# Patient Record
Sex: MALE | Race: BLACK OR AFRICAN AMERICAN | Employment: OTHER | URBAN - METROPOLITAN AREA
[De-identification: names, ages, dates, MRNs, and addresses within clinical notes are randomized per-mention and may not be internally consistent; named-entity substitution may affect disease eponyms.]

---

## 2017-01-17 ENCOUNTER — OFFICE VISIT (OUTPATIENT)
Dept: ENDOCRINOLOGY | Age: 72
End: 2017-01-17

## 2017-01-17 VITALS
SYSTOLIC BLOOD PRESSURE: 124 MMHG | DIASTOLIC BLOOD PRESSURE: 80 MMHG | HEIGHT: 72 IN | WEIGHT: 245.2 LBS | HEART RATE: 80 BPM | BODY MASS INDEX: 33.21 KG/M2

## 2017-01-17 DIAGNOSIS — I10 ESSENTIAL HYPERTENSION WITH GOAL BLOOD PRESSURE LESS THAN 130/80: ICD-10-CM

## 2017-01-17 DIAGNOSIS — E11.22 TYPE 2 DIABETES MELLITUS WITH STAGE 4 CHRONIC KIDNEY DISEASE, WITH LONG-TERM CURRENT USE OF INSULIN (HCC): Primary | ICD-10-CM

## 2017-01-17 DIAGNOSIS — E78.5 HYPERLIPIDEMIA, UNSPECIFIED HYPERLIPIDEMIA TYPE: ICD-10-CM

## 2017-01-17 DIAGNOSIS — N18.4 TYPE 2 DIABETES MELLITUS WITH STAGE 4 CHRONIC KIDNEY DISEASE, WITH LONG-TERM CURRENT USE OF INSULIN (HCC): Primary | ICD-10-CM

## 2017-01-17 DIAGNOSIS — Z79.4 TYPE 2 DIABETES MELLITUS WITH STAGE 4 CHRONIC KIDNEY DISEASE, WITH LONG-TERM CURRENT USE OF INSULIN (HCC): Primary | ICD-10-CM

## 2017-01-17 LAB — HBA1C MFR BLD HPLC: 8.8 %

## 2017-01-17 RX ORDER — CARVEDILOL 3.12 MG/1
6.25 TABLET ORAL 2 TIMES DAILY
COMMUNITY
Start: 2016-12-06 | End: 2018-02-27 | Stop reason: ALTCHOICE

## 2017-01-17 NOTE — PATIENT INSTRUCTIONS
Switch to Reli-On 70-30 insulin (mixed)  Take 40 units with breakfast and 40 units with dinner ONLY  STOP levemir and novolog    ----------------------------------------------------------------------------------------------------------------------    Below you will find a glucose log sheet which you can use to record your blood sugars. Without checking and recording what your home glucose levels are, it will be difficult to make any changes to your medication dose, even when significant changes may be needed. Please feel free to use the log below to record your home glucose levels. At the very least, I would like for you to login the entire 2-3 weeks just before your visit so we can make your visit much more productive and beneficial to you. GLUCOSE LOG SHEET:    Date Breakfast Lunch Dinner Bedtime Comments ? GLUCOSE LOG SHEET:    Date Breakfast Lunch Dinner Bedtime Comments ? GLUCOSE LOG SHEET:    Date Breakfast Lunch Dinner Bedtime Comments ? Learning About Diabetes Food Guidelines  Your Care Instructions  Meal planning is important to manage diabetes. It helps keep your blood sugar at a target level (which you set with your doctor). You don't have to eat special foods.  You can eat what your family eats, including sweets once in a while. But you do have to pay attention to how often you eat and how much you eat of certain foods. You may want to work with a dietitian or a certified diabetes educator (CDE) to help you plan meals and snacks. A dietitian or CDE can also help you lose weight if that is one of your goals. What should you know about eating carbs? Managing the amount of carbohydrate (carbs) you eat is an important part of healthy meals when you have diabetes. Carbohydrate is found in many foods. · Learn which foods have carbs. And learn the amounts of carbs in different foods. ¨ Bread, cereal, pasta, and rice have about 15 grams of carbs in a serving. A serving is 1 slice of bread (1 ounce), ½ cup of cooked cereal, or 1/3 cup of cooked pasta or rice. ¨ Fruits have 15 grams of carbs in a serving. A serving is 1 small fresh fruit, such as an apple or orange; ½ of a banana; ½ cup of cooked or canned fruit; ½ cup of fruit juice; 1 cup of melon or raspberries; or 2 tablespoons of dried fruit. ¨ Milk and no-sugar-added yogurt have 15 grams of carbs in a serving. A serving is 1 cup of milk or 2/3 cup of no-sugar-added yogurt. ¨ Starchy vegetables have 15 grams of carbs in a serving. A serving is ½ cup of mashed potatoes or sweet potato; 1 cup winter squash; ½ of a small baked potato; ½ cup of cooked beans; or ½ cup cooked corn or green peas. · Learn how much carbs to eat each day and at each meal. A dietitian or CDE can teach you how to keep track of the amount of carbs you eat. This is called carbohydrate counting. · If you are not sure how to count carbohydrate grams, use the Plate Method to plan meals. It is a good, quick way to make sure that you have a balanced meal. It also helps you spread carbs throughout the day. ¨ Divide your plate by types of foods.  Put non-starchy vegetables on half the plate, meat or other protein food on one-quarter of the plate, and a grain or starchy vegetable in the final quarter of the plate. To this you can add a small piece of fruit and 1 cup of milk or yogurt, depending on how many carbs you are supposed to eat at a meal.  · Try to eat about the same amount of carbs at each meal. Do not \"save up\" your daily allowance of carbs to eat at one meal.  · Proteins have very little or no carbs per serving. Examples of proteins are beef, chicken, turkey, fish, eggs, tofu, cheese, cottage cheese, and peanut butter. A serving size of meat is 3 ounces, which is about the size of a deck of cards. Examples of meat substitute serving sizes (equal to 1 ounce of meat) are 1/4 cup of cottage cheese, 1 egg, 1 tablespoon of peanut butter, and ½ cup of tofu. How can you eat out and still eat healthy? · Learn to estimate the serving sizes of foods that have carbohydrate. If you measure food at home, it will be easier to estimate the amount in a serving of restaurant food. · If the meal you order has too much carbohydrate (such as potatoes, corn, or baked beans), ask to have a low-carbohydrate food instead. Ask for a salad or green vegetables. · If you use insulin, check your blood sugar before and after eating out to help you plan how much to eat in the future. · If you eat more carbohydrate at a meal than you had planned, take a walk or do other exercise. This will help lower your blood sugar. What else should you know? · Limit saturated fat, such as the fat from meat and dairy products. This is a healthy choice because people who have diabetes are at higher risk of heart disease. So choose lean cuts of meat and nonfat or low-fat dairy products. Use olive or canola oil instead of butter or shortening when cooking. · Don't skip meals. Your blood sugar may drop too low if you skip meals and take insulin or certain medicines for diabetes. · Check with your doctor before you drink alcohol. Alcohol can cause your blood sugar to drop too low.  Alcohol can also cause a bad reaction if you take certain diabetes medicines. Follow-up care is a key part of your treatment and safety. Be sure to make and go to all appointments, and call your doctor if you are having problems. It's also a good idea to know your test results and keep a list of the medicines you take. Where can you learn more? Go to http://maria r-fran.info/. Enter B157 in the search box to learn more about \"Learning About Diabetes Food Guidelines. \"  Current as of: May 23, 2016  Content Version: 11.1  © 1797-9536 [employer]. Care instructions adapted under license by [employer] (which disclaims liability or warranty for this information). If you have questions about a medical condition or this instruction, always ask your healthcare professional. [address] any warranty or liability for your use of this information.

## 2017-01-17 NOTE — PROGRESS NOTES
CHIEF COMPLAINT: f/u evaluation for uncontrolled type 2 diabetes    HISTORY OF PRESENT ILLNESS:   Mandie Stovall is a 73 y.o. male with a PMHx as noted below who presents to the endocrinology clinic for f/u evaluation of uncontrolled type 2 diabetes. Patient presents for f/u. Noted for membranous nephropathy requiring steroids, and thus using NPH as an adjunct treatment for steroid induced hyperglycemia. He is currently off prednisone as of the 31st of December. Off the NPH as of 6-7th of January. Currently taking the following meds:  Levemir 30 units daily  Novolog 10/15/10  NPH: OFF    Review of home blood glucose:  -220  Lunch 180-300  Dinner 200-300's  Bedtime 200-300    Blood pressure today is stable, need to check a baseline urine microalbumin level, which may be a marker for us in the future of worsening nephropathy. PAST MEDICAL/SURGICAL HISTORY:   Past Medical History   Diagnosis Date    Arthritis     CAD (coronary artery disease)      high cholesterol    Chronic kidney disease     Diabetes ([facility] [address]     GERD (gastroesophageal reflux disease)      HX    Hypertension     Ill-defined condition     Unspecified sleep apnea      NO CPAP     Past Surgical History   Procedure Laterality Date    Hx orthopaedic       CERVICAL FUSION    Hx urological       TURP    Colonoscopy N/A 11/9/2016     COLONOSCOPY performed by Es Del Toro MD at [facility] ENDOSCOPY       ALLERGIES:   Allergies   Allergen Reactions    Albumin, Human 25 % Anaphylaxis and Hives       MEDICATIONS ON ADMISSION:     Current Outpatient Prescriptions:     carvedilol (COREG) 3.125 mg tablet, , Disp: , Rfl:     raNITIdine (ZANTAC) 150 mg tablet, , Disp: , Rfl:     insulin aspart (NOVOLOG) 100 unit/mL injection, 0.1 mL by SubCUTAneous route Before breakfast, lunch, and dinner., Disp: 10 mL, Rfl: 3    lisinopril (PRINIVIL, ZESTRIL) 20 mg tablet, Take 40 mg by mouth daily. , Disp: , Rfl:     atorvastatin (LIPITOR) 20 mg tablet, Take 20 mg by mouth daily. , Disp: , Rfl:     glimepiride (AMARYL) 4 mg tablet, Take 4 mg by mouth every morning., Disp: , Rfl:     insulin detemir (LEVEMIR) 100 unit/mL injection, 50 Units by SubCUTAneous route every evening., Disp: , Rfl:     Calcium-Cholecalciferol, D3, (CALCIUM 600 WITH VITAMIN D3) 600 mg(1,500mg) -400 unit chew, Take 1 Tab by mouth daily. , Disp: , Rfl:     fluticasone (FLONASE) 50 mcg/actuation nasal spray, 2 Sprays by Both Nostrils route daily. , Disp: , Rfl:     tamsulosin (FLOMAX) 0.4 mg capsule, Take 0.4 mg by mouth daily. , Disp: , Rfl:     insulin NPH (HUMULIN N) 100 unit/mL injection, 45 Units by SubCUTAneous route daily (with breakfast). To be taken with prednisone, Disp: , Rfl:     predniSONE (DELTASONE) 10 mg tablet, Take 40 mg by mouth daily. , Disp: , Rfl:     SOCIAL HISTORY:   Social History     Social History    Marital status: UNKNOWN     Spouse name: N/A    Number of children: N/A    Years of education: N/A     Occupational History    Not on file. Social History Main Topics    Smoking status: Former Smoker    Smokeless tobacco: Not on file      Comment: cigar    Alcohol use No    Drug use: No    Sexual activity: Not Currently     Other Topics Concern    Not on file     Social History Narrative       FAMILY HISTORY:  Family History   Problem Relation Age of Onset    Cancer Father     Diabetes Brother        REVIEW OF SYSTEMS: Complete ROS assessed and noted for that which is described above, all else are negative.   Eyes: normal  ENT: normal  CVS: normal  Resp: normal  GI: normal  : normal  GYN: normal  Endocrine: normal  Integument: normal  Musculoskeletal: normal  Neuro: normal  Psych: normal      PHYSICAL EXAMINATION:    VITAL SIGNS:  Visit Vitals    /80 (BP 1 Location: Right arm, BP Patient Position: Sitting)    Pulse 80    Ht 6' (1.829 m)    Wt 245 lb 3.2 oz (111.2 kg)    BMI 33.26 kg/m2       GENERAL: NCAT, Sitting comfortably, NAD  EYES: EOMI, non-icteric, no proptosis  Ear/Nose/Throat: NCAT, no inflammation, no masses  LYMPH NODES: No LAD  CARDIOVASCULAR: S1 S2, RRR, No murmur, 2+ radial pulses  RESPIRATORY: CTA b/l, no wheeze/rales  GASTROINTESTINAL: obese, NT, ND,  MUSCULOSKELETAL: Normal ROM, no atrophy  SKIN: warm, no edema/rash/ or other skin changes  NEUROLOGIC: 5/5 power all extremities, no tremors, AAOx3  PSYCHIATRIC: Normal affect, Normal insight and judgement         REVIEW OF LABORATORY AND RADIOLOGY DATA:   Labs and documentation have been reviewed as described above. ASSESSMENT AND PLAN:   Gricelda Nguyen is a 73 y.o. male with a PMHx as noted above who presents to the endocrinology clinic for evaluation of uncontrolled type 2 diabetes. 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 We discussed the plan moving forward and the importance of checking glucose and updating me with logs when glucose is not controlled. DM2:  STOP Levemir and Novolog  Start Novolin 70/30 at 40 units breakfast and dinner, will need to adjust from there, start this tomorrow  Continue to check glucose 4x/day  Provided with new log sheets      HTN: BP stable continue current meds  HLD: stable, will f/u on lipids when approp    Labs: urine microalbumin today    1 month f/u visit. Carolina Castellon.  [address] [facility]

## 2017-01-17 NOTE — MR AVS SNAPSHOT
Visit Information Date & Time Provider Department Dept. Phone Encounter #  
 1/17/2017  2:30 PM Luz Love, [address] [place] [address] Diabetes and Endocrinology [phone] [id number] Follow-up Instructions Return in about 1 month (around 2/17/2017). Upcoming Health Maintenance Date Due MICROALBUMIN Q1 8/29/1955 EYE EXAM RETINAL OR DILATED Q1 8/29/1957 FOBT Q 1 YEAR AGE 50-75 8/29/1997 ZOSTER VACCINE AGE 60> 8/29/2007 GLAUCOMA SCREENING Q2Y 8/29/2012 Pneumococcal 65+ High/Highest Risk (1 of 2 - PCV13) 8/29/2012 MEDICARE YEARLY EXAM 8/29/2012 HEMOGLOBIN A1C Q6M 5/16/2017 FOOT EXAM Q1 11/16/2017 LIPID PANEL Q1 11/16/2017 DTaP/Tdap/Td series (2 - Td) 6/24/2023 Allergies as of 1/17/2017  Review Complete On: 1/17/2017 By: Luz Love MD  
  
 Severity Noted Reaction Type Reactions Albumin, Human 25 % High 08/21/2016    Anaphylaxis, Hives Current Immunizations  Never Reviewed Name Date Influenza Vaccine (Quad) PF 11/1/2016  9:36 AM  
 Tdap 6/24/2013  7:24 PM  
  
 Not reviewed this visit You Were Diagnosed With   
  
 Codes Comments Type 2 diabetes mellitus with stage 4 chronic kidney disease, with long-term current use of insulin (HCC)    -  Primary ICD-10-CM: E11.22, N18.4, Z79.4 ICD-9-CM: 250.40, 585.4, V58.67 Hyperlipidemia, unspecified hyperlipidemia type     ICD-10-CM: E78.5 ICD-9-CM: 272.4 Essential hypertension with goal blood pressure less than 130/80     ICD-10-CM: I10 
ICD-9-CM: 401.9 Vitals BP Pulse Height(growth percentile) Weight(growth percentile) BMI Smoking Status 124/80 (BP 1 Location: Right arm, BP Patient Position: Sitting) 80 6' (1.829 m) 245 lb 3.2 oz (111.2 kg) 33.26 kg/m2 Former Smoker BMI and BSA Data Body Mass Index Body Surface Area  
 33.26 kg/m 2 2.38 m 2 Preferred Pharmacy Pharmacy Name Phone  [facility], [address] [facility] [phone] Your Updated Medication List  
  
   
This list is accurate as of: 17  3:00 PM.  Always use your most recent med list.  
  
  
  
  
 AMARYL 4 mg tablet Generic drug:  glimepiride Take 4 mg by mouth every morning. CALCIUM 600 WITH VITAMIN D3 600 mg(1,500mg) -400 unit Chew Generic drug:  Calcium-Cholecalciferol (D3) Take 1 Tab by mouth daily. carvedilol 3.125 mg tablet Commonly known as:  COREG  
  
 FLOMAX 0.4 mg capsule Generic drug:  tamsulosin Take 0.4 mg by mouth daily. FLONASE 50 mcg/actuation nasal spray Generic drug:  fluticasone 2 Sprays by Both Nostrils route daily. insulin NPH/insulin regular 100 unit/mL (70-30) injection Commonly known as:  NOVOLIN 70/30, HUMULIN 70/30  
40 Units by SubCUTAneous route Before breakfast and dinner. Reli-On brand  
  
 insulin syringe-needle U-100  
by SubCUTAneous route two (2) times a day. LIPITOR 20 mg tablet Generic drug:  atorvastatin Take 20 mg by mouth daily. lisinopril 20 mg tablet Commonly known as:  Supriya Kewanna Take 40 mg by mouth daily. predniSONE 10 mg tablet Commonly known as:  Christina Gordon Take 40 mg by mouth daily. raNITIdine 150 mg tablet Commonly known as:  ZANTAC Prescriptions Sent to Pharmacy Refills  
 insulin NPH/insulin regular (NOVOLIN 70/30, HUMULIN 70/30) 100 unit/mL (70-30) injection 11 Si Units by SubCUTAneous route Before breakfast and dinner. Reli-On brand Class: Normal  
 Pharmacy: [address], [facility] Ph #: [phone] Route: SubCUTAneous  
 insulin syringe-needle U-100 11 Sig: by SubCUTAneous route two (2) times a day. Class: Normal  
 Pharmacy: [address], [facility] Ph #: [phone] Route: SubCUTAneous We Performed the Following AMB POC HEMOGLOBIN A1C [62619 CPT(R)] MICROALBUMIN, UR, RAND W/ MICROALBUMIN/CREA RATIO [id number] [facility](R)] Follow-up Instructions Return in about 1 month (around 2/17/2017). Patient Instructions Switch to Reli-On 70-30 insulin (mixed) Take 40 units with breakfast and 40 units with dinner ONLY 
STOP levemir and novolog 
 
---------------------------------------------------------------------------------------------------------------------- Below you will find a glucose log sheet which you can use to record your blood sugars. Without checking and recording what your home glucose levels are, it will be difficult to make any changes to your medication dose, even when significant changes may be needed. Please feel free to use the log below to record your home glucose levels. At the very least, I would like for you to login the entire 2-3 weeks just before your visit so we can make your visit much more productive and beneficial to you. GLUCOSE LOG SHEET: 
 
Date Breakfast Lunch Dinner Bedtime Comments ? GLUCOSE LOG SHEET: 
 
Date Breakfast Lunch Dinner Bedtime Comments ? GLUCOSE LOG SHEET: 
 
Date Breakfast Lunch Dinner Bedtime Comments ? Learning About Diabetes Food Guidelines Your Care Instructions Meal planning is important to manage diabetes. It helps keep your blood sugar at a target level (which you set with your doctor). You don't have to eat special foods. You can eat what your family eats, including sweets once in a while. But you do have to pay attention to how often you eat and how much you eat of certain foods. You may want to work with a dietitian or a certified diabetes educator (CDE) to help you plan meals and snacks. A dietitian or CDE can also help you lose weight if that is one of your goals. What should you know about eating carbs? Managing the amount of carbohydrate (carbs) you eat is an important part of healthy meals when you have diabetes. Carbohydrate is found in many foods. · Learn which foods have carbs. And learn the amounts of carbs in different foods. ¨ Bread, cereal, pasta, and rice have about 15 grams of carbs in a serving. A serving is 1 slice of bread (1 ounce), ½ cup of cooked cereal, or 1/3 cup of cooked pasta or rice. ¨ Fruits have 15 grams of carbs in a serving. A serving is 1 small fresh fruit, such as an apple or orange; ½ of a banana; ½ cup of cooked or canned fruit; ½ cup of fruit juice; 1 cup of melon or raspberries; or 2 tablespoons of dried fruit. ¨ Milk and no-sugar-added yogurt have 15 grams of carbs in a serving. A serving is 1 cup of milk or 2/3 cup of no-sugar-added yogurt. ¨ Starchy vegetables have 15 grams of carbs in a serving. A serving is ½ cup of mashed potatoes or sweet potato; 1 cup winter squash; ½ of a small baked potato; ½ cup of cooked beans; or ½ cup cooked corn or green peas. · Learn how much carbs to eat each day and at each meal. A dietitian or CDE can teach you how to keep track of the amount of carbs you eat. This is called carbohydrate counting. · If you are not sure how to count carbohydrate grams, use the Plate Method to plan meals.  It is a good, quick way to make sure that you have a balanced meal. It also helps you spread carbs throughout the day. ¨ Divide your plate by types of foods. Put non-starchy vegetables on half the plate, meat or other protein food on one-quarter of the plate, and a grain or starchy vegetable in the final quarter of the plate. To this you can add a small piece of fruit and 1 cup of milk or yogurt, depending on how many carbs you are supposed to eat at a meal. 
· Try to eat about the same amount of carbs at each meal. Do not \"save up\" your daily allowance of carbs to eat at one meal. 
· Proteins have very little or no carbs per serving. Examples of proteins are beef, chicken, turkey, fish, eggs, tofu, cheese, cottage cheese, and peanut butter. A serving size of meat is 3 ounces, which is about the size of a deck of cards. Examples of meat substitute serving sizes (equal to 1 ounce of meat) are 1/4 cup of cottage cheese, 1 egg, 1 tablespoon of peanut butter, and ½ cup of tofu. How can you eat out and still eat healthy? · Learn to estimate the serving sizes of foods that have carbohydrate. If you measure food at home, it will be easier to estimate the amount in a serving of restaurant food. · If the meal you order has too much carbohydrate (such as potatoes, corn, or baked beans), ask to have a low-carbohydrate food instead. Ask for a salad or green vegetables. · If you use insulin, check your blood sugar before and after eating out to help you plan how much to eat in the future. · If you eat more carbohydrate at a meal than you had planned, take a walk or do other exercise. This will help lower your blood sugar. What else should you know? · Limit saturated fat, such as the fat from meat and dairy products. This is a healthy choice because people who have diabetes are at higher risk of heart disease. So choose lean cuts of meat and nonfat or low-fat dairy products. Use olive or canola oil instead of butter or shortening when cooking. · Don't skip meals. Your blood sugar may drop too low if you skip meals and take insulin or certain medicines for diabetes. · Check with your doctor before you drink alcohol. Alcohol can cause your blood sugar to drop too low. Alcohol can also cause a bad reaction if you take certain diabetes medicines. Follow-up care is a key part of your treatment and safety. Be sure to make and go to all appointments, and call your doctor if you are having problems. It's also a good idea to know your test results and keep a list of the medicines you take. Where can you learn more? Go to http://maria r-fran.info/. Enter W331 in the search box to learn more about \"Learning About Diabetes Food Guidelines. \" Current as of: May 23, 2016 Content Version: 11.1 © 2605-3749 [employer]. Care instructions adapted under license by [employer] (which disclaims liability or warranty for this information). If you have questions about a medical condition or this instruction, always ask your healthcare professional. [place] [address] any warranty or liability for your use of this information. Introducing [facility] & HEALTH SERVICES! [facility] introduces [employer] patient portal. Now you can access parts of your medical record, email your doctor's office, and request medication refills online. 1. In your internet browser, go to [email or web address] 2. Click on the First Time User? Click Here link in the Sign In box. You will see the New Member Sign Up page. 3. Enter your [employer] Access Code exactly as it appears below. You will not need to use this code after youve completed the sign-up process. If you do not sign up before the expiration date, you must request a new code. · [employer] Access Code: [email or web address] Expires: 1/25/2017  9:21 AM 
 
4.  Enter the last four digits of your Social Security Number (xxxx) and Date of Birth (mm/dd/yyyy) as indicated and click Submit. You will be taken to the next sign-up page. 5. Create a [employer] ID. This will be your [employer] login ID and cannot be changed, so think of one that is secure and easy to remember. 6. Create a [employer] password. You can change your password at any time. 7. Enter your Password Reset Question and Answer. This can be used at a later time if you forget your password. 8. Enter your e-mail address. You will receive e-mail notification when new information is available in [address] 9. Click Sign Up. You can now view and download portions of your medical record. 10. Click the Download Summary menu link to download a portable copy of your medical information. If you have questions, please visit the Frequently Asked Questions section of the [employer] website. Remember, [employer] is NOT to be used for urgent needs. For medical emergencies, dial 911. Now available from your iPhone and Android! Please provide this summary of care documentation to your next provider. Your primary care clinician is listed as Jennifer Mims. If you have any questions after today's visit, please call [phone].

## 2017-01-18 LAB
ALBUMIN/CREAT UR: 3857.9 MG/G CREAT (ref 0–30)
CREAT UR-MCNC: 122.8 MG/DL
INTERPRETATION: NORMAL
MICROALBUMIN UR-MCNC: 4737.5 UG/ML

## 2017-01-18 NOTE — PROGRESS NOTES
Proteinuria, treated for underlying nephropathy with steroids, completed course. May improve with better glycemic control. Letter sent to patient. No prior level with which to compare. Vania Schwartz.  [address] [facility]  [address] [place] [address]

## 2017-02-06 RX ORDER — DIPHENHYDRAMINE HYDROCHLORIDE 50 MG/ML
50 INJECTION, SOLUTION INTRAMUSCULAR; INTRAVENOUS ONCE
Status: COMPLETED | OUTPATIENT
Start: 2017-02-10 | End: 2017-02-10

## 2017-02-06 RX ORDER — ACETAMINOPHEN 325 MG/1
650 TABLET ORAL ONCE
Status: COMPLETED | OUTPATIENT
Start: 2017-02-10 | End: 2017-02-10

## 2017-02-10 ENCOUNTER — HOSPITAL ENCOUNTER (OUTPATIENT)
Dept: INFUSION THERAPY | Age: 72
Discharge: HOME | End: 2017-02-10
Payer: MEDICARE

## 2017-02-10 VITALS
OXYGEN SATURATION: 98 % | RESPIRATION RATE: 18 BRPM | SYSTOLIC BLOOD PRESSURE: 123 MMHG | DIASTOLIC BLOOD PRESSURE: 81 MMHG | TEMPERATURE: 99 F | WEIGHT: 250.3 LBS | HEART RATE: 93 BPM | BODY MASS INDEX: 33.95 KG/M2

## 2017-02-10 PROCEDURE — 74011000258 HC RX REV CODE- 258: Performed by: INTERNAL MEDICINE

## 2017-02-10 PROCEDURE — 96415 CHEMO IV INFUSION ADDL HR: CPT

## 2017-02-10 PROCEDURE — 99211 OFF/OP EST MAY X REQ PHY/QHP: CPT

## 2017-02-10 PROCEDURE — 96375 TX/PRO/DX INJ NEW DRUG ADDON: CPT

## 2017-02-10 PROCEDURE — 74011250636 HC RX REV CODE- 250/636

## 2017-02-10 PROCEDURE — 96413 CHEMO IV INFUSION 1 HR: CPT

## 2017-02-10 PROCEDURE — 74011250636 HC RX REV CODE- 250/636: Performed by: INTERNAL MEDICINE

## 2017-02-10 PROCEDURE — 74011250637 HC RX REV CODE- 250/637: Performed by: INTERNAL MEDICINE

## 2017-02-10 RX ORDER — SODIUM CHLORIDE 9 MG/ML
50 INJECTION, SOLUTION INTRAVENOUS ONCE
Status: COMPLETED | OUTPATIENT
Start: 2017-02-10 | End: 2017-02-10

## 2017-02-10 RX ORDER — ONDANSETRON 2 MG/ML
INJECTION INTRAMUSCULAR; INTRAVENOUS
Status: COMPLETED
Start: 2017-02-10 | End: 2017-02-10

## 2017-02-10 RX ORDER — ONDANSETRON 2 MG/ML
4 INJECTION INTRAMUSCULAR; INTRAVENOUS ONCE
Status: COMPLETED | OUTPATIENT
Start: 2017-02-10 | End: 2017-02-10

## 2017-02-10 RX ORDER — SODIUM CHLORIDE 0.9 % (FLUSH) 0.9 %
10-40 SYRINGE (ML) INJECTION AS NEEDED
Status: DISCONTINUED | OUTPATIENT
Start: 2017-02-10 | End: 2017-02-14 | Stop reason: HOSPADM

## 2017-02-10 RX ADMIN — ACETAMINOPHEN 650 MG: 325 TABLET ORAL at 10:59

## 2017-02-10 RX ADMIN — SODIUM CHLORIDE 50 ML/HR: 900 INJECTION, SOLUTION INTRAVENOUS at 10:59

## 2017-02-10 RX ADMIN — RITUXIMAB 880 MG: 10 INJECTION, SOLUTION INTRAVENOUS at 11:29

## 2017-02-10 RX ADMIN — ONDANSETRON 4 MG: 2 INJECTION INTRAMUSCULAR; INTRAVENOUS at 13:42

## 2017-02-10 RX ADMIN — METHYLPREDNISOLONE SODIUM SUCCINATE 125 MG: 125 INJECTION, POWDER, FOR SOLUTION INTRAMUSCULAR; INTRAVENOUS at 13:41

## 2017-02-10 RX ADMIN — Medication 4 MG: at 13:42

## 2017-02-10 RX ADMIN — DIPHENHYDRAMINE HYDROCHLORIDE 50 MG: 50 INJECTION INTRAMUSCULAR; INTRAVENOUS at 11:00

## 2017-02-10 NOTE — PROGRESS NOTES
Problem: Knowledge Deficit  Goal: *Verbalizes understanding of procedures and medications  Outcome: Progressing Towards Goal  Handout given on rituxan and reviewed with pt

## 2017-02-10 NOTE — PROGRESS NOTES
1000 Pt arrived at [facility] ambulatory and in no distress for Rituxan dose 1 of 2. Assessment completed, pt noted to have baseline SOB. States his blood sugars have improved greatly since switching to 70/30 insulin per Dr. Gilberto Looney. IV started left forearm with 22 gauge. Handout given on rituxan and reviewed with pt and wife. Visit Vitals    /90 (BP 1 Location: Left arm, BP Patient Position: Sitting)    Pulse 94    Temp 96.9 °F (36.1 °C)    Resp 18    Wt 113.5 kg (250 lb 4.8 oz)    SpO2 97%    BMI 33.95 kg/m2     Medications received:  NS @ KVO  Tylenol 650 mg PO  Benadryl 50 mg IV  rituxan 880 mg IV, titrated, started at 14 ml/ hr (50 mg) and titrated    1322 Rate @ 56 ml/ hr, pt began with rigors, visibly shaking. Rituxan stopped. NS started. VS taken and stable, pt has baseline SOB but no wheezing noted. PO2 97%. /90 manually. 1330 pt vomited large amount. 1335 Call placed to Dr. Marya Keane to advise of above. Orders to give solumedrol per protocol, zofran 4 mg IV x1 may repeat in 1 hour. If no wheezing, try to restart rituxan once symptoms resolve. Solumedrol 125 mg IV push, zofran 4 mg IV push    1425 Rigors resolved, pt states \" i feel much better\" . Rituxan restarted at 42 ml hr    1455  VSS, rituxan increased to 56 ml/ hr     1525 Rituxan increased to 70 ml/ hr    1550 Call placed to Dr. Esteban Vargas office at his request to update on condition, spoke to nurse Mary Adam. Pt tolerating rituxan since restarted, other than running low grade temps in the 99s.      1600  Mary Adam called to advise per Dr. Marya Keane, stop rituxan for today and do not give 2nd dose. Rituxan has approx 50-75 ml remaining in bag. Pt advised, and encouraged to follow up with Dr. Marya Keane for further instructions. Instructed to use tylenol/ benadryl if mild chills but if severe, or if he develops wheezing, chest pain he is to report to ER. Left IV flushed and removed.      Patient Vitals for the past 12 hrs:   Temp Pulse Resp BP SpO2   02/10/17 1550 99 °F (37.2 °C) 93 18 123/81 -   02/10/17 1520 99.3 °F (37.4 °C) 88 18 119/67 -   02/10/17 1454 99.5 °F (37.5 °C) 94 20 122/74 -   02/10/17 1344 - 88 18 146/88 98 %   02/10/17 1325 97.9 °F (36.6 °C) 76 18 128/90 97 %   02/10/17 1322 98.1 °F (36.7 °C) - 22 128/90 -   02/10/17 1235 97.8 °F (36.6 °C) 67 18 133/79 -   02/10/17 1205 97.5 °F (36.4 °C) 72 18 127/79 -   02/10/17 1009 96.9 °F (36.1 °C) 94 18 165/90 97 %       1610 Tolerated treatment well, no adverse reaction noted. D/Cd from [facility] ambulatory and in no distress accompanied by wife. No more appts at [facility].

## 2017-02-17 ENCOUNTER — OFFICE VISIT (OUTPATIENT)
Dept: ENDOCRINOLOGY | Age: 72
End: 2017-02-17

## 2017-02-17 VITALS
HEART RATE: 87 BPM | DIASTOLIC BLOOD PRESSURE: 83 MMHG | SYSTOLIC BLOOD PRESSURE: 159 MMHG | BODY MASS INDEX: 33.62 KG/M2 | HEIGHT: 72 IN | WEIGHT: 248.2 LBS

## 2017-02-17 DIAGNOSIS — N18.4 TYPE 2 DIABETES MELLITUS WITH STAGE 4 CHRONIC KIDNEY DISEASE, WITH LONG-TERM CURRENT USE OF INSULIN (HCC): Primary | ICD-10-CM

## 2017-02-17 DIAGNOSIS — E78.5 HYPERLIPIDEMIA, UNSPECIFIED HYPERLIPIDEMIA TYPE: ICD-10-CM

## 2017-02-17 DIAGNOSIS — I10 ESSENTIAL HYPERTENSION WITH GOAL BLOOD PRESSURE LESS THAN 130/80: ICD-10-CM

## 2017-02-17 DIAGNOSIS — Z79.4 TYPE 2 DIABETES MELLITUS WITH STAGE 4 CHRONIC KIDNEY DISEASE, WITH LONG-TERM CURRENT USE OF INSULIN (HCC): Primary | ICD-10-CM

## 2017-02-17 DIAGNOSIS — E11.22 TYPE 2 DIABETES MELLITUS WITH STAGE 4 CHRONIC KIDNEY DISEASE, WITH LONG-TERM CURRENT USE OF INSULIN (HCC): Primary | ICD-10-CM

## 2017-02-17 RX ORDER — SYRINGE-NEEDLE,INSULIN,0.5 ML 30 GX5/16"
SYRINGE, EMPTY DISPOSABLE MISCELLANEOUS
COMMUNITY
Start: 2017-01-17 | End: 2018-02-16 | Stop reason: SDUPTHER

## 2017-02-17 NOTE — MR AVS SNAPSHOT
Visit Information Date & Time Provider Department Dept. Phone Encounter #  
 2/17/2017  2:30 PM Evens Kern, [address] [facility] [phone] [id number] Follow-up Instructions Return in about 3 months (around 5/17/2017). Upcoming Health Maintenance Date Due FOBT Q 1 YEAR AGE 50-75 8/29/1997 ZOSTER VACCINE AGE 60> 8/29/2007 Pneumococcal 65+ High/Highest Risk (1 of 2 - PCV13) 8/29/2012 MEDICARE YEARLY EXAM 8/29/2012 HEMOGLOBIN A1C Q6M 7/17/2017 EYE EXAM RETINAL OR DILATED Q1 9/9/2017 FOOT EXAM Q1 11/16/2017 LIPID PANEL Q1 11/16/2017 MICROALBUMIN Q1 1/17/2018 GLAUCOMA SCREENING Q2Y 9/9/2018 DTaP/Tdap/Td series (2 - Td) 6/24/2023 Allergies as of 2/17/2017  Review Complete On: 2/17/2017 By: Evens Kern MD  
  
 Severity Noted Reaction Type Reactions Albumin, Human 25 % High 08/21/2016    Anaphylaxis, Hives Current Immunizations  Reviewed on 2/10/2017 Name Date Influenza Vaccine (Quad) PF 11/1/2016  9:36 AM  
 Tdap 6/24/2013  7:24 PM  
  
 Not reviewed this visit You Were Diagnosed With   
  
 Codes Comments Type 2 diabetes mellitus with stage 4 chronic kidney disease, with long-term current use of insulin (HCC)    -  Primary ICD-10-CM: E11.22, N18.4, Z79.4 ICD-9-CM: 250.40, 585.4, V58.67 Hyperlipidemia, unspecified hyperlipidemia type     ICD-10-CM: E78.5 ICD-9-CM: 272.4 Essential hypertension with goal blood pressure less than 130/80     ICD-10-CM: I10 
ICD-9-CM: 401.9 Vitals BP Pulse Height(growth percentile) Weight(growth percentile) BMI Smoking Status 159/83 (BP 1 Location: Right arm, BP Patient Position: Sitting) 87 6' (1.829 m) 248 lb 3.2 oz (112.6 kg) 33.66 kg/m2 Former Smoker Vitals History BMI and BSA Data Body Mass Index Body Surface Area  
 33.66 kg/m 2 2.39 m 2 Preferred Pharmacy Pharmacy Name Phone [facility] [address] [facility], [address] [phone] Your Updated Medication List  
  
   
This list is accurate as of: 2/17/17  3:00 PM.  Always use your most recent med list.  
  
  
  
  
 AMARYL 4 mg tablet Generic drug:  glimepiride Take 4 mg by mouth every morning. CALCIUM 600 WITH VITAMIN D3 600 mg(1,500mg) -400 unit Chew Generic drug:  Calcium-Cholecalciferol (D3) Take 1 Tab by mouth daily. carvedilol 3.125 mg tablet Commonly known as:  COREG  
  
 FLOMAX 0.4 mg capsule Generic drug:  tamsulosin Take 0.4 mg by mouth daily. FLONASE 50 mcg/actuation nasal spray Generic drug:  fluticasone 2 Sprays by Both Nostrils route daily. insulin NPH/insulin regular 100 unit/mL (70-30) injection Commonly known as:  NOVOLIN 70/30, HUMULIN 70/30  
40 Units by SubCUTAneous route Before breakfast and dinner. Reli-On brand  
  
 insulin syringe-needle U-100  
by SubCUTAneous route two (2) times a day. Insulin Syringe-Needle U-100 1 mL 30 gauge x 5/16 Syrg LIPITOR 20 mg tablet Generic drug:  atorvastatin Take 20 mg by mouth daily. lisinopril 20 mg tablet Commonly known as:  Arnold Files Take 40 mg by mouth daily. predniSONE 10 mg tablet Commonly known as:  Freya Gelineau Take 40 mg by mouth daily. raNITIdine 150 mg tablet Commonly known as:  ZANTAC Follow-up Instructions Return in about 3 months (around 5/17/2017). Patient Instructions Blood sugars are getting better Increase your mixed insulin as follows: 
 
40 units with breakfast,   45 units with dinner, If your morning blood sugars are still persistently above 160, increase dinner dose to 50 units.   
 
 
Plan for a 3 month f/u visit 
 
---------------------------------------------------------------------------------------------------------------------- 
 
 Below you will find a glucose log sheet which you can use to record your blood sugars. Without checking and recording what your home glucose levels are, it will be difficult to make any changes to your medication dose, even when significant changes may be needed. Please feel free to use the log below to record your home glucose levels. At the very least, I would like for you to login the entire 2-3 weeks just before your visit so we can make your visit much more productive and beneficial to you. GLUCOSE LOG SHEET: 
 
Date Breakfast Lunch Dinner Bedtime Comments ? GLUCOSE LOG SHEET: 
 
Date Breakfast Lunch Dinner Bedtime Comments ? GLUCOSE LOG SHEET: 
 
Date Breakfast Lunch Dinner Bedtime Comments ? Introducing [facility]! Lisa Arias introduces [employer] patient portal. Now you can access parts of your medical record, email your doctor's office, and request medication refills online. 1. In your internet browser, go to [email or web address] 2. Click on the First Time User? Click Here link in the Sign In box. You will see the New Member Sign Up page. 3. Enter your [employer] Access Code exactly as it appears below.  You will not need to use this code after youve completed the sign-up process. If you do not sign up before the expiration date, you must request a new code. · [employer] Access Code: [email or web address] Expires: 5/3/2017 10:12 AM 
 
4. Enter the last four digits of your Social Security Number (xxxx) and Date of Birth (mm/dd/yyyy) as indicated and click Submit. You will be taken to the next sign-up page. 5. Create a [employer] ID. This will be your [employer] login ID and cannot be changed, so think of one that is secure and easy to remember. 6. Create a [employer] password. You can change your password at any time. 7. Enter your Password Reset Question and Answer. This can be used at a later time if you forget your password. 8. Enter your e-mail address. You will receive e-mail notification when new information is available in [address] 9. Click Sign Up. You can now view and download portions of your medical record. 10. Click the Download Summary menu link to download a portable copy of your medical information. If you have questions, please visit the Frequently Asked Questions section of the [employer] website. Remember, [employer] is NOT to be used for urgent needs. For medical emergencies, dial 911. Now available from your iPhone and Android! Please provide this summary of care documentation to your next provider. Your primary care clinician is listed as Jovana Esposito. If you have any questions after today's visit, please call [phone].

## 2017-02-17 NOTE — PROGRESS NOTES
CHIEF COMPLAINT: f/u evaluation for uncontrolled type 2 diabetes    HISTORY OF PRESENT ILLNESS:   Tex Merlin is a 73 y.o. male with a PMHx as noted below who presents to the endocrinology clinic for f/u evaluation of uncontrolled type 2 diabetes. Patient presents for f/u. 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 He continues off prednisone as of the 31st of December. Off the NPH as of 6-7th of January. Currently taking the following meds:  Levemir and novolog were prev discontinued due to cost and he was started on reli-on novolin 70/30 insulin since he tends to get on NPH during steroids already. Previously advised 40 units BID AC    Review of home blood glucose:  AM       130-200  Lunch   Dinner  114-155  Bedtime     Nephropathy: Checked a urine microalbumin level for baseline following treatment with predisone: 3857.9mg / gm creatinine. Today he notes that he has had some joint symptoms, ,and feels dyspnea on exertion on occasion, along with fatigue.       PAST MEDICAL/SURGICAL HISTORY:   Past Medical History   Diagnosis Date    Arthritis     CAD (coronary artery disease)      high cholesterol    Chronic kidney disease     Diabetes ([facility] [address]     GERD (gastroesophageal reflux disease)      HX    Hypertension     Ill-defined condition     Unspecified sleep apnea      NO CPAP     Past Surgical History   Procedure Laterality Date    Hx orthopaedic       CERVICAL FUSION    Hx urological       TURP    Colonoscopy N/A 11/9/2016     COLONOSCOPY performed by Darnell King MD at [facility] ENDOSCOPY       ALLERGIES:   Allergies   Allergen Reactions    Albumin, Human 25 % Anaphylaxis and Hives       MEDICATIONS ON ADMISSION:     Current Outpatient Prescriptions:     Insulin Syringe-Needle U-100 1 mL 30 gauge x 5/16 syrg, , Disp: , Rfl:     carvedilol (COREG) 3.125 mg tablet, , Disp: , Rfl:    insulin NPH/insulin regular (NOVOLIN 70/30, HUMULIN 70/30) 100 unit/mL (70-30) injection, 40 Units by SubCUTAneous route Before breakfast and dinner. Reli-On brand, Disp: 30 mL, Rfl: 11    insulin syringe-needle U-100, by SubCUTAneous route two (2) times a day., Disp: 100 Syringe, Rfl: 11    raNITIdine (ZANTAC) 150 mg tablet, , Disp: , Rfl:     lisinopril (PRINIVIL, ZESTRIL) 20 mg tablet, Take 40 mg by mouth daily. , Disp: , Rfl:     atorvastatin (LIPITOR) 20 mg tablet, Take 20 mg by mouth daily. , Disp: , Rfl:     glimepiride (AMARYL) 4 mg tablet, Take 4 mg by mouth every morning., Disp: , Rfl:     Calcium-Cholecalciferol, D3, (CALCIUM 600 WITH VITAMIN D3) 600 mg(1,500mg) -400 unit chew, Take 1 Tab by mouth daily. , Disp: , Rfl:     fluticasone (FLONASE) 50 mcg/actuation nasal spray, 2 Sprays by Both Nostrils route daily. , Disp: , Rfl:     tamsulosin (FLOMAX) 0.4 mg capsule, Take 0.4 mg by mouth daily. , Disp: , Rfl:     predniSONE (DELTASONE) 10 mg tablet, Take 40 mg by mouth daily. , Disp: , Rfl:     SOCIAL HISTORY:   Social History     Social History    Marital status: UNKNOWN     Spouse name: N/A    Number of children: N/A    Years of education: N/A     Occupational History    Not on file. Social History Main Topics    Smoking status: Former Smoker    Smokeless tobacco: Not on file      Comment: cigar    Alcohol use No    Drug use: No    Sexual activity: Not Currently     Other Topics Concern    Not on file     Social History Narrative       FAMILY HISTORY:  Family History   Problem Relation Age of Onset    Cancer Father     Diabetes Brother        REVIEW OF SYSTEMS: Complete ROS assessed and noted for that which is described above, all else are negative.   Eyes: normal  ENT: normal  CVS: normal  Resp: normal  GI: normal  : normal  GYN: normal  Endocrine: normal  Integument: normal  Musculoskeletal: knee discomfort  Neuro: normal  Psych: normal      PHYSICAL EXAMINATION:    VITAL SIGNS:  Visit Vitals    /83 (BP 1 Location: Right arm, BP Patient Position: Sitting)    Pulse 87    Ht 6' (1.829 m)    Wt 248 lb 3.2 oz (112.6 kg)    BMI 33.66 kg/m2       GENERAL: NCAT, Sitting comfortably, NAD  EYES: EOMI, non-icteric, no proptosis  Ear/Nose/Throat: NCAT, no inflammation, no masses  LYMPH NODES: No LAD  CARDIOVASCULAR: S1 S2, RRR, No murmur  RESPIRATORY: CTA b/l, no wheeze/rales  GASTROINTESTINAL: obese, NT, ND,  MUSCULOSKELETAL: Normal ROM, no atrophy  SKIN: warm, no edema/rash/ or other skin changes  NEUROLOGIC: 5/5 power all extremities, no tremors, AAOx3  PSYCHIATRIC: Normal affect, Normal insight and judgement         REVIEW OF LABORATORY AND RADIOLOGY DATA:   Labs and documentation have been reviewed as described above. ASSESSMENT AND PLAN:   Leyla Vega is a 73 y.o. male with a PMHx as noted above who presents to the endocrinology clinic for evaluation of uncontrolled type 2 diabetes. 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 Need to increase evening dose of mixed insulin. Ultimately I find that he does well on this insulin and he has not minded the use of syringes, noting it is going well. I advised him that when he has symptoms of progressive dyspnea and feels ill, he should get evaluated sooner rather than later. Advised that even visiting the ED is appropriate with consideration to his prior acute CHF, and history of CAD. DM2:  Increase Novolin 70/30 to 40 units breakfast and 45 units with dinner  Continue to check glucose 4x/day  Provided with new log sheets    HTN: BP stable on coreg and lisinopril  HLD: lipitor 20mg    Labs: up to date, will review on future visit. 3 month f/u visit. Advised to call with any concerns,      Drew Colon.  [address] [facility]

## 2017-02-17 NOTE — PATIENT INSTRUCTIONS
Blood sugars are getting better  Increase your mixed insulin as follows:    40 units with breakfast,   45 units with dinner,         If your morning blood sugars are still persistently above 160, increase dinner dose to 50 units. Plan for a 3 month f/u visit    ----------------------------------------------------------------------------------------------------------------------    Below you will find a glucose log sheet which you can use to record your blood sugars. Without checking and recording what your home glucose levels are, it will be difficult to make any changes to your medication dose, even when significant changes may be needed. Please feel free to use the log below to record your home glucose levels. At the very least, I would like for you to login the entire 2-3 weeks just before your visit so we can make your visit much more productive and beneficial to you. GLUCOSE LOG SHEET:    Date Breakfast Lunch Dinner Bedtime Comments ? GLUCOSE LOG SHEET:    Date Breakfast Lunch Dinner Bedtime Comments ? GLUCOSE LOG SHEET:    Date Breakfast Lunch Dinner Bedtime Comments ?

## 2017-03-03 ENCOUNTER — APPOINTMENT (OUTPATIENT)
Dept: INFUSION THERAPY | Age: 72
End: 2017-03-03
Payer: MEDICARE

## 2017-03-21 RX ORDER — DEXAMETHASONE SODIUM PHOSPHATE 4 MG/ML
4 INJECTION, SOLUTION INTRA-ARTICULAR; INTRALESIONAL; INTRAMUSCULAR; INTRAVENOUS; SOFT TISSUE ONCE
Status: COMPLETED | OUTPATIENT
Start: 2017-03-24 | End: 2017-03-24

## 2017-03-21 RX ORDER — ACETAMINOPHEN 325 MG/1
650 TABLET ORAL ONCE
Status: COMPLETED | OUTPATIENT
Start: 2017-03-24 | End: 2017-03-24

## 2017-03-21 RX ORDER — DIPHENHYDRAMINE HYDROCHLORIDE 50 MG/ML
50 INJECTION, SOLUTION INTRAMUSCULAR; INTRAVENOUS ONCE
Status: COMPLETED | OUTPATIENT
Start: 2017-03-24 | End: 2017-03-24

## 2017-03-21 RX ORDER — ONDANSETRON 2 MG/ML
4 INJECTION INTRAMUSCULAR; INTRAVENOUS ONCE
Status: COMPLETED | OUTPATIENT
Start: 2017-03-24 | End: 2017-03-24

## 2017-03-24 ENCOUNTER — HOSPITAL ENCOUNTER (OUTPATIENT)
Dept: INFUSION THERAPY | Age: 72
Discharge: HOME | End: 2017-03-24
Payer: MEDICARE

## 2017-03-24 VITALS
BODY MASS INDEX: 34.58 KG/M2 | OXYGEN SATURATION: 96 % | TEMPERATURE: 97 F | DIASTOLIC BLOOD PRESSURE: 87 MMHG | RESPIRATION RATE: 18 BRPM | HEART RATE: 67 BPM | SYSTOLIC BLOOD PRESSURE: 142 MMHG | WEIGHT: 255 LBS

## 2017-03-24 PROCEDURE — 74011250636 HC RX REV CODE- 250/636: Performed by: INTERNAL MEDICINE

## 2017-03-24 PROCEDURE — 96415 CHEMO IV INFUSION ADDL HR: CPT

## 2017-03-24 PROCEDURE — 96375 TX/PRO/DX INJ NEW DRUG ADDON: CPT

## 2017-03-24 PROCEDURE — 96413 CHEMO IV INFUSION 1 HR: CPT

## 2017-03-24 PROCEDURE — 74011000258 HC RX REV CODE- 258: Performed by: INTERNAL MEDICINE

## 2017-03-24 PROCEDURE — 74011250637 HC RX REV CODE- 250/637: Performed by: INTERNAL MEDICINE

## 2017-03-24 RX ORDER — SODIUM CHLORIDE 0.9 % (FLUSH) 0.9 %
10-40 SYRINGE (ML) INJECTION AS NEEDED
Status: DISCONTINUED | OUTPATIENT
Start: 2017-03-24 | End: 2017-03-28 | Stop reason: HOSPADM

## 2017-03-24 RX ORDER — BUMETANIDE 2 MG/1
2 TABLET ORAL EVERY EVENING
Status: ON HOLD | COMMUNITY
End: 2020-07-21

## 2017-03-24 RX ORDER — SODIUM CHLORIDE 9 MG/ML
50 INJECTION, SOLUTION INTRAVENOUS ONCE
Status: COMPLETED | OUTPATIENT
Start: 2017-03-24 | End: 2017-03-24

## 2017-03-24 RX ADMIN — DIPHENHYDRAMINE HYDROCHLORIDE 50 MG: 50 INJECTION, SOLUTION INTRAMUSCULAR; INTRAVENOUS at 08:36

## 2017-03-24 RX ADMIN — ACETAMINOPHEN 650 MG: 325 TABLET ORAL at 08:35

## 2017-03-24 RX ADMIN — RITUXIMAB 880 MG: 10 INJECTION, SOLUTION INTRAVENOUS at 09:51

## 2017-03-24 RX ADMIN — SODIUM CHLORIDE 50 ML/HR: 900 INJECTION, SOLUTION INTRAVENOUS at 08:32

## 2017-03-24 RX ADMIN — DEXAMETHASONE SODIUM PHOSPHATE 4 MG: 4 INJECTION, SOLUTION INTRA-ARTICULAR; INTRALESIONAL; INTRAMUSCULAR; INTRAVENOUS; SOFT TISSUE at 08:51

## 2017-03-24 RX ADMIN — Medication 4 MG: at 08:47

## 2017-03-24 RX ADMIN — Medication 10 ML: at 08:32

## 2017-05-19 ENCOUNTER — OFFICE VISIT (OUTPATIENT)
Dept: ENDOCRINOLOGY | Age: 72
End: 2017-05-19

## 2017-05-19 VITALS
WEIGHT: 245.6 LBS | DIASTOLIC BLOOD PRESSURE: 97 MMHG | HEIGHT: 72 IN | HEART RATE: 72 BPM | SYSTOLIC BLOOD PRESSURE: 165 MMHG | BODY MASS INDEX: 33.26 KG/M2

## 2017-05-19 DIAGNOSIS — E78.5 HYPERLIPIDEMIA, UNSPECIFIED HYPERLIPIDEMIA TYPE: ICD-10-CM

## 2017-05-19 DIAGNOSIS — Z79.4 TYPE 2 DIABETES MELLITUS WITH STAGE 4 CHRONIC KIDNEY DISEASE, WITH LONG-TERM CURRENT USE OF INSULIN (HCC): Primary | ICD-10-CM

## 2017-05-19 DIAGNOSIS — N18.4 TYPE 2 DIABETES MELLITUS WITH STAGE 4 CHRONIC KIDNEY DISEASE, WITH LONG-TERM CURRENT USE OF INSULIN (HCC): Primary | ICD-10-CM

## 2017-05-19 DIAGNOSIS — I10 ESSENTIAL HYPERTENSION WITH GOAL BLOOD PRESSURE LESS THAN 130/80: ICD-10-CM

## 2017-05-19 DIAGNOSIS — E11.22 TYPE 2 DIABETES MELLITUS WITH STAGE 4 CHRONIC KIDNEY DISEASE, WITH LONG-TERM CURRENT USE OF INSULIN (HCC): Primary | ICD-10-CM

## 2017-05-19 LAB — HBA1C MFR BLD HPLC: 6.8 %

## 2017-05-19 RX ORDER — HYDRALAZINE HYDROCHLORIDE 25 MG/1
TABLET, FILM COATED ORAL
COMMUNITY
Start: 2017-04-18 | End: 2018-02-27 | Stop reason: ALTCHOICE

## 2017-05-19 RX ORDER — CLONIDINE HYDROCHLORIDE 0.1 MG/1
0.1 TABLET ORAL 2 TIMES DAILY
Qty: 180 TAB | Refills: 3 | Status: SHIPPED | OUTPATIENT
Start: 2017-05-19 | End: 2018-02-27 | Stop reason: ALTCHOICE

## 2017-05-19 NOTE — PROGRESS NOTES
CHIEF COMPLAINT: f/u evaluation for uncontrolled type 2 diabetes    HISTORY OF PRESENT ILLNESS:   Juana Lawson is a 73 y.o. male with a PMHx as noted below who presents to the endocrinology clinic for f/u evaluation of uncontrolled type 2 diabetes. Patient presents for f/u. 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 He continues off prednisone as of the 31st of December. Currently taking the following meds:  Levemir and novolog were prev discontinued due to cost and he was started on reli-on novolin 70/30 insulin since he tends to get on NPH during steroids already. Novolin 70/30 to 20 units breakfast and ZERO units with dinner, he cut back from the 40/45. Review of home blood glucose:  AM       105-160  Lunch   Dinner  97 - 120  Bedtime     Nephropathy: baseline proteinuria, seeing nephrology, last level off prednisone: 3857.9mg / gm creatinine. Feeling well, was concerned about some lows in th 50-70's frequently at bedtime. He has been cutting back on his dose as noted above.        PAST MEDICAL/SURGICAL HISTORY:   Past Medical History:   Diagnosis Date    Arthritis     CAD (coronary artery disease)     high cholesterol    Chronic kidney disease     Diabetes ([facility] [address]     GERD (gastroesophageal reflux disease)     HX    Hypertension     Ill-defined condition     Unspecified sleep apnea     NO CPAP     Past Surgical History:   Procedure Laterality Date    COLONOSCOPY N/A 11/9/2016    COLONOSCOPY performed by Lizbeth Avila MD at [facility] ENDOSCOPY    HX ORTHOPAEDIC      CERVICAL FUSION    HX UROLOGICAL      TURP       ALLERGIES:   Allergies   Allergen Reactions    Albumin, Human 25 % Anaphylaxis and Hives       MEDICATIONS ON ADMISSION:     Current Outpatient Prescriptions:     bumetanide (BUMEX) 2 mg tablet, Take 2 mg by mouth two (2) times a day., Disp: , Rfl:     Insulin Syringe-Needle U-100 1 mL 30 gauge x 5/16 syrg, , Disp: , Rfl:     carvedilol (COREG) 3.125 mg tablet, , Disp: , Rfl:     insulin syringe-needle U-100, by SubCUTAneous route two (2) times a day., Disp: 100 Syringe, Rfl: 11    raNITIdine (ZANTAC) 150 mg tablet, , Disp: , Rfl:     lisinopril (PRINIVIL, ZESTRIL) 20 mg tablet, Take 40 mg by mouth daily. , Disp: , Rfl:     atorvastatin (LIPITOR) 20 mg tablet, Take 20 mg by mouth daily. , Disp: , Rfl:     glimepiride (AMARYL) 4 mg tablet, Take 4 mg by mouth every morning., Disp: , Rfl:     Calcium-Cholecalciferol, D3, (CALCIUM 600 WITH VITAMIN D3) 600 mg(1,500mg) -400 unit chew, Take 1 Tab by mouth daily. , Disp: , Rfl:     fluticasone (FLONASE) 50 mcg/actuation nasal spray, 2 Sprays by Both Nostrils route daily. , Disp: , Rfl:     tamsulosin (FLOMAX) 0.4 mg capsule, Take 0.4 mg by mouth daily. , Disp: , Rfl:     hydrALAZINE (APRESOLINE) 25 mg tablet, , Disp: , Rfl:     insulin NPH/insulin regular (NOVOLIN 70/30, HUMULIN 70/30) 100 unit/mL (70-30) injection, 40 Units by SubCUTAneous route Before breakfast and dinner. Reli-On brand (Patient taking differently: 20 Units by SubCUTAneous route Before breakfast and dinner. Reli-On brand), Disp: 30 mL, Rfl: 11    predniSONE (DELTASONE) 10 mg tablet, Take 40 mg by mouth daily. , Disp: , Rfl:     SOCIAL HISTORY:   Social History     Social History    Marital status: UNKNOWN     Spouse name: N/A    Number of children: N/A    Years of education: N/A     Occupational History    Not on file. Social History Main Topics    Smoking status: Former Smoker    Smokeless tobacco: Not on file      Comment: cigar    Alcohol use No    Drug use: No    Sexual activity: Not Currently     Other Topics Concern    Not on file     Social History Narrative       FAMILY HISTORY:  Family History   Problem Relation Age of Onset    Cancer Father     Diabetes Brother        REVIEW OF SYSTEMS: Complete ROS assessed and noted for that which is described above, all else are negative.   Eyes: normal  ENT: normal  CVS: normal  Resp: normal  GI: normal  : normal  GYN: normal  Endocrine: normal  Integument: normal  Musculoskeletal: knee discomfort  Neuro: normal  Psych: normal      PHYSICAL EXAMINATION:    VITAL SIGNS:  Visit Vitals    BP (!) 165/97 (BP 1 Location: Right arm, BP Patient Position: Sitting)    Pulse 72    Ht 6' (1.829 m)    Wt 245 lb 9.6 oz (111.4 kg)    BMI 33.31 kg/m2       GENERAL: NCAT, Sitting comfortably, NAD  EYES: EOMI, non-icteric, no proptosis  Ear/Nose/Throat: NCAT, no inflammation, no masses  LYMPH NODES: No LAD  CARDIOVASCULAR: S1 S2, RRR, No murmur  RESPIRATORY: CTA b/l, no wheeze/rales  GASTROINTESTINAL: obese, NT, ND,  MUSCULOSKELETAL: Normal ROM, no atrophy  SKIN: warm, no edema/rash/ or other skin changes  NEUROLOGIC: 5/5 power all extremities, no tremors, AAOx3  PSYCHIATRIC: Normal affect, Normal insight and judgement         REVIEW OF LABORATORY AND RADIOLOGY DATA:   Labs and documentation have been reviewed as described above. ASSESSMENT AND PLAN:   Niya Bella is a 73 y.o. male with a PMHx as noted above who presents to the endocrinology clinic for evaluation of uncontrolled type 2 diabetes. 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 This is promising. Blood pressure however uncontrolled and needs to be addressed. DM2:  Novolin 70/30 to 7 units BID  Continue to check glucose 4x/day  Provided with new log sheets    HTN: BP remains elevated per his home records: coreg 6.25 BID and lisinopril 40mg daily, and hydralazine 25mg BID. Start clonidine 0.1mg BID  HLD: lipitor 20mg    Labs: up to date, will review on future visit. 3 month f/u visit. Advised to call with any concerns,      Arlet Yen.  [address] [facility] End[facility]

## 2017-05-19 NOTE — MR AVS SNAPSHOT
Visit Information Date & Time Provider Department Dept. Phone Encounter #  
 5/19/2017  2:30 PM Merle Tracy, [address] [place] [address] Diabetes and Endocrinology [phone] [id number] Follow-up Instructions Return in about 3 months (around 8/19/2017). Upcoming Health Maintenance Date Due FOBT Q 1 YEAR AGE 50-75 8/29/1997 ZOSTER VACCINE AGE 60> 8/29/2007 Pneumococcal 65+ High/Highest Risk (1 of 2 - PCV13) 8/29/2012 MEDICARE YEARLY EXAM 8/29/2012 HEMOGLOBIN A1C Q6M 7/17/2017 INFLUENZA AGE 9 TO ADULT 8/1/2017 EYE EXAM RETINAL OR DILATED Q1 9/9/2017 FOOT EXAM Q1 11/16/2017 LIPID PANEL Q1 11/16/2017 MICROALBUMIN Q1 1/17/2018 GLAUCOMA SCREENING Q2Y 9/9/2018 DTaP/Tdap/Td series (2 - Td) 6/24/2023 Allergies as of 5/19/2017  Review Complete On: 5/19/2017 By: Merle Tracy MD  
  
 Severity Noted Reaction Type Reactions Albumin, Human 25 % High 08/21/2016    Anaphylaxis, Hives Current Immunizations  Reviewed on 3/24/2017 Name Date Influenza Vaccine (Quad) PF 11/1/2016  9:36 AM  
 Tdap 6/24/2013  7:24 PM  
  
 Not reviewed this visit You Were Diagnosed With   
  
 Codes Comments Type 2 diabetes mellitus with stage 4 chronic kidney disease, with long-term current use of insulin (HCC)    -  Primary ICD-10-CM: E11.22, N18.4, Z79.4 ICD-9-CM: 250.40, 585.4, V58.67 Hyperlipidemia, unspecified hyperlipidemia type     ICD-10-CM: E78.5 ICD-9-CM: 272.4 Essential hypertension with goal blood pressure less than 130/80     ICD-10-CM: I10 
ICD-9-CM: 401.9 Vitals BP Pulse Height(growth percentile) Weight(growth percentile) BMI Smoking Status (!) 165/97 (BP 1 Location: Right arm, BP Patient Position: Sitting) 72 6' (1.829 m) 245 lb 9.6 oz (111.4 kg) 33.31 kg/m2 Former Smoker Vitals History BMI and BSA Data Body Mass Index Body Surface Area  
 33.31 kg/m 2 2.38 m 2 Preferred Pharmacy Pharmacy Name Phone [facility] [address] [facility], [address] [phone] Your Updated Medication List  
  
   
This list is accurate as of: 5/19/17  3:01 PM.  Always use your most recent med list.  
  
  
  
  
 AMARYL 4 mg tablet Generic drug:  glimepiride Take 4 mg by mouth every morning. bumetanide 2 mg tablet Commonly known as:  Christina Bolds Take 2 mg by mouth two (2) times a day. CALCIUM 600 WITH VITAMIN D3 600 mg(1,500mg) -400 unit Chew Generic drug:  Calcium-Cholecalciferol (D3) Take 1 Tab by mouth daily. carvedilol 3.125 mg tablet Commonly known as:  COREG  
  
 cloNIDine HCl 0.1 mg tablet Commonly known as:  CATAPRES Take 1 Tab by mouth two (2) times a day. FLOMAX 0.4 mg capsule Generic drug:  tamsulosin Take 0.4 mg by mouth daily. FLONASE 50 mcg/actuation nasal spray Generic drug:  fluticasone 2 Sprays by Both Nostrils route daily. hydrALAZINE 25 mg tablet Commonly known as:  APRESOLINE  
  
 insulin NPH/insulin regular 100 unit/mL (70-30) injection Commonly known as:  NOVOLIN 70/30, HUMULIN 70/30  
40 Units by SubCUTAneous route Before breakfast and dinner. Reli-On brand  
  
 insulin syringe-needle U-100  
by SubCUTAneous route two (2) times a day. Insulin Syringe-Needle U-100 1 mL 30 gauge x 5/16 Syrg LIPITOR 20 mg tablet Generic drug:  atorvastatin Take 20 mg by mouth daily. lisinopril 20 mg tablet Commonly known as:  Elizabeth Joon Take 40 mg by mouth daily. predniSONE 10 mg tablet Commonly known as:  [place] Take 40 mg by mouth daily. raNITIdine 150 mg tablet Commonly known as:  ZANTAC Prescriptions Sent to Pharmacy Refills  
 cloNIDine HCl (CATAPRES) 0.1 mg tablet 3 Sig: Take 1 Tab by mouth two (2) times a day. Class: Normal  
 Pharmacy: [facility] [address] [facility], [address] [facility] Ph #: [phone]  Route: Oral  
  
 We Performed the Following AMB POC HEMOGLOBIN A1C [05842 CPT(R)] Follow-up Instructions Return in about 3 months (around 8/19/2017). Patient Instructions Cut your insulin dose back to 7 units with breakfast and dinner,  
Continue checking your blood sugars regularly, We started clonidine 0.1mg twice daily in addition to your other blood pressure medications since your pressures have remained high at home for the past few weeks, 
 
 
---------------------------------------------------------------------------------------------------------------------- Below you will find a glucose log sheet which you can use to record your blood sugars. Without checking and recording what your home glucose levels are, it will be difficult to make any changes to your medication dose, even when significant changes may be needed. Please feel free to use the log below to record your home glucose levels. At the very least, I would like for you to login the entire 2-3 weeks just before your visit so we can make your visit much more productive and beneficial to you. GLUCOSE LOG SHEET: 
 
Date Breakfast Lunch Dinner Bedtime Comments ? GLUCOSE LOG SHEET: 
 
Date Breakfast Lunch Dinner Bedtime Comments ? GLUCOSE LOG SHEET: 
 
Date Breakfast Lunch Dinner Bedtime Comments ? Introducing [facility]! [facility] introduces [employer] patient portal. Now you can access parts of your medical record, email your doctor's office, and request medication refills online. 1. In your internet browser, go to [email or web address] 2. Click on the First Time User? Click Here link in the Sign In box. You will see the New Member Sign Up page. 3. Enter your [employer] Access Code exactly as it appears below. You will not need to use this code after youve completed the sign-up process. If you do not sign up before the expiration date, you must request a new code. · [employer] Access Code: [email or web address] Expires: 8/17/2017  3:00 PM 
 
4. Enter the last four digits of your Social Security Number (xxxx) and Date of Birth (mm/dd/yyyy) as indicated and click Submit. You will be taken to the next sign-up page. 5. Create a [employer] ID. This will be your [employer] login ID and cannot be changed, so think of one that is secure and easy to remember. 6. Create a [employer] password. You can change your password at any time. 7. Enter your Password Reset Question and Answer. This can be used at a later time if you forget your password. 8. Enter your e-mail address. You will receive e-mail notification when new information is available in [address] 9. Click Sign Up. You can now view and download portions of your medical record. 10. Click the Download Summary menu link to download a portable copy of your medical information. If you have questions, please visit the Frequently Asked Questions section of the [employer] website. Remember, [employer] is NOT to be used for urgent needs. For medical emergencies, dial 911. Now available from your iPhone and Android! Please provide this summary of care documentation to your next provider. Your primary care clinician is listed as Glenis Kemp  If you have any questions after today's visit, please call [phone].

## 2017-05-19 NOTE — PATIENT INSTRUCTIONS
Cut your insulin dose back to 7 units with breakfast and dinner,   Continue checking your blood sugars regularly,    We started clonidine 0.1mg twice daily in addition to your other blood pressure medications since your pressures have remained high at home for the past few weeks,      ----------------------------------------------------------------------------------------------------------------------    Below you will find a glucose log sheet which you can use to record your blood sugars. Without checking and recording what your home glucose levels are, it will be difficult to make any changes to your medication dose, even when significant changes may be needed. Please feel free to use the log below to record your home glucose levels. At the very least, I would like for you to login the entire 2-3 weeks just before your visit so we can make your visit much more productive and beneficial to you. GLUCOSE LOG SHEET:    Date Breakfast Lunch Dinner Bedtime Comments ? GLUCOSE LOG SHEET:    Date Breakfast Lunch Dinner Bedtime Comments ? GLUCOSE LOG SHEET:    Date Breakfast Lunch Dinner Bedtime Comments ?

## 2017-08-25 ENCOUNTER — OFFICE VISIT (OUTPATIENT)
Dept: ENDOCRINOLOGY | Age: 72
End: 2017-08-25

## 2017-08-25 VITALS
SYSTOLIC BLOOD PRESSURE: 167 MMHG | WEIGHT: 245 LBS | BODY MASS INDEX: 33.18 KG/M2 | DIASTOLIC BLOOD PRESSURE: 83 MMHG | HEART RATE: 64 BPM | HEIGHT: 72 IN

## 2017-08-25 DIAGNOSIS — I10 ESSENTIAL HYPERTENSION WITH GOAL BLOOD PRESSURE LESS THAN 130/80: ICD-10-CM

## 2017-08-25 DIAGNOSIS — Z79.4 TYPE 2 DIABETES MELLITUS WITH STAGE 4 CHRONIC KIDNEY DISEASE, WITH LONG-TERM CURRENT USE OF INSULIN (HCC): Primary | ICD-10-CM

## 2017-08-25 DIAGNOSIS — E11.22 TYPE 2 DIABETES MELLITUS WITH STAGE 4 CHRONIC KIDNEY DISEASE, WITH LONG-TERM CURRENT USE OF INSULIN (HCC): Primary | ICD-10-CM

## 2017-08-25 DIAGNOSIS — N18.4 TYPE 2 DIABETES MELLITUS WITH STAGE 4 CHRONIC KIDNEY DISEASE, WITH LONG-TERM CURRENT USE OF INSULIN (HCC): Primary | ICD-10-CM

## 2017-08-25 DIAGNOSIS — E78.5 HYPERLIPIDEMIA, UNSPECIFIED HYPERLIPIDEMIA TYPE: ICD-10-CM

## 2017-08-25 LAB — HBA1C MFR BLD HPLC: 7.1 %

## 2017-08-25 RX ORDER — FENOFIBRATE 145 MG/1
TABLET, COATED ORAL EVERY EVENING
Status: ON HOLD | COMMUNITY
End: 2020-07-21

## 2017-08-25 RX ORDER — CARVEDILOL 6.25 MG/1
12.5 TABLET ORAL 2 TIMES DAILY
COMMUNITY
Start: 2017-07-19 | End: 2018-10-09 | Stop reason: ALTCHOICE

## 2017-08-25 RX ORDER — HYDRALAZINE HYDROCHLORIDE 100 MG/1
100 TABLET, FILM COATED ORAL 3 TIMES DAILY
COMMUNITY
Start: 2017-08-14 | End: 2021-05-06

## 2017-08-25 NOTE — PATIENT INSTRUCTIONS
Continue 7 units of insulin twice daily  OK to increase to 20 units twice daily for one day when taking athar that day    Will see you back in 6 months,    ----------------------------------------------------------------------------------------------------------------------    Below you will find a glucose log sheet which you can use to record your blood sugars. Without checking and recording what your home glucose levels are, it will be difficult to make any changes to your medication dose, even when significant changes may be needed. Please feel free to use the log below to record your home glucose levels. At the very least, I would like for you to login the entire 2-3 weeks just before your visit so we can make your visit much more productive and beneficial to you. GLUCOSE LOG SHEET:    Date Breakfast Lunch Dinner Bedtime Comments ? GLUCOSE LOG SHEET:    Date Breakfast Lunch Dinner Bedtime Comments ? GLUCOSE LOG SHEET:    Date Breakfast Lunch Dinner Bedtime Comments ?                                                                                                                                                                                                                                             ----------------------------------------------------------------------------------------------------------------------    Below you will find a glucose log sheet which you can use to record your blood sugars.  Without checking and recording what your home glucose levels are, it will be difficult to make any changes to your medication dose, even when significant changes may be needed. Please feel free to use the log below to record your home glucose levels. At the very least, I would like for you to login the entire 2-3 weeks just before your visit so we can make your visit much more productive and beneficial to you. GLUCOSE LOG SHEET:    Date Breakfast Lunch Dinner Bedtime Comments ? GLUCOSE LOG SHEET:    Date Breakfast Lunch Dinner Bedtime Comments ? GLUCOSE LOG SHEET:    Date Breakfast Lunch Dinner Bedtime Comments ?

## 2017-08-25 NOTE — PROGRESS NOTES
CHIEF COMPLAINT: f/u evaluation for uncontrolled type 2 diabetes    HISTORY OF PRESENT ILLNESS:   Paras Clifton is a 73 y.o. male with a PMHx as noted below who presents to the endocrinology clinic for f/u evaluation of uncontrolled type 2 diabetes. Patient presents for f/u. 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 He continues off prednisone as of the 31st of December. Started Acthar (corticotropin) twice weekly for his renal disease, by nephrology, raises his blood sugars.       Currently taking the following meds:  Novolin 70/30:   7 units twice daily    Review of home blood glucose:  120-150 mostly  On days receiving acthar rises to 200's and takes 20 units of insulin BID for 1 day only, recovers    Nephropathy: baseline proteinuria, seeing nephrology    Review of most recent diabetes-related labs:  Lab Results   Component Value Date    HBA1C 10.5 (H) 11/16/2016    HBA1C 5.7 01/19/2011    URA2SSEP 6.8 05/19/2017    BEE9TBEW 8.8 01/17/2017    CHOL 199 11/16/2016    LDLC Comment 11/16/2016    GFRAA 21 (L) 11/01/2016    GFRNA 18 (L) 11/01/2016    MCACR 3857.9 (H) 01/17/2017    TSH 1.39 10/29/2016         PAST MEDICAL/SURGICAL HISTORY:   Past Medical History:   Diagnosis Date    Arthritis     CAD (coronary artery disease)     high cholesterol    Chronic kidney disease     Diabetes ([facility] [address]     GERD (gastroesophageal reflux disease)     HX    Hypertension     Ill-defined condition     Unspecified sleep apnea     NO CPAP     Past Surgical History:   Procedure Laterality Date    COLONOSCOPY N/A 11/9/2016    COLONOSCOPY performed by Megan Waggoner MD [facility] ENDOSCOPY    HX ORTHOPAEDIC      CERVICAL FUSION    HX UROLOGICAL      TURP       ALLERGIES:   Allergies   Allergen Reactions    Albumin, Human 25 % Anaphylaxis and Hives       MEDICATIONS ON ADMISSION:     Current Outpatient Prescriptions:     hydrALAZINE (APRESOLINE) 100 mg tablet, , Disp: , Rfl:     carvedilol (COREG) 6.25 mg tablet, , Disp: , Rfl:     fenofibrate nanocrystallized (TRICOR) 145 mg tablet, Take  by mouth daily. , Disp: , Rfl:     ACTHAR H.P. 80 unit/mL injectable gel, , Disp: , Rfl:     cloNIDine HCl (CATAPRES) 0.1 mg tablet, Take 1 Tab by mouth two (2) times a day., Disp: 180 Tab, Rfl: 3    bumetanide (BUMEX) 2 mg tablet, Take 2 mg by mouth two (2) times a day., Disp: , Rfl:     Insulin Syringe-Needle U-100 1 mL 30 gauge x 5/16 syrg, , Disp: , Rfl:     insulin NPH/insulin regular (NOVOLIN 70/30, HUMULIN 70/30) 100 unit/mL (70-30) injection, 40 Units by SubCUTAneous route Before breakfast and dinner. Reli-On brand (Patient taking differently: 20 Units by SubCUTAneous route Before breakfast and dinner. Reli-On brand), Disp: 30 mL, Rfl: 11    insulin syringe-needle U-100, by SubCUTAneous route two (2) times a day., Disp: 100 Syringe, Rfl: 11    lisinopril (PRINIVIL, ZESTRIL) 20 mg tablet, Take 40 mg by mouth daily. , Disp: , Rfl:     atorvastatin (LIPITOR) 20 mg tablet, Take 20 mg by mouth daily. , Disp: , Rfl:     glimepiride (AMARYL) 4 mg tablet, Take 4 mg by mouth every morning., Disp: , Rfl:     Calcium-Cholecalciferol, D3, (CALCIUM 600 WITH VITAMIN D3) 600 mg(1,500mg) -400 unit chew, Take 1 Tab by mouth daily. , Disp: , Rfl:     fluticasone (FLONASE) 50 mcg/actuation nasal spray, 2 Sprays by Both Nostrils route daily. , Disp: , Rfl:     tamsulosin (FLOMAX) 0.4 mg capsule, Take 0.4 mg by mouth daily. , Disp: , Rfl:     hydrALAZINE (APRESOLINE) 25 mg tablet, , Disp: , Rfl:     carvedilol (COREG) 3.125 mg tablet, , Disp: , Rfl:     raNITIdine (ZANTAC) 150 mg tablet, , Disp: , Rfl:     predniSONE (DELTASONE) 10 mg tablet, Take 40 mg by mouth daily. , Disp: , Rfl:     SOCIAL HISTORY:   Social History     Social History    Marital status: UNKNOWN     Spouse name: N/A    Number of children: N/A    Years of education: N/A     Occupational History    Not on file.      Social History Main Topics    Smoking status: Former Smoker    Smokeless tobacco: Not on file      Comment: cigar    Alcohol use No    Drug use: No    Sexual activity: Not Currently     Other Topics Concern    Not on file     Social History Narrative       FAMILY HISTORY:  Family History   Problem Relation Age of Onset    Cancer Father     Diabetes Brother        REVIEW OF SYSTEMS: Complete ROS assessed and noted for that which is described above, all else are negative. Eyes: normal  ENT: normal  CVS: normal  Resp: normal  GI: normal  : normal  GYN: normal  Endocrine: normal  Integument: normal  Musculoskeletal: knee discomfort  Neuro: normal  Psych: normal      PHYSICAL EXAMINATION:    VITAL SIGNS:  Visit Vitals    /83 (BP 1 Location: Right arm, BP Patient Position: Sitting)    Pulse 64    Ht 6' (1.829 m)    Wt 245 lb (111.1 kg)    BMI 33.23 kg/m2       GENERAL: NCAT, Sitting comfortably, NAD  EYES: EOMI, non-icteric, no proptosis  Ear/Nose/Throat: NCAT, no inflammation, no masses  LYMPH NODES: No LAD  CARDIOVASCULAR: S1 S2, RRR, No murmur  RESPIRATORY: CTA b/l, no wheeze/rales  GASTROINTESTINAL: obese, NT, ND,  MUSCULOSKELETAL: Normal ROM, no atrophy  SKIN: warm, no edema/rash/ or other skin changes  NEUROLOGIC: 5/5 power all extremities, no tremors, AAOx3  PSYCHIATRIC: Normal affect, Normal insight and judgement         REVIEW OF LABORATORY AND RADIOLOGY DATA:   Labs and documentation have been reviewed as described above. ASSESSMENT AND PLAN:   Gianluca Lorenz is a 73 y.o. male with a PMHx as noted above who presents to the endocrinology clinic for evaluation of uncontrolled type 2 diabetes. 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 This is promising. Blood pressure however uncontrolled and needs to be addressed.      DM2:  Novolin 70/30 to 7 units BID  Continue to check glucose 4x/day  Provided with new log sheets    HTN: BP fluctuates: coreg 6.25 BID and lisinopril 40mg daily, and hydralazine 100 mg TID. Started clonidine 0.1mg BID last visit. HLD: lipitor 20mg    Labs: up to date, will review on future visit. 6 month f/u visit. Advised to call with any concerns,    >25 minutes spent together with patient today of which >50% of this time was spent in counseling and coordination of care. Tran Arevalo.  [address] [facility]

## 2017-08-25 NOTE — MR AVS SNAPSHOT
Visit Information Date & Time Provider Department Dept. Phone Encounter #  
 8/25/2017 12:10 PM Reshma Thapa, [address] [place] [address] Diabetes and Endocrinology [phone] [id number] Follow-up Instructions Return in about 6 months (around 2/25/2018). Upcoming Health Maintenance Date Due FOBT Q 1 YEAR AGE 50-75 8/29/1997 ZOSTER VACCINE AGE 60> 6/29/2007 Pneumococcal 65+ High/Highest Risk (1 of 2 - PCV13) 8/29/2012 MEDICARE YEARLY EXAM 8/29/2012 INFLUENZA AGE 9 TO ADULT 8/1/2017 EYE EXAM RETINAL OR DILATED Q1 9/9/2017 FOOT EXAM Q1 11/16/2017 LIPID PANEL Q1 11/16/2017 HEMOGLOBIN A1C Q6M 11/19/2017 MICROALBUMIN Q1 1/17/2018 GLAUCOMA SCREENING Q2Y 9/9/2018 DTaP/Tdap/Td series (2 - Td) 6/24/2023 Allergies as of 8/25/2017  Review Complete On: 8/25/2017 By: Reshma Thapa MD  
  
 Severity Noted Reaction Type Reactions Albumin, Human 25 % High 08/21/2016    Anaphylaxis, Hives Current Immunizations  Reviewed on 3/24/2017 Name Date Influenza Vaccine (Quad) PF 11/1/2016  9:36 AM  
 Tdap 6/24/2013  7:24 PM  
  
 Not reviewed this visit You Were Diagnosed With   
  
 Codes Comments Type 2 diabetes mellitus with stage 4 chronic kidney disease, with long-term current use of insulin (HCC)    -  Primary ICD-10-CM: E11.22, N18.4, Z79.4 ICD-9-CM: 250.40, 585.4, V58.67 Hyperlipidemia, unspecified hyperlipidemia type     ICD-10-CM: E78.5 ICD-9-CM: 272.4 Essential hypertension with goal blood pressure less than 130/80     ICD-10-CM: I10 
ICD-9-CM: 401.9 Vitals BP Pulse Height(growth percentile) Weight(growth percentile) BMI Smoking Status 167/83 (BP 1 Location: Right arm, BP Patient Position: Sitting) 64 6' (1.829 m) 245 lb (111.1 kg) 33.23 kg/m2 Former Smoker Vitals History BMI and BSA Data Body Mass Index Body Surface Area  
 33.23 kg/m 2 2.38 m 2 Preferred Pharmacy Pharmacy Name Phone [facility] 2002 [facility], [address] [phone] Your Updated Medication List  
  
   
This list is accurate as of: 8/25/17 12:54 PM.  Always use your most recent med list.  
  
  
  
  
 Margrette Dry H.P. 80 unit/mL injectable gel Generic drug:  corticotropin AMARYL 4 mg tablet Generic drug:  glimepiride Take 4 mg by mouth every morning. bumetanide 2 mg tablet Commonly known as:  González Pack Take 2 mg by mouth two (2) times a day. CALCIUM 600 WITH VITAMIN D3 600 mg(1,500mg) -400 unit Chew Generic drug:  Calcium-Cholecalciferol (D3) Take 1 Tab by mouth daily. * carvedilol 3.125 mg tablet Commonly known as:  COREG  
6.25 mg two (2) times a day. * carvedilol 6.25 mg tablet Commonly known as:  COREG  
  
 cloNIDine HCl 0.1 mg tablet Commonly known as:  CATAPRES Take 1 Tab by mouth two (2) times a day. fenofibrate nanocrystallized 145 mg tablet Commonly known as:  [employer] Take  by mouth daily. FLOMAX 0.4 mg capsule Generic drug:  tamsulosin Take 0.4 mg by mouth daily. FLONASE 50 mcg/actuation nasal spray Generic drug:  fluticasone 2 Sprays by Both Nostrils route daily. * hydrALAZINE 25 mg tablet Commonly known as:  APRESOLINE * hydrALAZINE 100 mg tablet Commonly known as:  APRESOLINE  
100 mg three (3) times daily. insulin NPH/insulin regular 100 unit/mL (70-30) injection Commonly known as:  NOVOLIN 70/30, HUMULIN 70/30  
40 Units by SubCUTAneous route Before breakfast and dinner. Reli-On brand  
  
 insulin syringe-needle U-100  
by SubCUTAneous route two (2) times a day. Insulin Syringe-Needle U-100 1 mL 30 gauge x 5/16 Syrg LIPITOR 20 mg tablet Generic drug:  atorvastatin Take 20 mg by mouth daily. lisinopril 20 mg tablet Commonly known as:  Mechele Livings Take 40 mg by mouth daily. predniSONE 10 mg tablet Commonly known as:  Edrie Power  
 Take 40 mg by mouth daily. raNITIdine 150 mg tablet Commonly known as:  ZANTAC * Notice: This list has 4 medication(s) that are the same as other medications prescribed for you. Read the directions carefully, and ask your doctor or other care provider to review them with you. We Performed the Following AMB POC HEMOGLOBIN A1C [51937 CPT(R)] Follow-up Instructions Return in about 6 months (around 2/25/2018). Patient Instructions Continue 7 units of insulin twice daily OK to increase to 20 units twice daily for one day when taking athar that day Will see you back in 6 months, 
 
---------------------------------------------------------------------------------------------------------------------- Below you will find a glucose log sheet which you can use to record your blood sugars. Without checking and recording what your home glucose levels are, it will be difficult to make any changes to your medication dose, even when significant changes may be needed. Please feel free to use the log below to record your home glucose levels. At the very least, I would like for you to login the entire 2-3 weeks just before your visit so we can make your visit much more productive and beneficial to you. GLUCOSE LOG SHEET: 
 
Date Breakfast Lunch Dinner Bedtime Comments ? GLUCOSE LOG SHEET: 
 
Date Breakfast Lunch Dinner Bedtime Comments ? GLUCOSE LOG SHEET: 
 
 Date Breakfast Lunch Dinner Bedtime Comments ?  
       
       
       
       
       
       
       
       
       
       
       
       
       
       
       
       
       
       
       
       
       
       
       
       
       
       
       
       
       
 
---------------------------------------------------------------------------------------------------------------------- Below you will find a glucose log sheet which you can use to record your blood sugars. Without checking and recording what your home glucose levels are, it will be difficult to make any changes to your medication dose, even when significant changes may be needed. Please feel free to use the log below to record your home glucose levels. At the very least, I would like for you to login the entire 2-3 weeks just before your visit so we can make your visit much more productive and beneficial to you. GLUCOSE LOG SHEET: 
 
Date Breakfast Lunch Dinner Bedtime Comments ? GLUCOSE LOG SHEET: 
 
Date Breakfast Lunch Dinner Bedtime Comments ? GLUCOSE LOG SHEET: 
 
Date Breakfast Lunch Dinner Bedtime Comments ? Introducing [facility] & HEALTH SERVICES!    
 [employer] introduces [employer] patient portal. Now you can access parts of your medical record, email your doctor's office, and request medication refills online. 1. In your internet browser, go to [email or web address] 2. Click on the First Time User? Click Here link in the Sign In box. You will see the New Member Sign Up page. 3. Enter your its learning Access Code exactly as it appears below. You will not need to use this code after youve completed the sign-up process. If you do not sign up before the expiration date, you must request a new code. · its learning Access Code: [email or web address] Expires: 11/23/2017 12:43 PM 
 
4. Enter the last four digits of your Social Security Number (xxxx) and Date of Birth (mm/dd/yyyy) as indicated and click Submit. You will be taken to the next sign-up page. 5. Create a its learning ID. This will be your its learning login ID and cannot be changed, so think of one that is secure and easy to remember. 6. Create a its learning password. You can change your password at any time. 7. Enter your Password Reset Question and Answer. This can be used at a later time if you forget your password. 8. Enter your e-mail address. You will receive e-mail notification when new information is available in [address] 9. Click Sign Up. You can now view and download portions of your medical record. 10. Click the Download Summary menu link to download a portable copy of your medical information. If you have questions, please visit the Frequently Asked Questions section of the its learning website. Remember, its learning is NOT to be used for urgent needs. For medical emergencies, dial 911. Now available from your iPhone and Android! Please provide this summary of care documentation to your next provider. Your primary care clinician is listed as Master James. If you have any questions after today's visit, please call [phone].

## 2017-10-02 LAB
CREATININE, EXTERNAL: NORMAL
MICROALBUMIN UR TEST STR-MCNC: NORMAL MG/DL

## 2017-10-16 ENCOUNTER — DOCUMENTATION ONLY (OUTPATIENT)
Dept: ENDOCRINOLOGY | Age: 72
End: 2017-10-16

## 2018-01-29 ENCOUNTER — TELEPHONE (OUTPATIENT)
Dept: ENDOCRINOLOGY | Age: 73
End: 2018-01-29

## 2018-02-14 ENCOUNTER — TELEPHONE (OUTPATIENT)
Dept: ENDOCRINOLOGY | Age: 73
End: 2018-02-14

## 2018-02-14 NOTE — TELEPHONE ENCOUNTER
2/14/2018, 4:11 PM    Attempted to call Nahum Ortega, reached voice mail. I left a message to return my call.       Titi Rangel

## 2018-02-15 NOTE — TELEPHONE ENCOUNTER
Ordered,    Patient should discuss this also with the doctor prescribing the acthar as his dose may need to be modified. 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 Thanks,  Christopher Bennett.  [facility]  [address] [place] [address]

## 2018-02-15 NOTE — TELEPHONE ENCOUNTER
I called  Nina Quick and relayed the message from Dr. Paddy Lora. He said he has a appointment with that doctor next week and will talk about it then.   Vero Hernández

## 2018-02-15 NOTE — TELEPHONE ENCOUNTER
I returned Mr. Taiwo Lemus call. He said that now that he is taking his Novolin 70/30 60 units BID because of the ACTHAR, he needs a new script with the new amount. His old script said 7 units and the insurance won't give him any more insulin unless there is a new script with the new amounts. I told him that I would pass this on to Dr. Chester English and give him a call back .   Bibi Goode

## 2018-02-16 RX ORDER — SYRINGE-NEEDLE,INSULIN,0.5 ML 30 GX5/16"
SYRINGE, EMPTY DISPOSABLE MISCELLANEOUS
Qty: 100 SYRINGE | Refills: 7 | Status: SHIPPED | OUTPATIENT
Start: 2018-02-16 | End: 2021-05-05

## 2018-02-21 ENCOUNTER — TELEPHONE (OUTPATIENT)
Dept: ENDOCRINOLOGY | Age: 73
End: 2018-02-21

## 2018-02-21 NOTE — TELEPHONE ENCOUNTER
2/21/2018, 4:20 PM    Attempted to call Hloly Patel, reached voice mail. I left a message to return my call.       Melissa Gracia

## 2018-02-21 NOTE — TELEPHONE ENCOUNTER
Mr. Broderick Jackson called me back. He asked if we could order a 1ml syringe as he needs a larger one for the amount he has to take. I told him that we had done a script and sent it to his pharmacy on 2/16. I called the pharmacy and they said they would have that ready for him to  later today. I let Mr. Broderick Jackson know this.   Summer Cifuentes

## 2018-02-21 NOTE — TELEPHONE ENCOUNTER
Patient called to ask for a larger syring. He says the one he is using now is not big enough. He can be reached at:  [phone].

## 2018-02-27 ENCOUNTER — OFFICE VISIT (OUTPATIENT)
Dept: ENDOCRINOLOGY | Age: 73
End: 2018-02-27

## 2018-02-27 VITALS
BODY MASS INDEX: 34.02 KG/M2 | HEART RATE: 75 BPM | DIASTOLIC BLOOD PRESSURE: 74 MMHG | WEIGHT: 251.2 LBS | SYSTOLIC BLOOD PRESSURE: 115 MMHG | HEIGHT: 72 IN

## 2018-02-27 DIAGNOSIS — E78.5 HYPERLIPIDEMIA, UNSPECIFIED HYPERLIPIDEMIA TYPE: ICD-10-CM

## 2018-02-27 DIAGNOSIS — I10 ESSENTIAL HYPERTENSION WITH GOAL BLOOD PRESSURE LESS THAN 130/80: ICD-10-CM

## 2018-02-27 DIAGNOSIS — Z79.4 TYPE 2 DIABETES MELLITUS WITH STAGE 4 CHRONIC KIDNEY DISEASE, WITH LONG-TERM CURRENT USE OF INSULIN (HCC): Primary | ICD-10-CM

## 2018-02-27 DIAGNOSIS — E11.22 TYPE 2 DIABETES MELLITUS WITH STAGE 4 CHRONIC KIDNEY DISEASE, WITH LONG-TERM CURRENT USE OF INSULIN (HCC): Primary | ICD-10-CM

## 2018-02-27 DIAGNOSIS — N18.4 TYPE 2 DIABETES MELLITUS WITH STAGE 4 CHRONIC KIDNEY DISEASE, WITH LONG-TERM CURRENT USE OF INSULIN (HCC): Primary | ICD-10-CM

## 2018-02-27 LAB — HBA1C MFR BLD HPLC: 10.7 %

## 2018-02-27 RX ORDER — PEN NEEDLE, DIABETIC 31 GX3/16"
NEEDLE, DISPOSABLE MISCELLANEOUS
Qty: 100 PEN NEEDLE | Refills: 3 | Status: SHIPPED | OUTPATIENT
Start: 2018-02-27 | End: 2019-07-30 | Stop reason: SDUPTHER

## 2018-02-27 RX ORDER — CLONIDINE HYDROCHLORIDE 0.2 MG/1
TABLET ORAL EVERY EVENING
COMMUNITY
Start: 2018-02-03 | End: 2018-10-09 | Stop reason: ALTCHOICE

## 2018-02-27 NOTE — MR AVS SNAPSHOT
[facility] [address] [id number] [phone] Patient: Luis Akers MRN: [id number] ZJB:[id number] Visit Information Date & Time Provider Department Dept. Phone Encounter #  
 2/27/2018 10:50 AM Kimmy Jain, [address] [place] [address] Diabetes and Endocrinology [phone] [id number] Follow-up Instructions Return in about 3 months (around 5/27/2018). Upcoming Health Maintenance Date Due FOBT Q 1 YEAR AGE 50-75 8/29/1997 ZOSTER VACCINE AGE 60> 6/29/2007 Pneumococcal 65+ High/Highest Risk (1 of 2 - PCV13) 8/29/2012 MEDICARE YEARLY EXAM 8/29/2012 Influenza Age 5 to Adult 8/1/2017 FOOT EXAM Q1 11/16/2017 LIPID PANEL Q1 11/16/2017 HEMOGLOBIN A1C Q6M 2/25/2018 MICROALBUMIN Q1 10/2/2018 EYE EXAM RETINAL OR DILATED Q1 10/3/2018 GLAUCOMA SCREENING Q2Y 10/3/2019 DTaP/Tdap/Td series (2 - Td) 6/24/2023 Allergies as of 2/27/2018  Review Complete On: 2/27/2018 By: Pelon Mcmanus Severity Noted Reaction Type Reactions Albumin, Human 25 % High 08/21/2016    Anaphylaxis, Hives Niacin  02/27/2018    Hives Current Immunizations  Reviewed on 3/24/2017 Name Date Influenza Vaccine (Quad) PF 11/1/2016  9:36 AM  
 Tdap 6/24/2013  7:24 PM  
  
 Not reviewed this visit You Were Diagnosed With   
  
 Codes Comments Type 2 diabetes mellitus with stage 4 chronic kidney disease, with long-term current use of insulin (HCC)    -  Primary ICD-10-CM: E11.22, N18.4, Z79.4 ICD-9-CM: 250.40, 585.4, V58.67 Hyperlipidemia, unspecified hyperlipidemia type     ICD-10-CM: E78.5 ICD-9-CM: 272.4 Essential hypertension with goal blood pressure less than 130/80     ICD-10-CM: I10 
ICD-9-CM: 401.9 Vitals BP Pulse Height(growth percentile) Weight(growth percentile) BMI Smoking Status  115/74 (BP 1 Location: Right arm, BP Patient Position: Sitting) 75 6' (1.829 m) 251 lb 3.2 oz (113.9 kg) 34.07 kg/m2 Former Smoker Vitals History BMI and BSA Data Body Mass Index Body Surface Area 34.07 kg/m 2 2.41 m 2 Preferred Pharmacy Pharmacy Name Phone [facility] [place] 2002 Sunil Patel [address] [facility] [phone] Your Updated Medication List  
  
   
This list is accurate as of 2/27/18 11:52 AM.  Always use your most recent med list.  
  
  
  
  
 ACTHAR H.P. 80 unit/mL injectable gel Generic drug:  corticotropin AMARYL 4 mg tablet Generic drug:  glimepiride Take 4 mg by mouth every morning. bumetanide 2 mg tablet Commonly known as:  Olin Hammers Take 2 mg by mouth daily. CALCIUM 600 WITH VITAMIN D3 600 mg(1,500mg) -400 unit Chew Generic drug:  Calcium-Cholecalciferol (D3) Take 1 Tab by mouth daily. carvedilol 6.25 mg tablet Commonly known as:  COREG  
12.5 mg two (2) times daily (with meals). cloNIDine HCl 0.2 mg tablet Commonly known as:  CATAPRES  
  
 fenofibrate nanocrystallized 145 mg tablet Commonly known as:  [employer] Take  by mouth daily. FLOMAX 0.4 mg capsule Generic drug:  tamsulosin Take 0.4 mg by mouth daily. FLONASE 50 mcg/actuation nasal spray Generic drug:  fluticasone 2 Sprays by Both Nostrils route daily. hydrALAZINE 100 mg tablet Commonly known as:  APRESOLINE  
100 mg three (3) times daily. Insulin Needles (Disposable) 32 gauge x 5/32\" Ndle Commonly known as:  Haily Pen Needle For use each AM with Victoza Pen  
  
 insulin NPH/insulin regular 100 unit/mL (70-30) injection Commonly known as:  NovoLIN 70/30 U-100 Insulin 60 Units by SubCUTAneous route Before breakfast and dinner. insulin syringe-needle U-100  
by SubCUTAneous route two (2) times a day. Insulin Syringe-Needle U-100 1 mL 30 gauge x 5/16 Syrg Use twice daily for insulin injection LIPITOR 20 mg tablet Generic drug:  atorvastatin Take 20 mg by mouth daily. Liraglutide 0.6 mg/0.1 mL (18 mg/3 mL) Pnij Commonly known as:  VICTOZA 3-NELLI  
1.8 mg by SubCUTAneous route daily. Titrate up to 1.8mg daily as direcected  
  
 lisinopril 20 mg tablet Commonly known as:  Elvia Best Take 40 mg by mouth daily. predniSONE 10 mg tablet Commonly known as:  Valma Belts Take 40 mg by mouth daily. raNITIdine 150 mg tablet Commonly known as:  ZANTAC Prescriptions Sent to Pharmacy Refills Liraglutide (VICTOZA 3-NELLI) 0.6 mg/0.1 mL (18 mg/3 mL) pnij 7 Si.8 mg by SubCUTAneous route daily. Titrate up to 1.8mg daily as direcected Class: Normal  
 Pharmacy: [facility] DR ROYAL MALDONADO [address] [place] [address], [address] [facility] Ph #: [phone] Route: SubCUTAneous Insulin Needles, Disposable, (REYNALDO PEN NEEDLE) 32 gauge x 5/32\" ndle 3 Sig: For use each AM with Victoza Pen Class: Normal  
 Pharmacy: [facility] DR ROYAL MALDONADO [address] [place] [address], [address] [facility] Ph #: [phone] We Performed the Following AMB POC HEMOGLOBIN A1C [68610 CPT(R)]  DIABETES FOOT EXAM [HM7 Custom] LIPID PANEL [10140 CPT(R)] METABOLIC PANEL, COMPREHENSIVE [19453 CPT(R)] MICROALBUMIN, UR, RAND W/ MICROALB/CREAT RATIO [id number] CPT(R)] Follow-up Instructions Return in about 3 months (around 2018). Patient Instructions Starting Victoza:  
Start by taking 0.6mg once daily for 1 week, Then 1.2mg daily for 1 week, Then 1.8mg daily thereafter. Novolin 70/30: Will need to reduce dose gradually as Victoza will start to bring your sugars down. Consider 40 units twice daily initially and adjust from there IF GLUCOSE IS:                 THEN TAKE: 
    0   Extra Unit 151-165   1   Extra Unit 166-180   2   Extra Units 181-195   3   Extra Units 196-210   4   Extra Units 211-225   5   Extra Units 226-240   6   Extra Units 241-255   7   Extra Units 256-270   8   Extra Units 271-285   9   Extra Units 286-300   10 Extra Units 
 
---------------------------------------------------------------------------------------------------------------------- Below you will find a glucose log sheet which you can use to record your blood sugars. Without checking and recording what your home glucose levels are, it will be difficult to make any changes to your medication dose, even when significant changes may be needed. Please feel free to use the log below to record your home glucose levels. At the very least, I would like for you to login the entire 2-3 weeks just before your visit so we can make your visit much more productive and beneficial to you. GLUCOSE LOG SHEET: 
 
Date Breakfast Lunch Dinner Bedtime Comments ? GLUCOSE LOG SHEET: 
 
Date Breakfast Lunch Dinner Bedtime Comments ? GLUCOSE LOG SHEET: 
 
Date Breakfast Lunch Dinner Bedtime Comments ? Introducing [facility] & HEALTH SERVICES! [employer] introduces [employer] patient portal. Now you can access parts of your medical record, email your doctor's office, and request medication refills online. 1. In your internet browser, go to [email or web address] 2. Click on the First Time User? Click Here link in the Sign In box. You will see the New Member Sign Up page. 3. Enter your Omnisio Access Code exactly as it appears below. You will not need to use this code after youve completed the sign-up process. If you do not sign up before the expiration date, you must request a new code. · Omnisio Access Code: [email or web address] Expires: 4/29/2018  2:07 PM 
 
4. Enter the last four digits of your Social Security Number (xxxx) and Date of Birth (mm/dd/yyyy) as indicated and click Submit. You will be taken to the next sign-up page. 5. Create a Omnisio ID. This will be your Omnisio login ID and cannot be changed, so think of one that is secure and easy to remember. 6. Create a O[employer]isio password. You can change your password at any time. 7. Enter your Password Reset Question and Answer. This can be used at a later time if you forget your password. 8. Enter your e-mail address. You will receive e-mail notification when new information is available in [address] 9. Click Sign Up. You can now view and download portions of your medical record. 10. Click the Download Summary menu link to download a portable copy of your medical information. If you have questions, please visit the Frequently Asked Questions section of the O[employer]isio website. Remember, Omnisio is NOT to be used for urgent needs. For medical emergencies, dial 911. Now available from your iPhone and Android! Please provide this summary of care documentation to your next provider. Your primary care clinician is listed as Kasie Reynolds. If you have any questions after today's visit, please call [phone].

## 2018-02-27 NOTE — PATIENT INSTRUCTIONS
Starting Victoza:   Start by taking 0.6mg once daily for 1 week,   Then 1.2mg daily for 1 week,   Then 1.8mg daily thereafter. Novolin 70/30: Will need to reduce dose gradually as Victoza will start to bring your sugars down. Consider 40 units twice daily initially and adjust from there      IF GLUCOSE IS:                 THEN TAKE:      0   Extra Unit  151-165   1   Extra Unit  166-180   2   Extra Units  181-195   3   Extra Units  196-210   4   Extra Units  211-225   5   Extra Units  226-240   6   Extra Units  241-255   7   Extra Units  256-270   8   Extra Units  271-285   9   Extra Units  286-300   10 Extra Units    ----------------------------------------------------------------------------------------------------------------------    Below you will find a glucose log sheet which you can use to record your blood sugars. Without checking and recording what your home glucose levels are, it will be difficult to make any changes to your medication dose, even when significant changes may be needed. Please feel free to use the log below to record your home glucose levels. At the very least, I would like for you to login the entire 2-3 weeks just before your visit so we can make your visit much more productive and beneficial to you. GLUCOSE LOG SHEET:    Date Breakfast Lunch Dinner Bedtime Comments ? GLUCOSE LOG SHEET:    Date Breakfast Lunch Dinner Bedtime Comments ?                                                                                                                                                                                                                                                  GLUCOSE LOG SHEET:    Date Breakfast Lunch Dinner Bedtime Comments ?

## 2018-02-27 NOTE — PROGRESS NOTES
CHIEF COMPLAINT: f/u evaluation for uncontrolled type 2 diabetes    HISTORY OF PRESENT ILLNESS:   Liliam Gannon is a 81 y.o. male with a PMHx as noted below who presents to the endocrinology clinic for f/u evaluation of uncontrolled type 2 diabetes. Patient presents for f/u. 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 Continues on Acthar (corticotropin) twice weekly for his renal disease, by nephrology, raises his blood sugars. I adjusted his insulin doses between visits by phone.     Currently taking the following meds:  Novolin 70/30:   60 units BID  Correction 1:15>150    Review of home blood glucose:  -330, 135 after 3 days of [employer] 165-290, 141, few days after [employer] typical pattern following q4 day ACTHAR    Nephropathy: baseline proteinuria, seeing nephrology    Review of most recent diabetes-related labs:  Lab Results   Component Value Date    HBA1C 10.5 (H) 11/16/2016    HBA1C 5.7 01/19/2011    LUV4GKGX 7.1 08/25/2017    AUZ5IRRZ 6.8 05/19/2017    ULP4JOKR 8.8 01/17/2017    CHOL 199 11/16/2016    LDLC Comment 11/16/2016    GFRAA 21 (L) 11/01/2016    GFRNA 18 (L) 11/01/2016    CREATEXT Cr 3.47,  GFR 20 10/02/2017    MCACR 3857.9 (H) 01/17/2017    MALBEXT HIGH 4737.5 10/02/2017    TSH 1.39 10/29/2016         PAST MEDICAL/SURGICAL HISTORY:   Past Medical History:   Diagnosis Date    Arthritis     CAD (coronary artery disease)     high cholesterol    Chronic kidney disease     Diabetes ([facility] [address]     GERD (gastroesophageal reflux disease)     HX    Hypertension     Ill-defined condition     Unspecified sleep apnea     NO CPAP     Past Surgical History:   Procedure Laterality Date    COLONOSCOPY N/A 11/9/2016    COLONOSCOPY performed by Kristina Hale [facility]    HX ORTHOPAEDIC      CERVICAL FUSION    HX UROLOGICAL      TURP       ALLERGIES:   Allergies   Allergen Reactions    Albumin, Human 25 % Anaphylaxis and Hives    Niacin Hives MEDICATIONS ON ADMISSION:     Current Outpatient Prescriptions:     cloNIDine HCl (CATAPRES) 0.2 mg tablet, , Disp: , Rfl:     Insulin Syringe-Needle U-100 1 mL 30 gauge x 5/16 syrg, Use twice daily for insulin injection, Disp: 100 Syringe, Rfl: 7    insulin NPH/insulin regular (NOVOLIN 70/30 U-100 INSULIN) 100 unit/mL (70-30) injection, 60 Units by SubCUTAneous route Before breakfast and dinner., Disp: 10 Vial, Rfl: 3    hydrALAZINE (APRESOLINE) 100 mg tablet, 100 mg three (3) times daily. , Disp: , Rfl:     carvedilol (COREG) 6.25 mg tablet, 12.5 mg two (2) times daily (with meals). , Disp: , Rfl:     fenofibrate nanocrystallized (TRICOR) 145 mg tablet, Take  by mouth daily. , Disp: , Rfl:     ACTHAR H.P. 80 unit/mL injectable gel, , Disp: , Rfl:     bumetanide (BUMEX) 2 mg tablet, Take 2 mg by mouth daily. , Disp: , Rfl:     insulin syringe-needle U-100, by SubCUTAneous route two (2) times a day., Disp: 100 Syringe, Rfl: 11    lisinopril (PRINIVIL, ZESTRIL) 20 mg tablet, Take 40 mg by mouth daily. , Disp: , Rfl:     atorvastatin (LIPITOR) 20 mg tablet, Take 20 mg by mouth daily. , Disp: , Rfl:     glimepiride (AMARYL) 4 mg tablet, Take 4 mg by mouth every morning., Disp: , Rfl:     Calcium-Cholecalciferol, D3, (CALCIUM 600 WITH VITAMIN D3) 600 mg(1,500mg) -400 unit chew, Take 1 Tab by mouth daily. , Disp: , Rfl:     fluticasone (FLONASE) 50 mcg/actuation nasal spray, 2 Sprays by Both Nostrils route daily. , Disp: , Rfl:     tamsulosin (FLOMAX) 0.4 mg capsule, Take 0.4 mg by mouth daily. , Disp: , Rfl:     raNITIdine (ZANTAC) 150 mg tablet, , Disp: , Rfl:     predniSONE (DELTASONE) 10 mg tablet, Take 40 mg by mouth daily. , Disp: , Rfl:     SOCIAL HISTORY:   Social History     Social History    Marital status: UNKNOWN     Spouse name: N/A    Number of children: N/A    Years of education: N/A     Occupational History    Not on file.      Social History Main Topics    Smoking status: Former Smoker  Smokeless tobacco: Former User      Comment: cigar    Alcohol use No    Drug use: No    Sexual activity: Not Currently     Other Topics Concern    Not on file     Social History Narrative       FAMILY HISTORY:  Family History   Problem Relation Age of Onset    Cancer Father     Diabetes Brother        REVIEW OF SYSTEMS: Complete ROS assessed and noted for that which is described above, all else are negative. Eyes: normal  ENT: normal  CVS: normal  Resp: normal  GI: normal  : normal  GYN: normal  Endocrine: normal  Integument: normal  Musculoskeletal: knee discomfort  Neuro: normal  Psych: normal      PHYSICAL EXAMINATION:    VITAL SIGNS:  Visit Vitals    /74 (BP 1 Location: Right arm, BP Patient Position: Sitting)    Pulse 75    Ht 6' (1.829 m)    Wt 251 lb 3.2 oz (113.9 kg)    BMI 34.07 kg/m2       GENERAL: NCAT, Sitting comfortably, NAD  EYES: EOMI, non-icteric, no proptosis  Ear/Nose/Throat: NCAT, no inflammation, no masses  LYMPH NODES: No LAD  CARDIOVASCULAR: S1 S2, RRR, No murmur  RESPIRATORY: CTA b/l, no wheeze/rales  GASTROINTESTINAL: obese, NT, ND,  MUSCULOSKELETAL: Normal ROM, no atrophy  SKIN: warm, no edema/rash/ or other skin changes  NEUROLOGIC: 5/5 power all extremities, no tremors, AAOx3  PSYCHIATRIC: Normal affect, Normal insight and judgement           Diabetic foot exam performed by Roxann Gallegos MD     Measurement  Response Nurse Comment Physician Comment   Monofilament  R - normal sensation with micro filament  L - normal sensation with micro filament     Pulse DP R - 2+ (normal)  L - 2+ (normal)     Vibration R - Normal    L - Normal     Structural deformity R - None  L - None     Skin Integrity / Deformity R - None  L - None        Reviewed by:          REVIEW OF LABORATORY AND RADIOLOGY DATA:   Labs and documentation have been reviewed as described above.      ASSESSMENT AND PLAN:   Chantel Osorio is a 81 y.o. male with a PMHx as noted above who presents to the endocrinology clinic for evaluation of uncontrolled type 2 diabetes. 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 I am concerned about the cushing like syndrome that is taking effect from the [facility]. I am concerned about the uncontrolled DM and its effect on his entire body, not clear if the degree of any benefit from the [facility] outweighs the risk of uncontrolled DM to this degree. Advised that he discuss this together with his nephrologist. Plan to start Victoza as I don't think he will respond well to increasing doses of insulin due to the insulin resistance, and the likely significant weight gain that would occur. DM2:  Start victoza, titrate to 1.8 daily  Novolin 70/30:  40 units twice daily, to adjust  Correction 1:15>150  Continue to check glucose 4x/day  Provided with new log sheets    HTN: coreg, lisinopril, hydralazine, clonidine  HLD: lipitor 20mg    Labs: up to date, will review on future visit. 2 month f/u visit. Advised to call with any concerns,    >25 minutes spent together with patient today of which >50% of this time was spent in counseling and coordination of care. Poly Phelps.  [address] [facility]

## 2018-02-28 LAB
ALBUMIN SERPL-MCNC: 4.2 G/DL (ref 3.5–4.8)
ALBUMIN/CREAT UR: 1160.8 MG/G CREAT (ref 0–30)
ALBUMIN/GLOB SERPL: 1.7 {RATIO} (ref 1.2–2.2)
ALP SERPL-CCNC: 40 IU/L (ref 39–117)
ALT SERPL-CCNC: 26 IU/L (ref 0–44)
AST SERPL-CCNC: 16 IU/L (ref 0–40)
BILIRUB SERPL-MCNC: 0.3 MG/DL (ref 0–1.2)
BUN SERPL-MCNC: 72 MG/DL (ref 8–27)
BUN/CREAT SERPL: 15 (ref 10–24)
CALCIUM SERPL-MCNC: 9.8 MG/DL (ref 8.6–10.2)
CHLORIDE SERPL-SCNC: 99 MMOL/L (ref 96–106)
CHOLEST SERPL-MCNC: 157 MG/DL (ref 100–199)
CO2 SERPL-SCNC: 22 MMOL/L (ref 18–29)
CREAT SERPL-MCNC: 4.88 MG/DL (ref 0.76–1.27)
CREAT UR-MCNC: 124.9 MG/DL
GFR SERPLBLD CREATININE-BSD FMLA CKD-EPI: 11 ML/MIN/1.73
GFR SERPLBLD CREATININE-BSD FMLA CKD-EPI: 13 ML/MIN/1.73
GLOBULIN SER CALC-MCNC: 2.5 G/DL (ref 1.5–4.5)
GLUCOSE SERPL-MCNC: 142 MG/DL (ref 65–99)
HDLC SERPL-MCNC: 27 MG/DL
INTERPRETATION, 910389: NORMAL
INTERPRETATION: NORMAL
LDLC SERPL CALC-MCNC: 61 MG/DL (ref 0–99)
Lab: NORMAL
MICROALBUMIN UR-MCNC: 1449.8 UG/ML
PDF IMAGE, 910387: NORMAL
POTASSIUM SERPL-SCNC: 4.4 MMOL/L (ref 3.5–5.2)
PROT SERPL-MCNC: 6.7 G/DL (ref 6–8.5)
SODIUM SERPL-SCNC: 139 MMOL/L (ref 134–144)
TRIGL SERPL-MCNC: 346 MG/DL (ref 0–149)
VLDLC SERPL CALC-MCNC: 69 MG/DL (ref 5–40)

## 2018-02-28 NOTE — PROGRESS NOTES
Called patient to let him know that all though proteinuria appears to be improving,   Overall GFR/Cr not improving, but worse. To discussed risks/benefits of ACTHAR at current dose in setting of uncontrolled DM,  He had not picked up [address] yet but is planning to,    Corey Hood.  [address] [facility]  [address] [place] [address]

## 2018-04-20 ENCOUNTER — OFFICE VISIT (OUTPATIENT)
Dept: ENDOCRINOLOGY | Age: 73
End: 2018-04-20

## 2018-04-20 VITALS
SYSTOLIC BLOOD PRESSURE: 131 MMHG | WEIGHT: 254.9 LBS | DIASTOLIC BLOOD PRESSURE: 73 MMHG | BODY MASS INDEX: 34.52 KG/M2 | HEART RATE: 70 BPM | HEIGHT: 72 IN

## 2018-04-20 DIAGNOSIS — N18.4 TYPE 2 DIABETES MELLITUS WITH STAGE 4 CHRONIC KIDNEY DISEASE, WITH LONG-TERM CURRENT USE OF INSULIN (HCC): Primary | ICD-10-CM

## 2018-04-20 DIAGNOSIS — E11.22 TYPE 2 DIABETES MELLITUS WITH STAGE 4 CHRONIC KIDNEY DISEASE, WITH LONG-TERM CURRENT USE OF INSULIN (HCC): Primary | ICD-10-CM

## 2018-04-20 DIAGNOSIS — Z79.4 TYPE 2 DIABETES MELLITUS WITH STAGE 4 CHRONIC KIDNEY DISEASE, WITH LONG-TERM CURRENT USE OF INSULIN (HCC): Primary | ICD-10-CM

## 2018-04-20 DIAGNOSIS — I10 ESSENTIAL HYPERTENSION WITH GOAL BLOOD PRESSURE LESS THAN 130/80: ICD-10-CM

## 2018-04-20 DIAGNOSIS — E78.5 HYPERLIPIDEMIA, UNSPECIFIED HYPERLIPIDEMIA TYPE: ICD-10-CM

## 2018-04-20 NOTE — MR AVS SNAPSHOT
[address] [facility] [address] [address] [phone] Patient: Shiva Prado MRN: [id number] WCT:[id number] Visit Information Date & Time Provider Department Dept. Phone Encounter #  
 4/20/2018 10:50 AM Caryn Dorsey, [address] [place] [address] Diabetes and Endocrinology [phone] Upcoming Health Maintenance Date Due FOBT Q 1 YEAR AGE 50-75 8/29/1997 ZOSTER VACCINE AGE 60> 6/29/2007 Pneumococcal 65+ High/Highest Risk (1 of 2 - PCV13) 8/29/2012 Influenza Age 5 to Adult 8/1/2017 MEDICARE YEARLY EXAM 3/24/2018 HEMOGLOBIN A1C Q6M 8/27/2018 EYE EXAM RETINAL OR DILATED Q1 10/3/2018 FOOT EXAM Q1 2/27/2019 MICROALBUMIN Q1 2/27/2019 LIPID PANEL Q1 2/27/2019 GLAUCOMA SCREENING Q2Y 10/3/2019 DTaP/Tdap/Td series (2 - Td) 6/24/2023 Allergies as of 4/20/2018  Review Complete On: 4/20/2018 By: Caryn Dorsey MD  
  
 Severity Noted Reaction Type Reactions Albumin, Human 25 % High 08/21/2016    Anaphylaxis, Hives Niacin  02/27/2018    Hives Current Immunizations  Reviewed on 3/24/2017 Name Date Influenza Vaccine (Quad) PF 11/1/2016  9:36 AM  
 Tdap 6/24/2013  7:24 PM  
  
 Not reviewed this visit You Were Diagnosed With   
  
 Codes Comments Type 2 diabetes mellitus with stage 4 chronic kidney disease, with long-term current use of insulin (HCC)    -  Primary ICD-10-CM: E11.22, N18.4, Z79.4 ICD-9-CM: 250.40, 585.4, V58.67 Hyperlipidemia, unspecified hyperlipidemia type     ICD-10-CM: E78.5 ICD-9-CM: 272.4 Essential hypertension with goal blood pressure less than 130/80     ICD-10-CM: I10 
ICD-9-CM: 401.9 Vitals BP Pulse Height(growth percentile) Weight(growth percentile) BMI Smoking Status 131/73 (BP 1 Location: Left arm, BP Patient Position: Sitting) 70 6' (1.829 m) 254 lb 14.4 oz (115.6 kg) 34.57 kg/m2 Former Smoker Vitals History BMI and BSA Data Body Mass Index Body Surface Area 34.57 kg/m 2 2.42 m 2 Preferred Pharmacy Pharmacy Name Phone [facility] [address] [facility] [phone] Your Updated Medication List  
  
   
This list is accurate as of 4/20/18 11:18 AM.  Always use your most recent med list.  
  
  
  
  
 [employer] H.P. 80 unit/mL injectable gel Generic drug:  corticotropin AMARYL 4 mg tablet Generic drug:  glimepiride Take 4 mg by mouth every morning. bumetanide 2 mg tablet Commonly known as:  Bernell Rumble Take 2 mg by mouth daily. CALCIUM 600 WITH VITAMIN D3 600 mg(1,500mg) -400 unit Chew Generic drug:  Calcium-Cholecalciferol (D3) Take 1 Tab by mouth daily. carvedilol 6.25 mg tablet Commonly known as:  COREG  
12.5 mg two (2) times daily (with meals). cloNIDine HCl 0.2 mg tablet Commonly known as:  CATAPRES  
  
 fenofibrate nanocrystallized 145 mg tablet Commonly known as:  [employer] Take  by mouth daily. FLOMAX 0.4 mg capsule Generic drug:  tamsulosin Take 0.4 mg by mouth daily. FLONASE 50 mcg/actuation nasal spray Generic drug:  fluticasone 2 Sprays by Both Nostrils route daily. hydrALAZINE 100 mg tablet Commonly known as:  APRESOLINE  
100 mg three (3) times daily. Insulin Needles (Disposable) 32 gauge x 5/32\" Ndle Commonly known as:  Haily Pen Needle For use each AM with Victoza Pen  
  
 insulin NPH/insulin regular 100 unit/mL (70-30) injection Commonly known as:  NovoLIN 70/30 U-100 Insulin 60 Units by SubCUTAneous route Before breakfast and dinner. * Insulin Syringe-Needle U-100 1 mL 30 gauge x 5/16 Syrg Use twice daily for insulin injection * Insulin Syringe-Needle U-100 1 mL 30 gauge x 5/16 Syrg Subcutaneously twice daily LIPITOR 20 mg tablet Generic drug:  atorvastatin Take 20 mg by mouth daily. Liraglutide 0.6 mg/0.1 mL (18 mg/3 mL) Pnij Commonly known as:  VICTOZA 3-NELLI  
1.8 mg by SubCUTAneous route daily. Titrate up to 1.8mg daily as direcected  
  
 lisinopril 20 mg tablet Commonly known as:  Pecola Cypher Take 40 mg by mouth daily. * Notice: This list has 2 medication(s) that are the same as other medications prescribed for you. Read the directions carefully, and ask your doctor or other care provider to review them with you. Patient Instructions Continue victoza, titrate to 1.8 daily Novolin 70/30:  40 units breakfast, 46 units dinner * Increase the dinner dose of insulin by 2-3 units no frequent than every 3 days, and this is based on the AM fasting sugar. The goal is to keep it closer to the 150's. IF GLUCOSE IS:                 THEN TAKE: 
    0   Extra Unit 151-165   1   Extra Unit 166-180   2   Extra Units 181-195   3   Extra Units 196-210   4   Extra Units 211-225   5   Extra Units 226-240   6   Extra Units 241-255   7   Extra Units 256-270   8   Extra Units 271-285   9   Extra Units 286-300   10 Extra Units Hold atorvastatin for 1 week, if leg cramps improve, restart atorvastatin at one tab every other day (skipping days). If symptoms do not improve, then not likely from the atorvastatin, can restart it daily. Keith Cheema. [address] [facility] [address] [place] [address] Please note our new policy, you must arrive to the clinic 15 minutes before your appointment time to allow enough time for proper check-in, adequate time to spend with your doctor, and also to respect the appointment time of the next patient.  Not arriving 15 minutes in advance may result in having your appointment rescheduled for the next available day/time. 
---------------------------------------------------------------------------------------------------------------------- 
 
 Below you will find a glucose log sheet which you can use to record your blood sugars. Without checking and recording what your home glucose levels are, it will be difficult to make any changes to your medication dose, even when significant changes may be needed. Please feel free to use the log below to record your home glucose levels. At the very least, I would like for you to login the entire 2-3 weeks just before your visit so we can make your visit much more productive and beneficial to you. GLUCOSE LOG SHEET: 
 
Date Breakfast Lunch Dinner Bedtime Comments ? GLUCOSE LOG SHEET: 
 
Date Breakfast Lunch Dinner Bedtime Comments ? GLUCOSE LOG SHEET: 
 
Date Breakfast Lunch Dinner Bedtime Comments ? Introducing [facility]! [address] introduces [employer] patient portal. Now you can access parts of your medical record, email your doctor's office, and request medication refills online. 1. In your internet browser, go to [email or web address] 2. Click on the First Time User? Click Here link in the Sign In box. You will see the New Member Sign Up page. 3. Enter your [employer] Access Code exactly as it appears below.  You will not need to use this code after youve completed the sign-up process. If you do not sign up before the expiration date, you must request a new code. · [employer] Access Code: [email or web address] Expires: 4/29/2018  3:07 PM 
 
4. Enter the last four digits of your Social Security Number (xxxx) and Date of Birth (mm/dd/yyyy) as indicated and click Submit. You will be taken to the next sign-up page. 5. Create a [employer] ID. This will be your [employer] login ID and cannot be changed, so think of one that is secure and easy to remember. 6. Create a [employer] password. You can change your password at any time. 7. Enter your Password Reset Question and Answer. This can be used at a later time if you forget your password. 8. Enter your e-mail address. You will receive e-mail notification when new information is available in [address] 9. Click Sign Up. You can now view and download portions of your medical record. 10. Click the Download Summary menu link to download a portable copy of your medical information. If you have questions, please visit the Frequently Asked Questions section of the [employer] website. Remember, [employer] is NOT to be used for urgent needs. For medical emergencies, dial 911. Now available from your iPhone and Android! Please provide this summary of care documentation to your next provider. Your primary care clinician is listed as Becky Kelly. If you have any questions after today's visit, please call [phone].

## 2018-04-20 NOTE — PROGRESS NOTES
CHIEF COMPLAINT: f/u evaluation for uncontrolled type 2 diabetes    HISTORY OF PRESENT ILLNESS:   Gianluca Lorenz is a 81 y.o. male with a PMHx as noted below who presents to the endocrinology clinic for f/u evaluation of uncontrolled type 2 diabetes. Patient presents for f/u. 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 Last dose of Acthar (corticotropin) was on the 11th of this month, was no longer helping with his kidney, raised sugars. On last visit we ordered victoza to help with his sugars and reduce need for increased insulin doses.     Currently taking the following meds:  Victoza ordered on prior visit, on 1.8 daily  Novolin 70/30:   40 units BID  Correction 1:15>150    Review of home blood glucose:  AM    200-300 mostly, occasional 150's  Dinner     [address], some 104,108, some 158,183, rare 218    Review of most recent diabetes-related labs:  Lab Results   Component Value Date    HBA1C 10.5 (H) 11/16/2016    HBA1C 5.7 01/19/2011    NLO4XLET 10.7 02/27/2018    OAN9KOPN 7.1 08/25/2017    CKV5DMAA 6.8 05/19/2017    CHOL 157 02/27/2018    LDLC 61 02/27/2018    GFRAA 13 (L) 02/27/2018    GFRNA 11 (L) 02/27/2018    CREATEXT Cr 3.47,  GFR 20 10/02/2017    MCACR 1160.8 (H) 02/27/2018    MALBEXT HIGH 4737.5 10/02/2017    TSH 1.39 10/29/2016         PAST MEDICAL/SURGICAL HISTORY:   Past Medical History:   Diagnosis Date    Arthritis     CAD (coronary artery disease)     high cholesterol    Chronic kidney disease     Diabetes ([address]     GERD (gastroesophageal reflux disease)     HX    Hypertension     Ill-defined condition     Unspecified sleep apnea     NO CPAP     Past Surgical History:   Procedure Laterality Date    COLONOSCOPY N/A 11/9/2016    COLONOSCOPY performed by Edgar Purcell MD at \[facility]"" ENDOSCOPY    HX ORTHOPAEDIC      CERVICAL FUSION    HX UROLOGICAL      TURP       ALLERGIES:   Allergies   Allergen Reactions    Albumin, Human 25 % Anaphylaxis and Hives    Niacin Hives       MEDICATIONS ON ADMISSION:     Current Outpatient Prescriptions:     Insulin Syringe-Needle U-100 1 mL 30 gauge x 5/16 syrg, Subcutaneously twice daily, Disp: 200 Syringe, Rfl: 3    cloNIDine HCl (CATAPRES) 0.2 mg tablet, , Disp: , Rfl:     Liraglutide (VICTOZA 3-NELLI) 0.6 mg/0.1 mL (18 mg/3 mL) pnij, 1.8 mg by SubCUTAneous route daily. Titrate up to 1.8mg daily as direcected, Disp: 3 Pen, Rfl: 7    Insulin Needles, Disposable, (REYNALDO PEN NEEDLE) 32 gauge x 5/32\" ndle, For use each AM with Victoza Pen, Disp: 100 Pen Needle, Rfl: 3    Insulin Syringe-Needle U-100 1 mL 30 gauge x 5/16 syrg, Use twice daily for insulin injection, Disp: 100 Syringe, Rfl: 7    insulin NPH/insulin regular (NOVOLIN 70/30 U-100 INSULIN) 100 unit/mL (70-30) injection, 60 Units by SubCUTAneous route Before breakfast and dinner. (Patient taking differently: 40 Units by SubCUTAneous route Before breakfast and dinner.), Disp: 10 Vial, Rfl: 3    hydrALAZINE (APRESOLINE) 100 mg tablet, 100 mg three (3) times daily. , Disp: , Rfl:     carvedilol (COREG) 6.25 mg tablet, 12.5 mg two (2) times daily (with meals). , Disp: , Rfl:     fenofibrate nanocrystallized (TRICOR) 145 mg tablet, Take  by mouth daily. , Disp: , Rfl:     bumetanide (BUMEX) 2 mg tablet, Take 2 mg by mouth daily. , Disp: , Rfl:     lisinopril (PRINIVIL, ZESTRIL) 20 mg tablet, Take 40 mg by mouth daily. , Disp: , Rfl:     atorvastatin (LIPITOR) 20 mg tablet, Take 20 mg by mouth daily. , Disp: , Rfl:     glimepiride (AMARYL) 4 mg tablet, Take 4 mg by mouth every morning., Disp: , Rfl:     Calcium-Cholecalciferol, D3, (CALCIUM 600 WITH VITAMIN D3) 600 mg(1,500mg) -400 unit chew, Take 1 Tab by mouth daily. , Disp: , Rfl:     fluticasone (FLONASE) 50 mcg/actuation nasal spray, 2 Sprays by Both Nostrils route daily. , Disp: , Rfl:     tamsulosin (FLOMAX) 0.4 mg capsule, Take 0.4 mg by mouth daily. , Disp: , Rfl:     ACTHAR H.P. 80 unit/mL injectable gel, , Disp: , Rfl: SOCIAL HISTORY:   Social History     Social History    Marital status: UNKNOWN     Spouse name: N/A    Number of children: N/A    Years of education: N/A     Occupational History    Not on file. Social History Main Topics    Smoking status: Former Smoker    Smokeless tobacco: Former User      Comment: cigar    Alcohol use No    Drug use: No    Sexual activity: Not Currently     Other Topics Concern    Not on file     Social History Narrative       FAMILY HISTORY:  Family History   Problem Relation Age of Onset    Cancer Father     Diabetes Brother        REVIEW OF SYSTEMS: Complete ROS assessed and noted for that which is described above, all else are negative. Eyes: normal  ENT: normal  CVS: normal  Resp: normal  GI: normal  : normal  GYN: normal  Endocrine: normal  Integument: normal  Musculoskeletal: knee discomfort  Neuro: normal  Psych: normal      PHYSICAL EXAMINATION:    VITAL SIGNS:  Visit Vitals    /73 (BP 1 Location: Left arm, BP Patient Position: Sitting)    Pulse 70    Ht 6' (1.829 m)    Wt 254 lb 14.4 oz (115.6 kg)    BMI 34.57 kg/m2       GENERAL: NCAT, Sitting comfortably, NAD  EYES: EOMI, non-icteric, no proptosis  Ear/Nose/Throat: NCAT, no inflammation, no masses  LYMPH NODES: No LAD  CARDIOVASCULAR: S1 S2, RRR, No murmur  RESPIRATORY: CTA b/l, no wheeze/rales  GASTROINTESTINAL: obese, NT, ND,  MUSCULOSKELETAL: Normal ROM, no atrophy  SKIN: warm, no edema/rash/ or other skin changes  NEUROLOGIC: 5/5 power all extremities, no tremors, AAOx3  PSYCHIATRIC: Normal affect, Normal insight and judgement    REVIEW OF LABORATORY AND RADIOLOGY DATA:   Labs and documentation have been reviewed as described above. ASSESSMENT AND PLAN:   Lisa Nguyen is a 81 y.o. male with a PMHx as noted above who presents to the endocrinology clinic for evaluation of uncontrolled type 2 diabetes.      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 3 month f/u visit. Advised to call with any concerns,      Cheryl Ochoa.  [address] [facility]

## 2018-04-20 NOTE — PATIENT INSTRUCTIONS
Continue victoza, titrate to 1.8 daily  Novolin 70/30:  40 units breakfast, 46 units dinner    * Increase the dinner dose of insulin by 2-3 units no frequent than every 3 days, and this is based on the AM fasting sugar. The goal is to keep it closer to the 150's. IF GLUCOSE IS:                 THEN TAKE:      0   Extra Unit  151-165   1   Extra Unit  166-180   2   Extra Units  181-195   3   Extra Units  196-210   4   Extra Units  211-225   5   Extra Units  226-240   6   Extra Units  241-255   7   Extra Units  256-270   8   Extra Units  271-285   9   Extra Units  286-300   10 Extra Units      Hold atorvastatin for 1 week, if leg cramps improve, restart atorvastatin at one tab every other day (skipping days). If symptoms do not improve, then not likely from the atorvastatin, can restart it daily. Tran Arevalo. [address] [facility]  [address] [place] [address]      Please note our new policy, you must arrive to the clinic 15 minutes before your appointment time to allow enough time for proper check-in, adequate time to spend with your doctor, and also to respect the appointment time of the next patient. Not arriving 15 minutes in advance may result in having your appointment rescheduled for the next available day/time.  ----------------------------------------------------------------------------------------------------------------------    Below you will find a glucose log sheet which you can use to record your blood sugars. Without checking and recording what your home glucose levels are, it will be difficult to make any changes to your medication dose, even when significant changes may be needed. Please feel free to use the log below to record your home glucose levels. At the very least, I would like for you to login the entire 2-3 weeks just before your visit so we can make your visit much more productive and beneficial to you.      GLUCOSE LOG SHEET:    Date Breakfast Lunch Dinner Bedtime Comments ? GLUCOSE LOG SHEET:    Date Breakfast Lunch Dinner Bedtime Comments ? GLUCOSE LOG SHEET:    Date Breakfast Lunch Dinner Bedtime Comments ?

## 2018-05-07 ENCOUNTER — TELEPHONE (OUTPATIENT)
Dept: ENDOCRINOLOGY | Age: 73
End: 2018-05-07

## 2018-05-07 NOTE — TELEPHONE ENCOUNTER
I received a call from Mr. Chan Covington saying that he has a rash that is very itchy on both is arms and neck for the past 4 days. He is wondering if it is related to the Victoza. He started the Victoza a couple of months ago and he is thinking this might be a reaction to that. I asked about any thing else new and he said no new meds, soaps or food. I told him I would pass this on to Dr. Tracy Buchanan and get back to him.  [address]  [employer] [facility]

## 2018-05-07 NOTE — TELEPHONE ENCOUNTER
I called  Nati Pierson back and relayed the message from Dr. Kong Rodrigues. He understood the information and will do as instructed.   Ivie Hodgkins

## 2018-05-07 NOTE — TELEPHONE ENCOUNTER
If the rash is not at the site of injection, its less likely to be the victoza which he had done well with previously. He might consider using a small antihistamine to see if his symptoms improve. There are lots of allergens / pollens currently that are resulting in rashes, itchiness, and other associated symptoms from exposure outdoors. He could try a small dose of zyrtec or claritin or allegra to see if it improves. Thanks,    Cristiano Zarate.  [address]

## 2018-05-11 ENCOUNTER — HOSPITAL ENCOUNTER (OUTPATIENT)
Dept: GENERAL RADIOLOGY | Age: 73
Discharge: HOME | End: 2018-05-11
Attending: SURGERY
Payer: MEDICARE

## 2018-05-11 ENCOUNTER — HOSPITAL ENCOUNTER (OUTPATIENT)
Dept: PREADMISSION TESTING | Age: 73
Discharge: HOME | End: 2018-05-11
Payer: MEDICARE

## 2018-05-11 VITALS
HEIGHT: 72 IN | RESPIRATION RATE: 18 BRPM | TEMPERATURE: 97.8 F | DIASTOLIC BLOOD PRESSURE: 74 MMHG | SYSTOLIC BLOOD PRESSURE: 134 MMHG | OXYGEN SATURATION: 97 % | WEIGHT: 255.73 LBS | HEART RATE: 72 BPM | BODY MASS INDEX: 34.64 KG/M2

## 2018-05-11 LAB
ALBUMIN SERPL-MCNC: 3.8 G/DL (ref 3.5–5)
ALBUMIN/GLOB SERPL: 1.2 {RATIO} (ref 1.1–2.2)
ALP SERPL-CCNC: 41 U/L (ref 45–117)
ALT SERPL-CCNC: 33 U/L (ref 12–78)
ANION GAP SERPL CALC-SCNC: 9 MMOL/L (ref 5–15)
APTT PPP: 26.6 SEC (ref 22.1–32)
AST SERPL-CCNC: 14 U/L (ref 15–37)
ATRIAL RATE: 74 BPM
BILIRUB SERPL-MCNC: 0.4 MG/DL (ref 0.2–1)
BUN SERPL-MCNC: 65 MG/DL (ref 6–20)
BUN/CREAT SERPL: 12 (ref 12–20)
CALCIUM SERPL-MCNC: 9.4 MG/DL (ref 8.5–10.1)
CALCULATED R AXIS, ECG10: -54 DEGREES
CALCULATED T AXIS, ECG11: 55 DEGREES
CHLORIDE SERPL-SCNC: 106 MMOL/L (ref 97–108)
CO2 SERPL-SCNC: 23 MMOL/L (ref 21–32)
CREAT SERPL-MCNC: 5.46 MG/DL (ref 0.7–1.3)
DIAGNOSIS, 93000: NORMAL
ERYTHROCYTE [DISTWIDTH] IN BLOOD BY AUTOMATED COUNT: 13.8 % (ref 11.5–14.5)
GLOBULIN SER CALC-MCNC: 3.3 G/DL (ref 2–4)
GLUCOSE SERPL-MCNC: 206 MG/DL (ref 65–100)
HCT VFR BLD AUTO: 32.3 % (ref 36.6–50.3)
HGB BLD-MCNC: 10.8 G/DL (ref 12.1–17)
INR PPP: 1.1 (ref 0.9–1.1)
MCH RBC QN AUTO: 30.3 PG (ref 26–34)
MCHC RBC AUTO-ENTMCNC: 33.4 G/DL (ref 30–36.5)
MCV RBC AUTO: 90.7 FL (ref 80–99)
NRBC # BLD: 0 K/UL (ref 0–0.01)
NRBC BLD-RTO: 0 PER 100 WBC
P-R INTERVAL, ECG05: 164 MS
PLATELET # BLD AUTO: 232 K/UL (ref 150–400)
PMV BLD AUTO: 11.6 FL (ref 8.9–12.9)
POTASSIUM SERPL-SCNC: 4.3 MMOL/L (ref 3.5–5.1)
PROT SERPL-MCNC: 7.1 G/DL (ref 6.4–8.2)
PROTHROMBIN TIME: 11.4 SEC (ref 9–11.1)
Q-T INTERVAL, ECG07: 426 MS
QRS DURATION, ECG06: 146 MS
QTC CALCULATION (BEZET), ECG08: 472 MS
RBC # BLD AUTO: 3.56 M/UL (ref 4.1–5.7)
SODIUM SERPL-SCNC: 138 MMOL/L (ref 136–145)
THERAPEUTIC RANGE,PTTT: NORMAL SECS (ref 58–77)
VENTRICULAR RATE, ECG03: 74 BPM
WBC # BLD AUTO: 6.4 K/UL (ref 4.1–11.1)

## 2018-05-11 PROCEDURE — 85730 THROMBOPLASTIN TIME PARTIAL: CPT | Performed by: SURGERY

## 2018-05-11 PROCEDURE — 85610 PROTHROMBIN TIME: CPT | Performed by: SURGERY

## 2018-05-11 PROCEDURE — 85027 COMPLETE CBC AUTOMATED: CPT | Performed by: SURGERY

## 2018-05-11 PROCEDURE — 36415 COLL VENOUS BLD VENIPUNCTURE: CPT | Performed by: SURGERY

## 2018-05-11 PROCEDURE — 93005 ELECTROCARDIOGRAM TRACING: CPT

## 2018-05-11 PROCEDURE — 80053 COMPREHEN METABOLIC PANEL: CPT | Performed by: SURGERY

## 2018-05-11 PROCEDURE — 71046 X-RAY EXAM CHEST 2 VIEWS: CPT

## 2018-05-11 RX ORDER — SODIUM CHLORIDE, SODIUM LACTATE, POTASSIUM CHLORIDE, CALCIUM CHLORIDE 600; 310; 30; 20 MG/100ML; MG/100ML; MG/100ML; MG/100ML
25 INJECTION, SOLUTION INTRAVENOUS CONTINUOUS
Status: CANCELLED | OUTPATIENT
Start: 2018-05-21

## 2018-05-11 NOTE — PERIOP NOTES
[facility]  Preoperative Instructions        Surgery Date 5/21/18          Time of Arrival 0930 am    Contact # [phone]    1. On the day of your surgery, please report to the Surgical Services Registration Desk and sign in at your designated time. The Surgery Center is located to the right of the Emergency Room. 2. You must have someone with you to drive you home. You should not drive a car for 24 hours following surgery. Please make arrangements for a friend or family member to stay with you for the first 24 hours after your surgery. 3. Do not have anything to eat or drink (including water, gum, mints, coffee, juice) after midnight ?5/20/18   . ? This may not apply to medications prescribed by your physician. ?(Please note below the special instructions with medications to take the morning of your procedure.)    4. We recommend you do not drink any alcoholic beverages for 24 hours before and after your surgery. 5. Contact your surgeons office for instructions on the following medications: non-steroidal anti-inflammatory drugs (i.e. Advil, Aleve), vitamins, and supplements. (Some surgeons will want you to stop these medications prior to surgery and others may allow you to take them)  **If you are currently taking Plavix, Coumadin, Aspirin and/or other blood-thinning agents, contact your surgeon for instructions. ** Your surgeon will partner with the physician prescribing these medications to determine if it is safe to stop or if you need to continue taking. Please do not stop taking these medications without instructions from your surgeon    6. Wear comfortable clothes. Wear glasses instead of contacts. Do not bring any money or jewelry. Please bring picture ID, insurance card, and any prearranged co-payment or hospital payment. Do not wear make-up, particularly mascara the morning of your surgery.   Do not wear nail polish, particularly if you are having foot /hand surgery. Wear your hair loose or down, no ponytails, buns, ken pins or clips. All body piercings must be removed. Please shower with antibacterial soap for three consecutive days before and on the morning of surgery, but do not apply any lotions, powders or deodorants after the shower on the day of surgery. Please use a fresh towels after each shower. Please sleep in clean clothes and change bed linens the night before surgery. Please do not shave for 48 hours prior to surgery. Shaving of the face is acceptable. 7. You should understand that if you do not follow these instructions your surgery may be cancelled. If your physical condition changes (I.e. fever, cold or flu) please contact your surgeon as soon as possible. 8. It is important that you be on time. If a situation occurs where you may be late, please call [phone] (OR Holding Area). 9. If you have any questions and or problems, please call [phone] (Pre-admission Testing). 10. Your surgery time may be subject to change. You will receive a phone call the evening prior if your time changes. 11.  If having outpatient surgery, you must have someone to drive you here, stay with you during the duration of your stay, and to drive you home at time of discharge. 12.   In an effort to improve the efficiency, privacy, and safety for all of our Pre-op patients visitors are not allowed in the Holding area. Once you arrive and are registered your family/visitors will be asked to remain in the waiting room. The Pre-op staff will get you from the Surgical Waiting Area and will explain to you and your family/visitors that the Pre-op phase is beginning. The staff will answer any questions and provide instructions for tracking of the patient, by use of the existing tracking number and color-coded status board in the waiting room.   At this time the staff will also ask for your designated spokesperson information in the event that the physician or staff need to provide an update or obtain any pertinent information. The designated spokesperson will be notified if the physician needs to speak to family during the pre-operative phase. If at any time your family/visitors has questions or concerns they may approach the volunteer desk in the waiting area for assistance. Special Instructions: Check blood sugar morning of surgery and call [phone] for NPH dosing instructions. MEDICATIONS TO TAKE THE MORNING OF SURGERY WITH A SIP OF WATER: Flonase, Hydralazine, Victoza, Tamsulosin      I understand a pre-operative phone call will be made to verify my surgery time. In the event that I am not available, I give permission for a message to be left on my answering service and/or with another person?  yes       ___________________      __________   _________    (Signature of Patient)             (Witness)                (Date and Time)

## 2018-05-14 RX ORDER — CEFAZOLIN SODIUM/WATER 2 G/20 ML
2 SYRINGE (ML) INTRAVENOUS ONCE
Status: CANCELLED | OUTPATIENT
Start: 2018-05-21 | End: 2018-05-21

## 2018-05-21 ENCOUNTER — HOSPITAL ENCOUNTER (OUTPATIENT)
Age: 73
Setting detail: OUTPATIENT SURGERY
Discharge: HOME | End: 2018-05-21
Attending: SURGERY | Admitting: SURGERY
Payer: MEDICARE

## 2018-05-21 ENCOUNTER — ANESTHESIA (OUTPATIENT)
Dept: SURGERY | Age: 73
End: 2018-05-21
Payer: MEDICARE

## 2018-05-21 ENCOUNTER — ANESTHESIA EVENT (OUTPATIENT)
Dept: SURGERY | Age: 73
End: 2018-05-21
Payer: MEDICARE

## 2018-05-21 VITALS
RESPIRATION RATE: 16 BRPM | BODY MASS INDEX: 34.94 KG/M2 | DIASTOLIC BLOOD PRESSURE: 66 MMHG | HEART RATE: 77 BPM | WEIGHT: 257.94 LBS | HEIGHT: 72 IN | TEMPERATURE: 97.8 F | OXYGEN SATURATION: 96 % | SYSTOLIC BLOOD PRESSURE: 140 MMHG

## 2018-05-21 DIAGNOSIS — N18.30 CKD (CHRONIC KIDNEY DISEASE) STAGE 3, GFR 30-59 ML/MIN (HCC): Primary | ICD-10-CM

## 2018-05-21 LAB
ANION GAP BLD CALC-SCNC: 15 MMOL/L (ref 10–20)
BUN BLD-MCNC: 56 MG/DL (ref 9–20)
CA-I BLD-MCNC: 1.22 MMOL/L (ref 1.12–1.32)
CHLORIDE BLD-SCNC: 108 MMOL/L (ref 98–107)
CO2 BLD-SCNC: 23 MMOL/L (ref 21–32)
CREAT BLD-MCNC: 6.7 MG/DL (ref 0.6–1.3)
GLUCOSE BLD STRIP.AUTO-MCNC: 142 MG/DL (ref 65–100)
GLUCOSE BLD-MCNC: 163 MG/DL (ref 65–100)
HCT VFR BLD CALC: 30 % (ref 36.6–50.3)
POTASSIUM BLD-SCNC: 5.2 MMOL/L (ref 3.5–5.1)
SERVICE CMNT-IMP: ABNORMAL
SERVICE CMNT-IMP: ABNORMAL
SODIUM BLD-SCNC: 140 MMOL/L (ref 136–145)

## 2018-05-21 PROCEDURE — 82962 GLUCOSE BLOOD TEST: CPT

## 2018-05-21 PROCEDURE — 77030018836 HC SOL IRR NACL ICUM -A: Performed by: SURGERY

## 2018-05-21 PROCEDURE — 76060000033 HC ANESTHESIA 1 TO 1.5 HR: Performed by: SURGERY

## 2018-05-21 PROCEDURE — 74011250636 HC RX REV CODE- 250/636: Performed by: SURGERY

## 2018-05-21 PROCEDURE — 77030020153 HC PRB DOPLR DISP MIZU -C: Performed by: SURGERY

## 2018-05-21 PROCEDURE — 76210000020 HC REC RM PH II FIRST 0.5 HR: Performed by: SURGERY

## 2018-05-21 PROCEDURE — 77030002996 HC SUT SLK J&J -A: Performed by: SURGERY

## 2018-05-21 PROCEDURE — 74011250636 HC RX REV CODE- 250/636

## 2018-05-21 PROCEDURE — 77030037878 HC DRSG MEPILEX >48IN BORD MOLN -B

## 2018-05-21 PROCEDURE — 76210000016 HC OR PH I REC 1 TO 1.5 HR: Performed by: SURGERY

## 2018-05-21 PROCEDURE — A4565 SLINGS: HCPCS

## 2018-05-21 PROCEDURE — 77030002987 HC SUT PROL J&J -B: Performed by: SURGERY

## 2018-05-21 PROCEDURE — 74011000272 HC RX REV CODE- 272: Performed by: SURGERY

## 2018-05-21 PROCEDURE — 77030011640 HC PAD GRND REM COVD -A: Performed by: SURGERY

## 2018-05-21 PROCEDURE — 76010000149 HC OR TIME 1 TO 1.5 HR: Performed by: SURGERY

## 2018-05-21 PROCEDURE — 74011000250 HC RX REV CODE- 250: Performed by: SURGERY

## 2018-05-21 PROCEDURE — 77030002916 HC SUT ETHLN J&J -A: Performed by: SURGERY

## 2018-05-21 PROCEDURE — 74011000250 HC RX REV CODE- 250

## 2018-05-21 PROCEDURE — 80047 BASIC METABLC PNL IONIZED CA: CPT

## 2018-05-21 PROCEDURE — 77030020256 HC SOL INJ NACL 0.9%  500ML: Performed by: SURGERY

## 2018-05-21 PROCEDURE — 77030020782 HC GWN BAIR PAWS FLX 3M -B

## 2018-05-21 PROCEDURE — 77030002986 HC SUT PROL J&J -A: Performed by: SURGERY

## 2018-05-21 PROCEDURE — 77030031139 HC SUT VCRL2 J&J -A: Performed by: SURGERY

## 2018-05-21 RX ORDER — ROPIVACAINE HYDROCHLORIDE 5 MG/ML
INJECTION, SOLUTION EPIDURAL; INFILTRATION; PERINEURAL AS NEEDED
Status: DISCONTINUED | OUTPATIENT
Start: 2018-05-21 | End: 2018-05-21 | Stop reason: HOSPADM

## 2018-05-21 RX ORDER — LIDOCAINE HYDROCHLORIDE 10 MG/ML
0.1 INJECTION, SOLUTION EPIDURAL; INFILTRATION; INTRACAUDAL; PERINEURAL AS NEEDED
Status: DISCONTINUED | OUTPATIENT
Start: 2018-05-21 | End: 2018-05-21 | Stop reason: HOSPADM

## 2018-05-21 RX ORDER — LIDOCAINE HYDROCHLORIDE AND EPINEPHRINE 20; 10 MG/ML; UG/ML
INJECTION, SOLUTION INFILTRATION; PERINEURAL AS NEEDED
Status: DISCONTINUED | OUTPATIENT
Start: 2018-05-21 | End: 2018-05-21 | Stop reason: HOSPADM

## 2018-05-21 RX ORDER — SODIUM CHLORIDE 0.9 % (FLUSH) 0.9 %
5-10 SYRINGE (ML) INJECTION AS NEEDED
Status: DISCONTINUED | OUTPATIENT
Start: 2018-05-21 | End: 2018-05-21 | Stop reason: HOSPADM

## 2018-05-21 RX ORDER — SODIUM CHLORIDE, SODIUM LACTATE, POTASSIUM CHLORIDE, CALCIUM CHLORIDE 600; 310; 30; 20 MG/100ML; MG/100ML; MG/100ML; MG/100ML
25 INJECTION, SOLUTION INTRAVENOUS CONTINUOUS
Status: DISCONTINUED | OUTPATIENT
Start: 2018-05-21 | End: 2018-05-21 | Stop reason: HOSPADM

## 2018-05-21 RX ORDER — PROPOFOL 10 MG/ML
INJECTION, EMULSION INTRAVENOUS
Status: DISCONTINUED | OUTPATIENT
Start: 2018-05-21 | End: 2018-05-21 | Stop reason: HOSPADM

## 2018-05-21 RX ORDER — SODIUM CHLORIDE 0.9 % (FLUSH) 0.9 %
5-10 SYRINGE (ML) INJECTION EVERY 8 HOURS
Status: DISCONTINUED | OUTPATIENT
Start: 2018-05-21 | End: 2018-05-21 | Stop reason: HOSPADM

## 2018-05-21 RX ORDER — FENTANYL CITRATE 50 UG/ML
25 INJECTION, SOLUTION INTRAMUSCULAR; INTRAVENOUS
Status: DISCONTINUED | OUTPATIENT
Start: 2018-05-21 | End: 2018-05-21 | Stop reason: HOSPADM

## 2018-05-21 RX ORDER — HYDROCODONE BITARTRATE AND ACETAMINOPHEN 5; 325 MG/1; MG/1
1 TABLET ORAL
Qty: 15 TAB | Refills: 0 | Status: SHIPPED | OUTPATIENT
Start: 2018-05-21 | End: 2018-10-09 | Stop reason: ALTCHOICE

## 2018-05-21 RX ORDER — DIPHENHYDRAMINE HYDROCHLORIDE 50 MG/ML
12.5 INJECTION, SOLUTION INTRAMUSCULAR; INTRAVENOUS AS NEEDED
Status: DISCONTINUED | OUTPATIENT
Start: 2018-05-21 | End: 2018-05-21 | Stop reason: HOSPADM

## 2018-05-21 RX ORDER — MIDAZOLAM HYDROCHLORIDE 1 MG/ML
INJECTION, SOLUTION INTRAMUSCULAR; INTRAVENOUS AS NEEDED
Status: DISCONTINUED | OUTPATIENT
Start: 2018-05-21 | End: 2018-05-21 | Stop reason: HOSPADM

## 2018-05-21 RX ORDER — SODIUM CHLORIDE 9 MG/ML
INJECTION, SOLUTION INTRAVENOUS
Status: DISCONTINUED | OUTPATIENT
Start: 2018-05-21 | End: 2018-05-21 | Stop reason: HOSPADM

## 2018-05-21 RX ORDER — CEFAZOLIN SODIUM/WATER 2 G/20 ML
2 SYRINGE (ML) INTRAVENOUS ONCE
Status: COMPLETED | OUTPATIENT
Start: 2018-05-21 | End: 2018-05-21

## 2018-05-21 RX ORDER — HEPARIN SODIUM 1000 [USP'U]/ML
INJECTION, SOLUTION INTRAVENOUS; SUBCUTANEOUS AS NEEDED
Status: DISCONTINUED | OUTPATIENT
Start: 2018-05-21 | End: 2018-05-21 | Stop reason: HOSPADM

## 2018-05-21 RX ORDER — FENTANYL CITRATE 50 UG/ML
INJECTION, SOLUTION INTRAMUSCULAR; INTRAVENOUS AS NEEDED
Status: DISCONTINUED | OUTPATIENT
Start: 2018-05-21 | End: 2018-05-21 | Stop reason: HOSPADM

## 2018-05-21 RX ORDER — HYDROMORPHONE HYDROCHLORIDE 2 MG/ML
0.2 INJECTION, SOLUTION INTRAMUSCULAR; INTRAVENOUS; SUBCUTANEOUS
Status: DISCONTINUED | OUTPATIENT
Start: 2018-05-21 | End: 2018-05-21 | Stop reason: HOSPADM

## 2018-05-21 RX ADMIN — PROPOFOL 50 MCG/KG/MIN: 10 INJECTION, EMULSION INTRAVENOUS at 11:34

## 2018-05-21 RX ADMIN — LIDOCAINE HYDROCHLORIDE AND EPINEPHRINE 20 ML: 20; 10 INJECTION, SOLUTION INFILTRATION; PERINEURAL at 10:47

## 2018-05-21 RX ADMIN — MIDAZOLAM HYDROCHLORIDE 1 MG: 1 INJECTION, SOLUTION INTRAMUSCULAR; INTRAVENOUS at 10:44

## 2018-05-21 RX ADMIN — ROPIVACAINE HYDROCHLORIDE 20 ML: 5 INJECTION, SOLUTION EPIDURAL; INFILTRATION; PERINEURAL at 10:47

## 2018-05-21 RX ADMIN — SODIUM CHLORIDE: 9 INJECTION, SOLUTION INTRAVENOUS at 11:11

## 2018-05-21 RX ADMIN — FENTANYL CITRATE 50 MCG: 50 INJECTION, SOLUTION INTRAMUSCULAR; INTRAVENOUS at 10:41

## 2018-05-21 RX ADMIN — ROPIVACAINE HYDROCHLORIDE 5 ML: 5 INJECTION, SOLUTION EPIDURAL; INFILTRATION; PERINEURAL at 10:50

## 2018-05-21 RX ADMIN — LIDOCAINE HYDROCHLORIDE AND EPINEPHRINE 5 ML: 20; 10 INJECTION, SOLUTION INFILTRATION; PERINEURAL at 10:50

## 2018-05-21 RX ADMIN — MIDAZOLAM HYDROCHLORIDE 2 MG: 1 INJECTION, SOLUTION INTRAMUSCULAR; INTRAVENOUS at 10:41

## 2018-05-21 RX ADMIN — HEPARIN SODIUM 5000 UNITS: 1000 INJECTION, SOLUTION INTRAVENOUS; SUBCUTANEOUS at 11:56

## 2018-05-21 RX ADMIN — Medication 2 G: at 11:33

## 2018-05-21 NOTE — ANESTHESIA PROCEDURE NOTES
Peripheral Block    Start time: 5/21/2018 10:40 AM  End time: 5/21/2018 10:51 AM  Performed by: Librado Higginbotham  Authorized by: Librado Higginbotham       Pre-procedure: Indications: at surgeon's request and post-op pain management    Preanesthetic Checklist: risks and benefits discussed, site marked and timeout performed    Timeout Time: 10:40          Block Type:   Block Type:  Supraclavicular  Laterality:  Left  Monitoring:  Standard ASA monitoring, frequent vital sign checks, responsive to questions and oxygen  Injection Technique:  Single shot  Procedures: ultrasound guided    Prep: chlorhexidine    Location:  Supraclavicular  Needle Type:  Stimuplex  Needle Gauge:  22 G  Needle Localization:  Ultrasound guidance  Medication Injected:  0.5%  ropivacaine  Volume (mL):  20  Add'l Medication Injected:  2.0%  lidocaine  Adds:  Epi 1:200K  Volume (mL):  20    Assessment:  Number of attempts:  1  Injection Assessment:  Incremental injection every 5 mL, local visualized surrounding nerve on ultrasound, negative aspiration for blood, ultrasound image on chart, no intravascular symptoms and no paresthesia  Patient tolerance:  Patient tolerated the procedure well with no immediate complications  Supraclavicular Nerve Block Note     Left Supraclavicular nerve block:    Risks, benefits, alternatives explained at length and patient agrees to proceed. Time out performed and site for block/surgery identified. Standard monitors applied, 3 L NC O2, and sedation given as recorded by RN so as to achieve patient comfort and anxiolysis, but maintain meaningful verbal contact. Sterile prep followed by 1-2 mL local at insertion site. A 40 mm, 22G insulated stimiplex needle was inserted in the interscalene groove, approximately one centimeter from the mid-clavicle. The nerve bundle was identified under [address] guidance. Local anesthetic injected without resistance and with gentle aspiration in 3-5 ml increments.      An extremity ring block was performed with 5 ml of 0.5% ropivacaine + 5 ml of 2% lidocaine w/epi. Patient tolerated procedure well. No pain, paresthesia, or blood noted. VSS throughout. No complications noted.

## 2018-05-21 NOTE — IP AVS SNAPSHOT
[address] 
[phone] Patient: Lisa Nguyen MRN: [id number] HQL:[id number] About your hospitalization You were admitted on:  May 21, 2018 You last received care in the:  [facility] You were discharged on:  May 21, 2018 Why you were hospitalized Your primary diagnosis was:  Not on File Follow-up Information Follow up With Details Comments Contact Info MD Jennifer Demarco [address] 
[phone] Discharge Orders None A check leonidas indicates which time of day the medication should be taken. My Medications START taking these medications Instructions Each Dose to Equal  
 Morning Noon Evening Bedtime HYDROcodone-acetaminophen 5-325 mg per tablet Commonly known as:  Damaris Garrison Your last dose was: Your next dose is: Take 1 Tab by mouth every four (4) hours as needed for Pain. Max Daily Amount: 6 Tabs. 1 Tab CHANGE how you take these medications Instructions Each Dose to Equal  
 Morning Noon Evening Bedtime  
 insulin NPH/insulin regular 100 unit/mL (70-30) injection Commonly known as:  NovoLIN 70/30 U-100 Insulin What changed:  how much to take Your last dose was: Your next dose is:    
   
   
 60 Units by SubCUTAneous route Before breakfast and dinner. 60 Units CONTINUE taking these medications Instructions Each Dose to Equal  
 Morning Noon Evening Bedtime AMARYL 4 mg tablet Generic drug:  glimepiride Your last dose was: Your next dose is: Take 4 mg by mouth every morning. 4 mg  
    
   
   
   
  
 bumetanide 2 mg tablet Commonly known as:  Chari Cuenca Your last dose was: Your next dose is: Take 2 mg by mouth every evening. 2 mg CALCIUM 600 WITH VITAMIN D3 600 mg(1,500mg) -400 unit Chew Generic drug:  Calcium-Cholecalciferol (D3) Your last dose was: Your next dose is: Take 1 Tab by mouth every evening. 1 Tab  
    
   
   
   
  
 carvedilol 6.25 mg tablet Commonly known as:  Wendall Lundborg Your last dose was: Your next dose is:    
   
   
 12.5 mg two (2) times a day. Patient takes late afternoon and at bedtime. 12.5 mg  
    
   
   
   
  
 cloNIDine HCl 0.2 mg tablet Commonly known as:  CATAPRES Your last dose was: Your next dose is:    
   
   
 every evening. fenofibrate nanocrystallized 145 mg tablet Commonly known as:  [employer] Your last dose was: Your next dose is: Take  by mouth every evening. FLOMAX 0.4 mg capsule Generic drug:  tamsulosin Your last dose was: Your next dose is: Take 0.4 mg by mouth daily. 0.4 mg  
    
   
   
   
  
 FLONASE 50 mcg/actuation nasal spray Generic drug:  fluticasone Your last dose was: Your next dose is: 2 Sprays by Both Nostrils route daily. 2 Spray  
    
   
   
   
  
 hydrALAZINE 100 mg tablet Commonly known as:  APRESOLINE Your last dose was: Your next dose is:    
   
   
 100 mg three (3) times daily. 100 mg Insulin Needles (Disposable) 32 gauge x 5/32\" Ndle Commonly known as:  Haily Pen Needle Your last dose was: Your next dose is: For use each AM with Victoza Pen * Insulin Syringe-Needle U-100 1 mL 30 gauge x 5/16 Syrg Your last dose was: Your next dose is:    
   
   
 Use twice daily for insulin injection * Insulin Syringe-Needle U-100 1 mL 30 gauge x 5/16 Syrg Your last dose was: Your next dose is:    
   
   
 Subcutaneously twice daily LIPITOR 20 mg tablet Generic drug:  atorvastatin Your last dose was: Your next dose is: Take 20 mg by mouth every evening. 20 mg  
    
   
   
   
  
 Liraglutide 0.6 mg/0.1 mL (18 mg/3 mL) Pnij Commonly known as:  Patricio Bejarano 3-NELLI Your last dose was: Your next dose is:    
   
   
 1.8 mg by SubCUTAneous route daily. Titrate up to 1.8mg daily as direcected 1.8 mg  
    
   
   
   
  
 lisinopril 20 mg tablet Commonly known as:  Chetan Padilla Your last dose was: Your next dose is: Take 40 mg by mouth every evening. 40 mg  
    
   
   
   
  
 * Notice: This list has 2 medication(s) that are the same as other medications prescribed for you. Read the directions carefully, and ask your doctor or other care provider to review them with you. Where to Get Your Medications Information on where to get these meds will be given to you by the nurse or doctor. ! Ask your nurse or doctor about these medications HYDROcodone-acetaminophen 5-325 mg per tablet Opioid Education Prescription Opioids: What You Need to Know: 
 
 
FOLLOW UP VISIT Appointment in: Two Weeks Call for appt at [address] DISCHARGE SUMMARY from Nurse PATIENT INSTRUCTIONS: 
 
After general anesthesia or intravenous sedation, for 24 hours or while taking prescription Narcotics: · Limit your activities · Do not drive and operate hazardous machinery · Do not make important personal or business decisions · Do  not drink alcoholic beverages · If you have not urinated within 8 hours after discharge, please contact your surgeon on call. Report the following to your surgeon: 
· Excessive pain, swelling, redness or odor of or around the surgical area · Temperature over 100.5 · Nausea and vomiting lasting longer than 4 hours or if unable to take medications · Any signs of decreased circulation or nerve impairment to extremity: change in color, persistent  numbness, tingling, coldness or increase pain · Any questions What to do at Home: *  Please give a list of your current medications to your Primary Care Provider. *  Please update this list whenever your medications are discontinued, doses are 
    changed, or new medications (including over-the-counter products) are added. *  Please carry medication information at all times in case of emergency situations. These are general instructions for a healthy lifestyle: No smoking/ No tobacco products/ Avoid exposure to second hand smoke Surgeon General's Warning:  Quitting smoking now greatly reduces serious risk to your health. Obesity, smoking, and sedentary lifestyle greatly increases your risk for illness A healthy diet, regular physical exercise & weight monitoring are important for maintaining a healthy lifestyle You may be retaining fluid if you have a history of heart failure or if you experience any of the following symptoms:  Weight gain of 3 pounds or more overnight or 5 pounds in a week, increased swelling in our hands or feet or shortness of breath while lying flat in bed. Please call your doctor as soon as you notice any of these symptoms; do not wait until your next office visit. Recognize signs and symptoms of STROKE: 
 
 
? soup 
? broth 
?  toast  
? crackers ? applesauce 
? bananas  
? mashed potatoes, 
? soft or scrambled eggs 
? oatmeal 
?  jello It is important to eat when taking your pain medication. This will help to prevent nausea. If possible, please try to time your meals with your medications. It is very important to stay hydrated following surgery. Sip fluids frequently while awake. Avoid acidic drinks such as citrus juices and soda for 24 hours. Carbonated beverages may cause bloating and gas. Acceptable fluids include: 
 
? water (flavor packets may add variety) ? coffee or tea (in moderation) ? Gatorade ? Sophia Kaska ? apple juice 
? cranberry juice You are encouraged to cough and deep breathe every hour when awake. This will help to prevent respiratory complications following anesthesia. You may want to hug a pillow when coughing and sneezing to add additional support to the surgical area and to decrease discomfort if you had abdominal or chest surgery. If you are discharged home with support stockings, you may remove them after 24 hours. Support stockings are used to help prevent blood clots in the legs following surgery. Please take time to review all of your Home Care Instructions and Medication Information sheets provided in your discharge packet.  If you have any questions, please contact your surgeons office. Thank you. Narcotic-Analgesic/Acetaminophen (Percocet, Norco, Lorcet HD, Lortab 10/325) - (By mouth) Why this medicine is used:  
Relieves pain. Contact a nurse or doctor right away if you have: 
· Extreme weakness, shallow breathing, slow heartbeat · Severe confusion, lightheadedness, dizziness, fainting · Yellow skin or eyes, dark urine or pale stools · Severe constipation, severe stomach pain, nausea, vomiting, loss of appetite · Sweating or cold, clammy skin Common side effects: · Mild constipation, nausea, vomiting · Sleepiness, tiredness · Itching, rash © 2017 [employer] Information is for End User's use only and may not be sold, redistributed or otherwise used for commercial purposes. Introducing [facility] HE[facility]! [employer] introduces [employer] patient portal. Now you can access parts of your medical record, email your doctor's office, and request medication refills online. 1. In your internet browser, go to [email or web address] 2. Click on the First Time User? Click Here link in the Sign In box. You will see the New Member Sign Up page. 3. Enter your [employer] Access Code exactly as it appears below. You will not need to use this code after youve completed the sign-up process. If you do not sign up before the expiration date, you must request a new code. · [employer] Access Code: [email or web address] Expires: 8/9/2018 11:01 AM 
 
4. Enter the last four digits of your Social Security Number (xxxx) and Date of Birth (mm/dd/yyyy) as indicated and click Submit. You will be taken to the next sign-up page. 5. Create a Edifilmt ID. This will be your [employer] login ID and cannot be changed, so think of one that is secure and easy to remember. 6. Create a [employer] password. You can change your password at any time. 7. Enter your Password Reset Question and Answer.  This can be used at a later time if you forget your password. 8. Enter your e-mail address. You will receive e-mail notification when new information is available in [address] 9. Click Sign Up. You can now view and download portions of your medical record. 10. Click the Download Summary menu link to download a portable copy of your medical information. If you have questions, please visit the Frequently Asked Questions section of the [employer]oubt website. Remember, [employer] is NOT to be used for urgent needs. For medical emergencies, dial 911. Now available from your iPhone and Android! Introducing William Darnell As a [employer] patient, I wanted to make you aware of our electronic visit tool called William Darnell. [employer] [facility] 24/7 allows you to connect within minutes with a medical provider 24 hours a day, seven days a week via a mobile device or tablet or logging into a secure website from your computer. You can access William Darnell from anywhere in the [place]. A virtual visit might be right for you when you have a simple condition and feel like you just dont want to get out of bed, or cant get away from work for an appointment, when your regular [facility] provider is not available (evenings, weekends or holidays), or when youre out of town and need minor care. Electronic visits cost only $49 and if the [employer] 24/7 provider determines a prescription is needed to treat your condition, one can be electronically transmitted to a nearby pharmacy*. Please take a moment to enroll today if you have not already done so. The enrollment process is free and takes just a few minutes. To enroll, please download the [employer] 24/7 rja to your tablet or phone, or visit [email or web address] to enroll on your computer.    
And, as an [address] [place] [address] patient with a [employer] account, the results of your visits will be scanned into your electronic medical record and your primary care provider will be able to view the scanned results. We urge you to continue to see your regular [facility] provider for your ongoing medical care. And while your primary care provider may not be the one available when you seek a [facility] virtual visit, the peace of mind you get from getting a real diagnosis real time can be priceless. For more information on [employer], view our Frequently Asked Questions (FAQs) at [email or web address]. Sincerely, 
 
Blanca Jennings MD 
Chief Medical Officer Leanna8 Ellen Crawford *:  certain medications cannot be prescribed via [facility] Providers Seen During Your Hospitalization Provider Specialty Primary office phone Miracle Musa MD General and Vascular Surgery [phone] Your Primary Care Physician (PCP) Primary Care Physician Office Phone Office Fax Chris Check [phone] You are allergic to the following Allergen Reactions Albumin, Human 25 % Anaphylaxis Hives Niacin Hives Recent Documentation Height Weight BMI Smoking Status 1.829 m 117 kg 34.98 kg/m2 Former Smoker Emergency Contacts Name Discharge Info Relation Home Work Mobile Lizandro Guzman CAREGIVER [3] Spouse [3] [phone]  Other Relative [6] [phone] Patient Belongings The following personal items are in your possession at time of discharge: 
  Dental Appliances: Other (comment), With patient (dental bridge)  Visual Aid: Glasses, With patient   Hearing Aids/Status: Does not own  Home Medications: None   Jewelry: None  Clothing: Undergarments, With patient, Footwear, Socks, Shirt, Pants    Other Valuables: Eyeglasses  Personal Items Sent to Safe: No valuables to send to security Please provide this summary of care documentation to your next provider. Signatures-by signing, you are acknowledging that this After Visit Summary has been reviewed with you and you have received a copy. Patient Signature:  ____________________________________________________________ Date:  ____________________________________________________________  
  
Darlene William Provider Signature:  ____________________________________________________________ Date:  ____________________________________________________________

## 2018-05-21 NOTE — BRIEF OP NOTE
BRIEF OPERATIVE NOTE    Date of Procedure: 5/21/2018   Preoperative Diagnosis: END STAGE RENAL  Postoperative Diagnosis: END STAGE RENAL    Procedure(s): CREATION LEFT ARM AV FISTULA (PRA)  Surgeon: Andreas Bledsoe MD   Anesthesia: block   Estimated Blood Loss: none  Specimens: none   Findings: nice thrill   Complications: none  Implants: * No implants in log *

## 2018-05-21 NOTE — OP NOTES
[address]  OPERATIVE REPORT    [facility]  MR#: [id number]  : 1945  ACCOUNT #: [de-identified]   DATE OF SERVICE: 2018    PREOPERATIVE DIAGNOSIS:  End-stage renal disease. POSTOPERATIVE DIAGNOSIS:  End-stage renal disease. PROCEDURE PERFORMED:  Creation of a left arm AV fistula (proximal radiocephalic). SURGEON:  Noelle Esposito MD    ANESTHESIA:  Regional block. SPECIMENS REMOVED:  none     ESTIMATED BLOOD LOSS:  none     HISTORY OF PRESENT ILLNESS:  The patient is a 17-year-old gentleman in need of permanent hemodialysis access. He has modest cephalic and basilic veins in his left upper arm. He is admitted for creation of proximal radiocephalic AV fistula and a probable secondary procedure later on. [id number]:  Following informed consent, the patient supine on the operating table after adequate induction of regional block anesthesia, site and patient confirmation, and administered prophylactic antibiotics, the left arm was prepped and draped in sterile fashion. A longitudinal or vertical incision was made in the proximal portion of the forearm just below the antecubital crease and the cephalic vein dissected free and controlled. Dissection was deepened until the proximal radial artery, which is a good caliber and quality, was identified, dissected free and controlled. The patient was systemically heparinized. An end-to-side anastomosis was created between the cephalic vein (which was sclerotic further down the forearm) and the proximal radial artery using continuous Prolene suture technique. At the completion of the procedure, there was a nice thrill. The suture line was hemostatic. Wound was closed in two layers of absorbable suture. The patient tolerated the procedure well. COMPLICATIONS:  No complications.       Claudetta Hawthorne, MD Sharilyn Olden / Chelo Arshad  D: 2018 12:48     T: 2018 13:55  JOB #: [id number]

## 2018-05-21 NOTE — DISCHARGE INSTRUCTIONS
Patient Discharge Instructions    Jane Burton / [id number] : 1945    Admitted 2018 Discharged: 2018     What to do at Home  Recommended activity: Elevate arm   No needle sticks or BP checks in affected arm  Leave dressing for 72-96 hours. Information obtained by :  I understand that if any problems occur once I am at home I am to contact my physician. I understand and acknowledge receipt of the instructions indicated above. R.N.'s Signature                                                                  Date/Time                                                                                                                                              Patient or Representative Signature                                                          Date/Time      Alfred Cox MD     FOLLOW UP VISIT Appointment in: Two Weeks Call for appt at [facility] [phone]       [address] from Nurse    PATIENT INSTRUCTIONS:    After general anesthesia or intravenous sedation, for 24 hours or while taking prescription Narcotics:  · Limit your activities  · Do not drive and operate hazardous machinery  · Do not make important personal or business decisions  · Do  not drink alcoholic beverages  · If you have not urinated within 8 hours after discharge, please contact your surgeon on call.     Report the following to your surgeon:  · Excessive pain, swelling, redness or odor of or around the surgical area  · Temperature over 100.5  · Nausea and vomiting lasting longer than 4 hours or if unable to take medications  · Any signs of decreased circulation or nerve impairment to extremity: change in color, persistent  numbness, tingling, coldness or increase pain  · Any questions    What to do at Home:    *  Please give a list of your current medications to your Primary Care Provider. *  Please update this list whenever your medications are discontinued, doses are      changed, or new medications (including over-the-counter products) are added. *  Please carry medication information at all times in case of emergency situations. These are general instructions for a healthy lifestyle:    No smoking/ No tobacco products/ Avoid exposure to second hand smoke  Surgeon General's Warning:  Quitting smoking now greatly reduces serious risk to your health. Obesity, smoking, and sedentary lifestyle greatly increases your risk for illness    A healthy diet, regular physical exercise & weight monitoring are important for maintaining a healthy lifestyle    You may be retaining fluid if you have a history of heart failure or if you experience any of the following symptoms:  Weight gain of 3 pounds or more overnight or 5 pounds in a week, increased swelling in our hands or feet or shortness of breath while lying flat in bed. Please call your doctor as soon as you notice any of these symptoms; do not wait until your next office visit. Recognize signs and symptoms of STROKE:    F-face looks uneven    A-arms unable to move or move unevenly    S-speech slurred or non-existent    T-time-call 911 as soon as signs and symptoms begin-DO NOT go       Back to bed or wait to see if you get better-TIME IS BRAIN. Warning Signs of HEART ATTACK     Call 911 if you have these symptoms:   Chest discomfort. Most heart attacks involve discomfort in the center of the chest that lasts more than a few minutes, or that goes away and comes back. It can feel like uncomfortable pressure, squeezing, fullness, or pain.  Discomfort in other areas of the upper body. Symptoms can include pain or discomfort in one or both arms, the back, neck, jaw, or stomach.  Shortness of breath with or without chest discomfort.    Other signs may include breaking out in a cold sweat, nausea, or lightheadedness. Don't wait more than five minutes to call 911 - MINUTES MATTER! Fast action can save your life. Calling 911 is almost always the fastest way to get lifesaving treatment. Emergency Medical Services staff can begin treatment when they arrive -- up to an hour sooner than if someone gets to the hospital by car. The discharge information has been reviewed with the patient and caregiver. The patient and caregiver verbalized understanding. Discharge medications reviewed with the patient and caregiver and appropriate educational materials and side effects teaching were provided. ___________________________________________________________________________________________________________________________________      A common side effect of anesthesia following surgery is nausea and/or vomiting. In order to decrease symptoms, it is wise to avoid foods that are high in fat, greasy foods, milk products, and spicy foods for the first 24 hours. Acceptable foods for the first 24 hours following surgery include but are not limited to:     soup   broth    toast    crackers    applesauce    bananas    mashed potatoes,   soft or scrambled eggs   oatmeal    jello    It is important to eat when taking your pain medication. This will help to prevent nausea. If possible, please try to time your meals with your medications. It is very important to stay hydrated following surgery. Sip fluids frequently while awake. Avoid acidic drinks such as citrus juices and soda for 24 hours. Carbonated beverages may cause bloating and gas. Acceptable fluids include:    - water (flavor packets may add variety)  - coffee or tea (in moderation)  - Gatorade  - Albin-aid  - apple juice  - cranberry juice    You are encouraged to cough and deep breathe every hour when awake. This will help to prevent respiratory complications following anesthesia.  You may want to hug a pillow when coughing and sneezing to add additional support to the surgical area and to decrease discomfort if you had abdominal or chest surgery. If you are discharged home with support stockings, you may remove them after 24 hours. Support stockings are used to help prevent blood clots in the legs following surgery. Please take time to review all of your Home Care Instructions and Medication Information sheets provided in your discharge packet. If you have any questions, please contact your surgeons office. Thank you. Narcotic-Analgesic/Acetaminophen (Percocet, Norco, Lorcet HD, Lortab 10/325) - (By mouth)   Why this medicine is used:   Relieves pain. Contact a nurse or doctor right away if you have:  · Extreme weakness, shallow breathing, slow heartbeat  · Severe confusion, lightheadedness, dizziness, fainting  · Yellow skin or eyes, dark urine or pale stools  · Severe constipation, severe stomach pain, nausea, vomiting, loss of appetite  · Sweating or cold, clammy skin     Common side effects:  · Mild constipation, nausea, vomiting  · Sleepiness, tiredness  · Itching, rash  © 2017 [employer] Information is for End User's use only and may not be sold, redistributed or otherwise used for commercial purposes.

## 2018-05-21 NOTE — PERIOP NOTES
Handoff Report from Operating Room to PACU    Report received from ENRRIQUE Delatorre RN and Johnathan [place] TATIANA regarding Олег Son. Surgeon(s):  Walker Everett MD  And Procedure(s) (LRB):  CREATION LEFT ARM AV FISTULA  (AXILLARY BLOCK) (Left)  confirmed   with allergies and dressings discussed. Anesthesia type, drugs, patient history, complications, estimated blood loss, vital signs, intake and output, and last pain medication, lines, temperature and regional block were reviewed.

## 2018-05-21 NOTE — ANESTHESIA POSTPROCEDURE EVALUATION
Post-Anesthesia Evaluation and Assessment    Patient: Janet Gudino MRN: [id number]  SSN: [id number]    YOB: 1945  Age: 81 y.o. Sex: male       Cardiovascular Function/Vital Signs  Visit Vitals    /74    Pulse 71    Temp 36.7 °C (98 °F)    Resp 19    Ht 6' (1.829 m)    Wt 117 kg (257 lb 15 oz)    SpO2 95%    BMI 34.98 kg/m2       Patient is status post MAC, regional anesthesia for Procedure(s):  CREATION LEFT ARM AV FISTULA  (AXILLARY BLOCK). Nausea/Vomiting: None    Postoperative hydration reviewed and adequate. Pain:  Pain Scale 1: Numeric (0 - 10) (05/21/18 1315)  Pain Intensity 1: 0 (05/21/18 1315)   Managed    Neurological Status:   Neuro (WDL): Exceptions to WDL (05/21/18 1315)  Neuro  Neurologic State: Drowsy; Eyes open to voice (05/21/18 1315)  Orientation Level: Oriented to person;Oriented to situation (05/21/18 1245)  Cognition: Follows commands (05/21/18 1245)  Speech: Delayed responses; Appropriate for age (05/21/18 1245)  LUE Motor Response: Pharmacologically paralyzed (05/21/18 1245)  LLE Motor Response: Purposeful (05/21/18 1245)  RUE Motor Response: Purposeful (05/21/18 1245)  RLE Motor Response: Purposeful (05/21/18 1245)   At baseline    Mental Status and Level of Consciousness: Arousable    Pulmonary Status:   O2 Device: Room air (05/21/18 1315)   Adequate oxygenation and airway patent    Complications related to anesthesia: None    Post-anesthesia assessment completed.  No concerns    Signed By: Shelli Mercedes MD     May 21, 2018

## 2018-05-21 NOTE — ANESTHESIA PREPROCEDURE EVALUATION
Anesthetic History     PONV          Review of Systems / Medical History  Patient summary reviewed, nursing notes reviewed and pertinent labs reviewed    Pulmonary        Sleep apnea: No treatment  Shortness of breath      Comments: Former smoker - Quit 2009   Neuro/Psych   Within defined limits           Cardiovascular    Hypertension      CHF: orthopnea, dyspnea on exertion    CAD and hyperlipidemia  Pertinent negatives: No angina  Exercise tolerance: >4 METS  Comments: TTE (10/31/16):  Ejection fraction was estimated to be  25 %.    GI/Hepatic/Renal     GERD    Renal disease: ESRD      Comments: H/O Pancreatitis Endo/Other    Diabetes: poorly controlled, type 2, using insulin    Obesity, arthritis and anemia     Other Findings            Physical Exam    Airway  Mallampati: III  TM Distance: 4 - 6 cm  Neck ROM: normal range of motion   Mouth opening: Normal     Cardiovascular  Regular rate and rhythm,  S1 and S2 normal,  no murmur, click, rub, or gallop             Dental         Pulmonary  Breath sounds clear to auscultation               Abdominal  GI exam deferred       Other Findings            Anesthetic Plan    ASA: 4  Anesthesia type: MAC and regional - supraclavicular block          Induction: Intravenous  Anesthetic plan and risks discussed with: Patient

## 2018-05-21 NOTE — PERIOP NOTES
Pt. For d/c today. VSS, pt. Denies pain or nausea. Ambulated to bathroom and voided large amount clear yellow urine. Pt. left arm has a block and supported by sling. Pt.dyspnic, but denies using o2 at home, but has not taken his med's today. Breathing easier after sitting in chair. Sats 96%. Dressing on left arm is D/C/I, +thrill and bruitt. Reviewed d/c instructions, Rx. & F/U care with pt.  IV d/c'd and pt. Discharged home.

## 2018-06-29 ENCOUNTER — HOSPITAL ENCOUNTER (OUTPATIENT)
Dept: MRI IMAGING | Age: 73
Discharge: HOME | End: 2018-06-29
Attending: FAMILY MEDICINE

## 2018-06-29 DIAGNOSIS — M54.42 ACUTE BACK PAIN WITH SCIATICA, LEFT: ICD-10-CM

## 2018-06-29 DIAGNOSIS — M54.41 ACUTE BACK PAIN WITH SCIATICA, RIGHT: ICD-10-CM

## 2018-09-14 ENCOUNTER — HOSPITAL ENCOUNTER (OUTPATIENT)
Dept: GENERAL RADIOLOGY | Age: 73
Discharge: HOME | End: 2018-09-14
Payer: MEDICARE

## 2018-09-14 DIAGNOSIS — N18.6 END STAGE RENAL DISEASE (HCC): ICD-10-CM

## 2018-09-14 PROCEDURE — 71046 X-RAY EXAM CHEST 2 VIEWS: CPT

## 2018-10-09 ENCOUNTER — OFFICE VISIT (OUTPATIENT)
Dept: ENDOCRINOLOGY | Age: 73
End: 2018-10-09

## 2018-10-09 VITALS
WEIGHT: 238.3 LBS | SYSTOLIC BLOOD PRESSURE: 134 MMHG | HEIGHT: 72 IN | BODY MASS INDEX: 32.28 KG/M2 | DIASTOLIC BLOOD PRESSURE: 81 MMHG | HEART RATE: 85 BPM

## 2018-10-09 DIAGNOSIS — N18.4 TYPE 2 DIABETES MELLITUS WITH STAGE 4 CHRONIC KIDNEY DISEASE, WITH LONG-TERM CURRENT USE OF INSULIN (HCC): Primary | ICD-10-CM

## 2018-10-09 DIAGNOSIS — I10 ESSENTIAL HYPERTENSION WITH GOAL BLOOD PRESSURE LESS THAN 130/80: ICD-10-CM

## 2018-10-09 DIAGNOSIS — E78.5 HYPERLIPIDEMIA, UNSPECIFIED HYPERLIPIDEMIA TYPE: ICD-10-CM

## 2018-10-09 DIAGNOSIS — E11.22 TYPE 2 DIABETES MELLITUS WITH STAGE 4 CHRONIC KIDNEY DISEASE, WITH LONG-TERM CURRENT USE OF INSULIN (HCC): Primary | ICD-10-CM

## 2018-10-09 DIAGNOSIS — Z79.4 TYPE 2 DIABETES MELLITUS WITH STAGE 4 CHRONIC KIDNEY DISEASE, WITH LONG-TERM CURRENT USE OF INSULIN (HCC): Primary | ICD-10-CM

## 2018-10-09 LAB — HBA1C MFR BLD HPLC: 6.6 %

## 2018-10-09 RX ORDER — SERTRALINE HYDROCHLORIDE 25 MG/1
TABLET, FILM COATED ORAL
Status: ON HOLD | COMMUNITY
Start: 2018-07-25 | End: 2020-07-21

## 2018-10-09 RX ORDER — CARVEDILOL 12.5 MG/1
12.5 TABLET ORAL 2 TIMES DAILY WITH MEALS
COMMUNITY
Start: 2018-08-13 | End: 2021-05-05

## 2018-10-09 RX ORDER — TRAMADOL HYDROCHLORIDE 50 MG/1
TABLET ORAL
COMMUNITY
Start: 2018-07-27 | End: 2019-02-16

## 2018-10-09 RX ORDER — LORAZEPAM 0.5 MG/1
0.5 TABLET ORAL AS NEEDED
COMMUNITY
Start: 2018-07-25 | End: 2019-02-16

## 2018-10-09 NOTE — MR AVS SNAPSHOT
[address] [place] [address] [address] [id number] [phone] Patient: Christina Maher MRN: [id number] ZFA:[birth year] Visit Information Date & Time Provider Department Dept. Phone Encounter #  
 10/9/2018 11:50 AM Ruchi Ashby, [address] [place] [address] Diabetes and Endocrinology [phone] [id number] Follow-up Instructions Return in about 4 months (around 2/9/2019). Upcoming Health Maintenance Date Due Shingrix Vaccine Age 50> (1 of 2) 8/29/1997 FOBT Q 1 YEAR AGE 50-75 8/29/1997 Pneumococcal 65+ High/Highest Risk (1 of 2 - PCV13) 8/29/2012 Influenza Age 5 to Adult 8/1/2018 HEMOGLOBIN A1C Q6M 8/27/2018 EYE EXAM RETINAL OR DILATED Q1 10/3/2018 MEDICARE YEARLY EXAM 10/9/2018 FOOT EXAM Q1 2/27/2019 MICROALBUMIN Q1 2/27/2019 LIPID PANEL Q1 2/27/2019 GLAUCOMA SCREENING Q2Y 10/3/2019 DTaP/Tdap/Td series (2 - Td) 6/24/2023 Allergies as of 10/9/2018  Review Complete On: 10/9/2018 By: Ruchi Ashby MD  
  
 Severity Noted Reaction Type Reactions Albumin, Human 25 % High 08/21/2016    Anaphylaxis, Hives Niacin  02/27/2018    Hives Current Immunizations  Reviewed on 3/24/2017 Name Date Influenza Vaccine (Quad) PF 11/1/2016  9:36 AM  
 Tdap 6/24/2013  7:24 PM  
  
 Not reviewed this visit You Were Diagnosed With   
  
 Codes Comments Type 2 diabetes mellitus with stage 4 chronic kidney disease, with long-term current use of insulin (HCC)    -  Primary ICD-10-CM: E11.22, N18.4, Z79.4 ICD-9-CM: 250.40, 585.4, V58.67 Hyperlipidemia, unspecified hyperlipidemia type     ICD-10-CM: E78.5 ICD-9-CM: 272.4 Essential hypertension with goal blood pressure less than 130/80     ICD-10-CM: I10 
ICD-9-CM: 401.9 Vitals BP Pulse Height(growth percentile) Weight(growth percentile) BMI Smoking Status  134/81 (BP 1 Location: Left arm, BP Patient Position: Sitting) 85 6' (1.829 m) 238 lb 4.8 oz (108.1 kg) 32.32 kg/m2 Former Smoker BMI and BSA Data Body Mass Index Body Surface Area  
 32.32 kg/m 2 2.34 m 2 Preferred Pharmacy Pharmacy Name Phone [facility] [phone] [phone] Your Updated Medication List  
  
   
This list is accurate as of 10/9/18 12:17 PM.  Always use your most recent med list.  
  
  
  
  
 AMARYL 4 mg tablet Generic drug:  glimepiride Take 4 mg by mouth every morning. bumetanide 2 mg tablet Commonly known as:  Maryln Spoon Take 2 mg by mouth every evening. CALCIUM 600 WITH VITAMIN D3 600 mg(1,500mg) -400 unit Chew Generic drug:  Calcium-Cholecalciferol (D3) Take 1 Tab by mouth every evening. carvedilol 12.5 mg tablet Commonly known as:  COREG  
  
 fenofibrate nanocrystallized 145 mg tablet Commonly known as:  [employer] Take  by mouth every evening. FLOMAX 0.4 mg capsule Generic drug:  tamsulosin Take 0.4 mg by mouth daily. FLONASE 50 mcg/actuation nasal spray Generic drug:  fluticasone 2 Sprays by Both Nostrils route daily. hydrALAZINE 100 mg tablet Commonly known as:  APRESOLINE  
100 mg three (3) times daily. Insulin Needles (Disposable) 32 gauge x 5/32\" Ndle Commonly known as:  Haily Pen Needle For use each AM with Victoza Pen  
  
 insulin NPH/insulin regular 100 unit/mL (70-30) injection Commonly known as:  NovoLIN 70/30 U-100 Insulin 60 Units by SubCUTAneous route Before breakfast and dinner. Insulin Syringe-Needle U-100 1 mL 30 gauge x 5/16 Syrg Use twice daily for insulin injection LIPITOR 20 mg tablet Generic drug:  atorvastatin Take 20 mg by mouth every evening. Liraglutide 0.6 mg/0.1 mL (18 mg/3 mL) Pnij Commonly known as:  VICTOZA 3-NELLI  
1.8 mg by SubCUTAneous route daily. Titrate up to 1.8mg daily as direcected LORazepam 0.5 mg tablet Commonly known as:  ATIVAN  
  
 sertraline 25 mg tablet Commonly known as:  ZOLOFT  
TK 1 T PO QD  
  
 traMADol 50 mg tablet Commonly known as:  ULTRAM  
  
  
  
  
We Performed the Following AMB POC HEMOGLOBIN A1C [58303 CPT(R)] HEMOGLOBIN A1C WITH EAG [75491 CPT(R)] LIPID PANEL [27668 CPT(R)] METABOLIC PANEL, COMPREHENSIVE [34461 CPT(R)] MICROALBUMIN, UR, RAND W/ MICROALB/CREAT RATIO [id number] CPT(R)] Follow-up Instructions Return in about 4 months (around 2/9/2019). Patient Instructions Continue victoza, 1.8 daily Novolin 70/30:  40 units breakfast, as needed with dinner, you seem to be managing that very well Blood work before next visit using lab slip provided, (3 days before visit is ok, fasting) Tammi Mckenzie. [address] [facility] [address] [place] [address] Please note our new policy, you must arrive to the clinic 15 minutes before your appointment time to allow enough time for proper check-in, adequate time to spend with your doctor, and also to respect the appointment time of the next patient. Not arriving 15 minutes in advance may result in having your appointment rescheduled for the next available day/time. 
---------------------------------------------------------------------------------------------------------------------- Below you will find a glucose log sheet which you can use to record your blood sugars. Without checking and recording what your home glucose levels are, it will be difficult to make any changes to your medication dose, even when significant changes may be needed. Please feel free to use the log below to record your home glucose levels. At the very least, I would like for you to login the entire 2-3 weeks just before your visit so we can make your visit much more productive and beneficial to you. GLUCOSE LOG SHEET: 
 
Date Breakfast Lunch Dinner Bedtime Comments ? GLUCOSE LOG SHEET: 
 
Date Breakfast Lunch Dinner Bedtime Comments ? GLUCOSE LOG SHEET: 
 
Date Breakfast Lunch Dinner Bedtime Comments ? Introducing [facility]! Delilah Morton introduces Twyxt patient portal. Now you can access parts of your medical record, email your doctor's office, and request medication refills online. 1. In your internet browser, go to [email or web address] 2. Click on the First Time User? Click Here link in the Sign In box. You will see the New Member Sign Up page. 3. Enter your Twyxt Access Code exactly as it appears below. You will not need to use this code after youve completed the sign-up process. If you do not sign up before the expiration date, you must request a new code. · Twyxt Access Code: [email or web address] Expires: 12/13/2018  1:48 PM 
 
4. Enter the last four digits of your Social Security Number (xxxx) and Date of Birth (mm/dd/yyyy) as indicated and click Submit. You will be taken to the next sign-up page. 5. Create a Twyxt ID. This will be your Twyxt login ID and cannot be changed, so think of one that is secure and easy to remember. 6. Create a Twyxt password. You can change your password at any time. 7. Enter your Password Reset Question and Answer.  This can be used at a later time if you forget your password. 8. Enter your e-mail address. You will receive e-mail notification when new information is available in [address] 9. Click Sign Up. You can now view and download portions of your medical record. 10. Click the Download Summary menu link to download a portable copy of your medical information. If you have questions, please visit the Frequently Asked Questions section of the [employer] website. Remember, Screenie is NOT to be used for urgent needs. For medical emergencies, dial 911. Now available from your iPhone and Android! Please provide this summary of care documentation to your next provider. Your primary care clinician is listed as Caitlin Landaverde. If you have any questions after today's visit, please call [phone].

## 2018-10-09 NOTE — PATIENT INSTRUCTIONS
Continue victoza, 1.8 daily  Novolin 70/30:  40 units breakfast, as needed with dinner, you seem to be managing that very well    Blood work before next visit using lab slip provided, (3 days before visit is ok, fasting)    Amilcar BOLANOS [address] [facility]  [address] [place] [address]      Please note our new policy, you must arrive to the clinic 15 minutes before your appointment time to allow enough time for proper check-in, adequate time to spend with your doctor, and also to respect the appointment time of the next patient. Not arriving 15 minutes in advance may result in having your appointment rescheduled for the next available day/time.  ----------------------------------------------------------------------------------------------------------------------    Below you will find a glucose log sheet which you can use to record your blood sugars. Without checking and recording what your home glucose levels are, it will be difficult to make any changes to your medication dose, even when significant changes may be needed. Please feel free to use the log below to record your home glucose levels. At the very least, I would like for you to login the entire 2-3 weeks just before your visit so we can make your visit much more productive and beneficial to you. GLUCOSE LOG SHEET:    Date Breakfast Lunch Dinner Bedtime Comments ? GLUCOSE LOG SHEET:    Date Breakfast Lunch Dinner Bedtime Comments ?                                                                                                                                                                                                                                                  GLUCOSE LOG SHEET:    Date Breakfast Lunch Dinner Bedtime Comments ?

## 2018-10-09 NOTE — PROGRESS NOTES
CHIEF COMPLAINT: f/u evaluation for uncontrolled type 2 diabetes    HISTORY OF PRESENT ILLNESS:   Severo Kill is a 72 y.o. male with a PMHx as noted below who presents to the endocrinology clinic for f/u evaluation of uncontrolled type 2 diabetes. Patients A1c today is 6.6% and he is doing fairly well with the current regimen. Patient started dialysis about 2.5 weeks ago.  T/R/Sat    Currently taking the following meds:  Victoza 1.8mg daily  Novolin 70/30:   40 units breakfast, 46 units dinner (taking once or twice per day based on his sugars)  Correction 1:15>150    Review of home blood glucose:  AM    Variable: 115 - 180 mostly  Dinner   102 - 160 mostly,   On average appears to be stable  No hypoglycemia    Review of most recent diabetes-related labs:  Lab Results   Component Value Date    HBA1C 10.5 (H) 11/16/2016    HBA1C 5.7 01/19/2011    MCD2DQLU 10.7 02/27/2018    UFZ1NSSS 7.1 08/25/2017    BWX3QDTO 6.8 05/19/2017    CHOL 157 02/27/2018    LDLC 61 02/27/2018    GFRAA 13 (L) 05/11/2018    GFRNA 10 (L) 05/11/2018    CREATEXT 5.20 09/13/2018    MCACR 1160.8 (H) 02/27/2018    MALBEXT HIGH 4737.5 10/02/2017    TSH 1.39 10/29/2016         PAST MEDICAL/SURGICAL HISTORY:   Past Medical History:   Diagnosis Date    Arthritis     spine    CAD (coronary artery disease)     high cholesterol    Chronic kidney disease     Diabetes ([facility] [address]     GERD (gastroesophageal reflux disease)     HX    Hypertension     Ill-defined condition     irritable bowel syndrome    Unspecified sleep apnea     NO CPAP     Past Surgical History:   Procedure Laterality Date    COLONOSCOPY N/A 11/9/2016    COLONOSCOPY performed by Orlin Christie MD at [facility] ENDOSCOPY    HX ORTHOPAEDIC      CERVICAL FUSION    HX UROLOGICAL      TURP       ALLERGIES:   Allergies   Allergen Reactions    Albumin, Human 25 % Anaphylaxis and Hives    Niacin Hives       MEDICATIONS ON ADMISSION:     Current Outpatient Prescriptions:     LORazepam (ATIVAN) 0.5 mg tablet, , Disp: , Rfl:     sertraline (ZOLOFT) 25 mg tablet, TK 1 T PO QD, Disp: , Rfl:     traMADol (ULTRAM) 50 mg tablet, , Disp: , Rfl:     carvedilol (COREG) 12.5 mg tablet, , Disp: , Rfl:     Liraglutide (VICTOZA 3-NELLI) 0.6 mg/0.1 mL (18 mg/3 mL) pnij, 1.8 mg by SubCUTAneous route daily. Titrate up to 1.8mg daily as direcected, Disp: 3 Pen, Rfl: 7    Insulin Needles, Disposable, (REYNALDO PEN NEEDLE) 32 gauge x 5/32\" ndle, For use each AM with Victoza Pen, Disp: 100 Pen Needle, Rfl: 3    Insulin Syringe-Needle U-100 1 mL 30 gauge x 5/16 syrg, Use twice daily for insulin injection, Disp: 100 Syringe, Rfl: 7    insulin NPH/insulin regular (NOVOLIN 70/30 U-100 INSULIN) 100 unit/mL (70-30) injection, 60 Units by SubCUTAneous route Before breakfast and dinner. (Patient taking differently: 40 Units by SubCUTAneous route Before breakfast and dinner.), Disp: 10 Vial, Rfl: 3    hydrALAZINE (APRESOLINE) 100 mg tablet, 100 mg three (3) times daily. , Disp: , Rfl:     fenofibrate nanocrystallized (TRICOR) 145 mg tablet, Take  by mouth every evening., Disp: , Rfl:     bumetanide (BUMEX) 2 mg tablet, Take 2 mg by mouth every evening., Disp: , Rfl:     atorvastatin (LIPITOR) 20 mg tablet, Take 20 mg by mouth every evening., Disp: , Rfl:     glimepiride (AMARYL) 4 mg tablet, Take 4 mg by mouth every morning., Disp: , Rfl:     Calcium-Cholecalciferol, D3, (CALCIUM 600 WITH VITAMIN D3) 600 mg(1,500mg) -400 unit chew, Take 1 Tab by mouth every evening., Disp: , Rfl:     fluticasone (FLONASE) 50 mcg/actuation nasal spray, 2 Sprays by Both Nostrils route daily. , Disp: , Rfl:     tamsulosin (FLOMAX) 0.4 mg capsule, Take 0.4 mg by mouth daily. , Disp: , Rfl:     HYDROcodone-acetaminophen (NORCO) 5-325 mg per tablet, Take 1 Tab by mouth every four (4) hours as needed for Pain.  Max Daily Amount: 6 Tabs., Disp: 15 Tab, Rfl: 0    cloNIDine HCl (CATAPRES) 0.2 mg tablet, every evening., Disp: , Rfl:    carvedilol (COREG) 6.25 mg tablet, 12.5 mg two (2) times a day. Patient takes late afternoon and at bedtime. , Disp: , Rfl:     lisinopril (PRINIVIL, ZESTRIL) 20 mg tablet, Take 40 mg by mouth every evening., Disp: , Rfl:     SOCIAL HISTORY:   Social History     Social History    Marital status: UNKNOWN     Spouse name: N/A    Number of children: N/A    Years of education: N/A     Occupational History    Not on file. Social History Main Topics    Smoking status: Former Smoker     Quit date: 2009    Smokeless tobacco: Former User      Comment: cigars only    Alcohol use No    Drug use: No    Sexual activity: Not Currently     Other Topics Concern    Not on file     Social History Narrative       FAMILY HISTORY:  Family History   Problem Relation Age of Onset    Cancer Father      \"bone\"    Diabetes Brother        REVIEW OF SYSTEMS: Complete ROS assessed and noted for that which is described above, all else are negative. Eyes: normal  ENT: normal  CVS: normal  Resp: normal  GI: normal  : normal  GYN: normal  Endocrine: normal  Integument: normal  Musculoskeletal: knee discomfort  Neuro: normal  Psych: normal      PHYSICAL EXAMINATION:    VITAL SIGNS:  Visit Vitals    /81 (BP 1 Location: Left arm, BP Patient Position: Sitting)    Pulse 85    Ht 6' (1.829 m)    Wt 238 lb 4.8 oz (108.1 kg)    BMI 32.32 kg/m2       GENERAL: NCAT, Sitting comfortably, NAD  EYES: EOMI, non-icteric, no proptosis  Ear/Nose/Throat: NCAT, no inflammation, no masses  LYMPH NODES: No LAD  CARDIOVASCULAR: S1 S2, RRR, No murmur  RESPIRATORY: CTA b/l, no wheeze/rales  GASTROINTESTINAL: obese, NT, ND,  MUSCULOSKELETAL: Normal ROM, no atrophy  SKIN: warm, no edema/rash/ or other skin changes  NEUROLOGIC: 5/5 power all extremities, no tremors, AAOx3  PSYCHIATRIC: Normal affect, Normal insight and judgement    REVIEW OF LABORATORY AND RADIOLOGY DATA:   Labs and documentation have been reviewed as described above. ASSESSMENT AND PLAN:   Balaji Blair is a 72 y.o. male with a PMHx as noted above who presents to the endocrinology clinic for evaluation of uncontrolled type 2 diabetes. DM2  HTN  HLD    Patient now on HD for his kidney disease, and his current regimen is appropriate in this setting. It is reassuring that his A1c and sugars are improved and at goal, and his blood pressure is stable on less medications. We spent time reviewing the best way to approach his sugars moving forward and he notably is doing a good job in terms of judging how much insulin to take with dinner and if any is needed at all, as review of his blood sugars look quite stable and his decisions have generally resulted in good diabetes control. He is advised to let me know of any issues before next visit. He will have DM lab panel collected before next visit, ordered. DM2:  Continue victoza, 1.8 daily  Novolin 70/30:  40 units breakfast, he does a good job adjusting on his own for dinner based on his sugars,   Continue to check glucose 2-3 times daily,  Provided with new log sheets    HTN: Stable on coreg, hydralazine  HLD: lipitor 20mg, hx of myalgias, to take 3 days per week    4 month f/u visit. Advised to call with any concerns,    Alexx Quintana.  [address] [facility]

## 2018-10-19 NOTE — PERIOP NOTES
[facility] Preoperative Instructions Surgery Date 10/22/18          Time of Arrival 10:30AM 
 
1. On the day of your surgery, please report to the Surgical Services Registration Desk and sign in at your designated time. The Surgery Center is located to the right of the Emergency Room. 2. You must have someone with you to drive you home. You should not drive a car for 24 hours following surgery. Please make arrangements for a friend or family member to stay with you for the first 24 hours after your surgery. 3. Do not have anything to eat or drink (including water, gum, mints, coffee, juice) after midnight 10/21/18      . ? This may not apply to medications prescribed by your physician. ?(Please note below the special instructions with medications to take the morning of your procedure.) 4. We recommend you do not drink any alcoholic beverages for 24 hours before and after your surgery. 5. Contact your surgeons office for instructions on the following medications: non-steroidal anti-inflammatory drugs (i.e. Advil, Aleve), vitamins, and supplements. (Some surgeons will want you to stop these medications prior to surgery and others may allow you to take them) **If you are currently taking Plavix, Coumadin, Aspirin and/or other blood-thinning agents, contact your surgeon for instructions. ** Your surgeon will partner with the physician prescribing these medications to determine if it is safe to stop or if you need to continue taking. Please do not stop taking these medications without instructions from your surgeon 6. Wear comfortable clothes. Wear glasses instead of contacts. Do not bring any money or jewelry. Please bring picture ID, insurance card, and any prearranged co-payment or hospital payment. Do not wear make-up, particularly mascara the morning of your surgery. Do not wear nail polish, particularly if you are having foot /hand surgery.   Wear your hair loose or down, no ponytails, buns, ken pins or clips. All body piercings must be removed. Please shower with antibacterial soap for three consecutive days before and on the morning of surgery, but do not apply any lotions, powders or deodorants after the shower on the day of surgery. Please use a fresh towels after each shower. Please sleep in clean clothes and change bed linens the night before surgery. Please do not shave for 48 hours prior to surgery. Shaving of the face is acceptable. 7. You should understand that if you do not follow these instructions your surgery may be cancelled. If your physical condition changes (I.e. fever, cold or flu) please contact your surgeon as soon as possible. 8. It is important that you be on time. If a situation occurs where you may be late, please call [phone] (OR Holding Area). 9. If you have any questions and or problems, please call [phone] (Pre-admission Testing). 10. Your surgery time may be subject to change. You will receive a phone call the evening prior if your time changes. 11.  If having outpatient surgery, you must have someone to drive you here, stay with you during the duration of your stay, and to drive you home at time of discharge. 12.   In an effort to improve the efficiency, privacy, and safety for all of our Pre-op patients visitors are not allowed in the Holding area. Once you arrive and are registered your family/visitors will be asked to remain in the waiting room. The Pre-op staff will get you from the Surgical Waiting Area and will explain to you and your family/visitors that the Pre-op phase is beginning. The staff will answer any questions and provide instructions for tracking of the patient, by use of the existing tracking number and color-coded status board in the waiting room.   At this time the staff will also ask for your designated spokesperson information in the event that the physician or staff need to provide an update or obtain any pertinent information. The designated spokesperson will be notified if the physician needs to speak to family during the pre-operative phase. If at any time your family/visitors has questions or concerns they may approach the volunteer desk in the waiting area for assistance. Special Instructions: MEDICATIONS TO TAKE THE MORNING OF SURGERY WITH A SIP OF WATER: Coreg, Hydralazine, Zoloft, Flomax. Also Ultram and Ativan I understand a pre-operative phone call will be made to verify my surgery time. In the event that I am not available, I give permission for a message to be left on my answering service and/or with another person? Yes  
 
 
 
 ___________________      __________   _________ 
  (Signature of Patient)             (Witness)                (Date and Time)

## 2018-10-22 ENCOUNTER — HOSPITAL ENCOUNTER (OUTPATIENT)
Age: 73
Setting detail: OUTPATIENT SURGERY
Discharge: HOME | End: 2018-10-22
Attending: SURGERY | Admitting: SURGERY
Payer: MEDICARE

## 2018-10-22 ENCOUNTER — ANESTHESIA EVENT (OUTPATIENT)
Dept: SURGERY | Age: 73
End: 2018-10-22
Payer: MEDICARE

## 2018-10-22 ENCOUNTER — ANESTHESIA (OUTPATIENT)
Dept: SURGERY | Age: 73
End: 2018-10-22
Payer: MEDICARE

## 2018-10-22 VITALS
TEMPERATURE: 97.9 F | SYSTOLIC BLOOD PRESSURE: 164 MMHG | WEIGHT: 237.22 LBS | OXYGEN SATURATION: 99 % | HEIGHT: 72 IN | BODY MASS INDEX: 32.13 KG/M2 | HEART RATE: 65 BPM | DIASTOLIC BLOOD PRESSURE: 83 MMHG | RESPIRATION RATE: 21 BRPM

## 2018-10-22 DIAGNOSIS — N18.30 CKD (CHRONIC KIDNEY DISEASE) STAGE 3, GFR 30-59 ML/MIN (HCC): Primary | ICD-10-CM

## 2018-10-22 LAB
ANION GAP BLD CALC-SCNC: 13 MMOL/L (ref 10–20)
BUN BLD-MCNC: 51 MG/DL (ref 9–20)
CA-I BLD-MCNC: 1.19 MMOL/L (ref 1.12–1.32)
CHLORIDE BLD-SCNC: 106 MMOL/L (ref 98–107)
CO2 BLD-SCNC: 27 MMOL/L (ref 21–32)
CREAT BLD-MCNC: 4.6 MG/DL (ref 0.6–1.3)
GLUCOSE BLD STRIP.AUTO-MCNC: 139 MG/DL (ref 65–100)
GLUCOSE BLD-MCNC: 141 MG/DL (ref 65–100)
HCT VFR BLD CALC: 37 % (ref 36.6–50.3)
POTASSIUM BLD-SCNC: 4.6 MMOL/L (ref 3.5–5.1)
SERVICE CMNT-IMP: ABNORMAL
SERVICE CMNT-IMP: ABNORMAL
SODIUM BLD-SCNC: 141 MMOL/L (ref 136–145)

## 2018-10-22 PROCEDURE — 76010000149 HC OR TIME 1 TO 1.5 HR: Performed by: SURGERY

## 2018-10-22 PROCEDURE — 77030020256 HC SOL INJ NACL 0.9%  500ML: Performed by: SURGERY

## 2018-10-22 PROCEDURE — 76210000020 HC REC RM PH II FIRST 0.5 HR: Performed by: SURGERY

## 2018-10-22 PROCEDURE — 74011250636 HC RX REV CODE- 250/636: Performed by: ANESTHESIOLOGY

## 2018-10-22 PROCEDURE — A4565 SLINGS: HCPCS

## 2018-10-22 PROCEDURE — 74011250636 HC RX REV CODE- 250/636

## 2018-10-22 PROCEDURE — 77030020153 HC PRB DOPLR DISP MIZU -C: Performed by: SURGERY

## 2018-10-22 PROCEDURE — 77030002987 HC SUT PROL J&J -B: Performed by: SURGERY

## 2018-10-22 PROCEDURE — 76060000033 HC ANESTHESIA 1 TO 1.5 HR: Performed by: SURGERY

## 2018-10-22 PROCEDURE — 77030020782 HC GWN BAIR PAWS FLX 3M -B

## 2018-10-22 PROCEDURE — 77030002916 HC SUT ETHLN J&J -A: Performed by: SURGERY

## 2018-10-22 PROCEDURE — 80047 BASIC METABLC PNL IONIZED CA: CPT

## 2018-10-22 PROCEDURE — 74011000272 HC RX REV CODE- 272: Performed by: SURGERY

## 2018-10-22 PROCEDURE — 77030002986 HC SUT PROL J&J -A: Performed by: SURGERY

## 2018-10-22 PROCEDURE — 74011000250 HC RX REV CODE- 250: Performed by: SURGERY

## 2018-10-22 PROCEDURE — 74011250636 HC RX REV CODE- 250/636: Performed by: SURGERY

## 2018-10-22 PROCEDURE — 77030002996 HC SUT SLK J&J -A: Performed by: SURGERY

## 2018-10-22 PROCEDURE — 76210000006 HC OR PH I REC 0.5 TO 1 HR: Performed by: SURGERY

## 2018-10-22 PROCEDURE — 77030008463 HC STPLR SKN PROX J&J -B: Performed by: SURGERY

## 2018-10-22 PROCEDURE — 77030018836 HC SOL IRR NACL ICUM -A: Performed by: SURGERY

## 2018-10-22 PROCEDURE — 77030011640 HC PAD GRND REM COVD -A: Performed by: SURGERY

## 2018-10-22 PROCEDURE — 77030002924 HC SUT GORTX WLGO -B: Performed by: SURGERY

## 2018-10-22 PROCEDURE — C1768 GRAFT, VASCULAR: HCPCS | Performed by: SURGERY

## 2018-10-22 PROCEDURE — 82962 GLUCOSE BLOOD TEST: CPT

## 2018-10-22 PROCEDURE — 74011000250 HC RX REV CODE- 250

## 2018-10-22 DEVICE — PROPATEN VASCULAR GRAFT TW 7MMX40CM HEPARIN
Type: IMPLANTABLE DEVICE | Site: ARM | Status: FUNCTIONAL
Brand: GORE PROPATEN VASCULAR GRAFT

## 2018-10-22 RX ORDER — MIDAZOLAM HYDROCHLORIDE 1 MG/ML
INJECTION, SOLUTION INTRAMUSCULAR; INTRAVENOUS AS NEEDED
Status: DISCONTINUED | OUTPATIENT
Start: 2018-10-22 | End: 2018-10-22 | Stop reason: HOSPADM

## 2018-10-22 RX ORDER — LIDOCAINE HYDROCHLORIDE AND EPINEPHRINE 20; 10 MG/ML; UG/ML
INJECTION, SOLUTION INFILTRATION; PERINEURAL AS NEEDED
Status: DISCONTINUED | OUTPATIENT
Start: 2018-10-22 | End: 2018-10-22 | Stop reason: HOSPADM

## 2018-10-22 RX ORDER — HYDROCODONE BITARTRATE AND ACETAMINOPHEN 5; 325 MG/1; MG/1
1 TABLET ORAL
Qty: 20 TAB | Refills: 0 | Status: SHIPPED | OUTPATIENT
Start: 2018-10-22 | End: 2019-02-16

## 2018-10-22 RX ORDER — PHENYLEPHRINE HCL IN 0.9% NACL 0.4MG/10ML
SYRINGE (ML) INTRAVENOUS AS NEEDED
Status: DISCONTINUED | OUTPATIENT
Start: 2018-10-22 | End: 2018-10-22 | Stop reason: HOSPADM

## 2018-10-22 RX ORDER — HEPARIN SODIUM 1000 [USP'U]/ML
INJECTION, SOLUTION INTRAVENOUS; SUBCUTANEOUS AS NEEDED
Status: DISCONTINUED | OUTPATIENT
Start: 2018-10-22 | End: 2018-10-22 | Stop reason: HOSPADM

## 2018-10-22 RX ORDER — EPHEDRINE SULFATE 50 MG/ML
INJECTION, SOLUTION INTRAVENOUS AS NEEDED
Status: DISCONTINUED | OUTPATIENT
Start: 2018-10-22 | End: 2018-10-22 | Stop reason: HOSPADM

## 2018-10-22 RX ORDER — CEFAZOLIN SODIUM/WATER 2 G/20 ML
2 SYRINGE (ML) INTRAVENOUS ONCE
Status: COMPLETED | OUTPATIENT
Start: 2018-10-22 | End: 2018-10-22

## 2018-10-22 RX ORDER — FENTANYL CITRATE 50 UG/ML
INJECTION, SOLUTION INTRAMUSCULAR; INTRAVENOUS AS NEEDED
Status: DISCONTINUED | OUTPATIENT
Start: 2018-10-22 | End: 2018-10-22 | Stop reason: HOSPADM

## 2018-10-22 RX ORDER — PROPOFOL 10 MG/ML
INJECTION, EMULSION INTRAVENOUS
Status: DISCONTINUED | OUTPATIENT
Start: 2018-10-22 | End: 2018-10-22 | Stop reason: HOSPADM

## 2018-10-22 RX ORDER — SODIUM CHLORIDE 9 MG/ML
25 INJECTION, SOLUTION INTRAVENOUS CONTINUOUS
Status: DISCONTINUED | OUTPATIENT
Start: 2018-10-22 | End: 2018-10-22 | Stop reason: HOSPADM

## 2018-10-22 RX ORDER — ROPIVACAINE HYDROCHLORIDE 5 MG/ML
INJECTION, SOLUTION EPIDURAL; INFILTRATION; PERINEURAL AS NEEDED
Status: DISCONTINUED | OUTPATIENT
Start: 2018-10-22 | End: 2018-10-22 | Stop reason: HOSPADM

## 2018-10-22 RX ADMIN — LIDOCAINE HYDROCHLORIDE AND EPINEPHRINE 5 ML: 20; 10 INJECTION, SOLUTION INFILTRATION; PERINEURAL at 12:15

## 2018-10-22 RX ADMIN — LIDOCAINE HYDROCHLORIDE AND EPINEPHRINE 5 ML: 20; 10 INJECTION, SOLUTION INFILTRATION; PERINEURAL at 12:16

## 2018-10-22 RX ADMIN — EPHEDRINE SULFATE 20 MG: 50 INJECTION, SOLUTION INTRAVENOUS at 13:17

## 2018-10-22 RX ADMIN — ROPIVACAINE HYDROCHLORIDE 5 ML: 5 INJECTION, SOLUTION EPIDURAL; INFILTRATION; PERINEURAL at 12:15

## 2018-10-22 RX ADMIN — PROPOFOL 30 MCG/KG/MIN: 10 INJECTION, EMULSION INTRAVENOUS at 12:45

## 2018-10-22 RX ADMIN — PROPOFOL 50 MCG/KG/MIN: 10 INJECTION, EMULSION INTRAVENOUS at 12:37

## 2018-10-22 RX ADMIN — MIDAZOLAM HYDROCHLORIDE 2 MG: 1 INJECTION, SOLUTION INTRAMUSCULAR; INTRAVENOUS at 12:09

## 2018-10-22 RX ADMIN — MIDAZOLAM HYDROCHLORIDE 1 MG: 1 INJECTION, SOLUTION INTRAMUSCULAR; INTRAVENOUS at 12:38

## 2018-10-22 RX ADMIN — ROPIVACAINE HYDROCHLORIDE 10 ML: 5 INJECTION, SOLUTION EPIDURAL; INFILTRATION; PERINEURAL at 12:14

## 2018-10-22 RX ADMIN — Medication 100 MCG: at 13:13

## 2018-10-22 RX ADMIN — FENTANYL CITRATE 50 MCG: 50 INJECTION, SOLUTION INTRAMUSCULAR; INTRAVENOUS at 12:09

## 2018-10-22 RX ADMIN — Medication 100 MCG: at 13:07

## 2018-10-22 RX ADMIN — SODIUM CHLORIDE 25 ML/HR: 900 INJECTION, SOLUTION INTRAVENOUS at 11:33

## 2018-10-22 RX ADMIN — ROPIVACAINE HYDROCHLORIDE 5 ML: 5 INJECTION, SOLUTION EPIDURAL; INFILTRATION; PERINEURAL at 12:16

## 2018-10-22 RX ADMIN — Medication 2 G: at 12:37

## 2018-10-22 RX ADMIN — LIDOCAINE HYDROCHLORIDE AND EPINEPHRINE 10 ML: 20; 10 INJECTION, SOLUTION INFILTRATION; PERINEURAL at 12:14

## 2018-10-22 RX ADMIN — HEPARIN SODIUM 5000 UNITS: 1000 INJECTION, SOLUTION INTRAVENOUS; SUBCUTANEOUS at 13:04

## 2018-10-22 NOTE — OP NOTES
[address]  OPERATIVE REPORT    Alex Jones  MR#: [id number]  : 1945  ACCOUNT #: [de-identified]   DATE OF SERVICE: 10/22/2018    PREOPERATIVE DIAGNOSIS:  End-stage renal disease with an immature arteriovenous fistula. POSTOPERATIVE DIAGNOSIS:  End-stage renal disease with an immature arteriovenous fistula. PROCEDURE PERFORMED:  Placement of a new PTFE graft in the left upper arm (7 mm Propaten from the proximal fistula to axillary venous outflow). SURGEON:  Noemi Tatum. Margarita Mandel MD       ANESTHESIA:  Regional block. ESTIMATED BLOOD LOSS:  none    SPECIMENS REMOVED:  None     INDICATIONS:  The patient is a 77-year-old gentleman who in May had creation of a proximal radiocephalic AV fistula, which has failed to adequately mature after recent duplex and angiographic evaluation, he presents for revision. PROCEDURE:  After obtaining informed consent, the patient was placed supine on the operating table. After adequate induction of a regional block anesthesia, site and patient confirmation of prophylactic antibiotics, the left arm was prepped and draped in sterile fashion. A longitudinal incision was made in the axilla and the neurovascular bundle dissected free and controlled. Axillary vein was of excellent caliber and quality. A counter incision was made laterally on the arm. A vertical incision was made just below the elbow crease in the proximal portion of the fistula, which was well-developed, was identified and dissected free. A 7 mm heparin bonded graft was chosen and tunneled into position. The patient was systemically heparinized. The outflow anastomosis constructed first in an end-to-side fashion using continuous Kirkland-Gianluca suture. The proximal inflow anastomosis was then constructed and an end-to-end anastomosis with a perfect size fit. This was accomplished using a continuous running Kirkland-Gianluca suture.   At the completion of the second anastomosis, suture line was hemostatic. There was excellent flow in the graft as evidenced by a strong Doppler signal and a good thrill. All 3 wounds were copiously irrigated with antibiotic solution and closed in 2 layers. The patient tolerated the procedure well. There were no complications.       MD EDWIN Langford / TORRIE  D: 10/22/2018 14:07     T: 10/22/2018 19:22  JOB #: [id number]  CC: Lucina Pineda MD  CC: Sariah Benavides MD

## 2018-10-22 NOTE — ANESTHESIA PREPROCEDURE EVALUATION
Anesthetic History PONV Review of Systems / Medical History Patient summary reviewed, nursing notes reviewed and pertinent labs reviewed Pulmonary Sleep apnea: No treatment Shortness of breath Comments: Former smoker - Quit 2009 Neuro/Psych Within defined limits Cardiovascular Hypertension CHF: orthopnea, dyspnea on exertion CAD and hyperlipidemia Pertinent negatives: No angina Exercise tolerance: >4 METS Comments: TTE (10/31/16):  EF=25% GI/Hepatic/Renal 
  
GERD Renal disease: ESRD and dialysis Comments: H/O Pancreatitis Endo/Other Diabetes: poorly controlled, type 2, using insulin Obesity, arthritis and anemia Other Findings Physical Exam 
 
Airway Mallampati: II 
TM Distance: 4 - 6 cm Neck ROM: normal range of motion Mouth opening: Normal 
 
 Cardiovascular Regular rate and rhythm,  S1 and S2 normal,  no murmur, click, rub, or gallop Dental 
 
 
Comments: Multiple missing teeth, lower front crowns Pulmonary Breath sounds clear to auscultation Abdominal 
GI exam deferred Other Findings Anesthetic Plan ASA: 4 Anesthesia type: MAC and regional - supraclavicular block Induction: Intravenous Anesthetic plan and risks discussed with: Patient

## 2018-10-22 NOTE — ROUTINE PROCESS
TRANSFER - IN REPORT: 
 
Verbal report received from Kevin Ni RN(name) on Vicente Fonseca  being received from OR(unit) for routine post - op Report consisted of patients Situation, Background, Assessment and  
Recommendations(SBAR). Information from the following report(s) OR Summary was reviewed with the receiving nurse. Opportunity for questions and clarification was provided. Assessment completed upon patients arrival to unit and care assumed.

## 2018-10-22 NOTE — DISCHARGE INSTRUCTIONS
Patient Discharge Instructions    Ladena Lesches / [id number] : 1945    Admitted 10/22/2018 Discharged: 10/22/2018     What to do at Home  Recommended activity: Elevate arm   No needle sticks or BP checks in affected arm  Leave dressing for 72-96 hours. Follow-Up 2 weeks, Call office to make appointment. Huy Sierra MD      Narcotic-Analgesic/Acetaminophen (Percocet, [address], [facility], Lortab 10/325) - (By mouth)   Why this medicine is used:   Relieves pain. Contact a nurse or doctor right away if you have:  · Extreme weakness, shallow breathing, slow heartbeat  · Severe confusion, lightheadedness, dizziness, fainting  · Yellow skin or eyes, dark urine or pale stools  · Severe constipation, severe stomach pain, nausea, vomiting, loss of appetite  · Sweating or cold, clammy skin     Common side effects:  · Mild constipation, nausea, vomiting  · Sleepiness, tiredness  · Itching, rash  © 2017 [facility] Information is for End User's use only and may not be sold, redistributed or otherwise used for commercial purposes. DISCHARGE SUMMARY from Nurse    PATIENT INSTRUCTIONS:    After general anesthesia or intravenous sedation, for 24 hours or while taking prescription Narcotics:  · Limit your activities  · Do not drive and operate hazardous machinery  · Do not make important personal or business decisions  · Do  not drink alcoholic beverages  · If you have not urinated within 8 hours after discharge, please contact your surgeon on call.     Report the following to your surgeon:  · Excessive pain, swelling, redness or odor of or around the surgical area  · Temperature over 100.5  · Nausea and vomiting lasting longer than 4 hours or if unable to take medications  · Any signs of decreased circulation or nerve impairment to extremity: change in color, persistent  numbness, tingling, coldness or increase pain  · Any questions    *  Please give a list of your current medications to your Primary Care Provider. *  Please update this list whenever your medications are discontinued, doses are      changed, or new medications (including over-the-counter products) are added. *  Please carry medication information at all times in case of emergency situations. These are general instructions for a healthy lifestyle:    No smoking/ No tobacco products/ Avoid exposure to second hand smoke  Surgeon General's Warning:  Quitting smoking now greatly reduces serious risk to your health. Obesity, smoking, and sedentary lifestyle greatly increases your risk for illness    A healthy diet, regular physical exercise & weight monitoring are important for maintaining a healthy lifestyle    You may be retaining fluid if you have a history of heart failure or if you experience any of the following symptoms:  Weight gain of 3 pounds or more overnight or 5 pounds in a week, increased swelling in our hands or feet or shortness of breath while lying flat in bed. Please call your doctor as soon as you notice any of these symptoms; do not wait until your next office visit. Recognize signs and symptoms of STROKE:    F-face looks uneven    A-arms unable to move or move unevenly    S-speech slurred or non-existent    T-time-call 911 as soon as signs and symptoms begin-DO NOT go       Back to bed or wait to see if you get better-TIME IS BRAIN. Warning Signs of HEART ATTACK     Call 911 if you have these symptoms:   Chest discomfort. Most heart attacks involve discomfort in the center of the chest that lasts more than a few minutes, or that goes away and comes back. It can feel like uncomfortable pressure, squeezing, fullness, or pain.  Discomfort in other areas of the upper body. Symptoms can include pain or discomfort in one or both arms, the back, neck, jaw, or stomach.  Shortness of breath with or without chest discomfort.    Other signs may include breaking out in a cold sweat, nausea, or lightheadedness. Don't wait more than five minutes to call 911 - MINUTES MATTER! Fast action can save your life. Calling 911 is almost always the fastest way to get lifesaving treatment. Emergency Medical Services staff can begin treatment when they arrive -- up to an hour sooner than if someone gets to the hospital by car. The discharge information has been reviewed with the patient and Son. The patient and Son verbalized understanding. Discharge medications reviewed with the patient and Son and appropriate educational materials and side effects teaching were provided. For some surgeries, a doctor may do a peripheral nerve block to numb the part of the body involved, often an arm or a leg. Peripheral nerves lead from the spinal cord to other parts of the body, carrying signals for movement and feeling. The nerve block is sometimes used with medicine that makes you sleep during the surgery. Or the nerve block may be all that's needed, and you can stay awake without feeling any pain. The nerve block may also help keep your pain level lower after the surgery. What can you expect after a peripheral nerve block? The nerve block will leave the area that was numbed, like your arm or leg, partly or totally numb for a while. Your doctor will tell you how long. Follow your doctor's instructions carefully. If you'll be going home right after the surgery, you'll need someone to drive you. As the block wears off, you will start to feel some pain from the surgery. Be sure to take your pain medicines before the pain gets bad. Problems from a nerve block are rare. There is a small risk of problems like seizures, heart problems, damage to nerves, infection, or bleeding. The benefits usually outweigh these risks. Follow-up care is a key part of your treatment and safety. Be sure to make and go to all appointments, and call your doctor if you are having problems.  It's also a good idea to know your test results and keep a list of the medicines you take. Where can you learn more? Go to http://maria r-fran.info/. Enter Z959 in the search box to learn more about \"Learning About a Peripheral Nerve Block. \"  Current as of: March 29, 2018  Content Version: 11.8  © 2371-0932 [employer]wise, [employer]. Care instructions adapted under license by [employer] (which disclaims liability or warranty for this information). If you have questions about a medical condition or this instruction, always ask your healthcare professional. [address] any warranty or liability for your use of this information.

## 2018-10-22 NOTE — ANESTHESIA POSTPROCEDURE EVALUATION
Procedure(s): CREATION LEFT ARM ARTERIO VENOUS FISTULA WITH GRAFT  (AXILLARY BLOCK). Anesthesia Post Evaluation Patient location during evaluation: PACU Note status: Adequate. Level of consciousness: responsive to verbal stimuli and sleepy but conscious Pain management: satisfactory to patient Airway patency: patent Anesthetic complications: no 
Cardiovascular status: acceptable Respiratory status: acceptable Hydration status: acceptable Comments: +Post-Anesthesia Evaluation and Assessment Patient: Balaji Blair MRN: [id number]  SSN: [id number] YOB: 1945  Age: 72 y.o. Sex: male Cardiovascular Function/Vital Signs /81   Pulse 71   Temp 36.4 °C (97.6 °F)   Resp 18   Ht 6' (1.829 m)   Wt 107.6 kg (237 lb 3.4 oz)   SpO2 96%   BMI 32.17 kg/m² Patient is status post Procedure(s): CREATION LEFT ARM ARTERIO VENOUS FISTULA WITH GRAFT  (AXILLARY BLOCK). Nausea/Vomiting: Controlled. Postoperative hydration reviewed and adequate. Pain: 
Pain Scale 1: Numeric (0 - 10) (10/22/18 1415) Pain Intensity 1: 0 (10/22/18 1415) Managed. Neurological Status:  
Neuro (WDL): Exceptions to WDL (10/22/18 1357) At baseline. Mental Status and Level of Consciousness: Arousable. Pulmonary Status:  
O2 Device: Room air (10/22/18 1410) Adequate oxygenation and airway patent. Complications related to anesthesia: None Post-anesthesia assessment completed. No concerns. Signed By: Geni Nguyen MD  
 10/22/2018 Visit Vitals /81 Pulse 71 Temp 36.4 °C (97.6 °F) Resp 18 Ht 6' (1.829 m) Wt 107.6 kg (237 lb 3.4 oz) SpO2 96% BMI 32.17 kg/m²

## 2018-10-22 NOTE — BRIEF OP NOTE
BRIEF OPERATIVE NOTE Date of Procedure: 10/22/2018 Preoperative Diagnosis: ESRD w/immature AVF Postoperative Diagnosis: same Procedure(s): Revision with new AV graft MIGDALIAE (7mm Propaten) Surgeon: Davis Marvin MD 
Anesthesia: block Estimated Blood Loss: none Specimens: none Findings: nice thrill Complications: none Implants:  
Implant Name Type Inv. Item Serial No.  Lot No. LRB No. Used Action GRAFT TW STRTCH [id number] -- PROPATEN - [id number]  GRAFT TW STRTCH [id number] -- PROPATEN [id number]  [employer] N/A Left 1 Implanted

## 2018-10-22 NOTE — ANESTHESIA PROCEDURE NOTES
Peripheral Block Start time: 10/22/2018 12:09 PM 
End time: 10/22/2018 12:16 PM 
Performed by: Makenzie Gomez MD 
Authorized by: Makenzie Gomez MD  
 
 
Pre-procedure: Indications: at surgeon's request and post-op pain management Preanesthetic Checklist: risks and benefits discussed, site marked and timeout performed Timeout Time: 12:09 Block Type:  
Block Type:  Supraclavicular Laterality:  Left Monitoring:  Standard ASA monitoring, frequent vital sign checks, responsive to questions and oxygen Injection Technique:  Single shot Procedures: ultrasound guided Prep: chlorhexidine Location:  Supraclavicular Needle Type:  Stimuplex Needle Gauge:  22 G Needle Localization:  Ultrasound guidance Assessment: 
Number of attempts:  1 Injection Assessment:  Incremental injection every 5 mL, local visualized surrounding nerve on ultrasound, negative aspiration for blood, ultrasound image on chart, no intravascular symptoms and no paresthesia Patient tolerance:  Patient tolerated the procedure well with no immediate complications Supraclavicular Nerve Block Note Left Supraclavicular nerve block: 
 
Risks, benefits, alternatives explained at length and patient agrees to proceed. Time out performed and site for block/surgery identified. Standard monitors applied, 3 L NC O2, and sedation given as recorded by RN so as to achieve patient comfort and anxiolysis, but maintain meaningful verbal contact. Sterile prep followed by 1-2 mL local at insertion site. A 40 mm, 22G insulated stimiplex needle was inserted in the interscalene groove, approximately one centimeter from the mid-clavicle. The nerve bundle was identified under [place] guidance. Local anesthetic injected without resistance and with gentle aspiration in 3-5 ml increments. An extremity ring block was performed with 5 ml of 0.5% ropivacaine + 5 ml of 2% lidocaine w/epi. Patient tolerated procedure well. No pain, paresthesia, or blood noted. VSS throughout. No complications noted.

## 2018-10-22 NOTE — PROGRESS NOTES
Supraclavicular block done to left arm by anesthesia. Nursing at bedside to assist.  Patient placed on 2 liter of oxygen nasal cannula and vitals signs were monitored during procedure. Patient tolerated procedure well. No complaints of chest pain or shortness of breath. Frequent vitals initiated and nursing at bedside until patient left unit for the OR.

## 2019-02-16 ENCOUNTER — HOSPITAL ENCOUNTER (EMERGENCY)
Age: 74
Discharge: HOME | End: 2019-02-16
Attending: EMERGENCY MEDICINE
Payer: MEDICARE

## 2019-02-16 ENCOUNTER — APPOINTMENT (OUTPATIENT)
Dept: CT IMAGING | Age: 74
End: 2019-02-16
Attending: EMERGENCY MEDICINE
Payer: MEDICARE

## 2019-02-16 VITALS
BODY MASS INDEX: 32.94 KG/M2 | DIASTOLIC BLOOD PRESSURE: 83 MMHG | OXYGEN SATURATION: 95 % | WEIGHT: 243.17 LBS | RESPIRATION RATE: 16 BRPM | HEART RATE: 86 BPM | TEMPERATURE: 98.2 F | HEIGHT: 72 IN | SYSTOLIC BLOOD PRESSURE: 162 MMHG

## 2019-02-16 DIAGNOSIS — N30.00 ACUTE CYSTITIS WITHOUT HEMATURIA: Primary | ICD-10-CM

## 2019-02-16 DIAGNOSIS — N18.6 ESRD (END STAGE RENAL DISEASE) (HCC): ICD-10-CM

## 2019-02-16 DIAGNOSIS — R10.84 ABDOMINAL PAIN, GENERALIZED: ICD-10-CM

## 2019-02-16 DIAGNOSIS — K80.20 GALLSTONES: ICD-10-CM

## 2019-02-16 LAB
ALBUMIN SERPL-MCNC: 3.3 G/DL (ref 3.5–5)
ALBUMIN/GLOB SERPL: 0.8 {RATIO} (ref 1.1–2.2)
ALP SERPL-CCNC: 48 U/L (ref 45–117)
ALT SERPL-CCNC: 40 U/L (ref 12–78)
ANION GAP SERPL CALC-SCNC: 9 MMOL/L (ref 5–15)
APPEARANCE UR: ABNORMAL
AST SERPL-CCNC: 27 U/L (ref 15–37)
BACTERIA URNS QL MICRO: NEGATIVE /HPF
BASOPHILS # BLD: 0 K/UL (ref 0–0.1)
BASOPHILS NFR BLD: 0 % (ref 0–1)
BILIRUB SERPL-MCNC: 0.4 MG/DL (ref 0.2–1)
BILIRUB UR QL: NEGATIVE
BUN SERPL-MCNC: 51 MG/DL (ref 6–20)
BUN/CREAT SERPL: 12 (ref 12–20)
CALCIUM SERPL-MCNC: 8.9 MG/DL (ref 8.5–10.1)
CHLORIDE SERPL-SCNC: 110 MMOL/L (ref 97–108)
CO2 SERPL-SCNC: 23 MMOL/L (ref 21–32)
COLOR UR: ABNORMAL
CREAT SERPL-MCNC: 4.35 MG/DL (ref 0.7–1.3)
DIFFERENTIAL METHOD BLD: ABNORMAL
EOSINOPHIL # BLD: 0.2 K/UL (ref 0–0.4)
EOSINOPHIL NFR BLD: 2 % (ref 0–7)
EPITH CASTS URNS QL MICRO: ABNORMAL /LPF
ERYTHROCYTE [DISTWIDTH] IN BLOOD BY AUTOMATED COUNT: 15.3 % (ref 11.5–14.5)
GLOBULIN SER CALC-MCNC: 4 G/DL (ref 2–4)
GLUCOSE SERPL-MCNC: 140 MG/DL (ref 65–100)
GLUCOSE UR STRIP.AUTO-MCNC: NEGATIVE MG/DL
HCT VFR BLD AUTO: 32.2 % (ref 36.6–50.3)
HGB BLD-MCNC: 10.3 G/DL (ref 12.1–17)
HGB UR QL STRIP: NEGATIVE
HYALINE CASTS URNS QL MICRO: ABNORMAL /LPF (ref 0–5)
IMM GRANULOCYTES # BLD AUTO: 0.1 K/UL (ref 0–0.04)
IMM GRANULOCYTES NFR BLD AUTO: 1 % (ref 0–0.5)
KETONES UR QL STRIP.AUTO: NEGATIVE MG/DL
LEUKOCYTE ESTERASE UR QL STRIP.AUTO: ABNORMAL
LYMPHOCYTES # BLD: 1 K/UL (ref 0.8–3.5)
LYMPHOCYTES NFR BLD: 8 % (ref 12–49)
MCH RBC QN AUTO: 29.8 PG (ref 26–34)
MCHC RBC AUTO-ENTMCNC: 32 G/DL (ref 30–36.5)
MCV RBC AUTO: 93.1 FL (ref 80–99)
MONOCYTES # BLD: 1 K/UL (ref 0–1)
MONOCYTES NFR BLD: 8 % (ref 5–13)
NEUTS SEG # BLD: 10 K/UL (ref 1.8–8)
NEUTS SEG NFR BLD: 81 % (ref 32–75)
NITRITE UR QL STRIP.AUTO: NEGATIVE
NRBC # BLD: 0 K/UL (ref 0–0.01)
NRBC BLD-RTO: 0 PER 100 WBC
PH UR STRIP: 6 [PH] (ref 5–8)
PLATELET # BLD AUTO: 209 K/UL (ref 150–400)
PMV BLD AUTO: 11.5 FL (ref 8.9–12.9)
POTASSIUM SERPL-SCNC: 4.1 MMOL/L (ref 3.5–5.1)
PROT SERPL-MCNC: 7.3 G/DL (ref 6.4–8.2)
PROT UR STRIP-MCNC: 100 MG/DL
RBC # BLD AUTO: 3.46 M/UL (ref 4.1–5.7)
RBC #/AREA URNS HPF: ABNORMAL /HPF (ref 0–5)
SODIUM SERPL-SCNC: 142 MMOL/L (ref 136–145)
SP GR UR REFRACTOMETRY: 1.02 (ref 1–1.03)
UROBILINOGEN UR QL STRIP.AUTO: 1 EU/DL (ref 0.2–1)
WBC # BLD AUTO: 12.3 K/UL (ref 4.1–11.1)
WBC URNS QL MICRO: ABNORMAL /HPF (ref 0–4)

## 2019-02-16 PROCEDURE — 74176 CT ABD & PELVIS W/O CONTRAST: CPT

## 2019-02-16 PROCEDURE — 85025 COMPLETE CBC W/AUTO DIFF WBC: CPT

## 2019-02-16 PROCEDURE — 36415 COLL VENOUS BLD VENIPUNCTURE: CPT

## 2019-02-16 PROCEDURE — 81001 URINALYSIS AUTO W/SCOPE: CPT

## 2019-02-16 PROCEDURE — 99284 EMERGENCY DEPT VISIT MOD MDM: CPT

## 2019-02-16 PROCEDURE — 80053 COMPREHEN METABOLIC PANEL: CPT

## 2019-02-16 RX ORDER — CIPROFLOXACIN 500 MG/1
500 TABLET ORAL 2 TIMES DAILY
COMMUNITY
End: 2019-05-30

## 2019-02-16 NOTE — DISCHARGE INSTRUCTIONS
Patient Education        Abdominal Pain: Care Instructions  Your Care Instructions    Abdominal pain has many possible causes. Some aren't serious and get better on their own in a few days. Others need more testing and treatment. If your pain continues or gets worse, you need to be rechecked and may need more tests to find out what is wrong. You may need surgery to correct the problem. Don't ignore new symptoms, such as fever, nausea and vomiting, urination problems, pain that gets worse, and dizziness. These may be signs of a more serious problem. Your doctor may have recommended a follow-up visit in the next 8 to 12 hours. If you are not getting better, you may need more tests or treatment. The doctor has checked you carefully, but problems can develop later. If you notice any problems or new symptoms, get medical treatment right away. Follow-up care is a key part of your treatment and safety. Be sure to make and go to all appointments, and call your doctor if you are having problems. It's also a good idea to know your test results and keep a list of the medicines you take. How can you care for yourself at home? · Rest until you feel better. · To prevent dehydration, drink plenty of fluids, enough so that your urine is light yellow or clear like water. Choose water and other caffeine-free clear liquids until you feel better. If you have kidney, heart, or liver disease and have to limit fluids, talk with your doctor before you increase the amount of fluids you drink. · If your stomach is upset, eat mild foods, such as rice, dry toast or crackers, bananas, and applesauce. Try eating several small meals instead of two or three large ones. · Wait until 48 hours after all symptoms have gone away before you have spicy foods, alcohol, and drinks that contain caffeine. · Do not eat foods that are high in fat. · Avoid anti-inflammatory medicines such as aspirin, ibuprofen (Advil, Motrin), and naproxen (Aleve). These can cause stomach upset. Talk to your doctor if you take daily aspirin for another health problem. When should you call for help? Call 911 anytime you think you may need emergency care. For example, call if:    · You passed out (lost consciousness).     · You pass maroon or very bloody stools.     · You vomit blood or what looks like coffee grounds.     · You have new, severe belly pain.    Call your doctor now or seek immediate medical care if:    · Your pain gets worse, especially if it becomes focused in one area of your belly.     · You have a new or higher fever.     · Your stools are black and look like tar, or they have streaks of blood.     · You have unexpected vaginal bleeding.     · You have symptoms of a urinary tract infection. These may include:  ? Pain when you urinate. ? Urinating more often than usual.  ? Blood in your urine.     · You are dizzy or lightheaded, or you feel like you may faint.    Watch closely for changes in your health, and be sure to contact your doctor if:    · You are not getting better after 1 day (24 hours). Where can you learn more? Go to http://maria r-fran.info/. Enter I972 in the search box to learn more about \"Abdominal Pain: Care Instructions. \"  Current as of: September 23, 2018  Content Version: 11.9  © 7194-5819 [employer]. Care instructions adapted under license by [employer] (which disclaims liability or warranty for this information). If you have questions about a medical condition or this instruction, always ask your healthcare professional. [place] [address] any warranty or liability for your use of this information. Patient Education        Biliary Colic: Care Instructions  Your Care Instructions    Biliary (say \"BILL-ee-air-ee\") colic is belly pain caused by gallbladder problems.  It is usually caused by a gallstone moving through or blocking the common bile duct or cystic duct.  Gallstones are stones that form in the gallbladder. They are made of cholesterol and other substances. The gallbladder is a small sac located just under the liver. It stores bile released by the liver. Bile helps you digest fats. Gallstones also can form in the common bile duct or cystic duct. These ducts carry bile from the gallbladder and the liver to the small intestine. Gallstones may be as small as a grain of sand or as large as a golf ball. Gallstones that cause severe symptoms usually are treated with surgery to remove the gallbladder. If the first attack of biliary colic is mild, it is often safe to wait until you have had another attack before you think about having surgery. The doctor has checked you carefully, but problems can develop later. If you notice any problems or new symptoms, get medical treatment right away. Follow-up care is a key part of your treatment and safety. Be sure to make and go to all appointments, and call your doctor if you are having problems. It's also a good idea to know your test results and keep a list of the medicines you take. How can you care for yourself at home? · Take pain medicines exactly as directed. ? If the doctor gave you a prescription medicine for pain, take it as prescribed. ? If you are not taking a prescription pain medicine, ask your doctor if you can take an over-the-counter medicine. Read and follow all instructions on the label. · Avoid foods that cause symptoms, especially fatty foods. These can cause biliary colic. · You may need more tests to look at your gallbladder. When should you call for help? Call your doctor now or seek immediate medical care if:    · You have a fever.     · You have new belly pain, or your pain gets worse.     · There is a new or increasing yellow tint to your skin or the whites of your eyes.     · Your urine is dark yellow-brown, or your stools are light-colored or white.     · You cannot keep down fluids.  Watch closely for changes in your health, and be sure to contact your doctor if:    · You do not get better as expected.     · You are not getting better after 1 day (24 hours). Where can you learn more? Go to http://maria r-fran.info/. Enter T913 in the search box to learn more about \"Biliary Colic: Care Instructions. \"  Current as of: March 27, 2018  Content Version: 11.9  © [id number] [employer]. Care instructions adapted under license by [employer] (which disclaims liability or warranty for this information). If you have questions about a medical condition or this instruction, always ask your healthcare professional. [place] [address] any warranty or liability for your use of this information. Patient Education        Urinary Tract Infections in Men: Care Instructions  Your Care Instructions    A urinary tract infection, or UTI, is a general term for an infection anywhere between the kidneys and the tip of the penis. UTIs can also be a result of a prostate problem. Most cause pain or burning when you urinate. Most UTIs are caused by bacteria and can be cured with antibiotics. It is important to complete your treatment so that the infection does not get worse. Follow-up care is a key part of your treatment and safety. Be sure to make and go to all appointments, and call your doctor if you are having problems. It's also a good idea to know your test results and keep a list of the medicines you take. How can you care for yourself at home? · Take your antibiotics as prescribed. Do not stop taking them just because you feel better. You need to take the full course of antibiotics. · Take your medicines exactly as prescribed. Your doctor may have prescribed a medicine, such as phenazopyridine (Pyridium), to help relieve pain when you urinate. This turns your urine orange. You may stop taking it when your symptoms get better.  But be sure to take all of your antibiotics, which treat the infection. · Drink extra water for the next day or two. This will help make the urine less concentrated and help wash out the bacteria causing the infection. (If you have kidney, heart, or liver disease and have to limit your fluids, talk with your doctor before you increase your fluid intake.)  · Avoid drinks that are carbonated or have caffeine. They can irritate the bladder. · Urinate often. Try to empty your bladder each time. · To relieve pain, take a hot bath or lay a heating pad (set on low) over your lower belly or genital area. Never go to sleep with a heating pad in place. To help prevent UTIs  · Drink plenty of fluids, enough so that your urine is light yellow or clear like water. If you have kidney, heart, or liver disease and have to limit fluids, talk with your doctor before you increase the amount of fluids you drink. · Urinate when you have the urge. Do not hold your urine for a long time. Urinate before you go to sleep. · Keep your penis clean. Catheter care  If you have a drainage tube (catheter) in place, the following steps will help you care for it. · Always wash your hands before and after touching your catheter. · Check the area around the urethra for inflammation or signs of infection. Signs of infection include irritated, swollen, red, or tender skin, or pus around the catheter. · Clean the area around the catheter with soap and water two times a day. Dry with a clean towel afterward. · Do not apply powder or lotion to the skin around the catheter. To empty the urine collection bag  · Wash your hands with soap and water. · Without touching the drain spout, remove the spout from its sleeve at the bottom of the collection bag. Open the valve on the spout. · Let the urine flow out of the bag and into the toilet or a container. Do not let the tubing or drain spout touch anything.   · After you empty the bag, clean the end of the drain spout with tissue and water. Close the valve and put the drain spout back into its sleeve at the bottom of the collection bag. · Wash your hands with soap and water. When should you call for help? Call your doctor now or seek immediate medical care if:    · Symptoms such as a fever, chills, nausea, or vomiting get worse or happen for the first time.     · You have new pain in your back just below your rib cage. This is called flank pain.     · There is new blood or pus in your urine.     · You are not able to take or keep down your antibiotics.    Watch closely for changes in your health, and be sure to contact your doctor if:    · You are not getting better after taking an antibiotic for 2 days.     · Your symptoms go away but then come back. Where can you learn more? Go to http://maria r-fran.info/. Enter T646 in the search box to learn more about \"Urinary Tract Infections in Men: Care Instructions. \"  Current as of: March 20, 2018  Content Version: 11.9  © 9313-4956 [employer]. Care instructions adapted under license by [employer] (which disclaims liability or warranty for this information). If you have questions about a medical condition or this instruction, always ask your healthcare professional. [place] [address] any warranty or liability for your use of this information.

## 2019-02-16 NOTE — ED NOTES
Dr _______Smith_________________ in to talk with patient and explain plan of care with  understanding and  written & verbal instructions.

## 2019-02-16 NOTE — ED NOTES
Bladder scan done after patient voided & found 6 ml in bladder & patient notes having spasms after voiding

## 2019-02-16 NOTE — ED PROVIDER NOTES
EMERGENCY DEPARTMENT HISTORY AND PHYSICAL EXAM 
 
 
Date: 2/16/2019 Patient Name: Melvina Judge History of Presenting Illness Chief Complaint Patient presents with  Urinary Pain Pt presents with difficulty urinating. Also reports pain when voiding. Pt on dialysis, Tues, Thurs, Sat. History Provided By: Patient HPI: Melvina Judge, 72 y.o. male with PMHx significant for CKD, CAD, DM, GERD, presents ambulatory to the ED with cc of new onset dysuria with diarrhea and groin pain x 3 days. He notes 5 episodes of watery diarrhea today. The pt reports he was seen at PCP's office 2 days ago and prescribed Cipro. Pt relays he has not been able to take the medication. He adds that he is on dialysis and receives is Tues/Thurs/Sat. He denies any recent travel. He specifically denies any nausea, vomiting and fevers. Social Hx: - Tobacco, - EtOH, - Illicit Drugs Family Hx: Cancer There are no other complaints, changes, or physical findings at this time. PCP: Manny Platt MD 
 
No current facility-administered medications on file prior to encounter. Current Outpatient Medications on File Prior to Encounter Medication Sig Dispense Refill  ciprofloxacin HCl (CIPRO) 500 mg tablet Take 500 mg by mouth two (2) times a day.  sertraline (ZOLOFT) 25 mg tablet TK 1 T PO QD    
 carvedilol (COREG) 12.5 mg tablet  Liraglutide (VICTOZA 3-NELLI) 0.6 mg/0.1 mL (18 mg/3 mL) pnij 1.8 mg by SubCUTAneous route daily.  Titrate up to 1.8mg daily as direcected 3 Pen 7  
 Insulin Needles, Disposable, (REYNALDO PEN NEEDLE) 32 gauge x 5/32\" ndle For use each AM with Victoza Pen 100 Pen Needle 3  
 Insulin Syringe-Needle U-100 1 mL 30 gauge x 5/16 syrg Use twice daily for insulin injection 100 Syringe 7  
 insulin NPH/insulin regular (NOVOLIN 70/30 U-100 INSULIN) 100 unit/mL (70-30) injection 60 Units by SubCUTAneous route Before breakfast and dinner. (Patient taking differently: 40 Units by SubCUTAneous route Before breakfast and dinner.) 10 Vial 3  
 hydrALAZINE (APRESOLINE) 100 mg tablet 100 mg three (3) times daily.  fenofibrate nanocrystallized (TRICOR) 145 mg tablet Take  by mouth every evening.  bumetanide (BUMEX) 2 mg tablet Take 2 mg by mouth every evening.  atorvastatin (LIPITOR) 20 mg tablet Take 20 mg by mouth every evening.  glimepiride (AMARYL) 4 mg tablet Take 4 mg by mouth every morning.  Calcium-Cholecalciferol, D3, (CALCIUM 600 WITH VITAMIN D3) 600 mg(1,500mg) -400 unit chew Take 1 Tab by mouth every evening.  fluticasone (FLONASE) 50 mcg/actuation nasal spray 2 Sprays by Both Nostrils route daily.  tamsulosin (FLOMAX) 0.4 mg capsule Take 0.4 mg by mouth daily. Past History Past Medical History: 
Past Medical History:  
Diagnosis Date  Arthritis   
 spine  CAD (coronary artery disease)   
 high cholesterol  Chronic kidney disease  Diabetes ([facility] [address]  GERD (gastroesophageal reflux disease) HX  
 Hypertension  Ill-defined condition   
 irritable bowel syndrome  Unspecified sleep apnea NO CPAP Past Surgical History: 
Past Surgical History:  
Procedure Laterality Date  COLONOSCOPY N/A 11/9/2016 COLONOSCOPY performed by Clarke Hannah MD at [facility]  
 HX ORTHOPAEDIC    
 CERVICAL FUSION  
 HX UROLOGICAL    
 TURP  
 HX VASCULAR ACCESS    
 L arm dialysis access Family History: 
Family History Problem Relation Age of Onset  Cancer Father \"bone\"  Diabetes Brother Social History: 
Social History Tobacco Use  Smoking status: Former Smoker Last attempt to quit: 2009 Years since quitting: 10.1  Smokeless tobacco: Former User  Tobacco comment: cigars only Substance Use Topics  Alcohol use: No  
 Drug use: No  
 
 
Allergies: Allergies Allergen Reactions  Albumin, Human 25 % Anaphylaxis and Hives  [facility] Review of Systems Review of Systems Constitutional: Positive for fever. HENT: Negative for congestion. Eyes: Negative. Respiratory: Negative for chest tightness. Cardiovascular: Negative for chest pain. Gastrointestinal: Positive for diarrhea. Negative for nausea and vomiting. Endocrine: Negative for heat intolerance. Genitourinary: Positive for dysuria. Musculoskeletal: Negative for back pain. Skin: Negative for rash. Allergic/Immunologic: Negative for immunocompromised state. Neurological: Negative for dizziness. Hematological: Does not bruise/bleed easily. Psychiatric/Behavioral: Negative. All other systems reviewed and are negative. Physical Exam  
Physical Exam  
Constitutional: He is oriented to person, place, and time. He appears well-developed and well-nourished. He appears distressed (mild). HENT:  
Head: Normocephalic and atraumatic. Eyes: EOM are normal. Pupils are equal, round, and reactive to light. Neck: Normal range of motion. Neck supple. Cardiovascular: Normal rate, regular rhythm and normal heart sounds. Pulmonary/Chest: Effort normal and breath sounds normal. He has no wheezes. Abdominal: Soft. Bowel sounds are normal. He exhibits distension. There is tenderness. There is no rebound and no guarding. Mild, diffused abdominal tenderness. Genitourinary:  
Genitourinary Comments: Exam unremarkable. No erythema. Musculoskeletal: Normal range of motion. He exhibits no edema or tenderness. Neurological: He is alert and oriented to person, place, and time. No cranial nerve deficit. Skin: Skin is warm and dry. Psychiatric: He has a normal mood and affect. His behavior is normal.  
Nursing note and vitals reviewed. Diagnostic Study Results Labs - Recent Results (from the past 12 hour(s)) URINALYSIS W/ RFLX MICROSCOPIC  Collection Time: 02/16/19  3:28 AM  
 Result Value Ref Range Color YELLOW/STRAW Appearance TURBID (A) CLEAR Specific gravity 1.017 1.003 - 1.030    
 pH (UA) 6.0 5.0 - 8.0 Protein 100 (A) NEG mg/dL Glucose NEGATIVE  NEG mg/dL Ketone NEGATIVE  NEG mg/dL Bilirubin NEGATIVE  NEG Blood NEGATIVE  NEG Urobilinogen 1.0 0.2 - 1.0 EU/dL Nitrites NEGATIVE  NEG Leukocyte Esterase SMALL (A) NEG    
 WBC 5-10 0 - 4 /hpf  
 RBC 0-5 0 - 5 /hpf Epithelial cells FEW FEW /lpf Bacteria NEGATIVE  NEG /hpf Hyaline cast 0-2 0 - 5 /lpf  
CBC WITH AUTOMATED DIFF Collection Time: 02/16/19  3:49 AM  
Result Value Ref Range WBC 12.3 (H) 4.1 - 11.1 K/uL  
 RBC 3.46 (L) 4.10 - 5.70 M/uL  
 HGB 10.3 (L) 12.1 - 17.0 g/dL HCT 32.2 (L) 36.6 - 50.3 % MCV 93.1 80.0 - 99.0 FL  
 MCH 29.8 26.0 - 34.0 PG  
 MCHC 32.0 30.0 - 36.5 g/dL  
 RDW 15.3 (H) 11.5 - 14.5 % PLATELET 069 888 - 182 K/uL MPV 11.5 8.9 - 12.9 FL  
 NRBC 0.0 0  WBC ABSOLUTE NRBC 0.00 0.00 - 0.01 K/uL NEUTROPHILS 81 (H) 32 - 75 % LYMPHOCYTES 8 (L) 12 - 49 % MONOCYTES 8 5 - 13 % EOSINOPHILS 2 0 - 7 % BASOPHILS 0 0 - 1 % IMMATURE GRANULOCYTES 1 (H) 0.0 - 0.5 % ABS. NEUTROPHILS 10.0 (H) 1.8 - 8.0 K/UL  
 ABS. LYMPHOCYTES 1.0 0.8 - 3.5 K/UL  
 ABS. MONOCYTES 1.0 0.0 - 1.0 K/UL  
 ABS. EOSINOPHILS 0.2 0.0 - 0.4 K/UL  
 ABS. BASOPHILS 0.0 0.0 - 0.1 K/UL  
 ABS. IMM. GRANS. 0.1 (H) 0.00 - 0.04 K/UL  
 DF AUTOMATED METABOLIC PANEL, COMPREHENSIVE Collection Time: 02/16/19  3:49 AM  
Result Value Ref Range Sodium 142 136 - 145 mmol/L Potassium 4.1 3.5 - 5.1 mmol/L Chloride 110 (H) 97 - 108 mmol/L  
 CO2 23 21 - 32 mmol/L Anion gap 9 5 - 15 mmol/L Glucose 140 (H) 65 - 100 mg/dL BUN 51 (H) 6 - 20 MG/DL Creatinine 4.35 (H) 0.70 - 1.30 MG/DL  
 BUN/Creatinine ratio 12 12 - 20 GFR est AA 16 (L) >60 ml/min/1.73m2 GFR est non-AA 14 (L) >60 ml/min/1.73m2  Calcium 8.9 8.5 - 10.1 MG/DL  
 Bilirubin, total 0.4 0.2 - 1.0 MG/DL  
 ALT (SGPT) 40 12 - 78 U/L  
 AST (SGOT) 27 15 - 37 U/L Alk. phosphatase 48 45 - 117 U/L Protein, total 7.3 6.4 - 8.2 g/dL Albumin 3.3 (L) 3.5 - 5.0 g/dL Globulin 4.0 2.0 - 4.0 g/dL A-G Ratio 0.8 (L) 1.1 - 2.2 Radiologic Studies -  
CT ABD PELV WO CONT Final Result IMPRESSION:  
No acute intraperitoneal process is identified CT Results  (Last 48 hours) 02/16/19 0440  CT ABD PELV WO CONT Final result Impression:  IMPRESSION:  
No acute intraperitoneal process is identified Narrative:  EXAM: CT ABD PELV WO CONT Clinical history: Abdominal pain, hypertension, diabetes INDICATION: Abdominal pain Yolanda Expose y. o.?male?with PMHx significant for CKD, CAD, DM, GERD,  
presents?ambulatory?to the ED with cc of new onset dysuria with diarrhea and  
groin pain x 3 days. He notes 5 episodes of watery diarrhea today. COMPARISON: 11/25/2016 CONTRAST:  None. TECHNIQUE:   
Thin axial images were obtained through the abdomen and pelvis. Coronal and  
sagittal reconstructions were generated. Oral contrast was not administered. CT  
dose reduction was achieved through use of a standardized protocol tailored for  
this examination and automatic exposure control for dose modulation. The absence of intravenous contrast material reduces the sensitivity for  
evaluation of the solid parenchymal organs of the abdomen. FINDINGS:   
LUNG BASES: Clear. INCIDENTALLY IMAGED HEART AND MEDIASTINUM: Anemia. Cardiomegaly LIVER: No mass or biliary dilatation. GALLBLADDER: Cholelithiasis SPLEEN: No mass. PANCREAS: No mass or ductal dilatation. ADRENALS: Unremarkable. KIDNEYS/URETERS: Small renal hypodensities are not fully characterized or are  
simple cysts. STOMACH: Unremarkable. SMALL BOWEL: No dilatation or wall thickening. COLON: Diverticula. No diverticulitis APPENDIX: Unremarkable. PERITONEUM: No ascites or pneumoperitoneum. RETROPERITONEUM: No lymphadenopathy or aortic aneurysm. REPRODUCTIVE ORGANS: Enlarged prostate URINARY BLADDER: No mass or calculus. BONES: Degenerative change L4-5 L5-S1. Moderate canal stenosis at L5-S1. ADDITIONAL COMMENTS: N/A Medical Decision Making I am the first provider for this patient. I reviewed the vital signs, available nursing notes, past medical history, past surgical history, family history and social history. Vital Signs-Reviewed the patient's vital signs. Patient Vitals for the past 12 hrs: 
 Temp Pulse Resp BP SpO2  
02/16/19 0244 98.2 °F (36.8 °C) 86 16 162/82 97 % Pulse Oximetry Analysis - 97% on RA Cardiac Monitor:  
Rate: 86 bpm 
Rhythm: Normal Sinus Rhythm Records Reviewed: Nursing Notes and Old Medical Records Provider Notes (Medical Decision Making): DDx: Urinary retention, UTI, worsening renal failure, gastroenteritis, diverticulitis, dysuria ED Course:  
Initial assessment performed. The patients presenting problems have been discussed, and they are in agreement with the care plan formulated and outlined with them. I have encouraged them to ask questions as they arise throughout their visit. Critical Care Time:  
None Disposition: 
Discharge Note: 
5:17 AM 
The patient has been re-evaluated and is ready for discharge. Reviewed available results with patient. Counseled patient on diagnosis and care plan. Patient has expressed understanding, and all questions have been answered. Patient agrees with plan and agrees to follow up as recommended, or return to the ED if their symptoms worsen. Discharge instructions have been provided and explained to the patient, along with reasons to return to the ED. PLAN: 
1. Discharge Current Discharge Medication List  
  
 
2. Follow-up Information Follow up With Specialties Details Why Contact Info Manny Platt MD Family Practice In 2 days As needed Jennifer Amalia Drake [facility]. 
[phone] [facility] EMERGENCY DEPT Emergency Medicine  If symptoms worsen [address] [facility] 
[phone] Return to ED if worse Diagnosis Clinical Impression: 1. Acute cystitis without hematuria 2. Abdominal pain, generalized 3. Gallstones 4. ESRD (end stage renal disease) ([facility] [address] Attestations: This note is prepared by Marilu Lozano, acting as Scribe for MD Paula Mondragon MD: The scribe's documentation has been prepared under my direction and personally reviewed by me in its entirety. I confirm that the note above accurately reflects all work, treatment, procedures, and medical decision making performed by me.

## 2019-02-18 ENCOUNTER — HOSPITAL ENCOUNTER (EMERGENCY)
Age: 74
Discharge: HOME | End: 2019-02-18
Attending: EMERGENCY MEDICINE
Payer: MEDICARE

## 2019-02-18 VITALS
SYSTOLIC BLOOD PRESSURE: 162 MMHG | HEART RATE: 90 BPM | OXYGEN SATURATION: 97 % | WEIGHT: 242.51 LBS | DIASTOLIC BLOOD PRESSURE: 83 MMHG | TEMPERATURE: 98 F | HEIGHT: 72 IN | RESPIRATION RATE: 18 BRPM | BODY MASS INDEX: 32.85 KG/M2

## 2019-02-18 DIAGNOSIS — N40.0 ENLARGED PROSTATE: ICD-10-CM

## 2019-02-18 DIAGNOSIS — Z99.2 ESRD ON DIALYSIS (HCC): ICD-10-CM

## 2019-02-18 DIAGNOSIS — R30.9 URINARY PAIN: Primary | ICD-10-CM

## 2019-02-18 DIAGNOSIS — N18.6 ESRD ON DIALYSIS (HCC): ICD-10-CM

## 2019-02-18 DIAGNOSIS — R39.198 DIFFICULTY URINATING: ICD-10-CM

## 2019-02-18 LAB
AMORPH CRY URNS QL MICRO: ABNORMAL
APPEARANCE UR: ABNORMAL
BACTERIA URNS QL MICRO: NEGATIVE /HPF
BILIRUB UR QL: NEGATIVE
COLOR UR: ABNORMAL
EPITH CASTS URNS QL MICRO: ABNORMAL /LPF
GLUCOSE UR STRIP.AUTO-MCNC: NEGATIVE MG/DL
HGB UR QL STRIP: ABNORMAL
KETONES UR QL STRIP.AUTO: NEGATIVE MG/DL
LEUKOCYTE ESTERASE UR QL STRIP.AUTO: ABNORMAL
NITRITE UR QL STRIP.AUTO: NEGATIVE
PH UR STRIP: 6 [PH] (ref 5–8)
PROT UR STRIP-MCNC: 300 MG/DL
RBC #/AREA URNS HPF: ABNORMAL /HPF (ref 0–5)
SP GR UR REFRACTOMETRY: 1.02 (ref 1–1.03)
UA: UC IF INDICATED,UAUC: ABNORMAL
UROBILINOGEN UR QL STRIP.AUTO: 0.2 EU/DL (ref 0.2–1)
WBC URNS QL MICRO: ABNORMAL /HPF (ref 0–4)

## 2019-02-18 PROCEDURE — 81001 URINALYSIS AUTO W/SCOPE: CPT

## 2019-02-18 PROCEDURE — 99283 EMERGENCY DEPT VISIT LOW MDM: CPT

## 2019-02-18 PROCEDURE — 87086 URINE CULTURE/COLONY COUNT: CPT

## 2019-02-18 RX ORDER — LIDOCAINE HYDROCHLORIDE 20 MG/ML
JELLY TOPICAL ONCE
Status: DISCONTINUED | OUTPATIENT
Start: 2019-02-18 | End: 2019-02-18 | Stop reason: HOSPADM

## 2019-02-18 NOTE — ED PROVIDER NOTES
EMERGENCY DEPARTMENT HISTORY AND PHYSICAL EXAM 
 
 
Date: 2/18/2019 Patient Name: Chino Bermeo History of Presenting Illness Chief Complaint Patient presents with  Urinary Retention x1 week, seen yesterday in ED for same History Provided By: Patient HPI: Chino Bermeo, 72 y.o. male with PMHx significant for DM, HTN, CAD, sleep apnea, CKD, IBS, presents ambulatory to the ED with cc of ongoing difficulty urinating x 5 days. Pt states he typically urinates several times per day, specifically every 1-1.5 hours, however over the past 5 days he has only been able to urinate a very small amount. Pt c/o associated throbbing groin pain and bladder distension. He states he was seen by his PCP at onset of sx's and was written Cipro for suspected UTI. Pt was advised to take the Cipro after his dialysis. He is a Tuesday, Thursday, Saturday dialysis pt and was last dialyzed Saturday, 2/16. Per chart review, pt was seen at [facility] ED on 2/16 for dysuria and diarrhea. Chart review reveals pt's bladder scan showed only 6mL in his bladder, and thus he did not have a catheter placed. Additionally, CT abdomen pelvis showed enlarged prostate, but was otherwise unremarkable. He was dx with acute cystitis and recommended to continue his cipro. He specifically denies any recent fever, chills, nausea, vomiting, CP, SOB, lightheadedness, dizziness, numbness, weakness, tingling, BLE swelling, HA, heart palpitations, changes in PO intake, melena, hematochezia, cough, or congestion. Allergies: Albumin, Niacin PMHx: Significant for DM, HTN, CAD, CKD, IBS PSHx: Significant for urological TURP procedure, cervical fusion, L arm dialysis access Social Hx: - tobacco (former- quit 3342), - EtOH, - Illicit Drugs There are no other complaints, changes, or physical findings at this time. PCP: Conrado Dee MD 
Nephrology: Willam Yost MD  
 
Current Facility-Administered Medications Medication Dose Route Frequency Provider Last Rate Last Dose  lidocaine (URO-JET) 2 % jelly   Urethral ONCE Elodia Dupree MD      
 
Current Outpatient Medications Medication Sig Dispense Refill  ciprofloxacin HCl (CIPRO) 500 mg tablet Take 500 mg by mouth two (2) times a day.  sertraline (ZOLOFT) 25 mg tablet TK 1 T PO QD    
 carvedilol (COREG) 12.5 mg tablet  Liraglutide (VICTOZA 3-NELLI) 0.6 mg/0.1 mL (18 mg/3 mL) pnij 1.8 mg by SubCUTAneous route daily. Titrate up to 1.8mg daily as direcected 3 Pen 7  
 Insulin Needles, Disposable, (REYNALDO PEN NEEDLE) 32 gauge x 5/32\" ndle For use each AM with Victoza Pen 100 Pen Needle 3  
 Insulin Syringe-Needle U-100 1 mL 30 gauge x 5/16 syrg Use twice daily for insulin injection 100 Syringe 7  
 insulin NPH/insulin regular (NOVOLIN 70/30 U-100 INSULIN) 100 unit/mL (70-30) injection 60 Units by SubCUTAneous route Before breakfast and dinner. (Patient taking differently: 40 Units by SubCUTAneous route Before breakfast and dinner.) 10 Vial 3  
 hydrALAZINE (APRESOLINE) 100 mg tablet 100 mg three (3) times daily.  fenofibrate nanocrystallized (TRICOR) 145 mg tablet Take  by mouth every evening.  bumetanide (BUMEX) 2 mg tablet Take 2 mg by mouth every evening.  atorvastatin (LIPITOR) 20 mg tablet Take 20 mg by mouth every evening.  glimepiride (AMARYL) 4 mg tablet Take 4 mg by mouth every morning.  Calcium-Cholecalciferol, D3, (CALCIUM 600 WITH VITAMIN D3) 600 mg(1,500mg) -400 unit chew Take 1 Tab by mouth every evening.  fluticasone (FLONASE) 50 mcg/actuation nasal spray 2 Sprays by Both Nostrils route daily.  tamsulosin (FLOMAX) 0.4 mg capsule Take 0.4 mg by mouth daily. Past History Past Medical History: 
Past Medical History:  
Diagnosis Date  Arthritis   
 spine  CAD (coronary artery disease)   
 high cholesterol  Chronic kidney disease  Diabetes ([facility] [address]  GERD (gastroesophageal reflux disease) HX  
 Hypertension  Ill-defined condition   
 irritable bowel syndrome  Unspecified sleep apnea NO CPAP Past Surgical History: 
Past Surgical History:  
Procedure Laterality Date  COLONOSCOPY N/A 11/9/2016 COLONOSCOPY performed by Abdi Sidhu MD at [facility] ENDOSCOPY  
 HX ORTHOPAEDIC    
 CERVICAL FUSION  
 HX UROLOGICAL    
 TURP  
 HX VASCULAR ACCESS    
 L arm dialysis access Family History: 
Family History Problem Relation Age of Onset  Cancer Father \"bone\"  Diabetes Brother Social History: 
Social History Tobacco Use  Smoking status: Former Smoker Last attempt to quit: 2009 Years since quitting: 10.1  Smokeless tobacco: Former User  Tobacco comment: cigars only Substance Use Topics  Alcohol use: No  
 Drug use: No  
 
 
Allergies: Allergies Allergen Reactions  Albumin, Human 25 % Anaphylaxis and Hives  Niacin Hives Review of Systems Review of Systems Constitutional: Negative. Negative for appetite change, chills and fever. HENT: Negative. Negative for congestion. Eyes: Negative. Respiratory: Negative. Negative for cough and shortness of breath. Cardiovascular: Negative. Negative for chest pain, palpitations and leg swelling. Gastrointestinal: Positive for abdominal distention and diarrhea. Negative for blood in stool, constipation, nausea and vomiting. Genitourinary: Positive for difficulty urinating. Negative for dysuria and frequency. + groin pain Musculoskeletal: Negative. Skin: Negative. Neurological: Negative. Negative for dizziness, weakness, light-headedness, numbness and headaches. Psychiatric/Behavioral: Negative. All other systems reviewed and are negative. Physical Exam  
General appearance - well nourished, well appearing, and in no distress Eyes - pupils equal and reactive, extraocular eye movements intact ENT - mucous membranes moist, pharynx normal without lesions Neck - supple, no significant adenopathy; non-tender to palpation Chest - clear to auscultation, no wheezes, rales or rhonchi; non-tender to palpation Heart - normal rate and regular rhythm, S1 and S2 normal, no murmurs noted Abdomen - soft, suprapubic tenderness, distended abdomen with ventral hernia, no organomegaly Musculoskeletal - fistula left upper arm with positive thrill, no joint tenderness, deformity or swelling; normal ROM Extremities - peripheral pulses normal, no pedal edema Skin - normal coloration and turgor, no rashes Neurological - alert, oriented x3, normal speech, no focal findings or movement disorder noted Diagnostic Study Results Labs - Recent Results (from the past 12 hour(s)) URINALYSIS W/ REFLEX CULTURE Collection Time: 02/18/19  1:45 AM  
Result Value Ref Range Color YELLOW/STRAW Appearance CLOUDY (A) CLEAR Specific gravity 1.019 1.003 - 1.030    
 pH (UA) 6.0 5.0 - 8.0 Protein 300 (A) NEG mg/dL Glucose NEGATIVE  NEG mg/dL Ketone NEGATIVE  NEG mg/dL Bilirubin NEGATIVE  NEG Blood SMALL (A) NEG Urobilinogen 0.2 0.2 - 1.0 EU/dL Nitrites NEGATIVE  NEG Leukocyte Esterase SMALL (A) NEG    
 WBC 5-10 0 - 4 /hpf  
 RBC 5-10 0 - 5 /hpf Epithelial cells MODERATE (A) FEW /lpf Bacteria NEGATIVE  NEG /hpf  
 UA:UC IF INDICATED URINE CULTURE ORDERED (A) CNI Amorphous Crystals 3+ (A) NEG Medical Decision Making I am the first provider for this patient. I reviewed the vital signs, available nursing notes, past medical history, past surgical history, family history and social history. Vital Signs-Reviewed the patient's vital signs. Patient Vitals for the past 12 hrs: 
 Temp Pulse Resp BP SpO2  
02/18/19 0019 98 °F (36.7 °C) 90 18 162/83 97 % Pulse Oximetry Analysis - 97% on RA Cardiac Monitor:  
Rate: 90 bpm 
Rhythm: Normal Sinus Rhythm Records Reviewed: Nursing Notes, Old Medical Records, Previous Radiology Studies and Previous Laboratory Studies Provider Notes (Medical Decision Making): DDx: urinary retention, UTI, prostatitis, gastroenteritis ED Course:  
Initial assessment performed. The patients presenting problems have been discussed, and they are in agreement with the care plan formulated and outlined with them. I have encouraged them to ask questions as they arise throughout their visit. Medications Given in the ED: 
Medications  
lidocaine (URO-JET) 2 % jelly (not administered) Progress note: 
3:05 AM 
Pt noted to be feeling better, ready for discharge. Updated pt and/or family on all final lab findings. Will follow up as instructed. All questions have been answered, pt voiced understanding and agreement with plan. Specific return precautions provided as well as instructions to return to the ED should sx worsen at any time. Vital signs stable for discharge. Critical Care Time:  
none Disposition: 
DISCHARGE NOTE: 
3:10 AM 
The patient is ready for discharge. The patients signs, symptoms, diagnosis, and instructions for discharge have been discussed and the pt has conveyed their understanding. The patient is to follow up as recommended with PCP or return to the ER should their symptoms worsen. Plan has been discussed and patient has conveyed their agreement. PLAN: 
1. Discharge home 2. Follow-up Information Follow up With Specialties Details Why Contact Info Asher Dallas MD [facility] Call  [facility] [address] [phone] John Polk MD Urology Schedule an appointment as soon as possible for a visit  [address] [phone] [facility] EMERGENCY DEPT Emergency Medicine  If symptoms worsen [address] [facility] 
[phone] Return to ED if worse Diagnosis Clinical Impression: 1. Urinary pain 2. Enlarged prostate 3. ESRD on dialysis [facility]) 4. Difficulty urinating Attestations: This note is prepared by Benji Jorgensen, acting as Scribe for Elodia Contreras MD. Tamela Torres MD: The scribe's documentation has been prepared under my direction and personally reviewed by me in its entirety. I confirm that the note above accurately reflects all work, treatment, procedures, and medical decision making performed by me. This note will not be viewable in [address]

## 2019-02-18 NOTE — ED NOTES
Patient discharged by provider. Patient provided with discharge instructions Rx and instructions on follow up care. Patient out of ED ambulatory accompanied by self.

## 2019-02-18 NOTE — DISCHARGE INSTRUCTIONS
Patient Education        Painful Urination (Dysuria): Care Instructions  Your Care Instructions  Burning pain with urination (dysuria) is a common symptom of a urinary tract infection or other urinary problems. The bladder may become inflamed. This can cause pain when the bladder fills and empties. You may also feel pain if the tube that carries urine from the bladder to the outside of the body (urethra) gets irritated or infected. Sexually transmitted infections (STIs) also may cause pain when you urinate. Sometimes the pain can be caused by things other than an infection. The urethra can be irritated by soaps, perfumes, or foreign objects in the urethra. Kidney stones can cause pain when they pass through the urethra. The cause may be hard to find. You may need tests. Treatment for painful urination depends on the cause. Follow-up care is a key part of your treatment and safety. Be sure to make and go to all appointments, and call your doctor if you are having problems. It's also a good idea to know your test results and keep a list of the medicines you take. How can you care for yourself at home? · Drink extra water for the next day or two. This will help make the urine less concentrated. (If you have kidney, heart, or liver disease and have to limit fluids, talk with your doctor before you increase the amount of fluids you drink.)  · Avoid drinks that are carbonated or have caffeine. They can irritate the bladder. · Urinate often. Try to empty your bladder each time. For women:  · Urinate right after you have sex. · After going to the bathroom, wipe from front to back. · Avoid douches, bubble baths, and feminine hygiene sprays. And avoid other feminine hygiene products that have deodorants. When should you call for help? Call your doctor now or seek immediate medical care if:    · You have new symptoms, such as fever, nausea, or vomiting.     · You have new or worse symptoms of a urinary problem.  For example:  ? You have blood or pus in your urine. ? You have chills or body aches. ? It hurts worse to urinate. ? You have groin or belly pain. ? You have pain in your back just below your rib cage (the flank area).    Watch closely for changes in your health, and be sure to contact your doctor if you have any problems. Where can you learn more? Go to http://maria r-fran.info/. Enter E290 in the search box to learn more about \"Painful Urination (Dysuria): Care Instructions. \"  Current as of: March 20, 2018  Content Version: 11.9  © 8404-3481 [employer]. Care instructions adapted under license by [employer] (which disclaims liability or warranty for this information). If you have questions about a medical condition or this instruction, always ask your healthcare professional. [place] [address] any warranty or liability for your use of this information. Patient Education        Benign Prostatic Hyperplasia: Care Instructions  Your Care Instructions    Benign prostatic hyperplasia, or BPH, is an enlarged prostate gland. The prostate is a small gland that makes some of the fluid in semen. Prostate enlargement happens to almost all men as they age. It is usually not serious. BPH does not cause prostate cancer. As the prostate gets bigger, it may partly block the flow of urine. You may have a hard time getting a urine stream started or completely stopped. BPH can cause dribbling. You may have a weak urine stream, or you may have to urinate more often than you used to, especially at night. Most men find these problems easy to manage. You do not need treatment unless your symptoms bother you a lot or you have other problems, such as bladder infections or stones. In these cases, medicines may help. Surgery is not needed unless the urine flow is blocked or the symptoms do not get better with medicine.   Follow-up care is a key part of your treatment and safety. Be sure to make and go to all appointments, and call your doctor if you are having problems. It's also a good idea to know your test results and keep a list of the medicines you take. How can you care for yourself at home? · Take plenty of time to urinate. Try to relax. · Try \"double voiding. \" Urinate as much you can, relax for a few moments, and then try to urinate again. · Sit on the toilet to urinate. · Read or think of other things while you are waiting. · Turn on a faucet, or try to picture running water. Some men find that this helps get their urine flowing. · If dribbling is a problem, wash your penis daily to avoid skin irritation and infection. · Avoid caffeine and alcohol. These drinks will increase how often you need to urinate. Spread your fluid intake throughout the day. If the urge to urinate often wakes you at night, limit your fluid intake in the evening. Urinate right before you go to bed. · Many over-the-counter cold and allergy medicines can make the symptoms of BPH worse. Avoid antihistamines, decongestants, and allergy pills, if you can. Read the warnings on the package. · If you take any prescription medicines, especially tranquilizers or antidepressants, ask your doctor or pharmacist whether they can cause urination problems. There may be other medicines you can use that do not cause urinary problems. · Be safe with medicines. Take your medicines exactly as prescribed. Call your doctor if you think you are having a problem with your medicine. When should you call for help? Call your doctor now or seek immediate medical care if:    · You cannot urinate at all.     · You have symptoms of a urinary infection. For example:  ? You have blood or pus in your urine. ? You have pain in your back just below your rib cage. This is called flank pain. ? You have a fever, chills, or body aches. ? It hurts to urinate. ?  You have groin or belly pain.    Watch closely for changes in your health, and be sure to contact your doctor if:    · It hurts when you ejaculate.     · Your urinary problems get a lot worse or bother you a lot. Where can you learn more? Go to http://maria r-fran.info/. Enter L896 in the search box to learn more about \"Benign Prostatic Hyperplasia: Care Instructions. \"  Current as of: September 26, 2018  Content Version: 11.9  © 6343-2088 [employer]. Care instructions adapted under license by [employer] (which disclaims liability or warranty for this information). If you have questions about a medical condition or this instruction, always ask your healthcare professional. [address] any warranty or liability for your use of this information. Statement Selected

## 2019-02-18 NOTE — ED TRIAGE NOTES
Pt arrives ambulatory with urinary retention, pt states he has not urinated since Wednesday. Pt is on dialysis T, Th, S. Pt reports he still produces urine. Pt having some pain in his groin. Pt reports burning with urination.

## 2019-02-19 LAB
BACTERIA SPEC CULT: NORMAL
CC UR VC: NORMAL
SERVICE CMNT-IMP: NORMAL

## 2019-03-28 ENCOUNTER — TELEPHONE (OUTPATIENT)
Dept: ENDOCRINOLOGY | Age: 74
End: 2019-03-28

## 2019-03-28 NOTE — TELEPHONE ENCOUNTER
I returned this call and let her know that the form had been received and was put on Dr. Peters Law desk.   Edelmira Barragan

## 2019-03-28 NOTE — TELEPHONE ENCOUNTER
----- Message from Mae Whatley sent at 3/28/2019 12:11 PM EDT -----  Regarding: Dr Aruna Thompson from [facility] [phone] confirming receipt of fax sent on 3/26/19 for pt's MD to evaluate the pt's use of  Glimepiride .

## 2019-04-02 ENCOUNTER — TELEPHONE (OUTPATIENT)
Dept: ENDOCRINOLOGY | Age: 74
End: 2019-04-02

## 2019-04-02 NOTE — TELEPHONE ENCOUNTER
----- Message from Reese Mejia sent at 4/2/2019 10:27 AM EDT -----  Regarding: Dr Cecilia Melvin from [address] [place] [address] Dx [phone] , following up on a fax that was received and confirmed by Lev Chau the nurse on 3/28/19, they did receive it and was on the MD desk waiting to be reviewed, they are checking on the status and wanetd to know if there were any questions.

## 2019-04-02 NOTE — TELEPHONE ENCOUNTER
I called and spoke with their pharmacy. Looking back at all of my progress notes, in addition to all my patient instructions, it has not included glimepiride in the diabetes specific instructions that I provide him with after each visit. Only Victoza and reli-on 70/30 insulin. The noted that they will notify patient and their dialysis nurse. Patient not on sulfonylurea as we had started 70/30 insulin to help with prandial sugars. Ashley Jiménez.  [address] [facility] Endocrinology  [address] [place] [address]

## 2019-04-11 ENCOUNTER — OFFICE VISIT (OUTPATIENT)
Dept: ENDOCRINOLOGY | Age: 74
End: 2019-04-11

## 2019-04-11 VITALS
HEART RATE: 79 BPM | BODY MASS INDEX: 32.78 KG/M2 | WEIGHT: 242 LBS | HEIGHT: 72 IN | SYSTOLIC BLOOD PRESSURE: 146 MMHG | DIASTOLIC BLOOD PRESSURE: 81 MMHG

## 2019-04-11 DIAGNOSIS — Z79.4 TYPE 2 DIABETES MELLITUS WITH STAGE 4 CHRONIC KIDNEY DISEASE, WITH LONG-TERM CURRENT USE OF INSULIN (HCC): Primary | ICD-10-CM

## 2019-04-11 DIAGNOSIS — I10 ESSENTIAL HYPERTENSION WITH GOAL BLOOD PRESSURE LESS THAN 130/80: ICD-10-CM

## 2019-04-11 DIAGNOSIS — N18.4 TYPE 2 DIABETES MELLITUS WITH STAGE 4 CHRONIC KIDNEY DISEASE, WITH LONG-TERM CURRENT USE OF INSULIN (HCC): Primary | ICD-10-CM

## 2019-04-11 DIAGNOSIS — E11.22 TYPE 2 DIABETES MELLITUS WITH STAGE 4 CHRONIC KIDNEY DISEASE, WITH LONG-TERM CURRENT USE OF INSULIN (HCC): Primary | ICD-10-CM

## 2019-04-11 DIAGNOSIS — E78.5 HYPERLIPIDEMIA, UNSPECIFIED HYPERLIPIDEMIA TYPE: ICD-10-CM

## 2019-04-11 LAB — HBA1C MFR BLD HPLC: 7.5 %

## 2019-04-11 RX ORDER — GUAIFENESIN 100 MG/5ML
81 LIQUID (ML) ORAL DAILY
COMMUNITY

## 2019-04-11 RX ORDER — SERTRALINE HYDROCHLORIDE 50 MG/1
50 TABLET, FILM COATED ORAL DAILY
Refills: 0 | COMMUNITY
Start: 2019-02-25

## 2019-04-11 NOTE — PATIENT INSTRUCTIONS
Continue victoza, 1.8 daily    Novolin 70/30:  15 units with breakfast and dinner    Check your cholesterol with your next set of labs at dialysis, can send me a copy of results,       See you back in 4 months,     Delroy Gabriel.  [address] [facility] Endocrinology  [address] [place] [address]

## 2019-04-12 LAB
ALBUMIN/CREAT UR: 897.2 MG/G CREAT (ref 0–30)
CREAT UR-MCNC: 188.6 MG/DL
INTERPRETATION: NORMAL
Lab: NORMAL
MICROALBUMIN UR-MCNC: 1692.2 UG/ML

## 2019-05-26 ENCOUNTER — APPOINTMENT (OUTPATIENT)
Dept: GENERAL RADIOLOGY | Age: 74
DRG: 871 | End: 2019-05-26
Attending: EMERGENCY MEDICINE
Payer: MEDICARE

## 2019-05-26 ENCOUNTER — HOSPITAL ENCOUNTER (INPATIENT)
Age: 74
LOS: 4 days | Discharge: HOME | DRG: 871 | End: 2019-05-30
Attending: EMERGENCY MEDICINE | Admitting: INTERNAL MEDICINE
Payer: MEDICARE

## 2019-05-26 ENCOUNTER — APPOINTMENT (OUTPATIENT)
Dept: CT IMAGING | Age: 74
DRG: 871 | End: 2019-05-26
Attending: EMERGENCY MEDICINE
Payer: MEDICARE

## 2019-05-26 DIAGNOSIS — N18.9 ACUTE RENAL FAILURE SUPERIMPOSED ON CHRONIC KIDNEY DISEASE, UNSPECIFIED CKD STAGE, UNSPECIFIED ACUTE RENAL FAILURE TYPE (HCC): ICD-10-CM

## 2019-05-26 DIAGNOSIS — A41.9 SEVERE SEPSIS (HCC): ICD-10-CM

## 2019-05-26 DIAGNOSIS — R77.8 ELEVATED TROPONIN: ICD-10-CM

## 2019-05-26 DIAGNOSIS — J18.9 PNEUMONIA OF BOTH LUNGS DUE TO INFECTIOUS ORGANISM, UNSPECIFIED PART OF LUNG: ICD-10-CM

## 2019-05-26 DIAGNOSIS — J96.01 ACUTE RESPIRATORY FAILURE WITH HYPOXIA (HCC): Primary | ICD-10-CM

## 2019-05-26 DIAGNOSIS — R65.20 SEVERE SEPSIS (HCC): ICD-10-CM

## 2019-05-26 DIAGNOSIS — N17.9 ACUTE RENAL FAILURE SUPERIMPOSED ON CHRONIC KIDNEY DISEASE, UNSPECIFIED CKD STAGE, UNSPECIFIED ACUTE RENAL FAILURE TYPE (HCC): ICD-10-CM

## 2019-05-26 LAB
ALBUMIN SERPL-MCNC: 3.7 G/DL (ref 3.5–5)
ALBUMIN/GLOB SERPL: 0.9 {RATIO} (ref 1.1–2.2)
ALP SERPL-CCNC: 67 U/L (ref 45–117)
ALT SERPL-CCNC: 25 U/L (ref 12–78)
ANION GAP SERPL CALC-SCNC: 10 MMOL/L (ref 5–15)
APPEARANCE UR: CLEAR
AST SERPL-CCNC: 22 U/L (ref 15–37)
BACTERIA URNS QL MICRO: NEGATIVE /HPF
BASOPHILS # BLD: 0 K/UL (ref 0–0.1)
BASOPHILS NFR BLD: 0 % (ref 0–1)
BILIRUB SERPL-MCNC: 0.7 MG/DL (ref 0.2–1)
BILIRUB UR QL: NEGATIVE
BNP SERPL-MCNC: 4208 PG/ML
BUN SERPL-MCNC: 29 MG/DL (ref 6–20)
BUN/CREAT SERPL: 7 (ref 12–20)
CALCIUM SERPL-MCNC: 8.5 MG/DL (ref 8.5–10.1)
CHLORIDE SERPL-SCNC: 102 MMOL/L (ref 97–108)
CK MB CFR SERPL CALC: 0.2 % (ref 0–2.5)
CK MB SERPL-MCNC: 1.8 NG/ML (ref 5–25)
CK SERPL-CCNC: 975 U/L (ref 39–308)
CO2 SERPL-SCNC: 26 MMOL/L (ref 21–32)
COLOR UR: ABNORMAL
CREAT SERPL-MCNC: 4.03 MG/DL (ref 0.7–1.3)
DIFFERENTIAL METHOD BLD: ABNORMAL
EOSINOPHIL # BLD: 0.2 K/UL (ref 0–0.4)
EOSINOPHIL NFR BLD: 2 % (ref 0–7)
EPITH CASTS URNS QL MICRO: ABNORMAL /LPF
ERYTHROCYTE [DISTWIDTH] IN BLOOD BY AUTOMATED COUNT: 15.4 % (ref 11.5–14.5)
GLOBULIN SER CALC-MCNC: 4 G/DL (ref 2–4)
GLUCOSE BLD STRIP.AUTO-MCNC: 112 MG/DL (ref 65–100)
GLUCOSE SERPL-MCNC: 174 MG/DL (ref 65–100)
GLUCOSE UR STRIP.AUTO-MCNC: NEGATIVE MG/DL
HCT VFR BLD AUTO: 36.1 % (ref 36.6–50.3)
HGB BLD-MCNC: 11.6 G/DL (ref 12.1–17)
HGB UR QL STRIP: ABNORMAL
HYALINE CASTS URNS QL MICRO: ABNORMAL /LPF (ref 0–5)
IMM GRANULOCYTES # BLD AUTO: 0 K/UL (ref 0–0.04)
IMM GRANULOCYTES NFR BLD AUTO: 0 % (ref 0–0.5)
KETONES UR QL STRIP.AUTO: NEGATIVE MG/DL
LACTATE BLD-SCNC: 1.23 MMOL/L (ref 0.4–2)
LEUKOCYTE ESTERASE UR QL STRIP.AUTO: NEGATIVE
LYMPHOCYTES # BLD: 0.6 K/UL (ref 0.8–3.5)
LYMPHOCYTES NFR BLD: 7 % (ref 12–49)
MCH RBC QN AUTO: 30.1 PG (ref 26–34)
MCHC RBC AUTO-ENTMCNC: 32.1 G/DL (ref 30–36.5)
MCV RBC AUTO: 93.5 FL (ref 80–99)
MONOCYTES # BLD: 0.7 K/UL (ref 0–1)
MONOCYTES NFR BLD: 9 % (ref 5–13)
NEUTS SEG # BLD: 6.7 K/UL (ref 1.8–8)
NEUTS SEG NFR BLD: 82 % (ref 32–75)
NITRITE UR QL STRIP.AUTO: NEGATIVE
NRBC # BLD: 0 K/UL (ref 0–0.01)
NRBC BLD-RTO: 0 PER 100 WBC
PH UR STRIP: 7 [PH] (ref 5–8)
PLATELET # BLD AUTO: 222 K/UL (ref 150–400)
PMV BLD AUTO: 11.3 FL (ref 8.9–12.9)
POTASSIUM SERPL-SCNC: 3.6 MMOL/L (ref 3.5–5.1)
PROT SERPL-MCNC: 7.7 G/DL (ref 6.4–8.2)
PROT UR STRIP-MCNC: 300 MG/DL
RBC # BLD AUTO: 3.86 M/UL (ref 4.1–5.7)
RBC #/AREA URNS HPF: ABNORMAL /HPF (ref 0–5)
RBC MORPH BLD: ABNORMAL
SERVICE CMNT-IMP: ABNORMAL
SODIUM SERPL-SCNC: 138 MMOL/L (ref 136–145)
SP GR UR REFRACTOMETRY: 1.02 (ref 1–1.03)
TROPONIN I SERPL-MCNC: 0.43 NG/ML
UA: UC IF INDICATED,UAUC: ABNORMAL
UROBILINOGEN UR QL STRIP.AUTO: 1 EU/DL (ref 0.2–1)
WBC # BLD AUTO: 8.2 K/UL (ref 4.1–11.1)
WBC URNS QL MICRO: ABNORMAL /HPF (ref 0–4)

## 2019-05-26 PROCEDURE — 71046 X-RAY EXAM CHEST 2 VIEWS: CPT

## 2019-05-26 PROCEDURE — 74011250637 HC RX REV CODE- 250/637: Performed by: EMERGENCY MEDICINE

## 2019-05-26 PROCEDURE — 96365 THER/PROPH/DIAG IV INF INIT: CPT

## 2019-05-26 PROCEDURE — 74011000250 HC RX REV CODE- 250: Performed by: EMERGENCY MEDICINE

## 2019-05-26 PROCEDURE — 74011250636 HC RX REV CODE- 250/636: Performed by: EMERGENCY MEDICINE

## 2019-05-26 PROCEDURE — 74011000258 HC RX REV CODE- 258: Performed by: INTERNAL MEDICINE

## 2019-05-26 PROCEDURE — 65660000000 HC RM CCU STEPDOWN

## 2019-05-26 PROCEDURE — 83605 ASSAY OF LACTIC ACID: CPT

## 2019-05-26 PROCEDURE — 71250 CT THORAX DX C-: CPT

## 2019-05-26 PROCEDURE — 93005 ELECTROCARDIOGRAM TRACING: CPT

## 2019-05-26 PROCEDURE — 85025 COMPLETE CBC W/AUTO DIFF WBC: CPT

## 2019-05-26 PROCEDURE — 83880 ASSAY OF NATRIURETIC PEPTIDE: CPT

## 2019-05-26 PROCEDURE — 74011250637 HC RX REV CODE- 250/637: Performed by: INTERNAL MEDICINE

## 2019-05-26 PROCEDURE — 96375 TX/PRO/DX INJ NEW DRUG ADDON: CPT

## 2019-05-26 PROCEDURE — 82550 ASSAY OF CK (CPK): CPT

## 2019-05-26 PROCEDURE — 82553 CREATINE MB FRACTION: CPT

## 2019-05-26 PROCEDURE — 94640 AIRWAY INHALATION TREATMENT: CPT

## 2019-05-26 PROCEDURE — 81001 URINALYSIS AUTO W/SCOPE: CPT

## 2019-05-26 PROCEDURE — 84484 ASSAY OF TROPONIN QUANT: CPT

## 2019-05-26 PROCEDURE — 36415 COLL VENOUS BLD VENIPUNCTURE: CPT

## 2019-05-26 PROCEDURE — 94761 N-INVAS EAR/PLS OXIMETRY MLT: CPT

## 2019-05-26 PROCEDURE — 94760 N-INVAS EAR/PLS OXIMETRY 1: CPT

## 2019-05-26 PROCEDURE — 87040 BLOOD CULTURE FOR BACTERIA: CPT

## 2019-05-26 PROCEDURE — 82962 GLUCOSE BLOOD TEST: CPT

## 2019-05-26 PROCEDURE — 77010033678 HC OXYGEN DAILY

## 2019-05-26 PROCEDURE — 77030029684 HC NEB SM VOL KT MONA -A

## 2019-05-26 PROCEDURE — 74011000250 HC RX REV CODE- 250: Performed by: INTERNAL MEDICINE

## 2019-05-26 PROCEDURE — 74011250636 HC RX REV CODE- 250/636: Performed by: INTERNAL MEDICINE

## 2019-05-26 PROCEDURE — 74011000258 HC RX REV CODE- 258: Performed by: EMERGENCY MEDICINE

## 2019-05-26 PROCEDURE — 80053 COMPREHEN METABOLIC PANEL: CPT

## 2019-05-26 PROCEDURE — 99285 EMERGENCY DEPT VISIT HI MDM: CPT

## 2019-05-26 RX ORDER — SODIUM CHLORIDE 0.9 % (FLUSH) 0.9 %
5-40 SYRINGE (ML) INJECTION AS NEEDED
Status: DISCONTINUED | OUTPATIENT
Start: 2019-05-26 | End: 2019-05-30 | Stop reason: HOSPADM

## 2019-05-26 RX ORDER — INSULIN LISPRO 100 [IU]/ML
INJECTION, SOLUTION INTRAVENOUS; SUBCUTANEOUS
Status: DISCONTINUED | OUTPATIENT
Start: 2019-05-26 | End: 2019-05-30 | Stop reason: HOSPADM

## 2019-05-26 RX ORDER — LEVOFLOXACIN 5 MG/ML
750 INJECTION, SOLUTION INTRAVENOUS ONCE
Status: COMPLETED | OUTPATIENT
Start: 2019-05-26 | End: 2019-05-27

## 2019-05-26 RX ORDER — ALBUTEROL SULFATE 0.83 MG/ML
2.5 SOLUTION RESPIRATORY (INHALATION)
Status: DISCONTINUED | OUTPATIENT
Start: 2019-05-26 | End: 2019-05-30 | Stop reason: HOSPADM

## 2019-05-26 RX ORDER — CODEINE PHOSPHATE AND GUAIFENESIN 10; 100 MG/5ML; MG/5ML
10 SOLUTION ORAL
Status: COMPLETED | OUTPATIENT
Start: 2019-05-26 | End: 2019-05-26

## 2019-05-26 RX ORDER — ONDANSETRON 2 MG/ML
4 INJECTION INTRAMUSCULAR; INTRAVENOUS
Status: COMPLETED | OUTPATIENT
Start: 2019-05-26 | End: 2019-05-26

## 2019-05-26 RX ORDER — MAGNESIUM SULFATE 100 %
4 CRYSTALS MISCELLANEOUS AS NEEDED
Status: DISCONTINUED | OUTPATIENT
Start: 2019-05-26 | End: 2019-05-30 | Stop reason: HOSPADM

## 2019-05-26 RX ORDER — BUMETANIDE 0.25 MG/ML
2 INJECTION INTRAMUSCULAR; INTRAVENOUS
Status: COMPLETED | OUTPATIENT
Start: 2019-05-26 | End: 2019-05-26

## 2019-05-26 RX ORDER — FENOFIBRATE 145 MG/1
145 TABLET, COATED ORAL EVERY EVENING
Status: DISCONTINUED | OUTPATIENT
Start: 2019-05-26 | End: 2019-05-30 | Stop reason: HOSPADM

## 2019-05-26 RX ORDER — IPRATROPIUM BROMIDE AND ALBUTEROL SULFATE 2.5; .5 MG/3ML; MG/3ML
3 SOLUTION RESPIRATORY (INHALATION)
Status: DISCONTINUED | OUTPATIENT
Start: 2019-05-26 | End: 2019-05-28

## 2019-05-26 RX ORDER — GUAIFENESIN 100 MG/5ML
81 LIQUID (ML) ORAL DAILY
Status: DISCONTINUED | OUTPATIENT
Start: 2019-05-27 | End: 2019-05-30 | Stop reason: HOSPADM

## 2019-05-26 RX ORDER — ASPIRIN 325 MG
325 TABLET ORAL ONCE
Status: COMPLETED | OUTPATIENT
Start: 2019-05-26 | End: 2019-05-26

## 2019-05-26 RX ORDER — HEPARIN SODIUM 5000 [USP'U]/ML
5000 INJECTION, SOLUTION INTRAVENOUS; SUBCUTANEOUS EVERY 8 HOURS
Status: DISCONTINUED | OUTPATIENT
Start: 2019-05-26 | End: 2019-05-30 | Stop reason: HOSPADM

## 2019-05-26 RX ORDER — TAMSULOSIN HYDROCHLORIDE 0.4 MG/1
0.4 CAPSULE ORAL 2 TIMES DAILY
Status: DISCONTINUED | OUTPATIENT
Start: 2019-05-26 | End: 2019-05-30 | Stop reason: HOSPADM

## 2019-05-26 RX ORDER — SODIUM CHLORIDE 0.9 % (FLUSH) 0.9 %
5-10 SYRINGE (ML) INJECTION AS NEEDED
Status: DISCONTINUED | OUTPATIENT
Start: 2019-05-26 | End: 2019-05-30 | Stop reason: HOSPADM

## 2019-05-26 RX ORDER — SERTRALINE HYDROCHLORIDE 50 MG/1
25 TABLET, FILM COATED ORAL DAILY
Status: DISCONTINUED | OUTPATIENT
Start: 2019-05-27 | End: 2019-05-30 | Stop reason: HOSPADM

## 2019-05-26 RX ORDER — HYDRALAZINE HYDROCHLORIDE 50 MG/1
100 TABLET, FILM COATED ORAL 3 TIMES DAILY
Status: DISCONTINUED | OUTPATIENT
Start: 2019-05-26 | End: 2019-05-30 | Stop reason: HOSPADM

## 2019-05-26 RX ORDER — ACETAMINOPHEN 325 MG/1
650 TABLET ORAL
Status: DISCONTINUED | OUTPATIENT
Start: 2019-05-26 | End: 2019-05-30 | Stop reason: HOSPADM

## 2019-05-26 RX ORDER — CODEINE PHOSPHATE AND GUAIFENESIN 10; 100 MG/5ML; MG/5ML
10 SOLUTION ORAL
Status: DISCONTINUED | OUTPATIENT
Start: 2019-05-26 | End: 2019-05-30 | Stop reason: HOSPADM

## 2019-05-26 RX ORDER — OXYCODONE AND ACETAMINOPHEN 5; 325 MG/1; MG/1
1 TABLET ORAL
Status: DISCONTINUED | OUTPATIENT
Start: 2019-05-26 | End: 2019-05-30 | Stop reason: HOSPADM

## 2019-05-26 RX ORDER — LEVOFLOXACIN 5 MG/ML
500 INJECTION, SOLUTION INTRAVENOUS
Status: DISCONTINUED | OUTPATIENT
Start: 2019-05-28 | End: 2019-05-30 | Stop reason: HOSPADM

## 2019-05-26 RX ORDER — FLUTICASONE PROPIONATE 50 MCG
2 SPRAY, SUSPENSION (ML) NASAL DAILY
Status: DISCONTINUED | OUTPATIENT
Start: 2019-05-27 | End: 2019-05-30 | Stop reason: HOSPADM

## 2019-05-26 RX ORDER — ATORVASTATIN CALCIUM 20 MG/1
20 TABLET, FILM COATED ORAL EVERY EVENING
Status: DISCONTINUED | OUTPATIENT
Start: 2019-05-26 | End: 2019-05-30 | Stop reason: HOSPADM

## 2019-05-26 RX ORDER — GUAIFENESIN 600 MG/1
600 TABLET, EXTENDED RELEASE ORAL 2 TIMES DAILY
Status: DISCONTINUED | OUTPATIENT
Start: 2019-05-26 | End: 2019-05-30 | Stop reason: HOSPADM

## 2019-05-26 RX ORDER — SODIUM CHLORIDE 0.9 % (FLUSH) 0.9 %
5-40 SYRINGE (ML) INJECTION EVERY 8 HOURS
Status: DISCONTINUED | OUTPATIENT
Start: 2019-05-26 | End: 2019-05-30 | Stop reason: HOSPADM

## 2019-05-26 RX ORDER — MORPHINE SULFATE 2 MG/ML
2 INJECTION, SOLUTION INTRAMUSCULAR; INTRAVENOUS
Status: COMPLETED | OUTPATIENT
Start: 2019-05-26 | End: 2019-05-26

## 2019-05-26 RX ORDER — CARVEDILOL 12.5 MG/1
12.5 TABLET ORAL 2 TIMES DAILY WITH MEALS
Status: DISCONTINUED | OUTPATIENT
Start: 2019-05-26 | End: 2019-05-30 | Stop reason: HOSPADM

## 2019-05-26 RX ORDER — VANCOMYCIN 2 GRAM/500 ML IN 0.9 % SODIUM CHLORIDE INTRAVENOUS
2000 ONCE
Status: COMPLETED | OUTPATIENT
Start: 2019-05-26 | End: 2019-05-27

## 2019-05-26 RX ADMIN — GUAIFENESIN AND CODEINE PHOSPHATE 10 ML: 100; 10 SOLUTION ORAL at 21:09

## 2019-05-26 RX ADMIN — AZITHROMYCIN MONOHYDRATE 500 MG: 500 INJECTION, POWDER, LYOPHILIZED, FOR SOLUTION INTRAVENOUS at 19:25

## 2019-05-26 RX ADMIN — CEFEPIME HYDROCHLORIDE 1 G: 1 INJECTION, POWDER, FOR SOLUTION INTRAMUSCULAR; INTRAVENOUS at 22:26

## 2019-05-26 RX ADMIN — CEFTRIAXONE 1 G: 1 INJECTION, POWDER, FOR SOLUTION INTRAMUSCULAR; INTRAVENOUS at 18:02

## 2019-05-26 RX ADMIN — LEVOFLOXACIN 750 MG: 5 INJECTION, SOLUTION INTRAVENOUS at 21:00

## 2019-05-26 RX ADMIN — HEPARIN SODIUM 5000 UNITS: 5000 INJECTION INTRAVENOUS; SUBCUTANEOUS at 21:09

## 2019-05-26 RX ADMIN — IPRATROPIUM BROMIDE AND ALBUTEROL SULFATE 3 ML: .5; 3 SOLUTION RESPIRATORY (INHALATION) at 20:52

## 2019-05-26 RX ADMIN — VANCOMYCIN HYDROCHLORIDE 2000 MG: 10 INJECTION, POWDER, LYOPHILIZED, FOR SOLUTION INTRAVENOUS at 23:24

## 2019-05-26 RX ADMIN — GUAIFENESIN 600 MG: 600 TABLET, EXTENDED RELEASE ORAL at 21:07

## 2019-05-26 RX ADMIN — CARVEDILOL 12.5 MG: 12.5 TABLET, FILM COATED ORAL at 21:06

## 2019-05-26 RX ADMIN — FENOFIBRATE 145 MG: 145 TABLET ORAL at 21:06

## 2019-05-26 RX ADMIN — NITROGLYCERIN 1 INCH: 20 OINTMENT TOPICAL at 18:04

## 2019-05-26 RX ADMIN — MORPHINE SULFATE 2 MG: 2 INJECTION, SOLUTION INTRAMUSCULAR; INTRAVENOUS at 18:03

## 2019-05-26 RX ADMIN — Medication 5 ML: at 21:05

## 2019-05-26 RX ADMIN — TAMSULOSIN HYDROCHLORIDE 0.4 MG: 0.4 CAPSULE ORAL at 21:06

## 2019-05-26 RX ADMIN — ASPIRIN 325 MG: 325 TABLET ORAL at 18:05

## 2019-05-26 RX ADMIN — HYDRALAZINE HYDROCHLORIDE 100 MG: 50 TABLET, FILM COATED ORAL at 21:07

## 2019-05-26 RX ADMIN — BUMETANIDE 2 MG: 0.25 INJECTION INTRAMUSCULAR; INTRAVENOUS at 18:04

## 2019-05-26 RX ADMIN — ONDANSETRON 4 MG: 2 INJECTION INTRAMUSCULAR; INTRAVENOUS at 18:04

## 2019-05-26 RX ADMIN — ATORVASTATIN CALCIUM 20 MG: 20 TABLET, FILM COATED ORAL at 21:06

## 2019-05-26 NOTE — PROGRESS NOTES
Pharmacy Automatic Renal Dosing Protocol - Antimicrobials    Indication for Antimicrobials: HAP     Current Regimen of Each Antimicrobial:  Vancomycin - pharmacy dosing (Start Date ; Day # 1)  Levofloxacin - pharmacy dosing (, day 1)  Cefepime - pharmacy dosing (, day 1)    Previous Antimicrobial Therapy:    Goal Level: VANCOMYCIN TROUGH GOAL RANGE    Vancomycin Trough: 15 - 20 mcg/mL    Date Dose & Interval Measured (mcg/mL) Extrapolated (mcg/mL)                       Date & time of next level:     Significant Cultures:    blood: pending    Radiology / Imaging results: (X-ray, CT scan or MRI): Multifocal consolidation in the right upper lobe, right middle lobe and right lower lobe most consistent with inflammatory infiltrate or pneumonia    Paralysis, amputations, malnutrition:     Labs:  Recent Labs     19  1551   CREA 4.03*   BUN 29*   WBC 8.2     Temp (24hrs), Av.9 °F (37.7 °C), Min:99.9 °F (37.7 °C), Max:99.9 °F (37.7 °C)    Creatinine Clearance (mL/min) or Dialysis: HD    Impression/Plan:   · Vancomycin 2000 mg x 1, then 750 mg after HD  · Levofloxacin 750 mg x 1, then 500 mg every 48 hours  · Cefepime 1 gm every 24 hours  · Antimicrobial stop date pending     Pharmacy will follow daily and adjust medications as appropriate for renal function and/or serum levels.     Thank you,  Kaci Marti, PHARMD

## 2019-05-26 NOTE — H&P
Hospitalist Admission Note    NAME: Julia Roca   :  1945   MRN:  [id number]     Date/Time:  2019 6:49 PM    Patient PCP: Jaiden Rueda MD  ______________________________________________________________________  Given the patient's current clinical presentation, I have a high level of concern for decompensation if discharged from the emergency department. Complex decision making was performed, which includes reviewing the patient's available past medical records, laboratory results, and x-ray films. My assessment of this patient's clinical condition and my plan of care is as follows. Assessment / Plan:  SIRS due to R-sided multilobar PNA with pleurisy, present on admission:  Treating as HCAP due to ESRD/HD  - CXR with patchy bibasilar infiltrate, greatest in the right lower lobe. Correlate for developing CHF versus acute pneumonia in the right lower lobe. . Follow-up as clinically appropriate.  - CT chest with multifocal consolidation in the right upper lobe, right middle lobe and right lower lobe most consistent with inflammatory infiltrate or pneumonia. - blood cultures sent, will follow. Pt says cough nonproductive so not able to provide sputum sample. - emperic vanco, cefepime and levaquin for now  - start mucinex. Robitussin AC prn for pleuritic chest pain. 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 Last echo 10/16 with EF 25%. There was diffuse hypokinesis. Wall thickness was mildly increased. Hypertrophy was noted. Mild concentric hypertrophy.    - serial trop, current 0.43  - con't home ASA, coreg, hydralazine  - will hold bumex today with poor po intake, restart tomorrow if po improves  - con't lipitor, tricor  - prn nitrobid  Insulin dependent DM2 controlled with nephropathy:  - holding home victoza  - on 70/30 insulin at home, have ordered NPH  - lispro sliding scale  ESRD, on HD TTS: sees Dr. Jean Burnett  - nephrology consulted  BPH: con't flomax    Code Status: Full   Surrogate Decision Maker: wife  DVT Prophylaxis: heparin    Baseline: independent      Subjective:   CHIEF COMPLAINT:  Cough, chest pain, SOB    HISTORY OF PRESENT ILLNESS:     Esthela Muñoz is a 72 y.o.  male who presents with above. Pt in his usual state of health until about 3 days ago. He says that he walks daily and has been doing well with his glucose control. He was started on dialysis in October and has been doing well with this per his report. He has been having a severe nonproductive cough the last few days. He has developed R sided chest pain with coughing and movement. He also feels SOB. He endorses fever at home to 101.8. He denies dizziness. He has not eaten today. He denies stool changes. No focal weakness. No new edema. We were asked to admit for work up and evaluation of the above problems.      Past Medical History:   Diagnosis Date    Arthritis     spine    CAD (coronary artery disease)     high cholesterol    Chronic kidney disease     Diabetes (HCC)     GERD (gastroesophageal reflux disease)     HX    Hypertension     Ill-defined condition     irritable bowel syndrome    Systolic CHF ([facility] [address]     Unspecified sleep apnea     NO CPAP        Past Surgical History:   Procedure Laterality Date    COLONOSCOPY N/A 11/9/2016    COLONOSCOPY performed by Es De lToro MD at \[facility]\"" ENDOSCOPY    HX ORTHOPAEDIC      CERVICAL FUSION    HX UROLOGICAL      TURP    HX VASCULAR ACCESS      L arm dialysis access       Social History     Tobacco Use    Smoking status: Former Smoker     Last attempt to quit: 2009     Years since quitting: 10.4    Smokeless tobacco: Former User    Tobacco comment: cigars only   Substance Use Topics    Alcohol use: No        Family History Problem Relation Age of Onset    Cancer Father         \"bone\"    Diabetes Brother      Allergies   Allergen Reactions    Albumin, Human 25 % Anaphylaxis and Hives    Niacin Hives        Prior to Admission medications    Medication Sig Start Date End Date Taking? Authorizing Provider   VICTOZA 2-NELLI 0.6 mg/0.1 mL (18 mg/3 mL) pnij INJECT 1.8- MG SUBCUTANEOUSLY ONCE DAILY, TITRATE UP TO 1.8 DAILY AS DIRECTED 5/15/19   Mukesh Dubon MD   sertraline (ZOLOFT) 50 mg tablet TAKE 1 T PO QD 2/25/19   Provider, Historical   aspirin 81 mg chewable tablet Take 81 mg by mouth daily. Provider, Historical   ciprofloxacin HCl (CIPRO) 500 mg tablet Take 500 mg by mouth two (2) times a day. Other, MD Mignon   sertraline (ZOLOFT) 25 mg tablet TK 1 T PO QD 7/25/18   Provider, Historical   carvedilol (COREG) 12.5 mg tablet  8/13/18   Provider, Historical   Insulin Needles, Disposable, (REYNALDO PEN NEEDLE) 32 gauge x 5/32\" ndle For use each AM with Victoza Pen 2/27/18   Mukesh Dubon MD   Insulin Syringe-Needle U-100 1 mL 30 gauge x 5/16 syrg Use twice daily for insulin injection 2/16/18   Mukesh Dubon MD   insulin NPH/insulin regular (NOVOLIN 70/30 U-100 INSULIN) 100 unit/mL (70-30) injection 60 Units by SubCUTAneous route Before breakfast and dinner. Patient taking differently: 40 Units by SubCUTAneous route Before breakfast and dinner. Uses if blood sugar is over 200 2/15/18   Mukesh Dubon MD   hydrALAZINE (APRESOLINE) 100 mg tablet 100 mg three (3) times daily. 8/14/17   Provider, Historical   fenofibrate nanocrystallized (TRICOR) 145 mg tablet Take  by mouth every evening. Provider, Historical   bumetanide (BUMEX) 2 mg tablet Take 2 mg by mouth every evening. Provider, Historical   atorvastatin (LIPITOR) 20 mg tablet Take 20 mg by mouth every evening.     Provider, Historical   Calcium-Cholecalciferol, D3, (CALCIUM 600 WITH VITAMIN D3) 600 mg(1,500mg) -400 unit chew Take 1 Tab by mouth every evening. Provider, Historical   fluticasone (FLONASE) 50 mcg/actuation nasal spray 2 Sprays by Both Nostrils route daily. Provider, Historical   tamsulosin (FLOMAX) 0.4 mg capsule Take 0.4 mg by mouth two (2) times a day. Other, MD Mignon       REVIEW OF SYSTEMS:     I am not able to complete the review of systems because:    The patient is intubated and sedated    The patient has altered mental status due to his acute medical problems    The patient has baseline aphasia from prior stroke(s)    The patient has baseline dementia and is not reliable historian    The patient is in acute medical distress and unable to provide information           Total of 12 systems reviewed as follows:       POSITIVE= underlined text  Negative = text not underlined  General:  fever, chills, sweats, generalized weakness, weight loss/gain,      loss of appetite   Eyes:    blurred vision, eye pain, loss of vision, double vision  ENT:    rhinorrhea, pharyngitis   Respiratory:   cough, sputum production, SOB, BRITT, wheezing, pleuritic pain   Cardiology:   chest pain, palpitations, orthopnea, PND, edema, syncope   Gastrointestinal:  abdominal pain , N/V, diarrhea, dysphagia, constipation, bleeding   Genitourinary:  frequency, urgency, dysuria, hematuria, incontinence   Muskuloskeletal :  arthralgia, myalgia, back pain  Hematology:  easy bruising, nose or gum bleeding, lymphadenopathy   Dermatological: rash, ulceration, pruritis, color change / jaundice  Endocrine:   hot flashes or polydipsia   Neurological:  headache, dizziness, confusion, focal weakness, paresthesia,     Speech difficulties, memory loss, gait difficulty  Psychological: Feelings of anxiety, depression, agitation    Objective:   VITALS:    Visit Vitals  /74 (BP 1 Location: Left arm, BP Patient Position: Sitting)   Pulse 98   Temp 99.9 °F (37.7 °C)   Resp 28   Wt 110.6 kg (243 lb 13.3 oz)   SpO2 97%   BMI 33.07 kg/m²       PHYSICAL EXAM:    General:    Obese, alert, cooperative, no distress, appears stated age. HEENT: Atraumatic, anicteric sclerae, pink conjunctivae     No oral ulcers, mucosa moist, throat clear, dentition fair  Neck:  Supple, symmetrical,  thyroid: non tender  Lungs:   Clear to auscultation bilaterally. Poor air movement. No Wheezing or Rhonchi. No rales. Chest wall:  No tenderness  No accessory muscle use. Heart:   Regular  rhythm,  No murmur   No edema  Abdomen:   Soft, non-tender. Moderately distended. Bowel sounds normal  Extremities: No cyanosis. No clubbing,      Skin turgor normal, Capillary refill normal, Radial dial pulse 2+  Skin:     Not pale. Not Jaundiced  No rashes   Psych:  Good insight. Not depressed. Not anxious or agitated. Neurologic: EOMs intact. No facial asymmetry. No aphasia or slurred speech. Symmetrical strength, Sensation grossly intact. Alert and oriented X 4.     _______________________________________________________________________  Care Plan discussed with:    Comments   Patient x    Family      RN x    Care Manager                    Consultant:      _______________________________________________________________________  Expected  Disposition:   Home with Family x   HH/PT/OT/RN x   SNF/LTC    [facility]    ________________________________________________________________________  TOTAL TIME:  48 Minutes    Critical Care Provided     Minutes non procedure based      Comments    x Reviewed previous records   >50% of visit spent in counseling and coordination of care x Discussion with patient and/or family and questions answered       ________________________________________________________________________  Signed: Delmi Neely MD    Procedures: see electronic medical records for all procedures/Xrays and details which were not copied into this note but were reviewed prior to creation of Plan.     LAB DATA REVIEWED:    Recent Results (from the past 24 hour(s))   EKG, 12 LEAD, INITIAL    Collection Time: 05/26/19  3:41 PM Result Value Ref Range    Ventricular Rate 96 BPM    Atrial Rate 96 BPM    P-R Interval 168 ms    QRS Duration 130 ms    Q-T Interval 382 ms    QTC Calculation (Bezet) 482 ms    Calculated P Axis 18 degrees    Calculated R Axis -59 degrees    Calculated T Axis 84 degrees    Diagnosis       Normal sinus rhythm  Left atrial enlargement  Left axis deviation  Right bundle branch block  Left ventricular hypertrophy with repolarization abnormality  Cannot rule out Septal infarct (cited on or before 26-MAY-2019)  When compared with ECG of 11-MAY-2018 11:37,  Right bundle branch block is now present     CBC WITH AUTOMATED DIFF    Collection Time: 05/26/19  3:51 PM   Result Value Ref Range    WBC 8.2 4.1 - 11.1 K/uL    RBC 3.86 (L) 4.10 - 5.70 M/uL    HGB 11.6 (L) 12.1 - 17.0 g/dL    HCT 36.1 (L) 36.6 - 50.3 %    MCV 93.5 80.0 - 99.0 FL    MCH 30.1 26.0 - 34.0 PG    MCHC 32.1 30.0 - 36.5 g/dL    RDW 15.4 (H) 11.5 - 14.5 %    PLATELET 319 171 - 123 K/uL    MPV 11.3 8.9 - 12.9 FL    NRBC 0.0 0  WBC    ABSOLUTE NRBC 0.00 0.00 - 0.01 K/uL    NEUTROPHILS 82 (H) 32 - 75 %    LYMPHOCYTES 7 (L) 12 - 49 %    MONOCYTES 9 5 - 13 %    EOSINOPHILS 2 0 - 7 %    BASOPHILS 0 0 - 1 %    IMMATURE GRANULOCYTES 0 0.0 - 0.5 %    ABS. NEUTROPHILS 6.7 1.8 - 8.0 K/UL    ABS. LYMPHOCYTES 0.6 (L) 0.8 - 3.5 K/UL    ABS. MONOCYTES 0.7 0.0 - 1.0 K/UL    ABS. EOSINOPHILS 0.2 0.0 - 0.4 K/UL    ABS. BASOPHILS 0.0 0.0 - 0.1 K/UL    ABS. IMM.  GRANS. 0.0 0.00 - 0.04 K/UL    DF SMEAR SCANNED      RBC COMMENTS NORMOCYTIC, NORMOCHROMIC     METABOLIC PANEL, COMPREHENSIVE    Collection Time: 05/26/19  3:51 PM   Result Value Ref Range    Sodium 138 136 - 145 mmol/L    Potassium 3.6 3.5 - 5.1 mmol/L    Chloride 102 97 - 108 mmol/L    CO2 26 21 - 32 mmol/L    Anion gap 10 5 - 15 mmol/L    Glucose 174 (H) 65 - 100 mg/dL    BUN 29 (H) 6 - 20 MG/DL    Creatinine 4.03 (H) 0.70 - 1.30 MG/DL    BUN/Creatinine ratio 7 (L) 12 - 20      GFR est AA 18 (L) >60 ml/min/1.73m2    GFR est non-AA 15 (L) >60 ml/min/1.73m2    Calcium 8.5 8.5 - 10.1 MG/DL    Bilirubin, total 0.7 0.2 - 1.0 MG/DL    ALT (SGPT) 25 12 - 78 U/L    AST (SGOT) 22 15 - 37 U/L    Alk. phosphatase 67 45 - 117 U/L    Protein, total 7.7 6.4 - 8.2 g/dL    Albumin 3.7 3.5 - 5.0 g/dL    Globulin 4.0 2.0 - 4.0 g/dL    A-G Ratio 0.9 (L) 1.1 - 2.2     CK W/ REFLX CKMB    Collection Time: 05/26/19  3:51 PM   Result Value Ref Range     (H) 39 - 308 U/L   TROPONIN I    Collection Time: 05/26/19  3:51 PM   Result Value Ref Range    Troponin-I, Qt. 0.43 (H) <0.05 ng/mL   NT-PRO BNP    Collection Time: 05/26/19  3:51 PM   Result Value Ref Range    NT pro-BNP 4,208 (H) <125 PG/ML   CK-MB,QUANT.     Collection Time: 05/26/19  3:51 PM   Result Value Ref Range    CK - MB 1.8 <3.6 NG/ML    CK-MB Index 0.2 0.0 - 2.5     POC LACTIC ACID    Collection Time: 05/26/19  4:30 PM   Result Value Ref Range    Lactic Acid (POC) 1.23 0.40 - 2.00 mmol/L

## 2019-05-26 NOTE — ED NOTES
TRANSFER - OUT REPORT:    Verbal report given to [address] [facility]  being transferred to [facility] for routine progression of care       Report consisted of patients Situation, Background, Assessment and   Recommendations(SBAR). Information from the following report(s) SBAR, ED Summary, [facility] and Recent Results was reviewed with the receiving nurse. Opportunity for questions and clarification was provided.       Patient transported with:   [employer]

## 2019-05-27 LAB
ANION GAP SERPL CALC-SCNC: 5 MMOL/L (ref 5–15)
BUN SERPL-MCNC: 32 MG/DL (ref 6–20)
BUN/CREAT SERPL: 7 (ref 12–20)
CALCIUM SERPL-MCNC: 8.3 MG/DL (ref 8.5–10.1)
CHLORIDE SERPL-SCNC: 105 MMOL/L (ref 97–108)
CO2 SERPL-SCNC: 26 MMOL/L (ref 21–32)
CREAT SERPL-MCNC: 4.32 MG/DL (ref 0.7–1.3)
ERYTHROCYTE [DISTWIDTH] IN BLOOD BY AUTOMATED COUNT: 15.3 % (ref 11.5–14.5)
GLUCOSE BLD STRIP.AUTO-MCNC: 124 MG/DL (ref 65–100)
GLUCOSE BLD STRIP.AUTO-MCNC: 167 MG/DL (ref 65–100)
GLUCOSE BLD STRIP.AUTO-MCNC: 181 MG/DL (ref 65–100)
GLUCOSE BLD STRIP.AUTO-MCNC: 196 MG/DL (ref 65–100)
GLUCOSE SERPL-MCNC: 144 MG/DL (ref 65–100)
HCT VFR BLD AUTO: 34.8 % (ref 36.6–50.3)
HGB BLD-MCNC: 11.1 G/DL (ref 12.1–17)
MCH RBC QN AUTO: 30.6 PG (ref 26–34)
MCHC RBC AUTO-ENTMCNC: 31.9 G/DL (ref 30–36.5)
MCV RBC AUTO: 95.9 FL (ref 80–99)
NRBC # BLD: 0 K/UL (ref 0–0.01)
NRBC BLD-RTO: 0 PER 100 WBC
PLATELET # BLD AUTO: 217 K/UL (ref 150–400)
PMV BLD AUTO: 11.6 FL (ref 8.9–12.9)
POTASSIUM SERPL-SCNC: 3.9 MMOL/L (ref 3.5–5.1)
RBC # BLD AUTO: 3.63 M/UL (ref 4.1–5.7)
SERVICE CMNT-IMP: ABNORMAL
SODIUM SERPL-SCNC: 136 MMOL/L (ref 136–145)
TROPONIN I SERPL-MCNC: 0.51 NG/ML
TROPONIN I SERPL-MCNC: 0.51 NG/ML
WBC # BLD AUTO: 7.9 K/UL (ref 4.1–11.1)

## 2019-05-27 PROCEDURE — 94640 AIRWAY INHALATION TREATMENT: CPT

## 2019-05-27 PROCEDURE — 87070 CULTURE OTHR SPECIMN AEROBIC: CPT

## 2019-05-27 PROCEDURE — 85027 COMPLETE CBC AUTOMATED: CPT

## 2019-05-27 PROCEDURE — 74011250636 HC RX REV CODE- 250/636: Performed by: INTERNAL MEDICINE

## 2019-05-27 PROCEDURE — 74011636637 HC RX REV CODE- 636/637: Performed by: INTERNAL MEDICINE

## 2019-05-27 PROCEDURE — 77010033678 HC OXYGEN DAILY

## 2019-05-27 PROCEDURE — 84484 ASSAY OF TROPONIN QUANT: CPT

## 2019-05-27 PROCEDURE — 36415 COLL VENOUS BLD VENIPUNCTURE: CPT

## 2019-05-27 PROCEDURE — 82962 GLUCOSE BLOOD TEST: CPT

## 2019-05-27 PROCEDURE — 65660000000 HC RM CCU STEPDOWN

## 2019-05-27 PROCEDURE — 74011000250 HC RX REV CODE- 250: Performed by: INTERNAL MEDICINE

## 2019-05-27 PROCEDURE — 80048 BASIC METABOLIC PNL TOTAL CA: CPT

## 2019-05-27 PROCEDURE — 74011250637 HC RX REV CODE- 250/637: Performed by: INTERNAL MEDICINE

## 2019-05-27 PROCEDURE — 74011000258 HC RX REV CODE- 258: Performed by: INTERNAL MEDICINE

## 2019-05-27 PROCEDURE — 94760 N-INVAS EAR/PLS OXIMETRY 1: CPT

## 2019-05-27 RX ORDER — FUROSEMIDE 10 MG/ML
40 INJECTION INTRAMUSCULAR; INTRAVENOUS ONCE
Status: COMPLETED | OUTPATIENT
Start: 2019-05-27 | End: 2019-05-27

## 2019-05-27 RX ADMIN — ATORVASTATIN CALCIUM 20 MG: 20 TABLET, FILM COATED ORAL at 17:23

## 2019-05-27 RX ADMIN — IPRATROPIUM BROMIDE AND ALBUTEROL SULFATE 3 ML: .5; 3 SOLUTION RESPIRATORY (INHALATION) at 13:21

## 2019-05-27 RX ADMIN — OXYCODONE AND ACETAMINOPHEN 1 TABLET: 5; 325 TABLET ORAL at 21:43

## 2019-05-27 RX ADMIN — Medication 10 ML: at 21:35

## 2019-05-27 RX ADMIN — HEPARIN SODIUM 5000 UNITS: 5000 INJECTION INTRAVENOUS; SUBCUTANEOUS at 13:35

## 2019-05-27 RX ADMIN — FUROSEMIDE 40 MG: 10 INJECTION, SOLUTION INTRAMUSCULAR; INTRAVENOUS at 17:18

## 2019-05-27 RX ADMIN — Medication 5 ML: at 05:44

## 2019-05-27 RX ADMIN — CARVEDILOL 12.5 MG: 12.5 TABLET, FILM COATED ORAL at 17:23

## 2019-05-27 RX ADMIN — HEPARIN SODIUM 5000 UNITS: 5000 INJECTION INTRAVENOUS; SUBCUTANEOUS at 05:44

## 2019-05-27 RX ADMIN — FENOFIBRATE 145 MG: 145 TABLET ORAL at 17:23

## 2019-05-27 RX ADMIN — HYDRALAZINE HYDROCHLORIDE 100 MG: 50 TABLET, FILM COATED ORAL at 21:34

## 2019-05-27 RX ADMIN — METHYLPREDNISOLONE SODIUM SUCCINATE 40 MG: 40 INJECTION, POWDER, FOR SOLUTION INTRAMUSCULAR; INTRAVENOUS at 21:34

## 2019-05-27 RX ADMIN — OXYCODONE AND ACETAMINOPHEN 1 TABLET: 5; 325 TABLET ORAL at 07:39

## 2019-05-27 RX ADMIN — GUAIFENESIN 600 MG: 600 TABLET, EXTENDED RELEASE ORAL at 17:24

## 2019-05-27 RX ADMIN — INSULIN LISPRO 2 UNITS: 100 INJECTION, SOLUTION INTRAVENOUS; SUBCUTANEOUS at 11:29

## 2019-05-27 RX ADMIN — FLUTICASONE PROPIONATE 2 SPRAY: 50 SPRAY, METERED NASAL at 10:14

## 2019-05-27 RX ADMIN — ALBUTEROL SULFATE 2.5 MG: 2.5 SOLUTION RESPIRATORY (INHALATION) at 16:47

## 2019-05-27 RX ADMIN — METHYLPREDNISOLONE SODIUM SUCCINATE 40 MG: 40 INJECTION, POWDER, FOR SOLUTION INTRAMUSCULAR; INTRAVENOUS at 17:16

## 2019-05-27 RX ADMIN — ASPIRIN 81 MG 81 MG: 81 TABLET ORAL at 08:04

## 2019-05-27 RX ADMIN — IPRATROPIUM BROMIDE AND ALBUTEROL SULFATE 3 ML: .5; 3 SOLUTION RESPIRATORY (INHALATION) at 07:53

## 2019-05-27 RX ADMIN — HUMAN INSULIN 10 UNITS: 100 INJECTION, SUSPENSION SUBCUTANEOUS at 07:39

## 2019-05-27 RX ADMIN — CEFEPIME HYDROCHLORIDE 1 G: 1 INJECTION, POWDER, FOR SOLUTION INTRAMUSCULAR; INTRAVENOUS at 20:00

## 2019-05-27 RX ADMIN — INSULIN LISPRO 2 UNITS: 100 INJECTION, SOLUTION INTRAVENOUS; SUBCUTANEOUS at 08:02

## 2019-05-27 RX ADMIN — ACETAMINOPHEN 650 MG: 325 TABLET ORAL at 20:00

## 2019-05-27 RX ADMIN — HEPARIN SODIUM 5000 UNITS: 5000 INJECTION INTRAVENOUS; SUBCUTANEOUS at 21:35

## 2019-05-27 RX ADMIN — IPRATROPIUM BROMIDE AND ALBUTEROL SULFATE 3 ML: .5; 3 SOLUTION RESPIRATORY (INHALATION) at 02:29

## 2019-05-27 RX ADMIN — HUMAN INSULIN 10 UNITS: 100 INJECTION, SUSPENSION SUBCUTANEOUS at 17:21

## 2019-05-27 RX ADMIN — HYDRALAZINE HYDROCHLORIDE 100 MG: 50 TABLET, FILM COATED ORAL at 08:03

## 2019-05-27 RX ADMIN — CARVEDILOL 12.5 MG: 12.5 TABLET, FILM COATED ORAL at 07:39

## 2019-05-27 RX ADMIN — GUAIFENESIN 600 MG: 600 TABLET, EXTENDED RELEASE ORAL at 08:04

## 2019-05-27 RX ADMIN — Medication 10 ML: at 13:35

## 2019-05-27 RX ADMIN — TAMSULOSIN HYDROCHLORIDE 0.4 MG: 0.4 CAPSULE ORAL at 08:04

## 2019-05-27 RX ADMIN — SERTRALINE HYDROCHLORIDE 25 MG: 50 TABLET ORAL at 08:03

## 2019-05-27 RX ADMIN — HYDRALAZINE HYDROCHLORIDE 100 MG: 50 TABLET, FILM COATED ORAL at 15:24

## 2019-05-27 RX ADMIN — IPRATROPIUM BROMIDE AND ALBUTEROL SULFATE 3 ML: .5; 3 SOLUTION RESPIRATORY (INHALATION) at 20:39

## 2019-05-27 RX ADMIN — TAMSULOSIN HYDROCHLORIDE 0.4 MG: 0.4 CAPSULE ORAL at 17:23

## 2019-05-27 NOTE — PROGRESS NOTES
ADULT PROTOCOL: JET AEROSOL  REASSESSMENT    Patient  Jey Maldonado     72 y.o.   male     5/27/2019  10:11 AM    Breath Sounds Pre Procedure: Right Breath Sounds: Coarse, Expiratory wheezing                               Left Breath Sounds: Coarse, Expiratory wheezing    Breath Sounds Post Procedure: Right Breath Sounds: Coarse, Expiratory wheezing                                 Left Breath Sounds: Coarse, Expiratory wheezing    Breathing pattern: Pre procedure Breathing Pattern: Regular          Post procedure Breathing Pattern: Regular    Heart Rate: Pre procedure Pulse: 84           Post procedure Pulse: 84    Resp Rate: Pre procedure Respirations: 20           Post procedure Respirations: 20            Cough: Pre procedure Cough: Congested, Non-productive               Post procedure Cough: Congested, Non-productive      Oxygen: O2 Device: Nasal cannula   Flow rate (L/min) 2 lpm     Changed: NO    SpO2: Pre procedure SpO2: 96 %   with oxygen              Post procedure SpO2: 93 %  with oxygen    Nebulizer Therapy: Current medications Aerosolized Medications: DuoNeb      Changed: NO        Problem List:   Patient Active Problem List   Diagnosis Code    Acute pancreatitis K85.90    LFT'S ABNORMAL     Acute renal failure (ARF) ([facility]) N17.9    CKD (chronic kidney disease) stage 3, GFR 30-59 ml/min ([facility]) N18.3    HTN (hypertension) I10    CAD (coronary artery disease) I25.10    Abdominal pain, acute, epigastric R10.13    Nausea & vomiting R11.2    UTI (lower urinary tract infection) N39.0    Renal failure (ARF), acute on chronic ([facility]) N17.9, N18.9    Diarrhea R19.7    SIRS (systemic inflammatory response syndrome) ([facility]) R65.10    Nephrotic syndrome N04.9    Renal anasarca N04.9    Acute on chronic renal failure ([facility]) N17.9, N18.9    Hypotension I95.9    Chest pain R07.9    Type II diabetes mellitus with nephropathy ([facility]) E11.21    Hyperlipidemia E78.5    Pneumonia J18.9       Respiratory Therapist: Helen Meyer, RT

## 2019-05-27 NOTE — PROGRESS NOTES
Primary Nurse Alyson Schirmer, RN and Vero Bains RN performed a dual skin assessment on this patient No impairment noted  Kelby score is 21

## 2019-05-27 NOTE — ROUTINE PROCESS
Bedside and Verbal shift change report given to Colin Gresham RN (oncoming nurse) by David Elias RN (offgoing nurse). Report included the following information SBAR, Kardex, Intake/Output, MAR and Recent Results.

## 2019-05-27 NOTE — ED PROVIDER NOTES
EMERGENCY DEPARTMENT HISTORY AND PHYSICAL EXAM      Date: 5/26/2019  Patient Name: Leyla Vega    History of Presenting Illness     Chief Complaint   Patient presents with    Rib Pain     The patient presents to the ED with complaints of right rib pain, states that he has been coughing and short of breath for the past three days.  Shortness of Breath    Cough       History Provided By: Patient    HPI: Leyla Vega, 72 y.o. male with PMHx significant for diabetes, hypertension, CAD, CKD, GERD, IBS, arthritis, CHF, presents by POV to the ED with cc of Increased shortness of breath with cough and right-sided chest wall pain. Patient states over the last 3 days he has had a significant increase in his work of breathing. Patient states that he has had rib discomfort associated with inspiration in addition to his cough. Patient states pain here in the emergency department rated at a 10 out of 10 described as sharp in nature with no alleviating factors. Patient denies any pain or chest discomfort substernal or on the left-hand portion of his chest wall. Patient denies any fever, patient denies any abdominal pain, nausea vomiting diarrhea, patient denies any recent pain or swelling in. Patient denies any recent long travel or periods of immobility. There are no other complaints, changes, or physical findings at this time. PCP: Cierra Giles MD    No current facility-administered medications on file prior to encounter. Current Outpatient Medications on File Prior to Encounter   Medication Sig Dispense Refill    VICTOZA 2-NELLI 0.6 mg/0.1 mL (18 mg/3 mL) pnij INJECT 1.8- MG SUBCUTANEOUSLY ONCE DAILY, TITRATE UP TO 1.8 DAILY AS DIRECTED 9 Pen 3    sertraline (ZOLOFT) 50 mg tablet TAKE 1 T PO QD  0    aspirin 81 mg chewable tablet Take 81 mg by mouth daily.  ciprofloxacin HCl (CIPRO) 500 mg tablet Take 500 mg by mouth two (2) times a day.       sertraline (ZOLOFT) 25 mg tablet TK 1 T PO QD      carvedilol (COREG) 12.5 mg tablet       Insulin Needles, Disposable, (REYNALDO PEN NEEDLE) 32 gauge x 5/32\" ndle For use each AM with Victoza Pen 100 Pen Needle 3    Insulin Syringe-Needle U-100 1 mL 30 gauge x 5/16 syrg Use twice daily for insulin injection 100 Syringe 7    insulin NPH/insulin regular (NOVOLIN 70/30 U-100 INSULIN) 100 unit/mL (70-30) injection 60 Units by SubCUTAneous route Before breakfast and dinner. (Patient taking differently: 40 Units by SubCUTAneous route Before breakfast and dinner. Uses if blood sugar is over 200) 10 Vial 3    hydrALAZINE (APRESOLINE) 100 mg tablet 100 mg three (3) times daily.  fenofibrate nanocrystallized (TRICOR) 145 mg tablet Take  by mouth every evening.  bumetanide (BUMEX) 2 mg tablet Take 2 mg by mouth every evening.  atorvastatin (LIPITOR) 20 mg tablet Take 20 mg by mouth every evening.  Calcium-Cholecalciferol, D3, (CALCIUM 600 WITH VITAMIN D3) 600 mg(1,500mg) -400 unit chew Take 1 Tab by mouth every evening.  fluticasone (FLONASE) 50 mcg/actuation nasal spray 2 Sprays by Both Nostrils route daily.  tamsulosin (FLOMAX) 0.4 mg capsule Take 0.4 mg by mouth two (2) times a day.          Past History     Past Medical History:  Past Medical History:   Diagnosis Date    Arthritis     spine    CAD (coronary artery disease)     high cholesterol    Chronic kidney disease     Diabetes ([address]     GERD (gastroesophageal reflux disease)     HX    Hypertension     Ill-defined condition     irritable bowel syndrome    Systolic CHF (HCC)     Unspecified sleep apnea     NO CPAP       Past Surgical History:  Past Surgical History:   Procedure Laterality Date    COLONOSCOPY N/A 11/9/2016    COLONOSCOPY performed [facility]    HX ORTHOPAEDIC      CERVICAL FUSION    HX UROLOGICAL      TURP    HX VASCULAR ACCESS      L arm dialysis access       Family History:  Family History   Problem Relation Age of Onset    Cancer Father         \"bone\"    Diabetes Brother        Social History:  Social History     Tobacco Use    Smoking status: Former Smoker     Last attempt to quit: 2009     Years since quitting: 10.4    Smokeless tobacco: Former User    Tobacco comment: cigars only   Substance Use Topics    Alcohol use: No    Drug use: No       Allergies: Allergies   Allergen Reactions    Albumin, Human 25 % Anaphylaxis and Hives    Niacin Hives         Review of Systems   Review of Systems   Constitutional: Positive for fatigue. Negative for appetite change, chills and fever. HENT: Negative. Negative for congestion, rhinorrhea, sinus pressure and sore throat. Eyes: Negative. Respiratory: Positive for cough and shortness of breath. Negative for choking, chest tightness and wheezing. Cardiovascular: Positive for chest pain (Right sided- pleuritic in nature). Negative for palpitations and leg swelling. Gastrointestinal: Negative for abdominal pain, constipation, diarrhea, nausea and vomiting. Endocrine: Negative. Genitourinary: Negative. Negative for difficulty urinating, dysuria, flank pain and urgency. Musculoskeletal: Negative. Skin: Negative. Neurological: Negative. Negative for dizziness, speech difficulty, weakness, light-headedness, numbness and headaches. Psychiatric/Behavioral: Negative. All other systems reviewed and are negative. Physical Exam   Physical Exam   Constitutional: He is oriented to person, place, and time. He appears well-developed and well-nourished. He appears distressed (Appears ill). HENT:   Head: Normocephalic and atraumatic. Mouth/Throat: Oropharynx is clear and moist. No oropharyngeal exudate. Eyes: Pupils are equal, round, and reactive to light. Conjunctivae and EOM are normal.   Neck: Normal range of motion. Neck supple. No JVD present. No tracheal deviation present.    Cardiovascular: Normal rate, regular rhythm, normal heart sounds and intact distal pulses. No murmur heard. Pulmonary/Chest: No stridor. He is in respiratory distress. He has no wheezes. He has rales. He exhibits no tenderness. Diminished throughout with coarse breath sounds, increase work of breathing with tachypnea and accessory muscle use    Abdominal: Soft. He exhibits no distension. There is no tenderness. There is no rebound and no guarding. Musculoskeletal: Normal range of motion. He exhibits no edema or tenderness. Neurological: He is alert and oriented to person, place, and time. No cranial nerve deficit. No gross motor or sensory deficits    Skin: Skin is warm and dry. He is not diaphoretic. Psychiatric: He has a normal mood and affect. His behavior is normal.   Nursing note and vitals reviewed.       Diagnostic Study Results     Labs -     Recent Results (from the past 12 hour(s))   EKG, 12 LEAD, INITIAL    Collection Time: 05/26/19  3:41 PM   Result Value Ref Range    Ventricular Rate 96 BPM    Atrial Rate 96 BPM    P-R Interval 168 ms    QRS Duration 130 ms    Q-T Interval 382 ms    QTC Calculation (Bezet) 482 ms    Calculated P Axis 18 degrees    Calculated R Axis -59 degrees    Calculated T Axis 84 degrees    Diagnosis       Normal sinus rhythm  Left atrial enlargement  Left axis deviation  Right bundle branch block  Left ventricular hypertrophy with repolarization abnormality  Cannot rule out Septal infarct (cited on or before 26-MAY-2019)  When compared with ECG of 11-MAY-2018 11:37,  Right bundle branch block is now present     CBC WITH AUTOMATED DIFF    Collection Time: 05/26/19  3:51 PM   Result Value Ref Range    WBC 8.2 4.1 - 11.1 K/uL    RBC 3.86 (L) 4.10 - 5.70 M/uL    HGB 11.6 (L) 12.1 - 17.0 g/dL    HCT 36.1 (L) 36.6 - 50.3 %    MCV 93.5 80.0 - 99.0 FL    MCH 30.1 26.0 - 34.0 PG    MCHC 32.1 30.0 - 36.5 g/dL    RDW 15.4 (H) 11.5 - 14.5 %    PLATELET 617 862 - 061 K/uL    MPV 11.3 8.9 - 12.9 FL    NRBC 0.0 0  WBC    ABSOLUTE NRBC 0.00 0.00 - 0.01 K/uL NEUTROPHILS 82 (H) 32 - 75 %    LYMPHOCYTES 7 (L) 12 - 49 %    MONOCYTES 9 5 - 13 %    EOSINOPHILS 2 0 - 7 %    BASOPHILS 0 0 - 1 %    IMMATURE GRANULOCYTES 0 0.0 - 0.5 %    ABS. NEUTROPHILS 6.7 1.8 - 8.0 K/UL    ABS. LYMPHOCYTES 0.6 (L) 0.8 - 3.5 K/UL    ABS. MONOCYTES 0.7 0.0 - 1.0 K/UL    ABS. EOSINOPHILS 0.2 0.0 - 0.4 K/UL    ABS. BASOPHILS 0.0 0.0 - 0.1 K/UL    ABS. IMM. GRANS. 0.0 0.00 - 0.04 K/UL    DF SMEAR SCANNED      RBC COMMENTS NORMOCYTIC, NORMOCHROMIC     METABOLIC PANEL, COMPREHENSIVE    Collection Time: 05/26/19  3:51 PM   Result Value Ref Range    Sodium 138 136 - 145 mmol/L    Potassium 3.6 3.5 - 5.1 mmol/L    Chloride 102 97 - 108 mmol/L    CO2 26 21 - 32 mmol/L    Anion gap 10 5 - 15 mmol/L    Glucose 174 (H) 65 - 100 mg/dL    BUN 29 (H) 6 - 20 MG/DL    Creatinine 4.03 (H) 0.70 - 1.30 MG/DL    BUN/Creatinine ratio 7 (L) 12 - 20      GFR est AA 18 (L) >60 ml/min/1.73m2    GFR est non-AA 15 (L) >60 ml/min/1.73m2    Calcium 8.5 8.5 - 10.1 MG/DL    Bilirubin, total 0.7 0.2 - 1.0 MG/DL    ALT (SGPT) 25 12 - 78 U/L    AST (SGOT) 22 15 - 37 U/L    Alk. phosphatase 67 45 - 117 U/L    Protein, total 7.7 6.4 - 8.2 g/dL    Albumin 3.7 3.5 - 5.0 g/dL    Globulin 4.0 2.0 - 4.0 g/dL    A-G Ratio 0.9 (L) 1.1 - 2.2     CK W/ REFLX CKMB    Collection Time: 05/26/19  3:51 PM   Result Value Ref Range     (H) 39 - 308 U/L   TROPONIN I    Collection Time: 05/26/19  3:51 PM   Result Value Ref Range    Troponin-I, Qt. 0.43 (H) <0.05 ng/mL   NT-PRO BNP    Collection Time: 05/26/19  3:51 PM   Result Value Ref Range    NT pro-BNP 4,208 (H) <125 PG/ML   CK-MB,QUANT.     Collection Time: 05/26/19  3:51 PM   Result Value Ref Range    CK - MB 1.8 <3.6 NG/ML    CK-MB Index 0.2 0.0 - 2.5     POC LACTIC ACID    Collection Time: 05/26/19  4:30 PM   Result Value Ref Range    Lactic Acid (POC) 1.23 0.40 - 2.00 mmol/L   URINALYSIS W/ REFLEX CULTURE    Collection Time: 05/26/19  6:51 PM   Result Value Ref Range    Color YELLOW/STRAW      Appearance CLEAR CLEAR      Specific gravity 1.021 1.003 - 1.030      pH (UA) 7.0 5.0 - 8.0      Protein 300 (A) NEG mg/dL    Glucose NEGATIVE  NEG mg/dL    Ketone NEGATIVE  NEG mg/dL    Bilirubin NEGATIVE  NEG      Blood TRACE (A) NEG      Urobilinogen 1.0 0.2 - 1.0 EU/dL    Nitrites NEGATIVE  NEG      Leukocyte Esterase NEGATIVE  NEG      WBC 0-4 0 - 4 /hpf    RBC 0-5 0 - 5 /hpf    Epithelial cells FEW FEW /lpf    Bacteria NEGATIVE  NEG /hpf    UA:UC IF INDICATED CULTURE NOT INDICATED BY UA RESULT CNI      Hyaline cast 0-2 0 - 5 /lpf   GLUCOSE, POC    Collection Time: 05/26/19  9:33 PM   Result Value Ref Range    Glucose (POC) 112 (H) 65 - 100 mg/dL    Performed by Chai Steele (PCT)        Radiologic Studies -   CT CHEST WO CONT   Final Result   Multifocal consolidation in the right upper lobe, right middle lobe and right   lower lobe most consistent with inflammatory infiltrate or pneumonia. Correlate   clinically and follow by chest radiograph as clinically appropriate. XR CHEST PA LAT   Final Result    Patchy bibasilar infiltrate, greatest in the right lower lobe. Correlate for   developing CHF versus acute pneumonia in the right lower lobe. . Follow-up as   clinically appropriate. .           CT Results  (Last 48 hours)               05/26/19 1844  CT CHEST WO CONT Final result    Impression:  Multifocal consolidation in the right upper lobe, right middle lobe and right   lower lobe most consistent with inflammatory infiltrate or pneumonia. Correlate   clinically and follow by chest radiograph as clinically appropriate. Narrative:  INDICATION: Right rib pain with cough and shortness of breath for 3 days. COMPARISON: 11/25/2016       CONTRAST: None. TECHNIQUE:  5 mm axial images were obtained through the . Coronal and sagittal   reconstructions were generated.   CT dose reduction was achieved through use of a   standardized protocol tailored for this examination and automatic exposure   control for dose modulation. The absence of intravenous contrast reduces the sensitivity for evaluation of   the mediastinum and upper abdominal organs. FINDINGS:       THYROID: No nodule. MEDIASTINUM: No mass or lymphadenopathy. VICKIE: No mass or lymphadenopathy. THORACIC AORTA: No aneurysm. MAIN PULMONARY ARTERY: Normal in caliber. TRACHEA/BRONCHI: Patent. ESOPHAGUS: No wall thickening or dilatation. HEART: Upper normal in size. PLEURA: No effusion or pneumothorax. LUNGS: Patchy nodular infiltrate in the right upper lobe and also in the right   middle lobe and right lower lobe and separate areas of consolidation. Minimal   atelectasis in both lung bases and in the lingula. INCIDENTALLY IMAGED UPPER ABDOMEN: No focal abnormality. BONES: No destructive bone lesion. CXR Results  (Last 48 hours)               05/26/19 1620  XR CHEST PA LAT Final result    Impression:   Patchy bibasilar infiltrate, greatest in the right lower lobe. Correlate for   developing CHF versus acute pneumonia in the right lower lobe. . Follow-up as   clinically appropriate. .           Narrative:  INDICATION:  shortness of breath. EXAM: 2 VIEW CHEST RADIOGRAPH. COMPARISON: 9/14/2018. FINDINGS: Frontal and lateral views of the chest show patchy bibasilar   infiltrate, greatest in the right lower lobe, and central vascular fullness. .   The heart, mediastinum and pulmonary vasculature are stable with cardiomegaly. The bony thorax is unremarkable for age. ..                   Medical Decision Making   I am the first provider for this patient. I reviewed the vital signs, available nursing notes, past medical history, past surgical history, family history and social history. Vital Signs-Reviewed the patient's vital signs.   Patient Vitals for the past 12 hrs:   Temp Pulse Resp BP SpO2   05/26/19 2310 (!) 100.6 °F (38.1 °C) 83 18 129/68 97 %   05/26/19 2100 99.4 °F (37.4 °C) 85 19 136/73 97 %   05/26/19 2051     96 %   05/26/19 1900  84 30 (!) 169/97 96 %   05/26/19 1848  87 30  94 %   05/26/19 1846    (!) 176/93    05/26/19 1813  87 28 147/75    05/26/19 1812     96 %   05/26/19 1730  86 27 (!) 150/98 100 %   05/26/19 1700  86 25 155/81 98 %   05/26/19 1636     97 %   05/26/19 1632     91 %   05/26/19 1632  91 27 148/73 94 %   05/26/19 1624     93 %   05/26/19 1623    151/85    05/26/19 1536 99.9 °F (37.7 °C) 98 28 138/74 97 %       Pulse Oximetry Analysis - 86% on RA    Cardiac Monitor:   Rate: 82 bpm  Rhythm: Normal Sinus Rhythm      EKG interpretation: (Preliminary)  NSR, rate 96, RBBB, LVH, left axis, normal pr, prolonged qrs, NSST, Wen Jeffery DO       Records Reviewed: Nursing Notes, Old Medical Records, Previous Radiology Studies and Previous Laboratory Studies    Provider Notes (Medical Decision Making):   DDx- Pneumonia, CHF, ACS, PE, Renal Failure, electrolyte abnormality    ED Course:   Initial assessment performed. The patients presenting problems have been discussed, and they are in agreement with the care plan formulated and outlined with them. I have encouraged them to ask questions as they arise throughout their visit. Pt placed on O2 upon arrival to the ED. CXR- infiltrate v. Edema, start IV Ab, given elevated trop and BNP pt given ASA, also dosed with Bumex wh/ he is currently on. Pt states EF has been low in the past but no recent CAD requiring stenting, Possibility of underlying PE given pleuritic CP with elevated trop with no prior cardiac vascular disease. Pt with acute on chronic CKD, pt may need VQ scan. Pt with O2 and Bumex with NTG with sig improvement in resp status. Discussed results with pt and family.     Consult Note:  7:00 PM- Case discussed with Dr. Elis Galvan will see and evaluate pt for admission, Wen Jeffery DO    Critical Care Time:   CRITICAL CARE NOTE :    IMPENDING DETERIORATION -Respiratory, Cardiovascular and Renal  ASSOCIATED RISK FACTORS - Hypoxia, Pneumonia v. Edema, Renal insufficiency, Elevated troponin  MANAGEMENT- Bedside Assessment and Supervision of Care  INTERPRETATION -  Xrays, CT Scan, ECG, Blood Pressure, Cardiac Output Measures  and Labs  INTERVENTIONS - hemodynamic mngmt and Oxygen, IV Ab, ASA, NTG paste, Bumex  CASE REVIEW - Hospitalist, Nursing and Family  TREATMENT RESPONSE -Improved  PERFORMED BY - Self    NOTES   :  I have spent 40 minutes of critical care time involved in lab review, consultations with specialist, family decision- making, bedside attention and documentation. During this entire length of time I was immediately available to the patient . Gabriel Cote, DO    Disposition:  Admit for further eval and management. Multi-lobar pneumonia- IV Ab ordered. Elevated troponin. Pt given dose of ASA. Discussed with hospitalist Lovenox dosing- given elevated troponin, ? PE due to Acute on chronic CKD unable to obtain CT w/, VQ scan likely to be abnormal due to current lung pathology. CT w/o ordered to help further assess Lungs Infectious process v. Interstitial edema or both. Pt had recent cardiac cath at [facility] wh/ per pt did not demonstrate disease requiring stents. Due to holiday staffing I was able to have a copy of cath report sent from CCU staff at [facility], no one present to obtain medical records. PLAN:  1. Admit      Diagnosis     Clinical Impression:   1. Acute respiratory failure with hypoxia ([address]    2. Pneumonia of both lungs due to infectious organism, unspecified part of lung    3. Acute renal failure superimposed on chronic kidney disease, unspecified CKD stage, unspecified acute renal failure type (HCC)    4. Elevated troponin    5.  Severe sepsis ([address]

## 2019-05-27 NOTE — PROGRESS NOTES
Problem: Falls - Risk of  Goal: *Absence of Falls  Description  Document Florentin Pierre Fall Risk and appropriate interventions in the flowsheet.   Outcome: Progressing Towards Goal

## 2019-05-27 NOTE — PROGRESS NOTES
Reason for Admission:   Pneumonia               RRAT Score:     31             Resources/supports as identified by patient/family:   Wife/friends/family                Top Challenges facing patient (as identified by patient/family and CM): Finances/Medication cost?      Medicare A/B with [employer] of choice is [facility]. Transportation? Pt drives self, however wife will transport at discharge as well as any appointments. Support system or lack thereof? Family and friends                     Living arrangements? Pt lives with wife           Self-care/ADLs/Cognition? Pt sitting up on side of the bed with family members at bedside. Pt alert to person, place, time and situation. Pt is independent of ADL's/IADL's currently. Pt has No DME in the home. Pt denies use of HH services or SNF/IRF in the past.          Current Advanced Directive/Advance Care Plan:  Not on file                          Plan for utilizing home health:    No needs at this time                      Likelihood of readmission:  Moderate,  Pt did state his PCP has retired. Pt stated he is to establish care with one of the practice partners. CM encouraged pt to establish as soon as possible, CM can be of assistance scheduling apt at time of discharge. Pt verbalized understanding. Care Management Interventions  PCP Verified by CM: Yes(Per pt PCP retired will establish with partner in practice)  Mode of Transport at Discharge:  Other (see comment)(wife to transport at DC)  Transition of Care Consult (CM Consult): Discharge Planning  Discharge Durable Medical Equipment: No  Physical Therapy Consult: No  Occupational Therapy Consult: No  Speech Therapy Consult: No  Current Support Network: Lives with Spouse, Own Home, Family Lives Nearby(1 story w/daniella 1)  Confirm Follow Up Transport: Self  Plan discussed with Pt/Family/Caregiver: Yes(spoke with pt and wife)  Discharge Location  Discharge Placement: Home with family assistance                 Transition of Care Plan:          1) Pt goal  Home with family assistance  2) Pt will benefit from PCP apt. At DC  3) Pt will need 2nd IM letter prior to DC  4) Wife to transport home at time of DC    CM to remain available for support and DC planning    Cheryle Rilee.  RN, BSN  [facility]  [phone]

## 2019-05-27 NOTE — CONSULTS
[facility]   [address] [facility], [address] [place] [address], [facility]  Phone: [phone] NOTE     Patient: Jey Maldonado MRN: [id number]  PCP: Lawanda Servin MD   :     1945  Age:   72 y.o. Sex:  male      Referring physician: Victor M Hallman MD  Reason for consultation: 72 y.o. male with Pneumonia [I10.0] complicated by OLIVER   Admission Date: 2019  4:04 PM  LOS: 1 day      ASSESSMENT and PLAN :   Right sided multilobar PNA    ESRD - TTS at [facility]    HTN    DM    Non obstructive CAD by cath 3/2019    Congestive CM, EF~25%      REC:  Plan dialysis tomorrow  Ongoing abx  Serial labs     Thank you for consulting [facility] in the care of your patient. Subjective:   HPI: Jey Maldonado is a 72 y.o.  male who has been admitted to the hospital for multilobar PNA right lung. He has ESRD, dialyzes at [facility] unit TTS. Past Medical Hx:   Past Medical History:   Diagnosis Date    Arthritis     spine    CAD (coronary artery disease)     high cholesterol    Chronic kidney disease     Diabetes ([facility]     GERD (gastroesophageal reflux disease)     HX    Hypertension     Ill-defined condition     irritable bowel syndrome    Systolic CHF ([facility]     Unspecified sleep apnea     NO CPAP        Past Surgical Hx:     Past Surgical History:   Procedure Laterality Date    COLONOSCOPY N/A 2016    COLONOSCOPY performed by Deepika Miller MD at [facility] ENDOSCOPY    HX ORTHOPAEDIC      CERVICAL FUSION    HX UROLOGICAL      TURP    HX VASCULAR ACCESS      L arm dialysis access         Allergies   Allergen Reactions    Albumin, Human 25 % Anaphylaxis and Hives    Niacin Hives       Social Hx:  reports that he quit smoking about 10 years ago. He has quit using smokeless tobacco. He reports that he does not drink alcohol or use drugs.      Family History   Problem Relation Age of Onset    Cancer Father \"bone\"    Diabetes Brother        Review of Systems:  A thorough twelve point review of system was performed today. Pertinent positives and negatives are mentioned in the HPI. The reminder of the ROS is negative and noncontributory. Objective:    Vitals:    Vitals:    05/27/19 0841 05/27/19 1059 05/27/19 1321 05/27/19 1500   BP:  120/60  137/75   Pulse:  70  82   Resp:  20  22   Temp: 99.1 °F (37.3 °C) 97.5 °F (36.4 °C)  99.6 °F (37.6 °C)   SpO2:  95% 97% 92%   Weight:         I&O's:  05/26 0701 - 05/27 0700  In: 50 [I.V.:50]  Out: 450 [Urine:450]  Visit Vitals  /75 (BP 1 Location: Right arm, BP Patient Position: At rest)   Pulse 82   Temp 99.6 °F (37.6 °C)   Resp 22   Wt 110.6 kg (243 lb 13.3 oz)   SpO2 92%   BMI 33.07 kg/m²       Physical Exam:  General: SOB with exertion  HEENT: No Pallor , No Icterus  Neck: Supple, full ROM  Lungs : expir ronchi right side anterior and osterior  CVS: RRR, S1 S2 normal, No rub   Abdomen: Soft, NT, BS +  Extremities: Edema - none  Skin: No rash or lesions.   MS: No joint swelling, erythema, warmth  Neurologic: non focal, AAO x 3  Psych: normal affect  Good thrill LUE AV graft    Laboratory Results:    Recent Labs     05/27/19  0139 05/26/19  1551    138   K 3.9 3.6    102   CO2 26 26   * 174*   BUN 32* 29*   CREA 4.32* 4.03*   CA 8.3* 8.5   ALB  --  3.7   SGOT  --  22   ALT  --  25     Recent Labs     05/27/19  0139 05/26/19  1551   WBC 7.9 8.2   HGB 11.1* 11.6*   HCT 34.8* 36.1*    222     Lab Results   Component Value Date/Time    Specimen Description: PBC 10/25/2013 06:05 AM    Specimen Description: URINE 10/25/2013 06:05 AM    Specimen Description: VOIDED URINE 09/24/2013 10:40 AM     Lab Results   Component Value Date/Time    Culture result: NO GROWTH AFTER 15 HOURS 05/26/2019 03:51 PM    Culture result: NO GROWTH AFTER 15 HOURS 05/26/2019 03:51 PM    Culture result: NO GROWTH 1 DAY 02/18/2019 01:45 AM     Recent Results (from the past 24 hour(s))   EKG, 12 LEAD, INITIAL    Collection Time: 05/26/19  3:41 PM   Result Value Ref Range    Ventricular Rate 96 BPM    Atrial Rate 96 BPM    P-R Interval 168 ms    QRS Duration 130 ms    Q-T Interval 382 ms    QTC Calculation (Bezet) 482 ms    Calculated P Axis 18 degrees    Calculated R Axis -59 degrees    Calculated T Axis 84 degrees    Diagnosis       Normal sinus rhythm  Left atrial enlargement  Left axis deviation  Right bundle branch block  Left ventricular hypertrophy with repolarization abnormality  Cannot rule out Septal infarct (cited on or before 26-MAY-2019)  When compared with ECG of 11-MAY-2018 11:37,  Right bundle branch block is now present     CBC WITH AUTOMATED DIFF    Collection Time: 05/26/19  3:51 PM   Result Value Ref Range    WBC 8.2 4.1 - 11.1 K/uL    RBC 3.86 (L) 4.10 - 5.70 M/uL    HGB 11.6 (L) 12.1 - 17.0 g/dL    HCT 36.1 (L) 36.6 - 50.3 %    MCV 93.5 80.0 - 99.0 FL    MCH 30.1 26.0 - 34.0 PG    MCHC 32.1 30.0 - 36.5 g/dL    RDW 15.4 (H) 11.5 - 14.5 %    PLATELET 118 215 - 429 K/uL    MPV 11.3 8.9 - 12.9 FL    NRBC 0.0 0  WBC    ABSOLUTE NRBC 0.00 0.00 - 0.01 K/uL    NEUTROPHILS 82 (H) 32 - 75 %    LYMPHOCYTES 7 (L) 12 - 49 %    MONOCYTES 9 5 - 13 %    EOSINOPHILS 2 0 - 7 %    BASOPHILS 0 0 - 1 %    IMMATURE GRANULOCYTES 0 0.0 - 0.5 %    ABS. NEUTROPHILS 6.7 1.8 - 8.0 K/UL    ABS. LYMPHOCYTES 0.6 (L) 0.8 - 3.5 K/UL    ABS. MONOCYTES 0.7 0.0 - 1.0 K/UL    ABS. EOSINOPHILS 0.2 0.0 - 0.4 K/UL    ABS. BASOPHILS 0.0 0.0 - 0.1 K/UL    ABS. IMM.  GRANS. 0.0 0.00 - 0.04 K/UL    DF SMEAR SCANNED      RBC COMMENTS NORMOCYTIC, NORMOCHROMIC     METABOLIC PANEL, COMPREHENSIVE    Collection Time: 05/26/19  3:51 PM   Result Value Ref Range    Sodium 138 136 - 145 mmol/L    Potassium 3.6 3.5 - 5.1 mmol/L    Chloride 102 97 - 108 mmol/L    CO2 26 21 - 32 mmol/L    Anion gap 10 5 - 15 mmol/L    Glucose 174 (H) 65 - 100 mg/dL    BUN 29 (H) 6 - 20 MG/DL    Creatinine 4.03 (H) 0.70 - 1.30 MG/DL    BUN/Creatinine ratio 7 (L) 12 - 20      GFR est AA 18 (L) >60 ml/min/1.73m2    GFR est non-AA 15 (L) >60 ml/min/1.73m2    Calcium 8.5 8.5 - 10.1 MG/DL    Bilirubin, total 0.7 0.2 - 1.0 MG/DL    ALT (SGPT) 25 12 - 78 U/L    AST (SGOT) 22 15 - 37 U/L    Alk. phosphatase 67 45 - 117 U/L    Protein, total 7.7 6.4 - 8.2 g/dL    Albumin 3.7 3.5 - 5.0 g/dL    Globulin 4.0 2.0 - 4.0 g/dL    A-G Ratio 0.9 (L) 1.1 - 2.2     CK W/ REFLX CKMB    Collection Time: 05/26/19  3:51 PM   Result Value Ref Range     (H) 39 - 308 U/L   TROPONIN I    Collection Time: 05/26/19  3:51 PM   Result Value Ref Range    Troponin-I, Qt. 0.43 (H) <0.05 ng/mL   CULTURE, BLOOD    Collection Time: 05/26/19  3:51 PM   Result Value Ref Range    Special Requests: NO SPECIAL REQUESTS      Culture result: NO GROWTH AFTER 15 HOURS     CULTURE, BLOOD    Collection Time: 05/26/19  3:51 PM   Result Value Ref Range    Special Requests: NO SPECIAL REQUESTS      Culture result: NO GROWTH AFTER 15 HOURS     NT-PRO BNP    Collection Time: 05/26/19  3:51 PM   Result Value Ref Range    NT pro-BNP 4,208 (H) <125 PG/ML   CK-MB,QUANT.     Collection Time: 05/26/19  3:51 PM   Result Value Ref Range    CK - MB 1.8 <3.6 NG/ML    CK-MB Index 0.2 0.0 - 2.5     POC LACTIC ACID    Collection Time: 05/26/19  4:30 PM   Result Value Ref Range    Lactic Acid (POC) 1.23 0.40 - 2.00 mmol/L   URINALYSIS W/ REFLEX CULTURE    Collection Time: 05/26/19  6:51 PM   Result Value Ref Range    Color YELLOW/STRAW      Appearance CLEAR CLEAR      Specific gravity 1.021 1.003 - 1.030      pH (UA) 7.0 5.0 - 8.0      Protein 300 (A) NEG mg/dL    Glucose NEGATIVE  NEG mg/dL    Ketone NEGATIVE  NEG mg/dL    Bilirubin NEGATIVE  NEG      Blood TRACE (A) NEG      Urobilinogen 1.0 0.2 - 1.0 EU/dL    Nitrites NEGATIVE  NEG      Leukocyte Esterase NEGATIVE  NEG      WBC 0-4 0 - 4 /hpf    RBC 0-5 0 - 5 /hpf    Epithelial cells FEW FEW /lpf    Bacteria NEGATIVE  NEG /hpf    UA:UC IF INDICATED CULTURE NOT INDICATED BY UA RESULT CNI      Hyaline cast 0-2 0 - 5 /lpf   GLUCOSE, POC    Collection Time: 05/26/19  9:33 PM   Result Value Ref Range    Glucose (POC) 112 (H) 65 - 100 mg/dL    Performed by Cristin Betancur (PCT)    TROPONIN I    Collection Time: 05/27/19  1:39 AM   Result Value Ref Range    Troponin-I, Qt. 0.51 (H) <2.96 ng/mL   METABOLIC PANEL, BASIC    Collection Time: 05/27/19  1:39 AM   Result Value Ref Range    Sodium 136 136 - 145 mmol/L    Potassium 3.9 3.5 - 5.1 mmol/L    Chloride 105 97 - 108 mmol/L    CO2 26 21 - 32 mmol/L    Anion gap 5 5 - 15 mmol/L    Glucose 144 (H) 65 - 100 mg/dL    BUN 32 (H) 6 - 20 MG/DL    Creatinine 4.32 (H) 0.70 - 1.30 MG/DL    BUN/Creatinine ratio 7 (L) 12 - 20      GFR est AA 16 (L) >60 ml/min/1.73m2    GFR est non-AA 14 (L) >60 ml/min/1.73m2    Calcium 8.3 (L) 8.5 - 10.1 MG/DL   CBC W/O DIFF    Collection Time: 05/27/19  1:39 AM   Result Value Ref Range    WBC 7.9 4.1 - 11.1 K/uL    RBC 3.63 (L) 4.10 - 5.70 M/uL    HGB 11.1 (L) 12.1 - 17.0 g/dL    HCT 34.8 (L) 36.6 - 50.3 %    MCV 95.9 80.0 - 99.0 FL    MCH 30.6 26.0 - 34.0 PG    MCHC 31.9 30.0 - 36.5 g/dL    RDW 15.3 (H) 11.5 - 14.5 %    PLATELET 829 633 - 166 K/uL    MPV 11.6 8.9 - 12.9 FL    NRBC 0.0 0  WBC    ABSOLUTE NRBC 0.00 0.00 - 0.01 K/uL   TROPONIN I    Collection Time: 05/27/19  1:39 AM   Result Value Ref Range    Troponin-I, Qt. 0.51 (H) <0.05 ng/mL   GLUCOSE, POC    Collection Time: 05/27/19  7:48 AM   Result Value Ref Range    Glucose (POC) 167 (H) 65 - 100 mg/dL    Performed by Frankey Stall (PCT)    GLUCOSE, POC    Collection Time: 05/27/19 11:03 AM   Result Value Ref Range    Glucose (POC) 181 (H) 65 - 100 mg/dL    Performed by Frankey Stall (PCT)          Urine dipstick:   Lab Results   Component Value Date/Time    Color YELLOW/STRAW 05/26/2019 06:51 PM    Appearance CLEAR 05/26/2019 06:51 PM    Specific gravity 1.021 05/26/2019 06:51 PM    Specific gravity 1.010 04/06/2009 10:48 AM    pH (UA) 7.0 05/26/2019 06:51 PM    Protein 300 (A) 05/26/2019 06:51 PM    Glucose NEGATIVE  05/26/2019 06:51 PM    Ketone NEGATIVE  05/26/2019 06:51 PM    Bilirubin NEGATIVE  05/26/2019 06:51 PM    Urobilinogen 1.0 05/26/2019 06:51 PM    Nitrites NEGATIVE  05/26/2019 06:51 PM    Leukocyte Esterase NEGATIVE  05/26/2019 06:51 PM    Epithelial cells FEW 05/26/2019 06:51 PM    Bacteria NEGATIVE  05/26/2019 06:51 PM    WBC 0-4 05/26/2019 06:51 PM    RBC 0-5 05/26/2019 06:51 PM       I have reviewed the following: All pertinent labs, microbiology data, radiology imaging for my assessment     Medications list Personally Reviewed   [x]      Yes     []               No       Medications:  Prior to Admission medications    Medication Sig Start Date End Date Taking? Authorizing Provider   VICTOZA 2-NELLI 0.6 mg/0.1 mL (18 mg/3 mL) pnij INJECT 1.8- MG SUBCUTANEOUSLY ONCE DAILY, TITRATE UP TO 1.8 DAILY AS DIRECTED 5/15/19   Davis Woods MD   sertraline (ZOLOFT) 50 mg tablet TAKE 1 T PO QD 2/25/19   Provider, Historical   aspirin 81 mg chewable tablet Take 81 mg by mouth daily. Provider, Historical   ciprofloxacin HCl (CIPRO) 500 mg tablet Take 500 mg by mouth two (2) times a day. Other, MD Mignon   sertraline (ZOLOFT) 25 mg tablet TK 1 T PO QD 7/25/18   Provider, Historical   carvedilol (COREG) 12.5 mg tablet  8/13/18   Provider, Historical   Insulin Needles, Disposable, (REYNALDO PEN NEEDLE) 32 gauge x 5/32\" ndle For use each AM with Victoza Pen 2/27/18   Davis Woods MD   Insulin Syringe-Needle U-100 1 mL 30 gauge x 5/16 syrg Use twice daily for insulin injection 2/16/18   Davis Woods MD   insulin NPH/insulin regular (NOVOLIN 70/30 U-100 INSULIN) 100 unit/mL (70-30) injection 60 Units by SubCUTAneous route Before breakfast and dinner. Patient taking differently: 40 Units by SubCUTAneous route Before breakfast and dinner.  Uses if blood sugar is over 200 2/15/18   Davis Woods MD hydrALAZINE (APRESOLINE) 100 mg tablet 100 mg three (3) times daily. 8/14/17   Provider, Historical   fenofibrate nanocrystallized (TRICOR) 145 mg tablet Take  by mouth every evening. Provider, Historical   bumetanide (BUMEX) 2 mg tablet Take 2 mg by mouth every evening. Provider, Historical   atorvastatin (LIPITOR) 20 mg tablet Take 20 mg by mouth every evening. Provider, Historical   Calcium-Cholecalciferol, D3, (CALCIUM 600 WITH VITAMIN D3) 600 mg(1,500mg) -400 unit chew Take 1 Tab by mouth every evening. Provider, Historical   fluticasone (FLONASE) 50 mcg/actuation nasal spray 2 Sprays by Both Nostrils route daily. Provider, Historical   tamsulosin (FLOMAX) 0.4 mg capsule Take 0.4 mg by mouth two (2) times a day. Other, MD Mignon        Thank you for allowing us to participate in the care of this patient. We will follow patient. Please dont hesitate to call with any questions    Elissa Dakins, MD  [facility] Nephrology [facility]   [address] [facility] [address]  Phone - [phone]   Fax - [phone]  [email or web address]

## 2019-05-27 NOTE — PROGRESS NOTES
Bedside and Verbal shift change report given to Raquel (oncoming nurse) by Rosemarie Friend RN (offgoing nurse). Report included the following information Kardex, MAR and Recent Results.

## 2019-05-27 NOTE — PROGRESS NOTES
Hospitalist Progress Note    NAME: Heena Poe   :  1945   MRN:  [id number]       Assessment / Plan:  Acute respiratory failure with hypoxia   SIRS due to R-sided multilobar PNA with pleurisy, present on admission:  Treating as HCAP due to ESRD/HD  - CXR with patchy bibasilar infiltrate, greatest in the right lower lobe. Correlate for developing CHF versus acute pneumonia in the right lower lobe. . Follow-up as clinically appropriate.  - CT chest with multifocal consolidation in the right upper lobe, right middle lobe and right lower lobe most consistent with inflammatory infiltrate or pneumonia. - blood cultures sent, - emperic vanco, cefepime and levaquin for now  - start mucinex. Robitussin AC prn for pleuritic chest pain. - supplemental O2   - pt with increased wob /crakles and wheezing >> will start IV steroids , give a dose lasix . 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 Last echo 10/16 with EF 25%. There was diffuse hypokinesis. Wall thickness was mildly increased. Hypertrophy was noted. Mild concentric hypertrophy. - serial trop, current 0.43  - con't home ASA, coreg, hydralazine  - will hold bumex today with poor po intake, restart tomorrow if po improves  - con't lipitor, tricor  - prn nitrobid    Insulin dependent DM2 controlled with nephropathy:  - holding home victoza  - on 70/30 insulin at home, have ordered NPH  - lispro sliding scale    ESRD, on HD TTS: sees Dr. Neil Simmons  - nephrology consulted  BPH: con't flomax     Code Status: Full   Surrogate Decision Maker: wife  DVT Prophylaxis: heparin     Baseline: independent      Body mass index is 33.07 kg/m².  therefore classifying patient as obese, NOS (BMI of >30 kg/m2)  -counseled exercise/diet. Patient receptive. Subjective:     Chief Complaint / Reason for Physician Visit  C/o pleuritic chest pain , reproducible worse with coughing and changing movement. Discussed with RN events overnight. Review of Systems:  Symptom Y/N Comments  Symptom Y/N Comments   Fever/Chills n   Chest Pain n    Poor Appetite    Edema     Cough y   Abdominal Pain n    Sputum n   Joint Pain     SOB/BRITT y   Pruritis/Rash     Nausea/vomit n   Tolerating PT/OT     Diarrhea    Tolerating Diet y    Constipation    Other       Could NOT obtain due to:      Objective:     VITALS:   Last 24hrs VS reviewed since prior progress note. Most recent are:  Patient Vitals for the past 24 hrs:   Temp Pulse Resp BP SpO2   05/27/19 1059 97.5 °F (36.4 °C) 70 20 120/60 95 %   05/27/19 0841 99.1 °F (37.3 °C)       05/27/19 0753     96 %   05/27/19 0750 (!) 100.8 °F (38.2 °C) 84 18 152/83 96 %   05/27/19 0312 100 °F (37.8 °C) 87 18 142/72 100 %   05/27/19 0228     96 %   05/26/19 2310 (!) 100.6 °F (38.1 °C) 83 18 129/68 97 %   05/26/19 2100 99.4 °F (37.4 °C) 85 19 136/73 97 %   05/26/19 2051     96 %   05/26/19 1900  84 30 (!) 169/97 96 %   05/26/19 1848  87 30  94 %   05/26/19 1846    (!) 176/93    05/26/19 1813  87 28 147/75    05/26/19 1812     96 %   05/26/19 1730  86 27 (!) 150/98 100 %   05/26/19 1700  86 25 155/81 98 %   05/26/19 1636     97 %   05/26/19 1632     91 %   05/26/19 1632  91 27 148/73 94 %   05/26/19 1624     93 %   05/26/19 1623    151/85    05/26/19 1536 99.9 °F (37.7 °C) 98 28 138/74 97 %       Intake/Output Summary (Last 24 hours) at 5/27/2019 1316  Last data filed at 5/27/2019 0743  Gross per 24 hour   Intake 50 ml   Output 750 ml   Net -700 ml        PHYSICAL EXAM:  General: WD, WN. Alert, cooperative, in mod  acute distress    EENT:  EOMI. Anicteric sclerae. MMM  Resp:  CTA bilaterally, no wheezing but has rales.   Use accessory muscle use  CV:  Regular  rhythm,  No edema, reproducible R sided chest pain  GI:  Soft, Non distended, Non tender.  +Bowel sounds  Neurologic:  Alert and oriented X 3, normal speech,   Psych:   Good insight. Not anxious nor agitated  Skin:  No rashes. No jaundice    Reviewed most current lab test results and cultures  YES  Reviewed most current radiology test results   YES  Review and summation of old records today    NO  Reviewed patient's current orders and MAR    YES  PMH/SH reviewed - no change compared to H&P  ________________________________________________________________________  Care Plan discussed with:    Comments   Patient x    Family      RN x    Care Manager     Consultant                        Multidiciplinary team rounds were held today with , nursing, pharmacist and clinical coordinator. Patient's plan of care was discussed; medications were reviewed and discharge planning was addressed. ________________________________________________________________________  Total NON critical care TIME:  25 Minutes    Total CRITICAL CARE TIME Spent:   Minutes non procedure based      Comments   >50% of visit spent in counseling and coordination of care     ________________________________________________________________________  Toby Garcia MD     Procedures: see electronic medical records for all procedures/Xrays and details which were not copied into this note but were reviewed prior to creation of Plan. LABS:  I reviewed today's most current labs and imaging studies.   Pertinent labs include:  Recent Labs     05/27/19  0139 05/26/19  1551   WBC 7.9 8.2   HGB 11.1* 11.6*   HCT 34.8* 36.1*    222     Recent Labs     05/27/19  0139 05/26/19  1551    138   K 3.9 3.6    102   CO2 26 26   * 174*   BUN 32* 29*   CREA 4.32* 4.03*   CA 8.3* 8.5   ALB  --  3.7   TBILI  --  0.7   SGOT  --  22   ALT  --  25       Signed: Toby Garcia MD

## 2019-05-27 NOTE — PROGRESS NOTES
ADULT PROTOCOL: JET AEROSOL ASSESSMENT    Patient  Gabo Michelle     72 y.o.   male     5/26/2019  9:08 PM    Breath Sounds Pre Procedure: Right Breath Sounds: Diminished, Coarse                               Left Breath Sounds: Diminished, Coarse    Breath Sounds Post Procedure: Right Breath Sounds: Diminished, Coarse                                 Left Breath Sounds: Diminished, Coarse    Heart Rate: Pre procedure Pulse: 82           Post procedure Pulse: 80    Resp Rate: Pre procedure Respirations: 20           Post procedure Respirations: 20        Oxygen: O2 Device: Nasal cannula   Flow rate (L/min) 2     Changed: NO    SpO2: Pre procedure SpO2: 96 %   with oxygen              Post procedure SpO2: 96 %  with oxygen    Nebulizer Therapy: Current medications Aerosolized Medications: DuoNeb      Changed: NO    Smoking History:  Former Smoker    Problem List:   Patient Active Problem List   Diagnosis Code    Acute pancreatitis K85.90    LFT'S ABNORMAL     Acute renal failure (ARF) ([facility]) N17.9    CKD (chronic kidney disease) stage 3, GFR 30-59 ml/min ([facility]) N18.3    HTN (hypertension) I10    CAD (coronary artery disease) I25.10    Abdominal pain, acute, epigastric R10.13    Nausea & vomiting R11.2    UTI (lower urinary tract infection) N39.0    Renal failure (ARF), acute on chronic (HCC) N17.9, N18.9    Diarrhea R19.7    SIRS (systemic inflammatory response syndrome) ([facility]) R65.10    Nephrotic syndrome N04.9    Renal anasarca N04.9    Acute on chronic renal failure (HCC) N17.9, N18.9    Hypotension I95.9    Chest pain R07.9    Type II diabetes mellitus with nephropathy ([facility] E11.21    Hyperlipidemia E78.5    Pneumonia J18.9       Respiratory Therapist: Wing Zimmerman RT

## 2019-05-28 LAB
ALBUMIN SERPL-MCNC: 3.2 G/DL (ref 3.5–5)
ALBUMIN/GLOB SERPL: 0.8 {RATIO} (ref 1.1–2.2)
ALP SERPL-CCNC: 56 U/L (ref 45–117)
ALT SERPL-CCNC: 27 U/L (ref 12–78)
ANION GAP SERPL CALC-SCNC: 8 MMOL/L (ref 5–15)
AST SERPL-CCNC: 26 U/L (ref 15–37)
ATRIAL RATE: 96 BPM
BASOPHILS # BLD: 0 K/UL (ref 0–0.1)
BASOPHILS NFR BLD: 0 % (ref 0–1)
BILIRUB SERPL-MCNC: 0.5 MG/DL (ref 0.2–1)
BUN SERPL-MCNC: 53 MG/DL (ref 6–20)
BUN/CREAT SERPL: 10 (ref 12–20)
CALCIUM SERPL-MCNC: 8.6 MG/DL (ref 8.5–10.1)
CALCULATED P AXIS, ECG09: 18 DEGREES
CALCULATED R AXIS, ECG10: -59 DEGREES
CALCULATED T AXIS, ECG11: 84 DEGREES
CHLORIDE SERPL-SCNC: 101 MMOL/L (ref 97–108)
CO2 SERPL-SCNC: 24 MMOL/L (ref 21–32)
CREAT SERPL-MCNC: 5.56 MG/DL (ref 0.7–1.3)
DIAGNOSIS, 93000: NORMAL
DIFFERENTIAL METHOD BLD: ABNORMAL
EOSINOPHIL # BLD: 0 K/UL (ref 0–0.4)
EOSINOPHIL NFR BLD: 0 % (ref 0–7)
ERYTHROCYTE [DISTWIDTH] IN BLOOD BY AUTOMATED COUNT: 15.2 % (ref 11.5–14.5)
GLOBULIN SER CALC-MCNC: 4.2 G/DL (ref 2–4)
GLUCOSE BLD STRIP.AUTO-MCNC: 186 MG/DL (ref 65–100)
GLUCOSE BLD STRIP.AUTO-MCNC: 223 MG/DL (ref 65–100)
GLUCOSE BLD STRIP.AUTO-MCNC: 269 MG/DL (ref 65–100)
GLUCOSE BLD STRIP.AUTO-MCNC: 329 MG/DL (ref 65–100)
GLUCOSE SERPL-MCNC: 234 MG/DL (ref 65–100)
HCT VFR BLD AUTO: 35.4 % (ref 36.6–50.3)
HGB BLD-MCNC: 11.2 G/DL (ref 12.1–17)
IMM GRANULOCYTES # BLD AUTO: 0 K/UL (ref 0–0.04)
IMM GRANULOCYTES NFR BLD AUTO: 0 % (ref 0–0.5)
LYMPHOCYTES # BLD: 0.5 K/UL (ref 0.8–3.5)
LYMPHOCYTES NFR BLD: 7 % (ref 12–49)
MCH RBC QN AUTO: 30.2 PG (ref 26–34)
MCHC RBC AUTO-ENTMCNC: 31.6 G/DL (ref 30–36.5)
MCV RBC AUTO: 95.4 FL (ref 80–99)
MONOCYTES # BLD: 0.1 K/UL (ref 0–1)
MONOCYTES NFR BLD: 1 % (ref 5–13)
NEUTS SEG # BLD: 6.4 K/UL (ref 1.8–8)
NEUTS SEG NFR BLD: 91 % (ref 32–75)
NRBC # BLD: 0 K/UL (ref 0–0.01)
NRBC BLD-RTO: 0 PER 100 WBC
P-R INTERVAL, ECG05: 168 MS
PLATELET # BLD AUTO: 193 K/UL (ref 150–400)
PMV BLD AUTO: 11.5 FL (ref 8.9–12.9)
POTASSIUM SERPL-SCNC: 4.5 MMOL/L (ref 3.5–5.1)
PROT SERPL-MCNC: 7.4 G/DL (ref 6.4–8.2)
Q-T INTERVAL, ECG07: 382 MS
QRS DURATION, ECG06: 130 MS
QTC CALCULATION (BEZET), ECG08: 482 MS
RBC # BLD AUTO: 3.71 M/UL (ref 4.1–5.7)
SERVICE CMNT-IMP: ABNORMAL
SODIUM SERPL-SCNC: 133 MMOL/L (ref 136–145)
VENTRICULAR RATE, ECG03: 96 BPM
WBC # BLD AUTO: 7 K/UL (ref 4.1–11.1)

## 2019-05-28 PROCEDURE — 94640 AIRWAY INHALATION TREATMENT: CPT

## 2019-05-28 PROCEDURE — 36415 COLL VENOUS BLD VENIPUNCTURE: CPT

## 2019-05-28 PROCEDURE — 5A1D70Z PERFORMANCE OF URINARY FILTRATION, INTERMITTENT, LESS THAN 6 HOURS PER DAY: ICD-10-PCS | Performed by: INTERNAL MEDICINE

## 2019-05-28 PROCEDURE — 65660000000 HC RM CCU STEPDOWN

## 2019-05-28 PROCEDURE — 74011000250 HC RX REV CODE- 250: Performed by: INTERNAL MEDICINE

## 2019-05-28 PROCEDURE — 74011250637 HC RX REV CODE- 250/637: Performed by: INTERNAL MEDICINE

## 2019-05-28 PROCEDURE — 74011636637 HC RX REV CODE- 636/637: Performed by: INTERNAL MEDICINE

## 2019-05-28 PROCEDURE — 90935 HEMODIALYSIS ONE EVALUATION: CPT

## 2019-05-28 PROCEDURE — 74011250636 HC RX REV CODE- 250/636: Performed by: INTERNAL MEDICINE

## 2019-05-28 PROCEDURE — 74011000258 HC RX REV CODE- 258: Performed by: INTERNAL MEDICINE

## 2019-05-28 PROCEDURE — 77010033678 HC OXYGEN DAILY

## 2019-05-28 PROCEDURE — 82962 GLUCOSE BLOOD TEST: CPT

## 2019-05-28 PROCEDURE — 80053 COMPREHEN METABOLIC PANEL: CPT

## 2019-05-28 PROCEDURE — 94760 N-INVAS EAR/PLS OXIMETRY 1: CPT

## 2019-05-28 PROCEDURE — 85025 COMPLETE CBC W/AUTO DIFF WBC: CPT

## 2019-05-28 RX ORDER — BUMETANIDE 1 MG/1
2 TABLET ORAL EVERY EVENING
Status: DISCONTINUED | OUTPATIENT
Start: 2019-05-28 | End: 2019-05-30 | Stop reason: HOSPADM

## 2019-05-28 RX ORDER — PREDNISONE 20 MG/1
40 TABLET ORAL
Status: DISCONTINUED | OUTPATIENT
Start: 2019-05-29 | End: 2019-05-30 | Stop reason: HOSPADM

## 2019-05-28 RX ORDER — IPRATROPIUM BROMIDE AND ALBUTEROL SULFATE 2.5; .5 MG/3ML; MG/3ML
3 SOLUTION RESPIRATORY (INHALATION)
Status: DISCONTINUED | OUTPATIENT
Start: 2019-05-29 | End: 2019-05-29

## 2019-05-28 RX ADMIN — TAMSULOSIN HYDROCHLORIDE 0.4 MG: 0.4 CAPSULE ORAL at 13:23

## 2019-05-28 RX ADMIN — BUMETANIDE 2 MG: 1 TABLET ORAL at 17:31

## 2019-05-28 RX ADMIN — SERTRALINE HYDROCHLORIDE 25 MG: 50 TABLET ORAL at 13:23

## 2019-05-28 RX ADMIN — HUMAN INSULIN 10 UNITS: 100 INJECTION, SUSPENSION SUBCUTANEOUS at 16:30

## 2019-05-28 RX ADMIN — HYDRALAZINE HYDROCHLORIDE 100 MG: 50 TABLET, FILM COATED ORAL at 21:36

## 2019-05-28 RX ADMIN — INSULIN LISPRO 2 UNITS: 100 INJECTION, SOLUTION INTRAVENOUS; SUBCUTANEOUS at 13:23

## 2019-05-28 RX ADMIN — HEPARIN SODIUM 5000 UNITS: 5000 INJECTION INTRAVENOUS; SUBCUTANEOUS at 05:33

## 2019-05-28 RX ADMIN — GUAIFENESIN 600 MG: 600 TABLET, EXTENDED RELEASE ORAL at 17:31

## 2019-05-28 RX ADMIN — INSULIN LISPRO 7 UNITS: 100 INJECTION, SOLUTION INTRAVENOUS; SUBCUTANEOUS at 16:13

## 2019-05-28 RX ADMIN — GUAIFENESIN 600 MG: 600 TABLET, EXTENDED RELEASE ORAL at 13:23

## 2019-05-28 RX ADMIN — INSULIN LISPRO 3 UNITS: 100 INJECTION, SOLUTION INTRAVENOUS; SUBCUTANEOUS at 09:17

## 2019-05-28 RX ADMIN — INSULIN LISPRO 3 UNITS: 100 INJECTION, SOLUTION INTRAVENOUS; SUBCUTANEOUS at 21:36

## 2019-05-28 RX ADMIN — FENOFIBRATE 145 MG: 145 TABLET ORAL at 17:32

## 2019-05-28 RX ADMIN — OXYCODONE AND ACETAMINOPHEN 1 TABLET: 5; 325 TABLET ORAL at 20:51

## 2019-05-28 RX ADMIN — METHYLPREDNISOLONE SODIUM SUCCINATE 40 MG: 40 INJECTION, POWDER, FOR SOLUTION INTRAMUSCULAR; INTRAVENOUS at 05:33

## 2019-05-28 RX ADMIN — METHYLPREDNISOLONE SODIUM SUCCINATE 40 MG: 40 INJECTION, POWDER, FOR SOLUTION INTRAMUSCULAR; INTRAVENOUS at 13:24

## 2019-05-28 RX ADMIN — VANCOMYCIN HYDROCHLORIDE 750 MG: 750 INJECTION, POWDER, LYOPHILIZED, FOR SOLUTION INTRAVENOUS at 11:04

## 2019-05-28 RX ADMIN — TAMSULOSIN HYDROCHLORIDE 0.4 MG: 0.4 CAPSULE ORAL at 17:32

## 2019-05-28 RX ADMIN — HUMAN INSULIN 10 UNITS: 100 INJECTION, SUSPENSION SUBCUTANEOUS at 07:30

## 2019-05-28 RX ADMIN — ASPIRIN 81 MG 81 MG: 81 TABLET ORAL at 13:23

## 2019-05-28 RX ADMIN — HYDRALAZINE HYDROCHLORIDE 100 MG: 50 TABLET, FILM COATED ORAL at 16:13

## 2019-05-28 RX ADMIN — Medication 10 ML: at 05:33

## 2019-05-28 RX ADMIN — FLUTICASONE PROPIONATE 2 SPRAY: 50 SPRAY, METERED NASAL at 13:24

## 2019-05-28 RX ADMIN — HEPARIN SODIUM 5000 UNITS: 5000 INJECTION INTRAVENOUS; SUBCUTANEOUS at 13:29

## 2019-05-28 RX ADMIN — CEFEPIME HYDROCHLORIDE 1 G: 1 INJECTION, POWDER, FOR SOLUTION INTRAMUSCULAR; INTRAVENOUS at 20:48

## 2019-05-28 RX ADMIN — IPRATROPIUM BROMIDE AND ALBUTEROL SULFATE 3 ML: .5; 3 SOLUTION RESPIRATORY (INHALATION) at 21:01

## 2019-05-28 RX ADMIN — LEVOFLOXACIN 500 MG: 5 INJECTION, SOLUTION INTRAVENOUS at 21:36

## 2019-05-28 RX ADMIN — ATORVASTATIN CALCIUM 20 MG: 20 TABLET, FILM COATED ORAL at 17:32

## 2019-05-28 RX ADMIN — Medication 10 ML: at 21:36

## 2019-05-28 RX ADMIN — IPRATROPIUM BROMIDE AND ALBUTEROL SULFATE 3 ML: .5; 3 SOLUTION RESPIRATORY (INHALATION) at 15:28

## 2019-05-28 RX ADMIN — Medication 10 ML: at 13:24

## 2019-05-28 RX ADMIN — HEPARIN SODIUM 5000 UNITS: 5000 INJECTION INTRAVENOUS; SUBCUTANEOUS at 21:36

## 2019-05-28 RX ADMIN — IPRATROPIUM BROMIDE AND ALBUTEROL SULFATE 3 ML: .5; 3 SOLUTION RESPIRATORY (INHALATION) at 02:57

## 2019-05-28 RX ADMIN — CARVEDILOL 12.5 MG: 12.5 TABLET, FILM COATED ORAL at 16:13

## 2019-05-28 NOTE — PROGRESS NOTES
Hospitalist Progress Note    NAME: Jey Maldonado   :  1945   MRN:  [id number]       Assessment / Plan:  Acute respiratory failure with hypoxia   SIRS due to R-sided multilobar PNA with pleurisy, present on admission:  Treating as HCAP due to ESRD/HD  - CXR with patchy bibasilar infiltrate, greatest in the right lower lobe. Correlate for developing CHF versus acute pneumonia in the right lower lobe. . Follow-up as clinically appropriate.  - CT chest with multifocal consolidation in the right upper lobe, right middle lobe and right lower lobe most consistent with inflammatory infiltrate or pneumonia. - blood cultures sent, - emperic vanco, cefepime and levaquin for now  - start mucinex. Robitussin AC prn for pleuritic chest pain. 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 Last echo 10/16 with EF 25%. There was diffuse hypokinesis. Wall thickness was mildly increased. Hypertrophy was noted. Mild concentric hypertrophy. - serial trop, current 0.43  - con't home ASA, coreg, hydralazine  -  bumex restarted  - con't lipitor, tricor  - prn nitrobid    Steroid induced hyperglycemia in setting of  Insulin dependent DM2 controlled with nephropathy:  - holding home victoza  - on 70/30 insulin at home,c/w NPH  - lispro sliding scale    ESRD, on HD TTS: sees Dr. Adrienne Pleitez  - nephrology consulted  BPH: con't flomax     Code Status: Full   Surrogate Decision Maker: wife  DVT Prophylaxis: heparin     Baseline: independent      Body mass index is 32.96 kg/m². therefore classifying patient as obese, NOS (BMI of >30 kg/m2)  -counseled exercise/diet. Patient receptive. Subjective:     Chief Complaint / Reason for Physician Visit  C/o pleuritic chest pain , feels much better. Discussed with RN events overnight. Review of Systems:  Symptom Y/N Comments  Symptom Y/N Comments   Fever/Chills n   Chest Pain n    Poor Appetite    Edema     Cough y   Abdominal Pain n    Sputum n   Joint Pain     SOB/BRITT y   Pruritis/Rash     Nausea/vomit n   Tolerating PT/OT     Diarrhea    Tolerating Diet y    Constipation    Other       Could NOT obtain due to:      Objective:     VITALS:   Last 24hrs VS reviewed since prior progress note. Most recent are:  Patient Vitals for the past 24 hrs:   Temp Pulse Resp BP SpO2   05/28/19 1130 98.2 °F (36.8 °C) 70 16 112/83    05/28/19 1115  68 16 140/77    05/28/19 1100  65 16 137/73    05/28/19 1045  (!) 58 16 129/71    05/28/19 1030  (!) 57 16 116/64    05/28/19 1015  (!) 56 16 109/63    05/28/19 1000  68 16 108/69    05/28/19 0945  61 16 131/75    05/28/19 0930  65 16 129/76    05/28/19 0915  (!) 58 16 119/68    05/28/19 0900  (!) 57 16 110/73    05/28/19 0845  (!) 56 16 120/72    05/28/19 0830  (!) 57 16 120/73    05/28/19 0815  (!) 56 16 121/71    05/28/19 0800 97.8 °F (36.6 °C) (!) 56 16 132/78    05/28/19 0524 97.6 °F (36.4 °C)       05/28/19 0322 97.2 °F (36.2 °C) 65 18 125/73 98 %   05/28/19 0257     97 %   05/27/19 2359 97.3 °F (36.3 °C) 70 18 144/78 99 %   05/27/19 2039     96 %   05/27/19 1941 100.3 °F (37.9 °C)  18 122/71 96 %   05/27/19 1647     94 %   05/27/19 1600  78 20 136/76 95 %   05/27/19 1500 99.6 °F (37.6 °C) 82 22 137/75 92 %   05/27/19 1321     97 %       Intake/Output Summary (Last 24 hours) at 5/28/2019 1230  Last data filed at 5/28/2019 1130  Gross per 24 hour   Intake 100 ml   Output 3035 ml   Net -2935 ml        PHYSICAL EXAM:  General: WD, WN. Alert, cooperative, no distress   EENT:  EOMI. Anicteric sclerae.  MMM  Resp:  CTA bilaterally, no wheezing but has rales. no accessory muscle use  CV:  Regular  rhythm,  No edema, reproducible R sided chest pain  GI:  Soft, Non distended, Non tender.  +Bowel sounds  Neurologic:  Alert and oriented X 3, normal speech,   Psych:   Good insight. Not anxious nor agitated  Skin:  No rashes. No jaundice    Reviewed most current lab test results and cultures  YES  Reviewed most current radiology test results   YES  Review and summation of old records today    NO  Reviewed patient's current orders and MAR    YES  PMH/SH reviewed - no change compared to H&P  ________________________________________________________________________  Care Plan discussed with:    Comments   Patient x    Family      RN x    Care Manager     Consultant                        Multidiciplinary team rounds were held today with , nursing, pharmacist and clinical coordinator. Patient's plan of care was discussed; medications were reviewed and discharge planning was addressed. ________________________________________________________________________  Total NON critical care TIME:  25 Minutes    Total CRITICAL CARE TIME Spent:   Minutes non procedure based      Comments   >50% of visit spent in counseling and coordination of care     ________________________________________________________________________  Gurmeet Morrison MD     Procedures: see electronic medical records for all procedures/Xrays and details which were not copied into this note but were reviewed prior to creation of Plan. LABS:  I reviewed today's most current labs and imaging studies.   Pertinent labs include:  Recent Labs     05/28/19  0236 05/27/19  0139 05/26/19  1551   WBC 7.0 7.9 8.2   HGB 11.2* 11.1* 11.6*   HCT 35.4* 34.8* 36.1*    217 222     Recent Labs     05/28/19  0236 05/27/19  0139 05/26/19  1551   * 136 138   K 4.5 3.9 3.6    105 102   CO2 24 26 26   * 144* 174*   BUN 53* 32* 29*   CREA 5.56* 4.32* 4.03*   CA 8.6 8.3* 8.5   ALB 3.2* --  3.7   TBILI 0.5  --  0.7   SGOT 26  --  22   ALT 27  --  25       Signed: Tomasa Soler MD

## 2019-05-28 NOTE — PROGRESS NOTES
TRANSFER - OUT REPORT:    Verbal report given to NEHA Duque on Charolotte Knee  being transferred to [facility] for ordered procedure       Report consisted of patients Situation, Background, Assessment and   Recommendations(SBAR). Information from the following report(s) Kardex was reviewed with the receiving nurse. Opportunity for questions and clarification was provided.       Patient transported with:   [employer]

## 2019-05-28 NOTE — PROCEDURES
HD TRANSFER - OUT REPORT:    Verbal report given to Renee Baldwinadilia  on Alena Montgomery being transferred to [facility]  for ordered procedure       Report consisted of patient's Situation, Background, Assessment and   Recommendations(SBAR). Information from the following report(s) Kardex was reviewed with the receiving nurse. Method:  $$ Method: Hemodialysis (05/28/19 1130)    Fluid Removed  NET Fluid Removed (mL): 2700 ml (05/28/19 1130)     Patient response to treatment:  Stable    End Time  Hemodialysis End Time: 1130 (05/28/19 1130)  If not documented, dialysis nurse to update post-dialysis row in HD/Filtration flowsheet     Medications /Volume expansion agents or Fluid boluses administered during treatment? yes Vancomycin 750mg IV given    Post-dialysis medication administration due?  no  Remind nurse to administer post-HD medication upon return to unit. Fistula hemostasis? yes    Line heparinization? no    Lines: secure    Opportunity for questions and clarification was provided.       Patient transported with: [employer]

## 2019-05-28 NOTE — PROGRESS NOTES
0700: Bedside shift change report given to NEHA Madrigal (oncoming nurse) by Mariia Nieves (offgoing nurse). Report included the following information SBAR, Kardex, Procedure Summary, Intake/Output, MAR and Recent Results. *Pt. Scheduled for HD today, no issues overnight. Report given to Olive in HD.  Vancomycin pulled from [facility] to go with transport for PRN vanc in HD

## 2019-05-28 NOTE — PROGRESS NOTES
Bedside shift change report given to Dimitri Gonzales (oncoming nurse) by Kash Ugarte (offgoing nurse). Report included the following information SBAR, Kardex, Intake/Output, MAR, Accordion and Recent Results.

## 2019-05-28 NOTE — PROGRESS NOTES
Nephrology Progress Note     Chavo Euceda     [facility]. [email or web address]                  Phone - [phone]   Patient: Alena Montgomery   Date- 5/28/2019        Admit Date: 5/26/2019      YOB: 1945         CC: Follow up for ESRD          Subjective: Interval History:   -  Seen on hd  Sob improved  bp stable  ROS:- as above   Assessment:   ESRD - TTS at [facility]     HTN    Anemia of ckd    Right sided multilobar PNA     DM    Non obstructive CAD by cath 3/2019     Congestive CM, EF~25%        Plan:   · Seen on hd  · Hold epogen  · Remove 2-3 kg  · abx per primary team  · Continue coreg, hydralazine,    Physical Exam:   GEN: NAD  NECK- Supple, no mass  RESP: Clear b/l, no wheezing,   CVS: RRR,S1,S2    ABDO: soft , non tender, No mass  EXT: No Edema   NEURO: normal speech, non focal  Upper arm avf +    Care Plan discussed with: patient and hd nurse  Objective:   Visit Vitals  /72   Pulse (!) 56   Temp 97.8 °F (36.6 °C) (Oral)   Resp 16   Wt 110.2 kg (243 lb)   SpO2 98%   BMI 32.96 kg/m²     Last 3 Recorded Weights in this Encounter    05/26/19 1536 05/28/19 0524   Weight: 110.6 kg (243 lb 13.3 oz) 110.2 kg (243 lb)     05/26 1901 - 05/28 0700  In: 100 [I.V.:100]  Out: 1085 [Urine:1085]  Chart reviewed. Pertinent Notes reviewed.        Medication list  reviewed   Current Facility-Administered Medications   Medication    methylPREDNISolone (PF) (SOLU-MEDROL) injection 40 mg    sodium chloride (NS) flush 5-10 mL    sodium chloride (NS) flush 5-40 mL    sodium chloride (NS) flush 5-40 mL    acetaminophen (TYLENOL) tablet 650 mg    oxyCODONE-acetaminophen (PERCOCET) 5-325 mg per tablet 1 Tab    heparin (porcine) injection 5,000 Units    guaiFENesin ER (MUCINEX) tablet 600 mg    guaiFENesin-codeine (ROBITUSSIN AC) 100-10 mg/5 mL solution 10 mL    insulin lispro (HUMALOG) injection    glucose chewable tablet 16 g    glucagon (GLUCAGEN) injection 1 mg    aspirin chewable tablet 81 mg    atorvastatin (LIPITOR) tablet 20 mg    carvedilol (COREG) tablet 12.5 mg    fenofibrate nanocrystallized (TRICOR) tablet 145 mg    fluticasone propionate (FLONASE) 50 mcg/actuation nasal spray 2 Spray    hydrALAZINE (APRESOLINE) tablet 100 mg    sertraline (ZOLOFT) tablet 25 mg    tamsulosin (FLOMAX) capsule 0.4 mg    insulin NPH (NOVOLIN N, HUMULIN N) injection 10 Units    nitroglycerin (NITROBID) 2 % ointment 1 Inch    cefepime (MAXIPIME) 1 g in 0.9% sodium chloride (MBP/ADV) 50 mL    levoFLOXacin (LEVAQUIN) 500 mg in D5W IVPB    albuterol-ipratropium (DUO-NEB) 2.5 MG-0.5 MG/3 ML    albuterol (PROVENTIL VENTOLIN) nebulizer solution 2.5 mg    vancomycin (VANCOCIN) 750 mg in 0.9% sodium chloride (MBP/ADV) 250 mL    VANCOMYCIN INFORMATION NOTE           Data Review :  Recent Labs     05/28/19  0236 05/27/19  0139 05/26/19  1551   WBC 7.0 7.9 8.2   HGB 11.2* 11.1* 11.6*    217 222   ANEU 6.4  --  6.7   * 136 138   K 4.5 3.9 3.6   * 144* 174*   BUN 53* 32* 29*   CREA 5.56* 4.32* 4.03*   ALT 27  --  25   SGOT 26  --  22   TBILI 0.5  --  0.7   AP 56  --  67   CA 8.6 8.3* 8.5     Lab Results   Component Value Date/Time    Culture result: PENDING 05/27/2019 05:25 PM    Culture result: NO GROWTH 2 DAYS 05/26/2019 03:51 PM    Culture result: NO GROWTH 2 DAYS 05/26/2019 03:51 PM     Lab Results   Component Value Date/Time    Specimen Description: PBC 10/25/2013 06:05 AM    Specimen Description: URINE 10/25/2013 06:05 AM    Specimen Description: VOIDED URINE 09/24/2013 10:40 AM     No results for input(s): FE, TIBC, PSAT, FERR in the last 72 hours. Lab Results   Component Value Date/Time    Creatinine, urine 188.6 04/11/2019 12:27 PM     Chivo Guerra MD  [address] [facility]   [email or web address]  [facility]  [address], [address]  [place], [address]  Phone - [phone]         Fax - [phone]

## 2019-05-28 NOTE — PROGRESS NOTES
TRANSFER - OUT REPORT:    Verbal report given to NEHA Duque on Julia Mini  being transferred to [facility] for ordered procedure       Report consisted of patients Situation, Background, Assessment and   Recommendations(SBAR). Information from the following report(s) Kardex was reviewed with the receiving nurse. Opportunity for questions and clarification was provided.       Patient transported with:   [employer]

## 2019-05-28 NOTE — CONSULTS
Chavo Caseesa         NAME:Amado Magaña  EKP:[id number]   :1945                     Pt seen by DR. PORTER. Tammy Quarles, [facility]  [facility] [address], [place], [address]  Phone - [phone]         Fax - [phone] [facility] Office  [address] [place] [address]  Phone - [phone]        Fax - [phone]     [email or web address]

## 2019-05-28 NOTE — PROCEDURES
[facility]  [phone]    Vitals   Pre   Post   Assessment   Pre   Post     Temp  Temp: 97.8 °F (36.6 °C) (05/28/19 0800)  98.2 LOC  A & O x 4 A & O x 4   HR   Pulse (Heart Rate): (!) 56 (05/28/19 0800) 70 Lungs   Fine rales & wheezes  R/L lower lobes  rales lower lobes   B/P   BP: 132/78 (05/28/19 0800) 112/83 Cardiac   Normal S1  Normal S1   Resp   Resp Rate: 16 (05/28/19 0800) 18 Skin   Warm & dry  Warm & dry   Pain level  Pain Intensity 1: 0 (05/27/19 2240) 0 Edema  None     none   Orders:    Duration:   Start:    0800 End:    1130 Total:   3.5   Dialyzer:   Dialyzer/Set Up Inspection: Revaclear (05/28/19 0800)   K Bath:   Dialysate K (mEq/L): 2 (05/28/19 0800)   Ca Bath:   Dialysate CA (mEq/L): 2.5 (05/28/19 0800)   Na/Bicarb:   Dialysate NA (mEq/L): 140 (05/28/19 0800)   Target Fluid Removal:   Goal/Amount of Fluid to Remove (mL): 3500 mL (05/28/19 0800)   Access  AVF   Type & Location:   Left upper avf   Labs     Obtained/Reviewed   Critical Results Called   Date when labs were drawn-  Hgb-    HGB   Date Value Ref Range Status   05/28/2019 11.2 (L) 12.1 - 17.0 g/dL Final     K-    Potassium   Date Value Ref Range Status   05/28/2019 4.5 3.5 - 5.1 mmol/L Final     Ca-   Calcium   Date Value Ref Range Status   05/28/2019 8.6 8.5 - 10.1 MG/DL Final     Bun-   BUN   Date Value Ref Range Status   05/28/2019 53 (H) 6 - 20 MG/DL Final     Comment:     INVESTIGATED PER DELTA CHECK PROTOCOL     Creat-   Creatinine   Date Value Ref Range Status   05/28/2019 5.56 (H) 0.70 - 1.30 MG/DL Final        Medications/ Blood Products Given     Name   Dose   Route and Time     Vanc  750mg IV 1030             Blood Volume Processed (BVP):    82.0 Net Fluid   Removed:  2700ml   Comments RN completed patient assessment. RN reviewed technicians vital signs and procedure note. Tx completed.  Reviewed by NEAH Benson RN  Time Out Done: 7655  Primary Nurse Rpt Pre:NEHA Duque Nurse Rpt Post: Damian Mediate  Pt Education:Access care  Care Plan:HD care  Tx Summary:STEPHANIE AVF: skin CDI. No s/s of infection. No issues with cannulation or hemostasis. Running well at . Evita Gakarina Pt arrived to HD [address]. Consent signed & on file. SBAR received from Primary RN.  [id number]: Pt cannulated with 73T needles per policy & without issue. Labs drawn per request/ order. VSS. Dialysis Tx initiated. 0800: Pt. Resting, lines secure and visible  0815: Pt. Stable,lines secure and visible  0830: Pt.resting well, lines secure and visible  0845: Pt. Stable, lines secure and visible  0900: Pt resting quietly. Lines secure and visible  0915: Pt. Stable, lines secure and visible, Dr. Naif Rosario roundmadelyn  0930: Pt. Stable, lines secure and visible  0945: Pt. Stable, lines secure and visible  1000: Goal decreased due to low b/p lines secure and visible. 1030: Pt. Stable, lines secure and visible  1045: Pt. Stable, lines secure and visible  1100: Pt. Stable, lines secure and visib  1115: Pt. stable  1130 Tx ended. VSS. All possible blood returned to patient. Hemostasis achieved without issue. Bed locked and in the lowest position, call bell and belongings in reach. SBAR given to Primary, RN. Patient is stable at time of their/ my departure. All Dialysis related medications have been reviewed. Admiting Diagnosis: Pneumonia   Pt's previous clinic- [facility]  Consent signed - Informed Consent Verified: Yes (05/28/19 0800)  Ebonieita Consent - yes  Hepatitis Status- negative 5/16/19  Machine #- Machine Number: [id number] (05/28/19 0800)  Telemetry status-yes  Pre-dialysis wt. -

## 2019-05-29 LAB
ALBUMIN SERPL-MCNC: 3 G/DL (ref 3.5–5)
ALBUMIN/GLOB SERPL: 0.8 {RATIO} (ref 1.1–2.2)
ALP SERPL-CCNC: 52 U/L (ref 45–117)
ALT SERPL-CCNC: 27 U/L (ref 12–78)
ANION GAP SERPL CALC-SCNC: 8 MMOL/L (ref 5–15)
AST SERPL-CCNC: 24 U/L (ref 15–37)
BACTERIA SPEC CULT: ABNORMAL
BACTERIA SPEC CULT: ABNORMAL
BASOPHILS # BLD: 0 K/UL (ref 0–0.1)
BASOPHILS NFR BLD: 0 % (ref 0–1)
BILIRUB SERPL-MCNC: 0.4 MG/DL (ref 0.2–1)
BUN SERPL-MCNC: 64 MG/DL (ref 6–20)
BUN/CREAT SERPL: 14 (ref 12–20)
CALCIUM SERPL-MCNC: 8.4 MG/DL (ref 8.5–10.1)
CHLORIDE SERPL-SCNC: 100 MMOL/L (ref 97–108)
CO2 SERPL-SCNC: 26 MMOL/L (ref 21–32)
CREAT SERPL-MCNC: 4.69 MG/DL (ref 0.7–1.3)
DIFFERENTIAL METHOD BLD: ABNORMAL
EOSINOPHIL # BLD: 0 K/UL (ref 0–0.4)
EOSINOPHIL NFR BLD: 0 % (ref 0–7)
ERYTHROCYTE [DISTWIDTH] IN BLOOD BY AUTOMATED COUNT: 15.1 % (ref 11.5–14.5)
GLOBULIN SER CALC-MCNC: 3.9 G/DL (ref 2–4)
GLUCOSE BLD STRIP.AUTO-MCNC: 211 MG/DL (ref 65–100)
GLUCOSE BLD STRIP.AUTO-MCNC: 255 MG/DL (ref 65–100)
GLUCOSE BLD STRIP.AUTO-MCNC: 275 MG/DL (ref 65–100)
GLUCOSE BLD STRIP.AUTO-MCNC: 275 MG/DL (ref 65–100)
GLUCOSE SERPL-MCNC: 220 MG/DL (ref 65–100)
GRAM STN SPEC: ABNORMAL
HCT VFR BLD AUTO: 31.8 % (ref 36.6–50.3)
HGB BLD-MCNC: 10.1 G/DL (ref 12.1–17)
IMM GRANULOCYTES # BLD AUTO: 0.1 K/UL (ref 0–0.04)
IMM GRANULOCYTES NFR BLD AUTO: 1 % (ref 0–0.5)
LYMPHOCYTES # BLD: 0.8 K/UL (ref 0.8–3.5)
LYMPHOCYTES NFR BLD: 5 % (ref 12–49)
MCH RBC QN AUTO: 29.7 PG (ref 26–34)
MCHC RBC AUTO-ENTMCNC: 31.8 G/DL (ref 30–36.5)
MCV RBC AUTO: 93.5 FL (ref 80–99)
MONOCYTES # BLD: 0.9 K/UL (ref 0–1)
MONOCYTES NFR BLD: 6 % (ref 5–13)
NEUTS SEG # BLD: 12.9 K/UL (ref 1.8–8)
NEUTS SEG NFR BLD: 88 % (ref 32–75)
NRBC # BLD: 0 K/UL (ref 0–0.01)
NRBC BLD-RTO: 0 PER 100 WBC
PLATELET # BLD AUTO: 198 K/UL (ref 150–400)
PMV BLD AUTO: 11.7 FL (ref 8.9–12.9)
POTASSIUM SERPL-SCNC: 4.3 MMOL/L (ref 3.5–5.1)
PROT SERPL-MCNC: 6.9 G/DL (ref 6.4–8.2)
RBC # BLD AUTO: 3.4 M/UL (ref 4.1–5.7)
SERVICE CMNT-IMP: ABNORMAL
SODIUM SERPL-SCNC: 134 MMOL/L (ref 136–145)
WBC # BLD AUTO: 14.6 K/UL (ref 4.1–11.1)

## 2019-05-29 PROCEDURE — 65660000000 HC RM CCU STEPDOWN

## 2019-05-29 PROCEDURE — 80053 COMPREHEN METABOLIC PANEL: CPT

## 2019-05-29 PROCEDURE — 74011000250 HC RX REV CODE- 250: Performed by: INTERNAL MEDICINE

## 2019-05-29 PROCEDURE — 85025 COMPLETE CBC W/AUTO DIFF WBC: CPT

## 2019-05-29 PROCEDURE — 82962 GLUCOSE BLOOD TEST: CPT

## 2019-05-29 PROCEDURE — 74011636637 HC RX REV CODE- 636/637: Performed by: INTERNAL MEDICINE

## 2019-05-29 PROCEDURE — 36415 COLL VENOUS BLD VENIPUNCTURE: CPT

## 2019-05-29 PROCEDURE — 77010033678 HC OXYGEN DAILY

## 2019-05-29 PROCEDURE — 74011250636 HC RX REV CODE- 250/636: Performed by: INTERNAL MEDICINE

## 2019-05-29 PROCEDURE — 94640 AIRWAY INHALATION TREATMENT: CPT

## 2019-05-29 PROCEDURE — 74011250637 HC RX REV CODE- 250/637: Performed by: INTERNAL MEDICINE

## 2019-05-29 PROCEDURE — 74011000258 HC RX REV CODE- 258: Performed by: INTERNAL MEDICINE

## 2019-05-29 PROCEDURE — 94760 N-INVAS EAR/PLS OXIMETRY 1: CPT

## 2019-05-29 RX ORDER — IPRATROPIUM BROMIDE AND ALBUTEROL SULFATE 2.5; .5 MG/3ML; MG/3ML
3 SOLUTION RESPIRATORY (INHALATION)
Status: DISCONTINUED | OUTPATIENT
Start: 2019-05-29 | End: 2019-05-30 | Stop reason: HOSPADM

## 2019-05-29 RX ADMIN — GUAIFENESIN 600 MG: 600 TABLET, EXTENDED RELEASE ORAL at 08:58

## 2019-05-29 RX ADMIN — INSULIN LISPRO 5 UNITS: 100 INJECTION, SOLUTION INTRAVENOUS; SUBCUTANEOUS at 12:14

## 2019-05-29 RX ADMIN — INSULIN LISPRO 3 UNITS: 100 INJECTION, SOLUTION INTRAVENOUS; SUBCUTANEOUS at 21:33

## 2019-05-29 RX ADMIN — CARVEDILOL 12.5 MG: 12.5 TABLET, FILM COATED ORAL at 08:58

## 2019-05-29 RX ADMIN — CARVEDILOL 12.5 MG: 12.5 TABLET, FILM COATED ORAL at 17:17

## 2019-05-29 RX ADMIN — INSULIN LISPRO 3 UNITS: 100 INJECTION, SOLUTION INTRAVENOUS; SUBCUTANEOUS at 08:59

## 2019-05-29 RX ADMIN — HEPARIN SODIUM 5000 UNITS: 5000 INJECTION INTRAVENOUS; SUBCUTANEOUS at 06:10

## 2019-05-29 RX ADMIN — Medication 10 ML: at 06:10

## 2019-05-29 RX ADMIN — BUMETANIDE 2 MG: 1 TABLET ORAL at 17:17

## 2019-05-29 RX ADMIN — HYDRALAZINE HYDROCHLORIDE 100 MG: 50 TABLET, FILM COATED ORAL at 17:17

## 2019-05-29 RX ADMIN — HUMAN INSULIN 12 UNITS: 100 INJECTION, SUSPENSION SUBCUTANEOUS at 08:59

## 2019-05-29 RX ADMIN — ATORVASTATIN CALCIUM 20 MG: 20 TABLET, FILM COATED ORAL at 17:17

## 2019-05-29 RX ADMIN — FENOFIBRATE 145 MG: 145 TABLET ORAL at 17:17

## 2019-05-29 RX ADMIN — GUAIFENESIN 600 MG: 600 TABLET, EXTENDED RELEASE ORAL at 17:17

## 2019-05-29 RX ADMIN — HUMAN INSULIN 12 UNITS: 100 INJECTION, SUSPENSION SUBCUTANEOUS at 17:17

## 2019-05-29 RX ADMIN — TAMSULOSIN HYDROCHLORIDE 0.4 MG: 0.4 CAPSULE ORAL at 08:58

## 2019-05-29 RX ADMIN — TAMSULOSIN HYDROCHLORIDE 0.4 MG: 0.4 CAPSULE ORAL at 17:17

## 2019-05-29 RX ADMIN — CEFEPIME HYDROCHLORIDE 1 G: 1 INJECTION, POWDER, FOR SOLUTION INTRAMUSCULAR; INTRAVENOUS at 19:40

## 2019-05-29 RX ADMIN — ASPIRIN 81 MG 81 MG: 81 TABLET ORAL at 08:59

## 2019-05-29 RX ADMIN — HYDRALAZINE HYDROCHLORIDE 100 MG: 50 TABLET, FILM COATED ORAL at 21:33

## 2019-05-29 RX ADMIN — Medication 10 ML: at 14:38

## 2019-05-29 RX ADMIN — HEPARIN SODIUM 5000 UNITS: 5000 INJECTION INTRAVENOUS; SUBCUTANEOUS at 21:33

## 2019-05-29 RX ADMIN — INSULIN LISPRO 5 UNITS: 100 INJECTION, SOLUTION INTRAVENOUS; SUBCUTANEOUS at 17:17

## 2019-05-29 RX ADMIN — IPRATROPIUM BROMIDE AND ALBUTEROL SULFATE 3 ML: .5; 3 SOLUTION RESPIRATORY (INHALATION) at 07:45

## 2019-05-29 RX ADMIN — FLUTICASONE PROPIONATE 2 SPRAY: 50 SPRAY, METERED NASAL at 09:02

## 2019-05-29 RX ADMIN — Medication 10 ML: at 21:33

## 2019-05-29 RX ADMIN — HEPARIN SODIUM 5000 UNITS: 5000 INJECTION INTRAVENOUS; SUBCUTANEOUS at 14:38

## 2019-05-29 RX ADMIN — SERTRALINE HYDROCHLORIDE 25 MG: 50 TABLET ORAL at 08:58

## 2019-05-29 RX ADMIN — PREDNISONE 40 MG: 20 TABLET ORAL at 08:58

## 2019-05-29 NOTE — PROGRESS NOTES
Hospitalist Progress Note    NAME: Lara Viramontes   :  1945   MRN:  [id number]       Assessment / Plan:  Acute respiratory failure with hypoxia   Sepsis 2/2 R-sided multilobar PNA with pleurisy, present on admission:  Leucocytosis, most likely steroid induced    Treating as HCAP due to ESRD/HD  - CXR with patchy bibasilar infiltrate, greatest in the right lower lobe. Correlate for developing CHF versus acute pneumonia in the right lower lobe. . Follow-up as clinically appropriate.  - CT chest with multifocal consolidation in the right upper lobe, right middle lobe and right lower lobe most consistent with inflammatory infiltrate or pneumonia. - blood cultures sent, - emperic vanco, cefepime and levaquin for now  - start mucinex. Robitussin AC prn for pleuritic chest pain. 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 Last echo 10/16 with EF 25%. There was diffuse hypokinesis. Wall thickness was mildly increased. Hypertrophy was noted. Mild concentric hypertrophy.    - serial trop, current 0.43  - con't home ASA, coreg, hydralazine  -  bumex restarted  - con't lipitor, tricor  - prn nitrobid    Steroid induced hyperglycemia in setting of  Insulin dependent DM2 controlled with nephropathy:  - holding home victoza  - on 70/30 insulin at home,c/w NPH  - lispro sliding scale    ESRD, on HD TTS: sees Dr. Anshu Chaudhry  - nephrology consulted  BPH: con't flomax     Code Status: Full   Surrogate Decision Maker: wife  DVT Prophylaxis: heparin     Baseline: independent      Body mass index is 32.7 kg/m². therefore classifying patient as obese, NOS (BMI of >30 kg/m2)  -counseled exercise/diet. Patient receptive. Subjective:     Chief Complaint / Reason for Physician Visit  Fu pneumonia  , feels much better but desat upon ambulation . He reports miimal productive cough . Discussed with RN events overnight. Review of Systems:  Symptom Y/N Comments  Symptom Y/N Comments   Fever/Chills n   Chest Pain n    Poor Appetite    Edema     Cough y   Abdominal Pain n    Sputum y Minimal   Joint Pain     SOB/BRITT n   Pruritis/Rash     Nausea/vomit n   Tolerating PT/OT     Diarrhea    Tolerating Diet y    Constipation    Other       Could NOT obtain due to:      Objective:     VITALS:   Last 24hrs VS reviewed since prior progress note. Most recent are:  Patient Vitals for the past 24 hrs:   Temp Pulse Resp BP SpO2   05/29/19 0752 97.7 °F (36.5 °C) 68 18 118/73 98 %   05/29/19 0747     95 %   05/29/19 0335 97.4 °F (36.3 °C) 66 18 140/81 98 %   05/28/19 2312 97.5 °F (36.4 °C) 70 18 131/66 96 %   05/28/19 2101     94 %   05/28/19 1943 98.7 °F (37.1 °C) 73 18 125/67 90 %   05/28/19 1835 97.6 °F (36.4 °C) 68 17 132/72 93 %   05/28/19 1730     95 %   05/28/19 1729    131/66 95 %   05/28/19 1529     93 %   05/28/19 1525 98.2 °F (36.8 °C) 69 16 127/74 95 %   05/28/19 1241 98 °F (36.7 °C) (!) 58 16 140/80 98 %   05/28/19 1130 98.2 °F (36.8 °C) 70 16 112/83    05/28/19 1115  68 16 140/77    05/28/19 1100  65 16 137/73    05/28/19 1045  (!) 58 16 129/71    05/28/19 1030  (!) 57 16 116/64        Intake/Output Summary (Last 24 hours) at 5/29/2019 1027  Last data filed at 5/29/2019 0612  Gross per 24 hour   Intake 150 ml   Output 2875 ml   Net -2725 ml        PHYSICAL EXAM:  General: WD, WN. Alert, cooperative, no distress   EENT:  EOMI. Anicteric sclerae.  MMM  Resp:  CTA bilaterally, no wheezing or crakles . no accessory muscle use  CV:  Regular  rhythm,  No edema, reproducible R sided chest pain  GI:  Soft, Non distended, Non tender.  +Bowel sounds  Neurologic:  Alert and oriented X 3, normal speech,   Psych:   Good insight. Not anxious nor agitated  Skin:  No rashes. No jaundice    Reviewed most current lab test results and cultures  YES  Reviewed most current radiology test results   YES  Review and summation of old records today    NO  Reviewed patient's current orders and MAR    YES  PMH/SH reviewed - no change compared to H&P  ________________________________________________________________________  Care Plan discussed with:    Comments   Patient x    Family      RN x    Care Manager     Consultant                        Multidiciplinary team rounds were held today with , nursing, pharmacist and clinical coordinator. Patient's plan of care was discussed; medications were reviewed and discharge planning was addressed. ________________________________________________________________________  Total NON critical care TIME:  25 Minutes    Total CRITICAL CARE TIME Spent:   Minutes non procedure based      Comments   >50% of visit spent in counseling and coordination of care     ________________________________________________________________________  General MD Suzette     Procedures: see electronic medical records for all procedures/Xrays and details which were not copied into this note but were reviewed prior to creation of Plan. LABS:  I reviewed today's most current labs and imaging studies.   Pertinent labs include:  Recent Labs     05/29/19  0301 05/28/19  0236 05/27/19  0139   WBC 14.6* 7.0 7.9   HGB 10.1* 11.2* 11.1*   HCT 31.8* 35.4* 34.8*    193 217     Recent Labs     05/29/19  0301 05/28/19  0236 05/27/19  0139 05/26/19  1551   * 133* 136 138   K 4.3 4.5 3.9 3.6    101 105 102   CO2 26 24 26 26   * 234* 144* 174*   BUN 64* 53* 32* 29* CREA 4.69* 5.56* 4.32* 4.03*   CA 8.4* 8.6 8.3* 8.5   ALB 3.0* 3.2*  --  3.7   TBILI 0.4 0.5  --  0.7   SGOT 24 26  --  22   ALT 27 27  --  25       Signed: Josiah Domingo MD

## 2019-05-29 NOTE — PROGRESS NOTES
ADULT PROTOCOL: JET AEROSOL  REASSESSMENT    Patient  Lara Viramontes     72 y.o.   male     5/29/2019  12:03 PM    Breath Sounds Pre Procedure: Right Breath Sounds: Diminished                               Left Breath Sounds: Diminished    Breath Sounds Post Procedure: Right Breath Sounds: Diminished                                 Left Breath Sounds: Diminished    Breathing pattern: Pre procedure Breathing Pattern: Regular          Post procedure Breathing Pattern: Regular    Heart Rate: Pre procedure Pulse: 75           Post procedure Pulse: 79    Resp Rate: Pre procedure Respirations: 20           Post procedure Respirations: 20      Oxygen: O2 Device: Nasal cannula   Flow rate (L/min) 2     Changed: NO    SpO2: Pre procedure SpO2: 95 %   with oxygen              Post procedure SpO2: 97 %  with oxygen    Nebulizer Therapy: Current medications Aerosolized Medications: DuoNeb      Changed: YES    Smoking History: cigarettes and tobacco use.      Problem List:   Patient Active Problem List   Diagnosis Code    Acute pancreatitis K85.90    LFT'S ABNORMAL     Acute renal failure (ARF) ([facility]) N17.9    CKD (chronic kidney disease) stage 3, GFR 30-59 ml/min ([facility]) N18.3    HTN (hypertension) I10    CAD (coronary artery disease) I25.10    Abdominal pain, acute, epigastric R10.13    Nausea & vomiting R11.2    UTI (lower urinary tract infection) N39.0    Renal failure (ARF), acute on chronic (HCC) N17.9, N18.9    Diarrhea R19.7    SIRS (systemic inflammatory response syndrome) ([facility]) R65.10    Nephrotic syndrome N04.9    Renal anasarca N04.9    Acute on chronic renal failure (HCC) N17.9, N18.9    Hypotension I95.9    Chest pain R07.9    Type II diabetes mellitus with nephropathy ([facility] [address] E11.21    Hyperlipidemia E78.5    Pneumonia J18.9       Respiratory Therapist: Julius Crawford, RT

## 2019-05-29 NOTE — PROGRESS NOTES
Medicare pt has received, reviewed, and signed 2nd IM letter informing them of their right to appeal the discharge. Signed copy has been placed on pt bedside chart.               Ruba Johnson  [phone]

## 2019-05-29 NOTE — PROGRESS NOTES
0700: Bedside shift change report given to NEHA Madrigal (oncoming nurse) by Lennox Hauser (offgoing nurse). Report included the following information SBAR, Kardex, Procedure Summary, Intake/Output, MAR and Recent Results.

## 2019-05-29 NOTE — PROGRESS NOTES
Nephrology Progress Note     Ch[facility] Euceda     [facility]. [email or web address]                  Phone - [phone]   Patient: Julia Roca   Date- 5/29/2019        Admit Date: 5/26/2019      YOB: 1945         CC: Follow up for  esrd          Subjective: Interval History:   -  S/p hd yesterday  bp stable  No C/O of chest pain,SOB, vomiting, abdominal pain,fever,chills  ROS:- as above   Assessment:   ESRD - TTS at [facility]     HTN    Anemia of ckd    Right sided multilobar PNA     DM    Non obstructive CAD by cath 3/2019     Congestive CM, EF~25%        Plan:   · No dialysis today  · Hold epogen  · abx per primary team  · Continue coreg, hydralazine  · Continue hd TTS    Physical Exam:   GENERAL ASSESSMENT: NAD  CHEST: CTA b/l, no wheezing  HEART: S1,S2,RRR  ABDOMEN: Soft,Non tender  NEURO: Non focal, normal speech  EXTREMITY: no EDEMA       Left arm avf bruit +    Care Plan discussed with: patient , nurse  Objective:   Visit Vitals  /73 (BP 1 Location: Right arm, BP Patient Position: At rest)   Pulse 68   Temp 97.7 °F (36.5 °C)   Resp 18   Wt 109.4 kg (241 lb 1.6 oz)   SpO2 98%   BMI 32.70 kg/m²     Last 3 Recorded Weights in this Encounter    05/26/19 1536 05/28/19 0524 05/29/19 0611   Weight: 110.6 kg (243 lb 13.3 oz) 110.2 kg (243 lb) 109.4 kg (241 lb 1.6 oz)     05/27 1901 - 05/29 0700  In: 200 [I.V.:200]  Out: 3210 [Urine:510]  Chart reviewed. Pertinent Notes reviewed.        Medication list  reviewed   Current Facility-Administered Medications   Medication    bumetanide (BUMEX) tablet 2 mg    predniSONE (DELTASONE) tablet 40 mg    insulin NPH (NOVOLIN N, HUMULIN N) injection 12 Units    albuterol-ipratropium (DUO-NEB) 2.5 MG-0.5 MG/3 ML    sodium chloride (NS) flush 5-10 mL    sodium chloride (NS) flush 5-40 mL    sodium chloride (NS) flush 5-40 mL    acetaminophen (TYLENOL) tablet 650 mg    oxyCODONE-acetaminophen (PERCOCET) 5-325 mg per tablet 1 Tab  heparin (porcine) injection 5,000 Units    guaiFENesin ER (MUCINEX) tablet 600 mg    guaiFENesin-codeine (ROBITUSSIN AC) 100-10 mg/5 mL solution 10 mL    insulin lispro (HUMALOG) injection    glucose chewable tablet 16 g    glucagon (GLUCAGEN) injection 1 mg    aspirin chewable tablet 81 mg    atorvastatin (LIPITOR) tablet 20 mg    carvedilol (COREG) tablet 12.5 mg    fenofibrate nanocrystallized (TRICOR) tablet 145 mg    fluticasone propionate (FLONASE) 50 mcg/actuation nasal spray 2 Spray    hydrALAZINE (APRESOLINE) tablet 100 mg    sertraline (ZOLOFT) tablet 25 mg    tamsulosin (FLOMAX) capsule 0.4 mg    nitroglycerin (NITROBID) 2 % ointment 1 Inch    cefepime (MAXIPIME) 1 g in 0.9% sodium chloride (MBP/ADV) 50 mL    levoFLOXacin (LEVAQUIN) 500 mg in D5W IVPB    albuterol (PROVENTIL VENTOLIN) nebulizer solution 2.5 mg    vancomycin (VANCOCIN) 750 mg in 0.9% sodium chloride (MBP/ADV) 250 mL    VANCOMYCIN INFORMATION NOTE           Data Review :  Recent Labs     05/29/19  0301 05/28/19  0236 05/27/19  0139 05/26/19  1551   WBC 14.6* 7.0 7.9 8.2   HGB 10.1* 11.2* 11.1* 11.6*    193 217 222   ANEU 12.9* 6.4  --  6.7   * 133* 136 138   K 4.3 4.5 3.9 3.6   * 234* 144* 174*   BUN 64* 53* 32* 29*   CREA 4.69* 5.56* 4.32* 4.03*   ALT 27 27  --  25   SGOT 24 26  --  22   TBILI 0.4 0.5  --  0.7   AP 52 56  --  67   CA 8.4* 8.6 8.3* 8.5     Lab Results   Component Value Date/Time    Culture result: FEW YEAST, (APPARENT CANDIDA ALBICANS) (A) 05/27/2019 05:25 PM    Culture result: LIGHT NORMAL RESPIRATORY LOW 05/27/2019 05:25 PM    Culture result: NO GROWTH 3 DAYS 05/26/2019 03:51 PM    Culture result: NO GROWTH 3 DAYS 05/26/2019 03:51 PM     Lab Results   Component Value Date/Time    Specimen Description: [facility] 10/25/2013 06:05 AM    Specimen Description: URINE 10/25/2013 06:05 AM    Specimen Description: VOIDED URINE 09/24/2013 10:40 AM     No results for input(s): FE, TIBC, PSAT, FERR in the last 72 hours. Lab Results   Component Value Date/Time    Creatinine, urine 188.6 04/11/2019 12:27 PM     Jeff Dennis MD  [facility]   [email or web address]  [facility]  [address], [address]  [place], [address]  Phone - [phone]         Fax - [phone]

## 2019-05-29 NOTE — PROGRESS NOTES
ADULT PROTOCOL: JET AEROSOL  REASSESSMENT    Patient  Alina Prim     72 y.o.   male     5/28/2019  10:24 PM    Breath Sounds Pre Procedure: Right Breath Sounds: Diminished                               Left Breath Sounds: Diminished    Breath Sounds Post Procedure: Right Breath Sounds: Diminished                                 Left Breath Sounds: Diminished    Breathing pattern: Pre procedure Breathing Pattern: Regular          Post procedure Breathing Pattern: Regular    Heart Rate: Pre procedure Pulse: 68           Post procedure Pulse: 70    Resp Rate: Pre procedure Respirations: 18           Post procedure Respirations: 18    Peak Flow: Pre bronchodilator   n/a          Post bronchodilator   n/a    Incentive Spirometry:   n/a      n/a    Cough: Pre procedure Cough: Non-productive               Post procedure Cough: Non-productive    Sputum: Pre procedure  None                 Post procedure Sputum amount: Moderate  Sputum color/odor: Yellow, Pink tinged  Sputum consistency:  Thick  Sputum method obtained: Spontaneous    Oxygen: O2 Device: Nasal cannula   Flow rate (L/min) 2     Changed: NO    SpO2: Pre procedure SpO2: 94 %   with oxygen              Post procedure SpO2: 94 %  with oxygen    Nebulizer Therapy: Current medications Aerosolized Medications: DuoNeb      Changed: YES    Problem List:   Patient Active Problem List   Diagnosis Code    Acute pancreatitis K85.90    LFT'S ABNORMAL     Acute renal failure (ARF) ([facility]) N17.9    CKD (chronic kidney disease) stage 3, GFR 30-59 ml/min ([facility]) N18.3    HTN (hypertension) I10    CAD (coronary artery disease) I25.10    Abdominal pain, acute, epigastric R10.13    Nausea & vomiting R11.2    UTI (lower urinary tract infection) N39.0    Renal failure (ARF), acute on chronic ([facility]) N17.9, N18.9    Diarrhea R19.7    SIRS (systemic inflammatory response syndrome) ([facility]) R65.10    Nephrotic syndrome N04.9    Renal anasarca N04.9    Acute on chronic renal failure (HCC) N17.9, N18.9    Hypotension I95.9    Chest pain R07.9    [facility] E11.21    Hyperlipidemia E78.5    Pneumonia J18.9       Respiratory Therapist: Matthew Moreira, RT

## 2019-05-29 NOTE — PROGRESS NOTES
Bedside shift change report given to Jolanta Oseguera (oncoming nurse) by Raymond Wilson (offgoing nurse). Report included the following information SBAR, Kardex, MAR and Recent Results.

## 2019-05-29 NOTE — DIABETES MGMT
[facility]    [facility] Progress Note    Recommendations/ Comments: If appropriate, please consider:  1) titrating pt's NPH toward home dose 20 units bid (pt is currently on 50% of home regimen). 3) changing correctional insulin to high sensitivity once po intakes >50% and start prandial coverage. Current hospital DM medication: NPH 12 units bid ac, Lispro correctional insulin normal sensitivity. Chart reviewed on Jenni Mai due to hyperglycemia - pt is on 21 units basal and 9 units of prandial coverage on his home mixed insulin (NPH/Regular) along w/ his GLP1. Patient is a 72 y.o. male with known DM on NPH/Reg 30 units bid ac, Victoza 1.8 mg daily. A1c:   Lab Results   Component Value Date/Time    Hemoglobin A1c 10.5 (H) 11/16/2016 10:45 AM    Hemoglobin A1c 5.7 01/19/2011 07:00 AM       Recent Glucose Results:   Lab Results   Component Value Date/Time     (H) 05/29/2019 03:01 AM    GLUCPOC 275 (H) 05/29/2019 04:33 PM    GLUCPOC 255 (H) 05/29/2019 11:28 AM    GLUCPOC 211 (H) 05/29/2019 07:50 AM        Lab Results   Component Value Date/Time    Creatinine 4.69 (H) 05/29/2019 03:01 AM     CrCl cannot be calculated (Unknown ideal weight. ). Active Orders   Diet    DIET DIABETIC CONSISTENT CARB Regular        PO intake:   Patient Vitals for the past 72 hrs:   % Diet Eaten   05/27/19 1752 20 %   05/27/19 0841 60 %       Will continue to follow as needed.     Thank you  [address]      Time spent: 10 minutes

## 2019-05-29 NOTE — CDMP QUERY
Dr Zuhair Edwards:    
Patient admitted with SIRS d/t R sided multilobar PNA POA and noted to have 3/4 SIRS criteria (temp, HR, RR, WBC) w/ leukocytosis likely steroid induced. Per ED assessment 'severe sepsis.'   If possible, please document in progress notes and d/c summary if you are evaluating and/or treating any of the following: 
 
 
? SIRS of non-infectious origin w/ acute organ dysfunction ? SIRS of non-infectious origin w/o acute organ dysfunction ? Sepsis POA ? Rule out Sepsis ? Other Explanation of clinical findings ? Clinically Undetermined (no explanation for clinical findings) The medical record reflects the following: 
 
   Risk Factors: PNA, ESRD on HD, steroids Clinical Indicators: RLL PNA w/ low grade temp 99.9, HR 98, RR 28, WBC 14K, LA 1.4 Treatment: IV Rocephin/Zitrhomax, CT chest & HD. Thank you,  
Nemesio Solano RN, MP

## 2019-05-30 VITALS
OXYGEN SATURATION: 95 % | WEIGHT: 240.6 LBS | SYSTOLIC BLOOD PRESSURE: 131 MMHG | TEMPERATURE: 97.6 F | DIASTOLIC BLOOD PRESSURE: 67 MMHG | BODY MASS INDEX: 32.63 KG/M2 | HEART RATE: 60 BPM | RESPIRATION RATE: 17 BRPM

## 2019-05-30 LAB
ALBUMIN SERPL-MCNC: 3.3 G/DL (ref 3.5–5)
ALBUMIN/GLOB SERPL: 0.8 {RATIO} (ref 1.1–2.2)
ALP SERPL-CCNC: 56 U/L (ref 45–117)
ALT SERPL-CCNC: 27 U/L (ref 12–78)
ANION GAP SERPL CALC-SCNC: 8 MMOL/L (ref 5–15)
AST SERPL-CCNC: 19 U/L (ref 15–37)
BASOPHILS # BLD: 0 K/UL (ref 0–0.1)
BASOPHILS NFR BLD: 0 % (ref 0–1)
BILIRUB SERPL-MCNC: 0.5 MG/DL (ref 0.2–1)
BUN SERPL-MCNC: 84 MG/DL (ref 6–20)
BUN/CREAT SERPL: 16 (ref 12–20)
CALCIUM SERPL-MCNC: 8.6 MG/DL (ref 8.5–10.1)
CHLORIDE SERPL-SCNC: 104 MMOL/L (ref 97–108)
CO2 SERPL-SCNC: 27 MMOL/L (ref 21–32)
CREAT SERPL-MCNC: 5.37 MG/DL (ref 0.7–1.3)
DATE LAST DOSE: NORMAL
DIFFERENTIAL METHOD BLD: ABNORMAL
EOSINOPHIL # BLD: 0 K/UL (ref 0–0.4)
EOSINOPHIL NFR BLD: 0 % (ref 0–7)
ERYTHROCYTE [DISTWIDTH] IN BLOOD BY AUTOMATED COUNT: 15.1 % (ref 11.5–14.5)
GLOBULIN SER CALC-MCNC: 4 G/DL (ref 2–4)
GLUCOSE BLD STRIP.AUTO-MCNC: 126 MG/DL (ref 65–100)
GLUCOSE BLD STRIP.AUTO-MCNC: 178 MG/DL (ref 65–100)
GLUCOSE SERPL-MCNC: 195 MG/DL (ref 65–100)
HCT VFR BLD AUTO: 33.6 % (ref 36.6–50.3)
HGB BLD-MCNC: 10.8 G/DL (ref 12.1–17)
IMM GRANULOCYTES # BLD AUTO: 0 K/UL (ref 0–0.04)
IMM GRANULOCYTES NFR BLD AUTO: 0 % (ref 0–0.5)
LYMPHOCYTES # BLD: 1.3 K/UL (ref 0.8–3.5)
LYMPHOCYTES NFR BLD: 9 % (ref 12–49)
MCH RBC QN AUTO: 29.9 PG (ref 26–34)
MCHC RBC AUTO-ENTMCNC: 32.1 G/DL (ref 30–36.5)
MCV RBC AUTO: 93.1 FL (ref 80–99)
MONOCYTES # BLD: 0.6 K/UL (ref 0–1)
MONOCYTES NFR BLD: 4 % (ref 5–13)
NEUTS BAND NFR BLD MANUAL: 1 %
NEUTS SEG # BLD: 12.6 K/UL (ref 1.8–8)
NEUTS SEG NFR BLD: 86 % (ref 32–75)
NRBC # BLD: 0 K/UL (ref 0–0.01)
NRBC BLD-RTO: 0 PER 100 WBC
PLATELET # BLD AUTO: 238 K/UL (ref 150–400)
PMV BLD AUTO: 12.1 FL (ref 8.9–12.9)
POTASSIUM SERPL-SCNC: 4 MMOL/L (ref 3.5–5.1)
PROT SERPL-MCNC: 7.3 G/DL (ref 6.4–8.2)
RBC # BLD AUTO: 3.61 M/UL (ref 4.1–5.7)
RBC MORPH BLD: ABNORMAL
REPORTED DOSE,DOSE: NORMAL UNITS
REPORTED DOSE/TIME,TMG: NORMAL
SERVICE CMNT-IMP: ABNORMAL
SERVICE CMNT-IMP: ABNORMAL
SODIUM SERPL-SCNC: 139 MMOL/L (ref 136–145)
VANCOMYCIN TROUGH SERPL-MCNC: 8.1 UG/ML (ref 5–10)
WBC # BLD AUTO: 14.5 K/UL (ref 4.1–11.1)
WBC MORPH BLD: ABNORMAL

## 2019-05-30 PROCEDURE — 80053 COMPREHEN METABOLIC PANEL: CPT

## 2019-05-30 PROCEDURE — 80202 ASSAY OF VANCOMYCIN: CPT

## 2019-05-30 PROCEDURE — 90935 HEMODIALYSIS ONE EVALUATION: CPT

## 2019-05-30 PROCEDURE — 85025 COMPLETE CBC W/AUTO DIFF WBC: CPT

## 2019-05-30 PROCEDURE — 74011250637 HC RX REV CODE- 250/637: Performed by: INTERNAL MEDICINE

## 2019-05-30 PROCEDURE — 36415 COLL VENOUS BLD VENIPUNCTURE: CPT

## 2019-05-30 PROCEDURE — 74011636637 HC RX REV CODE- 636/637: Performed by: INTERNAL MEDICINE

## 2019-05-30 PROCEDURE — 94760 N-INVAS EAR/PLS OXIMETRY 1: CPT

## 2019-05-30 PROCEDURE — 74011250636 HC RX REV CODE- 250/636: Performed by: INTERNAL MEDICINE

## 2019-05-30 PROCEDURE — 82962 GLUCOSE BLOOD TEST: CPT

## 2019-05-30 RX ORDER — LEVOFLOXACIN 500 MG/1
500 TABLET, FILM COATED ORAL
Qty: 2 TAB | Refills: 0 | Status: SHIPPED | OUTPATIENT
Start: 2019-06-01 | End: 2019-06-05

## 2019-05-30 RX ORDER — PREDNISONE 20 MG/1
40 TABLET ORAL
Qty: 6 TAB | Refills: 0 | Status: SHIPPED | OUTPATIENT
Start: 2019-05-31 | End: 2019-06-03

## 2019-05-30 RX ORDER — AMOXICILLIN AND CLAVULANATE POTASSIUM 875; 125 MG/1; MG/1
1 TABLET, FILM COATED ORAL 2 TIMES DAILY
Qty: 16 TAB | Refills: 0 | Status: SHIPPED | OUTPATIENT
Start: 2019-05-30 | End: 2019-06-07

## 2019-05-30 RX ADMIN — Medication 10 ML: at 06:02

## 2019-05-30 RX ADMIN — VANCOMYCIN HYDROCHLORIDE 750 MG: 750 INJECTION, POWDER, LYOPHILIZED, FOR SOLUTION INTRAVENOUS at 09:59

## 2019-05-30 RX ADMIN — TAMSULOSIN HYDROCHLORIDE 0.4 MG: 0.4 CAPSULE ORAL at 12:17

## 2019-05-30 RX ADMIN — SERTRALINE HYDROCHLORIDE 25 MG: 50 TABLET ORAL at 12:18

## 2019-05-30 RX ADMIN — HEPARIN SODIUM 5000 UNITS: 5000 INJECTION INTRAVENOUS; SUBCUTANEOUS at 06:01

## 2019-05-30 RX ADMIN — GUAIFENESIN 600 MG: 600 TABLET, EXTENDED RELEASE ORAL at 12:17

## 2019-05-30 RX ADMIN — ASPIRIN 81 MG 81 MG: 81 TABLET ORAL at 12:17

## 2019-05-30 RX ADMIN — PREDNISONE 40 MG: 20 TABLET ORAL at 12:17

## 2019-05-30 RX ADMIN — FLUTICASONE PROPIONATE 2 SPRAY: 50 SPRAY, METERED NASAL at 12:19

## 2019-05-30 NOTE — PROGRESS NOTES
HD TRANSFER - OUT REPORT:    Verbal report given to Mikaela Pascal RN on Vik Barlow being transferred to [facility]  (Unit) for ordered procedure       Report consisted of patient's Situation, Background, Assessment and   Recommendations(SBAR). Information from the following report(s) Kardex was reviewed with the receiving nurse. Method:  $$ Method: Hemodialysis (05/30/19 0800)    Fluid Removed  NET Fluid Removed (mL): 2700 ml (05/28/19 1130)     Patient response to treatment:  Stable    End Time  Hemodialysis End Time: 1130 (05/28/19 1130)  If not documented, dialysis nurse to update post-dialysis row in HD/Filtration flowsheet     Medications /Volume expansion agents or Fluid boluses administered during treatment? yes, Vancomycin 750mg IV    Post-dialysis medication administration due?  no  Remind nurse to administer post-HD medication upon return to unit. Fistula hemostasis? yes    Line heparinization? no    Lines: lines secure    Opportunity for questions and clarification was provided.       Patient transported with: Hemarina

## 2019-05-30 NOTE — PROGRESS NOTES
TRANSFER - IN REPORT:    Verbal report received from Halima Rodriguez RN on Kameron Cave  being received from Med/Tele for ordered procedure      Report consisted of patients Situation, Background, Assessment and   Recommendations(SBAR). Information from the following report(s) SBAR and Kardex was reviewed with the receiving nurse. Opportunity for questions and clarification was provided. Assessment completed upon patients arrival to unit and care assumed.

## 2019-05-30 NOTE — PROCEDURES
[place] Dialysis Team [facility]  [phone]    Vitals   Pre   Post   Assessment   Pre   Post     Temp  Temp: 97.7 °F (36.5 °C) (05/30/19 0800)  98.1 LOC  A & O X 4 A & O x 4   HR   Pulse (Heart Rate): (!) 58 (05/30/19 0815) 59 Lungs   Clear  Clear   B/P   BP: 124/65 (05/30/19 0815) 142/74 Cardiac   Normal S1  Normal S1   Resp   Resp Rate: 18 (05/30/19 0815) 16 Skin   Warm & dry  Warm & dry   Pain level  Pain Intensity 1: 2 (05/29/19 1932) 0 Edema  None     none   Orders: HEMODIALYSIS INPATIENT Duration (hr): 3.5; Dialysate Bath:  K: 2; Dialysate Bath:  CA: 2.5; Weight Loss (kg): 3.5; Maximum Blood Flow (mL/minute): 450 ONE TIME Routine   Duration:   Start:    0800 End:    1130 Total:   3.5   Dialyzer:   Dialyzer/Set Up Inspection: Lyn Howard (05/30/19 0800)   K Bath:   Dialysate K (mEq/L): 2 (05/30/19 0800)   Ca Bath:   Dialysate CA (mEq/L): 2.5 (05/30/19 0800)   Na/Bicarb:   Dialysate NA (mEq/L): 140 (05/30/19 0800)   Target Fluid Removal:   Goal/Amount of Fluid to Remove (mL): 3000 mL (05/30/19 0800)   Access  AVF   Type & Location:   Left upper AVF   Labs     Obtained/Reviewed   Critical Results Called   Date when labs were drawn-  Hgb-    HGB   Date Value Ref Range Status   05/29/2019 10.1 (L) 12.1 - 17.0 g/dL Final     K-    Potassium   Date Value Ref Range Status   05/29/2019 4.3 3.5 - 5.1 mmol/L Final     Ca-   Calcium   Date Value Ref Range Status   05/29/2019 8.4 (L) 8.5 - 10.1 MG/DL Final     Bun-   BUN   Date Value Ref Range Status   05/29/2019 64 (H) 6 - 20 MG/DL Final     Creat-   Creatinine   Date Value Ref Range Status   05/29/2019 4.69 (H) 0.70 - 1.30 MG/DL Final        Medications/ Blood Products Given     Name   Dose   Route and Time     Vancomycin 750mg IV             Blood Volume Processed (BVP):    78.1 Net Fluid   Removed:  1650ml   Comments RN completed patient assessment. RN reviewed technicians vital signs and procedure note. Tx completed.  Reviewed by RN Paulding Nightengale  Time Out Done: 0720  Primary Nurse Rpt Pre: Juan Galicia  Primary Nurse Rpt Post: Albina Bauer RN  Pt Education: Access care  Care Plan: HD process  Tx Summary:STEPHANIE AVF: skin CDI. No s/s of infection. No issues with cannulation or hemostasis. Labs drawn pre HD. Running well at . Pt arrived to HD suite A&Ox4. Consent signed & on file. SBAR received from Primary RN. 0800: Pt cannulated with 21N needles per policy & without issue. Labs drawn per request/ order. VSS. Dialysis Tx initiated. 0815: Pt. Stable, lines secure and visible. 0830: Pt. Stable, watching TV. Lines secure and visible  0845: Pt. Stable, lines secure and visible  0900: decreased goal due b/p trending down. Admin 100ml NS to maintain patency. Lines secure and visible. 0915: Pt resting quietly. lines secure and visible  0930: DR. Avitia rounding, lines secure and visible  0945: Pt. Stable, no problems noted. Lines secure and visible  1000: Pt. Stable, watching TV. Lines secure and visible  1015: Pt. Watching Tv. Lines secure and visible  1030: Pt. Stable, lines secure and visible. Vanc.750mg IV Started infusing  1045: Pt. Watching tv, lines secure and visible  1115: Pt. Watching tv, voiced no concerns, lines secure and visible  1130: Tx ended, Vanc 750mg completed infusing. VSS. All possible blood returned to patient. Hemostasis achieved without issue. Bed locked and in the lowest position, call bell and belongings in reach. SBAR given to Primary, RN. Patient is stable at time of their/ my departure. All Dialysis related medications have been reviewed. Gricelda Foster  Pt's previous clinic- [facility]  Consent signed - Informed Consent Verified: Yes (05/30/19 0800)  [place] Consent - Yes  Hepatitis Status- Negative  Machine #- Machine Number: [id number] (05/30/19 0800)  Telemetry status- No  Pre-dialysis wt. -  Stretcher

## 2019-05-30 NOTE — PROGRESS NOTES
0700: Bedside shift change report given to [facility] (oncoming nurse) by Juan Mayen (offgoing nurse). Report included the following information SBAR, Kardex, Procedure Summary, Intake/Output, MAR, Accordion and Recent Results.

## 2019-05-30 NOTE — PROGRESS NOTES
Pt was 97% on room air at rest. The patient walked the length of the keys and back and dropped to 92% on room air. Denied shortness of breath.

## 2019-05-30 NOTE — DISCHARGE SUMMARY
Hospitalist Discharge Summary     Patient ID:  Lara Viramontes  [id number]  20 y.o.  1945 5/26/2019    PCP on record: Comfort Espinosa MD    Admit date: 5/26/2019  Discharge date and time: 5/30/2019    DISCHARGE DIAGNOSIS:  Acute respiratory failure with hypoxia   Sepsis 2/2 R-sided multilobar PNA with pleurisy, present on admission:  Leucocytosis, most likely steroid induced  CAD with minimal troponin elevation, chronic systolic CHF, and essential HTN: recent cardiac cath 3/15/19 at [facility] reviewed with   Steroid induced hyperglycemia in setting of  Insulin dependent DM2 controlled with nephropathy:  -ESRD, on HD TTS:   BPH:      CONSULTATIONS:  IP CONSULT TO NEPHROLOGY    Excerpted HPI from H&P of Mikaela Dutton MD:  Kassi Caldwell is a 72 y.o.  male who presents with above. Pt in his usual state of health until about 3 days ago. He says that he walks daily and has been doing well with his glucose control. He was started on dialysis in October and has been doing well with this per his report. He has been having a severe nonproductive cough the last few days. He has developed R sided chest pain with coughing and movement. He also feels SOB. He endorses fever at home to 101.8. He denies dizziness. He has not eaten today. He denies stool changes. No focal weakness. No new edema.    ______________________________________________________________________  DISCHARGE SUMMARY/HOSPITAL COURSE:  for full details see H&P, daily progress notes, labs, consult notes. Acute respiratory failure with hypoxia   Sepsis 2/2 R-sided multilobar PNA with pleurisy, present on admission:  Leucocytosis, most likely steroid induced    Treating as HCAP due to ESRD/HD   - s/p cefipime/vanco and levaquin   - CXR with patchy bibasilar infiltrate, greatest in the right lower lobe. Correlate for developing CHF versus acute pneumonia in the right lower lobe. . Follow-up as clinically appropriate.  - CT chest with multifocal consolidation in the right upper lobe, right middle lobe and right lower lobe most consistent with inflammatory infiltrate or pneumonia. - blood cultures NTD , - - started on  mucinex.  Robitussin AC prn for pleuritic chest pain. 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 Last echo 10/16 with EF 25%. There was diffuse hypokinesis. Wall thickness was mildly increased. Hypertrophy was noted. Mild concentric hypertrophy.   - serial trop, current 0.43  - con't home ASA, coreg, hydralazine  -  bumex restarted  - con't lipitor, tricor  - prn nitrobid     Steroid induced hyperglycemia in setting of  Insulin dependent DM2 controlled with nephropathy:  - c/w home insulin regimen      ESRD, on HD TTS: sees Dr. Nilsa Hernandez  - nephrology consulted  BPH: con't flomax    ______________________________________________________________________  Patient seen and examined by me on discharge day. Pertinent Findings:  Gen:    Not in distress  Chest: Clear lungs  CVS:   Regular rhythm.   No edema  Abd:  Soft, not distended, not tender  Neuro:  Alert, oriented x4  _______________________________________________________________________  DISCHARGE MEDICATIONS:   Discharge Medication List as of 5/30/2019  1:28 PM      CONTINUE these medications which have NOT CHANGED    Details   VICTOZA 2-NELLI 0.6 mg/0.1 mL (18 mg/3 mL) pnij INJECT 1.8- MG SUBCUTANEOUSLY ONCE DAILY, TITRATE UP TO 1.8 DAILY AS DIRECTED, NormalPlease consider 90 day supplies to promote better adherenceDisp-9 Pen, R-3      !! sertraline (ZOLOFT) 50 mg tablet TAKE 1 T PO QD, Historical Med, R-0      aspirin 81 mg chewable tablet Take 81 mg by mouth daily. , Historical Med      ciprofloxacin HCl (CIPRO) 500 mg tablet Take 500 mg by mouth two (2) times a day., Historical Med      !! sertraline (ZOLOFT) 25 mg tablet TK 1 T PO QD, Historical Med      carvedilol (COREG) 12.5 mg tablet Historical Med      Insulin Needles, Disposable, (REYNALDO PEN NEEDLE) 32 gauge x 5/32\" ndle For use each AM with Victoza Pen, Normal, Disp-100 Pen Needle, R-3      Insulin Syringe-Needle U-100 1 mL 30 gauge x 5/16 syrg Use twice daily for insulin injection, Normal, Disp-100 Syringe, R-7      insulin NPH/insulin regular (NOVOLIN 70/30 U-100 INSULIN) 100 unit/mL (70-30) injection 60 Units by SubCUTAneous route Before breakfast and dinner., Normal, Disp-10 Vial, R-3      hydrALAZINE (APRESOLINE) 100 mg tablet 100 mg three (3) times daily. , Historical Med      fenofibrate nanocrystallized (TRICOR) 145 mg tablet Take  by mouth every evening., Historical Med      bumetanide (BUMEX) 2 mg tablet Take 2 mg by mouth every evening., Historical Med      atorvastatin (LIPITOR) 20 mg tablet Take 20 mg by mouth every evening., Historical Med      Calcium-Cholecalciferol, D3, (CALCIUM 600 WITH VITAMIN D3) 600 mg(1,500mg) -400 unit chew Take 1 Tab by mouth every evening., Historical Med      fluticasone (FLONASE) 50 mcg/actuation nasal spray 2 Sprays by Both Nostrils route daily. , Historical Med      tamsulosin (FLOMAX) 0.4 mg capsule Take 0.4 mg by mouth two (2) times a day., Historical Med       !! - Potential duplicate medications found. Please discuss with provider. Patient Follow Up Instructions: Activity: Activity as tolerated  Diet: Renal Diet  Wound Care: None needed    Follow-up with PCP  in 7 days.   Follow-up tests/labs   Follow-up Information     Follow up With Specialties Details Why Contact Info    Jonathan Munguia, [address]  [id number]  [phone]          ________________________________________________________________    Risk of deterioration: Low    Condition at Discharge:  Stable  __________________________________________________________________    Disposition  Home with family, no needs    ____________________________________________________________________    Code Status: Full Code  ___________________________________________________________________      Total time in minutes spent coordinating this discharge (includes going over instructions, follow-up, prescriptions, and preparing report for sign off to her PCP) :  35 minutes    Signed:  Gurmeet Morrison MD

## 2019-05-30 NOTE — DISCHARGE INSTRUCTIONS
DISCHARGE DIAGNOSIS:  Acute respiratory failure with hypoxia   Sepsis 2/2 R-sided multilobar PNA with pleurisy, present on admission:  Leucocytosis, most likely steroid induced  CAD with minimal troponin elevation, chronic systolic CHF, and essential HTN: recent cardiac cath 3/15/19 at [facility] reviewed with   Steroid induced hyperglycemia in setting of  Insulin dependent DM2 controlled with nephropathy:  -ESRD, on HD TTS:   BPH:    MEDICATIONS:  · It is important that you take the medication exactly as they are prescribed. · Keep your medication in the bottles provided by the pharmacist and keep a list of the medication names, dosages, and times to be taken in your wallet. · Do not take other medications without consulting your doctor. Pain Management: per above medications    What to do at Home    Recommended diet:  Renal Diet    Recommended activity: Activity as tolerated    If you have questions regarding the hospital related prescriptions or hospital related issues please call [address] [place] [address] 190 at . You can always direct your questions to your primary care doctor if you are unable to reach your hospital physician; your PCP works as an extension of your hospital doctor just like your hospital doctor is an extension of your PCP for your time at the hospital [facility]).     If you experience any of the following symptoms then please call your primary care physician or return to the emergency room if you cannot get hold of your doctor:  Fever, chills, nausea, vomiting, diarrhea, change in mentation, falling, bleeding, shortness of breath

## 2019-05-30 NOTE — PROGRESS NOTES
Nephrology Progress Note     Ch[facility] Euceda     [email or web address]                  Phone - [phone]   Patient: Wang Quigley   Date- 5/30/2019        Admit Date: 5/26/2019      YOB: 1945         CC: Follow up for  esrd          Subjective: Interval History:     Pt seen on dialysis. He says he is feeling much better. His SOB is gone and cough almost gone. Right-sided chest pain with coughing is also almost gone. He denies any other complaints and says he hopes to go home today after dialysis. ROS:- as above   Assessment:   ESRD - TTS at [facility].      HTN    Anemia of ckd    Right sided multilobar PNA     DM    Non obstructive CAD by cath 3/2019     Congestive CM, EF~25%        Plan:   ·   · Dialysis now. · EPO on hold  · abx per primary team  · Continue coreg, hydralazine  · Continue hd TTS    Physical Exam:   GENERAL ASSESSMENT: elderly man in no distress. CHEST: lungs clear; normal resp effort  HEART: RRR; no gallop or rub  ABDOMEN: soft and non-tender; no distension  NEURO: alert and OX 3; normal speech  EXTREMITY: no EDEMA       Left arm avf bruit +  Psych: normal affect and mood    Care Plan discussed with: patient and dialysis RN. Objective:   Visit Vitals  /71   Pulse 65   Temp 97.7 °F (36.5 °C) (Oral)   Resp 16   Wt 109.1 kg (240 lb 9.6 oz)   SpO2 95%   BMI 32.63 kg/m²     Last 3 Recorded Weights in this Encounter    05/28/19 0524 05/29/19 0611 05/30/19 0325   Weight: 110.2 kg (243 lb) 109.4 kg (241 lb 1.6 oz) 109.1 kg (240 lb 9.6 oz)     05/28 1901 - 05/30 0700  In: 200 [I.V.:200]  Out: 175 [Urine:175]  Chart reviewed. Pertinent Notes reviewed.        Medication list  reviewed   Current Facility-Administered Medications   Medication    albuterol-ipratropium (DUO-NEB) 2.5 MG-0.5 MG/3 ML    Vancomycin Trough    bumetanide (BUMEX) tablet 2 mg    predniSONE (DELTASONE) tablet 40 mg    insulin NPH (NOVOLIN N, HUMULIN N) injection 12 Units    sodium chloride (NS) flush 5-10 mL    sodium chloride (NS) flush 5-40 mL    sodium chloride (NS) flush 5-40 mL    acetaminophen (TYLENOL) tablet 650 mg    oxyCODONE-acetaminophen (PERCOCET) 5-325 mg per tablet 1 Tab    heparin (porcine) injection 5,000 Units    guaiFENesin ER (MUCINEX) tablet 600 mg    guaiFENesin-codeine (ROBITUSSIN AC) 100-10 mg/5 mL solution 10 mL    insulin lispro (HUMALOG) injection    glucose chewable tablet 16 g    glucagon (GLUCAGEN) injection 1 mg    aspirin chewable tablet 81 mg    atorvastatin (LIPITOR) tablet 20 mg    carvedilol (COREG) tablet 12.5 mg    fenofibrate nanocrystallized (TRICOR) tablet 145 mg    fluticasone propionate (FLONASE) 50 mcg/actuation nasal spray 2 Spray    hydrALAZINE (APRESOLINE) tablet 100 mg    sertraline (ZOLOFT) tablet 25 mg    tamsulosin (FLOMAX) capsule 0.4 mg    nitroglycerin (NITROBID) 2 % ointment 1 Inch    cefepime (MAXIPIME) 1 g in 0.9% sodium chloride (MBP/ADV) 50 mL    levoFLOXacin (LEVAQUIN) 500 mg in D5W IVPB    albuterol (PROVENTIL VENTOLIN) nebulizer solution 2.5 mg    vancomycin (VANCOCIN) 750 mg in 0.9% sodium chloride (MBP/ADV) 250 mL    VANCOMYCIN INFORMATION NOTE           Data Review :  Recent Labs     05/30/19  0814 05/29/19  0301 05/28/19  0236   WBC 14.5* 14.6* 7.0   HGB 10.8* 10.1* 11.2*    198 193   ANEU 12.6* 12.9* 6.4    134* 133*   K 4.0 4.3 4.5   * 220* 234*   BUN 84* 64* 53*   CREA 5.37* 4.69* 5.56*   ALT 27 27 27   SGOT 19 24 26   TBILI 0.5 0.4 0.5   AP 56 52 56   CA 8.6 8.4* 8.6     Lab Results   Component Value Date/Time    Culture result: FEW YEAST, (APPARENT CANDIDA ALBICANS) (A) 05/27/2019 05:25 PM    Culture result: LIGHT NORMAL RESPIRATORY LOW 05/27/2019 05:25 PM    Culture result: NO GROWTH 4 DAYS 05/26/2019 03:51 PM    Culture result: NO GROWTH 4 DAYS 05/26/2019 03:51 PM     Lab Results   Component Value Date/Time    Specimen Description: [facility] 10/25/2013 06:05 AM Specimen Description: URINE 10/25/2013 06:05 AM    Specimen Description: VOIDED URINE 09/24/2013 10:40 AM     No results for input(s): FE, TIBC, PSAT, FERR in the last 72 hours. Lab Results   Component Value Date/Time    Creatinine, urine 188.6 04/11/2019 12:27 PM     Augustine Holstein, MD  [facility]   [email or web address]  [facility]  [address] [facility] [place], [address]  Phone - [phone]         Fax - [phone]

## 2019-06-01 LAB
BACTERIA SPEC CULT: NORMAL
BACTERIA SPEC CULT: NORMAL
SERVICE CMNT-IMP: NORMAL
SERVICE CMNT-IMP: NORMAL

## 2019-07-30 ENCOUNTER — APPOINTMENT (OUTPATIENT)
Dept: CT IMAGING | Age: 74
End: 2019-07-30
Attending: PHYSICIAN ASSISTANT
Payer: MEDICARE

## 2019-07-30 ENCOUNTER — HOSPITAL ENCOUNTER (EMERGENCY)
Age: 74
Discharge: HOME | End: 2019-07-30
Attending: EMERGENCY MEDICINE
Payer: MEDICARE

## 2019-07-30 VITALS
HEIGHT: 72 IN | OXYGEN SATURATION: 98 % | DIASTOLIC BLOOD PRESSURE: 77 MMHG | TEMPERATURE: 98 F | WEIGHT: 236.99 LBS | SYSTOLIC BLOOD PRESSURE: 153 MMHG | HEART RATE: 79 BPM | RESPIRATION RATE: 16 BRPM | BODY MASS INDEX: 32.1 KG/M2

## 2019-07-30 DIAGNOSIS — R31.0 GROSS HEMATURIA: Primary | ICD-10-CM

## 2019-07-30 LAB
ALBUMIN SERPL-MCNC: 4 G/DL (ref 3.5–5)
ALBUMIN/GLOB SERPL: 1 {RATIO} (ref 1.1–2.2)
ALP SERPL-CCNC: 76 U/L (ref 45–117)
ALT SERPL-CCNC: 23 U/L (ref 12–78)
ANION GAP SERPL CALC-SCNC: 6 MMOL/L (ref 5–15)
APPEARANCE UR: ABNORMAL
AST SERPL-CCNC: 23 U/L (ref 15–37)
BACTERIA URNS QL MICRO: NEGATIVE /HPF
BILIRUB SERPL-MCNC: 0.6 MG/DL (ref 0.2–1)
BILIRUB UR QL: NEGATIVE
BUN SERPL-MCNC: 21 MG/DL (ref 6–20)
BUN/CREAT SERPL: 7 (ref 12–20)
CALCIUM SERPL-MCNC: 8.8 MG/DL (ref 8.5–10.1)
CHLORIDE SERPL-SCNC: 104 MMOL/L (ref 97–108)
CO2 SERPL-SCNC: 29 MMOL/L (ref 21–32)
COLOR UR: ABNORMAL
CREAT SERPL-MCNC: 2.98 MG/DL (ref 0.7–1.3)
EPITH CASTS URNS QL MICRO: ABNORMAL /LPF
ERYTHROCYTE [DISTWIDTH] IN BLOOD BY AUTOMATED COUNT: 15.3 % (ref 11.5–14.5)
GLOBULIN SER CALC-MCNC: 4.1 G/DL (ref 2–4)
GLUCOSE SERPL-MCNC: 99 MG/DL (ref 65–100)
GLUCOSE UR STRIP.AUTO-MCNC: NEGATIVE MG/DL
HCT VFR BLD AUTO: 37.6 % (ref 36.6–50.3)
HGB BLD-MCNC: 12.1 G/DL (ref 12.1–17)
HGB UR QL STRIP: ABNORMAL
HYALINE CASTS URNS QL MICRO: ABNORMAL /LPF (ref 0–5)
KETONES UR QL STRIP.AUTO: NEGATIVE MG/DL
LEUKOCYTE ESTERASE UR QL STRIP.AUTO: ABNORMAL
MCH RBC QN AUTO: 30.5 PG (ref 26–34)
MCHC RBC AUTO-ENTMCNC: 32.2 G/DL (ref 30–36.5)
MCV RBC AUTO: 94.7 FL (ref 80–99)
NITRITE UR QL STRIP.AUTO: NEGATIVE
NRBC # BLD: 0 K/UL (ref 0–0.01)
NRBC BLD-RTO: 0 PER 100 WBC
PH UR STRIP: 7.5 [PH] (ref 5–8)
PLATELET # BLD AUTO: 233 K/UL (ref 150–400)
PMV BLD AUTO: 11.3 FL (ref 8.9–12.9)
POTASSIUM SERPL-SCNC: 3.8 MMOL/L (ref 3.5–5.1)
PROT SERPL-MCNC: 8.1 G/DL (ref 6.4–8.2)
PROT UR STRIP-MCNC: >300 MG/DL
RBC # BLD AUTO: 3.97 M/UL (ref 4.1–5.7)
RBC #/AREA URNS HPF: >100 /HPF (ref 0–5)
SODIUM SERPL-SCNC: 139 MMOL/L (ref 136–145)
SP GR UR REFRACTOMETRY: 1.01 (ref 1–1.03)
UA: UC IF INDICATED,UAUC: ABNORMAL
UROBILINOGEN UR QL STRIP.AUTO: 0.2 EU/DL (ref 0.2–1)
WBC # BLD AUTO: 7.1 K/UL (ref 4.1–11.1)
WBC URNS QL MICRO: ABNORMAL /HPF (ref 0–4)

## 2019-07-30 PROCEDURE — 85027 COMPLETE CBC AUTOMATED: CPT

## 2019-07-30 PROCEDURE — 80053 COMPREHEN METABOLIC PANEL: CPT

## 2019-07-30 PROCEDURE — 81001 URINALYSIS AUTO W/SCOPE: CPT

## 2019-07-30 PROCEDURE — 74176 CT ABD & PELVIS W/O CONTRAST: CPT

## 2019-07-30 PROCEDURE — 87086 URINE CULTURE/COLONY COUNT: CPT

## 2019-07-30 PROCEDURE — 99283 EMERGENCY DEPT VISIT LOW MDM: CPT

## 2019-07-30 PROCEDURE — 36415 COLL VENOUS BLD VENIPUNCTURE: CPT

## 2019-07-30 RX ORDER — CEPHALEXIN 500 MG/1
500 CAPSULE ORAL 4 TIMES DAILY
Qty: 28 CAP | Refills: 0 | Status: SHIPPED | OUTPATIENT
Start: 2019-07-30 | End: 2019-07-30

## 2019-07-30 RX ORDER — CEPHALEXIN 500 MG/1
500 CAPSULE ORAL 4 TIMES DAILY
Qty: 28 CAP | Refills: 0 | Status: SHIPPED | OUTPATIENT
Start: 2019-07-30 | End: 2019-08-06

## 2019-07-30 NOTE — ED NOTES
Pt returned from CT at this time    Pt arrives ambulatory to the ED with c/o blood in urine since last night. Pt denies pain or difficulty urinating.  Pt states he noticed \"what looked like a blood clot\" in the toilet last night and today his urine looked like the color of \"wine\"    Pt denies hx kidney stone, however states he does have ESRD and does dialysis [facility], Sat    Pt sitting in chair, placed x2 on monitor, call bell in reach

## 2019-07-30 NOTE — ED NOTES
MD Margy reviewed discharge instructions with the patient. The patient verbalized understanding. All questions and concerns were addressed. The patient declined a wheelchair and is discharged ambulatory with instructions and prescriptions in hand. Pt is alert and oriented x 4. Respirations are clear and unlabored.

## 2019-07-30 NOTE — ED PROVIDER NOTES
EMERGENCY DEPARTMENT HISTORY AND PHYSICAL EXAM      Date: 7/30/2019  Patient Name: Leopold Cha    History of Presenting Illness     Chief Complaint   Patient presents with    Blood in Urine     Patient complain of hematuria one episode onset this AM Denies any other symptoms or pain        History Provided By: Patient    HPI: Leopold Cha, 72 y.o. male with PMHx significant for arthritis, coronary artery disease, chronic kidney disease, diabetes, GERD, hypertension, presents to the ED with cc of hematuria onset last night. His urine has been dark red and he did pass one clot. Symptoms are persistent, there are no modifying factors. He denies associated dysuria, flank pain, abdominal pain, fever, nausea, vomiting. There are no other complaints, changes, or physical findings at this time. PCP: None    No current facility-administered medications on file prior to encounter. Current Outpatient Medications on File Prior to Encounter   Medication Sig Dispense Refill    L.acidoph & parac-S.therm-Bifido (LOW Q2/RISAQUAD-2) 16 billion cell cap cap Take 1 Cap by mouth daily. 30 Cap 0    VICTOZA 2-NELLI 0.6 mg/0.1 mL (18 mg/3 mL) pnij INJECT 1.8- MG SUBCUTANEOUSLY ONCE DAILY, TITRATE UP TO 1.8 DAILY AS DIRECTED 9 Pen 3    sertraline (ZOLOFT) 50 mg tablet TAKE 1 T PO QD  0    aspirin 81 mg chewable tablet Take 81 mg by mouth daily.  carvedilol (COREG) 12.5 mg tablet       Insulin Syringe-Needle U-100 1 mL 30 gauge x 5/16 syrg Use twice daily for insulin injection 100 Syringe 7    hydrALAZINE (APRESOLINE) 100 mg tablet 100 mg three (3) times daily.  fenofibrate nanocrystallized (TRICOR) 145 mg tablet Take  by mouth every evening.  bumetanide (BUMEX) 2 mg tablet Take 2 mg by mouth every evening.  atorvastatin (LIPITOR) 20 mg tablet Take 20 mg by mouth every evening.       Calcium-Cholecalciferol, D3, (CALCIUM 600 WITH VITAMIN D3) 600 mg(1,500mg) -400 unit chew Take 1 Tab by mouth every evening.  fluticasone (FLONASE) 50 mcg/actuation nasal spray 2 Sprays by Both Nostrils route daily.  tamsulosin (FLOMAX) 0.4 mg capsule Take 0.4 mg by mouth two (2) times a day.  Insulin Needles, Disposable, (REYNALDO PEN NEEDLE) 32 gauge x 5/32\" ndle FOR USE EVERY MORNING WITH VICTOZA  Pen Needle 3    insulin NPH/insulin regular (NOVOLIN 70/30 U-100 INSULIN) 100 unit/mL (70-30) injection Up to 40 units before breakfast and dinner, 8 Vial 3    sertraline (ZOLOFT) 25 mg tablet TK 1 T PO QD         Past History     Past Medical History:  Past Medical History:   Diagnosis Date    Arthritis     spine    CAD (coronary artery disease)     high cholesterol    Chronic kidney disease     Diabetes (HCC)     GERD (gastroesophageal reflux disease)     HX    Hypertension     Ill-defined condition     irritable bowel syndrome    Systolic CHF ([facility] [address]     Unspecified sleep apnea     NO CPAP       Past Surgical History:  Past Surgical History:   Procedure Laterality Date    COLONOSCOPY N/A 11/9/2016    COLONOSCOPY performed by Tanmay Puentes MD at [facility] ENDOSCOPY    HX ORTHOPAEDIC      CERVICAL FUSION    HX UROLOGICAL      TURP    HX VASCULAR ACCESS      L arm dialysis access       Family History:  Family History   Problem Relation Age of Onset    Cancer Father         \"bone\"    Diabetes Brother        Social History:  Social History     Tobacco Use    Smoking status: Former Smoker     Last attempt to quit: 2009     Years since quitting: 10.5    Smokeless tobacco: Former User    Tobacco comment: cigars only   Substance Use Topics    Alcohol use: No    Drug use: No       Allergies: Allergies   Allergen Reactions    Albumin, Human 25 % Anaphylaxis and Hives    Niacin Hives         Review of Systems   Review of Systems   Constitutional: Negative for chills and fever. HENT: Negative for ear pain and sore throat. Eyes: Negative for redness and visual disturbance.    Respiratory: Negative for cough and shortness of breath. Cardiovascular: Negative for chest pain and palpitations. Gastrointestinal: Negative for abdominal pain, nausea and vomiting. Genitourinary: Positive for hematuria. Negative for dysuria and flank pain. Musculoskeletal: Negative for back pain and gait problem. Skin: Negative for rash and wound. Neurological: Negative for dizziness and headaches. Psychiatric/Behavioral: Negative for behavioral problems and confusion. All other systems reviewed and are negative. Physical Exam   Physical Exam   Constitutional: He is oriented to person, place, and time. He appears well-developed and well-nourished. No distress. HENT:   Head: Normocephalic and atraumatic. Eyes: Pupils are equal, round, and reactive to light. Conjunctivae and EOM are normal.   Neck: Normal range of motion. Neck supple. Cardiovascular: Normal rate, regular rhythm and normal heart sounds. Pulmonary/Chest: Effort normal and breath sounds normal. No respiratory distress. He has no wheezes. He has no rales. Abdominal: Soft. He exhibits no distension. There is no tenderness. There is no rebound, no guarding and no CVA tenderness. Musculoskeletal: Normal range of motion. Neurological: He is alert and oriented to person, place, and time. He has normal strength. No cranial nerve deficit or sensory deficit. GCS eye subscore is 4. GCS verbal subscore is 5. GCS motor subscore is 6. Skin: Skin is warm and dry. No rash noted. Psychiatric: He has a normal mood and affect. His behavior is normal.   Nursing note and vitals reviewed.         Diagnostic Study Results     Labs -     Recent Results (from the past 12 hour(s))   URINALYSIS W/ REFLEX CULTURE    Collection Time: 07/30/19 12:29 PM   Result Value Ref Range    Color BLOODY      Appearance BLOODY (A) CLEAR      Specific gravity 1.014 1.003 - 1.030      pH (UA) 7.5 5.0 - 8.0      Protein >300 (A) NEG mg/dL    Glucose NEGATIVE  NEG mg/dL    Ketone NEGATIVE  NEG mg/dL    Bilirubin NEGATIVE  NEG      Blood LARGE (A) NEG      Urobilinogen 0.2 0.2 - 1.0 EU/dL    Nitrites NEGATIVE  NEG      Leukocyte Esterase TRACE (A) NEG      WBC 10-20 0 - 4 /hpf    RBC >100 (H) 0 - 5 /hpf    Epithelial cells FEW FEW /lpf    Bacteria NEGATIVE  NEG /hpf    UA:UC IF INDICATED URINE CULTURE ORDERED (A) CNI      Hyaline cast 2-5 0 - 5 /lpf   CBC W/O DIFF    Collection Time: 07/30/19 12:29 PM   Result Value Ref Range    WBC 7.1 4.1 - 11.1 K/uL    RBC 3.97 (L) 4.10 - 5.70 M/uL    HGB 12.1 12.1 - 17.0 g/dL    HCT 37.6 36.6 - 50.3 %    MCV 94.7 80.0 - 99.0 FL    MCH 30.5 26.0 - 34.0 PG    MCHC 32.2 30.0 - 36.5 g/dL    RDW 15.3 (H) 11.5 - 14.5 %    PLATELET 926 574 - 516 K/uL    MPV 11.3 8.9 - 12.9 FL    NRBC 0.0 0  WBC    ABSOLUTE NRBC 0.00 0.00 - 8.52 K/uL   METABOLIC PANEL, COMPREHENSIVE    Collection Time: 07/30/19 12:29 PM   Result Value Ref Range    Sodium 139 136 - 145 mmol/L    Potassium 3.8 3.5 - 5.1 mmol/L    Chloride 104 97 - 108 mmol/L    CO2 29 21 - 32 mmol/L    Anion gap 6 5 - 15 mmol/L    Glucose 99 65 - 100 mg/dL    BUN 21 (H) 6 - 20 MG/DL    Creatinine 2.98 (H) 0.70 - 1.30 MG/DL    BUN/Creatinine ratio 7 (L) 12 - 20      GFR est AA 25 (L) >60 ml/min/1.73m2    GFR est non-AA 21 (L) >60 ml/min/1.73m2    Calcium 8.8 8.5 - 10.1 MG/DL    Bilirubin, total 0.6 0.2 - 1.0 MG/DL    ALT (SGPT) 23 12 - 78 U/L    AST (SGOT) 23 15 - 37 U/L    Alk. phosphatase 76 45 - 117 U/L    Protein, total 8.1 6.4 - 8.2 g/dL    Albumin 4.0 3.5 - 5.0 g/dL    Globulin 4.1 (H) 2.0 - 4.0 g/dL    A-G Ratio 1.0 (L) 1.1 - 2.2         Radiologic Studies -   CT ABD PELV WO CONT   Final Result   IMPRESSION:   No acute findings seen. Small kidneys with right side nonobstructing calculi and   cysts. Enlarged prostate. CT Results  (Last 48 hours)               07/30/19 1359  CT ABD PELV WO CONT Final result    Impression:  IMPRESSION:   No acute findings seen.  Small kidneys with right side nonobstructing calculi and   cysts. Enlarged prostate. Narrative:  EXAM: CT ABD PELV WO CONT       INDICATION: Flank pain, stone disease suspected; Hematuria, rule out stone       COMPARISON: February 16, 2019       CONTRAST:  None. TECHNIQUE:    Thin axial images were obtained through the abdomen and pelvis. Coronal and   sagittal reconstructions were generated. Oral contrast was not administered. CT   dose reduction was achieved through use of a standardized protocol tailored for   this examination and automatic exposure control for dose modulation. The absence of intravenous contrast material reduces the sensitivity for   evaluation of the solid parenchymal organs of the abdomen. FINDINGS:    LUNG BASES: Clear. INCIDENTALLY IMAGED HEART AND MEDIASTINUM: Prominent heart size   LIVER: No mass or biliary dilatation. GALLBLADDER: Cholelithiasis in a otherwise unremarkable gallbladder. SPLEEN: No mass. PANCREAS: No mass or ductal dilatation. ADRENALS: Unremarkable. KIDNEYS/URETERS: Small kidneys bilaterally, known obstructing small calculus   upper pole right kidney. Bilateral small masses likely cysts, no obstruction. STOMACH: Unremarkable. SMALL BOWEL: No dilatation or wall thickening. COLON: No dilatation or wall thickening. APPENDIX: Unremarkable. PERITONEUM: No ascites or pneumoperitoneum. RETROPERITONEUM: No lymphadenopathy or aortic aneurysm. The prostate is enlarged and nodular   URINARY BLADDER: Not filled cannot be evaluated. BONES: No destructive bone lesion. ADDITIONAL COMMENTS: N/A               CXR Results  (Last 48 hours)    None            Medical Decision Making   I am the first provider for this patient. I reviewed the vital signs, available nursing notes, past medical history, past surgical history, family history and social history. Vital Signs-Reviewed the patient's vital signs.   Patient Vitals for the past 12 hrs:   BP SpO2 07/30/19 1526  98 %   07/30/19 1500 153/77 95 %   07/30/19 1409  100 %   07/30/19 1408 146/88          Records Reviewed: Nursing Notes and Old Medical Records    Provider Notes (Medical Decision Making):   Patient presents with hematuria onset last night. Differential diagnosis includes urinary tract infection, kidney stone, bladder cancer. Plan for urinalysis, labs, CT abdomen. ED Course:   Initial assessment performed. The patients presenting problems have been discussed, and they are in agreement with the care plan formulated and outlined with them. I have encouraged them to ask questions as they arise throughout their visit. Disposition:  2:58 PM -    The patient has been re-evaluated and is ready for discharge. Reviewed available results with patient. Counseled patient on diagnosis and care plan. Patient has expressed understanding, and all questions have been answered. Patient agrees with plan and agrees to follow up as recommended, or to return to the ED if their symptoms worsen. Discharge instructions have been provided and explained to the patient, along with reasons to return to the ED. PLAN:  1. Discharge Medication List as of 7/30/2019  2:58 PM      START taking these medications    Details   cephALEXin (KEFLEX) 500 mg capsule Take 1 Cap by mouth four (4) times daily for 7 days. , Normal, Disp-28 Cap, R-0         CONTINUE these medications which have CHANGED    Details   Insulin Needles, Disposable, (REYNALDO PEN NEEDLE) 32 gauge x 5/32\" ndle FOR USE EVERY MORNING WITH VICTOZA PEN, NormalPlease consider 90 day supplies to promote better adherenceDisp-100 Pen Needle, R-3      insulin NPH/insulin regular (NOVOLIN 70/30 U-100 INSULIN) 100 unit/mL (70-30) injection Up to 40 units before breakfast and dinner,, NormalPlease consider 90 day supplies to promote better adherenceDisp-8 Vial, R-3         CONTINUE these medications which have NOT CHANGED    Details   Kay & parac-S.therm-Bifido (LOW Q2/RISAQUAD-2) 16 billion cell cap cap Take 1 Cap by mouth daily. , Normal, Disp-30 Cap, R-0      VICTOZA 2-NELLI 0.6 mg/0.1 mL (18 mg/3 mL) pnij INJECT 1.8- MG SUBCUTANEOUSLY ONCE DAILY, TITRATE UP TO 1.8 DAILY AS DIRECTED, NormalPlease consider 90 day supplies to promote better adherenceDisp-9 Pen, R-3      !! sertraline (ZOLOFT) 50 mg tablet TAKE 1 T PO QD, Historical Med, R-0      aspirin 81 mg chewable tablet Take 81 mg by mouth daily. , Historical Med      carvedilol (COREG) 12.5 mg tablet Historical Med      Insulin Syringe-Needle U-100 1 mL 30 gauge x 5/16 syrg Use twice daily for insulin injection, Normal, Disp-100 Syringe, R-7      hydrALAZINE (APRESOLINE) 100 mg tablet 100 mg three (3) times daily. , Historical Med      fenofibrate nanocrystallized (TRICOR) 145 mg tablet Take  by mouth every evening., Historical Med      bumetanide (BUMEX) 2 mg tablet Take 2 mg by mouth every evening., Historical Med      atorvastatin (LIPITOR) 20 mg tablet Take 20 mg by mouth every evening., Historical Med      Calcium-Cholecalciferol, D3, (CALCIUM 600 WITH VITAMIN D3) 600 mg(1,500mg) -400 unit chew Take 1 Tab by mouth every evening., Historical Med      fluticasone (FLONASE) 50 mcg/actuation nasal spray 2 Sprays by Both Nostrils route daily. , Historical Med      tamsulosin (FLOMAX) 0.4 mg capsule Take 0.4 mg by mouth two (2) times a day., Historical Med      !! sertraline (ZOLOFT) 25 mg tablet TK 1 T PO QD, Historical Med       !! - Potential duplicate medications found. Please discuss with provider.         2.   Follow-up Information     Follow up With Specialties Details Why Maria M Segovia MD Urology Call today to schedule a follow up appointment [address]  [phone]      [facility] EMERGENCY DEPT Emergency Medicine Go to If symptoms worsen [address] [place] [address]  [phone]        Return to ED if worse Diagnosis     Clinical Impression:   1. Gross hematuria            Malinda Luis. SIGIFREDO Davila        This note will not be viewable in MyChart.

## 2019-08-01 LAB
BACTERIA SPEC CULT: NORMAL
CC UR VC: NORMAL
SERVICE CMNT-IMP: NORMAL

## 2019-08-14 ENCOUNTER — OFFICE VISIT (OUTPATIENT)
Dept: ENDOCRINOLOGY | Age: 74
End: 2019-08-14

## 2019-08-14 VITALS
BODY MASS INDEX: 32.43 KG/M2 | RESPIRATION RATE: 20 BRPM | SYSTOLIC BLOOD PRESSURE: 129 MMHG | DIASTOLIC BLOOD PRESSURE: 77 MMHG | WEIGHT: 239.4 LBS | HEIGHT: 72 IN | OXYGEN SATURATION: 96 % | HEART RATE: 66 BPM

## 2019-08-14 DIAGNOSIS — E11.22 TYPE 2 DIABETES MELLITUS WITH STAGE 4 CHRONIC KIDNEY DISEASE, WITH LONG-TERM CURRENT USE OF INSULIN (HCC): Primary | ICD-10-CM

## 2019-08-14 DIAGNOSIS — Z79.4 TYPE 2 DIABETES MELLITUS WITH STAGE 4 CHRONIC KIDNEY DISEASE, WITH LONG-TERM CURRENT USE OF INSULIN (HCC): Primary | ICD-10-CM

## 2019-08-14 DIAGNOSIS — N18.4 TYPE 2 DIABETES MELLITUS WITH STAGE 4 CHRONIC KIDNEY DISEASE, WITH LONG-TERM CURRENT USE OF INSULIN (HCC): Primary | ICD-10-CM

## 2019-08-14 DIAGNOSIS — I10 ESSENTIAL HYPERTENSION WITH GOAL BLOOD PRESSURE LESS THAN 130/80: ICD-10-CM

## 2019-08-14 DIAGNOSIS — E78.5 HYPERLIPIDEMIA, UNSPECIFIED HYPERLIPIDEMIA TYPE: ICD-10-CM

## 2019-08-14 LAB — HBA1C MFR BLD HPLC: 6.4 %

## 2019-08-14 NOTE — PROGRESS NOTES
CHIEF COMPLAINT: f/u evaluation for uncontrolled type 2 diabetes    HISTORY OF PRESENT ILLNESS:   Johnathan Cuevas is a 72 y.o. male with a PMHx as noted below who presents to the endocrinology clinic for f/u evaluation of uncontrolled type 2 diabetes.     A1c today is 6.4%,     Currently taking the following meds:  Victoza 1.8mg daily  Novolin 70/30:   15 units with breakfast and dinner  Correction 1:15>150    Review of home blood glucose:  No log or meter with him today  Reports AM sugar usually 130-140  Not checking at dinner or bedtime  Denies hypoglycemia    Review of most recent diabetes-related labs:  Lab Results   Component Value Date    HBA1C 10.5 (H) 11/16/2016    HBA1C 5.7 01/19/2011    FJD8VLDN 6.4 08/14/2019    KMU9BMII 7.5 04/11/2019    WVB4ECGM 6.6 10/09/2018    CHOL 157 02/27/2018    LDLC 61 02/27/2018    GFRAA 25 (L) 07/30/2019    GFRNA 21 (L) 07/30/2019    CREATEXT 5.20 09/13/2018    MCACR 897.2 (H) 04/11/2019    MALBEXT HIGH 4737.5 10/02/2017    TSH 1.39 10/29/2016         PAST MEDICAL/SURGICAL HISTORY:   Past Medical History:   Diagnosis Date    Arthritis     spine    CAD (coronary artery disease)     high cholesterol    Chronic kidney disease     Diabetes ([address]     GERD (gastroesophageal reflux disease)     HX    Hypertension     Ill-defined condition     irritable bowel syndrome    Systolic CHF ([address]     Unspecified sleep apnea     NO CPAP     Past Surgical History:   Procedure Laterality Date    COLONOSCOPY N/A 11/9/2016    COLONOSCOPY performed by Lauren Turner MD at [facility] ENDOSCOPY    HX ORTHOPAEDIC      CERVICAL FUSION    HX UROLOGICAL      TURP    HX VASCULAR ACCESS      L arm dialysis access       ALLERGIES:   Allergies   Allergen Reactions    Albumin, Human 25 % Anaphylaxis and Hives    Niacin Hives       MEDICATIONS ON ADMISSION:     Current Outpatient Medications:     insulin NPH/insulin regular (NOVOLIN 70/30 U-100 INSULIN) 100 unit/mL (70-30) injection, Up to 40 units before breakfast and dinner, (Patient taking differently: 15 Units two (2) times a day. Up to 40 units before breakfast and dinner,  Indications: type 2 diabetes mellitus), Disp: 8 Vial, Rfl: 3    VICTOZA 2-NELLI 0.6 mg/0.1 mL (18 mg/3 mL) pnij, INJECT 1.8- MG SUBCUTANEOUSLY ONCE DAILY, TITRATE UP TO 1.8 DAILY AS DIRECTED, Disp: 9 Pen, Rfl: 3    sertraline (ZOLOFT) 50 mg tablet, TAKE 1 T PO QD, Disp: , Rfl: 0    aspirin 81 mg chewable tablet, Take 81 mg by mouth daily. , Disp: , Rfl:     sertraline (ZOLOFT) 25 mg tablet, TK 1 T PO QD, Disp: , Rfl:     carvedilol (COREG) 12.5 mg tablet, , Disp: , Rfl:     Insulin Syringe-Needle U-100 1 mL 30 gauge x 5/16 syrg, Use twice daily for insulin injection, Disp: 100 Syringe, Rfl: 7    hydrALAZINE (APRESOLINE) 100 mg tablet, 100 mg three (3) times daily. , Disp: , Rfl:     fenofibrate nanocrystallized (TRICOR) 145 mg tablet, Take  by mouth every evening., Disp: , Rfl:     bumetanide (BUMEX) 2 mg tablet, Take 2 mg by mouth every evening., Disp: , Rfl:     atorvastatin (LIPITOR) 20 mg tablet, Take 20 mg by mouth every evening., Disp: , Rfl:     Calcium-Cholecalciferol, D3, (CALCIUM 600 WITH VITAMIN D3) 600 mg(1,500mg) -400 unit chew, Take 1 Tab by mouth every evening., Disp: , Rfl:     fluticasone (FLONASE) 50 mcg/actuation nasal spray, 2 Sprays by Both Nostrils route daily. , Disp: , Rfl:     tamsulosin (FLOMAX) 0.4 mg capsule, Take 0.4 mg by mouth two (2) times a day., Disp: , Rfl:     Insulin Needles, Disposable, (REYNALDO PEN NEEDLE) 32 gauge x 5/32\" ndle, FOR USE EVERY MORNING WITH VICTOZA PEN, Disp: 100 Pen Needle, Rfl: 3    L.acidoph & parac-S.therm-Bifido (LOW Q2/RISAQUAD-2) 16 billion cell cap cap, Take 1 Cap by mouth daily. , Disp: 30 Cap, Rfl: 0    SOCIAL HISTORY:   Social History     Socioeconomic History    Marital status: UNKNOWN     Spouse name: Not on file    Number of children: Not on file    Years of education: Not on file    Highest education level: Not on file   Occupational History    Not on file   Social Needs    Financial resource strain: Not on file    Food insecurity:     Worry: Not on file     Inability: Not on file    Transportation needs:     Medical: Not on file     Non-medical: Not on file   Tobacco Use    Smoking status: Former Smoker     Last attempt to quit: 2009     Years since quitting: 10.6    Smokeless tobacco: Former User    Tobacco comment: cigars only   Substance and Sexual Activity    Alcohol use: No    Drug use: No    Sexual activity: Not Currently   Lifestyle    Physical activity:     Days per week: Not on file     Minutes per session: Not on file    Stress: Not on file   Relationships    Social connections:     Talks on phone: Not on file     Gets together: Not on file     Attends Taoist service: Not on file     Active member of club or organization: Not on file     Attends meetings of clubs or organizations: Not on file     Relationship status: Not on file    Intimate partner violence:     Fear of current or ex partner: Not on file     Emotionally abused: Not on file     Physically abused: Not on file     Forced sexual activity: Not on file   Other Topics Concern    Not on file   Social History Narrative    Not on file       FAMILY HISTORY:  Family History   Problem Relation Age of Onset    Cancer Father         \"bone\"    Diabetes Brother        REVIEW OF SYSTEMS: Complete ROS assessed and noted for that which is described above, all else are negative.   Eyes: normal  ENT: normal  CVS: normal  Resp: normal  GI: normal  : normal  GYN: normal  Endocrine: normal  Integument: normal  Musculoskeletal: knee discomfort  Neuro: normal  Psych: normal      PHYSICAL EXAMINATION:    VITAL SIGNS:  Visit Vitals  /77   Pulse 66   Resp 20   Ht 6' (1.829 m)   Wt 239 lb 6.4 oz (108.6 kg)   SpO2 96%   BMI 32.47 kg/m²       GENERAL: NCAT, Sitting comfortably, NAD  EYES: EOMI, non-icteric, no proptosis  Ear/Nose/Throat: NCAT, no inflammation, no masses  LYMPH NODES: No LAD  CARDIOVASCULAR: S1 S2, RRR, No murmur  RESPIRATORY: CTA b/l, no wheeze/rales  GASTROINTESTINAL: obese, NT, ND,  MUSCULOSKELETAL: Normal ROM, no atrophy  SKIN: warm, no edema/rash/ or other skin changes  NEUROLOGIC: 5/5 power all extremities, no tremors, AAOx3  PSYCHIATRIC: Normal affect, Normal insight and judgement    Diabetic foot exam:     Left Foot:   Visual Exam: normal , flat   Pulse DP: 2+ (normal)   Filament test: normal sensation    Vibratory sensation: Vibratory sensation: normal       Right Foot:   Visual Exam: callous - mediall along great toe, flat   Pulse DP: 2+ (normal)   Filament test: normal sensation    Vibratory sensation: Vibratory sensation: normal      REVIEW OF LABORATORY AND RADIOLOGY DATA:   Labs and documentation have been reviewed as described above. ASSESSMENT AND PLAN:   Lester Sorensen is a 72 y.o. male with a PMHx as noted above who presents to the endocrinology clinic for evaluation of uncontrolled type 2 diabetes. DM2  HTN  HLD    Patient's A1c is improved without hypoglycemia however we noted that we should be cautions with interpreting his A1c as he has ESRD on dialysis. His reported home morning numbers are reassuring and consistent with his A1c. Discussed that it would be a good idea to check his sugar at dinner time when he injects his second dose. DM2:  Continue victoza, 1.8 daily  Novolin 70/30:  15 units with breakfast and dinner  Continue to check glucose 2-3 times daily,    HTN: Stable on current meds  HLD: on statin, advised he needs level, has fistula and will have checked with dialysis on future visit    4 month f/u visit. Advised to call with any concerns,    Natalie Cho.  [address] [facility]

## 2019-08-14 NOTE — PATIENT INSTRUCTIONS
Continue victoza, 1.8 daily Novolin 70/30:  15 units with breakfast AND dinner See you back in 4 months,  
 
Ap AGUILAR. [address] [facility] [address] [place] [address]

## 2019-10-13 ENCOUNTER — APPOINTMENT (OUTPATIENT)
Dept: GENERAL RADIOLOGY | Age: 74
End: 2019-10-13
Attending: EMERGENCY MEDICINE
Payer: MEDICARE

## 2019-10-13 ENCOUNTER — HOSPITAL ENCOUNTER (EMERGENCY)
Age: 74
Discharge: HOME | End: 2019-10-14
Attending: EMERGENCY MEDICINE
Payer: MEDICARE

## 2019-10-13 DIAGNOSIS — Z99.2 ESRD ON DIALYSIS (HCC): ICD-10-CM

## 2019-10-13 DIAGNOSIS — I50.9 CONGESTIVE HEART FAILURE, UNSPECIFIED HF CHRONICITY, UNSPECIFIED HEART FAILURE TYPE (HCC): ICD-10-CM

## 2019-10-13 DIAGNOSIS — J81.0 ACUTE PULMONARY EDEMA (HCC): Primary | ICD-10-CM

## 2019-10-13 DIAGNOSIS — N18.6 ESRD ON DIALYSIS (HCC): ICD-10-CM

## 2019-10-13 PROCEDURE — 82550 ASSAY OF CK (CPK): CPT

## 2019-10-13 PROCEDURE — 93005 ELECTROCARDIOGRAM TRACING: CPT

## 2019-10-13 PROCEDURE — 96374 THER/PROPH/DIAG INJ IV PUSH: CPT

## 2019-10-13 PROCEDURE — 36415 COLL VENOUS BLD VENIPUNCTURE: CPT

## 2019-10-13 PROCEDURE — 71045 X-RAY EXAM CHEST 1 VIEW: CPT

## 2019-10-13 PROCEDURE — 84484 ASSAY OF TROPONIN QUANT: CPT

## 2019-10-13 PROCEDURE — 99284 EMERGENCY DEPT VISIT MOD MDM: CPT

## 2019-10-13 PROCEDURE — 80053 COMPREHEN METABOLIC PANEL: CPT

## 2019-10-13 PROCEDURE — 85025 COMPLETE CBC W/AUTO DIFF WBC: CPT

## 2019-10-13 PROCEDURE — 83880 ASSAY OF NATRIURETIC PEPTIDE: CPT

## 2019-10-14 VITALS
SYSTOLIC BLOOD PRESSURE: 172 MMHG | DIASTOLIC BLOOD PRESSURE: 89 MMHG | TEMPERATURE: 97.7 F | OXYGEN SATURATION: 94 % | HEIGHT: 72 IN | WEIGHT: 240.74 LBS | BODY MASS INDEX: 32.61 KG/M2 | RESPIRATION RATE: 23 BRPM | HEART RATE: 69 BPM

## 2019-10-14 LAB
ALBUMIN SERPL-MCNC: 3.9 G/DL (ref 3.5–5)
ALBUMIN/GLOB SERPL: 1 {RATIO} (ref 1.1–2.2)
ALP SERPL-CCNC: 61 U/L (ref 45–117)
ALT SERPL-CCNC: 27 U/L (ref 12–78)
ANION GAP SERPL CALC-SCNC: 6 MMOL/L (ref 5–15)
APPEARANCE UR: CLEAR
AST SERPL-CCNC: 17 U/L (ref 15–37)
ATRIAL RATE: 79 BPM
BACTERIA URNS QL MICRO: NEGATIVE /HPF
BASOPHILS # BLD: 0.1 K/UL (ref 0–0.1)
BASOPHILS NFR BLD: 1 % (ref 0–1)
BILIRUB SERPL-MCNC: 0.5 MG/DL (ref 0.2–1)
BILIRUB UR QL: NEGATIVE
BNP SERPL-MCNC: 3785 PG/ML
BUN SERPL-MCNC: 41 MG/DL (ref 6–20)
BUN/CREAT SERPL: 9 (ref 12–20)
CALCIUM SERPL-MCNC: 9.1 MG/DL (ref 8.5–10.1)
CALCULATED P AXIS, ECG09: 8 DEGREES
CALCULATED R AXIS, ECG10: -58 DEGREES
CALCULATED T AXIS, ECG11: 92 DEGREES
CHLORIDE SERPL-SCNC: 110 MMOL/L (ref 97–108)
CK MB CFR SERPL CALC: 1.2 % (ref 0–2.5)
CK MB SERPL-MCNC: 2.7 NG/ML (ref 5–25)
CK SERPL-CCNC: 228 U/L (ref 39–308)
CO2 SERPL-SCNC: 24 MMOL/L (ref 21–32)
COLOR UR: ABNORMAL
CREAT SERPL-MCNC: 4.65 MG/DL (ref 0.7–1.3)
DIAGNOSIS, 93000: NORMAL
DIFFERENTIAL METHOD BLD: ABNORMAL
EOSINOPHIL # BLD: 0.4 K/UL (ref 0–0.4)
EOSINOPHIL NFR BLD: 4 % (ref 0–7)
EPITH CASTS URNS QL MICRO: ABNORMAL /LPF
ERYTHROCYTE [DISTWIDTH] IN BLOOD BY AUTOMATED COUNT: 14.7 % (ref 11.5–14.5)
GLOBULIN SER CALC-MCNC: 4.1 G/DL (ref 2–4)
GLUCOSE SERPL-MCNC: 107 MG/DL (ref 65–100)
GLUCOSE UR STRIP.AUTO-MCNC: NEGATIVE MG/DL
HCT VFR BLD AUTO: 35.4 % (ref 36.6–50.3)
HGB BLD-MCNC: 11.3 G/DL (ref 12.1–17)
HGB UR QL STRIP: NEGATIVE
HYALINE CASTS URNS QL MICRO: ABNORMAL /LPF (ref 0–5)
IMM GRANULOCYTES # BLD AUTO: 0.1 K/UL (ref 0–0.04)
IMM GRANULOCYTES NFR BLD AUTO: 1 % (ref 0–0.5)
KETONES UR QL STRIP.AUTO: NEGATIVE MG/DL
LEUKOCYTE ESTERASE UR QL STRIP.AUTO: NEGATIVE
LYMPHOCYTES # BLD: 1.4 K/UL (ref 0.8–3.5)
LYMPHOCYTES NFR BLD: 13 % (ref 12–49)
MCH RBC QN AUTO: 30 PG (ref 26–34)
MCHC RBC AUTO-ENTMCNC: 31.9 G/DL (ref 30–36.5)
MCV RBC AUTO: 93.9 FL (ref 80–99)
MONOCYTES # BLD: 0.8 K/UL (ref 0–1)
MONOCYTES NFR BLD: 7 % (ref 5–13)
NEUTS SEG # BLD: 7.8 K/UL (ref 1.8–8)
NEUTS SEG NFR BLD: 74 % (ref 32–75)
NITRITE UR QL STRIP.AUTO: NEGATIVE
NRBC # BLD: 0 K/UL (ref 0–0.01)
NRBC BLD-RTO: 0 PER 100 WBC
P-R INTERVAL, ECG05: 164 MS
PH UR STRIP: 7.5 [PH] (ref 5–8)
PLATELET # BLD AUTO: 242 K/UL (ref 150–400)
PMV BLD AUTO: 11.7 FL (ref 8.9–12.9)
POTASSIUM SERPL-SCNC: 4.2 MMOL/L (ref 3.5–5.1)
PROT SERPL-MCNC: 8 G/DL (ref 6.4–8.2)
PROT UR STRIP-MCNC: 300 MG/DL
Q-T INTERVAL, ECG07: 428 MS
QRS DURATION, ECG06: 146 MS
QTC CALCULATION (BEZET), ECG08: 490 MS
RBC # BLD AUTO: 3.77 M/UL (ref 4.1–5.7)
RBC #/AREA URNS HPF: ABNORMAL /HPF (ref 0–5)
SODIUM SERPL-SCNC: 140 MMOL/L (ref 136–145)
SP GR UR REFRACTOMETRY: 1.01 (ref 1–1.03)
TROPONIN I SERPL-MCNC: 0.13 NG/ML
UA: UC IF INDICATED,UAUC: ABNORMAL
UROBILINOGEN UR QL STRIP.AUTO: 0.2 EU/DL (ref 0.2–1)
VENTRICULAR RATE, ECG03: 79 BPM
WBC # BLD AUTO: 10.5 K/UL (ref 4.1–11.1)
WBC URNS QL MICRO: ABNORMAL /HPF (ref 0–4)

## 2019-10-14 PROCEDURE — 81001 URINALYSIS AUTO W/SCOPE: CPT

## 2019-10-14 PROCEDURE — 74011000250 HC RX REV CODE- 250: Performed by: EMERGENCY MEDICINE

## 2019-10-14 RX ORDER — BUMETANIDE 0.25 MG/ML
1 INJECTION INTRAMUSCULAR; INTRAVENOUS
Status: COMPLETED | OUTPATIENT
Start: 2019-10-14 | End: 2019-10-14

## 2019-10-14 RX ORDER — ALBUTEROL SULFATE 90 UG/1
2 AEROSOL, METERED RESPIRATORY (INHALATION)
Qty: 1 INHALER | Refills: 0 | Status: ON HOLD | OUTPATIENT
Start: 2019-10-14 | End: 2020-07-21

## 2019-10-14 RX ADMIN — BUMETANIDE 1 MG: 0.25 INJECTION INTRAMUSCULAR; INTRAVENOUS at 00:32

## 2019-10-14 NOTE — ED NOTES
Dr. Martine Blancas has reviewed discharge instructions with the patient. The patient verbalized understanding. Patient ambulated to waiting room in no distress.

## 2019-10-14 NOTE — ED PROVIDER NOTES
EMERGENCY DEPARTMENT HISTORY AND PHYSICAL EXAM      Date: 10/13/2019  Patient Name: Ti Mehta    History of Presenting Illness     Chief Complaint   Patient presents with    Respiratory Distress     Known CHF, dialysis patient. He has been having trouble breathing for about a week but it got really bad tonight. Dialysis T-TH-Sat       History Provided By: Patient    HPI: Ti Mehta, 69 y.o. male with PMHx significant for congestive heart failure, and end-stage renal disease on hemodialysis Tuesday, Thursday, Saturday presents to the emergency room with chief complaint of shortness of breath that is been going on for the past week, but worse tonight. Patient states that he has been eating out a few times this week and may be had more salt than usual, but otherwise there is been no changes in his dietary intake or activities. The review of the records shows that he had an and that showed an EF of 25%. Patient still urinates multiple times a day and takes Bumex 2 mg at nighttime. He denies any chest pain but does report some palpitations and heart racing. He denies leg swelling, fever, chills, nausea, cough. He has been compliant with his dialysis. He denies any vomiting in the last 24 hours but did have an episode last week. Patient states that his shortness of breath is worse when he lies down and with any activity including just talking and moving his arms. PCP: None    No current facility-administered medications on file prior to encounter. Current Outpatient Medications on File Prior to Encounter   Medication Sig Dispense Refill    Insulin Needles, Disposable, (REYNALDO PEN NEEDLE) 32 gauge x 5/32\" ndle FOR USE EVERY MORNING WITH VICTOZA  Pen Needle 3    insulin NPH/insulin regular (NOVOLIN 70/30 U-100 INSULIN) 100 unit/mL (70-30) injection Up to 40 units before breakfast and dinner, (Patient taking differently: 15 Units two (2) times a day.  Up to 40 units before breakfast and dinner, Indications: type 2 diabetes mellitus) 8 Vial 3    L.acidoph & parac-S.therm-Bifido (LOW Q2/RISAQUAD-2) 16 billion cell cap cap Take 1 Cap by mouth daily. 30 Cap 0    VICTOZA 2-NELLI 0.6 mg/0.1 mL (18 mg/3 mL) pnij INJECT 1.8- MG SUBCUTANEOUSLY ONCE DAILY, TITRATE UP TO 1.8 DAILY AS DIRECTED 9 Pen 3    sertraline (ZOLOFT) 50 mg tablet TAKE 1 T PO QD  0    aspirin 81 mg chewable tablet Take 81 mg by mouth daily.  sertraline (ZOLOFT) 25 mg tablet TK 1 T PO QD      carvedilol (COREG) 12.5 mg tablet       Insulin Syringe-Needle U-100 1 mL 30 gauge x 5/16 syrg Use twice daily for insulin injection 100 Syringe 7    hydrALAZINE (APRESOLINE) 100 mg tablet 100 mg three (3) times daily.  fenofibrate nanocrystallized (TRICOR) 145 mg tablet Take  by mouth every evening.  bumetanide (BUMEX) 2 mg tablet Take 2 mg by mouth every evening.  atorvastatin (LIPITOR) 20 mg tablet Take 20 mg by mouth every evening.  Calcium-Cholecalciferol, D3, (CALCIUM 600 WITH VITAMIN D3) 600 mg(1,500mg) -400 unit chew Take 1 Tab by mouth every evening.  fluticasone (FLONASE) 50 mcg/actuation nasal spray 2 Sprays by Both Nostrils route daily.  tamsulosin (FLOMAX) 0.4 mg capsule Take 0.4 mg by mouth two (2) times a day.          Past History     Past Medical History:  Past Medical History:   Diagnosis Date    Arthritis     spine    CAD (coronary artery disease)     high cholesterol    Chronic kidney disease     [facility]     GERD (gastroesophageal reflux disease)     HX    Hypertension     Ill-defined condition     irritable bowel syndrome    Systolic CHF ([facility]     Unspecified sleep apnea     NO CPAP       Past Surgical History:  Past Surgical History:   Procedure Laterality Date    COLONOSCOPY N/A 11/9/2016    COLONOSCOPY performed by Kimberly Foster MD at [facility] ENDOSCOPY    HX ORTHOPAEDIC      CERVICAL FUSION    HX UROLOGICAL      TURP    HX VASCULAR ACCESS      L arm dialysis access Family History:  Family History   Problem Relation Age of Onset   [de-identified] Cancer Father         \"bone\"    Diabetes Brother        Social History:  Social History     Tobacco Use    Smoking status: Former Smoker     Last attempt to quit: 2009     Years since quitting: 10.7    Smokeless tobacco: Former User    Tobacco comment: cigars only   Substance Use Topics    Alcohol use: No    Drug use: No       Allergies: Allergies   Allergen Reactions    Albumin, Human 25 % Anaphylaxis and Hives    Niacin Hives         Review of Systems   Review of Systems   Constitutional: Negative for chills and fever. HENT: Negative for congestion, ear pain, rhinorrhea and sore throat. Eyes: Negative. Respiratory: Positive for shortness of breath. Negative for cough, chest tightness and wheezing. Cardiovascular: Positive for palpitations. Negative for chest pain and leg swelling. Gastrointestinal: Negative for abdominal pain, constipation, diarrhea, nausea and vomiting. Genitourinary: Negative for dysuria, flank pain and hematuria. Musculoskeletal: Negative for back pain and myalgias. Skin: Negative for rash and wound. Neurological: Negative for dizziness, syncope, numbness and headaches. Psychiatric/Behavioral: Negative for agitation. The patient is nervous/anxious.           Physical Exam   General appearance - well nourished, well appearing, and in no distress  Eyes - pupils equal and reactive, extraocular eye movements intact  ENT - mucous membranes moist, pharynx normal without lesions  Neck - supple, no significant adenopathy; non-tender to palpation  Chest -crackles bilateral bases, no wheezes non-tender to palpation  Heart - normal rate and regular rhythm, S1 and S2 normal, no murmurs noted  Abdomen - soft, nontender, nondistended, no masses or organomegaly  Musculoskeletal - no joint tenderness, deformity or swelling; normal ROM  Extremities - peripheral pulses normal, 1+ bilateral lower extremity edema, dialysis access in left upper arm  Skin - normal coloration and turgor, no rashes  Neurological - alert, oriented x3, normal speech, no focal findings or movement disorder noted    Diagnostic Study Results     Labs -     Recent Results (from the past 12 hour(s))   CBC WITH AUTOMATED DIFF    Collection Time: 10/13/19 11:58 PM   Result Value Ref Range    WBC 10.5 4.1 - 11.1 K/uL    RBC 3.77 (L) 4.10 - 5.70 M/uL    HGB 11.3 (L) 12.1 - 17.0 g/dL    HCT 35.4 (L) 36.6 - 50.3 %    MCV 93.9 80.0 - 99.0 FL    MCH 30.0 26.0 - 34.0 PG    MCHC 31.9 30.0 - 36.5 g/dL    RDW 14.7 (H) 11.5 - 14.5 %    PLATELET 412 745 - 558 K/uL    MPV 11.7 8.9 - 12.9 FL    NRBC 0.0 0  WBC    ABSOLUTE NRBC 0.00 0.00 - 0.01 K/uL    NEUTROPHILS 74 32 - 75 %    LYMPHOCYTES 13 12 - 49 %    MONOCYTES 7 5 - 13 %    EOSINOPHILS 4 0 - 7 %    BASOPHILS 1 0 - 1 %    IMMATURE GRANULOCYTES 1 (H) 0.0 - 0.5 %    ABS. NEUTROPHILS 7.8 1.8 - 8.0 K/UL    ABS. LYMPHOCYTES 1.4 0.8 - 3.5 K/UL    ABS. MONOCYTES 0.8 0.0 - 1.0 K/UL    ABS. EOSINOPHILS 0.4 0.0 - 0.4 K/UL    ABS. BASOPHILS 0.1 0.0 - 0.1 K/UL    ABS. IMM. GRANS. 0.1 (H) 0.00 - 0.04 K/UL    DF AUTOMATED         Radiologic Studies -   XR CHEST PORT   Final Result   IMPRESSION: Mild pulmonary edema        CT Results  (Last 48 hours)    None        CXR Results  (Last 48 hours)               10/14/19 0005  XR CHEST PORT Final result    Impression:  IMPRESSION: Mild pulmonary edema       Narrative:  EXAM: XR CHEST PORT       INDICATION: Shortness of breath       COMPARISON: 5/26/2019       FINDINGS: A portable AP radiograph of the chest was obtained at 2345 hours. The   patient is on a cardiac monitor. There is mild pulmonary edema. No focal   infiltrate is seen. Medical Decision Making   I am the first provider for this patient. I reviewed the vital signs, available nursing notes, past medical history, past surgical history, family history and social history.     Vital Signs-Reviewed the patient's vital signs. Patient Vitals for the past 12 hrs:   Temp Pulse Resp BP SpO2   10/13/19 2355     95 %   10/13/19 2341 97.7 °F (36.5 °C) 79 28 (!) 177/91 96 %       EKG: Normal sinus rhythm, 79 bpm, left axis deviation, normal AL interval, right bundle branch block, nonspecific ST changes  Records Reviewed: Nursing Notes and Old Medical Records    Provider Notes (Medical Decision Making):   Differential diagnosis: Acute coronary syndrome, congestive heart failure, fluid overload, pneumonia    ED Course:   Initial assessment performed. The patients presenting problems have been discussed, and they are in agreement with the care plan formulated and outlined with them. I have encouraged them to ask questions as they arise throughout their visit. Progress Notes:  ED Course as of Oct 22 0336   Mon Oct 14, 2019   9980 Patient ambulated in ED without becoming hypoxic. He is feeling much better and has diuresed after Bumex. Will discharge with instructions to return for worsening shortness of breath. Will encourage patient to follow-up for dialysis tomorrow without fail.    [AO]      ED Course User Index  [AO] Elodia Dupree MD       Disposition:  Discharge home    PLAN:  1. Discharge Medication List as of 10/14/2019  5:55 AM        2. Follow-up Information     Follow up With Specialties Details Why Contact Info    [facility] EMERGENCY DEPT Emergency Medicine Schedule an appointment as soon as possible for a visit  [address] [place] [address] 31    Justo Manuel MD Nephrology Schedule an appointment as soon as possible for a visit  [address]  [address]  [address] [facility]      Audrey Vitale MD Cardiology Schedule an appointment as soon as possible for a visit  [address]  [address]  [address] [phone]          Return to ED if worse     Diagnosis     Clinical Impression:   1.  Acute pulmonary edema ([facility]    2. ESRD on dialysis ([facility]    3.  Congestive heart failure, unspecified HF chronicity, unspecified heart failure type ([facility]

## 2019-10-14 NOTE — DISCHARGE INSTRUCTIONS
Patient Education        Pulmonary Edema: Care Instructions  Your Care Instructions    Pulmonary edema is the buildup of fluid in the lungs. It usually occurs when the heart does not pump blood through the body properly. Pulmonary edema can also be caused by another disease, such as liver or kidney failure. It can also happen at high altitudes, from a poisoning, or as a result of a near-drowning. If you have fluid in your lungs, you may have trouble breathing, be restless, have a fast heart rate, or cough up foamy pink fluid. Breathing problems may be worse when you lie down. Follow-up care is a key part of your treatment and safety. Be sure to make and go to all appointments, and call your doctor if you are having problems. It's also a good idea to know your test results and keep a list of the medicines you take. How can you care for yourself at home? Medicines    · Take your medicines exactly as prescribed. Call your doctor if you think you are having a problem with your medicine.     · Review all of your regular medicines with your doctor. Do not take any vitamins, over-the-counter medicines, or herbal products without talking to your doctor first.   Diet    · Eat a balanced diet. Make an appointment with a dietitian if you have questions about what type of diet might be best for you.     · Do not eat more than 2,000 milligrams (mg) of sodium each day. That is less than 1 teaspoon of salt a day, including all the salt you eat in prepared or packaged foods. ? Do not add salt while you are cooking or at the table. Flavor with garlic, lemon juice, onion, vinegar, herbs, and spices instead of salt. ? Eat fewer processed foods and foods from restaurants, including fast food. ? Use fresh or frozen foods instead of canned. ? Count and record how much sodium you eat each day. Check food labels for sodium. ?  Ask your doctor before using salt substitutes that have potassium, such as Lite Salt.    Lifestyle    · Stay out of air pollution; smog; cold, dry air; hot, humid air; and high altitudes.     · Learn breathing methods that help the airflow in and out of your lungs.     · Take rest breaks often. Schedule short rest breaks when doing housework and other activities. An occupational or physical therapist can help you find ways to do everyday activities with less effort.     · Start light exercise if your doctor says it is okay. Try to stay as active as possible. If you have not exercised in the past, start out slowly. Walking is a good way to start.     · Get enough rest at night. Sleeping with 1 or 2 pillows under your upper body and head may help you breathe easier at night.     · Discuss rehabilitation with your doctor. Find out what programs are available in your area.     · Do not smoke or use other tobacco products. Smoking can make your condition worse. If you need help quitting, talk to your doctor about stop-smoking programs and medicines. These can increase your chances of quitting for good.     · Do not use alcohol or illegal drugs. When should you call for help? Call 911 anytime you think you may need emergency care. For example, call if:    · You have severe trouble breathing.     · You passed out (lost consciousness).     · You have symptoms of a heart attack. These may include:  ? Chest pain or pressure, or a strange feeling in the chest.  ? Sweating. ? Shortness of breath. ? Nausea or vomiting. ? Pain, pressure, or a strange feeling in the back, neck, jaw, or upper belly or in one or both shoulders or arms. ? Lightheadedness or sudden weakness. ? A fast or irregular heartbeat. Pain that spreads from the chest to the neck, jaw, or one or both shoulders or arms.    After you call 911, the  may tell you to chew 1 adult-strength or 2 to 4 low-dose aspirin. Wait for an ambulance.  Do not try to drive yourself.   Call your doctor now or seek immediate medical care if:    · You have trouble breathing or have wheezing that is getting worse.     · You are coughing more deeply or more often.     · You cough up blood.     · You get a fever.     · You have more swelling in your legs or belly.     · Your symptoms are getting worse.    Watch closely for changes in your health, and be sure to contact your doctor if you have any problems. Where can you learn more? Go to http://maria r-fran.info/. Enter J231 in the search box to learn more about \"Pulmonary Edema: Care Instructions. \"  Current as of: June 9, 2019  Content Version: 12.2  © 0848-1448 [employer]. Care instructions adapted under license by [employer] (which disclaims liability or warranty for this information). If you have questions about a medical condition or this instruction, always ask your healthcare professional. [address] any warranty or liability for your use of this information.

## 2019-10-28 ENCOUNTER — HOSPITAL ENCOUNTER (EMERGENCY)
Age: 74
Discharge: HOME | End: 2019-10-28
Attending: EMERGENCY MEDICINE
Payer: MEDICARE

## 2019-10-28 ENCOUNTER — APPOINTMENT (OUTPATIENT)
Dept: GENERAL RADIOLOGY | Age: 74
End: 2019-10-28
Attending: EMERGENCY MEDICINE
Payer: MEDICARE

## 2019-10-28 VITALS
DIASTOLIC BLOOD PRESSURE: 94 MMHG | HEART RATE: 69 BPM | BODY MASS INDEX: 32.94 KG/M2 | HEIGHT: 72 IN | RESPIRATION RATE: 22 BRPM | WEIGHT: 243.17 LBS | TEMPERATURE: 97.8 F | SYSTOLIC BLOOD PRESSURE: 164 MMHG | OXYGEN SATURATION: 94 %

## 2019-10-28 DIAGNOSIS — R77.8 ELEVATED TROPONIN: ICD-10-CM

## 2019-10-28 DIAGNOSIS — Z99.2 CHRONIC KIDNEY DISEASE WITH END STAGE RENAL FAILURE ON DIALYSIS (HCC): ICD-10-CM

## 2019-10-28 DIAGNOSIS — N18.6 CHRONIC KIDNEY DISEASE WITH END STAGE RENAL FAILURE ON DIALYSIS (HCC): ICD-10-CM

## 2019-10-28 DIAGNOSIS — J81.0 ACUTE PULMONARY EDEMA (HCC): Primary | ICD-10-CM

## 2019-10-28 LAB
ALBUMIN SERPL-MCNC: 3.7 G/DL (ref 3.5–5)
ALBUMIN/GLOB SERPL: 0.9 {RATIO} (ref 1.1–2.2)
ALP SERPL-CCNC: 55 U/L (ref 45–117)
ALT SERPL-CCNC: 34 U/L (ref 12–78)
ANION GAP SERPL CALC-SCNC: 8 MMOL/L (ref 5–15)
AST SERPL-CCNC: 20 U/L (ref 15–37)
BASOPHILS # BLD: 0.1 K/UL (ref 0–0.1)
BASOPHILS NFR BLD: 1 % (ref 0–1)
BILIRUB SERPL-MCNC: 0.8 MG/DL (ref 0.2–1)
BUN SERPL-MCNC: 37 MG/DL (ref 6–20)
BUN/CREAT SERPL: 8 (ref 12–20)
CALCIUM SERPL-MCNC: 9 MG/DL (ref 8.5–10.1)
CHLORIDE SERPL-SCNC: 111 MMOL/L (ref 97–108)
CO2 SERPL-SCNC: 24 MMOL/L (ref 21–32)
CREAT SERPL-MCNC: 4.91 MG/DL (ref 0.7–1.3)
DIFFERENTIAL METHOD BLD: ABNORMAL
EOSINOPHIL # BLD: 0.3 K/UL (ref 0–0.4)
EOSINOPHIL NFR BLD: 4 % (ref 0–7)
ERYTHROCYTE [DISTWIDTH] IN BLOOD BY AUTOMATED COUNT: 14.9 % (ref 11.5–14.5)
GLOBULIN SER CALC-MCNC: 3.9 G/DL (ref 2–4)
GLUCOSE SERPL-MCNC: 123 MG/DL (ref 65–100)
HCT VFR BLD AUTO: 34.9 % (ref 36.6–50.3)
HGB BLD-MCNC: 10.9 G/DL (ref 12.1–17)
IMM GRANULOCYTES # BLD AUTO: 0 K/UL (ref 0–0.04)
IMM GRANULOCYTES NFR BLD AUTO: 0 % (ref 0–0.5)
LYMPHOCYTES # BLD: 1.2 K/UL (ref 0.8–3.5)
LYMPHOCYTES NFR BLD: 15 % (ref 12–49)
MCH RBC QN AUTO: 29.2 PG (ref 26–34)
MCHC RBC AUTO-ENTMCNC: 31.2 G/DL (ref 30–36.5)
MCV RBC AUTO: 93.6 FL (ref 80–99)
MONOCYTES # BLD: 0.7 K/UL (ref 0–1)
MONOCYTES NFR BLD: 8 % (ref 5–13)
NEUTS SEG # BLD: 6 K/UL (ref 1.8–8)
NEUTS SEG NFR BLD: 72 % (ref 32–75)
NRBC # BLD: 0 K/UL (ref 0–0.01)
NRBC BLD-RTO: 0 PER 100 WBC
PLATELET # BLD AUTO: 241 K/UL (ref 150–400)
PMV BLD AUTO: 12.1 FL (ref 8.9–12.9)
POTASSIUM SERPL-SCNC: 4.2 MMOL/L (ref 3.5–5.1)
PROT SERPL-MCNC: 7.6 G/DL (ref 6.4–8.2)
RBC # BLD AUTO: 3.73 M/UL (ref 4.1–5.7)
SODIUM SERPL-SCNC: 143 MMOL/L (ref 136–145)
TROPONIN I SERPL-MCNC: 0.15 NG/ML
TROPONIN I SERPL-MCNC: 0.15 NG/ML
WBC # BLD AUTO: 8.3 K/UL (ref 4.1–11.1)

## 2019-10-28 PROCEDURE — 36415 COLL VENOUS BLD VENIPUNCTURE: CPT

## 2019-10-28 PROCEDURE — 80053 COMPREHEN METABOLIC PANEL: CPT

## 2019-10-28 PROCEDURE — 85025 COMPLETE CBC W/AUTO DIFF WBC: CPT

## 2019-10-28 PROCEDURE — 93005 ELECTROCARDIOGRAM TRACING: CPT

## 2019-10-28 PROCEDURE — 96374 THER/PROPH/DIAG INJ IV PUSH: CPT

## 2019-10-28 PROCEDURE — 74011000250 HC RX REV CODE- 250: Performed by: EMERGENCY MEDICINE

## 2019-10-28 PROCEDURE — 99284 EMERGENCY DEPT VISIT MOD MDM: CPT

## 2019-10-28 PROCEDURE — 84484 ASSAY OF TROPONIN QUANT: CPT

## 2019-10-28 PROCEDURE — 71046 X-RAY EXAM CHEST 2 VIEWS: CPT

## 2019-10-28 RX ORDER — BUMETANIDE 0.25 MG/ML
1 INJECTION INTRAMUSCULAR; INTRAVENOUS
Status: DISCONTINUED | OUTPATIENT
Start: 2019-10-28 | End: 2019-10-28

## 2019-10-28 RX ORDER — BUMETANIDE 0.25 MG/ML
2 INJECTION INTRAMUSCULAR; INTRAVENOUS
Status: COMPLETED | OUTPATIENT
Start: 2019-10-28 | End: 2019-10-28

## 2019-10-28 RX ADMIN — BUMETANIDE 2 MG: 0.25 INJECTION INTRAMUSCULAR; INTRAVENOUS at 18:46

## 2019-10-28 NOTE — ED PROVIDER NOTES
EMERGENCY DEPARTMENT HISTORY AND PHYSICAL EXAM      Date: 10/28/2019  Patient Name: Marvetta Rinne    History of Presenting Illness     Chief Complaint   Patient presents with    Shortness of Breath     TTS dialysis pt, last full tx Saturday, started with sob last night.  he saw his pcp today who sent him here because \"I  have fluid\"       History Provided By: Patient    HPI: Marvetta Rinne, 69 y.o. male presents to the ED with cc shortness of breath. Patient has a history of end-stage renal disease, and goes to dialysis Tuesdays, Thursdays and Saturdays. He had a full dialysis on Saturday. He states that he has felt more short of breath in the last day. He also noticed that his abdomen is distended and he feels pressure but no pain. He denies any significant swelling in his legs or leg pain. He also denies chest pain. He was seen here on October 13 for a similar episode, at that time he was treated with Bumex and had a good response. He denies lightheadedness, fever or chills. He also denies any recent immobilization or long distance travel. There are no other complaints, changes, or physical findings at this time. PCP: None    No current facility-administered medications on file prior to encounter. Current Outpatient Medications on File Prior to Encounter   Medication Sig Dispense Refill    albuterol (PROVENTIL HFA, VENTOLIN HFA, PROAIR HFA) 90 mcg/actuation inhaler Take 2 Puffs by inhalation every four (4) hours as needed for Wheezing. 1 Inhaler 0    Insulin Needles, Disposable, (REYNALDO PEN NEEDLE) 32 gauge x 5/32\" ndle FOR USE EVERY MORNING WITH VICTOZA  Pen Needle 3    insulin NPH/insulin regular (NOVOLIN 70/30 U-100 INSULIN) 100 unit/mL (70-30) injection Up to 40 units before breakfast and dinner, (Patient taking differently: 15 Units two (2) times a day.  Up to 40 units before breakfast and dinner,  Indications: type 2 diabetes mellitus) 8 Vial 3    L.acidoph & parac-S.therm-Bifido (LOW Q2/RISAQUAD-2) 16 billion cell cap cap Take 1 Cap by mouth daily. 30 Cap 0    VICTOZA 2-NELLI 0.6 mg/0.1 mL (18 mg/3 mL) pnij INJECT 1.8- MG SUBCUTANEOUSLY ONCE DAILY, TITRATE UP TO 1.8 DAILY AS DIRECTED 9 Pen 3    sertraline (ZOLOFT) 50 mg tablet TAKE 1 T PO QD  0    aspirin 81 mg chewable tablet Take 81 mg by mouth daily.  sertraline (ZOLOFT) 25 mg tablet TK 1 T PO QD      carvedilol (COREG) 12.5 mg tablet       Insulin Syringe-Needle U-100 1 mL 30 gauge x 5/16 syrg Use twice daily for insulin injection 100 Syringe 7    hydrALAZINE (APRESOLINE) 100 mg tablet 100 mg three (3) times daily.  fenofibrate nanocrystallized (TRICOR) 145 mg tablet Take  by mouth every evening.  bumetanide (BUMEX) 2 mg tablet Take 2 mg by mouth every evening.  atorvastatin (LIPITOR) 20 mg tablet Take 20 mg by mouth every evening.  Calcium-Cholecalciferol, D3, (CALCIUM 600 WITH VITAMIN D3) 600 mg(1,500mg) -400 unit chew Take 1 Tab by mouth every evening.  fluticasone (FLONASE) 50 mcg/actuation nasal spray 2 Sprays by Both Nostrils route daily.  tamsulosin (FLOMAX) 0.4 mg capsule Take 0.4 mg by mouth two (2) times a day.          Past History     Past Medical History:  Past Medical History:   Diagnosis Date    Arthritis     spine    CAD (coronary artery disease)     high cholesterol    Chronic kidney disease     Diabetes ([address]     GERD (gastroesophageal reflux disease)     HX    Hypertension     Ill-defined condition     irritable bowel syndrome    Systolic CHF (HCC)     Unspecified sleep apnea     NO CPAP       Past Surgical History:  Past Surgical History:   Procedure Laterality Date    COLONOSCOPY N/A 11/9/2016    COLONOSCOPY performed by [facility]ileen Capellan [facility] at [facility] ENDOSCOPY    HX ORTHOPAEDIC      CERVICAL FUSION    HX UROLOGICAL      TURP    HX VASCULAR ACCESS      L arm dialysis access       Family History:  Family History   Problem Relation Age of Onset    Cancer Father \"bone\"    Diabetes Brother        Social History:  Social History     Tobacco Use    Smoking status: Former Smoker     Last attempt to quit: 2009     Years since quitting: 10.8    Smokeless tobacco: Former User    Tobacco comment: cigars only   Substance Use Topics    Alcohol use: No    Drug use: No       Allergies: Allergies   Allergen Reactions    Albumin, Human 25 % Anaphylaxis and Hives    Niacin Hives         Review of Systems   Review of Systems   Constitutional: Negative for chills and fever. HENT: Negative for congestion. Eyes: Negative. Respiratory: Positive for shortness of breath. Negative for cough. Cardiovascular: Negative for chest pain. Gastrointestinal: Negative for abdominal pain. Endocrine: Negative for heat intolerance. Genitourinary: Negative for difficulty urinating. Musculoskeletal: Negative for back pain. Skin: Negative for rash. Allergic/Immunologic: Negative for immunocompromised state. Neurological: Negative for dizziness. Hematological: Does not bruise/bleed easily. Psychiatric/Behavioral: Negative. All other systems reviewed and are negative. Physical Exam   Physical Exam   Constitutional: He is oriented to person, place, and time. He appears well-developed and well-nourished. No distress. HENT:   Head: Normocephalic and atraumatic. Eyes: Pupils are equal, round, and reactive to light. EOM are normal.   Neck: Normal range of motion. Neck supple. Cardiovascular: Normal rate, regular rhythm, normal heart sounds and intact distal pulses. Pulmonary/Chest: Effort normal and breath sounds normal. He has no wheezes. Abdominal: Soft. Bowel sounds are normal. He exhibits distension. There is no tenderness. Musculoskeletal: Normal range of motion. He exhibits no edema or tenderness. Neurological: He is alert and oriented to person, place, and time. No cranial nerve deficit. Skin: Skin is warm and dry.    Psychiatric: He has a normal mood and affect. His behavior is normal.   Nursing note and vitals reviewed. Diagnostic Study Results     Labs -     Recent Results (from the past 12 hour(s))   EKG, 12 LEAD, INITIAL    Collection Time: 10/28/19  4:36 PM   Result Value Ref Range    Ventricular Rate 64 BPM    Atrial Rate 64 BPM    P-R Interval 172 ms    QRS Duration 128 ms    Q-T Interval 456 ms    QTC Calculation (Bezet) 470 ms    Calculated R Axis -55 degrees    Calculated T Axis -27 degrees    Diagnosis       Normal sinus rhythm  Left axis deviation  Left ventricular hypertrophy with QRS widening  When compared with ECG of 13-OCT-2019 23:49,  (RBBB and left anterior fascicular block) is no longer present  Criteria for Anteroseptal infarct are no longer present     CBC WITH AUTOMATED DIFF    Collection Time: 10/28/19  5:39 PM   Result Value Ref Range    WBC 8.3 4.1 - 11.1 K/uL    RBC 3.73 (L) 4.10 - 5.70 M/uL    HGB 10.9 (L) 12.1 - 17.0 g/dL    HCT 34.9 (L) 36.6 - 50.3 %    MCV 93.6 80.0 - 99.0 FL    MCH 29.2 26.0 - 34.0 PG    MCHC 31.2 30.0 - 36.5 g/dL    RDW 14.9 (H) 11.5 - 14.5 %    PLATELET 772 864 - 893 K/uL    MPV 12.1 8.9 - 12.9 FL    NRBC 0.0 0  WBC    ABSOLUTE NRBC 0.00 0.00 - 0.01 K/uL    NEUTROPHILS 72 32 - 75 %    LYMPHOCYTES 15 12 - 49 %    MONOCYTES 8 5 - 13 %    EOSINOPHILS 4 0 - 7 %    BASOPHILS 1 0 - 1 %    IMMATURE GRANULOCYTES 0 0.0 - 0.5 %    ABS. NEUTROPHILS 6.0 1.8 - 8.0 K/UL    ABS. LYMPHOCYTES 1.2 0.8 - 3.5 K/UL    ABS. MONOCYTES 0.7 0.0 - 1.0 K/UL    ABS. EOSINOPHILS 0.3 0.0 - 0.4 K/UL    ABS. BASOPHILS 0.1 0.0 - 0.1 K/UL    ABS. IMM. GRANS. 0.0 0.00 - 0.04 K/UL    DF AUTOMATED         Radiologic Studies -   XR CHEST PA LAT   Final Result   IMPRESSION:    1. Small right pleural effusion. Additional heterogeneous opacities in the   bilateral lower lobes, which likely represent atelectasis, but may represent   edema and/or infection. 2. Unchanged cardiomegaly.            CT Results  (Last 48 hours)    None        CXR Results  (Last 48 hours)               10/28/19 1716  XR CHEST PA LAT Final result    Impression:  IMPRESSION:    1. Small right pleural effusion. Additional heterogeneous opacities in the   bilateral lower lobes, which likely represent atelectasis, but may represent   edema and/or infection. 2. Unchanged cardiomegaly. Narrative:  EXAM:  XR CHEST PA LAT       INDICATION:   sob       COMPARISON: Chest radiograph 10/13/2019. FINDINGS: PA and lateral radiographs of the chest were obtained. Small right pleural effusion with bilateral lower lobe patchy heterogeneous   opacities. No pneumothorax. Unchanged cardiomegaly. No acute osseous   abnormalities. Flowing ventral osteophytes in the thoracic spine consistent with   DISH. Cervical spine ACDF partially visualized. Medical Decision Making   I am the first provider for this patient. I reviewed the vital signs, available nursing notes, past medical history, past surgical history, family history and social history. Vital Signs-Reviewed the patient's vital signs. Patient Vitals for the past 12 hrs:   Temp Pulse Resp BP SpO2   10/28/19 1800  63 26 152/89 95 %   10/28/19 1642 97.8 °F (36.6 °C) 66 16 137/73 98 %       EKG interpretation: (Preliminary)  Rhythm: normal sinus rhythm; and regular . Rate (approx.): 64; Axis: left axis deviation; VA interval: normal; QRS interval: normal ; ST/T wave: non-specific changes; Other findings: unchanged from previous ekg. Records Reviewed: Nursing Notes, Old Medical Records, Previous electrocardiograms, Previous Radiology Studies and Previous Laboratory Studies    Provider Notes (Medical Decision Making):   Fluid overload, chf, cad, PNA    ED Course:   Initial assessment performed. The patients presenting problems have been discussed, and they are in agreement with the care plan formulated and outlined with them.   I have encouraged them to ask questions as they arise throughout their visit.    Progress Note:    The patient was ambulated with a pulse ox after diuresis. His saturation was in the mid 90's and he felt much better       Critical Care Time:   0    Disposition:  home    PLAN:  1. Discharge Medication List as of 10/28/2019  9:12 PM        2. Follow-up Information     Follow up With Specialties Details Why Contact Info    Georgie Sánchez MD Nephrology Call in 1 day  [address] [facility] [address] [phone]      [facility] EMERGENCY DEPT Emergency Medicine  If symptoms worsen [address] [place] [address]  [address] [facility]  [phone]        Return to ED if worse     Diagnosis     Clinical Impression:   1. Acute pulmonary edema (HCC)    2. Chronic kidney disease with end stage renal failure on dialysis (HCC)    3. Elevated troponin        Attestations:    Fabio Painter MD    Please note that this dictation was completed with [employer], the computer voice recognition software. Quite often unanticipated grammatical, syntax, homophones, and other interpretive errors are inadvertently transcribed by the computer software. Please disregard these errors. Please excuse any errors that have escaped final proofreading. Thank you.

## 2019-10-28 NOTE — ED NOTES
Assumed care from triage. Patient comes to the ED feeling short of breath since yesterday and noticed that the patients abdomen is tight and distended. Patient has a hx of CHF and dialysis. Patient last received dialysis on Saturday and has dialysis again tomorrow. Patient states that even sitting in the chair he feels like he cannot get all of the air through.

## 2019-10-29 LAB
ATRIAL RATE: 64 BPM
CALCULATED R AXIS, ECG10: -55 DEGREES
CALCULATED T AXIS, ECG11: -27 DEGREES
DIAGNOSIS, 93000: NORMAL
P-R INTERVAL, ECG05: 172 MS
Q-T INTERVAL, ECG07: 456 MS
QRS DURATION, ECG06: 128 MS
QTC CALCULATION (BEZET), ECG08: 470 MS
VENTRICULAR RATE, ECG03: 64 BPM

## 2019-10-29 NOTE — DISCHARGE INSTRUCTIONS
Patient Education        Learning About Fluid Overload  What is fluid overload? Fluid overload means that your body has too much water. The extra fluid in your body can raise your blood pressure and force your heart to work harder. It can also make it hard for you to breathe. Most of your body is made up of water. The body uses minerals like sodium and potassium to help organs such as your heart, kidneys, and liver balance how much water you need. For example, the heart pumps blood to move water around the body. And the kidneys work to get rid of the water that the body doesn't need. Health conditions like kidney disease, heart failure, and cirrhosis can cause fluid overload. Other things can cause extra fluid to build up. IV fluids, some medicines, too much salt (sodium) from food, and certain medical treatments can sometimes cause this fluid increase. What are the symptoms? Some of the most common symptoms are:  · Gaining weight over a short period of time. · Swelling in the ankles or legs. · Shortness of breath. How is it treated? The goal of treatment is to remove the extra fluid in your body. Your treatment will depend on the cause. Your doctor may:  · Give you medicines, such as diuretics (also called \"water pills\"). They help your body get rid of the extra fluid. · Restrict your fluid or salt intake. Follow-up care is a key part of your treatment and safety. Be sure to make and go to all appointments, and call your doctor if you are having problems. It's also a good idea to know your test results and keep a list of the medicines you take. Where can you learn more? Go to http://maria r-fran.info/. Enter O110 in the search box to learn more about \"Learning About Fluid Overload. \"  Current as of: April 9, 2019  Content Version: 12.2  © 5589-2747 [employer].  Care instructions adapted under license by [employer] (which disclaims liability or warranty for this information). If you have questions about a medical condition or this instruction, always ask your healthcare professional. [place] [address] any warranty or liability for your use of this information.

## 2019-10-29 NOTE — ED NOTES
Dr. Clair Bailey has reviewed discharge instructions with the patient and family. The patient verbalized understanding. Patient ambulated to waiting room in no distress.

## 2019-12-11 ENCOUNTER — OFFICE VISIT (OUTPATIENT)
Dept: ENDOCRINOLOGY | Age: 74
End: 2019-12-11

## 2019-12-11 VITALS
DIASTOLIC BLOOD PRESSURE: 75 MMHG | WEIGHT: 241 LBS | HEIGHT: 72 IN | BODY MASS INDEX: 32.64 KG/M2 | SYSTOLIC BLOOD PRESSURE: 149 MMHG | HEART RATE: 67 BPM

## 2019-12-11 DIAGNOSIS — E11.22 TYPE 2 DIABETES MELLITUS WITH STAGE 4 CHRONIC KIDNEY DISEASE, WITH LONG-TERM CURRENT USE OF INSULIN (HCC): Primary | ICD-10-CM

## 2019-12-11 DIAGNOSIS — N18.4 TYPE 2 DIABETES MELLITUS WITH STAGE 4 CHRONIC KIDNEY DISEASE, WITH LONG-TERM CURRENT USE OF INSULIN (HCC): Primary | ICD-10-CM

## 2019-12-11 DIAGNOSIS — E78.5 HYPERLIPIDEMIA, UNSPECIFIED HYPERLIPIDEMIA TYPE: ICD-10-CM

## 2019-12-11 DIAGNOSIS — I10 ESSENTIAL HYPERTENSION WITH GOAL BLOOD PRESSURE LESS THAN 130/80: ICD-10-CM

## 2019-12-11 DIAGNOSIS — Z79.4 TYPE 2 DIABETES MELLITUS WITH STAGE 4 CHRONIC KIDNEY DISEASE, WITH LONG-TERM CURRENT USE OF INSULIN (HCC): Primary | ICD-10-CM

## 2019-12-11 LAB — HBA1C MFR BLD HPLC: 7.3 %

## 2019-12-11 RX ORDER — GABAPENTIN 300 MG/1
300 CAPSULE ORAL DAILY
Qty: 90 CAP | Refills: 3 | Status: SHIPPED | OUTPATIENT
Start: 2019-12-11 | End: 2020-09-01

## 2019-12-11 NOTE — PATIENT INSTRUCTIONS
Novolin 70/30:  
 Breakfast: 18 units Dinner:      20 units Gabapentin for neuropathy in feet, 300mg at bedtime, OR take it before dialysis on dialysis days. Please note our new policy, you must arrive to the clinic 15 minutes before your appointment time to allow enough time for proper check-in, adequate time to spend with your doctor, and also to respect the appointment time of the next patient. Not arriving 15 minutes in advance may result in having your appointment rescheduled for the next available day/time. 
---------------------------------------------------------------------------------------------------------------------- Below you will find a glucose log sheet which you can use to record your blood sugars. Without checking and recording what your home glucose levels are, it will be difficult to make any changes to your medication dose, even when significant changes may be needed. Please feel free to use the log below to record your home glucose levels. At the very least, I would like for you to login the entire 2-3 weeks just before your visit so we can make your visit much more productive and beneficial to you. GLUCOSE LOG SHEET: 
 
Date Breakfast Lunch Dinner Bedtime Comments ? GLUCOSE LOG SHEET: 
 
Date Breakfast Lunch Dinner Bedtime Comments ? GLUCOSE LOG SHEET: 
 
Date Breakfast Lunch Dinner Bedtime Comments ?

## 2019-12-11 NOTE — PROGRESS NOTES
CHIEF COMPLAINT: f/u evaluation for uncontrolled type 2 diabetes    HISTORY OF PRESENT ILLNESS:   Fidelina Hernandez is a 69 y.o. male with a PMHx as noted below who presents to the endocrinology clinic for f/u evaluation of uncontrolled type 2 diabetes.     A1c today is 7.3%, prev 6.4%,     Currently taking the following meds:  Victoza 1.8mg daily (OFF x2 months due to cost)  Novolin 70/30:   15 units with breakfast and dinner  Correction 1:15>150    Review of home blood glucose:  No log or meter with him today  AM: [address] in the AM  Not checking at other times  No low sugars    Review of most recent diabetes-related labs:  Lab Results   Component Value Date    HBA1C 10.5 (H) 11/16/2016    HBA1C 5.7 01/19/2011    GUK7JBGH 6.4 08/14/2019    SAS2XAQO 7.5 04/11/2019    SCU2PASX 6.6 10/09/2018    CHOL 157 02/27/2018    LDLC 61 02/27/2018    GFRAA 14 (L) 10/28/2019    GFRNA 12 (L) 10/28/2019    CREATEXT 5.20 09/13/2018    MCACR 897.2 (H) 04/11/2019    MALBEXT HIGH 4737.5 10/02/2017    TSH 1.39 10/29/2016         PAST MEDICAL/SURGICAL HISTORY:   Past Medical History:   Diagnosis Date    Arthritis     spine    CAD (coronary artery disease)     high cholesterol    Chronic kidney disease     Diabetes ([address]     GERD (gastroesophageal reflux disease)     HX    Hypertension     Ill-defined condition     irritable bowel syndrome    Systolic CHF ([address]     Unspecified sleep apnea     NO CPAP     Past Surgical History:   Procedure Laterality Date    COLONOSCOPY N/A 11/9/2016    COLON[facility]fred [facility]ulo [facility] ENDOSCOPY    HX ORTHOPAEDIC      CERVICAL FUSION    HX UROLOGICAL      TURP    HX VASCULAR ACCESS      L arm dialysis access       ALLERGIES:   Allergies   Allergen Reactions    Albumin, Human 25 % Anaphylaxis and Hives    Niacin Hives       MEDICATIONS ON ADMISSION:     Current Outpatient Medications:     insulin NPH/insulin regular (NOVOLIN 70/30 U-100 INSULIN) 100 unit/mL (70-30) injection, Up to 40 units before breakfast and dinner, (Patient taking differently: 15 Units two (2) times a day. Up to 40 units before breakfast and dinner,  Indications: type 2 diabetes mellitus), Disp: 8 Vial, Rfl: 3    sertraline (ZOLOFT) 50 mg tablet, TAKE 1 T PO QD, Disp: , Rfl: 0    aspirin 81 mg chewable tablet, Take 81 mg by mouth daily. , Disp: , Rfl:     carvedilol (COREG) 12.5 mg tablet, , Disp: , Rfl:     Insulin Syringe-Needle U-100 1 mL 30 gauge x 5/16 syrg, Use twice daily for insulin injection, Disp: 100 Syringe, Rfl: 7    hydrALAZINE (APRESOLINE) 100 mg tablet, 100 mg three (3) times daily. , Disp: , Rfl:     fenofibrate nanocrystallized (TRICOR) 145 mg tablet, Take  by mouth every evening., Disp: , Rfl:     bumetanide (BUMEX) 2 mg tablet, Take 2 mg by mouth every evening., Disp: , Rfl:     atorvastatin (LIPITOR) 20 mg tablet, Take 20 mg by mouth every evening., Disp: , Rfl:     Calcium-Cholecalciferol, D3, (CALCIUM 600 WITH VITAMIN D3) 600 mg(1,500mg) -400 unit chew, Take 1 Tab by mouth every evening., Disp: , Rfl:     fluticasone (FLONASE) 50 mcg/actuation nasal spray, 2 Sprays by Both Nostrils route daily. , Disp: , Rfl:     tamsulosin (FLOMAX) 0.4 mg capsule, Take 0.4 mg by mouth two (2) times a day., Disp: , Rfl:     albuterol (PROVENTIL HFA, VENTOLIN HFA, PROAIR HFA) 90 mcg/actuation inhaler, Take 2 Puffs by inhalation every four (4) hours as needed for Wheezing., Disp: 1 Inhaler, Rfl: 0    Insulin Needles, Disposable, (REYNALDO PEN NEEDLE) 32 gauge x 5/32\" ndle, FOR USE EVERY MORNING WITH VICTOZA PEN, Disp: 100 Pen Needle, Rfl: 3    L.acidoph & parac-S.therm-Bifido (LOW Q2/RISAQUAD-2) 16 billion cell cap cap, Take 1 Cap by mouth daily. , Disp: 30 Cap, Rfl: 0    VICTOZA 2-NELLI 0.6 mg/0.1 mL (18 mg/3 mL) pnij, INJECT 1.8- MG SUBCUTANEOUSLY ONCE DAILY, TITRATE UP TO 1.8 DAILY AS DIRECTED, Disp: 9 Pen, Rfl: 3    sertraline (ZOLOFT) 25 mg tablet, TK 1 T PO QD, Disp: , Rfl:     SOCIAL HISTORY:   Social History     Socioeconomic History    Marital status: UNKNOWN     Spouse name: Not on file    Number of children: Not on file    Years of education: Not on file    Highest education level: Not on file   Occupational History    Not on file   Social Needs    Financial resource strain: Not on file    Food insecurity:     Worry: Not on file     Inability: Not on file    Transportation needs:     Medical: Not on file     Non-medical: Not on file   Tobacco Use    Smoking status: Former Smoker     Last attempt to quit: 2009     Years since quitting: 10.9    Smokeless tobacco: Former User    Tobacco comment: cigars only   Substance and Sexual Activity    Alcohol use: No    Drug use: No    Sexual activity: Not Currently   Lifestyle    Physical activity:     Days per week: Not on file     Minutes per session: Not on file    Stress: Not on file   Relationships    Social connections:     Talks on phone: Not on file     Gets together: Not on file     Attends Anabaptist service: Not on file     Active member of club or organization: Not on file     Attends meetings of clubs or organizations: Not on file     Relationship status: Not on file    Intimate partner violence:     Fear of current or ex partner: Not on file     Emotionally abused: Not on file     Physically abused: Not on file     Forced sexual activity: Not on file   Other Topics Concern    Not on file   Social History Narrative    Not on file       FAMILY HISTORY:  Family History   Problem Relation Age of Onset    Cancer Father         \"bone\"    Diabetes Brother        REVIEW OF SYSTEMS: Complete ROS assessed and noted for that which is described above, all else are negative.   Eyes: normal  ENT: normal  CVS: normal  Resp: normal  GI: normal  : normal  GYN: normal  Endocrine: normal  Integument: normal  Musculoskeletal: knee discomfort  Neuro: normal  Psych: normal      PHYSICAL EXAMINATION:    VITAL SIGNS:  Visit Vitals  /75 (BP 1 Location: Right arm, BP Patient Position: Sitting)   Pulse 67   Ht 6' (1.829 m)   Wt 241 lb (109.3 kg)   BMI 32.69 kg/m²       GENERAL: NCAT, Sitting comfortably, NAD  EYES: EOMI, non-icteric, no proptosis  Ear/Nose/Throat: NCAT, no inflammation, no masses  LYMPH NODES: No LAD  CARDIOVASCULAR: S1 S2, RRR, No murmur  RESPIRATORY: CTA b/l, no wheeze/rales  GASTROINTESTINAL: obese, NT, ND,  MUSCULOSKELETAL: Normal ROM, no atrophy  SKIN: warm, no edema/rash/ or other skin changes  NEUROLOGIC: 5/5 power all extremities, no tremors, AAOx3  PSYCHIATRIC: Normal affect, Normal insight and judgement    REVIEW OF LABORATORY AND RADIOLOGY DATA:   Labs and documentation have been reviewed as described above. ASSESSMENT AND PLAN:   Mic Sinclair is a 69 y.o. male with a PMHx as noted above who presents to the endocrinology clinic for evaluation of uncontrolled type 2 diabetes. DM2  HTN  HLD    Patients victoza price went up and so he discontinued it 2 months ago. I am surprised that his A1c did not rise much higher. In light of his A1c remaining around 7.3% and his sugars in the 170's fasting, it is reasonable to just increase his insulin a bit rather than adding an alternate GLP-1 agonist at the present time. He is also noting today some neuropathy in his feet that is worse when they raise his legs for dialysis, and is requesting a treatment for this. DM2:  OFF victoza due to cost  Novolin 70/30:    Breakfast: 18 units   Dinner: 20 units  Continue to check glucose 2-3 times daily,    HTN: Stable on current meds  HLD: on statin, advised he needs level, has fistula and will have checked with dialysis, otherwise will obtain on future visit with entire DM panel (2020 panel)  DM Neuropathy: Requesting help with this. Will trial 300mg before dialysis on dialysis days since his feet hurt most those morning.      LABS: Will try to coordinate his next 2020 DM labs to be obtained in the future with his dialysis    4 month f/u visit    Sylvia Vaughn.  [address] [facility]

## 2020-01-22 ENCOUNTER — HOSPITAL ENCOUNTER (OUTPATIENT)
Dept: GENERAL RADIOLOGY | Age: 75
Discharge: HOME | End: 2020-01-22
Payer: MEDICARE

## 2020-01-22 DIAGNOSIS — N18.6 ESRF (END STAGE RENAL FAILURE) (HCC): ICD-10-CM

## 2020-01-22 PROCEDURE — 71046 X-RAY EXAM CHEST 2 VIEWS: CPT

## 2020-02-20 ENCOUNTER — TELEPHONE (OUTPATIENT)
Dept: ENDOCRINOLOGY | Age: 75
End: 2020-02-20

## 2020-02-20 NOTE — TELEPHONE ENCOUNTER
----- Message from Radha Burton sent at 2/20/2020 11:14 AM EST -----  Regarding: /telephone  Medication Refill    Caller (if not patient):      Relationship of caller (if not patient):      Best contact number(s):[phone]      Name of medication and dosage if known: Novalen 70/30. Is patient out of this medication (yes/no): yes. Pharmacy name: [facility] in [place].     Pharmacy listed in chart? (yes/no): yes  Pharmacy phone number:

## 2020-02-20 NOTE — TELEPHONE ENCOUNTER
Message from Kade Kingsley sent at 2/20/2020 11:14 AM EST -----  Regarding: /telephone  Medication Refill     Caller (if not patient):        Relationship of caller (if not patient):        Best contact number(s):[phone]        Name of medication and dosage if known: Novalen 70/30.        Is patient out of this medication (yes/no): yes.        Ph[facility] name: [facility] [address] listed in chart? (yes/no): yes  Pharmacy phone number:

## 2020-06-19 ENCOUNTER — APPOINTMENT (OUTPATIENT)
Dept: CT IMAGING | Age: 75
End: 2020-06-19
Attending: EMERGENCY MEDICINE
Payer: MEDICARE

## 2020-06-19 ENCOUNTER — HOSPITAL ENCOUNTER (EMERGENCY)
Age: 75
Discharge: HOME | End: 2020-06-19
Attending: EMERGENCY MEDICINE
Payer: MEDICARE

## 2020-06-19 ENCOUNTER — APPOINTMENT (OUTPATIENT)
Dept: GENERAL RADIOLOGY | Age: 75
End: 2020-06-19
Attending: EMERGENCY MEDICINE
Payer: MEDICARE

## 2020-06-19 VITALS
WEIGHT: 250 LBS | OXYGEN SATURATION: 95 % | SYSTOLIC BLOOD PRESSURE: 170 MMHG | TEMPERATURE: 98.4 F | BODY MASS INDEX: 33.86 KG/M2 | DIASTOLIC BLOOD PRESSURE: 84 MMHG | RESPIRATION RATE: 18 BRPM | HEIGHT: 72 IN | HEART RATE: 69 BPM

## 2020-06-19 DIAGNOSIS — I50.9 ACUTE ON CHRONIC CONGESTIVE HEART FAILURE, UNSPECIFIED HEART FAILURE TYPE (HCC): ICD-10-CM

## 2020-06-19 DIAGNOSIS — I10 HYPERTENSION, UNSPECIFIED TYPE: Primary | ICD-10-CM

## 2020-06-19 DIAGNOSIS — N18.9 CHRONIC KIDNEY DISEASE, UNSPECIFIED CKD STAGE: ICD-10-CM

## 2020-06-19 LAB
ALBUMIN SERPL-MCNC: 3.6 G/DL (ref 3.5–5)
ALBUMIN/GLOB SERPL: 0.8 {RATIO} (ref 1.1–2.2)
ALP SERPL-CCNC: 86 U/L (ref 45–117)
ALT SERPL-CCNC: 32 U/L (ref 12–78)
ANION GAP SERPL CALC-SCNC: 5 MMOL/L (ref 5–15)
APPEARANCE UR: CLEAR
AST SERPL-CCNC: 18 U/L (ref 15–37)
ATRIAL RATE: 73 BPM
BACTERIA URNS QL MICRO: NEGATIVE /HPF
BASOPHILS # BLD: 0.1 K/UL (ref 0–0.1)
BASOPHILS NFR BLD: 1 % (ref 0–1)
BILIRUB SERPL-MCNC: 0.5 MG/DL (ref 0.2–1)
BILIRUB UR QL: NEGATIVE
BNP SERPL-MCNC: 6784 PG/ML
BUN SERPL-MCNC: 41 MG/DL (ref 6–20)
BUN/CREAT SERPL: 9 (ref 12–20)
CALCIUM SERPL-MCNC: 8.8 MG/DL (ref 8.5–10.1)
CALCULATED P AXIS, ECG09: 9 DEGREES
CALCULATED R AXIS, ECG10: -53 DEGREES
CALCULATED T AXIS, ECG11: 66 DEGREES
CHLORIDE SERPL-SCNC: 107 MMOL/L (ref 97–108)
CO2 SERPL-SCNC: 28 MMOL/L (ref 21–32)
COLOR UR: ABNORMAL
COMMENT, HOLDF: NORMAL
CREAT SERPL-MCNC: 4.72 MG/DL (ref 0.7–1.3)
DIAGNOSIS, 93000: NORMAL
DIFFERENTIAL METHOD BLD: ABNORMAL
EOSINOPHIL # BLD: 0.7 K/UL (ref 0–0.4)
EOSINOPHIL NFR BLD: 8 % (ref 0–7)
EPITH CASTS URNS QL MICRO: ABNORMAL /LPF
ERYTHROCYTE [DISTWIDTH] IN BLOOD BY AUTOMATED COUNT: 15.1 % (ref 11.5–14.5)
GLOBULIN SER CALC-MCNC: 4.3 G/DL (ref 2–4)
GLUCOSE SERPL-MCNC: 174 MG/DL (ref 65–100)
GLUCOSE UR STRIP.AUTO-MCNC: NEGATIVE MG/DL
HCT VFR BLD AUTO: 32.6 % (ref 36.6–50.3)
HGB BLD-MCNC: 10.4 G/DL (ref 12.1–17)
HGB UR QL STRIP: NEGATIVE
HYALINE CASTS URNS QL MICRO: ABNORMAL /LPF (ref 0–5)
IMM GRANULOCYTES # BLD AUTO: 0 K/UL (ref 0–0.04)
IMM GRANULOCYTES NFR BLD AUTO: 0 % (ref 0–0.5)
KETONES UR QL STRIP.AUTO: NEGATIVE MG/DL
LEUKOCYTE ESTERASE UR QL STRIP.AUTO: NEGATIVE
LYMPHOCYTES # BLD: 1.1 K/UL (ref 0.8–3.5)
LYMPHOCYTES NFR BLD: 13 % (ref 12–49)
MCH RBC QN AUTO: 29.5 PG (ref 26–34)
MCHC RBC AUTO-ENTMCNC: 31.9 G/DL (ref 30–36.5)
MCV RBC AUTO: 92.6 FL (ref 80–99)
MONOCYTES # BLD: 0.8 K/UL (ref 0–1)
MONOCYTES NFR BLD: 10 % (ref 5–13)
NEUTS SEG # BLD: 5.8 K/UL (ref 1.8–8)
NEUTS SEG NFR BLD: 68 % (ref 32–75)
NITRITE UR QL STRIP.AUTO: NEGATIVE
NRBC # BLD: 0 K/UL (ref 0–0.01)
NRBC BLD-RTO: 0 PER 100 WBC
P-R INTERVAL, ECG05: 172 MS
PH UR STRIP: 8 [PH] (ref 5–8)
PLATELET # BLD AUTO: 217 K/UL (ref 150–400)
PMV BLD AUTO: 11.6 FL (ref 8.9–12.9)
POTASSIUM SERPL-SCNC: 4.3 MMOL/L (ref 3.5–5.1)
PROT SERPL-MCNC: 7.9 G/DL (ref 6.4–8.2)
PROT UR STRIP-MCNC: 300 MG/DL
Q-T INTERVAL, ECG07: 418 MS
QRS DURATION, ECG06: 132 MS
QTC CALCULATION (BEZET), ECG08: 460 MS
RBC # BLD AUTO: 3.52 M/UL (ref 4.1–5.7)
RBC #/AREA URNS HPF: ABNORMAL /HPF (ref 0–5)
SAMPLES BEING HELD,HOLD: NORMAL
SODIUM SERPL-SCNC: 140 MMOL/L (ref 136–145)
SP GR UR REFRACTOMETRY: 1.02 (ref 1–1.03)
TROPONIN I SERPL-MCNC: 0.16 NG/ML
UA: UC IF INDICATED,UAUC: ABNORMAL
UROBILINOGEN UR QL STRIP.AUTO: 0.2 EU/DL (ref 0.2–1)
VENTRICULAR RATE, ECG03: 73 BPM
WBC # BLD AUTO: 8.5 K/UL (ref 4.1–11.1)
WBC URNS QL MICRO: ABNORMAL /HPF (ref 0–4)

## 2020-06-19 PROCEDURE — 99285 EMERGENCY DEPT VISIT HI MDM: CPT

## 2020-06-19 PROCEDURE — 74176 CT ABD & PELVIS W/O CONTRAST: CPT

## 2020-06-19 PROCEDURE — 74011000250 HC RX REV CODE- 250: Performed by: EMERGENCY MEDICINE

## 2020-06-19 PROCEDURE — 81001 URINALYSIS AUTO W/SCOPE: CPT

## 2020-06-19 PROCEDURE — 36415 COLL VENOUS BLD VENIPUNCTURE: CPT

## 2020-06-19 PROCEDURE — 80053 COMPREHEN METABOLIC PANEL: CPT

## 2020-06-19 PROCEDURE — 83880 ASSAY OF NATRIURETIC PEPTIDE: CPT

## 2020-06-19 PROCEDURE — 96374 THER/PROPH/DIAG INJ IV PUSH: CPT

## 2020-06-19 PROCEDURE — 74011250637 HC RX REV CODE- 250/637: Performed by: EMERGENCY MEDICINE

## 2020-06-19 PROCEDURE — 93005 ELECTROCARDIOGRAM TRACING: CPT

## 2020-06-19 PROCEDURE — 71046 X-RAY EXAM CHEST 2 VIEWS: CPT

## 2020-06-19 PROCEDURE — 85025 COMPLETE CBC W/AUTO DIFF WBC: CPT

## 2020-06-19 PROCEDURE — 84484 ASSAY OF TROPONIN QUANT: CPT

## 2020-06-19 RX ORDER — BUMETANIDE 0.25 MG/ML
2 INJECTION INTRAMUSCULAR; INTRAVENOUS
Status: COMPLETED | OUTPATIENT
Start: 2020-06-19 | End: 2020-06-19

## 2020-06-19 RX ORDER — CARVEDILOL 12.5 MG/1
12.5 TABLET ORAL
Status: COMPLETED | OUTPATIENT
Start: 2020-06-19 | End: 2020-06-19

## 2020-06-19 RX ORDER — HYDRALAZINE HYDROCHLORIDE 50 MG/1
100 TABLET, FILM COATED ORAL
Status: COMPLETED | OUTPATIENT
Start: 2020-06-19 | End: 2020-06-19

## 2020-06-19 RX ORDER — BUMETANIDE 1 MG/1
1 TABLET ORAL DAILY
Qty: 30 TAB | Refills: 0 | Status: SHIPPED | OUTPATIENT
Start: 2020-06-19 | End: 2020-07-23

## 2020-06-19 RX ADMIN — HYDRALAZINE HYDROCHLORIDE 100 MG: 50 TABLET, FILM COATED ORAL at 18:13

## 2020-06-19 RX ADMIN — CARVEDILOL 12.5 MG: 12.5 TABLET, FILM COATED ORAL at 18:13

## 2020-06-19 RX ADMIN — BUMETANIDE 2 MG: 0.25 INJECTION, SOLUTION INTRAMUSCULAR; INTRAVENOUS at 15:55

## 2020-06-19 NOTE — ED NOTES
Bedside shift change report given to Aminata Cutler  (oncoming nurse) by Pal RN  (offgoing nurse). Report included the following information SBAR, Kardex, ED Summary and MAR.

## 2020-06-19 NOTE — DISCHARGE INSTRUCTIONS
Patient Education        Fluid Restriction: Care Instructions  Your Care Instructions     A buildup of fluid in the body can cause low sodium levels in the blood. It may also cause symptoms such as swelling and pain. Your doctor may suggest that you limit liquids, including foods that contain a lot of liquid. Limiting liquids is called fluid restriction. Keeping track of the amount of fluids you take in may help you feel better. Your doctor will tell you how much fluid you can have in a day. Follow-up care is a key part of your treatment and safety. Be sure to make and go to all appointments, and call your doctor if you are having problems. It's also a good idea to know your test results and keep a list of the medicines you take. How can you care for yourself at home? · Find a way of tracking the fluids you take in that works for you. Here are two methods you can try:  ? Write down how much you drink throughout the day. ? Keep a container filled with the amount of liquid allowed for the day. As you drink liquids during the day, such as a 6-ounce cup of coffee, pour that same amount out of the container. When the container is empty, you've had your liquid for the day. · Count any foods that will melt (such as ice cream, gelatin, or flavored ice treats) or liquid foods (such as soup) as part of your fluids for the day. Also count the liquid in canned fruits and vegetables as part of your daily intake, or drain them well before serving. · Space your liquids throughout the day. Then you won't be tempted to drink more than the amount your doctor recommends. · To relieve thirst without taking in extra water, try chewing gum, sucking on hard candy (sugarless if you have diabetes), or rinsing your mouth with water and spitting it out. Where can you learn more? Go to http://maria r-fran.info/  Enter T083 in the search box to learn more about \"Fluid Restriction: Care Instructions. \"  Current as of: December 16, 2019               Content Version: 12.5  © 2746-4790 [employer]. Care instructions adapted under license by [employer] (which disclaims liability or warranty for this information). If you have questions about a medical condition or this instruction, always ask your healthcare professional. [address] any warranty or liability for your use of this information. Patient Education        Heart Failure: Care Instructions  Your Care Instructions     Heart failure occurs when your heart does not pump as much blood as the body needs. Failure does not mean that the heart has stopped pumping but rather that it is not pumping as well as it should. Over time, this causes fluid buildup in your lungs and other parts of your body. Fluid buildup can cause shortness of breath, fatigue, swollen ankles, and other problems. By taking medicines regularly, reducing sodium (salt) in your diet, checking your weight every day, and making lifestyle changes, you can feel better and live longer. Follow-up care is a key part of your treatment and safety. Be sure to make and go to all appointments, and call your doctor if you are having problems. It's also a good idea to know your test results and keep a list of the medicines you take. How can you care for yourself at home? Medicines  · Be safe with medicines. Take your medicines exactly as prescribed. Call your doctor if you think you are having a problem with your medicine. · Do not take any vitamins, over-the-counter medicine, or herbal products without talking to your doctor first. Mario Herb not take ibuprofen (Advil or Motrin) and naproxen (Aleve) without talking to your doctor first. They could make your heart failure worse. · You may take some of the following medicine. ?  Angiotensin-converting enzyme inhibitors (ACEIs) or angiotensin II receptor blockers (ARBs) reduce the heart's workload, lower blood pressure, and reduce swelling. Taking an ACEI or ARB may lower your chance of needing to be hospitalized. ? Beta-blockers can slow heart rate, decrease blood pressure, and improve your condition. Taking a beta-blocker may lower your chance of needing to be hospitalized. ? Diuretics, also called water pills, reduce swelling. You will get more details on the specific medicines your doctor prescribes. Diet  · Your doctor may suggest that you limit sodium. Your doctor can tell you how much sodium is right for you. An example is less than 3,000 mg a day. This includes all the salt you eat in cooking or in packaged foods. People get most of their sodium from processed foods. Fast food and restaurant meals also tend to be very high in sodium. · Ask your doctor how much liquid you can drink each day. You may have to limit liquids. Weight  · Weigh yourself without clothing at the same time each day. Record your weight. Call your doctor if you have a sudden weight gain, such as more than 2 to 3 pounds in a day or 5 pounds in a week. (Your doctor may suggest a different range of weight gain.) A sudden weight gain may mean that your heart failure is getting worse. Activity level  · Start light exercise (if your doctor says it is okay). Even if you can only do a small amount, exercise will help you get stronger, have more energy, and manage your weight and your stress. Walking is an easy way to get exercise. Start out by walking a little more than you did before. Bit by bit, increase the amount you walk. · When you exercise, watch for signs that your heart is working too hard. You are pushing yourself too hard if you cannot talk while you are exercising. If you become short of breath or dizzy or have chest pain, stop, sit down, and rest.  · If you feel \"wiped out\" the day after you exercise, walk slower or for a shorter distance until you can work up to a better pace. · Get enough rest at night.  Sleeping with 1 or 2 pillows under your upper body and head may help you breathe easier. Lifestyle changes  · Do not smoke. Smoking can make a heart condition worse. If you need help quitting, talk to your doctor about stop-smoking programs and medicines. These can increase your chances of quitting for good. Quitting smoking may be the most important step you can take to protect your heart. · Limit alcohol to 2 drinks a day for men and 1 drink a day for women. Too much alcohol can cause health problems. · Avoid getting sick from colds and the flu. Get a pneumococcal vaccine shot. If you have had one before, ask your doctor whether you need another dose. Get a flu shot each year. If you must be around people with colds or the flu, wash your hands often. When should you call for help? Call 911 if you have symptoms of sudden heart failure such as:  · You have severe trouble breathing. · You cough up pink, foamy mucus. · You have a new irregular or rapid heartbeat. Call your doctor now or seek immediate medical care if:  · You have new or increased shortness of breath. · You are dizzy or lightheaded, or you feel like you may faint. · You have sudden weight gain, such as more than 2 to 3 pounds in a day or 5 pounds in a week. (Your doctor may suggest a different range of weight gain.)  · You have increased swelling in your legs, ankles, or feet. · You are suddenly so tired or weak that you cannot do your usual activities. Watch closely for changes in your health, and be sure to contact your doctor if you develop new symptoms. Where can you learn more? Go to http://maria r-fran.info/  Enter C531 in the search box to learn more about \"Heart Failure: Care Instructions. \"  Current as of: December 16, 2019               Content Version: 12.5  © 0706-1848 [employer]. Care instructions adapted under license by [employer] (which disclaims liability or warranty for this information).  If you have questions about a medical condition or this instruction, always ask your healthcare professional. [address] any warranty or liability for your use of this information. Patient Education        High Blood Pressure: Care Instructions  Overview     It's normal for blood pressure to go up and down throughout the day. But if it stays up, you have high blood pressure. Another name for high blood pressure is hypertension. Despite what a lot of people think, high blood pressure usually doesn't cause headaches or make you feel dizzy or lightheaded. It usually has no symptoms. But it does increase your risk of stroke, heart attack, and other problems. You and your doctor will talk about your risks of these problems based on your blood pressure. Your doctor will give you a goal for your blood pressure. Your goal will be based on your health and your age. Lifestyle changes, such as eating healthy and being active, are always important to help lower blood pressure. You might also take medicine to reach your blood pressure goal.  Follow-up care is a key part of your treatment and safety. Be sure to make and go to all appointments, and call your doctor if you are having problems. It's also a good idea to know your test results and keep a list of the medicines you take. How can you care for yourself at home? Medical treatment  · If you stop taking your medicine, your blood pressure will go back up. You may take one or more types of medicine to lower your blood pressure. Be safe with medicines. Take your medicine exactly as prescribed. Call your doctor if you think you are having a problem with your medicine. · Talk to your doctor before you start taking aspirin every day. Aspirin can help certain people lower their risk of a heart attack or stroke. But taking aspirin isn't right for everyone, because it can cause serious bleeding. · See your doctor regularly.  You may need to see the doctor more often at first or until your blood pressure comes down. · If you are taking blood pressure medicine, talk to your doctor before you take decongestants or anti-inflammatory medicine, such as ibuprofen. Some of these medicines can raise blood pressure. · Learn how to check your blood pressure at home. Lifestyle changes  · Stay at a healthy weight. This is especially important if you put on weight around the waist. Losing even 10 pounds can help you lower your blood pressure. · If your doctor recommends it, get more exercise. Walking is a good choice. Bit by bit, increase the amount you walk every day. Try for at least 30 minutes on most days of the week. You also may want to swim, bike, or do other activities. · Avoid or limit alcohol. Talk to your doctor about whether you can drink any alcohol. · Try to limit how much sodium you eat to less than 2,300 milligrams (mg) a day. Your doctor may ask you to try to eat less than 1,500 mg a day. · Eat plenty of fruits (such as bananas and oranges), vegetables, legumes, whole grains, and low-fat dairy products. · Lower the amount of saturated fat in your diet. Saturated fat is found in animal products such as milk, cheese, and meat. Limiting these foods may help you lose weight and also lower your risk for heart disease. · Do not smoke. Smoking increases your risk for heart attack and stroke. If you need help quitting, talk to your doctor about stop-smoking programs and medicines. These can increase your chances of quitting for good. When should you call for help? Call  911 anytime you think you may need emergency care. This may mean having symptoms that suggest that your blood pressure is causing a serious heart or blood vessel problem. Your blood pressure may be over 180/120. For example, call 911 if:  · You have symptoms of a heart attack. These may include:  ? Chest pain or pressure, or a strange feeling in the chest.  ? Sweating. ?  Shortness of breath. ? Nausea or vomiting. ? Pain, pressure, or a strange feeling in the back, neck, jaw, or upper belly or in one or both shoulders or arms. ? Lightheadedness or sudden weakness. ? A fast or irregular heartbeat. · You have symptoms of a stroke. These may include:  ? Sudden numbness, tingling, weakness, or loss of movement in your face, arm, or leg, especially on only one side of your body. ? Sudden vision changes. ? Sudden trouble speaking. ? Sudden confusion or trouble understanding simple statements. ? Sudden problems with walking or balance. ? A sudden, severe headache that is different from past headaches. · You have severe back or belly pain. Do not wait until your blood pressure comes down on its own. Get help right away. Call your doctor now or seek immediate care if:  · Your blood pressure is much higher than normal (such as 180/120 or higher), but you don't have symptoms. · You think high blood pressure is causing symptoms, such as:  ? Severe headache.  ? Blurry vision. Watch closely for changes in your health, and be sure to contact your doctor if:  · Your blood pressure measures higher than your doctor recommends at least 2 times. That means the top number is higher or the bottom number is higher, or both. · You think you may be having side effects from your blood pressure medicine. Where can you learn more? Go to http://maria r-fran.info/  Enter [id number] in the search box to learn more about \"High Blood Pressure: Care Instructions. \"  Current as of: December 16, 2019               Content Version: 12.5  © 2101-6407 [employer]. Care instructions adapted under license by [employer] (which disclaims liability or warranty for this information).  If you have questions about a medical condition or this instruction, always ask your healthcare professional. [place] [address] any warranty or liability for your use of this information.

## 2020-06-22 ENCOUNTER — PATIENT OUTREACH (OUTPATIENT)
Dept: INTERNAL MEDICINE CLINIC | Age: 75
End: 2020-06-22

## 2020-06-22 DIAGNOSIS — Z71.89 ADVANCE CARE PLANNING: Primary | ICD-10-CM

## 2020-06-22 NOTE — PROGRESS NOTES
Patient contacted regarding recent discharge and COVID-19 risk. [facility] ED 20 dx-HTN, CKD, acute on chronic CHF    Discussed COVID-19 related testing which was not done at this time. Test results were not done. Patient informed of results, if available? N/a  Recent Travel Screening and Travel History documentation     Travel Screening      No screening recorded since 20 0000      Travel History   Travel since 20     No documented travel since 20          Attempted to contact for follow up. Left voice message on all phone numbers listed in pt contacts requesting a call back. Spoke to pt who stated he is not feeling too much better, still bloated and has been sob for about a year. He is taking fluid pill as instructed in ED. Pt has not reached out to PCP yet nor Dr Andrea Kaplan, but stated he will. Pt requested referral for ACP specialist.      Care Transition Nurse/ Ambulatory Care Manager contacted the patient by telephone to perform post discharge assessment. Verified name and  with patient as identifiers. Patient has following risk factors of: diabetes, chronic kidney disease and older adult. CTN/ACM reviewed discharge instructions, medical action plan and red flags related to discharge diagnosis. Reviewed and educated them on any new and changed medications related to discharge diagnosis. Advised obtaining a 90-day supply of all daily and as-needed medications. Education provided regarding infection prevention, and signs and symptoms of COVID-19 and when to seek medical attention with patient who verbalized understanding. Discussed exposure protocols and quarantine from [address] you at higher risk for severe illness  and given an opportunity for questions and concerns. The patient agrees to contact the COVID-19 hotline [phone] or PCP office for questions related to their healthcare. CTN/ACM provided contact information for future reference.     From CDC: Are you at higher risk for severe illness?  Wash your hands often.  Avoid close contact (6 feet, which is about two arm lengths) with people who are sick.  Put distance between yourself and other people if COVID-19 is spreading in your community.  Clean and disinfect frequently touched surfaces.  Avoid all cruise travel and non-essential air travel.  Call your healthcare professional if you have concerns about COVID-19 and your underlying condition or if you are sick. For more information on steps you can take to protect yourself, see CDC's How to Protect Yourself      Patient/family/caregiver given information for [facility] and agrees to enroll no  Patient's preferred e-mail:  n/a  Patient's preferred phone number: n/a  Based on Loop alert triggers, patient will be contacted by nurse care manager for worsening symptoms. Plan for follow-up call in 7-14 days based on severity of symptoms and risk factors.

## 2020-06-23 NOTE — ED PROVIDER NOTES
EMERGENCY DEPARTMENT HISTORY AND PHYSICAL EXAM      Date: 6/19/2020  Patient Name: Alex Bass    History of Presenting Illness     Chief Complaint   Patient presents with   [facility]     Ambulatory into the ED with c/o abdominal bloating and SOB when laying flat x last night.  Shortness of Breath       History Provided By: Patient    HPI: Alex Bass, 69 y.o. male with PMHx as noted below presents the emergency department with chief complaint of loading and dyspnea. Patient reports that he is felt some abdominal bloating and swelling since yesterday. He also reports orthopnea yesterday evening. Patient otherwise notes that his dyspnea is somewhat worse with exertion but notes that he feels fine now here sitting at rest.  Patient is currently on 2 mg of Bumex daily. Otherwise has had no cough, sputum production, fevers, chills, chest pain, significant weight gain that he is aware of. Patient notes that he has not missed any of his dialysis sessions. PCP: Polina Bhakta MD    Current Outpatient Medications   Medication Sig Dispense Refill    bumetanide (BUMEX) 1 mg tablet Take 1 Tab by mouth daily. Take 1 tab in morning and your 2mg tab in the evening 30 Tab 0    insulin NPH/insulin regular (NOVOLIN 70/30 U-100 INSULIN) 100 unit/mL (70-30) injection Inject 18 Units with Breakfast, 20 Units with Dinner 4 Vial 3    gabapentin (NEURONTIN) 300 mg capsule Take 1 Cap by mouth daily. Max Daily Amount: 300 mg. 90 Cap 3    albuterol (PROVENTIL HFA, VENTOLIN HFA, PROAIR HFA) 90 mcg/actuation inhaler Take 2 Puffs by inhalation every four (4) hours as needed for Wheezing. 1 Inhaler 0    Insulin Needles, Disposable, (REYNALDO PEN NEEDLE) 32 gauge x 5/32\" ndle FOR USE EVERY MORNING WITH VICTOZA  Pen Needle 3    L.acidoph & parac-S.therm-Bifido (LOW Q2/RISAQUAD-2) 16 billion cell cap cap Take 1 Cap by mouth daily.  30 Cap 0    VICTOZA 2-NELLI 0.6 mg/0.1 mL (18 mg/3 mL) pnij INJECT 1.8- MG SUBCUTANEOUSLY ONCE DAILY, TITRATE UP TO 1.8 DAILY AS DIRECTED 9 Pen 3    sertraline (ZOLOFT) 50 mg tablet TAKE 1 T PO QD  0    aspirin 81 mg chewable tablet Take 81 mg by mouth daily.  sertraline (ZOLOFT) 25 mg tablet TK 1 T PO QD      carvedilol (COREG) 12.5 mg tablet       Insulin Syringe-Needle U-100 1 mL 30 gauge x 5/16 syrg Use twice daily for insulin injection 100 Syringe 7    hydrALAZINE (APRESOLINE) 100 mg tablet 100 mg three (3) times daily.  fenofibrate nanocrystallized (TRICOR) 145 mg tablet Take  by mouth every evening.  bumetanide (BUMEX) 2 mg tablet Take 2 mg by mouth every evening.  atorvastatin (LIPITOR) 20 mg tablet Take 20 mg by mouth every evening.  Calcium-Cholecalciferol, D3, (CALCIUM 600 WITH VITAMIN D3) 600 mg(1,500mg) -400 unit chew Take 1 Tab by mouth every evening.  fluticasone (FLONASE) 50 mcg/actuation nasal spray 2 Sprays by Both Nostrils route daily.  tamsulosin (FLOMAX) 0.4 mg capsule Take 0.4 mg by mouth two (2) times a day.          Past History     Past Medical History:  Past Medical History:   Diagnosis Date    Arthritis     spine    CAD (coronary artery disease)     high cholesterol    Chronic kidney disease     dialysis    Diabetes ([facility] [address]     GERD (gastroesophageal reflux disease)     HX    Hypertension     Ill-defined condition     irritable bowel syndrome    Systolic CHF ([facility] [address]     Unspecified sleep apnea     NO CPAP       Past Surgical History:  Past Surgical History:   Procedure Laterality Date    COLONOSCOPY N/A 11/9/2016    COLONOSCOPY performed by Grayson Martin MD at [facility] ENDOSCOPY    HX ORTHOPAEDIC      CERVICAL FUSION    HX UROLOGICAL      TURP    HX VASCULAR ACCESS      L arm dialysis access       Family History:  Family History   Problem Relation Age of Onset    Cancer Father         \"bone\"    Diabetes Brother        Social History:  Social History     Tobacco Use    Smoking status: Former Smoker     Last attempt to quit: 2009 Years since quittin.4    Smokeless tobacco: Former User    Tobacco comment: cigars only   Substance Use Topics    Alcohol use: No    Drug use: No       Allergies: Allergies   Allergen Reactions    Albumin, Human 25 % Anaphylaxis and Hives    Niacin Hives         Review of Systems   Review of Systems  Constitutional: Negative for fever, chills, and fatigue. HENT: Negative for congestion, sore throat, rhinorrhea, sneezing and neck stiffness   Eyes: Negative for discharge and redness. Respiratory: Positive for  shortness of breath. negative wheezing   Cardiovascular: Negative for chest pain, palpitations   Gastrointestinal: Negative for nausea, vomiting, abdominal pain, constipation, diarrhea and blood in stool. Genitourinary: Negative for dysuria, hematuria, flank pain, decreased urine volume, discharge,   Musculoskeletal: Negative for myalgias or joint pain . Skin: Negative for rash or lesions . Neurological: Negative weakness, light-headedness, numbness and headaches. Physical Exam   Physical Exam    GENERAL: alert and oriented, no acute distress  EYES: PEERL, No injection, discharge or icterus. ENT: Mucous membranes pink and moist.  NECK: Supple  LUNGS: Airway patent. Non-labored respirations, speaking in full sentences. Crackles in the bases bilaterally  HEART: Regular rate and rhythm. ABDOMEN: Mildly distended, diffusely tender, without guarding or rebound. SKIN:  warm, dry  MSK/EXTREMITIES: Without  tenderness or deformity, symmetric with normal ROM  NEUROLOGICAL: Alert, oriented      Diagnostic Study Results     Labs -  Reviewed    Radiologic Studies -   CT ABD PELV WO CONT   Final Result   IMPRESSION:   1. Small pleural effusions. 2. No acute finding in the abdomen/pelvis. XR CHEST PA LAT   Final Result   IMPRESSION:   1. Findings are consistent with congestive heart failure.         CT Results  (Last 48 hours)    None        CXR Results  (Last 48 hours)    None Medical Decision Making   I, Cory Nayak MD am the first provider for this patient and am the attending of record for this patient encounter. I reviewed the vital signs, available nursing notes, past medical history, past surgical history, family history and social history. Vital Signs-Reviewed the patient's vital signs. Records Reviewed: Nursing Notes and Old Medical Records    Provider Notes (Medical Decision Making): On presentation, the patient is well appearing, in no acute distress with normal vital signs. Based on my history and exam the differential diagnosis for this patient includes heart failure, anemia, metabolic abnormality, pneumonia, COVID-19, ACS. Work-up including basic labs, CT scan of the abdomen as well as x-ray of the chest are consistent with exacerbation of his heart failure. Patient was given dose of Bumex in the emergency department. We will add an additional 1 mg pill in the morning in addition to his 2 mg pill that he takes every evening. Patient does also have dialysis tomorrow which should help. He is remained asymptomatic while sitting here in the emergency department and continues to breathe comfortably maintaining adequate oxygen saturations on room air so felt stable for discharge. ED Course:   Initial assessment performed. The patients presenting problems have been discussed, and they are in agreement with the care plan formulated and outlined with them. I have encouraged them to ask questions as they arise throughout their visit. PROGRESS  Santosh Poon's  results have been reviewed with him. He has been counseled regarding his diagnosis. He verbally conveys understanding and agreement of the signs, symptoms, diagnosis, treatment and prognosis and additionally agrees to follow up as recommended with Dr. Augusta Basilio MD in 24 - 48 hours. He also agrees with the care-plan and conveys that all of his questions have been answered.   I have also put together some discharge instructions for him that include: 1) educational information regarding their diagnosis, 2) how to care for their diagnosis at home, as well a 3) list of reasons why they would want to return to the ED prior to their follow-up appointment, should their condition change. Disposition:  home    PLAN:  1. Discharge Medication List as of 6/19/2020  6:12 PM      START taking these medications    Details   !! bumetanide (BUMEX) 1 mg tablet Take 1 Tab by mouth daily. Take 1 tab in morning and your 2mg tab in the evening, Normal, Disp-30 Tab, R-0       !! - Potential duplicate medications found. Please discuss with provider. CONTINUE these medications which have NOT CHANGED    Details   insulin NPH/insulin regular (NOVOLIN 70/30 U-100 INSULIN) 100 unit/mL (70-30) injection Inject 18 Units with Breakfast, 20 Units with Dinner, Normal, Disp-4 Vial, R-3Please consider 90 day supplies to promote better adherence      gabapentin (NEURONTIN) 300 mg capsule Take 1 Cap by mouth daily. Max Daily Amount: 300 mg., Print, Disp-90 Cap, R-3      albuterol (PROVENTIL HFA, VENTOLIN HFA, PROAIR HFA) 90 mcg/actuation inhaler Take 2 Puffs by inhalation every four (4) hours as needed for Wheezing., Print, Disp-1 Inhaler, R-0      Insulin Needles, Disposable, (REYNALDO PEN NEEDLE) 32 gauge x 5/32\" ndle FOR USE EVERY MORNING WITH VICTOZA PEN, NormalPlease consider 90 day supplies to promote better adherenceDisp-100 Pen Needle, R-3      L.acidoph & parac-S.therm-Bifido (LOW Q2/RISAQUAD-2) 16 billion cell cap cap Take 1 Cap by mouth daily. , Normal, Disp-30 Cap, R-0      VICTOZA 2-NELLI 0.6 mg/0.1 mL (18 mg/3 mL) pnij INJECT 1.8- MG SUBCUTANEOUSLY ONCE DAILY, TITRATE UP TO 1.8 DAILY AS DIRECTED, NormalPlease consider 90 day supplies to promote better adherenceDisp-9 Pen, R-3      !! sertraline (ZOLOFT) 50 mg tablet TAKE 1 T PO QD, Historical Med, R-0      aspirin 81 mg chewable tablet Take 81 mg by mouth daily., Historical Med      !! sertraline (ZOLOFT) 25 mg tablet TK 1 T PO QD, Historical Med      carvedilol (COREG) 12.5 mg tablet Historical Med      Insulin Syringe-Needle U-100 1 mL 30 gauge x 5/16 syrg Use twice daily for insulin injection, Normal, Disp-100 Syringe, R-7      hydrALAZINE (APRESOLINE) 100 mg tablet 100 mg three (3) times daily. , Historical Med      fenofibrate nanocrystallized (TRICOR) 145 mg tablet Take  by mouth every evening., Historical Med      !! bumetanide (BUMEX) 2 mg tablet Take 2 mg by mouth every evening., Historical Med      atorvastatin (LIPITOR) 20 mg tablet Take 20 mg by mouth every evening., Historical Med      Calcium-Cholecalciferol, D3, (CALCIUM 600 WITH VITAMIN D3) 600 mg(1,500mg) -400 unit chew Take 1 Tab by mouth every evening., Historical Med      fluticasone (FLONASE) 50 mcg/actuation nasal spray 2 Sprays by Both Nostrils route daily. , Historical Med      tamsulosin (FLOMAX) 0.4 mg capsule Take 0.4 mg by mouth two (2) times a day., Historical Med       !! - Potential duplicate medications found. Please discuss with provider. 2.   Follow-up Information     Follow up With Specialties Details Why Contact Info    [facility] EMERGENCY DEPT Emergency Medicine  If symptoms worsen [address] [facility] [address]    Alec Duran MD Cardiology Schedule an appointment as soon as possible for a visit in 1 day  [id number]  [address] [phone]      Follow-up with her primary care physician in 24 to 48 hours. Return to ED if worse     Diagnosis     Clinical Impression:   1. Hypertension, unspecified type    2. Chronic kidney disease, unspecified CKD stage    3. Acute on chronic congestive heart failure, unspecified heart failure type [facility])        Please note that this dictation was completed with Dragon, computer voice recognition software.   Quite often unanticipated grammatical, syntax, homophones, and other interpretive errors are inadvertently transcribed by the computer software. Please disregard these errors. Additionally, please excuse any errors that have escaped final proofreading.

## 2020-06-26 ENCOUNTER — TELEPHONE (OUTPATIENT)
Dept: PALLATIVE CARE | Age: 75
End: 2020-06-26

## 2020-07-07 ENCOUNTER — PATIENT OUTREACH (OUTPATIENT)
Dept: INTERNAL MEDICINE CLINIC | Age: 75
End: 2020-07-07

## 2020-07-07 NOTE — PROGRESS NOTES
Patient resolved from Transition of Care episode on 7/7/2020. ACM/CTN was unsuccessful at contacting this patient today. Patient/family was provided the following resources and education related to COVID-19 during the initial call:                         Signs, symptoms and red flags related to COVID-19            CDC exposure and quarantine guidelines            [employer] exposure contact - [phone]            Contact for their local Department of Health                 Patient has not had any additional ED or hospital visits. No further outreach scheduled with this CTN/ACM. Episode of Care resolved. Patient has this CTN/ACM contact information if future needs arise.

## 2020-07-21 ENCOUNTER — HOSPITAL ENCOUNTER (INPATIENT)
Age: 75
LOS: 2 days | Discharge: HOME | DRG: 291 | End: 2020-07-23
Attending: EMERGENCY MEDICINE | Admitting: HOSPITALIST
Payer: MEDICARE

## 2020-07-21 ENCOUNTER — APPOINTMENT (OUTPATIENT)
Dept: GENERAL RADIOLOGY | Age: 75
DRG: 291 | End: 2020-07-21
Attending: EMERGENCY MEDICINE
Payer: MEDICARE

## 2020-07-21 DIAGNOSIS — I50.20 SYSTOLIC CONGESTIVE HEART FAILURE, UNSPECIFIED HF CHRONICITY (HCC): ICD-10-CM

## 2020-07-21 DIAGNOSIS — N18.9 CHRONIC KIDNEY FAILURE, UNSPECIFIED STAGE: ICD-10-CM

## 2020-07-21 DIAGNOSIS — J96.01 ACUTE HYPOXEMIC RESPIRATORY FAILURE (HCC): Primary | ICD-10-CM

## 2020-07-21 PROBLEM — R77.8 ELEVATED TROPONIN: Status: ACTIVE | Noted: 2020-07-21

## 2020-07-21 PROBLEM — Z99.2 ESRD (END STAGE RENAL DISEASE) ON DIALYSIS (HCC): Status: ACTIVE | Noted: 2020-07-21

## 2020-07-21 PROBLEM — I10 ACCELERATED HYPERTENSION: Status: ACTIVE | Noted: 2020-07-21

## 2020-07-21 PROBLEM — N18.6 ESRD (END STAGE RENAL DISEASE) ON DIALYSIS (HCC): Status: ACTIVE | Noted: 2020-07-21

## 2020-07-21 PROBLEM — I50.23 ACUTE ON CHRONIC SYSTOLIC CHF (CONGESTIVE HEART FAILURE) (HCC): Status: ACTIVE | Noted: 2020-07-21

## 2020-07-21 LAB
ALBUMIN SERPL-MCNC: 3.6 G/DL (ref 3.5–5)
ALBUMIN/GLOB SERPL: 0.8 {RATIO} (ref 1.1–2.2)
ALP SERPL-CCNC: 87 U/L (ref 45–117)
ALT SERPL-CCNC: 27 U/L (ref 12–78)
ANION GAP BLD CALC-SCNC: 16 MMOL/L (ref 10–20)
ANION GAP SERPL CALC-SCNC: 8 MMOL/L (ref 5–15)
APPEARANCE UR: CLEAR
AST SERPL-CCNC: 15 U/L (ref 15–37)
ATRIAL RATE: 72 BPM
ATRIAL RATE: 88 BPM
BACTERIA URNS QL MICRO: NEGATIVE /HPF
BASOPHILS # BLD: 0.1 K/UL (ref 0–0.1)
BASOPHILS NFR BLD: 1 % (ref 0–1)
BILIRUB SERPL-MCNC: 0.7 MG/DL (ref 0.2–1)
BILIRUB UR QL: NEGATIVE
BNP SERPL-MCNC: 9341 PG/ML
BUN BLD-MCNC: 38 MG/DL (ref 9–20)
BUN SERPL-MCNC: 40 MG/DL (ref 6–20)
BUN/CREAT SERPL: 8 (ref 12–20)
CA-I BLD-MCNC: 1.25 MMOL/L (ref 1.12–1.32)
CALCIUM SERPL-MCNC: 9.1 MG/DL (ref 8.5–10.1)
CALCULATED P AXIS, ECG09: -18 DEGREES
CALCULATED P AXIS, ECG09: 0 DEGREES
CALCULATED R AXIS, ECG10: -59 DEGREES
CALCULATED R AXIS, ECG10: -62 DEGREES
CALCULATED T AXIS, ECG11: 100 DEGREES
CALCULATED T AXIS, ECG11: 94 DEGREES
CHLORIDE BLD-SCNC: 108 MMOL/L (ref 98–107)
CHLORIDE SERPL-SCNC: 110 MMOL/L (ref 97–108)
CK SERPL-CCNC: 191 U/L (ref 39–308)
CO2 BLD-SCNC: 22 MMOL/L (ref 21–32)
CO2 SERPL-SCNC: 21 MMOL/L (ref 21–32)
COLOR UR: ABNORMAL
COMMENT, HOLDF: NORMAL
CREAT BLD-MCNC: 5.1 MG/DL (ref 0.6–1.3)
CREAT SERPL-MCNC: 4.78 MG/DL (ref 0.7–1.3)
DIAGNOSIS, 93000: NORMAL
DIAGNOSIS, 93000: NORMAL
DIFFERENTIAL METHOD BLD: ABNORMAL
EOSINOPHIL # BLD: 0.2 K/UL (ref 0–0.4)
EOSINOPHIL NFR BLD: 3 % (ref 0–7)
EPITH CASTS URNS QL MICRO: ABNORMAL /LPF
ERYTHROCYTE [DISTWIDTH] IN BLOOD BY AUTOMATED COUNT: 15.7 % (ref 11.5–14.5)
GLOBULIN SER CALC-MCNC: 4.5 G/DL (ref 2–4)
GLUCOSE BLD STRIP.AUTO-MCNC: 103 MG/DL (ref 65–100)
GLUCOSE BLD STRIP.AUTO-MCNC: 137 MG/DL (ref 65–100)
GLUCOSE BLD STRIP.AUTO-MCNC: 164 MG/DL (ref 65–100)
GLUCOSE BLD-MCNC: 185 MG/DL (ref 65–100)
GLUCOSE SERPL-MCNC: 194 MG/DL (ref 65–100)
GLUCOSE UR STRIP.AUTO-MCNC: NEGATIVE MG/DL
HCT VFR BLD AUTO: 35.3 % (ref 36.6–50.3)
HCT VFR BLD CALC: 31 % (ref 36.6–50.3)
HGB BLD-MCNC: 11.1 G/DL (ref 12.1–17)
HGB UR QL STRIP: ABNORMAL
IMM GRANULOCYTES # BLD AUTO: 0.1 K/UL (ref 0–0.04)
IMM GRANULOCYTES NFR BLD AUTO: 1 % (ref 0–0.5)
KETONES UR QL STRIP.AUTO: NEGATIVE MG/DL
LEUKOCYTE ESTERASE UR QL STRIP.AUTO: NEGATIVE
LYMPHOCYTES # BLD: 1 K/UL (ref 0.8–3.5)
LYMPHOCYTES NFR BLD: 11 % (ref 12–49)
MAGNESIUM SERPL-MCNC: 2.5 MG/DL (ref 1.6–2.4)
MCH RBC QN AUTO: 29 PG (ref 26–34)
MCHC RBC AUTO-ENTMCNC: 31.4 G/DL (ref 30–36.5)
MCV RBC AUTO: 92.2 FL (ref 80–99)
MONOCYTES # BLD: 0.6 K/UL (ref 0–1)
MONOCYTES NFR BLD: 6 % (ref 5–13)
NEUTS SEG # BLD: 7.6 K/UL (ref 1.8–8)
NEUTS SEG NFR BLD: 78 % (ref 32–75)
NITRITE UR QL STRIP.AUTO: NEGATIVE
NRBC # BLD: 0 K/UL (ref 0–0.01)
NRBC BLD-RTO: 0 PER 100 WBC
P-R INTERVAL, ECG05: 166 MS
P-R INTERVAL, ECG05: 178 MS
PH UR STRIP: 6.5 [PH] (ref 5–8)
PLATELET # BLD AUTO: 286 K/UL (ref 150–400)
PMV BLD AUTO: 10.8 FL (ref 8.9–12.9)
POTASSIUM BLD-SCNC: 4.6 MMOL/L (ref 3.5–5.1)
POTASSIUM SERPL-SCNC: 4.6 MMOL/L (ref 3.5–5.1)
PROT SERPL-MCNC: 8.1 G/DL (ref 6.4–8.2)
PROT UR STRIP-MCNC: >300 MG/DL
Q-T INTERVAL, ECG07: 392 MS
Q-T INTERVAL, ECG07: 452 MS
QRS DURATION, ECG06: 132 MS
QRS DURATION, ECG06: 150 MS
QTC CALCULATION (BEZET), ECG08: 474 MS
QTC CALCULATION (BEZET), ECG08: 494 MS
RBC # BLD AUTO: 3.83 M/UL (ref 4.1–5.7)
RBC #/AREA URNS HPF: ABNORMAL /HPF (ref 0–5)
SAMPLES BEING HELD,HOLD: NORMAL
SERVICE CMNT-IMP: ABNORMAL
SODIUM BLD-SCNC: 140 MMOL/L (ref 136–145)
SODIUM SERPL-SCNC: 139 MMOL/L (ref 136–145)
SP GR UR REFRACTOMETRY: 1.02 (ref 1–1.03)
TROPONIN I SERPL-MCNC: 0.18 NG/ML
TROPONIN I SERPL-MCNC: 0.23 NG/ML
UA: UC IF INDICATED,UAUC: ABNORMAL
UROBILINOGEN UR QL STRIP.AUTO: 0.2 EU/DL (ref 0.2–1)
VENTRICULAR RATE, ECG03: 72 BPM
VENTRICULAR RATE, ECG03: 88 BPM
WBC # BLD AUTO: 9.6 K/UL (ref 4.1–11.1)
WBC URNS QL MICRO: ABNORMAL /HPF (ref 0–4)

## 2020-07-21 PROCEDURE — 83880 ASSAY OF NATRIURETIC PEPTIDE: CPT

## 2020-07-21 PROCEDURE — 74011636637 HC RX REV CODE- 636/637: Performed by: HOSPITALIST

## 2020-07-21 PROCEDURE — 84484 ASSAY OF TROPONIN QUANT: CPT

## 2020-07-21 PROCEDURE — 74011000250 HC RX REV CODE- 250: Performed by: EMERGENCY MEDICINE

## 2020-07-21 PROCEDURE — 93005 ELECTROCARDIOGRAM TRACING: CPT

## 2020-07-21 PROCEDURE — 36415 COLL VENOUS BLD VENIPUNCTURE: CPT

## 2020-07-21 PROCEDURE — 80053 COMPREHEN METABOLIC PANEL: CPT

## 2020-07-21 PROCEDURE — 74011000250 HC RX REV CODE- 250: Performed by: HOSPITALIST

## 2020-07-21 PROCEDURE — 94761 N-INVAS EAR/PLS OXIMETRY MLT: CPT

## 2020-07-21 PROCEDURE — 99285 EMERGENCY DEPT VISIT HI MDM: CPT

## 2020-07-21 PROCEDURE — 74011250637 HC RX REV CODE- 250/637: Performed by: EMERGENCY MEDICINE

## 2020-07-21 PROCEDURE — 82550 ASSAY OF CK (CPK): CPT

## 2020-07-21 PROCEDURE — 85025 COMPLETE CBC W/AUTO DIFF WBC: CPT

## 2020-07-21 PROCEDURE — 90935 HEMODIALYSIS ONE EVALUATION: CPT

## 2020-07-21 PROCEDURE — 80047 BASIC METABLC PNL IONIZED CA: CPT

## 2020-07-21 PROCEDURE — 81001 URINALYSIS AUTO W/SCOPE: CPT

## 2020-07-21 PROCEDURE — 74011250636 HC RX REV CODE- 250/636: Performed by: HOSPITALIST

## 2020-07-21 PROCEDURE — 71045 X-RAY EXAM CHEST 1 VIEW: CPT

## 2020-07-21 PROCEDURE — 96374 THER/PROPH/DIAG INJ IV PUSH: CPT

## 2020-07-21 PROCEDURE — 65660000000 HC RM CCU STEPDOWN

## 2020-07-21 PROCEDURE — 74011250637 HC RX REV CODE- 250/637: Performed by: HOSPITALIST

## 2020-07-21 PROCEDURE — 5A1D70Z PERFORMANCE OF URINARY FILTRATION, INTERMITTENT, LESS THAN 6 HOURS PER DAY: ICD-10-PCS | Performed by: INTERNAL MEDICINE

## 2020-07-21 PROCEDURE — 82962 GLUCOSE BLOOD TEST: CPT

## 2020-07-21 PROCEDURE — 83735 ASSAY OF MAGNESIUM: CPT

## 2020-07-21 RX ORDER — BUMETANIDE 0.25 MG/ML
4 INJECTION INTRAMUSCULAR; INTRAVENOUS ONCE
Status: COMPLETED | OUTPATIENT
Start: 2020-07-21 | End: 2020-07-21

## 2020-07-21 RX ORDER — CARVEDILOL 12.5 MG/1
25 TABLET ORAL 2 TIMES DAILY WITH MEALS
Status: DISCONTINUED | OUTPATIENT
Start: 2020-07-22 | End: 2020-07-23 | Stop reason: HOSPADM

## 2020-07-21 RX ORDER — ASPIRIN 325 MG
325 TABLET ORAL ONCE
Status: COMPLETED | OUTPATIENT
Start: 2020-07-21 | End: 2020-07-21

## 2020-07-21 RX ORDER — FERROUS SULFATE, DRIED 160(50) MG
1 TABLET, EXTENDED RELEASE ORAL EVERY EVENING
Status: DISCONTINUED | OUTPATIENT
Start: 2020-07-21 | End: 2020-07-23 | Stop reason: HOSPADM

## 2020-07-21 RX ORDER — HYDRALAZINE HYDROCHLORIDE 25 MG/1
100 TABLET, FILM COATED ORAL 3 TIMES DAILY
Status: DISCONTINUED | OUTPATIENT
Start: 2020-07-21 | End: 2020-07-23 | Stop reason: HOSPADM

## 2020-07-21 RX ORDER — NITROGLYCERIN 20 MG/100ML
0-100 INJECTION INTRAVENOUS
Status: DISCONTINUED | OUTPATIENT
Start: 2020-07-21 | End: 2020-07-21

## 2020-07-21 RX ORDER — SODIUM CHLORIDE 0.9 % (FLUSH) 0.9 %
5-40 SYRINGE (ML) INJECTION EVERY 8 HOURS
Status: DISCONTINUED | OUTPATIENT
Start: 2020-07-21 | End: 2020-07-23 | Stop reason: HOSPADM

## 2020-07-21 RX ORDER — TAMSULOSIN HYDROCHLORIDE 0.4 MG/1
0.8 CAPSULE ORAL DAILY
Status: DISCONTINUED | OUTPATIENT
Start: 2020-07-21 | End: 2020-07-23 | Stop reason: HOSPADM

## 2020-07-21 RX ORDER — INSULIN LISPRO 100 [IU]/ML
4 INJECTION, SOLUTION INTRAVENOUS; SUBCUTANEOUS
Status: DISCONTINUED | OUTPATIENT
Start: 2020-07-21 | End: 2020-07-22

## 2020-07-21 RX ORDER — MAGNESIUM SULFATE 100 %
4 CRYSTALS MISCELLANEOUS AS NEEDED
Status: DISCONTINUED | OUTPATIENT
Start: 2020-07-21 | End: 2020-07-23 | Stop reason: HOSPADM

## 2020-07-21 RX ORDER — GABAPENTIN 300 MG/1
300 CAPSULE ORAL DAILY
Status: DISCONTINUED | OUTPATIENT
Start: 2020-07-22 | End: 2020-07-23 | Stop reason: HOSPADM

## 2020-07-21 RX ORDER — HYDRALAZINE HYDROCHLORIDE 20 MG/ML
20 INJECTION INTRAMUSCULAR; INTRAVENOUS ONCE
Status: COMPLETED | OUTPATIENT
Start: 2020-07-21 | End: 2020-07-21

## 2020-07-21 RX ORDER — BUMETANIDE 1 MG/1
2 TABLET ORAL DAILY
Status: DISCONTINUED | OUTPATIENT
Start: 2020-07-22 | End: 2020-07-22

## 2020-07-21 RX ORDER — ACETAMINOPHEN 325 MG/1
650 TABLET ORAL
Status: DISCONTINUED | OUTPATIENT
Start: 2020-07-21 | End: 2020-07-23 | Stop reason: HOSPADM

## 2020-07-21 RX ORDER — BUMETANIDE 0.25 MG/ML
2 INJECTION INTRAMUSCULAR; INTRAVENOUS ONCE
Status: COMPLETED | OUTPATIENT
Start: 2020-07-21 | End: 2020-07-21

## 2020-07-21 RX ORDER — SODIUM CHLORIDE 0.9 % (FLUSH) 0.9 %
5-40 SYRINGE (ML) INJECTION AS NEEDED
Status: DISCONTINUED | OUTPATIENT
Start: 2020-07-21 | End: 2020-07-23 | Stop reason: HOSPADM

## 2020-07-21 RX ORDER — INSULIN LISPRO 100 [IU]/ML
INJECTION, SOLUTION INTRAVENOUS; SUBCUTANEOUS
Status: DISCONTINUED | OUTPATIENT
Start: 2020-07-21 | End: 2020-07-23 | Stop reason: HOSPADM

## 2020-07-21 RX ORDER — NITROGLYCERIN 0.4 MG/1
0.4 TABLET SUBLINGUAL AS NEEDED
Status: DISCONTINUED | OUTPATIENT
Start: 2020-07-21 | End: 2020-07-23 | Stop reason: HOSPADM

## 2020-07-21 RX ORDER — GUAIFENESIN 100 MG/5ML
81 LIQUID (ML) ORAL DAILY
Status: DISCONTINUED | OUTPATIENT
Start: 2020-07-22 | End: 2020-07-23 | Stop reason: HOSPADM

## 2020-07-21 RX ORDER — INSULIN GLARGINE 100 [IU]/ML
22 INJECTION, SOLUTION SUBCUTANEOUS
Status: DISCONTINUED | OUTPATIENT
Start: 2020-07-21 | End: 2020-07-22

## 2020-07-21 RX ORDER — HEPARIN SODIUM 5000 [USP'U]/ML
5000 INJECTION, SOLUTION INTRAVENOUS; SUBCUTANEOUS EVERY 12 HOURS
Status: DISCONTINUED | OUTPATIENT
Start: 2020-07-21 | End: 2020-07-23 | Stop reason: HOSPADM

## 2020-07-21 RX ORDER — SERTRALINE HYDROCHLORIDE 50 MG/1
50 TABLET, FILM COATED ORAL DAILY
Status: DISCONTINUED | OUTPATIENT
Start: 2020-07-21 | End: 2020-07-23 | Stop reason: HOSPADM

## 2020-07-21 RX ORDER — ONDANSETRON 2 MG/ML
4 INJECTION INTRAMUSCULAR; INTRAVENOUS
Status: DISCONTINUED | OUTPATIENT
Start: 2020-07-21 | End: 2020-07-23 | Stop reason: HOSPADM

## 2020-07-21 RX ORDER — CARVEDILOL 12.5 MG/1
12.5 TABLET ORAL 2 TIMES DAILY WITH MEALS
Status: DISCONTINUED | OUTPATIENT
Start: 2020-07-21 | End: 2020-07-21

## 2020-07-21 RX ORDER — DEXTROSE 50 % IN WATER (D50W) INTRAVENOUS SYRINGE
12.5-25 AS NEEDED
Status: DISCONTINUED | OUTPATIENT
Start: 2020-07-21 | End: 2020-07-23 | Stop reason: HOSPADM

## 2020-07-21 RX ADMIN — HYDRALAZINE HYDROCHLORIDE 20 MG: 20 INJECTION INTRAMUSCULAR; INTRAVENOUS at 13:37

## 2020-07-21 RX ADMIN — NITROGLYCERIN 0.4 MG: 0.4 TABLET, ORALLY DISINTEGRATING SUBLINGUAL at 05:30

## 2020-07-21 RX ADMIN — HEPARIN SODIUM 5000 UNITS: 5000 INJECTION INTRAVENOUS; SUBCUTANEOUS at 16:27

## 2020-07-21 RX ADMIN — TAMSULOSIN HYDROCHLORIDE 0.8 MG: 0.4 CAPSULE ORAL at 16:26

## 2020-07-21 RX ADMIN — Medication 10 ML: at 16:28

## 2020-07-21 RX ADMIN — NITROGLYCERIN 1 INCH: 20 OINTMENT TOPICAL at 06:29

## 2020-07-21 RX ADMIN — INSULIN LISPRO 4 UNITS: 100 INJECTION, SOLUTION INTRAVENOUS; SUBCUTANEOUS at 17:01

## 2020-07-21 RX ADMIN — ACETAMINOPHEN 650 MG: 325 TABLET ORAL at 19:20

## 2020-07-21 RX ADMIN — Medication 10 ML: at 21:05

## 2020-07-21 RX ADMIN — NITROGLYCERIN 0.4 MG: 0.4 TABLET, ORALLY DISINTEGRATING SUBLINGUAL at 05:33

## 2020-07-21 RX ADMIN — Medication 1 TABLET: at 17:07

## 2020-07-21 RX ADMIN — BUMETANIDE 2 MG: 0.25 INJECTION INTRAMUSCULAR; INTRAVENOUS at 05:32

## 2020-07-21 RX ADMIN — HYDRALAZINE HYDROCHLORIDE 100 MG: 50 TABLET, FILM COATED ORAL at 16:27

## 2020-07-21 RX ADMIN — BUMETANIDE 4 MG: 0.25 INJECTION INTRAMUSCULAR; INTRAVENOUS at 19:30

## 2020-07-21 RX ADMIN — INSULIN GLARGINE 22 UNITS: 100 INJECTION, SOLUTION SUBCUTANEOUS at 22:26

## 2020-07-21 RX ADMIN — HYDRALAZINE HYDROCHLORIDE 100 MG: 50 TABLET, FILM COATED ORAL at 21:05

## 2020-07-21 RX ADMIN — CARVEDILOL 12.5 MG: 12.5 TABLET, FILM COATED ORAL at 16:26

## 2020-07-21 RX ADMIN — SERTRALINE HYDROCHLORIDE 50 MG: 50 TABLET ORAL at 16:27

## 2020-07-21 RX ADMIN — ASPIRIN 325 MG: 325 TABLET, FILM COATED ORAL at 10:15

## 2020-07-21 NOTE — CONSULTS
Consult    NAME: Marino Foy   :  [id number]   MRN:  [id number]     Date/Time:  2020 6:07 PM    Patient PCP: Rylee Baca MD     Cardiologist: Dr Navjot Tinajero   ________________________________________________________________________     Assessment:     1. Type II MI: Minimal troponin elevation, in the setting of elevated BP and volume overload, suggest Type 2 MI  2. CAD, abn stress test in 2018 with anterolateral ischemia   3. Acute on chronic systolic heart failure   4. ESRD on HD   5. Accelerated hypertension   6. DM  7. HLP  8. FRIDA  9. Obesity           Plan:     1. His symptoms resolved after fluid removal with HD, he is asymptomatic now and BP also improved, elevated trop is type 2 with underlying CAD, do not suspect ACS, continue to trend CE and if stable no need for heparin therapy   2. Cont aspirin, b blocker  3. Unclear why not on statin, with DM and CAD would benefit from statin therapy, start lipitor if no contraindication   4. BP improved after HD, Cont to monitor on home medications       Thank you for allowing me to participate in patient care. Will discuss with his cardiologist (Dr. Navjot Tinajero) to follow up with him tomorrow       [x]        High complexity decision making was performed        Subjective:   CHIEF COMPLAINT:  SOB    HISTORY OF PRESENT ILLNESS:     Mika Bender is a 69 y.o. Male with pmh of CAD, DM, HTN, ESRD on HD TTS presented with shortness of breath and abdominal bloating for past 3 days, worse with exertion,also report orthopnea. Denied any Chest pain or chest pressure at rest or with exertion. No fever or chills. Did have HD on Saturday. Did eat jillian salt diet. Has not checked BP at home. He was going to HD this morning, but SOB got worse significantly and came to ER. He was hypoxic with O2 sat 85%, Pulm edema and BP was 199/105. His troponin was minimally elevated. EKG showed SR, RBBB, LVH and repolarization changes.  He was initially started on nitro gtt and taken urgently to HD. After HD his symptoms resolved and BP also improved. We were asked to consult for work up and evaluation of the above problems. He has stress test in 2018 which showed  Anterolateral reversibility and being managed with medical therapy, his EF was 25%. Past Medical History:   Diagnosis Date    Arthritis     spine    CAD (coronary artery disease)     high cholesterol    Chronic kidney disease     dialysis    Diabetes ([facility] [address]     GERD (gastroesophageal reflux disease)     HX    Hypertension     Ill-defined condition     irritable bowel syndrome    Systolic CHF ([address]     Unspecified sleep apnea     NO CPAP      Past Surgical History:   Procedure Laterality Date    COLONOSCOPY N/A 2016    COLONOSCOPY performed by [facility]ado [facility] at [facility] ENDOSCOPY    HX ORTHOPAEDIC      CERVICAL FUSION    HX UROLOGICAL      TURP    HX VASCULAR ACCESS      L arm dialysis access     Allergies   Allergen Reactions    Albumin, Human 25 % Anaphylaxis and Hives    Niacin Hives      Meds:  See below  Social History     Tobacco Use    Smoking status: Former Smoker     Last attempt to quit:      Years since quittin.5    Smokeless tobacco: Former User    Tobacco comment: cigars only   Substance Use Topics    Alcohol use: No      Family History   Problem Relation Age of Onset    Cancer Father         \"bone\"    Diabetes Brother        REVIEW OF SYSTEMS:         Total of 12 systems reviewed, all systems review was negative except Pertinent Positives included in HPI       Objective:      Physical Exam:    Last 24hrs VS reviewed since prior progress note.  Most recent are:    Visit Vitals  /78 (BP Patient Position: At rest)   Pulse 68   Temp 98.3 °F (36.8 °C)   Resp 24   Ht 6' (1.829 m)   Wt 109.1 kg (240 lb 8.4 oz)   SpO2 99% Comment: 2L/min   BMI 32.62 kg/m²       Intake/Output Summary (Last 24 hours) at 2020 1807  Last data filed at 2020 1230  Gross per 24 hour   Intake  Output 3465 ml   Net -3465 ml        General Appearance: Well developed, well nourished, alert & oriented x 3,    no acute distress. Ears/Nose/Mouth/Throat: Pupils equal and round, Hearing grossly normal.  Neck: Supple. JVP within normal limits. Carotids good upstrokes, with no bruit. Chest: Lungs clear to auscultation bilaterally. Cardiovascular: Regular rate and rhythm, S1S2 normal, no murmur, rubs, gallops. Abdomen: Soft, non-tender, bowel sounds are active. No organomegaly. Extremities: No edema bilaterally. Femoral pulses +2, Distal Pulses +1. Skin: Warm and dry. Neuro: CN II-XII grossly intact, Strength and sensation grossly intact. Data:      Telemetry: NSR    EKG:  NSR, RBBB, LVH, STT changes related to LVH        Prior to Admission medications    Medication Sig Start Date End Date Taking? Authorizing Provider   insulin NPH/insulin regular (NOVOLIN 70/30 U-100 INSULIN) 100 unit/mL (70-30) injection Inject 18 Units with Breakfast, 20 Units with Dinner 2/20/20  Yes Alissa Brown MD   gabapentin (NEURONTIN) 300 mg capsule Take 1 Cap by mouth daily. Max Daily Amount: 300 mg. 12/11/19  Yes Alissa Brown MD   sertraline (ZOLOFT) 50 mg tablet TAKE 1 T PO QD 2/25/19  Yes Provider, Historical   aspirin 81 mg chewable tablet Take 81 mg by mouth daily. Yes Provider, Historical   hydrALAZINE (APRESOLINE) 100 mg tablet 100 mg three (3) times daily. 8/14/17  Yes Provider, Historical   tamsulosin (FLOMAX) 0.4 mg capsule Take 0.8 mg by mouth daily. Yes Other, MD Mignon   bumetanide (BUMEX) 1 mg tablet Take 1 Tab by mouth daily. Take 1 tab in morning and your 2mg tab in the evening  Patient taking differently: Take 2 mg by mouth daily.  6/19/20   Duyen Teixeira MD   Insulin Needles, Disposable, (REYNALDO PEN NEEDLE) 32 gauge x 5/32\" ndle FOR USE EVERY MORNING WITH VICTOZA PEN 7/30/19   Alissa Brown MD   carvedilol (COREG) 12.5 mg tablet Take 12.5 mg by mouth two (2) times daily (with meals). 8/13/18   Provider, Historical   Insulin Syringe-Needle U-100 1 mL 30 gauge x 5/16 syrg Use twice daily for insulin injection 2/16/18   Chante Candelario MD   Calcium-Cholecalciferol, D3, (CALCIUM 600 WITH VITAMIN D3) 600 mg(1,500mg) -400 unit chew Take 1 Tab by mouth every evening. Provider, Historical   fluticasone (FLONASE) 50 mcg/actuation nasal spray 2 Sprays by Both Nostrils route daily. Provider, Historical       Recent Results (from the past 24 hour(s))   EKG, 12 LEAD, INITIAL    Collection Time: 07/21/20  5:24 AM   Result Value Ref Range    Ventricular Rate 88 BPM    Atrial Rate 88 BPM    P-R Interval 178 ms    QRS Duration 132 ms    Q-T Interval 392 ms    QTC Calculation (Bezet) 474 ms    Calculated P Axis 0 degrees    Calculated R Axis -59 degrees    Calculated T Axis 100 degrees    Diagnosis       Normal sinus rhythm  Left axis deviation  Right bundle branch block  Left ventricular hypertrophy with repolarization abnormality  Cannot rule out Anteroseptal infarct , age undetermined  When compared with ECG of 19-JUN-2020 13:04,  Diego-Parkinson-White is no longer present     CBC WITH AUTOMATED DIFF    Collection Time: 07/21/20  5:33 AM   Result Value Ref Range    WBC 9.6 4.1 - 11.1 K/uL    RBC 3.83 (L) 4.10 - 5.70 M/uL    HGB 11.1 (L) 12.1 - 17.0 g/dL    HCT 35.3 (L) 36.6 - 50.3 %    MCV 92.2 80.0 - 99.0 FL    MCH 29.0 26.0 - 34.0 PG    MCHC 31.4 30.0 - 36.5 g/dL    RDW 15.7 (H) 11.5 - 14.5 %    PLATELET 785 266 - 219 K/uL    MPV 10.8 8.9 - 12.9 FL    NRBC 0.0 0  WBC    ABSOLUTE NRBC 0.00 0.00 - 0.01 K/uL    NEUTROPHILS 78 (H) 32 - 75 %    LYMPHOCYTES 11 (L) 12 - 49 %    MONOCYTES 6 5 - 13 %    EOSINOPHILS 3 0 - 7 %    BASOPHILS 1 0 - 1 %    IMMATURE GRANULOCYTES 1 (H) 0.0 - 0.5 %    ABS. NEUTROPHILS 7.6 1.8 - 8.0 K/UL    ABS. LYMPHOCYTES 1.0 0.8 - 3.5 K/UL    ABS. MONOCYTES 0.6 0.0 - 1.0 K/UL    ABS. EOSINOPHILS 0.2 0.0 - 0.4 K/UL    ABS. BASOPHILS 0.1 0.0 - 0.1 K/UL    ABS. IMM. GRANS. 0.1 (H) 0.00 - 0.04 K/UL    DF AUTOMATED     METABOLIC PANEL, COMPREHENSIVE    Collection Time: 07/21/20  5:33 AM   Result Value Ref Range    Sodium 139 136 - 145 mmol/L    Potassium 4.6 3.5 - 5.1 mmol/L    Chloride 110 (H) 97 - 108 mmol/L    CO2 21 21 - 32 mmol/L    Anion gap 8 5 - 15 mmol/L    Glucose 194 (H) 65 - 100 mg/dL    BUN 40 (H) 6 - 20 MG/DL    Creatinine 4.78 (H) 0.70 - 1.30 MG/DL    BUN/Creatinine ratio 8 (L) 12 - 20      GFR est AA 15 (L) >60 ml/min/1.73m2    GFR est non-AA 12 (L) >60 ml/min/1.73m2    Calcium 9.1 8.5 - 10.1 MG/DL    Bilirubin, total 0.7 0.2 - 1.0 MG/DL    ALT (SGPT) 27 12 - 78 U/L    AST (SGOT) 15 15 - 37 U/L    Alk. phosphatase 87 45 - 117 U/L    Protein, total 8.1 6.4 - 8.2 g/dL    Albumin 3.6 3.5 - 5.0 g/dL    Globulin 4.5 (H) 2.0 - 4.0 g/dL    A-G Ratio 0.8 (L) 1.1 - 2.2     CK W/ REFLX CKMB    Collection Time: 07/21/20  5:33 AM   Result Value Ref Range     39 - 308 U/L   SAMPLES BEING HELD    Collection Time: 07/21/20  5:33 AM   Result Value Ref Range    SAMPLES BEING HELD  RD, BLUE     COMMENT        Add-on orders for these samples will be processed based on acceptable specimen integrity and analyte stability, which may vary by analyte.    MAGNESIUM    Collection Time: 07/21/20  5:33 AM   Result Value Ref Range    Magnesium 2.5 (H) 1.6 - 2.4 mg/dL   NT-PRO BNP    Collection Time: 07/21/20  5:33 AM   Result Value Ref Range    NT pro-BNP 9,341 (H) <125 PG/ML   TROPONIN I    Collection Time: 07/21/20  5:33 AM   Result Value Ref Range    Troponin-I, Qt. 0.18 (H) <0.05 ng/mL   POC CHEM8    Collection Time: 07/21/20  5:47 AM   Result Value Ref Range    Calcium, ionized (POC) 1.25 1.12 - 1.32 mmol/L    Sodium (POC) 140 136 - 145 mmol/L    Potassium (POC) 4.6 3.5 - 5.1 mmol/L    Chloride (POC) 108 (H) 98 - 107 mmol/L    CO2 (POC) 22 21 - 32 mmol/L    Anion gap (POC) 16 10 - 20 mmol/L    Glucose (POC) 185 (H) 65 - 100 mg/dL    BUN (POC) 38 (H) 9 - 20 mg/dL    Creatinine (POC) 5.1 (H) 0.6 - 1.3 mg/dL    GFRAA, POC 14 (L) >60 ml/min/1.73m2    GFRNA, POC 11 (L) >60 ml/min/1.73m2    Hematocrit (POC) 31 (L) 36.6 - 50.3 %    Comment Notified RN or MD immediately by      URINALYSIS W/ REFLEX CULTURE    Collection Time: 07/21/20  6:45 AM    Specimen: Urine   Result Value Ref Range    Color YELLOW/STRAW      Appearance CLEAR CLEAR      Specific gravity 1.020 1.003 - 1.030      pH (UA) 6.5 5.0 - 8.0      Protein >300 (A) NEG mg/dL    Glucose Negative NEG mg/dL    Ketone Negative NEG mg/dL    Bilirubin Negative NEG      Blood TRACE (A) NEG      Urobilinogen 0.2 0.2 - 1.0 EU/dL    Nitrites Negative NEG      Leukocyte Esterase Negative NEG      WBC 0-4 0 - 4 /hpf    RBC 5-10 0 - 5 /hpf    Epithelial cells FEW FEW /lpf    Bacteria Negative NEG /hpf    UA:UC IF INDICATED CULTURE NOT INDICATED BY UA RESULT CNI     TROPONIN I    Collection Time: 07/21/20 10:15 AM   Result Value Ref Range    Troponin-I, Qt. 0.23 (H) <0.05 ng/mL   EKG, 12 LEAD, SUBSEQUENT    Collection Time: 07/21/20  1:07 PM   Result Value Ref Range    Ventricular Rate 72 BPM    Atrial Rate 72 BPM    P-R Interval 166 ms    QRS Duration 150 ms    Q-T Interval 452 ms    QTC Calculation (Bezet) 494 ms    Calculated P Axis -18 degrees    Calculated R Axis -62 degrees    Calculated T Axis 94 degrees    Diagnosis       Normal sinus rhythm  Right bundle branch block  Left anterior fascicular block  ** Bifascicular block **  Left ventricular hypertrophy with repolarization abnormality  Cannot rule out Septal infarct , age undetermined  When compared with ECG of 21-JUL-2020 05:24,  MANUAL COMPARISON REQUIRED, DATA IS UNCONFIRMED     GLUCOSE, POC    Collection Time: 07/21/20  1:18 PM   Result Value Ref Range    Glucose (POC) 103 (H) 65 - 100 mg/dL    Performed by Lisa Ayoub    GLUCOSE, POC    Collection Time: 07/21/20  4:22 PM   Result Value Ref Range    Glucose (POC) 137 (H) 65 - 100 mg/dL    Performed by Summer Chan (PCT) Ralph Wilcox MD

## 2020-07-21 NOTE — ED NOTES
TRANSFER - OUT REPORT:    Verbal report given to Tricia (name) on Maya Police  being transferred to PCU (unit) for routine progression of care       Report consisted of patients Situation, Background, Assessment and   Recommendations(SBAR). Information from the following report(s) SBAR, ED Summary, MAR and Med Rec Status was reviewed with the receiving nurse. Lines:   Peripheral IV 07/21/20 Right Forearm (Active)   Site Assessment Clean, dry, & intact 07/21/20 0538   Phlebitis Assessment 0 07/21/20 0538   Infiltration Assessment 0 07/21/20 0538   Dressing Status Clean, dry, & intact 07/21/20 0538   Dressing Type Tape;Transparent 07/21/20 0538   Hub Color/Line Status Pink;Flushed;Patent 07/21/20 0538   Action Taken Blood drawn 07/21/20 0538   Alcohol Cap Used No 07/21/20 0538        Opportunity for questions and clarification was provided.       Patient transported with:   Monitor  O2 @ 2 liters  Registered Nurse

## 2020-07-21 NOTE — ED NOTES
Dialysis is pulling troponin on patient and giving aspirin. Discussed with Dr. Tanmay Lopez and she is aware pt went to dialysis shortly after 0730 and okay with getting repeat EKG when he returns.

## 2020-07-21 NOTE — PROGRESS NOTES
11:28 AM- CM attempted to meet with pt this morning- he was unavailable. CM contact pt's wife regarding initial assessment- no answer, HIPPA compliant left on cell phone, home phone number did not have any space to leave a VM. CM will continue to attempt to contact pt's wife for initial assessment.      Yvonne Traylor, MSW, [address] [facility]   [phone]

## 2020-07-21 NOTE — PROGRESS NOTES
HD TRANSFER - OUT REPORT:    Verbal report given to Fatuma Caruso RN on Dewaynechuckie Redmond being transferred to ED for routine progression of care       Report consisted of patient's Situation, Background, Assessment and   Recommendations(SBAR). Information from the following report(s) SBAR, Kardex, Procedure Summary, Intake/Output, MAR and Recent Results was reviewed with the receiving nurse. Method:  $$ Method: Hemodialysis (07/21/20 9688)    Fluid Removed  NET Fluid Removed (mL): 3350 ml (07/21/20 1230)     Patient response to treatment:  Stable    End Time  Hemodialysis End Time: 8611 (07/21/20 1230)  If not documented, dialysis nurse to update post-dialysis row in HD/Filtration flowsheet     Medications /Volume expansion agents or Fluid boluses administered during treatment? yes    Post-dialysis medication administration due?  yes  Remind nurse to administer post-HD medication upon return to unit. Fistula hemostasis? yes    Line heparinization? no    Lines: STEPHANIE AVG    Opportunity for questions and clarification was provided.       Patient transported with: O2 @ 3 liters  Tech

## 2020-07-21 NOTE — PROGRESS NOTES
1418 Report received from [address].    1500 Pt arrived to floor    1520 Report given to [address].

## 2020-07-21 NOTE — ED PROVIDER NOTES
EMERGENCY DEPARTMENT HISTORY AND PHYSICAL EXAM    Please note that this dictation was completed with [employer], the computer voice recognition software. Quite often unanticipated grammatical, syntax, homophones, and other interpretive errors are inadvertently transcribed by the computer software. Please disregard these errors. Please excuse any errors that have escaped final proofreading. Date: 7/21/2020  Patient Name: Amarilis Ruelas  Patient Age and Sex: 69 y.o. male    History of Presenting Illness     Chief Complaint   Patient presents with    Respiratory Distress     Pt reports SOB and \"bloating\" x 2 days. Denies chest pain. Worse with exertion. Dialysis patient receiving treatments Tu, Thurs, and Sat. Last dialyzed on Sat. History Provided By: Patient    HPI: Amarilis Ruelas, is a 69 y.o. male whose medical history is noted below and includes chronic kidney disease, on dialysis Tuesday/Thursday/Saturday, congestive heart failure with last echo in 2016 showing markedly reduced EF,presents to the ED with exertional dyspnea that has progressed to also dyspnea at rest and orthopnea. He denies having any chest pain at this time. He was last dialyzed on Saturday. Over the weekend, reports some dietary indiscretion which he believes may be the cause of his current symptoms. Namely, he ate food containing a lot of fat as well as salt. His dyspnea began on Saturday and then gradually progressed. He was barely able to sleep despite being completely propped up last night. He finally then decided he had to come in because he was unable to even across the room without getting very winded which is not normal for him. No fevers or chills. No cough. He was seen for congestive heart failure exacerbation in the emergency room about a month ago. At that time, he presented with very similar symptoms but reports him to have been less severe.   He is normally on 2 mg of Bumex daily, after that visit an extra 1 mg of Bumex was added to his regiment in hopes to improve his fluid balance. Patient states that he after that visit initially felt somewhat better until his shortness of breath started on Saturday. Pt denies any other alleviating or exacerbating factors. There are no other complaints, changes or physical findings at this time. PCP: Jenelle Matos MD    Past History   Past Medical History:  Past Medical History:   Diagnosis Date    Arthritis     spine    CAD (coronary artery disease)     high cholesterol    Chronic kidney disease     dialysis    Diabetes ([facility] [address]     GERD (gastroesophageal reflux disease)     HX    Hypertension     Ill-defined condition     irritable bowel syndrome    Systolic CHF ([facility] [address]     Unspecified sleep apnea     NO CPAP       Past Surgical History:  Past Surgical History:   Procedure Laterality Date    COLONOSCOPY N/A 2016    COLONOSCOPY performed by Yong Archibald MD at [facility] ENDOSCOPY    HX ORTHOPAEDIC      CERVICAL FUSION    HX UROLOGICAL      TURP    HX VASCULAR ACCESS      L arm dialysis access       Family History:  Family History   Problem Relation Age of Onset    Cancer Father         \"bone\"    Diabetes Brother        Social History:  Social History     Tobacco Use    Smoking status: Former Smoker     Last attempt to quit: 2009     Years since quittin.5    Smokeless tobacco: Former User    Tobacco comment: cigars only   Substance Use Topics    Alcohol use: No    Drug use: No       Allergies: Allergies   Allergen Reactions    Albumin, Human 25 % Anaphylaxis and Hives    Niacin Hives       Review of Systems     Review of Systems   Constitutional: Negative for appetite change, chills and fever. HENT: Negative. Respiratory: Positive for shortness of breath. Negative for cough, chest tightness and wheezing. Cardiovascular: Positive for leg swelling. Negative for chest pain and palpitations. Gastrointestinal: Positive for abdominal distention. Negative for abdominal pain, blood in stool, constipation, diarrhea, nausea and vomiting. Genitourinary: Negative for dysuria, flank pain and hematuria. Musculoskeletal: Negative for arthralgias, joint swelling, myalgias, neck pain and neck stiffness. Skin: Negative for rash and wound. Neurological: Negative for dizziness, weakness, light-headedness, numbness and headaches. Hematological: Negative for adenopathy. All other systems reviewed and are negative. Physical Exam     Physical Exam  Vitals signs and nursing note reviewed. Constitutional:       General: He is in acute distress. Appearance: He is ill-appearing and diaphoretic. HENT:      Head: Atraumatic. Mouth/Throat:      Mouth: Mucous membranes are moist.   Eyes:      Extraocular Movements: Extraocular movements intact. Conjunctiva/sclera: Conjunctivae normal.      Pupils: Pupils are equal, round, and reactive to light. Neck:      Musculoskeletal: Normal range of motion and neck supple. No neck rigidity. Cardiovascular:      Rate and Rhythm: Normal rate and regular rhythm. Pulses: Normal pulses. Pulmonary:      Breath sounds: Rales present. No wheezing. Comments: Increased effort  Abdominal:      General: There is distension. Tenderness: There is no abdominal tenderness. Musculoskeletal: Normal range of motion. Right lower leg: Edema present. Left lower leg: Edema present. Skin:     General: Skin is warm. Capillary Refill: Capillary refill takes 2 to 3 seconds. Findings: No erythema. Neurological:      General: No focal deficit present. Mental Status: He is alert and oriented to person, place, and time. Diagnostic Study Results     Labs - I have personally reviewed and interpreted all laboratory results.  Reji Drew MD, MSc  Recent Results (from the past 24 hour(s))   EKG, 12 LEAD, INITIAL    Collection Time: 07/21/20  5:24 AM   Result Value Ref Range Ventricular Rate 88 BPM    Atrial Rate 88 BPM    P-R Interval 178 ms    QRS Duration 132 ms    Q-T Interval 392 ms    QTC Calculation (Bezet) 474 ms    Calculated P Axis 0 degrees    Calculated R Axis -59 degrees    Calculated T Axis 100 degrees    Diagnosis       Normal sinus rhythm  Left axis deviation  Right bundle branch block  Left ventricular hypertrophy with repolarization abnormality  Cannot rule out Anteroseptal infarct , age undetermined  When compared with ECG of 19-JUN-2020 13:04,  Diego-Parkinson-White is no longer present     CBC WITH AUTOMATED DIFF    Collection Time: 07/21/20  5:33 AM   Result Value Ref Range    WBC 9.6 4.1 - 11.1 K/uL    RBC 3.83 (L) 4.10 - 5.70 M/uL    HGB 11.1 (L) 12.1 - 17.0 g/dL    HCT 35.3 (L) 36.6 - 50.3 %    MCV 92.2 80.0 - 99.0 FL    MCH 29.0 26.0 - 34.0 PG    MCHC 31.4 30.0 - 36.5 g/dL    RDW 15.7 (H) 11.5 - 14.5 %    PLATELET 431 896 - 280 K/uL    MPV 10.8 8.9 - 12.9 FL    NRBC 0.0 0  WBC    ABSOLUTE NRBC 0.00 0.00 - 0.01 K/uL    NEUTROPHILS 78 (H) 32 - 75 %    LYMPHOCYTES 11 (L) 12 - 49 %    MONOCYTES 6 5 - 13 %    EOSINOPHILS 3 0 - 7 %    BASOPHILS 1 0 - 1 %    IMMATURE GRANULOCYTES 1 (H) 0.0 - 0.5 %    ABS. NEUTROPHILS 7.6 1.8 - 8.0 K/UL    ABS. LYMPHOCYTES 1.0 0.8 - 3.5 K/UL    ABS. MONOCYTES 0.6 0.0 - 1.0 K/UL    ABS. EOSINOPHILS 0.2 0.0 - 0.4 K/UL    ABS. BASOPHILS 0.1 0.0 - 0.1 K/UL    ABS. IMM.  GRANS. 0.1 (H) 0.00 - 0.04 K/UL    DF AUTOMATED     METABOLIC PANEL, COMPREHENSIVE    Collection Time: 07/21/20  5:33 AM   Result Value Ref Range    Sodium 139 136 - 145 mmol/L    Potassium 4.6 3.5 - 5.1 mmol/L    Chloride 110 (H) 97 - 108 mmol/L    CO2 21 21 - 32 mmol/L    Anion gap 8 5 - 15 mmol/L    Glucose 194 (H) 65 - 100 mg/dL    BUN 40 (H) 6 - 20 MG/DL    Creatinine 4.78 (H) 0.70 - 1.30 MG/DL    BUN/Creatinine ratio 8 (L) 12 - 20      GFR est AA 15 (L) >60 ml/min/1.73m2    GFR est non-AA 12 (L) >60 ml/min/1.73m2    Calcium 9.1 8.5 - 10.1 MG/DL    Bilirubin, total 0.7 0.2 - 1.0 MG/DL    ALT (SGPT) 27 12 - 78 U/L    AST (SGOT) 15 15 - 37 U/L    Alk. phosphatase 87 45 - 117 U/L    Protein, total 8.1 6.4 - 8.2 g/dL    Albumin 3.6 3.5 - 5.0 g/dL    Globulin 4.5 (H) 2.0 - 4.0 g/dL    A-G Ratio 0.8 (L) 1.1 - 2.2     CK W/ REFLX CKMB    Collection Time: 07/21/20  5:33 AM   Result Value Ref Range     39 - 308 U/L   SAMPLES BEING HELD    Collection Time: 07/21/20  5:33 AM   Result Value Ref Range    SAMPLES BEING HELD  RD, BLUE     COMMENT        Add-on orders for these samples will be processed based on acceptable specimen integrity and analyte stability, which may vary by analyte. MAGNESIUM    Collection Time: 07/21/20  5:33 AM   Result Value Ref Range    Magnesium 2.5 (H) 1.6 - 2.4 mg/dL   NT-PRO BNP    Collection Time: 07/21/20  5:33 AM   Result Value Ref Range    NT pro-BNP 9,341 (H) <125 PG/ML   TROPONIN I    Collection Time: 07/21/20  5:33 AM   Result Value Ref Range    Troponin-I, Qt. 0.18 (H) <0.05 ng/mL   POC CHEM8    Collection Time: 07/21/20  5:47 AM   Result Value Ref Range    Calcium, ionized (POC) 1.25 1.12 - 1.32 mmol/L    Sodium (POC) 140 136 - 145 mmol/L    Potassium (POC) 4.6 3.5 - 5.1 mmol/L    Chloride (POC) 108 (H) 98 - 107 mmol/L    CO2 (POC) 22 21 - 32 mmol/L    Anion gap (POC) 16 10 - 20 mmol/L    Glucose (POC) 185 (H) 65 - 100 mg/dL    BUN (POC) 38 (H) 9 - 20 mg/dL    Creatinine (POC) 5.1 (H) 0.6 - 1.3 mg/dL    GFRAA, POC 14 (L) >60 ml/min/1.73m2    GFRNA, POC 11 (L) >60 ml/min/1.73m2    Hematocrit (POC) 31 (L) 36.6 - 50.3 %    Comment Notified RN or MD immediately by          Radiologic Studies - I have personally reviewed and interpreted all imaging studies and agree with radiology interpretation and report.  Alma Mcghee MD, MSc  XR CHEST PORT   Final Result   IMPRESSION: Findings most with congestive failure with bilateral pleural   effusions, interstitial edema, and probable airspace edema though superimposed   infectious infiltrate is not excluded. Medical Decision Making   I am the first provider for this patient. Records Reviewed: I reviewed our electronic medical record system for any past medical records that were available that may contribute to the patient's current condition, including their PMH, surgical history, social and family history. Reviewed the nursing notes and vital signs from today's visit. Nursing notes will be reviewed as they become available in realtime while the pt has been in the ED. Stefanie Araujo MD Msc    Vital Signs-Reviewed the patient's vital signs. Patient Vitals for the past 24 hrs:   Temp Pulse Resp BP SpO2   07/21/20 0647 98.9 °F (37.2 °C) 72 26 (!) 199/98 99 %   07/21/20 0630  76 27 (!) 195/110 99 %   07/21/20 0629  74  (!) 199/98    07/21/20 0615  89 (!) 31  97 %   07/21/20 0600  81 29 (!) 193/110 100 %   07/21/20 0550  82 29 (!) 191/108 100 %   07/21/20 0540  82 27 (!) 171/101 97 %   07/21/20 0531  88 28  99 %   07/21/20 0530  88 (!) 33 (!) 175/123 98 %   07/21/20 0525  95 30 (!) 199/105 98 %   07/21/20 0522     (!) 85 %   07/21/20 0511 98.6 °F (37 °C) 88 24 (!) 190/96 90 %       ECG interpretation: Normal sinus rhythm with rate 88, lad, LVH, similar to prior and no changes concerning for acute ischemia. This ECG has been viewed and interpreted by me personally. Stefanie Araujo MD, Msc    Provider Notes (Medical Decision Making): The patient is a 59-year-old gentleman who has advanced heart failure as well as end-stage renal disease presents to the emergency room with shortness of breath and orthopnea. It appears that over the past 36 hours he has gained quite a bit of fluid. This is likely the cause of his symptoms. He is due to be dialyzed today. His history does not suggest an infectious etiology of his shortness of breath and therefore I do not think that he has pneumonia or any viral illness. Will get chest x-ray regardless.    Plan: Full hematologic and metabolic panel will be sent. Cardiac markers. ECG. Chest x-ray. We will start treating him with IV Bumex, sublingual nitro, supplemental oxygen as needed. He was hypoxemic on arrival and his oxygen level was 86% without supplemental oxygen which he does not normally use at home. Needs dialysis today. Can be placed on nitro drip if blood pressure not controlled. This will help with his respiratory symptoms I believe. 5:48 AM  Spoke with nephrology. Will dialyze today. ED Course:   Initial assessment performed. The patients presenting problems have been discussed, and they are in agreement with the care plan formulated and outlined with them. I have encouraged them to ask questions as they arise throughout their visit. Brian Kerr MD, am the attending of record for this patient encounter. ED Orders Placed/Medications Administered During ED Course:   Orders Placed This Encounter    XR CHEST PORT    CBC WITH AUTOMATED DIFF    METABOLIC PANEL, COMPREHENSIVE    CK W/ REFLX CKMB    URINALYSIS W/ REFLEX CULTURE    SAMPLES BEING HELD    MAGNESIUM    NT-PRO BNP    TROPONIN I    SOB PANEL TRACKING (DO NOT DESELECT)    OXYGEN CANNULA (To maintain O2 sat greater than 92%) Consult MD if Hx. of COPD    PULSE OXIMETRY SPOT CHECK    VITAL SIGNS    WEIGH PATIENT    POC CHEM8    POC CHEM8    EKG, 12 LEAD, INITIAL    SALINE LOCK IV ONE TIME STAT    SALINE LOCK IV ONE TIME STAT    HEMODIALYSIS INPATIENT Duration (hr): 3.75; Dialyzer: Revaclear; Dialysate Bath:  K: 2; Dialysate Bath:  CA: 2.5; Dialysate Bath: Bicarb: 35; Sodium Profiling/Modeling: No; Profile (Beginning / mEq/L): 138; Weight Loss (kg): 3; Dry Weight (kg): 11. ..     nitroglycerin (NITROSTAT) tablet 0.4 mg    bumetanide (BUMEX) injection 2 mg    nitroglycerin (NITROBID) 2 % ointment 1 Inch     Medications   nitroglycerin (NITROSTAT) tablet 0.4 mg (0.4 mg SubLINGual Given 7/21/20 0533)   bumetanide (BUMEX) injection 2 mg (2 mg IntraVENous Given 7/21/20 0532)   nitroglycerin (NITROBID) 2 % ointment 1 Inch (1 Inch Topical Given 7/21/20 3559)     ED Course as of Jul 21 0720 Tue Jul 21, 2020   0629 RN notified that troponin elevated 0.18. Patient not having any active chest pain. Received signout at the bedside from Dr. Dickson Reed. Patient had just attempted to use the bathroom and was extremely winded, elevated respiratory rate at 36 but improved with rest over the course of 2 to 3 minutes. Labs now back showing reassuring potassium at 4.6, elevated troponin at 0.18 and proBNP at 9341.  1 inch Nitropaste ordered with plan for hospital admission. [TL]      ED Course User Index  [TL] Deniz Valera MD       Consult Note:  Nandini Son MD spoke with nephrology,   Discussed pt's hx, physical exam and available diagnostic and imaging results. Reviewed care plans. Agree with management and plan thus far. Disposition: Admit. Admit Note:  7:21 AM  Pt is being admitted by Dr.K Jessica Callaway. The results of their tests and reason(s) for their admission have been discussed with pt and/or available family. They convey agreement and understanding for the need to be admitted and for admission diagnosis. Diagnosis     Clinical Impression:   1. Acute hypoxemic respiratory failure (HCC)    2. Systolic congestive heart failure, unspecified HF chronicity ([facility] [address]    3. Chronic kidney failure, unspecified stage        Attestation:  I personally performed the services described in this documentation on this date 7/21/2020 for patient Vadim Santana.   Nandini Son MD

## 2020-07-21 NOTE — H&P
Hospitalist Admission Note    NAME: Bhaskar Kothari   :     MRN:  [id number]     Date/Time:  2020 2:42 PM    Patient PCP: Nii Martinez MD  ______________________________________________________________________   Assessment & Plan:  Acute hypoxic respiratory failure to due  Pulmonary edema, POA with acute on chronic systolic chf EF 41% in 0655  Elevated troponin 0.16-->0.23, rule out nstemi. Subtle ST changes anterior new from prior  --85% RA, required 3L O2. O2 sat after dialysis with removal of 3.5L is 89-90% RA at rest.  Patient remains tachypneic with exertion. Suspect will need additional dialysis tomorrow, still has crackles in left base  --give bumex 4mg IV x 1 this afternoon and resume PO bumex in AM  --continue supplemental O2 and wean off as tolerated  --continue aspirin. --cardiology consult. Discussed with Dr. eKlley Cody ([facility]), if troponin increase significantly, will put on heparin drip  --ordered echo    Accelerated HTN  --BP remains elevated after dialysis 228/28 systolic  --IV hydralazine 20mg x1.   --increase coreg from 12.5mg bid to 25mg bid. Continue hydralazine  --add ACE-I if echo shows reduced EF    ESRD on HD TTS  --nephrology consult and dialysis done today. --suspect will need to repeat dialysis tomorrow to get more volume off    Noncompliance with diet, recent increased intake  --ate miner, sausages and greasy foods at [facility] over weekend. --nutrition consult for diet education    IDDM   DM neuropathy  --continue 70/30 insulin bid  --continue neurontin    FRIDA, not on cpap  --need repeat sleep study if echo shows pulmonary HTN    Obesity  Body mass index is 33.91 kg/m².     Code: full  DVT prophylaxis: heparin  Surrogate decision maker:  wife        Subjective:   CHIEF COMPLAINT:  SOB, orthopnea    HISTORY OF PRESENT ILLNESS:     Bhaskar Kothari is a 69 y.o. male with PMH CAD, DM, HTN, ESRD on HD TTS presents with SOB started 3 days ago, worse with exertion, progressive orthopnea. Denies chest pain, wheezing, cough. No abdominal pain, n/v.  He had dialysis on Saturday and that evening had barbecue and ate miner, sausages and greasy foods. SOB started that night. In ER, he is hypoxic 85% RA, CXR pulmonary edema, /105. He did not take his bumex yesterday. We were asked to admit for work up and evaluation of the above problems. Past Medical History:   Diagnosis Date    Arthritis     spine    CAD (coronary artery disease)     high cholesterol    Chronic kidney disease     dialysis    Diabetes ([facility] [address]     GERD (gastroesophageal reflux disease)     HX    Hypertension     Ill-defined condition     irritable bowel syndrome    Systolic CHF ([facility] [address]     Unspecified sleep apnea     NO CPAP      Past Surgical History:   Procedure Laterality Date    COLONOSCOPY N/A 2016    COLONOSCOPY performed by Kryst[facility] Iniguez [facility] at [facility] ENDOSCOPY    HX ORTHOPAEDIC      CERVICAL FUSION    HX UROLOGICAL      TURP    HX VASCULAR ACCESS      L arm dialysis access     Social History     Tobacco Use    Smoking status: Former Smoker     Last attempt to quit:      Years since quittin.5    Smokeless tobacco: Former User    Tobacco comment: cigars only   Substance Use Topics    Alcohol use: No      Drug use:        Family History   Problem Relation Age of Onset    Cancer Father         \"bone\"    Diabetes Brother      Allergies   Allergen Reactions    Albumin, Human 25 % Anaphylaxis and Hives    Niacin Hives        Prior to Admission medications    Medication Sig Start Date End Date Taking? Authorizing Provider   bumetanide (BUMEX) 1 mg tablet Take 1 Tab by mouth daily.  Take 1 tab in morning and your 2mg tab in the evening 20   Santos Elizabeth MD   insulin NPH/insulin regular (NOVOLIN 70/30 U-100 INSULIN) 100 unit/mL (70-30) injection Inject 18 Units with Breakfast, 20 Units with Dinner 20   Isabelle Anglin MD   gabapentin (NEURONTIN) 300 mg capsule Take 1 Cap by mouth daily. Max Daily Amount: 300 mg. 12/11/19   Vamsi Reyes MD   albuterol (PROVENTIL HFA, VENTOLIN HFA, PROAIR HFA) 90 mcg/actuation inhaler Take 2 Puffs by inhalation every four (4) hours as needed for Wheezing. 10/14/19   Elodia Dupree MD   Insulin Needles, Disposable, (REYNALDO PEN NEEDLE) 32 gauge x 5/32\" ndle FOR USE EVERY MORNING WITH VICTOZA PEN 7/30/19   Vamsi Reyes MD   L.acidoph & parac-S.therm-Bifido (LOW Q2/RISAQUAD-2) 16 billion cell cap cap Take 1 Cap by mouth daily. 5/30/19   Rishabh Lynn MD   VICTOZA 2-NELLI 0.6 mg/0.1 mL (18 mg/3 mL) pnij INJECT 1.8- MG SUBCUTANEOUSLY ONCE DAILY, TITRATE UP TO 1.8 DAILY AS DIRECTED 5/15/19   Vamsi Reyes MD   sertraline (ZOLOFT) 50 mg tablet TAKE 1 T PO QD 2/25/19   Provider, Historical   aspirin 81 mg chewable tablet Take 81 mg by mouth daily. Provider, Historical   sertraline (ZOLOFT) 25 mg tablet TK 1 T PO QD 7/25/18   Provider, Historical   carvedilol (COREG) 12.5 mg tablet  8/13/18   Provider, Historical   Insulin Syringe-Needle U-100 1 mL 30 gauge x 5/16 syrg Use twice daily for insulin injection 2/16/18   Vamsi Reyes MD   hydrALAZINE (APRESOLINE) 100 mg tablet 100 mg three (3) times daily. 8/14/17   Provider, Historical   fenofibrate nanocrystallized (TRICOR) 145 mg tablet Take  by mouth every evening. Provider, Historical   bumetanide (BUMEX) 2 mg tablet Take 2 mg by mouth every evening. Provider, Historical   atorvastatin (LIPITOR) 20 mg tablet Take 20 mg by mouth every evening. Provider, Historical   Calcium-Cholecalciferol, D3, (CALCIUM 600 WITH VITAMIN D3) 600 mg(1,500mg) -400 unit chew Take 1 Tab by mouth every evening. Provider, Historical   fluticasone (FLONASE) 50 mcg/actuation nasal spray 2 Sprays by Both Nostrils route daily. Provider, Historical   tamsulosin (FLOMAX) 0.4 mg capsule Take 0.4 mg by mouth two (2) times a day.     Other, MD Mignon     REVIEW OF SYSTEMS:  POSITIVE= Bold. Negative = normal text  General:  fever, chills, sweats, generalized weakness, weight loss/gain, loss of appetite  Eyes:  blurred vision, eye pain, loss of vision, diplopia  Ear Nose and Throat:  rhinorrhea, pharyngitis  Respiratory:   cough, sputum production, SOB, wheezing, BRITT, pleuritic pain  Cardiology:  chest pain, palpitations, orthopnea, PND, edema, syncope   Gastrointestinal:  abdominal pain, N/V, dysphagia, diarrhea, constipation, bleeding  Genitourinary:  frequency, urgency, dysuria, hematuria, incontinence  Muskuloskeletal :  arthralgia, myalgia  Hematology:  easy bruising, bleeding, lymphadenopathy  Dermatological:  rash, ulceration, pruritis  Endocrine:  hot flashes or polydipsia  Neurological:  headache, dizziness, confusion, focal weakness, paresthesia, memory loss, gait disturbance  Psychological: anxiety, depression, agitation      Objective:   VITALS:    Visit Vitals  /77   Pulse 79   Temp 98.1 °F (36.7 °C)   Resp 25   Ht 6' (1.829 m)   Wt 113.4 kg (250 lb)   SpO2 100%   BMI 33.91 kg/m²     Temp (24hrs), Av.6 °F (37 °C), Min:98.1 °F (36.7 °C), Max:98.9 °F (37.2 °C)    Body mass index is 33.91 kg/m². PHYSICAL EXAM:    General:    Alert, obese male, cooperative, tachypnea, worsen with minimal exertion such as shifting in bed, appears stated age. HEENT: Atraumatic, anicteric sclerae, pink conjunctivae     No oral ulcers, mucosa moist, throat clear. Hearing intact. Neck:  Supple, symmetrical,  thyroid: non tender  Lungs:   Crackles in left base otherwise clear. No Wheezing or Rhonchi. Chest wall:  No tenderness  No Accessory muscle use. Heart:   Regular  rhythm,  No  murmur   No gallop. No edema. Abdomen:   Soft, non-tender. Not distended. Bowel sounds normal. No masses  Extremities: No cyanosis. No clubbing  Skin:     Not pale Not Jaundiced  No rashes   Psych:  Good insight. Not depressed. Not anxious or agitated. Neurologic: EOMs intact.  No facial asymmetry. No aphasia or slurred speech. Symmetrical strength, Alert and oriented X 3. IMAGING RESULTS:   []       I have personally reviewed the actual   []     CXR  []     CT scan  CXR:  CT :  EKG:   ________________________________________________________________________  Care Plan discussed with:    Comments   Patient y    Family      RN y    Care Manager                    Consultant:      ________________________________________________________________________  Prophylaxis:  GI none   DVT heparin   ________________________________________________________________________  Recommended Disposition:   Home with Family y   HH/PT/OT/RN    SNF/LTC    [facility]    ________________________________________________________________________  Code Status:  Full Code y   DNR/DNI    ________________________________________________________________________  TOTAL TIME:  50 minutes      Comments     Reviewed previous records   >50% of visit spent in counseling and coordination of care  Discussion with patient and/or family and questions answered         ______________________________________________________________________  Mason Smyth MD      Procedures: see electronic medical records for all procedures/Xrays and details which were not copied into this note but were reviewed prior to creation of Plan.     LAB DATA REVIEWED:    Recent Results (from the past 24 hour(s))   EKG, 12 LEAD, INITIAL    Collection Time: 07/21/20  5:24 AM   Result Value Ref Range    Ventricular Rate 88 BPM    Atrial Rate 88 BPM    P-R Interval 178 ms    QRS Duration 132 ms    Q-T Interval 392 ms    QTC Calculation (Bezet) 474 ms    Calculated P Axis 0 degrees    Calculated R Axis -59 degrees    Calculated T Axis 100 degrees    Diagnosis       Normal sinus rhythm  Left axis deviation  Right bundle branch block  Left ventricular hypertrophy with repolarization abnormality  Cannot rule out Anteroseptal infarct , age undetermined  When compared with ECG of 19-JUN-2020 13:04,  Diego-Parkinson-White is no longer present     CBC WITH AUTOMATED DIFF    Collection Time: 07/21/20  5:33 AM   Result Value Ref Range    WBC 9.6 4.1 - 11.1 K/uL    RBC 3.83 (L) 4.10 - 5.70 M/uL    HGB 11.1 (L) 12.1 - 17.0 g/dL    HCT 35.3 (L) 36.6 - 50.3 %    MCV 92.2 80.0 - 99.0 FL    MCH 29.0 26.0 - 34.0 PG    MCHC 31.4 30.0 - 36.5 g/dL    RDW 15.7 (H) 11.5 - 14.5 %    PLATELET 943 021 - 875 K/uL    MPV 10.8 8.9 - 12.9 FL    NRBC 0.0 0  WBC    ABSOLUTE NRBC 0.00 0.00 - 0.01 K/uL    NEUTROPHILS 78 (H) 32 - 75 %    LYMPHOCYTES 11 (L) 12 - 49 %    MONOCYTES 6 5 - 13 %    EOSINOPHILS 3 0 - 7 %    BASOPHILS 1 0 - 1 %    IMMATURE GRANULOCYTES 1 (H) 0.0 - 0.5 %    ABS. NEUTROPHILS 7.6 1.8 - 8.0 K/UL    ABS. LYMPHOCYTES 1.0 0.8 - 3.5 K/UL    ABS. MONOCYTES 0.6 0.0 - 1.0 K/UL    ABS. EOSINOPHILS 0.2 0.0 - 0.4 K/UL    ABS. BASOPHILS 0.1 0.0 - 0.1 K/UL    ABS. IMM. GRANS. 0.1 (H) 0.00 - 0.04 K/UL    DF AUTOMATED     METABOLIC PANEL, COMPREHENSIVE    Collection Time: 07/21/20  5:33 AM   Result Value Ref Range    Sodium 139 136 - 145 mmol/L    Potassium 4.6 3.5 - 5.1 mmol/L    Chloride 110 (H) 97 - 108 mmol/L    CO2 21 21 - 32 mmol/L    Anion gap 8 5 - 15 mmol/L    Glucose 194 (H) 65 - 100 mg/dL    BUN 40 (H) 6 - 20 MG/DL    Creatinine 4.78 (H) 0.70 - 1.30 MG/DL    BUN/Creatinine ratio 8 (L) 12 - 20      GFR est AA 15 (L) >60 ml/min/1.73m2    GFR est non-AA 12 (L) >60 ml/min/1.73m2    Calcium 9.1 8.5 - 10.1 MG/DL    Bilirubin, total 0.7 0.2 - 1.0 MG/DL    ALT (SGPT) 27 12 - 78 U/L    AST (SGOT) 15 15 - 37 U/L    Alk.  phosphatase 87 45 - 117 U/L    Protein, total 8.1 6.4 - 8.2 g/dL    Albumin 3.6 3.5 - 5.0 g/dL    Globulin 4.5 (H) 2.0 - 4.0 g/dL    A-G Ratio 0.8 (L) 1.1 - 2.2     CK W/ REFLX CKMB    Collection Time: 07/21/20  5:33 AM   Result Value Ref Range     39 - 308 U/L   SAMPLES BEING HELD    Collection Time: 07/21/20  5:33 AM   Result Value Ref Range    SAMPLES BEING HELD  RD, BLUE COMMENT        Add-on orders for these samples will be processed based on acceptable specimen integrity and analyte stability, which may vary by analyte.    MAGNESIUM    Collection Time: 07/21/20  5:33 AM   Result Value Ref Range    Magnesium 2.5 (H) 1.6 - 2.4 mg/dL   NT-PRO BNP    Collection Time: 07/21/20  5:33 AM   Result Value Ref Range    NT pro-BNP 9,341 (H) <125 PG/ML   TROPONIN I    Collection Time: 07/21/20  5:33 AM   Result Value Ref Range    Troponin-I, Qt. 0.18 (H) <0.05 ng/mL   POC CHEM8    Collection Time: 07/21/20  5:47 AM   Result Value Ref Range    Calcium, ionized (POC) 1.25 1.12 - 1.32 mmol/L    Sodium (POC) 140 136 - 145 mmol/L    Potassium (POC) 4.6 3.5 - 5.1 mmol/L    Chloride (POC) 108 (H) 98 - 107 mmol/L    CO2 (POC) 22 21 - 32 mmol/L    Anion gap (POC) 16 10 - 20 mmol/L    Glucose (POC) 185 (H) 65 - 100 mg/dL    BUN (POC) 38 (H) 9 - 20 mg/dL    Creatinine (POC) 5.1 (H) 0.6 - 1.3 mg/dL    GFRAA, POC 14 (L) >60 ml/min/1.73m2    GFRNA, POC 11 (L) >60 ml/min/1.73m2    Hematocrit (POC) 31 (L) 36.6 - 50.3 %    Comment Notified RN or MD immediately by      URINALYSIS W/ REFLEX CULTURE    Collection Time: 07/21/20  6:45 AM    Specimen: Urine   Result Value Ref Range    Color YELLOW/STRAW      Appearance CLEAR CLEAR      Specific gravity 1.020 1.003 - 1.030      pH (UA) 6.5 5.0 - 8.0      Protein >300 (A) NEG mg/dL    Glucose Negative NEG mg/dL    Ketone Negative NEG mg/dL    Bilirubin Negative NEG      Blood TRACE (A) NEG      Urobilinogen 0.2 0.2 - 1.0 EU/dL    Nitrites Negative NEG      Leukocyte Esterase Negative NEG      WBC 0-4 0 - 4 /hpf    RBC 5-10 0 - 5 /hpf    Epithelial cells FEW FEW /lpf    Bacteria Negative NEG /hpf    UA:UC IF INDICATED CULTURE NOT INDICATED BY UA RESULT CNI     TROPONIN I    Collection Time: 07/21/20 10:15 AM   Result Value Ref Range    Troponin-I, Qt. 0.23 (H) <0.05 ng/mL   EKG, 12 LEAD, SUBSEQUENT    Collection Time: 07/21/20  1:07 PM   Result Value Ref Range    Ventricular Rate 72 BPM    Atrial Rate 72 BPM    P-R Interval 166 ms    QRS Duration 150 ms    Q-T Interval 452 ms    QTC Calculation (Bezet) 494 ms    Calculated P Axis -18 degrees    Calculated R Axis -62 degrees    Calculated T Axis 94 degrees    Diagnosis       Normal sinus rhythm  Right bundle branch block  Left anterior fascicular block  ** Bifascicular block **  Left ventricular hypertrophy with repolarization abnormality  Cannot rule out Septal infarct , age undetermined  When compared with ECG of 21-JUL-2020 05:24,  MANUAL COMPARISON REQUIRED, DATA IS UNCONFIRMED     GLUCOSE, POC    Collection Time: 07/21/20  1:18 PM   Result Value Ref Range    Glucose (POC) 103 (H) 65 - 100 mg/dL    Performed by Bonita Álvarez

## 2020-07-21 NOTE — ED NOTES
Spoke with office regarding consult. Dr. Servin Double in the office today and will not be taking consult. Office to re-page consult and have MD call RN back. Awaiting call back.

## 2020-07-21 NOTE — CONSULTS
Consult Date: 2020    Consults     69yo AAM, h/o ESRD( h/o membranous nephropathy, AIN due to NSAIDs, on HD at [facility] on TTS since 2018), DM, HTN and HF,  presents with SOB and found to have uncontrolled HTN( BP up to 199/105's in the ER early this AM), pulmonary edema and hypoxemia( SO2: 86% on RA)  No h/o missing sessions  Tx Time :3 hrs 45 min   .0 Kg per HD records, so pt is currently 3.4kg above dry weight   Nephrology consulted for ESRD management, fluid overload.     Subjective   Pt seen during HD this AM  Reports some improvement in SOB  Admitted high intake of fast food last few days and forgot to take antihypertensives yesterday    Past Medical History:   Diagnosis Date    Arthritis     spine    CAD (coronary artery disease)     high cholesterol    Chronic kidney disease     dialysis    Diabetes ([address]     GERD (gastroesophageal reflux disease)     HX    Hypertension     Ill-defined condition     irritable bowel syndrome    Systolic CHF ([address]     Unspecified sleep apnea     NO CPAP      Past Surgical History:   Procedure Laterality Date    COLONOSCOPY N/A 2016    COLONOSCOPY performed by Jb Dinh MD at [facility] ENDOSCOPY    HX ORTHOPAEDIC      CERVICAL FUSION    HX UROLOGICAL      TURP    HX VASCULAR ACCESS      L arm dialysis access     Family History   Problem Relation Age of Onset    Cancer Father         \"bone\"    Diabetes Brother       Social History     Tobacco Use    Smoking status: Former Smoker     Last attempt to quit: 2009     Years since quittin.5    Smokeless tobacco: Former User    Tobacco comment: cigars only   Substance Use Topics    Alcohol use: No       Current Facility-Administered Medications   Medication Dose Route Frequency Provider Last Rate Last Dose    nitroglycerin (NITROSTAT) tablet 0.4 mg  0.4 mg SubLINGual PRN Paz Delaney MD   0.4 mg at 20 0533    acetaminophen (TYLENOL) tablet 650 mg  650 mg Oral Q6H PRN Arleen Holloway Byron Jones MD        aspirin tablet 325 mg  325 mg Oral ONCE Aman Ku MD        nitroglycerin (Tridil) 200 mcg/ml infusion  0-100 mcg/min IntraVENous TITRATE Dwain Muniz MD         Current Outpatient Medications   Medication Sig Dispense Refill    bumetanide (BUMEX) 1 mg tablet Take 1 Tab by mouth daily. Take 1 tab in morning and your 2mg tab in the evening 30 Tab 0    insulin NPH/insulin regular (NOVOLIN 70/30 U-100 INSULIN) 100 unit/mL (70-30) injection Inject 18 Units with Breakfast, 20 Units with Dinner 4 Vial 3    gabapentin (NEURONTIN) 300 mg capsule Take 1 Cap by mouth daily. Max Daily Amount: 300 mg. 90 Cap 3    albuterol (PROVENTIL HFA, VENTOLIN HFA, PROAIR HFA) 90 mcg/actuation inhaler Take 2 Puffs by inhalation every four (4) hours as needed for Wheezing. 1 Inhaler 0    Insulin Needles, Disposable, (REYNALDO PEN NEEDLE) 32 gauge x 5/32\" ndle FOR USE EVERY MORNING WITH VICTOZA  Pen Needle 3    L.acidoph & parac-S.therm-Bifido (LOW Q2/RISAQUAD-2) 16 billion cell cap cap Take 1 Cap by mouth daily. 30 Cap 0    VICTOZA 2-NELLI 0.6 mg/0.1 mL (18 mg/3 mL) pnij INJECT 1.8- MG SUBCUTANEOUSLY ONCE DAILY, TITRATE UP TO 1.8 DAILY AS DIRECTED 9 Pen 3    sertraline (ZOLOFT) 50 mg tablet TAKE 1 T PO QD  0    aspirin 81 mg chewable tablet Take 81 mg by mouth daily.  sertraline (ZOLOFT) 25 mg tablet TK 1 T PO QD      carvedilol (COREG) 12.5 mg tablet       Insulin Syringe-Needle U-100 1 mL 30 gauge x 5/16 syrg Use twice daily for insulin injection 100 Syringe 7    hydrALAZINE (APRESOLINE) 100 mg tablet 100 mg three (3) times daily.  fenofibrate nanocrystallized (TRICOR) 145 mg tablet Take  by mouth every evening.  bumetanide (BUMEX) 2 mg tablet Take 2 mg by mouth every evening.  atorvastatin (LIPITOR) 20 mg tablet Take 20 mg by mouth every evening.  Calcium-Cholecalciferol, D3, (CALCIUM 600 WITH VITAMIN D3) 600 mg(1,500mg) -400 unit chew Take 1 Tab by mouth every evening.  fluticasone (FLONASE) 50 mcg/actuation nasal spray 2 Sprays by Both Nostrils route daily.  tamsulosin (FLOMAX) 0.4 mg capsule Take 0.4 mg by mouth two (2) times a day. Review of Systems   Constitutional: Negative for chills, fever and malaise/fatigue. HENT: Negative. Eyes: Negative. Respiratory: Positive for shortness of breath. Negative for cough, hemoptysis, sputum production and wheezing. Cardiovascular: Positive for leg swelling. Negative for chest pain. Gastrointestinal: Negative for abdominal pain, diarrhea, nausea and vomiting. Genitourinary: Negative. Musculoskeletal: Negative for myalgias. Skin: Negative. Neurological: Negative. Psychiatric/Behavioral: Negative. Objective     Vital signs for last 24 hours:  Visit Vitals  BP (!) 188/102   Pulse 71   Temp 98.9 °F (37.2 °C)   Resp 17   Ht 6' (1.829 m)   Wt 113.4 kg (250 lb)   SpO2 98%   BMI 33.91 kg/m²       Intake/Output this shift:  Current Shift: No intake/output data recorded.   Last 3 Shifts: 07/19 1901 - 07/21 0700  In: -   Out: 115 [Urine:115]    Data Review:   Recent Results (from the past 24 hour(s))   EKG, 12 LEAD, INITIAL    Collection Time: 07/21/20  5:24 AM   Result Value Ref Range    Ventricular Rate 88 BPM    Atrial Rate 88 BPM    P-R Interval 178 ms    QRS Duration 132 ms    Q-T Interval 392 ms    QTC Calculation (Bezet) 474 ms    Calculated P Axis 0 degrees    Calculated R Axis -59 degrees    Calculated T Axis 100 degrees    Diagnosis       Normal sinus rhythm  Left axis deviation  Right bundle branch block  Left ventricular hypertrophy with repolarization abnormality  Cannot rule out Anteroseptal infarct , age undetermined  When compared with ECG of 19-JUN-2020 13:04,  Diego-Parkinson-White is no longer present     CBC WITH AUTOMATED DIFF    Collection Time: 07/21/20  5:33 AM   Result Value Ref Range    WBC 9.6 4.1 - 11.1 K/uL    RBC 3.83 (L) 4.10 - 5.70 M/uL    HGB 11.1 (L) 12.1 - 17.0 g/dL HCT 35.3 (L) 36.6 - 50.3 %    MCV 92.2 80.0 - 99.0 FL    MCH 29.0 26.0 - 34.0 PG    MCHC 31.4 30.0 - 36.5 g/dL    RDW 15.7 (H) 11.5 - 14.5 %    PLATELET 756 346 - 233 K/uL    MPV 10.8 8.9 - 12.9 FL    NRBC 0.0 0  WBC    ABSOLUTE NRBC 0.00 0.00 - 0.01 K/uL    NEUTROPHILS 78 (H) 32 - 75 %    LYMPHOCYTES 11 (L) 12 - 49 %    MONOCYTES 6 5 - 13 %    EOSINOPHILS 3 0 - 7 %    BASOPHILS 1 0 - 1 %    IMMATURE GRANULOCYTES 1 (H) 0.0 - 0.5 %    ABS. NEUTROPHILS 7.6 1.8 - 8.0 K/UL    ABS. LYMPHOCYTES 1.0 0.8 - 3.5 K/UL    ABS. MONOCYTES 0.6 0.0 - 1.0 K/UL    ABS. EOSINOPHILS 0.2 0.0 - 0.4 K/UL    ABS. BASOPHILS 0.1 0.0 - 0.1 K/UL    ABS. IMM. GRANS. 0.1 (H) 0.00 - 0.04 K/UL    DF AUTOMATED     METABOLIC PANEL, COMPREHENSIVE    Collection Time: 07/21/20  5:33 AM   Result Value Ref Range    Sodium 139 136 - 145 mmol/L    Potassium 4.6 3.5 - 5.1 mmol/L    Chloride 110 (H) 97 - 108 mmol/L    CO2 21 21 - 32 mmol/L    Anion gap 8 5 - 15 mmol/L    Glucose 194 (H) 65 - 100 mg/dL    BUN 40 (H) 6 - 20 MG/DL    Creatinine 4.78 (H) 0.70 - 1.30 MG/DL    BUN/Creatinine ratio 8 (L) 12 - 20      GFR est AA 15 (L) >60 ml/min/1.73m2    GFR est non-AA 12 (L) >60 ml/min/1.73m2    Calcium 9.1 8.5 - 10.1 MG/DL    Bilirubin, total 0.7 0.2 - 1.0 MG/DL    ALT (SGPT) 27 12 - 78 U/L    AST (SGOT) 15 15 - 37 U/L    Alk. phosphatase 87 45 - 117 U/L    Protein, total 8.1 6.4 - 8.2 g/dL    Albumin 3.6 3.5 - 5.0 g/dL    Globulin 4.5 (H) 2.0 - 4.0 g/dL    A-G Ratio 0.8 (L) 1.1 - 2.2     CK W/ REFLX CKMB    Collection Time: 07/21/20  5:33 AM   Result Value Ref Range     39 - 308 U/L   SAMPLES BEING HELD    Collection Time: 07/21/20  5:33 AM   Result Value Ref Range    SAMPLES BEING HELD  RD, BLUE     COMMENT        Add-on orders for these samples will be processed based on acceptable specimen integrity and analyte stability, which may vary by analyte.    MAGNESIUM    Collection Time: 07/21/20  5:33 AM   Result Value Ref Range    Magnesium 2.5 (H) 1.6 - 2.4 mg/dL   NT-PRO BNP    Collection Time: 07/21/20  5:33 AM   Result Value Ref Range    NT pro-BNP 9,341 (H) <125 PG/ML   TROPONIN I    Collection Time: 07/21/20  5:33 AM   Result Value Ref Range    Troponin-I, Qt. 0.18 (H) <0.05 ng/mL   POC CHEM8    Collection Time: 07/21/20  5:47 AM   Result Value Ref Range    Calcium, ionized (POC) 1.25 1.12 - 1.32 mmol/L    Sodium (POC) 140 136 - 145 mmol/L    Potassium (POC) 4.6 3.5 - 5.1 mmol/L    Chloride (POC) 108 (H) 98 - 107 mmol/L    CO2 (POC) 22 21 - 32 mmol/L    Anion gap (POC) 16 10 - 20 mmol/L    Glucose (POC) 185 (H) 65 - 100 mg/dL    BUN (POC) 38 (H) 9 - 20 mg/dL    Creatinine (POC) 5.1 (H) 0.6 - 1.3 mg/dL    GFRAA, POC 14 (L) >60 ml/min/1.73m2    GFRNA, POC 11 (L) >60 ml/min/1.73m2    Hematocrit (POC) 31 (L) 36.6 - 50.3 %    Comment Notified RN or MD immediately by      URINALYSIS W/ REFLEX CULTURE    Collection Time: 07/21/20  6:45 AM    Specimen: Urine   Result Value Ref Range    Color YELLOW/STRAW      Appearance CLEAR CLEAR      Specific gravity 1.020 1.003 - 1.030      pH (UA) 6.5 5.0 - 8.0      Protein >300 (A) NEG mg/dL    Glucose Negative NEG mg/dL    Ketone Negative NEG mg/dL    Bilirubin Negative NEG      Blood TRACE (A) NEG      Urobilinogen 0.2 0.2 - 1.0 EU/dL    Nitrites Negative NEG      Leukocyte Esterase Negative NEG      WBC 0-4 0 - 4 /hpf    RBC 5-10 0 - 5 /hpf    Epithelial cells FEW FEW /lpf    Bacteria Negative NEG /hpf    UA:UC IF INDICATED CULTURE NOT INDICATED BY UA RESULT CNI         Physical Exam  Constitutional:       Appearance: He is well-developed and well-nourished. HENT:      Head: Normocephalic and atraumatic. Neck:      Musculoskeletal: Normal range of motion and neck supple. Vascular: No JVD. Cardiovascular:      Rate and Rhythm: Normal rate and regular rhythm. Heart sounds: Normal heart sounds. Pulmonary:      Effort: Pulmonary effort is normal. No respiratory distress. Breath sounds:  No wheezing or rales. Comments: Decreased BS in bases  Skin:     General: Skin is warm and dry. Findings: No rash. Neurological:      Mental Status: He is alert and oriented to person, place, and time. Psychiatric:         Mood and Affect: Mood and affect normal.         Behavior: Behavior normal.         Thought Content: Thought content normal.         Judgment: Judgment normal.     left AVF in use  Study Result     EXAM: XR CHEST PORT     INDICATION: Shortness of breath     COMPARISON: Chest x-ray 6/19/2020.     FINDINGS: A portable AP radiograph of the chest was obtained at 05:59 hours. The  patient is on a cardiac monitor. There are bilateral pleural effusions,  pulmonary vascular congestion with diffuse interstitial prominence, and areas of  airspace opacity. The cardiac silhouette appears enlarged and the mediastinal  contours are normal.  The chest wall structures and visualized upper abdomen  show no acute findings with incidental note of degenerative spine and shoulder  changes.      IMPRESSION  IMPRESSION: Findings most with congestive failure with bilateral pleural  effusions, interstitial edema, and probable airspace edema though superimposed  infectious infiltrate is not excluded. XR CHEST PORT (Order: [id number]) - 7/21/2020       Assessment/Plan:  69yo AAM, h/o ESRD( on HD at [facility] on TTS since 2018), DM, HTN and HF,  presents with SOB and found to have uncontrolled HTN( BP up to 199/105's), pulmonary edema and hypoxemia( SO2: 86% on RA). Nephrology consulted for ESRD management, fluid overload.     # ESRD  - Outpt Tx Time :3 hrs 45 min   - .0 Kg per HD records, so pt is currently 3.4kg above dry weight   - fluid overload likely due to poor compliance with diet/fluid intake, didn't miss HD sessions  - HD today: 4hs, UF: 3.5L as tolerated  - increase home dose of Bumex to 2mg BID  - monitor I/O's, daily weight  - fluid restriction 1L/day  - May need additional  UF tomorrow, will reassess in AM  - renal diet    # Anemia of CKD  - Hb: 11.1  - no need to start EPO at this time  - monitor CBC    # MBD   - check PTH, Vit D, phosph  - apparently not on binders, will check outpt records    # HTN  - uncontrolled currently likely due to volume overload and partial compliance with meds  - please resume home meds  - will monitor    Thank you for allowing us to participate in this patient's care.

## 2020-07-21 NOTE — PROGRESS NOTES
TRANSFER - IN REPORT:    Verbal report received from [address] on Vadim [place]  being received from ED for ordered procedure      Report consisted of patients Situation, Background, Assessment and   Recommendations(SBAR). Information from the following report(s) SBAR, ED Summary and Cardiac Rhythm R BBB was reviewed with the receiving nurse. Opportunity for questions and clarification was provided. Assessment completed upon patients arrival to unit and care assumed.

## 2020-07-21 NOTE — ROUTINE PROCESS
1500:Report received from RAH Casanova RN. Admission database in process. 1715:Admission database completed to the best of my ability. 1800:He accepted 100% of his meal well without c/o nausea or regurgitation. His wife Ramona Wells is at the bedside, she left his medication and glasses at the bedside. 1917:He c/o headache, tylenol administered. 1930:Bedside and Verbal shift change report given to NEHA Louise (oncoming nurse) by myself (offgoing nurse). Report included the following information SBAR, Kardex, Procedure Summary, Intake/Output, MAR, Accordion, Recent Results, Med Rec Status, Cardiac Rhythm sinus rhythm, Alarm Parameters  and Quality Measures. Continue the current plan of care.

## 2020-07-21 NOTE — ED NOTES
8838: Pt presents to ed c/o shortness of breath increased upon moving and exertion. Pt reports that he has dialysis on Tues, thurs, and sat and was dialyzed this sat. Hx of HF. Pt still produces urine and is prescribed diuretics. Pt placed on monitor x4 and placed on 4 liters of O2, quick improvement then weaned down to 3 L NC. Remains with appropriate POX    TRANSFER - OUT REPORT:    Verbal report given to  AUREA Grace RN (name) on Reubin Rein  being transferred to Dialysis unit(unit) for routine progression of care       Report consisted of patients Situation, Background, Assessment and   Recommendations(SBAR). Information from the following report(s) SBAR, Kardex, ED Summary, Intake/Output and MAR was reviewed with the receiving nurse. Lines:   Peripheral IV 07/21/20 Right Forearm (Active)   Site Assessment Clean, dry, & intact 07/21/20 0538   Phlebitis Assessment 0 07/21/20 0538   Infiltration Assessment 0 07/21/20 0538   Dressing Status Clean, dry, & intact 07/21/20 0538   Dressing Type Tape;Transparent 07/21/20 0538   Hub Color/Line Status Pink;Flushed;Patent 07/21/20 0538   Action Taken Blood drawn 07/21/20 0538   Alcohol Cap Used No 07/21/20 0538        Opportunity for questions and clarification was provided. Patient transported with:   Monitor      Dialysis RN will call for transport shortly    ------------------------------------------------    0720 - Bedside shift change report given to [address] [place] [address] (oncoming nurse) by Luz Thapa  (offgoing nurse). Report included the following information SBAR, Kardex, ED Summary, Intake/Output and MAR.      Pending for transport to dialysis

## 2020-07-22 ENCOUNTER — APPOINTMENT (OUTPATIENT)
Dept: NON INVASIVE DIAGNOSTICS | Age: 75
DRG: 291 | End: 2020-07-22
Attending: HOSPITALIST
Payer: MEDICARE

## 2020-07-22 LAB
25(OH)D3 SERPL-MCNC: 30.9 NG/ML (ref 30–100)
ALBUMIN SERPL-MCNC: 3.2 G/DL (ref 3.5–5)
ANION GAP SERPL CALC-SCNC: 7 MMOL/L (ref 5–15)
BUN SERPL-MCNC: 30 MG/DL (ref 6–20)
BUN/CREAT SERPL: 7 (ref 12–20)
CALCIUM SERPL-MCNC: 8.5 MG/DL (ref 8.5–10.1)
CALCIUM SERPL-MCNC: 8.7 MG/DL (ref 8.5–10.1)
CHLORIDE SERPL-SCNC: 98 MMOL/L (ref 97–108)
CO2 SERPL-SCNC: 32 MMOL/L (ref 21–32)
CREAT SERPL-MCNC: 4.17 MG/DL (ref 0.7–1.3)
ECHO AO ROOT DIAM: 3.48 CM
ECHO AV AREA PEAK VELOCITY: 2 CM2
ECHO AV AREA PEAK VELOCITY: 2.01 CM2
ECHO AV AREA PEAK VELOCITY: 2.02 CM2
ECHO AV AREA PEAK VELOCITY: 2.04 CM2
ECHO AV AREA VTI: 2.22 CM2
ECHO AV AREA/BSA VTI: 1 CM2/M2
ECHO AV MEAN GRADIENT: 7.6 MMHG
ECHO AV PEAK GRADIENT: 18.18 MMHG
ECHO AV PEAK GRADIENT: 18.57 MMHG
ECHO AV PEAK VELOCITY: 213.2 CM/S
ECHO AV PEAK VELOCITY: 215.49 CM/S
ECHO AV VTI: 34.02 CM
ECHO EST RA PRESSURE: 10 MMHG
ECHO LA AREA 4C: 23.75 CM2
ECHO LA MAJOR AXIS: 4.29 CM
ECHO LA MINOR AXIS: 1.86 CM
ECHO LA VOL 4C: 61.97 ML (ref 18–58)
ECHO LA VOLUME INDEX A4C: 26.92 ML/M2 (ref 16–28)
ECHO LV E' LATERAL VELOCITY: 6.28 CM/S
ECHO LV E' SEPTAL VELOCITY: 3.24 CM/S
ECHO LV EDV A4C: 266.53 ML
ECHO LV EDV INDEX A4C: 115.8 ML/M2
ECHO LV EJECTION FRACTION A4C: 59 PERCENT
ECHO LV ESV A4C: 109.4 ML
ECHO LV ESV INDEX A4C: 47.5 ML/M2
ECHO LV INTERNAL DIMENSION DIASTOLIC: 5.32 CM (ref 4.2–5.9)
ECHO LV INTERNAL DIMENSION SYSTOLIC: 5.18 CM
ECHO LV IVSD: 1.27 CM (ref 0.6–1)
ECHO LV MASS 2D: 293.4 G (ref 88–224)
ECHO LV MASS INDEX 2D: 127.4 G/M2 (ref 49–115)
ECHO LV POSTERIOR WALL DIASTOLIC: 1.36 CM (ref 0.6–1)
ECHO LVOT CARDIAC OUTPUT: 5.52 LITER/MINUTE
ECHO LVOT DIAM: 2.24 CM
ECHO LVOT PEAK GRADIENT: 4.8 MMHG
ECHO LVOT PEAK GRADIENT: 4.86 MMHG
ECHO LVOT PEAK VELOCITY: 109.58 CM/S
ECHO LVOT PEAK VELOCITY: 110.25 CM/S
ECHO LVOT SV: 75.7 ML
ECHO LVOT VTI: 19.21 CM
ECHO MV A VELOCITY: 61.25 CM/S
ECHO MV AREA PHT: 2.77 CM2
ECHO MV AREA VTI: 1.83 CM2
ECHO MV E DECELERATION TIME (DT): 0.17 S
ECHO MV E VELOCITY: 129.99 CM/S
ECHO MV E/A RATIO: 2.12
ECHO MV E/E' LATERAL: 20.7
ECHO MV E/E' RATIO (AVERAGED): 30.41
ECHO MV E/E' SEPTAL: 40.12
ECHO MV MAX VELOCITY: 167.59 CM/S
ECHO MV MEAN GRADIENT: 3.54 MMHG
ECHO MV PEAK GRADIENT: 11.23 MMHG
ECHO MV PRESSURE HALF TIME (PHT): 0.08 S
ECHO MV VTI: 41.43 CM
ECHO PV MAX VELOCITY: 106.9 CM/S
ECHO PV MAX VELOCITY: 110.1 CM/S
ECHO PV MEAN GRADIENT: 2.5 MMHG
ECHO PV PEAK INSTANTANEOUS GRADIENT SYSTOLIC: 4.57 MMHG
ECHO PV PEAK INSTANTANEOUS GRADIENT SYSTOLIC: 4.85 MMHG
ECHO PV VTI: 22.61 CM
ECHO RIGHT VENTRICULAR SYSTOLIC PRESSURE (RVSP): 32.65 MMHG
ECHO TV REGURGITANT MAX VELOCITY: 237.98 CM/S
ECHO TV REGURGITANT PEAK GRADIENT: 22.65 MMHG
ERYTHROCYTE [DISTWIDTH] IN BLOOD BY AUTOMATED COUNT: 15.4 % (ref 11.5–14.5)
GLUCOSE BLD STRIP.AUTO-MCNC: 120 MG/DL (ref 65–100)
GLUCOSE BLD STRIP.AUTO-MCNC: 123 MG/DL (ref 65–100)
GLUCOSE BLD STRIP.AUTO-MCNC: 133 MG/DL (ref 65–100)
GLUCOSE BLD STRIP.AUTO-MCNC: 158 MG/DL (ref 65–100)
GLUCOSE SERPL-MCNC: 117 MG/DL (ref 65–100)
HCT VFR BLD AUTO: 33 % (ref 36.6–50.3)
HGB BLD-MCNC: 10.1 G/DL (ref 12.1–17)
LVOT MG: 1.8 MMHG
MCH RBC QN AUTO: 28.3 PG (ref 26–34)
MCHC RBC AUTO-ENTMCNC: 30.6 G/DL (ref 30–36.5)
MCV RBC AUTO: 92.4 FL (ref 80–99)
NRBC # BLD: 0 K/UL (ref 0–0.01)
NRBC BLD-RTO: 0 PER 100 WBC
PHOSPHATE SERPL-MCNC: 3.6 MG/DL (ref 2.6–4.7)
PLATELET # BLD AUTO: 248 K/UL (ref 150–400)
PMV BLD AUTO: 11 FL (ref 8.9–12.9)
POTASSIUM SERPL-SCNC: 3.5 MMOL/L (ref 3.5–5.1)
PTH-INTACT SERPL-MCNC: 323.6 PG/ML (ref 18.4–88)
RBC # BLD AUTO: 3.57 M/UL (ref 4.1–5.7)
SERVICE CMNT-IMP: ABNORMAL
SODIUM SERPL-SCNC: 137 MMOL/L (ref 136–145)
TROPONIN I SERPL-MCNC: 0.18 NG/ML
WBC # BLD AUTO: 8.3 K/UL (ref 4.1–11.1)

## 2020-07-22 PROCEDURE — 84484 ASSAY OF TROPONIN QUANT: CPT

## 2020-07-22 PROCEDURE — 94760 N-INVAS EAR/PLS OXIMETRY 1: CPT

## 2020-07-22 PROCEDURE — 74011636637 HC RX REV CODE- 636/637: Performed by: HOSPITALIST

## 2020-07-22 PROCEDURE — 36415 COLL VENOUS BLD VENIPUNCTURE: CPT

## 2020-07-22 PROCEDURE — 80069 RENAL FUNCTION PANEL: CPT

## 2020-07-22 PROCEDURE — 74011250637 HC RX REV CODE- 250/637: Performed by: HOSPITALIST

## 2020-07-22 PROCEDURE — 82306 VITAMIN D 25 HYDROXY: CPT

## 2020-07-22 PROCEDURE — 82962 GLUCOSE BLOOD TEST: CPT

## 2020-07-22 PROCEDURE — 85027 COMPLETE CBC AUTOMATED: CPT

## 2020-07-22 PROCEDURE — 93306 TTE W/DOPPLER COMPLETE: CPT

## 2020-07-22 PROCEDURE — 83970 ASSAY OF PARATHORMONE: CPT

## 2020-07-22 PROCEDURE — 65660000000 HC RM CCU STEPDOWN

## 2020-07-22 PROCEDURE — 77010033678 HC OXYGEN DAILY

## 2020-07-22 PROCEDURE — 74011250637 HC RX REV CODE- 250/637: Performed by: INTERNAL MEDICINE

## 2020-07-22 PROCEDURE — 74011636637 HC RX REV CODE- 636/637: Performed by: INTERNAL MEDICINE

## 2020-07-22 PROCEDURE — 74011250636 HC RX REV CODE- 250/636: Performed by: HOSPITALIST

## 2020-07-22 RX ORDER — BUMETANIDE 1 MG/1
2 TABLET ORAL
Status: DISCONTINUED | OUTPATIENT
Start: 2020-07-22 | End: 2020-07-23 | Stop reason: HOSPADM

## 2020-07-22 RX ORDER — AMLODIPINE BESYLATE 5 MG/1
5 TABLET ORAL DAILY
Status: DISCONTINUED | OUTPATIENT
Start: 2020-07-22 | End: 2020-07-23 | Stop reason: HOSPADM

## 2020-07-22 RX ORDER — INSULIN GLARGINE 100 [IU]/ML
15 INJECTION, SOLUTION SUBCUTANEOUS
Status: DISCONTINUED | OUTPATIENT
Start: 2020-07-22 | End: 2020-07-23 | Stop reason: HOSPADM

## 2020-07-22 RX ADMIN — CARVEDILOL 25 MG: 12.5 TABLET, FILM COATED ORAL at 08:50

## 2020-07-22 RX ADMIN — Medication 1 TABLET: at 17:19

## 2020-07-22 RX ADMIN — AMLODIPINE BESYLATE 5 MG: 5 TABLET ORAL at 09:59

## 2020-07-22 RX ADMIN — HYDRALAZINE HYDROCHLORIDE 100 MG: 50 TABLET, FILM COATED ORAL at 16:49

## 2020-07-22 RX ADMIN — INSULIN GLARGINE 15 UNITS: 100 INJECTION, SOLUTION SUBCUTANEOUS at 21:37

## 2020-07-22 RX ADMIN — HYDRALAZINE HYDROCHLORIDE 100 MG: 50 TABLET, FILM COATED ORAL at 21:37

## 2020-07-22 RX ADMIN — HEPARIN SODIUM 5000 UNITS: 5000 INJECTION INTRAVENOUS; SUBCUTANEOUS at 16:49

## 2020-07-22 RX ADMIN — ASPIRIN 81 MG CHEWABLE TABLET 81 MG: 81 TABLET CHEWABLE at 08:50

## 2020-07-22 RX ADMIN — Medication 10 ML: at 13:16

## 2020-07-22 RX ADMIN — GABAPENTIN 300 MG: 300 CAPSULE ORAL at 08:50

## 2020-07-22 RX ADMIN — Medication 10 ML: at 06:00

## 2020-07-22 RX ADMIN — SERTRALINE HYDROCHLORIDE 50 MG: 50 TABLET ORAL at 08:50

## 2020-07-22 RX ADMIN — BUMETANIDE 2 MG: 1 TABLET ORAL at 16:49

## 2020-07-22 RX ADMIN — BUMETANIDE 2 MG: 1 TABLET ORAL at 08:50

## 2020-07-22 RX ADMIN — Medication 10 ML: at 21:37

## 2020-07-22 RX ADMIN — HEPARIN SODIUM 5000 UNITS: 5000 INJECTION INTRAVENOUS; SUBCUTANEOUS at 03:25

## 2020-07-22 RX ADMIN — TAMSULOSIN HYDROCHLORIDE 0.8 MG: 0.4 CAPSULE ORAL at 08:50

## 2020-07-22 RX ADMIN — HYDRALAZINE HYDROCHLORIDE 100 MG: 50 TABLET, FILM COATED ORAL at 08:50

## 2020-07-22 RX ADMIN — CARVEDILOL 25 MG: 12.5 TABLET, FILM COATED ORAL at 16:49

## 2020-07-22 RX ADMIN — INSULIN LISPRO 4 UNITS: 100 INJECTION, SOLUTION INTRAVENOUS; SUBCUTANEOUS at 08:48

## 2020-07-22 NOTE — PROGRESS NOTES
TRANSFER - OUT REPORT:    Verbal report given to Yesika Nunez (name) on Jamaal Hernandez  being transferred to (unit) for routine progression of care       Report consisted of patients Situation, Background, Assessment and   Recommendations(SBAR). Information from the following report(s) SBAR, Kardex, Intake/Output, MAR, Recent Results and Cardiac Rhythm NSR was reviewed with the receiving nurse. Lines:   Peripheral IV 07/21/20 Right Forearm (Active)   Site Assessment Clean, dry, & intact 07/22/20 0846   Phlebitis Assessment 0 07/22/20 0846   Infiltration Assessment 0 07/22/20 0846   Dressing Status Clean, dry, & intact 07/22/20 0846   Dressing Type Tape;Transparent 07/22/20 0846   Hub Color/Line Status Pink 07/22/20 0846   Action Taken Open ports on tubing capped 07/22/20 0324   Alcohol Cap Used No 07/22/20 0846        Opportunity for questions and clarification was provided.

## 2020-07-22 NOTE — PROGRESS NOTES
Progress Note      7/22/2020 2:01 PM  NAME: Crys Callejas   MRN:  [id number]   Admit Diagnosis: Acute respiratory failure with hypoxia ([facility] [J96.01]; Acute on chronic systolic CHF (congestive heart failure) ([facility] [I50.23]; Elevated troponin [R79.89]; Accelerated hypertension [I10];ESRD (end stage renal disease) on dialysis ([facility] [N18.6, Z99.2]     Assessment:       1. Minimal troponin elevation, in the setting of elevated BP and volume overload , ESRD ,  Chronically elevated troponin . This is not an MI.      1. Pt has chronically elevated Troponin dating back to at least 2016 in  . 2. stress test in 2018 with anterolateral ischemia   3.  chronic systolic heart failure   LV EF reported 40 - 45% on Echo today . 3. ESRD on HD   4. Accelerated hypertension   5. DM  6. HLP  7. FRIDA  8. Obesity     Symptoms resolved after urgent dialysis yesterday with removal of > 3 liters fluid volume . The primary issue here seems to be volume overload, dietary indiscretion, etc.   No indication of acute cardiac event. Plan:     Continue medical management . No plans for Cath or other cardiac procedure. [x]        High complexity decision making was performed    Subjective:     Crys Callejas denies chest pain, dyspnea. Discussed with RN events overnight.      Patient Active Problem List   Diagnosis Code    Acute pancreatitis K85.90    LFT'S ABNORMAL     Acute renal failure (ARF) ([facility]) N17.9    CKD (chronic kidney disease) stage 3, GFR 30-59 ml/min ([facility]) N18.3    HTN (hypertension) I10    CAD (coronary artery disease) I25.10    Abdominal pain, acute, epigastric R10.13    Nausea & vomiting R11.2    UTI (lower urinary tract infection) N39.0    Renal failure (ARF), acute on chronic (HCC) N17.9, N18.9    Diarrhea R19.7    SIRS (systemic inflammatory response syndrome) ([facility]) R65.10    Nephrotic syndrome N04.9    Renal anasarca N04.9    Acute on chronic renal failure (HCC) N17.9, N18.9  Hypotension I95.9    Chest pain R07.9    Type II diabetes mellitus with nephropathy ([facility]) E11.21    Hyperlipidemia E78.5    Pneumonia J18.9    Accelerated hypertension I10    Elevated troponin R79.89    ESRD (end stage renal disease) on dialysis ([facility]) N18.6, Z99.2    Acute respiratory failure with hypoxia ([facility]) J96.01    Acute on chronic systolic CHF (congestive heart failure) ([facility]) I50.23       Review of Systems:    Symptom Y/N Comments  Symptom Y/N Comments   Fever/Chills N   Chest Pain N    Poor Appetite N   Edema N    Cough N   Abdominal Pain N    Sputum N   Joint Pain N    SOB/BRITT N   Pruritis/Rash N    Nausea/vomit N   Tolerating PT/OT Y    Diarrhea N   Tolerating Diet Y    Constipation N   Other       Could NOT obtain due to:      Objective:      Physical Exam:    Last 24hrs VS reviewed since prior progress note. Most recent are:    Visit Vitals  /76 (BP 1 Location: Left arm, BP Patient Position: Sitting)   Pulse 75   Temp 98 °F (36.7 °C)   Resp 20   Ht 6' (1.829 m)   Wt 108.9 kg (240 lb)   SpO2 100%   BMI 32.55 kg/m²       Intake/Output Summary (Last 24 hours) at 7/22/2020 1401  Last data filed at 7/22/2020 0846  Gross per 24 hour   Intake 720 ml   Output 676 ml   Net 44 ml        General Appearance: Well developed, well nourished, alert & oriented x 3,    no acute distress. Ears/Nose/Mouth/Throat: Hearing grossly normal.  Neck: Supple. Chest: Lungs clear to auscultation bilaterally. Cardiovascular: Regular rate and rhythm, S1S2 normal, no murmur. Abdomen: Soft, non-tender, bowel sounds are active. Extremities: No edema bilaterally. Skin: Warm and dry.     PMH/SH reviewed - no change compared to H&P    Data Review    Telemetry: normal sinus rhythm     Lab Data Personally Reviewed:    Recent Labs     07/22/20 0337 07/21/20  0533   WBC 8.3 9.6   HGB 10.1* 11.1*   HCT 33.0* 35.3*    286   LABRCNT(INR:3,PTP:3,APTT:3,)  Recent Labs     07/22/20 0337 07/21/20  0533    139   K 3.5 4.6   CL 98 110*   CO2 32 21   BUN 30* 40*   CREA 4.17* 4.78*   * 194*   CA 8.5  8.7 9.1   MG  --  2.5*   LABRCNT(CPK:3,CpKMB:3,ckndx:3,troiq:3)  Lab Results   Component Value Date/Time    Cholesterol, total 157 02/27/2018 11:38 AM    HDL Cholesterol 27 (L) 02/27/2018 11:38 AM    LDL, calculated 61 02/27/2018 11:38 AM    Triglyceride 346 (H) 02/27/2018 11:38 AM    CHOL/HDL Ratio 3.9 01/19/2011 07:00 AM   LABRCNT(sgot:3,gpt:3,ap:3,tbiL:3,TP:3,ALB:3,GLOB:3,ggt:3,aml:3,amyp:3,lpse:3,hlpse:3)No results for input(s): PH, PCO2, PO2 in the last 72 hours. Lab Results   Component Value Date/Time    Cholesterol, total 157 02/27/2018 11:38 AM    HDL Cholesterol 27 (L) 02/27/2018 11:38 AM    LDL, calculated 61 02/27/2018 11:38 AM    Triglyceride 346 (H) 02/27/2018 11:38 AM    CHOL/HDL Ratio 3.9 01/19/2011 07:00 AM   MEDTABLETimmickey Galicia MD  No results for input(s): PH, PCO2, PO2 in the last 72 hours.     Medications Personally Reviewed:    Current Facility-Administered Medications   Medication Dose Route Frequency    bumetanide (BUMEX) tablet 2 mg  2 mg Oral ACB&D    amLODIPine (NORVASC) tablet 5 mg  5 mg Oral DAILY    nitroglycerin (NITROSTAT) tablet 0.4 mg  0.4 mg SubLINGual PRN    acetaminophen (TYLENOL) tablet 650 mg  650 mg Oral Q6H PRN    sodium chloride (NS) flush 5-40 mL  5-40 mL IntraVENous Q8H    sodium chloride (NS) flush 5-40 mL  5-40 mL IntraVENous PRN    ondansetron (ZOFRAN) injection 4 mg  4 mg IntraVENous Q6H PRN    heparin (porcine) injection 5,000 Units  5,000 Units SubCUTAneous Q12H    insulin lispro (HUMALOG) injection   SubCUTAneous AC&HS    glucose chewable tablet 16 g  4 Tab Oral PRN    dextrose (D50W) injection syrg 12.5-25 g  12.5-25 g IntraVENous PRN    glucagon (GLUCAGEN) injection 1 mg  1 mg IntraMUSCular PRN    aspirin chewable tablet 81 mg  81 mg Oral DAILY    calcium-vitamin D (OS-ARELI) 500 mg-200 unit tablet  1 Tab Oral QPM    gabapentin (NEURONTIN) capsule 300 mg 300 mg Oral DAILY    hydrALAZINE (APRESOLINE) tablet 100 mg  100 mg Oral TID    sertraline (ZOLOFT) tablet 50 mg  50 mg Oral DAILY    tamsulosin (FLOMAX) capsule 0.8 mg  0.8 mg Oral DAILY    insulin glargine (LANTUS) injection 22 Units  22 Units SubCUTAneous QHS    And    insulin lispro (HUMALOG) injection 4 Units  4 Units SubCUTAneous TIDAC    carvediloL (COREG) tablet 25 mg  25 mg Oral BID WITH MEALS         Yuni Keller MD

## 2020-07-22 NOTE — PROGRESS NOTES
TRANSFER - IN REPORT:    Verbal report received from [address] (name) on Licha Mireles  being received from PCU (unit) for routine progression of care      Report consisted of patients Situation, Background, Assessment and   Recommendations(SBAR). Information from the following report(s) SBAR was reviewed with the receiving nurse. Opportunity for questions and clarification was provided. Assessment completed upon patients arrival to unit and care assumed.

## 2020-07-22 NOTE — PROGRESS NOTES
Problem: Falls - Risk of  Goal: *Absence of Falls  Description: Document Fawn Salvador Fall Risk and appropriate interventions in the flowsheet.   Outcome: Progressing Towards Goal  Note: Fall Risk Interventions:  Mobility Interventions: Patient to call before getting OOB         Medication Interventions: Patient to call before getting OOB, Teach patient to arise slowly    Elimination Interventions: Bed/chair exit alarm, Call light in reach, Patient to call for help with toileting needs, Toileting schedule/hourly rounds              Problem: Patient Education: Go to Patient Education Activity  Goal: Patient/Family Education  Outcome: Progressing Towards Goal

## 2020-07-22 NOTE — PROGRESS NOTES
Patient walked to the end of the Neuro hallway and back with no oxygen. SpO2 was 94% on 2L NC before walking, after walking SpO2 was 84% on room air. Patient expressed feeling tired and disappointed that he was so fatigued at the end of the walk. Patient placed back on 2L NC. Will continue to monitor the need for O2.

## 2020-07-22 NOTE — PROGRESS NOTES
Nutrition Education    · Verbally reviewed information with Patient and Family member  · Educated on Low Na diet and fluid restriction  · Written educational materials provided. · Contact name and number provided. · Refer to Patient Education activity for more details. Provided written and verbal education to pt and his wife. Pt consumed mostly high Na foods at home. He also uses the salt shaker on all of his foods per wife. Wife states he drinks several 16oz beverages a day. We focused mostly on low Na/low fat cooking methods, strongly encouraging pt to eat foods cooked from scratch using Mrs. Heller. Encouraged him to stop using salt shaker first and foremost (and discouraged salt alternatives as they are KCL). Discussed using a water bottle to contain his entire 1L of fluids for the day to keep track of how much he has consumed and how much he has left for the day. Encouraged pt to discuss changes to his diet with dialysis RD (can focus on 1-2 changes per week). Expected compliance is fair to poor, he will certainly benefit from outpatient counseling when he is ready and motivated to change.      Electronically signed by Yvon Baumann RD on 7/22/2020 at 3:18 PM    Contact Number: [phone]

## 2020-07-22 NOTE — PROGRESS NOTES
Bedside and Verbal shift change report given to Stewart Gomez (oncoming nurse) by Andres Dutton (offgoing nurse). Report included the following information SBAR.

## 2020-07-22 NOTE — PROGRESS NOTES
[address]  [facility]:[id number]   :1945                                                  Nephrology Progress Note  [address] [facility]  [facility] [employer]  [address], [place], [address]  Phone - [phone]               Fax - [phone]  Patient: Vadim Santana    YOB: 1945     Admit Date: 2020   Date- 2020     CC: Follow up for ESRD/fluid overload        Assessment/Plan:   67yo AAM, h/o ESRD( on HD at [facility] on TTS since 2018), DM, HTN and HF,  presents with SOB and found to have uncontrolled HTN( BP up to 199/105's), pulmonary edema and hypoxemia. Nephrology consulted for ESRD management, fluid overload. # ESRD  - Outpt Tx Time :3 hrs 45 min   - .0 Kg per HD records  - fluid overload likely due to poor compliance with diet/fluid intake, didn't miss HD sessions  - HD yesterday: 4hs, UF: 3.2L removed  - increased home dose of Bumex to 2mg BID  - monitor I/O's, daily weight  - fluid restriction 1L/day  - May need additional  UF tomorrow, will reassess in AM  - renal diet    # Anemia of CKD  - Hb: 10  - will resume EPO   - monitor CBC    # MBD   - PTH, Vit D and  phosph at goal    # HTN  - uncontrolled  - added Amlodipine to current regimen  - will monitor    Thank you for allowing us to participate in this patient's care. Will follow                 Subjective: Interval History:   Pt on Echo suite  No new complaints    ROS:  Not done today, pt out of the room    Objective:   Vitals:    20 0717 20 0959 20 1011 20 1047   BP: 150/90 140/73 140/73 144/76   Pulse: 63 74  75   Resp: 20   20   Temp: 98.1 °F (36.7 °C)   98 °F (36.7 °C)   TempSrc:       SpO2: 99%   100%   Weight:   108.9 kg (240 lb)    Height:   6' (1.829 m)       Physical exam:     Pt on Echo suite      Chart reviewed. Pertinent Notes reviewed. Data Review :  Recent Labs     07/22/20  0337 07/21/20  0533    139   K 3.5 4.6   CL 98 110*   CO2 32 21   BUN 30* 40*   CREA 4.17* 4.78*   * 194*   CA 8.5  8.7 9.1   MG  --  2.5*   PHOS 3.6  --      Recent Labs     07/22/20  0337 07/21/20  0533   WBC 8.3 9.6   HGB 10.1* 11.1*   HCT 33.0* 35.3*    286     No results for input(s): FE, TIBC, PSAT, FERR in the last 72 hours.    Medication list  reviewed  Current Facility-Administered Medications   Medication Dose Route Frequency    bumetanide (BUMEX) tablet 2 mg  2 mg Oral ACB&D    amLODIPine (NORVASC) tablet 5 mg  5 mg Oral DAILY    nitroglycerin (NITROSTAT) tablet 0.4 mg  0.4 mg SubLINGual PRN    acetaminophen (TYLENOL) tablet 650 mg  650 mg Oral Q6H PRN    sodium chloride (NS) flush 5-40 mL  5-40 mL IntraVENous Q8H    sodium chloride (NS) flush 5-40 mL  5-40 mL IntraVENous PRN    ondansetron (ZOFRAN) injection 4 mg  4 mg IntraVENous Q6H PRN    heparin (porcine) injection 5,000 Units  5,000 Units SubCUTAneous Q12H    insulin lispro (HUMALOG) injection   SubCUTAneous AC&HS    glucose chewable tablet 16 g  4 Tab Oral PRN    dextrose (D50W) injection syrg 12.5-25 g  12.5-25 g IntraVENous PRN    glucagon (GLUCAGEN) injection 1 mg  1 mg IntraMUSCular PRN    aspirin chewable tablet 81 mg  81 mg Oral DAILY    calcium-vitamin D (OS-ARELI) 500 mg-200 unit tablet  1 Tab Oral QPM    gabapentin (NEURONTIN) capsule 300 mg  300 mg Oral DAILY    hydrALAZINE (APRESOLINE) tablet 100 mg  100 mg Oral TID    sertraline (ZOLOFT) tablet 50 mg  50 mg Oral DAILY    tamsulosin (FLOMAX) capsule 0.8 mg  0.8 mg Oral DAILY    insulin glargine (LANTUS) injection 22 Units  22 Units SubCUTAneous QHS    And    insulin lispro (HUMALOG) injection 4 Units  4 Units SubCUTAneous TIDAC    carvediloL (COREG) tablet 25 mg  25 mg Oral BID WITH [address] MD                   [facility]                   [email or web address]

## 2020-07-22 NOTE — PROGRESS NOTES
Bedside shift change report given to Karina Gannon (oncoming nurse) by [place] (offgoing nurse). Report included the following information SBAR, Kardex, Intake/Output, MAR, Recent Results and Cardiac Rhythm NSR. Bedside shift change report given to Jerzy (oncoming nurse) by Karina Gannon (offgoing nurse). Report included the following information SBAR, Kardex, Intake/Output, MAR, Recent Results and Cardiac Rhythm NSR.

## 2020-07-22 NOTE — PROGRESS NOTES
Hospitalist Progress Note    NAME: Julian Rahman   :  1945   MRN:  [id number]       Assessment / Plan:  Acute hypoxic respiratory failure to due  Acute on chronic systolic chf EF 96% in 1365  Elevated troponin likely demand ischemia   -Significant improvement in symptoms after hemodialysis with removal of 3 L  -Continue Bumex, Coreg  -Appreciate recommendations from cardiology. Recommends medical management  -He is now on 2 L nasal cannula   -Echo shows diastolic dysfunction with ejection fraction of 45%       Accelerated HTN  -Blood pressure improved with hemodialysis  -Continue Norvasc, Coreg and hydralazine       ESRD on HD TTS  --Renal following for hemodialysis needs     Noncompliance with diet, recent increased intake  [facility] --Counseled regarding importance of dietary compliance     IDDM   DM neuropathy  --Blood sugars borderline low around 120  -Decrease Lantus to 15 units and hold lispro for now. Continue insulin sliding scale     FRIDA, not on cpap  --Outpatient follow-up     Obesity  Body mass index is 33.91 kg/m².     Code: full  DVT prophylaxis: heparin  Surrogate decision maker:  wife               Body mass index is 32.55 kg/m². Subjective:     Chief Complaint / Reason for Physician Visit  Reports improvement of sob with HD. Mild cough. Review of Systems:  Symptom Y/N Comments  Symptom Y/N Comments   Fever/Chills    Chest Pain     Poor Appetite    Edema     Cough    Abdominal Pain     Sputum    Joint Pain     SOB/BRITT    Pruritis/Rash     Nausea/vomit    Tolerating PT/OT     Diarrhea    Tolerating Diet     Constipation    Other       Could NOT obtain due to:      Objective:     VITALS:   Last 24hrs VS reviewed since prior progress note.  Most recent are:  Patient Vitals for the past 24 hrs:   Temp Pulse Resp BP SpO2   20 1047 98 °F (36.7 °C) 75 20 144/76 100 %   20 1011    140/73    20 0959  74  140/73    07/22/20 0717 98.1 °F (36.7 °C) 63 20 150/90 99 %   07/22/20 0324 98.3 °F (36.8 °C) 73 21 147/84 100 %   07/21/20 2332 98.4 °F (36.9 °C) 64 20 135/70 96 %   07/21/20 1930  74  154/69    07/21/20 1914 98.4 °F (36.9 °C) 78 28 154/69 99 %   07/21/20 1600 98.3 °F (36.8 °C) 68 24 166/78 99 %   07/21/20 1520 98.5 °F (36.9 °C) 75 17 155/74 90 %       Intake/Output Summary (Last 24 hours) at 7/22/2020 1516  Last data filed at 7/22/2020 0846  Gross per 24 hour   Intake 720 ml   Output 676 ml   Net 44 ml        PHYSICAL EXAM:  General: ooperative, no acute distress    EENT:  EOMI. Anicteric sclerae. MMM  Resp:  Briana +, crackles +, no wheeze   CV:  Regular  rhythm,  No edema  GI:  Soft, Non distended, Non tender.  +Bowel sounds  Neurologic:  Alert and oriented X 3, normal speech,   Psych:   Not anxious nor agitated  Skin:  No rashes.   No jaundice    Reviewed most current lab test results and cultures  YES  Reviewed most current radiology test results   YES  Review and summation of old records today    NO  Reviewed patient's current orders and MAR    YES  PMH/SH reviewed - no change compared to H&P          Current Facility-Administered Medications:     bumetanide (BUMEX) tablet 2 mg, 2 mg, Oral, ACB&D, Luis Antonio Maza MD, Stopped at 07/22/20 1000    amLODIPine (NORVASC) tablet 5 mg, 5 mg, Oral, DAILY, Miroslava Goode MD, 5 mg at 07/22/20 0959    nitroglycerin (NITROSTAT) tablet 0.4 mg, 0.4 mg, SubLINGual, PRN, Eli Washburn MD, 0.4 mg at 07/21/20 0533    acetaminophen (TYLENOL) tablet 650 mg, 650 mg, Oral, Q6H PRN, Moose Fleming MD, 650 mg at 07/21/20 1920    sodium chloride (NS) flush 5-40 mL, 5-40 mL, IntraVENous, Q8H, Neymar Xiong MD, 10 mL at 07/22/20 1316    sodium chloride (NS) flush 5-40 mL, 5-40 mL, IntraVENous, PRN, Neymar Xiong MD    ondansetron [facility]) injection 4 mg, 4 mg, IntraVENous, Q6H PRN, Neymar Xiong MD    heparin (porcine) injection 5,000 Units, 5,000 Units, SubCUTAneous, Q12H, Kieth Gaucher, MD, 5,000 Units at 07/22/20 0325    insulin lispro (HUMALOG) injection, , SubCUTAneous, AC&HS, Kieth Gaucher, MD, Stopped at 07/21/20 1630    glucose chewable tablet 16 g, 4 Tab, Oral, PRN, Kieth Gaucher, MD    dextrose (D50W) injection syrg 12.5-25 g, 12.5-25 g, IntraVENous, PRN, Kieth Gaucher, MD    glucagon [facility]) injection 1 mg, 1 mg, IntraMUSCular, PRN, Kieth Gaucher, MD    aspirin chewable tablet 81 mg, 81 mg, Oral, DAILY, Kieth Gaucher, MD, 81 mg at 07/22/20 0850    calcium-vitamin D (OS-ARELI) 500 mg-200 unit tablet, 1 Tab, Oral, QPM, Kieth Gaucher, MD, 1 Tab at 07/21/20 1707    gabapentin (NEURONTIN) capsule 300 mg, 300 mg, Oral, DAILY, Kieth Gaucher, MD, 300 mg at 07/22/20 0850    hydrALAZINE (APRESOLINE) tablet 100 mg, 100 mg, Oral, TID, Kieth Gaucher, MD, 100 mg at 07/22/20 0850    sertraline (ZOLOFT) tablet 50 mg, 50 mg, Oral, DAILY, Kieth Gaucher, MD, 50 mg at 07/22/20 0850    tamsulosin (FLOMAX) capsule 0.8 mg, 0.8 mg, Oral, DAILY, Kieth Gaucher, MD, 0.8 mg at 07/22/20 0850    insulin glargine (LANTUS) injection 22 Units, 22 Units, SubCUTAneous, QHS, 22 Units at 07/21/20 2226 **AND** insulin lispro (HUMALOG) injection 4 Units, 4 Units, SubCUTAneous, TIDAC, Kieth Gaucher, MD, Stopped at 07/22/20 1130    carvediloL (COREG) tablet 25 mg, 25 mg, Oral, BID WITH MEALS, Kieth Gaucher, MD, 25 mg at 07/22/20 0969  ________________________________________________________________________  Care Plan discussed with:    Comments   Patient y    Family      RN y    Care Manager     Consultant                        Multidiciplinary team rounds were held today with , nursing, pharmacist and clinical coordinator. Patient's plan of care was discussed; medications were reviewed and discharge planning was addressed.      ________________________________________________________________________  Total NON critical care TIME:  35    Minutes    Total CRITICAL CARE TIME Spent:   Minutes non procedure based      Comments   >50% of visit spent in counseling and coordination of care     ________________________________________________________________________  Svetlana Quiñones MD     Procedures: see electronic medical records for all procedures/Xrays and details which were not copied into this note but were reviewed prior to creation of Plan. LABS:  I reviewed today's most current labs and imaging studies.   Pertinent labs include:  Recent Labs     07/22/20 0337 07/21/20  0533   WBC 8.3 9.6   HGB 10.1* 11.1*   HCT 33.0* 35.3*    286     Recent Labs     07/22/20  0337 07/21/20  0533    139   K 3.5 4.6   CL 98 110*   CO2 32 21   * 194*   BUN 30* 40*   CREA 4.17* 4.78*   CA 8.5  8.7 9.1   MG  --  2.5*   PHOS 3.6  --    ALB 3.2* 3.6   TBILI  --  0.7   ALT  --  27       Signed: Svetlana Quiñones MD

## 2020-07-23 VITALS
RESPIRATION RATE: 16 BRPM | HEIGHT: 72 IN | TEMPERATURE: 97.6 F | DIASTOLIC BLOOD PRESSURE: 62 MMHG | WEIGHT: 240.9 LBS | BODY MASS INDEX: 32.63 KG/M2 | OXYGEN SATURATION: 96 % | HEART RATE: 70 BPM | SYSTOLIC BLOOD PRESSURE: 113 MMHG

## 2020-07-23 LAB
ALBUMIN SERPL-MCNC: 3.2 G/DL (ref 3.5–5)
ANION GAP SERPL CALC-SCNC: 6 MMOL/L (ref 5–15)
BUN SERPL-MCNC: 44 MG/DL (ref 6–20)
BUN/CREAT SERPL: 9 (ref 12–20)
CALCIUM SERPL-MCNC: 8.8 MG/DL (ref 8.5–10.1)
CHLORIDE SERPL-SCNC: 102 MMOL/L (ref 97–108)
CO2 SERPL-SCNC: 31 MMOL/L (ref 21–32)
CREAT SERPL-MCNC: 5.13 MG/DL (ref 0.7–1.3)
GLUCOSE BLD STRIP.AUTO-MCNC: 100 MG/DL (ref 65–100)
GLUCOSE BLD STRIP.AUTO-MCNC: 145 MG/DL (ref 65–100)
GLUCOSE BLD STRIP.AUTO-MCNC: 177 MG/DL (ref 65–100)
GLUCOSE SERPL-MCNC: 113 MG/DL (ref 65–100)
PHOSPHATE SERPL-MCNC: 4 MG/DL (ref 2.6–4.7)
POTASSIUM SERPL-SCNC: 3.8 MMOL/L (ref 3.5–5.1)
SERVICE CMNT-IMP: ABNORMAL
SERVICE CMNT-IMP: ABNORMAL
SERVICE CMNT-IMP: NORMAL
SODIUM SERPL-SCNC: 139 MMOL/L (ref 136–145)

## 2020-07-23 PROCEDURE — 82962 GLUCOSE BLOOD TEST: CPT

## 2020-07-23 PROCEDURE — 74011250637 HC RX REV CODE- 250/637: Performed by: HOSPITALIST

## 2020-07-23 PROCEDURE — 74011250636 HC RX REV CODE- 250/636: Performed by: HOSPITALIST

## 2020-07-23 PROCEDURE — 77010033678 HC OXYGEN DAILY

## 2020-07-23 PROCEDURE — 90935 HEMODIALYSIS ONE EVALUATION: CPT

## 2020-07-23 PROCEDURE — 74011250637 HC RX REV CODE- 250/637: Performed by: INTERNAL MEDICINE

## 2020-07-23 PROCEDURE — 94760 N-INVAS EAR/PLS OXIMETRY 1: CPT

## 2020-07-23 PROCEDURE — 74011636637 HC RX REV CODE- 636/637: Performed by: HOSPITALIST

## 2020-07-23 PROCEDURE — 80069 RENAL FUNCTION PANEL: CPT

## 2020-07-23 RX ORDER — BUMETANIDE 2 MG/1
2 TABLET ORAL
Qty: 60 TAB | Refills: 0 | Status: SHIPPED | OUTPATIENT
Start: 2020-07-24 | End: 2020-08-23

## 2020-07-23 RX ADMIN — TAMSULOSIN HYDROCHLORIDE 0.8 MG: 0.4 CAPSULE ORAL at 07:38

## 2020-07-23 RX ADMIN — INSULIN LISPRO 2 UNITS: 100 INJECTION, SOLUTION INTRAVENOUS; SUBCUTANEOUS at 16:30

## 2020-07-23 RX ADMIN — INSULIN LISPRO 2 UNITS: 100 INJECTION, SOLUTION INTRAVENOUS; SUBCUTANEOUS at 07:38

## 2020-07-23 RX ADMIN — Medication 1 TABLET: at 16:25

## 2020-07-23 RX ADMIN — BUMETANIDE 2 MG: 1 TABLET ORAL at 15:54

## 2020-07-23 RX ADMIN — HYDRALAZINE HYDROCHLORIDE 100 MG: 50 TABLET, FILM COATED ORAL at 15:54

## 2020-07-23 RX ADMIN — Medication 10 ML: at 15:59

## 2020-07-23 RX ADMIN — CARVEDILOL 25 MG: 12.5 TABLET, FILM COATED ORAL at 16:06

## 2020-07-23 RX ADMIN — HEPARIN SODIUM 5000 UNITS: 5000 INJECTION INTRAVENOUS; SUBCUTANEOUS at 04:31

## 2020-07-23 RX ADMIN — ASPIRIN 81 MG CHEWABLE TABLET 81 MG: 81 TABLET CHEWABLE at 16:06

## 2020-07-23 RX ADMIN — SERTRALINE HYDROCHLORIDE 50 MG: 50 TABLET ORAL at 07:39

## 2020-07-23 RX ADMIN — GABAPENTIN 300 MG: 300 CAPSULE ORAL at 07:40

## 2020-07-23 RX ADMIN — HEPARIN SODIUM 5000 UNITS: 5000 INJECTION INTRAVENOUS; SUBCUTANEOUS at 15:54

## 2020-07-23 NOTE — PROGRESS NOTES
Comprehensive Nutrition Assessment    Type and Reason for Visit: Initial, Positive nutrition screen    Nutrition Recommendations/Plan:  Continue current diet    Nutrition Assessment:     Patient medically noted for respiratory failure, CHF, ESRD on HD, accelerated HTN, and DM. Currently receiving a renal diet. Patient finishing lunch at time of visit; he had eaten >70% of his tray. He reports a good appetite and food is okay. Followed up on education provided by SAVITA yesterday; patient states he has no questions at this time. He states his wife and him will go over the literature provided. Encouraged patient to ask questions as needed. Estimated Daily Nutrient Needs:  Energy (kcal):  1941 kcal (BMR (1878) x 1. 3AF -500kcal)  Protein (g):  97-105g (1.2-1.3 g/kg IBW)       Fluid (ml/day):  1000 mL    Nutrition Related Findings:       -[phone], K+ 3.8, Phos 4.0  Medications: Norvasc, Bumex, Os-kim, Hydralazine, Lantus, Humalog, Zoloft  BM 7/21    Wounds:    None       Current Nutrition Therapies:   DIET RENAL Regular; FR 1000ML    Anthropometric Measures:  · Height:  6' (182.9 cm)  · Current Body Wt:  109.3 kg (240 lb 15.4 oz)    · Ideal Body Wt:  178:  135.4 %   · BMI Categories:  Obese class 1 (BMI 30.0-34. 9)       Nutrition Diagnosis:   No nutritional diagnosis at this time     Nutrition Interventions:   Food and/or Nutrient Delivery: Continue current diet  Nutrition Education and Counseling: No recommendations at this time  Coordination of Nutrition Care: No recommendation at this time    Goals:  PO intake >75% of meals next 5-7 days       Nutrition Monitoring and Evaluation:   Behavioral-Environmental Outcomes: Knowledge or skill, Readiness for change  Food/Nutrient Intake Outcomes: Food and nutrient intake  Physical Signs/Symptoms Outcomes: Biochemical data, Weight    Discharge Planning:    Continue current diet     Electronically signed by Shelton Russo RD on 7/23/2020 at 2:01 PM    Contact: Ext [phone], Pager [phone]

## 2020-07-23 NOTE — PROGRESS NOTES
COTY:   1) Home with family vs home w/HH  2) FU appts  3) 2nd IM letter  4) Care Card application  5) Wife to transport at d/c    Reason for Admission:   Acute respiratory failure with hypoxia, acute on chronic systolic CHF, elevated troponin, accelerated HTN, ESRD                   RUR Score:    17 LOW                 Plan for utilizing home health:     Awaiting PT recs     PCP: First and Last name:  Junaid Jones   Name of Practice: [facility]   Are you a current patient: Yes/No: Yes   Approximate date of last visit: June   Can you participate in a virtual visit with your PCP: Yes                    Current Advanced Directive/Advance Care Plan: None on file. Pt may want to complete one during stay                         Transition of Care Plan:                      Pt is a 69year old  male admitted with acute respiratory failure with hypoxia, acute on chronic systolic CHF, elevated troponin, accelerated HTN, and ESRD. Pt is off davis for dialysis so CM spoke with pt's wife Lexie Pedersen. Mrs Nikhil Lamb verified demographics, emergency contacts, and PCP. Pt lives with his wife and son in a single story house with 2 steps to get in. Pt is independent with ADLs and sometimes uses a cane. Pt still drives and his wife will transport home at discharge. Pt has not had HH or SNF in the past. Pt uses the [facility] in [place] and sometimes has problems paying for his medications. Pt has good support with his wife and son. Family has no concerns for discharge at this time. Case management consult noted. Pt will likely discharge home with f/u appts [facility]. Will ask floor CM to give pt Care Card application when pt returns from dialysis. CM will continue to follow pt and assist with any needs he may have. Care Management Interventions  PCP Verified by CM: Yes  Mode of Transport at Discharge:  Other (see comment)(wife)  Discharge Durable Medical Equipment: No  Physical Therapy Consult: Yes  Occupational Therapy Consult: No  Speech Therapy Consult: No  Current Support Network: Lives with Spouse, Own Home  Confirm Follow Up Transport: Self  The Patient and/or Patient Representative was Provided with a Choice of Provider and Agrees with the Discharge Plan?: No  Freedom of Choice List was Provided with Basic Dialogue that Supports the Patient's Individualized Plan of Care/Goals, Treatment Preferences and Shares the Quality Data Associated with the Providers?: No   Resource Information Provided?: No    Sohan Dick RN ,[facility]  [phone]

## 2020-07-23 NOTE — PROGRESS NOTES
Progress Note      7/23/2020 2:01 PM  NAME: Gin Glover   MRN:  [id number]   Admit Diagnosis: Acute respiratory failure with hypoxia ([facility] [J96.01]; Acute on chronic systolic CHF (congestive heart failure) ([facility] [I50.23]; Elevated troponin [R79.89]; Accelerated hypertension [I10];ESRD (end stage renal disease) on dialysis ([facility] [N18.6, Z99.2]     Assessment:       1. Minimal troponin elevation, in the setting of elevated BP and volume overload , ESRD ,  Chronically elevated troponin . This is not an MI.      1. Pt has chronically elevated Troponin dating back to at least 2016 in  . 2. stress test in 2018 with anterolateral ischemia   3.  chronic systolic heart failure   LV EF reported 40 - 45% on Echo today . 3. ESRD on HD   4. Accelerated hypertension   5. DM  6. HLP  7. FRIDA  8. Obesity     Symptoms resolved after urgent dialysis yesterday with removal of > 3 liters fluid volume . The primary issue here seems to be volume overload, dietary indiscretion, etc.   No indication of acute cardiac event. 7/23  VSS ,   Weight down to 102 Kg today , from 113 Kg on admission. O2 sat is good , nasal O2.   K+ 5.1 today , after dialysis yesterday . Cardiac status stable . No new c/o or issues. Plan:     Continue medical management . No plans for Cath or other cardiac procedure. [x]        High complexity decision making was performed    Subjective:     Gin Glover denies chest pain, dyspnea. Discussed with RN events overnight.      Patient Active Problem List   Diagnosis Code    Acute pancreatitis K85.90    LFT'S ABNORMAL     Acute renal failure (ARF) (HCC) N17.9    CKD (chronic kidney disease) stage 3, GFR 30-59 ml/min ([facility]) N18.3    HTN (hypertension) I10    CAD (coronary artery disease) I25.10    Abdominal pain, acute, epigastric R10.13    Nausea & vomiting R11.2    UTI (lower urinary tract infection) N39.0    Renal failure (ARF), acute on chronic (HCC) N17.9, N18.9    Diarrhea R19.7    SIRS (systemic inflammatory response syndrome) ([facility]) R65.10    Nephrotic syndrome N04.9    Renal anasarca N04.9    Acute on chronic renal failure ([facility]) N17.9, N18.9    Hypotension I95.9    Chest pain R07.9    Type II diabetes mellitus with nephropathy ([facility]) E11.21    Hyperlipidemia E78.5    Pneumonia J18.9    Accelerated hypertension I10    Elevated troponin R79.89    ESRD (end stage renal disease) on dialysis ([facility]) N18.6, Z99.2    Acute respiratory failure with hypoxia ([facility]) J96.01    Acute on chronic systolic CHF (congestive heart failure) ([facility]) I50.23       Review of Systems:    Symptom Y/N Comments  Symptom Y/N Comments   Fever/Chills N   Chest Pain N    Poor Appetite N   Edema N    Cough N   Abdominal Pain N    Sputum N   Joint Pain N    SOB/BRITT N   Pruritis/Rash N    Nausea/vomit N   Tolerating PT/OT Y    Diarrhea N   Tolerating Diet Y    Constipation N   Other       Could NOT obtain due to:      Objective:      Physical Exam:    Last 24hrs VS reviewed since prior progress note. Most recent are:    Visit Vitals  BP (P) 114/69   Pulse (P) 62   Temp 98 °F (36.7 °C) (Oral)   Resp (P) 17   Ht 6' (1.829 m)   Wt 109.3 kg (240 lb 14.4 oz)   SpO2 100%   BMI 32.67 kg/m²     No intake or output data in the 24 hours ending 07/23/20 0939     General Appearance: Well developed, well nourished, alert & oriented x 3,    no acute distress. Ears/Nose/Mouth/Throat: Hearing grossly normal.  Neck: Supple. Chest: Lungs clear to auscultation bilaterally. Cardiovascular: Regular rate and rhythm, S1S2 normal, no murmur. Abdomen: Soft, non-tender, bowel sounds are active. Extremities: No edema bilaterally. Skin: Warm and dry.     PMH/SH reviewed - no change compared to H&P    Data Review    Telemetry: normal sinus rhythm     Lab Data Personally Reviewed:    Recent Labs     07/22/20  0337 07/21/20  0533   WBC 8.3 9.6   HGB 10.1* 11.1*   HCT 33.0* 35.3*    286 LABRCNT(INR:3,PTP:3,APTT:3,)  Recent Labs     07/23/20  0339 07/22/20  0337 07/21/20  0533    137 139   K 3.8 3.5 4.6    98 110*   CO2 31 32 21   BUN 44* 30* 40*   CREA 5.13* 4.17* 4.78*   * 117* 194*   CA 8.8 8.5  8.7 9.1   MG  --   --  2.5*   LABRCNT(CPK:3,CpKMB:3,ckndx:3,troiq:3)  Lab Results   Component Value Date/Time    Cholesterol, total 157 02/27/2018 11:38 AM    HDL Cholesterol 27 (L) 02/27/2018 11:38 AM    LDL, calculated 61 02/27/2018 11:38 AM    Triglyceride 346 (H) 02/27/2018 11:38 AM    CHOL/HDL Ratio 3.9 01/19/2011 07:00 AM   LABRCNT(sgot:3,gpt:3,ap:3,tbiL:3,TP:3,ALB:3,GLOB:3,ggt:3,aml:3,amyp:3,lpse:3,hlpse:3)No results for input(s): PH, PCO2, PO2 in the last 72 hours. Lab Results   Component Value Date/Time    Cholesterol, total 157 02/27/2018 11:38 AM    HDL Cholesterol 27 (L) 02/27/2018 11:38 AM    LDL, calculated 61 02/27/2018 11:38 AM    Triglyceride 346 (H) 02/27/2018 11:38 AM    CHOL/HDL Ratio 3.9 01/19/2011 07:00 AM   MEDTABLEYury Pantoja MD  No results for input(s): PH, PCO2, PO2 in the last 72 hours.     Medications Personally Reviewed:    Current Facility-Administered Medications   Medication Dose Route Frequency    bumetanide (BUMEX) tablet 2 mg  2 mg Oral ACB&D    amLODIPine (NORVASC) tablet 5 mg  5 mg Oral DAILY    insulin glargine (LANTUS) injection 15 Units  15 Units SubCUTAneous QHS    nitroglycerin (NITROSTAT) tablet 0.4 mg  0.4 mg SubLINGual PRN    acetaminophen (TYLENOL) tablet 650 mg  650 mg Oral Q6H PRN    sodium chloride (NS) flush 5-40 mL  5-40 mL IntraVENous Q8H    sodium chloride (NS) flush 5-40 mL  5-40 mL IntraVENous PRN    ondansetron (ZOFRAN) injection 4 mg  4 mg IntraVENous Q6H PRN    heparin (porcine) injection 5,000 Units  5,000 Units SubCUTAneous Q12H    insulin lispro (HUMALOG) injection   SubCUTAneous AC&HS    glucose chewable tablet 16 g  4 Tab Oral PRN    dextrose (D50W) injection syrg 12.5-25 g  12.5-25 g IntraVENous PRN  glucagon (GLUCAGEN) injection 1 mg  1 mg IntraMUSCular PRN    aspirin chewable tablet 81 mg  81 mg Oral DAILY    calcium-vitamin D (OS-ARELI) 500 mg-200 unit tablet  1 Tab Oral QPM    gabapentin (NEURONTIN) capsule 300 mg  300 mg Oral DAILY    hydrALAZINE (APRESOLINE) tablet 100 mg  100 mg Oral TID    sertraline (ZOLOFT) tablet 50 mg  50 mg Oral DAILY    tamsulosin (FLOMAX) capsule 0.8 mg  0.8 mg Oral DAILY    carvediloL (COREG) tablet 25 mg  25 mg Oral BID WITH MEALS         Nkechi Rogers MD

## 2020-07-23 NOTE — PROGRESS NOTES
Problem: Falls - Risk of  Goal: *Absence of Falls  Description: Document Jun Vinson Fall Risk and appropriate interventions in the flowsheet.   7/23/2020 1706 by Brigitte Ferreira  Outcome: Resolved/Met  7/23/2020 0841 by Brigittekimberley Ferreira  Outcome: Progressing Towards Goal  Note: Fall Risk Interventions:  Mobility Interventions: Patient to call before getting OOB         Medication Interventions: Evaluate medications/consider consulting pharmacy, Teach patient to arise slowly    Elimination Interventions: Bed/chair exit alarm, Call light in reach, Patient to call for help with toileting needs, Toileting schedule/hourly rounds              Problem: Patient Education: Go to Patient Education Activity  Goal: Patient/Family Education  7/23/2020 1706 by Brigitte Hanna  Outcome: Resolved/Met  7/23/2020 0841 by Brigitte Glonicky  Outcome: Progressing Towards Goal

## 2020-07-23 NOTE — PROGRESS NOTES
COTY:   1) Home with family home O2 via [address]  2) FU appts  3) Care Card application  5) Wife to transport at d/c    3:54 PM: Referral faxed to [address] and they are willing to accept. Oxygen will be delivered to the hospital this evening for discharge. Care Card application given to pt. Pt will need to make follow up appointments after discharge. No further needs identified at this time. Pt is clear to discharge from a CM standpoint. RN notified. Care Management Interventions  PCP Verified by CM: Yes  Mode of Transport at Discharge: Other (see comment)(wife)  Transition of Care Consult (CM Consult): Other(home with home O2)  Discharge Durable Medical Equipment: Yes(home O2)  Physical Therapy Consult: Yes  Occupational Therapy Consult: No  Speech Therapy Consult: No  Current Support Network: Lives with Spouse, Own Home  Confirm Follow Up Transport: Self  The Patient and/or Patient Representative was Provided with a Choice of Provider and Agrees with the Discharge Plan?: No  Freedom of Choice List was Provided with Basic Dialogue that Supports the Patient's Individualized Plan of Care/Goals, Treatment Preferences and Shares the Quality Data Associated with the Providers?: No   Resource Information Provided?: No  Discharge Location  Discharge Placement: Home with outpatient services       3:29 PM: Pt will need home oxygen prior to discharge.  Pt will need an order stating he needs home   O2 and needs portable gas and concentrator and the liters per minute that he needs to be on in order to send referrral.     Queenie Kincaid RN ,[facility],[address]   [phone]

## 2020-07-23 NOTE — PROGRESS NOTES
TRANSFER - IN REPORT:    Verbal report received from Mahesh Michaels RN on Mic Sinclair  being received from [facility] for ordered procedure      Report consisted of patients Situation, Background, Assessment and   Recommendations(SBAR). Information from the following report(s) SBAR, Kardex and Cardiac Rhythm NSR, PVCs was reviewed with the receiving nurse. Opportunity for questions and clarification was provided. Assessment completed upon patients arrival to unit and care assumed.

## 2020-07-23 NOTE — DISCHARGE INSTRUCTIONS
Patient Discharge Instructions    Carlyle John / [id number] : 1945    Admitted 2020 Discharged: 2020         DISCHARGE DIAGNOSIS:   Acute hypoxic respiratory failure to due  Acute on chronic systolic chf EF 01% in   Elevated troponin likely demand ischemia   Accelerated HTN improved  ESRD on HD TTS  Noncompliance with diet, recent increased intake  IDDM   FRIDA, not on cpap  Obesity        Take Home Medications     {Medication reconciliation information is now added to the patient's AVS automatically when it is printed. There is no need to use this SmartLink in discharge instructions. Highlight this text and delete it to clear this message}      General drug facts     If you have a very bad allergy, wear an allergy ID at all times. It is important that you take the medication exactly as they are prescribed. Keep your medication in the bottles provided by the pharmacist.  Keep a list of all your drugs (prescription, natural products, vitamins, OTC) with you. Give this list to your doctor. Do not take other medications without consulting your doctor. Do not share your drugs with others and do not take anyone else's drugs. Keep all drugs out of the reach of children and pets. Most drugs may be thrown away in household trash after mixing with coffee grounds or palma litter and sealing in a plastic bag. Keep a list Call your doctor for help with any side effects. If in the [place] you may also call the FDA at 6-493-SOX-1647    Talk with the doctor before starting any new drug, including OTC, natural products, or vitamins. What to do at Home    1. Recommended diet: diabetic     2. Recommended activity: Activity as tolerated    3. If you experience any of the following symptoms then please call your primary care physician or return to the emergency room if you cannot get hold of your doctor:    4. Wound Care: none     5. Lab work: cbc and bmp in 1 week     6.  Continue to wear oxygen at 2l at all times     7. Bring these papers with you to your follow up appointments. The papers will help your doctors be sure to continue the care plan from the hospital.      Follow-up with:   PCP: Gucci Ward MD  Follow-up Information     Follow up With Specialties Details Why Contact Info    Gucci Ward MD Family Medicine       Gucci Ward MD Family Medicine Schedule an appointment as soon as possible for a visit in 1 week      Sole Phillips MD Cardiology Schedule an appointment as soon as possible for a visit in 1 week  [phone] [facility]  [address] [facility]      Natty Zimmerman MD Nephrology Schedule an appointment as soon as possible for a visit in 1 week  [facility]. [address]  [address] [facility] [address]. [phone]             Please call for your own appointment        Information obtained by :  I understand that if any problems occur once I am at home I am to contact my physician. I understand and acknowledge receipt of the instructions indicated above.                                                                                                                                            Physician's or R.N.'s Signature                                                                  Date/Time                                                                                                                                              Patient or Representative Signature                                                          Date/Time

## 2020-07-23 NOTE — PROGRESS NOTES
PT note:     Orders received and acknowledged. Chart reviewed and spoke with nursing. Patient is currently ambulating in hallway with nursing staff for O2 challenge. Noted safe and steady gait and patient with no concerns regarding mobility. Will complete order.      Kary Haley, PT, DPT

## 2020-07-23 NOTE — PROGRESS NOTES
Problem: Falls - Risk of  Goal: *Absence of Falls  Description: Document Vikas Juan Luis Fall Risk and appropriate interventions in the flowsheet.   Outcome: Progressing Towards Goal  Note: Fall Risk Interventions:  Mobility Interventions: Patient to call before getting OOB         Medication Interventions: Patient to call before getting OOB    Elimination Interventions: Bed/chair exit alarm, Call light in reach, Patient to call for help with toileting needs, Toileting schedule/hourly rounds

## 2020-07-23 NOTE — PROGRESS NOTES
Patient taken for 5 minute walk for the O2 Challenge. Patient only able to complete 3 minutes of the 5 minute walk. Patient oxygen saturation 97% on room air while sitting. Patient oxygen saturation 99% on 2 liters nasal cannula while sitting    Patient oxygen saturation 93% on room air while standing  Patient oxygen saturation 96% on 2 liters nasal cannula while standing    Patient oxygen saturation 85% on room air while ambulating  Patient oxygen saturation 95% on 2 liters nasal cannula while ambulating.

## 2020-07-23 NOTE — PROGRESS NOTES
Hospitalist Progress Note    NAME: Lillian Mora   :  1945   MRN:  [id number]       Assessment / Plan:  Acute hypoxic respiratory failure to due  Acute on chronic systolic chf EF 38% in 4677  Elevated troponin likely demand ischemia   -Significant improvement in symptoms after hemodialysis with removal of 3 L  -Continue Bumex, Coreg  -Appreciate recommendations from cardiology. Recommends medical management  -He is now on 2 L nasal cannula   -Echo shows diastolic dysfunction with ejection fraction of 45%  -consult case mgmt for home o2  -Needs homeO2 and portable gas and concentrator at  2 liters per minute at all times           Accelerated HTN improved  -Blood pressure improved with hemodialysis  -Continue Norvasc, Coreg and hydralazine       ESRD on HD TTS  --Renal following for hemodialysis needs     Noncompliance with diet, recent increased intake  --ate miner, sausages and greasy foods at barbecue over weekend. --Counseled regarding importance of dietary compliance     IDDM   DM neuropathy  --Blood sugars borderline low around 120  -Cont Lantus 15 units and SSI. Continue insulin sliding scale     FRIDA, not on cpap  --Outpatient follow-up     Obesity  Body mass index is 33.91 kg/m².     Code: full  DVT prophylaxis: heparin  Surrogate decision maker:  wife               Body mass index is 32.67 kg/m². Subjective:     Chief Complaint / Reason for Physician Visit  Sob is better but desaturated on room air on ambulation      Review of Systems:  Symptom Y/N Comments  Symptom Y/N Comments   Fever/Chills    Chest Pain     Poor Appetite    Edema     Cough    Abdominal Pain     Sputum    Joint Pain     SOB/BRITT    Pruritis/Rash     Nausea/vomit    Tolerating PT/OT     Diarrhea    Tolerating Diet     Constipation    Other       Could NOT obtain due to:      Objective:     VITALS:   Last 24hrs VS reviewed since prior progress note.  Most recent are:  Patient Vitals for the past 24 hrs:   Temp Pulse Resp BP SpO2   07/23/20 1519 97.6 °F (36.4 °C) 70 16 113/62 96 %   07/23/20 1324 97.8 °F (36.6 °C) 62 16 133/80 97 %   07/23/20 1230 98 °F (36.7 °C) 62 17 141/82    07/23/20 1215  64 18 124/78    07/23/20 1200  65 18 128/80    07/23/20 1145  (!) 58 18 130/79    07/23/20 1130  62 18 138/77    07/23/20 1115  61 18 134/80    07/23/20 1100  60 18 127/76    07/23/20 1045  60 17 125/74    07/23/20 1030  64 18 117/74    07/23/20 1000  (!) 59 18 122/67    07/23/20 0945  (!) 55 17 105/63    07/23/20 0930  (!) 59 17 126/72    07/23/20 0915  62 17 114/69    07/23/20 0900  (!) 57 18 125/67    07/23/20 0845  (!) 57 17 133/63    07/23/20 0830  (!) 59 18 143/74    07/23/20 0815  63 18 146/79    07/23/20 0810  66 18 142/76    07/23/20 0732 98 °F (36.7 °C) 69 18 130/73    07/23/20 0631 97.6 °F (36.4 °C) 70 16 147/64 100 %   07/23/20 0413 97.7 °F (36.5 °C) (!) 57 18 114/66 97 %   07/23/20 0400  75      07/23/20 0000  65      07/22/20 2357 97.7 °F (36.5 °C) 62 16 121/50 96 %   07/22/20 2000  66      07/22/20 1827 97.9 °F (36.6 °C) 66 20 142/81 97 %       Intake/Output Summary (Last 24 hours) at 7/23/2020 1536  Last data filed at 7/23/2020 1215  Gross per 24 hour   Intake    Output 3000 ml   Net -3000 ml        PHYSICAL EXAM:  General: ooperative, no acute distress    EENT:  EOMI. Anicteric sclerae. MMM  Resp:  Briana +, crackles +, no wheeze   CV:  Regular  rhythm,  No edema  GI:  Soft, Non distended, Non tender.  +Bowel sounds  Neurologic:  Alert and oriented X 3, normal speech,   Psych:   Not anxious nor agitated  Skin:  No rashes.   No jaundice    Reviewed most current lab test results and cultures  YES  Reviewed most current radiology test results   YES  Review and summation of old records today    NO  Reviewed patient's current orders and MAR    YES  PMH/SH reviewed - no change compared to H&P          Current Facility-Administered Medications:     bumetanide (BUMEX) tablet 2 mg, 2 mg, Oral, ACB&D, Arnie Stephens MD, Stopped at 07/23/20 0730    amLODIPine (NORVASC) tablet 5 mg, 5 mg, Oral, DAILY, Miroslava Goode MD, Stopped at 07/23/20 0900    insulin glargine (LANTUS) injection 15 Units, 15 Units, SubCUTAneous, QHS, Chrissy Baeza MD, 15 Units at 07/22/20 2137    nitroglycerin (NITROSTAT) tablet 0.4 mg, 0.4 mg, SubLINGual, PRN, Lucero Sorto MD, 0.4 mg at 07/21/20 0533    acetaminophen (TYLENOL) tablet 650 mg, 650 mg, Oral, Q6H PRN, Halima Tapia MD, 650 mg at 07/21/20 1920    sodium chloride (NS) flush 5-40 mL, 5-40 mL, IntraVENous, Q8H, Tyesha Yuan MD, 10 mL at 07/22/20 2137    sodium chloride (NS) flush 5-40 mL, 5-40 mL, IntraVENous, PRN, Tyesha Yuan MD    [facility]) injection 4 mg, 4 mg, IntraVENous, Q6H PRN, Tyesha Yuan MD    heparin (porcine) injection 5,000 Units, 5,000 Units, SubCUTAneous, Q12H, Tyesha Yuan MD, 5,000 Units at 07/23/20 0431    insulin lispro (HUMALOG) injection, , SubCUTAneous, AC&HS, Tyesha Yuan MD, Stopped at 07/23/20 1130    glucose chewable tablet 16 g, 4 Tab, Oral, PRN, Tyesha Yuan MD    dextrose (D50W) injection syrg 12.5-25 g, 12.5-25 g, IntraVENous, PRN, Tyesha Yuan MD    glucagon [facility]) injection 1 mg, 1 mg, IntraMUSCular, PRN, Tyesha Yuan MD    aspirin chewable tablet 81 mg, 81 mg, Oral, DAILY, Tyesha Yuan MD, 81 mg at 07/22/20 0850    calcium-vitamin D (OS-ARELI) 500 mg-200 unit tablet, 1 Tab, Oral, QPM, Tyesha Yuan MD, 1 Tab at 07/22/20 1719    gabapentin (NEURONTIN) capsule 300 mg, 300 mg, Oral, DAILY, Tyesha Yuan MD, 300 mg at 07/23/20 0740    hydrALAZINE (APRESOLINE) tablet 100 mg, 100 mg, Oral, TID, Tyesha Yuan MD, Stopped at 07/23/20 0900    sertraline (ZOLOFT) tablet 50 mg, 50 mg, Oral, DAILY, Tyesha Yuan MD, 50 mg at 07/23/20 0739    tamsulosin (FLOMAX) capsule 0.8 mg, 0.8 mg, Oral, DAILY, Tyesha Yuan MD, 0.8 mg at 07/23/20 0738    carvediloL (COREG) tablet 25 mg, 25 mg, Oral, BID WITH MEALS, Danielle Yost MD, Stopped at 07/23/20 0800  ________________________________________________________________________  Care Plan discussed with:    Comments   Patient y    Family      RN y    Care Manager     Consultant                        Multidiciplinary team rounds were held today with , nursing, pharmacist and clinical coordinator. Patient's plan of care was discussed; medications were reviewed and discharge planning was addressed. ________________________________________________________________________  Total NON critical care TIME:  35    Minutes    Total CRITICAL CARE TIME Spent:   Minutes non procedure based      Comments   >50% of visit spent in counseling and coordination of care     ________________________________________________________________________  Es Krishnamurthy MD     Procedures: see electronic medical records for all procedures/Xrays and details which were not copied into this note but were reviewed prior to creation of Plan. LABS:  I reviewed today's most current labs and imaging studies.   Pertinent labs include:  Recent Labs     07/22/20 0337 07/21/20 0533   WBC 8.3 9.6   HGB 10.1* 11.1*   HCT 33.0* 35.3*    286     Recent Labs     07/23/20 0339 07/22/20 0337 07/21/20  0533    137 139   K 3.8 3.5 4.6    98 110*   CO2 31 32 21   * 117* 194*   BUN 44* 30* 40*   CREA 5.13* 4.17* 4.78*   CA 8.8 8.5  8.7 9.1   MG  --   --  2.5*   PHOS 4.0 3.6  --    ALB 3.2* 3.2* 3.6   TBILI  --   --  0.7   ALT  --   --  27       Signed: Es Krishnamurthy MD

## 2020-07-23 NOTE — PROGRESS NOTES
Problem: Falls - Risk of  Goal: *Absence of Falls  Description: Document Wicho Wilkinson Fall Risk and appropriate interventions in the flowsheet.   Outcome: Progressing Towards Goal  Note: Fall Risk Interventions:  Mobility Interventions: Patient to call before getting OOB         Medication Interventions: Evaluate medications/consider consulting pharmacy, Teach patient to arise slowly    Elimination Interventions: Bed/chair exit alarm, Call light in reach, Patient to call for help with toileting needs, Toileting schedule/hourly rounds              Problem: Patient Education: Go to Patient Education Activity  Goal: Patient/Family Education  Outcome: Progressing Towards Goal

## 2020-07-23 NOTE — DISCHARGE SUMMARY
Hospitalist Discharge Summary     Patient ID:  Xi Plunkett  [id number]  89 y.o.  1945 7/21/2020    PCP on record: Freddy Morse MD    Admit date: 7/21/2020  Discharge date and time: 7/23/2020    DISCHARGE DIAGNOSIS:    Acute hypoxic respiratory failure to due  Acute on chronic systolic chf EF 33% in 8452  Elevated troponin likely demand ischemia   Accelerated HTN improved  ESRD on HD TTS  Noncompliance with diet, recent increased intake  IDDM   FRIDA, not on cpap  Obesity    CONSULTATIONS:  IP CONSULT TO CARDIOLOGY  IP CONSULT TO HOSPITALIST    Excerpted HPI from H&P of Rupert Cornejo MD:  Xi Plunkett is a 69 y.o. male with PMH CAD, DM, HTN, ESRD on HD TTS presents with SOB started 3 days ago, worse with exertion, progressive orthopnea. Denies chest pain, wheezing, cough. No abdominal pain, n/v.  He had dialysis on Saturday and that evening had barbecue and ate miner, sausages and greasy foods. SOB started that night.     In ER, he is hypoxic 85% RA, CXR pulmonary edema, /105. He did not take his bumex yesterday.       ______________________________________________________________________  DISCHARGE SUMMARY/HOSPITAL COURSE:  for full details see H&P, daily progress notes, labs, consult notes. Hospital course problem wise      Acute hypoxic respiratory failure to due  Acute on chronic systolic chf EF 20% in 5452  Elevated troponin likely demand ischemia     Patient was admitted to hospital and diagnosed to have acute on chronic systolic congestive heart failure. Cardiology and nephrology consultations were placed secondary to his dialysis needs. Patient underwent dialysis with removal of 3 L of fluid and experienced a significant improvement of his symptoms. His echo showed ejection fraction of 69% with diastolic dysfunction. He desaturated upon ambulation on room air for which home oxygen was arranged.   Cardiology cleared the patient for discharge but advised to increase the dose of Bumex to 2 mg twice daily. Patient is doing much better today his symptoms are significantly better and I personally spoke to his wife and explained plan of care and he is being released today. PT consultation was placed and they recommended that he can go home no needs. Accelerated HTN improved  -Blood pressure improved with hemodialysis  -Continue  Coreg and hydralazine        ESRD on HD TTS  --Renal following for hemodialysis needs     Noncompliance with diet, recent increased intake  --ate miner, sausages and greasy foods at barbecue over weekend. --Counseled regarding importance of dietary compliance     IDDM   DM neuropathy  He was advised to go back on his previous home regimen of insulin as below     FRIDA, not on cpap  --Outpatient follow-up     Obesity  Body mass index is 33.91 kg/m². Patient seen and examined today, vital signs are stable, lab work is at baseline and he was cleared by all consultant parties including cardiology and nephrology to be discharged for outpatient follow-up.    _______________________________________________________________________  Patient seen and examined by me on discharge day. Pertinent Findings:  Gen:    Not in distress  Chest: Clear lungs  CVS:   Regular rhythm. No edema  Abd:  Soft, not distended, not tender  Neuro:  Alert, awake  _______________________________________________________________________  DISCHARGE MEDICATIONS:   Current Discharge Medication List      CONTINUE these medications which have CHANGED    Details   bumetanide (BUMEX) 2 mg tablet Take 1 Tab by mouth Before breakfast and dinner for 30 days.   Qty: 60 Tab, Refills: 0         CONTINUE these medications which have NOT CHANGED    Details   insulin NPH/insulin regular (NOVOLIN 70/30 U-100 INSULIN) 100 unit/mL (70-30) injection Inject 18 Units with Breakfast, 20 Units with Dinner  Qty: 4 Vial, Refills: 3    Comments: Please consider 90 day supplies to promote better adherence gabapentin (NEURONTIN) 300 mg capsule Take 1 Cap by mouth daily. Max Daily Amount: 300 mg. Qty: 90 Cap, Refills: 3    Associated Diagnoses: Type 2 diabetes mellitus with stage 4 chronic kidney disease, with long-term current use of insulin (HCC)      sertraline (ZOLOFT) 50 mg tablet TAKE 1 T PO QD  Refills: 0      aspirin 81 mg chewable tablet Take 81 mg by mouth daily. hydrALAZINE (APRESOLINE) 100 mg tablet 100 mg three (3) times daily. tamsulosin (FLOMAX) 0.4 mg capsule Take 0.8 mg by mouth daily. Insulin Needles, Disposable, (REYNALDO PEN NEEDLE) 32 gauge x 5/32\" ndle FOR USE EVERY MORNING WITH VICTOZA PEN  Qty: 100 Pen Needle, Refills: 3    Comments: Please consider 90 day supplies to promote better adherence      carvedilol (COREG) 12.5 mg tablet Take 12.5 mg by mouth two (2) times daily (with meals). Insulin Syringe-Needle U-100 1 mL 30 gauge x 5/16 syrg Use twice daily for insulin injection  Qty: 100 Syringe, Refills: 7      Calcium-Cholecalciferol, D3, (CALCIUM 600 WITH VITAMIN D3) 600 mg(1,500mg) -400 unit chew Take 1 Tab by mouth every evening. fluticasone (FLONASE) 50 mcg/actuation nasal spray 2 Sprays by Both Nostrils route daily. Patient Follow Up Instructions: Activity: Activity as tolerated  Diet: Diabetic Diet  Wound Care: None needed    Follow-up with as below       Follow-up Information     Follow up With Specialties Details Why Contact Info    Melody Lawrence MD Family Medicine       Melody Lawrence MD Family Medicine Schedule an appointment as soon as possible for a visit in 1 week      Cristin Murray MD Cardiology Schedule an appointment as soon as possible for a visit in 1 week  [address] [facility] 700  [address] [facility]      Pramod Trinidad MD Nephrology Schedule an appointment as soon as possible for a visit in 1 week  [facility].   [address]  [phone] ________________________________________________________________    Risk of deterioration: High    Condition at Discharge:  Stable  __________________________________________________________________    Disposition  Home with family, no needs    ____________________________________________________________________    Code Status: Full Code  ___________________________________________________________________      Total time in minutes spent coordinating this discharge (includes going over instructions, follow-up, prescriptions, and preparing report for sign off to her PCP) :  35  minutes    Signed:  Rankin Runner, MD

## 2020-07-23 NOTE — PROGRESS NOTES
[address]  [id number]   :1945                                                  Nephrology Progress Note  [address] [facility]  [facility] CummingsAt[facility], [place], [address]  Phone - [phone]               Fax - [phone]  Patient: Elvira Morgan    YOB: 1945     Admit Date: 2020   Date- 2020     CC: Follow up for ESRD/fluid overload        Assessment/Plan:   69yo AAM, h/o ESRD( on HD at [facility] AND [facility] on TTS since 2018), DM, HTN and HF,  presents with SOB and found to have uncontrolled HTN( BP up to 199/105's), pulmonary edema and hypoxemia. Nephrology consulted for ESRD management, fluid overload. # ESRD  - Outpt Tx Time :3 hrs 45 min   - .0 Kg per HD records  - fluid overload on admission  likely due to poor compliance with diet/fluid intake, didn't miss HD sessions  - HD yesterday, tolerated well  - increased home dose of Bumex to 2mg BID  - no need for HD today  - monitor I/O's, daily weight  - fluid restriction 1L/day  - renal diet    # Anemia of CKD  - continue EPO   - monitor CBC    # MBD   - PTH, Vit D and  phosph at goal    # HTN  - better controlled  - added Amlodipine to regimen  - will monitor    Thank you for allowing us to participate in this patient's care. Will follow                 Subjective: Interval History:     No new complaints    ROS:  12 ROS neg     Objective:   Vitals:    20 1230 20 1324 20 1358 20 1519   BP: 141/82 133/80  113/62   Pulse: 62 62  70   Resp: 17 16  16   Temp: 98 °F (36.7 °C) 97.8 °F (36.6 °C)  97.6 °F (36.4 °C)   TempSrc: Oral      SpO2:  97%  96%   Weight:       Height:   6' (1.829 m)       Physical exam:     Constitutional:       Appearance: He is well-developed and obese  HENT:      Head: Normocephalic and atraumatic.    Neck: Musculoskeletal: Normal range of motion and neck supple. Vascular: No JVD. Cardiovascular:      Rate and Rhythm: Normal rate and regular rhythm. Heart sounds: Normal heart sounds. Pulmonary:      Effort: Pulmonary effort is normal. No respiratory distress. Breath sounds: No wheezing or rales. Skin:     General: Skin is warm and dry. Findings: No rash. Neurological:      Mental Status: He is alert and oriented to person, place, and time. Psychiatric:         Mood and Affect: Mood and affect normal.         Behavior: Behavior normal.         Thought Content: Thought content normal.         Judgment: Judgment normal.   HD access: left AVF with good thrill    Chart reviewed. Pertinent Notes reviewed. Data Review :  Recent Labs     07/23/20  0339 07/22/20 0337 07/21/20  0533    137 139   K 3.8 3.5 4.6    98 110*   CO2 31 32 21   BUN 44* 30* 40*   CREA 5.13* 4.17* 4.78*   * 117* 194*   CA 8.8 8.5  8.7 9.1   MG  --   --  2.5*   PHOS 4.0 3.6  --      Recent Labs     07/22/20 0337 07/21/20  0533   WBC 8.3 9.6   HGB 10.1* 11.1*   HCT 33.0* 35.3*    286     No results for input(s): FE, TIBC, PSAT, FERR in the last 72 hours.    Medication list  reviewed  Current Facility-Administered Medications   Medication Dose Route Frequency    bumetanide (BUMEX) tablet 2 mg  2 mg Oral ACB&D    amLODIPine (NORVASC) tablet 5 mg  5 mg Oral DAILY    insulin glargine (LANTUS) injection 15 Units  15 Units SubCUTAneous QHS    nitroglycerin (NITROSTAT) tablet 0.4 mg  0.4 mg SubLINGual PRN    acetaminophen (TYLENOL) tablet 650 mg  650 mg Oral Q6H PRN    sodium chloride (NS) flush 5-40 mL  5-40 mL IntraVENous Q8H    sodium chloride (NS) flush 5-40 mL  5-40 mL IntraVENous PRN    ondansetron (ZOFRAN) injection 4 mg  4 mg IntraVENous Q6H PRN    heparin (porcine) injection 5,000 Units  5,000 Units SubCUTAneous Q12H    insulin lispro (HUMALOG) injection   SubCUTAneous AC&HS  glucose chewable tablet 16 g  4 Tab Oral PRN    dextrose (D50W) injection syrg 12.5-25 g  12.5-25 g IntraVENous PRN    glucagon (GLUCAGEN) injection 1 mg  1 mg IntraMUSCular PRN    aspirin chewable tablet 81 mg  81 mg Oral DAILY    calcium-vitamin D (OS-ARELI) 500 mg-200 unit tablet  1 Tab Oral QPM    gabapentin (NEURONTIN) capsule 300 mg  300 mg Oral DAILY    hydrALAZINE (APRESOLINE) tablet 100 mg  100 mg Oral TID    sertraline (ZOLOFT) tablet 50 mg  50 mg Oral DAILY    tamsulosin (FLOMAX) capsule 0.8 mg  0.8 mg Oral DAILY    carvediloL (COREG) tablet 25 mg  25 mg Oral BID WITH MEALS                          Donn Bird MD                   [facility]                   [email or web address]

## 2020-07-23 NOTE — ADT AUTH CERT NOTES
Patient Demographics     Patient Name   Kwame Sullivan   [id number]  Sex   Male     1945  Address   [address] [place], [place] [address]  Phone   [phone] (Home)   [phone] (Mobile) *Preferred*    CSN:    [id number]    Admit Date:  Admit Time  Room  Bed    2020   5:15 AM  3103 [59822]  01 [[id number]]    Attending Providers     Provider  Pager  From  To    Cl Pace MD   20    Masha Escalona MD   20    Eloina Cuevas MD   20    Colton Aparicio MD   20     Emergency Contact(s)     Name  Relation  Home  Work  [facility] [employer]  [phone]    Utilization Reviews         Pulmonary Disease GRG - Care Day 3 (2020) by Mellissa Gutiérrez RN         Review Entered  Review Status    2020 11:48  Completed        Criteria Review       Care Day: 3 Care Date: 2020 Level of Care: Telemetry    Guideline Day 3    Level Of Care    ( ) * Activity level acceptable    ( ) * Isolation not needed, or status acceptable    (X) Floor to discharge    2020 11:47:02 EDT by Daisy Kay      tele bed    Clinical Status    (X) * Respiratory status acceptable    2020 11:47:51 EDT by Daisy Kay      acceptable    ( ) * Right heart function adequate    (X) * Temperature status acceptable    2020 11:47:02 EDT by Daisy Kay      97.6 70 16 100 147/64 2 l nc    ( ) * No infection, or status acceptable    ( ) * Stable chest findings    (X) * Pain and nausea absent or adequately managed    2020 11:47:02 EDT by Daisy Kay      pain mgt    ( ) * General Discharge Criteria met    Interventions    ( ) * No chest tube, or status acceptable    ( ) * Intake acceptable    (X) * No inpatient interventions needed    2020 11:47:02 EDT by Daisy Kay      Neurontin 300 mg po qd, Hep 5000 u sc q12 Humalog ssi qid,  zoloft 50 mg po qd FLomax 0.8 mg po qd,    2020 11:47:27 EDT by Vasu Williamson    Subject: Additional Clinical Information    glu 113 bun 44 crit 5.13 bun ratio 9 gfr 11 alb 3.2       * Milestone    Additional Notes    7/23       Cardio notes:  Assessment:             1.  Minimal troponin elevation, in the setting of elevated BP and volume overload , ESRD ,  Chronically elevated troponin .             This is not an MI.         1. Pt has chronically elevated Troponin dating back to at least 2016 in  . 2. stress test in 2018 with anterolateral ischemia    3.  chronic systolic heart failure   LV EF reported 40 - 45% on Echo today . 3. ESRD on HD    4. Accelerated hypertension    5. DM    6. HLP    7. FRIDA    8. Obesity          Symptoms resolved after urgent dialysis yesterday with removal of > 3 liters fluid volume .  The primary issue here seems to be volume overload, dietary indiscretion, etc.   No indication of acute cardiac event.           7/23  VSS ,   Weight down to 102 Kg today , from 113 Kg on admission.      O2 sat is good , nasal O2.    K+ 5.1 today , after dialysis yesterday .      Cardiac status stable .  No new c/o or issues.                          Plan:           Continue medical management . No plans for Cath or other cardiac procedure. General Appearance: Well developed, well nourished, alert & oriented x 3,                no acute distress. Ears/Nose/Mouth/Throat: Hearing grossly normal.    Neck: Supple. Chest: Lungs clear to auscultation bilaterally. Cardiovascular: Regular rate and rhythm, S1S2 normal, no murmur. Abdomen: Soft, non-tender, bowel sounds are active.     Extremities: No edema bilaterally.        Pulmonary Disease GRG - Care Day 2 (7/22/2020) by Tatiana Watts RN         Review Entered  Review Status    7/22/2020 13:21  Completed        Criteria Review       Care Day: 2 Care Date: 7/22/2020 Level of Care: Telemetry    Guideline Day 2    Level Of Care    (X) Floor    7/22/2020 13:18:35 EDT by Titi Maki tele bed  98 75 20 100 144/76 2 l nc    Clinical Status    (X) * No ICU or intermediate care needs    7/22/2020 13:19:53 EDT by Edwige Sanchez      tele bed    (X) * No mechanical ventilation required [I]    7/22/2020 13:19:53 EDT by Edwige Sanchez      2 l nc    Interventions    (X) Transition to oral routes    7/22/2020 13:21:02 EDT by Edwige Sanchez      Norvacs 5 mg po qd ASA 81 mg po qd, coreg 25 mg po bid, Neurontin 300 mg po qd, Hep gtt, Hydralzine 100 mg po tid, Humalog 4 u sc tid, Humalog ssi qid, Zoloft 50 mg po qd flomax 0.8 mg po qd, bumex 2 mg po qd    7/22/2020 13:19:32 EDT by Edwige Sanchez    Subject: Additional Clinical Information       rbc 3.57 h/h 10.1/33.0 glu 117 bun 30 crit 4.17 bun ratio 7 gfr 14 alb 3.2 trop 0.18    Result status: Final result    * LV: Normal cavity size. Mild concentric hypertrophy. Mild-to-moderate systolic function. Estimated left ventricular ejection fraction is 40 - 45%. Left ventricular diastolic dysfunction. * LA: Mildly dilated left atrium. * MV: Mild mitral annular calcification. Trace mitral valve regurgitation is present.                           * Milestone    Additional Notes    7/22       Nephro: Assessment/Plan:    69yo AAM, h/o ESRD( on HD at [facility] AND [facility] on TTS since 2018), DM, HTN and HF,  presents with SOB and found to have uncontrolled HTN( BP up to 199/105's), pulmonary edema and hypoxemia.  Nephrology consulted for ESRD management, fluid overload.         # ESRD    - Outpt Tx Time :3 hrs 45 min    - .0 Kg per HD records    - fluid overload likely due to poor compliance with diet/fluid intake, didn't miss HD sessions    - HD yesterday: 4hs, UF: 3.2L removed    - increased home dose of Bumex to 2mg BID    - monitor I/O's, daily weight    - fluid restriction 1L/day    - May need additional  UF tomorrow, will reassess in AM    - renal diet         # Anemia of CKD    - Hb: 10    - will resume EPO    - monitor CBC         # MBD    - PTH, Vit D and  phosph at goal         # HTN    - uncontrolled    - added Amlodipine to current regimen    - will monitor         Thank you for allowing us to participate in this patient's care. Will follow                                  Subjective:     Interval History:    Pt on Echo suite    No new complaints         ROS:    Not done today, pt out of the room

## 2020-07-24 ENCOUNTER — PATIENT OUTREACH (OUTPATIENT)
Dept: CASE MANAGEMENT | Age: 75
End: 2020-07-24

## 2020-07-24 NOTE — PROGRESS NOTES
Patient contacted regarding recent discharge and COVID-19 risk. Discussed COVID-19 related testing which was not done at this time. Test results were not done. Patient informed of results, if available? n/a    Care Transition Nurse/ Ambulatory Care Manager contacted the patient by telephone to perform post discharge assessment. Verified name and  with patient as identifiers. Patient has following risk factors of: heart failure, diabetes and chronic kidney disease. CTN/ACM reviewed discharge instructions, medical action plan and red flags related to discharge diagnosis. Reviewed and educated them on any new and changed medications related to discharge diagnosis. Advised obtaining a 90-day supply of all daily and as-needed medications. Patient stated he has obtained all medications and is taking as prescribed. Advance Care Planning:   Does patient have an Advance Directive: not on file     Education provided regarding infection prevention, and signs and symptoms of COVID-19 and when to seek medical attention with patient who verbalized understanding. Discussed exposure protocols and quarantine from [address] you at higher risk for severe illness  and given an opportunity for questions and concerns. The patient agrees to contact the COVID-19 hotline [phone] or PCP office for questions related to their healthcare. CTN/ACM provided contact information for future reference. Patient declined 800 numbers at this time. From CDC: Are you at higher risk for severe illness?  Wash your hands often.  Avoid close contact (6 feet, which is about two arm lengths) with people who are sick.  Put distance between yourself and other people if COVID-19 is spreading in your community.  Clean and disinfect frequently touched surfaces.  Avoid all cruise travel and non-essential air travel.    Call your healthcare professional if you have concerns about COVID-19 and your underlying condition or if you are sick. For more information on steps you can take to protect yourself, see CDC's How to Protect Yourself      Patient/family/caregiver given information for [employer] and agrees to enroll no  Patient's preferred e-mail:  n/a  Patient's preferred phone number: n/a      Plan to follow up in 14 days based on severity of symptoms and risk factors. DMB

## 2020-08-07 ENCOUNTER — TELEPHONE (OUTPATIENT)
Dept: PALLATIVE CARE | Age: 75
End: 2020-08-07

## 2020-08-07 NOTE — TELEPHONE ENCOUNTER
[address]  Note    Carlyle John  1945    Received Advance Care Specialist referral from Rey Oswald RN. This nurse called patient to discuss ACP and to answer any questions he might have. No answer, no option to leave voicemail. Will call back.       JAYE BennettN, RN  [phone]

## 2020-08-11 ENCOUNTER — PATIENT OUTREACH (OUTPATIENT)
Dept: CASE MANAGEMENT | Age: 75
End: 2020-08-11

## 2020-08-11 NOTE — PROGRESS NOTES
Patient resolved from Transition of Care episode on 8/11/2020. ACM/CTN was unsuccessful at contacting this patient today. S/W Jennifer Rodriguez, patient's wife she advises he is currently at dialysis. CTN left contact information for patient to return call. Patient/family was provided the following resources and education related to COVID-19 during the initial call:                         Signs, symptoms and red flags related to COVID-19            CDC exposure and quarantine guidelines            [facility] exposure contact - [phone]            Contact for their local Department of Health                 Patient has not had any additional ED or hospital visits. No further outreach scheduled with this CTN/ACM. Episode of Care resolved. Patient has this CTN/ACM contact information if future needs arise.

## 2020-08-19 ENCOUNTER — TELEPHONE (OUTPATIENT)
Dept: PALLATIVE CARE | Age: 75
End: 2020-08-19

## 2020-08-19 NOTE — TELEPHONE ENCOUNTER
[address]  Note    Luis Miguel Chavez  1945       Patient was referred to Advance Care Specialist by Mady Mcfarland, NEHA. This nurse called patient to discuss ACP and to answer any questions he might have. No answer, left message requesting pt call back.      Gregg Kunz, BSN, RN  [phone]
14-Feb-2018

## 2020-08-31 DIAGNOSIS — Z79.4 TYPE 2 DIABETES MELLITUS WITH STAGE 4 CHRONIC KIDNEY DISEASE, WITH LONG-TERM CURRENT USE OF INSULIN (HCC): ICD-10-CM

## 2020-08-31 DIAGNOSIS — N18.4 TYPE 2 DIABETES MELLITUS WITH STAGE 4 CHRONIC KIDNEY DISEASE, WITH LONG-TERM CURRENT USE OF INSULIN (HCC): ICD-10-CM

## 2020-08-31 DIAGNOSIS — E11.22 TYPE 2 DIABETES MELLITUS WITH STAGE 4 CHRONIC KIDNEY DISEASE, WITH LONG-TERM CURRENT USE OF INSULIN (HCC): ICD-10-CM

## 2020-08-31 NOTE — TELEPHONE ENCOUNTER
[address]  Note    Marta Haque  1945    Marta Haque was referred to Advance Care Specialist by Myrtle Brittle, RN, ANNIE OROZCO nurse. This nurse called patient to discuss ACP, to answer any questions he might have, and to assist with completion of a [place] for Pepe Lomeli if patient is ready to do so. No answer, left HIPPA compliant voice mail requesting call back.       Margery Skiff, BSN, RN  [phone]

## 2020-09-01 RX ORDER — GABAPENTIN 300 MG/1
CAPSULE ORAL
Qty: 90 CAP | Refills: 3 | Status: ON HOLD | OUTPATIENT
Start: 2020-09-01 | End: 2021-05-06 | Stop reason: SDUPTHER

## 2020-09-17 ENCOUNTER — HOSPITAL ENCOUNTER (EMERGENCY)
Age: 75
Discharge: HOME | End: 2020-09-17
Attending: EMERGENCY MEDICINE
Payer: MEDICARE

## 2020-09-17 VITALS
HEIGHT: 72 IN | WEIGHT: 243 LBS | HEART RATE: 62 BPM | TEMPERATURE: 98.7 F | SYSTOLIC BLOOD PRESSURE: 121 MMHG | OXYGEN SATURATION: 94 % | RESPIRATION RATE: 19 BRPM | DIASTOLIC BLOOD PRESSURE: 65 MMHG | BODY MASS INDEX: 32.91 KG/M2

## 2020-09-17 DIAGNOSIS — R42 EPISODIC LIGHTHEADEDNESS: Primary | ICD-10-CM

## 2020-09-17 LAB
ANION GAP SERPL CALC-SCNC: 2 MMOL/L (ref 5–15)
BUN SERPL-MCNC: 25 MG/DL (ref 6–20)
BUN/CREAT SERPL: 7 (ref 12–20)
CALCIUM SERPL-MCNC: 8.7 MG/DL (ref 8.5–10.1)
CHLORIDE SERPL-SCNC: 100 MMOL/L (ref 97–108)
CO2 SERPL-SCNC: 33 MMOL/L (ref 21–32)
CREAT SERPL-MCNC: 3.8 MG/DL (ref 0.7–1.3)
ERYTHROCYTE [DISTWIDTH] IN BLOOD BY AUTOMATED COUNT: 17.1 % (ref 11.5–14.5)
GLUCOSE SERPL-MCNC: 196 MG/DL (ref 65–100)
HCT VFR BLD AUTO: 37.4 % (ref 36.6–50.3)
HGB BLD-MCNC: 11.8 G/DL (ref 12.1–17)
MCH RBC QN AUTO: 29.1 PG (ref 26–34)
MCHC RBC AUTO-ENTMCNC: 31.6 G/DL (ref 30–36.5)
MCV RBC AUTO: 92.1 FL (ref 80–99)
NRBC # BLD: 0 K/UL (ref 0–0.01)
NRBC BLD-RTO: 0 PER 100 WBC
PLATELET # BLD AUTO: 164 K/UL (ref 150–400)
PMV BLD AUTO: 11.4 FL (ref 8.9–12.9)
POTASSIUM SERPL-SCNC: 4.3 MMOL/L (ref 3.5–5.1)
RBC # BLD AUTO: 4.06 M/UL (ref 4.1–5.7)
SODIUM SERPL-SCNC: 135 MMOL/L (ref 136–145)
WBC # BLD AUTO: 8.6 K/UL (ref 4.1–11.1)

## 2020-09-17 PROCEDURE — 99284 EMERGENCY DEPT VISIT MOD MDM: CPT

## 2020-09-17 PROCEDURE — 93005 ELECTROCARDIOGRAM TRACING: CPT

## 2020-09-17 PROCEDURE — 80048 BASIC METABOLIC PNL TOTAL CA: CPT

## 2020-09-17 PROCEDURE — 85027 COMPLETE CBC AUTOMATED: CPT

## 2020-09-17 PROCEDURE — 36415 COLL VENOUS BLD VENIPUNCTURE: CPT

## 2020-09-17 RX ORDER — LOSARTAN POTASSIUM 25 MG/1
25 TABLET ORAL DAILY
COMMUNITY

## 2020-09-17 NOTE — ED PROVIDER NOTES
EMERGENCY DEPARTMENT HISTORY AND PHYSICAL EXAM      Date: 9/17/2020  Patient Name: Amarilis Ruelas  Patient Age and Sex: 70 y.o. male     History of Presenting Illness     Chief Complaint   Patient presents with    Dizziness     Patient stated he was at dialysis and asked the dialysis nurse to take him off of dialysis 5 min early to go to the bathroom. Patient stated when he was standing at the sink he felt lightheaded and dizzy. Per EMS . History Provided By: Patient    HPI: Amarilis Ruelas is a 44-year-old male with a history of end-stage renal disease on dialysis presenting with lightheadedness. Patient states that he was about 5 minutes from completing dialysis when he started to have gas pains and wanted to go to the bathroom. Patient states that he has intermittent gas pains every so often. While he was drying his hands in the bathroom he started to feel lightheaded. Patient states that he sat on a stool and they gave him some water to drink. His blood pressures were within normal limits and his glucose was 200s. EMS reports that his blood pressures have been fine and patient states that he feels perfect right now. Does not feel lightheaded. Denies any chest pain, shortness of breath, nausea, vomiting, diarrhea with this instance. There are no other complaints, changes, or physical findings at this time. PCP: Doreen Stephenson MD    No current facility-administered medications on file prior to encounter. Current Outpatient Medications on File Prior to Encounter   Medication Sig Dispense Refill    losartan (COZAAR) 25 mg tablet Take  by mouth daily.       gabapentin (NEURONTIN) 300 mg capsule TAKE 1 CAPSULE BY MOUTH EVERY DAY 90 Cap 3    insulin NPH/insulin regular (NOVOLIN 70/30 U-100 INSULIN) 100 unit/mL (70-30) injection Inject 18 Units with Breakfast, 20 Units with Dinner 4 Vial 3    sertraline (ZOLOFT) 50 mg tablet TAKE 1 T PO QD  0    aspirin 81 mg chewable tablet Take 81 mg by mouth daily.  carvedilol (COREG) 12.5 mg tablet Take 12.5 mg by mouth two (2) times daily (with meals).  Calcium-Cholecalciferol, D3, (CALCIUM 600 WITH VITAMIN D3) 600 mg(1,500mg) -400 unit chew Take 1 Tab by mouth every evening.  fluticasone (FLONASE) 50 mcg/actuation nasal spray 2 Sprays by Both Nostrils route daily.  tamsulosin (FLOMAX) 0.4 mg capsule Take 0.8 mg by mouth daily.  Insulin Needles, Disposable, (REYNALDO PEN NEEDLE) 32 gauge x \" ndle FOR USE EVERY MORNING WITH VICTOZA  Pen Needle 3    Insulin Syringe-Needle U-100 1 mL 30 gauge x  syrg Use twice daily for insulin injection 100 Syringe 7    hydrALAZINE (APRESOLINE) 100 mg tablet 100 mg three (3) times daily. Past History     Past Medical History:  Past Medical History:   Diagnosis Date    Arthritis     spine    CAD (coronary artery disease)     high cholesterol    Chronic kidney disease     dialysis    Diabetes ([address]     GERD (gastroesophageal reflux disease)     HX    Hypertension     Ill-defined condition     irritable bowel syndrome    Systolic CHF ([address]     Unspecified sleep apnea     NO CPAP       Past Surgical History:  Past Surgical History:   Procedure Laterality Date    COLONOSCOPY N/A 2016    COLONOSCOPY performed [facility] Hilary Calixto [facility] at [facility] ENDOSCOPY    HX ORTHOPAEDIC      CERVICAL FUSION    HX UROLOGICAL      TURP    HX VASCULAR ACCESS      L arm dialysis access       Family History:  Family History   Problem Relation Age of Onset    Cancer Father         \"bone\"    Diabetes Brother        Social History:  Social History     Tobacco Use    Smoking status: Former Smoker     Last attempt to quit: 2009     Years since quittin.7    Smokeless tobacco: Former User    Tobacco comment: cigars only   Substance Use Topics    Alcohol use: No    Drug use: No       Allergies:   Allergies   Allergen Reactions    Albumin, Human 25 % Anaphylaxis and Hives    Niacin Hives Review of Systems   Review of Systems   Constitutional: Negative for chills and fever. Respiratory: Negative for cough and shortness of breath. Cardiovascular: Negative for chest pain. Gastrointestinal: Positive for abdominal pain. Negative for constipation, diarrhea, nausea and vomiting. Genitourinary: Negative for dysuria, frequency and hematuria. Neurological: Positive for light-headedness. Negative for syncope, weakness and numbness. All other systems reviewed and are negative. Physical Exam   Physical Exam  Vitals signs and nursing note reviewed. Constitutional:       Appearance: He is well-developed. HENT:      Head: Normocephalic and atraumatic. Nose: Nose normal.      Mouth/Throat:      Mouth: Mucous membranes are moist.   Eyes:      Extraocular Movements: Extraocular movements intact. Conjunctiva/sclera: Conjunctivae normal.   Neck:      Musculoskeletal: Normal range of motion and neck supple. Cardiovascular:      Rate and Rhythm: Normal rate and regular rhythm. Pulmonary:      Effort: Pulmonary effort is normal. No respiratory distress. Breath sounds: Normal breath sounds. Abdominal:      General: There is no distension. Palpations: Abdomen is soft. Tenderness: There is no abdominal tenderness. Comments: Where patient had his gas pains in the lower quadrants no longer having any abdominal pain or tenderness   Musculoskeletal: Normal range of motion. Skin:     General: Skin is warm and dry. Neurological:      General: No focal deficit present. Mental Status: He is alert and oriented to person, place, and time. Mental status is at baseline.    Psychiatric:         Mood and Affect: Mood normal.          Diagnostic Study Results     Labs -     Recent Results (from the past 12 hour(s))   CBC W/O DIFF    Collection Time: 09/17/20 11:46 AM   Result Value Ref Range    WBC 8.6 4.1 - 11.1 K/uL    RBC 4.06 (L) 4.10 - 5.70 M/uL    HGB 11.8 (L) 12.1 - 17.0 g/dL    HCT 37.4 36.6 - 50.3 %    MCV 92.1 80.0 - 99.0 FL    MCH 29.1 26.0 - 34.0 PG    MCHC 31.6 30.0 - 36.5 g/dL    RDW 17.1 (H) 11.5 - 14.5 %    PLATELET 942 701 - 840 K/uL    MPV 11.4 8.9 - 12.9 FL    NRBC 0.0 0  WBC    ABSOLUTE NRBC 0.00 0.00 - 1.64 K/uL   METABOLIC PANEL, BASIC    Collection Time: 09/17/20 11:46 AM   Result Value Ref Range    Sodium 135 (L) 136 - 145 mmol/L    Potassium 4.3 3.5 - 5.1 mmol/L    Chloride 100 97 - 108 mmol/L    CO2 33 (H) 21 - 32 mmol/L    Anion gap 2 (L) 5 - 15 mmol/L    Glucose 196 (H) 65 - 100 mg/dL    BUN 25 (H) 6 - 20 MG/DL    Creatinine 3.80 (H) 0.70 - 1.30 MG/DL    BUN/Creatinine ratio 7 (L) 12 - 20      GFR est AA 19 (L) >60 ml/min/1.73m2    GFR est non-AA 16 (L) >60 ml/min/1.73m2    Calcium 8.7 8.5 - 10.1 MG/DL       Radiologic Studies -   No orders to display     CT Results  (Last 48 hours)    None        CXR Results  (Last 48 hours)    None            Medical Decision Making   I am the first provider for this patient. I reviewed the vital signs, available nursing notes, past medical history, past surgical history, family history and social history. Vital Signs-Reviewed the patient's vital signs. Patient Vitals for the past 12 hrs:   Temp Pulse Resp BP SpO2   09/17/20 1230 -- 62 19 121/65 94 %   09/17/20 1201 -- 63 -- 112/77 --   09/17/20 1159 -- -- -- 112/77 --   09/17/20 1118 98.7 °F (37.1 °C) 69 16 114/65 97 %       Records Reviewed: Nursing Notes and Old Medical Records    Provider Notes (Medical Decision Making):   Patient presenting with lightheadedness after dialysis. Differential includes orthostatic hypotension, just getting up too quickly to go to the bathroom, vasovagal, overdiuresis. Less likely ACS or electrolyte issue. Will get orthostatics and basic labs in addition to EKG. ED Course:   Initial assessment performed.  The patients presenting problems have been discussed, and they are in agreement with the care plan formulated and outlined with them. I have encouraged them to ask questions as they arise throughout their visit. ED Course as of Sep 17 1422   Thu Sep 17, 2020   1117 EKG interpreted by me. Shows Normal Sinus rhythm with a HR of 69. No ST elevations or depressions concerning for ischemia. Normal intervals. Artifact in V2  Brianda Olsen M.D.        [JS]      ED Course User Index  [JS] Marcello Loza MD     Critical Care Time:   0    Disposition:  Discharge Note:  The patient has been re-evaluated and is ready for discharge. Reviewed available results with patient. Counseled patient on diagnosis and care plan. Patient has expressed understanding, and all questions have been answered. Patient agrees with plan and agrees to follow up as recommended, or to return to the ED if their symptoms worsen. Discharge instructions have been provided and explained to the patient, along with reasons to return to the ED. PLAN:  Discharge Medication List as of 9/17/2020 12:21 PM        2. Follow-up Information     Follow up With Specialties Details Why Contact Info    Sharon Ross MD Family Medicine  As needed         3. Return to ED if worse     Diagnosis     Clinical Impression:   1. Episodic lightheadedness        Attestations:    Brianda Olsen M.D. Please note that this dictation was completed with [employer], the computer voice recognition software. Quite often unanticipated grammatical, syntax, homophones, and other interpretive errors are inadvertently transcribed by the computer software. Please disregard these errors. Please excuse any errors that have escaped final proofreading. Thank you.

## 2020-09-17 NOTE — DISCHARGE INSTRUCTIONS
Patient Education        Lightheadedness or Faintness: Care Instructions  Your Care Instructions  Lightheadedness is a feeling that you are about to faint or \"pass out. \" You do not feel as if you or your surroundings are moving. It is different from vertigo, which is the feeling that you or things around you are spinning or tilting. Lightheadedness usually goes away or gets better when you lie down. If lightheadedness gets worse, it can lead to a fainting spell. It is common to feel lightheaded from time to time. Lightheadedness usually is not caused by a serious problem. It often is caused by a short-lasting drop in blood pressure and blood flow to your head that occurs when you get up too quickly from a seated or lying position. Follow-up care is a key part of your treatment and safety. Be sure to make and go to all appointments, and call your doctor if you are having problems. It's also a good idea to know your test results and keep a list of the medicines you take. How can you care for yourself at home? · Lie down for 1 or 2 minutes when you feel lightheaded. After lying down, sit up slowly and remain sitting for 1 to 2 minutes before slowly standing up. · Avoid movements, positions, or activities that have made you lightheaded in the past.  · Get plenty of rest, especially if you have a cold or flu, which can cause lightheadedness. · Make sure you drink plenty of fluids, especially if you have a fever or have been sweating. · Do not drive or put yourself and others in danger while you feel lightheaded. When should you call for help? Call 911 anytime you think you may need emergency care. For example, call if:    · You have symptoms of a stroke. These may include:  ? Sudden numbness, tingling, weakness, or loss of movement in your face, arm, or leg, especially on only one side of your body. ? Sudden vision changes. ? Sudden trouble speaking.   ? Sudden confusion or trouble understanding simple statements. ? Sudden problems with walking or balance. ? A sudden, severe headache that is different from past headaches.     · You have symptoms of a heart attack. These may include:  ? Chest pain or pressure, or a strange feeling in the chest.  ? Sweating. ? Shortness of breath. ? Nausea or vomiting. ? Pain, pressure, or a strange feeling in the back, neck, jaw, or upper belly or in one or both shoulders or arms. ? Lightheadedness or sudden weakness. ? A fast or irregular heartbeat. After you call 911, the  may tell you to chew 1 adult-strength or 2 to 4 low-dose aspirin. Wait for an ambulance. Do not try to drive yourself. Watch closely for changes in your health, and be sure to contact your doctor if:    · Your lightheadedness gets worse or does not get better with home care. Where can you learn more? Go to http://maria r-fran.info/  Enter [id number] in the search box to learn more about \"Lightheadedness or Faintness: Care Instructions. \"  Current as of: June 26, 2019               Content Version: 12.6  © 6100-9462 [employer]. Care instructions adapted under license by [employer] (which disclaims liability or warranty for this information). If you have questions about a medical condition or this instruction, always ask your healthcare professional. [address] any warranty or liability for your use of this information.

## 2020-09-18 LAB
ATRIAL RATE: 69 BPM
CALCULATED P AXIS, ECG09: -23 DEGREES
CALCULATED R AXIS, ECG10: -60 DEGREES
CALCULATED T AXIS, ECG11: 31 DEGREES
DIAGNOSIS, 93000: NORMAL
P-R INTERVAL, ECG05: 184 MS
Q-T INTERVAL, ECG07: 428 MS
QRS DURATION, ECG06: 140 MS
QTC CALCULATION (BEZET), ECG08: 458 MS
VENTRICULAR RATE, ECG03: 69 BPM

## 2020-11-09 ENCOUNTER — TRANSCRIBE ORDER (OUTPATIENT)
Dept: SCHEDULING | Age: 75
End: 2020-11-09

## 2020-11-09 DIAGNOSIS — R31.9 HEMATURIA: Primary | ICD-10-CM

## 2021-03-16 ENCOUNTER — HOSPITAL ENCOUNTER (EMERGENCY)
Age: 76
Discharge: HOME | End: 2021-03-16
Attending: EMERGENCY MEDICINE
Payer: MEDICARE

## 2021-03-16 ENCOUNTER — APPOINTMENT (OUTPATIENT)
Dept: GENERAL RADIOLOGY | Age: 76
End: 2021-03-16
Attending: EMERGENCY MEDICINE
Payer: MEDICARE

## 2021-03-16 VITALS
HEART RATE: 62 BPM | BODY MASS INDEX: 35.27 KG/M2 | RESPIRATION RATE: 26 BRPM | SYSTOLIC BLOOD PRESSURE: 152 MMHG | WEIGHT: 260.36 LBS | DIASTOLIC BLOOD PRESSURE: 59 MMHG | TEMPERATURE: 97.9 F | HEIGHT: 72 IN | OXYGEN SATURATION: 97 %

## 2021-03-16 DIAGNOSIS — Z20.822 SUSPECTED COVID-19 VIRUS INFECTION: ICD-10-CM

## 2021-03-16 DIAGNOSIS — R50.9 FEVER, UNSPECIFIED FEVER CAUSE: ICD-10-CM

## 2021-03-16 DIAGNOSIS — J18.9 PNEUMONIA DUE TO INFECTIOUS ORGANISM, UNSPECIFIED LATERALITY, UNSPECIFIED PART OF LUNG: Primary | ICD-10-CM

## 2021-03-16 LAB
ALBUMIN SERPL-MCNC: 3.4 G/DL (ref 3.5–5)
ALBUMIN/GLOB SERPL: 0.8 {RATIO} (ref 1.1–2.2)
ALP SERPL-CCNC: 80 U/L (ref 45–117)
ALT SERPL-CCNC: 44 U/L (ref 12–78)
ANION GAP SERPL CALC-SCNC: 7 MMOL/L (ref 5–15)
AST SERPL-CCNC: 28 U/L (ref 15–37)
ATRIAL RATE: 75 BPM
BASOPHILS # BLD: 0 K/UL (ref 0–0.1)
BASOPHILS NFR BLD: 0 % (ref 0–1)
BILIRUB SERPL-MCNC: 0.4 MG/DL (ref 0.2–1)
BUN SERPL-MCNC: 63 MG/DL (ref 6–20)
BUN/CREAT SERPL: 9 (ref 12–20)
CALCIUM SERPL-MCNC: 8.8 MG/DL (ref 8.5–10.1)
CALCULATED R AXIS, ECG10: -54 DEGREES
CALCULATED T AXIS, ECG11: 89 DEGREES
CHLORIDE SERPL-SCNC: 103 MMOL/L (ref 97–108)
CO2 SERPL-SCNC: 22 MMOL/L (ref 21–32)
COVID-19 RAPID TEST, COVR: ABNORMAL
CREAT SERPL-MCNC: 6.66 MG/DL (ref 0.7–1.3)
DIAGNOSIS, 93000: NORMAL
DIFFERENTIAL METHOD BLD: ABNORMAL
EOSINOPHIL # BLD: 0.3 K/UL (ref 0–0.4)
EOSINOPHIL NFR BLD: 3 % (ref 0–7)
ERYTHROCYTE [DISTWIDTH] IN BLOOD BY AUTOMATED COUNT: 14.3 % (ref 11.5–14.5)
FLUAV AG NPH QL IA: NEGATIVE
FLUBV AG NOSE QL IA: NEGATIVE
GLOBULIN SER CALC-MCNC: 4.2 G/DL (ref 2–4)
GLUCOSE SERPL-MCNC: 296 MG/DL (ref 65–100)
HCT VFR BLD AUTO: 26 % (ref 36.6–50.3)
HGB BLD-MCNC: 8.6 G/DL (ref 12.1–17)
IMM GRANULOCYTES # BLD AUTO: 0.1 K/UL (ref 0–0.04)
IMM GRANULOCYTES NFR BLD AUTO: 1 % (ref 0–0.5)
LACTATE BLD-SCNC: 1.02 MMOL/L (ref 0.4–2)
LYMPHOCYTES # BLD: 1.5 K/UL (ref 0.8–3.5)
LYMPHOCYTES NFR BLD: 15 % (ref 12–49)
MCH RBC QN AUTO: 30.3 PG (ref 26–34)
MCHC RBC AUTO-ENTMCNC: 33.1 G/DL (ref 30–36.5)
MCV RBC AUTO: 91.5 FL (ref 80–99)
MONOCYTES # BLD: 0.7 K/UL (ref 0–1)
MONOCYTES NFR BLD: 8 % (ref 5–13)
NEUTS SEG # BLD: 7.1 K/UL (ref 1.8–8)
NEUTS SEG NFR BLD: 73 % (ref 32–75)
NRBC # BLD: 0 K/UL (ref 0–0.01)
NRBC BLD-RTO: 0 PER 100 WBC
P-R INTERVAL, ECG05: 168 MS
PLATELET # BLD AUTO: 177 K/UL (ref 150–400)
PMV BLD AUTO: 11.8 FL (ref 8.9–12.9)
POTASSIUM SERPL-SCNC: 4.6 MMOL/L (ref 3.5–5.1)
PROT SERPL-MCNC: 7.6 G/DL (ref 6.4–8.2)
Q-T INTERVAL, ECG07: 410 MS
QRS DURATION, ECG06: 132 MS
QTC CALCULATION (BEZET), ECG08: 457 MS
RBC # BLD AUTO: 2.84 M/UL (ref 4.1–5.7)
SARS-COV-2, COV2: NORMAL
SODIUM SERPL-SCNC: 132 MMOL/L (ref 136–145)
SOURCE, COVRS: ABNORMAL
TROPONIN I SERPL-MCNC: 0.11 NG/ML
TROPONIN I SERPL-MCNC: 0.11 NG/ML
VENTRICULAR RATE, ECG03: 75 BPM
WBC # BLD AUTO: 9.7 K/UL (ref 4.1–11.1)

## 2021-03-16 PROCEDURE — 74011250637 HC RX REV CODE- 250/637: Performed by: EMERGENCY MEDICINE

## 2021-03-16 PROCEDURE — 71045 X-RAY EXAM CHEST 1 VIEW: CPT

## 2021-03-16 PROCEDURE — 90935 HEMODIALYSIS ONE EVALUATION: CPT

## 2021-03-16 PROCEDURE — 87804 INFLUENZA ASSAY W/OPTIC: CPT

## 2021-03-16 PROCEDURE — 85025 COMPLETE CBC W/AUTO DIFF WBC: CPT

## 2021-03-16 PROCEDURE — 99285 EMERGENCY DEPT VISIT HI MDM: CPT

## 2021-03-16 PROCEDURE — 93005 ELECTROCARDIOGRAM TRACING: CPT

## 2021-03-16 PROCEDURE — 87635 SARS-COV-2 COVID-19 AMP PRB: CPT

## 2021-03-16 PROCEDURE — 83605 ASSAY OF LACTIC ACID: CPT

## 2021-03-16 PROCEDURE — 36415 COLL VENOUS BLD VENIPUNCTURE: CPT

## 2021-03-16 PROCEDURE — U0005 INFEC AGEN DETEC AMPLI PROBE: HCPCS

## 2021-03-16 PROCEDURE — 80053 COMPREHEN METABOLIC PANEL: CPT

## 2021-03-16 PROCEDURE — 84484 ASSAY OF TROPONIN QUANT: CPT

## 2021-03-16 RX ORDER — LEVOFLOXACIN 500 MG/1
500 TABLET, FILM COATED ORAL
Status: COMPLETED | OUTPATIENT
Start: 2021-03-16 | End: 2021-03-16

## 2021-03-16 RX ORDER — LEVOFLOXACIN 500 MG/1
500 TABLET, FILM COATED ORAL EVERY OTHER DAY
Qty: 3 TAB | Refills: 0 | Status: SHIPPED | OUTPATIENT
Start: 2021-03-16 | End: 2021-03-23 | Stop reason: ALTCHOICE

## 2021-03-16 RX ORDER — ACETAMINOPHEN 325 MG/1
975 TABLET ORAL
Status: COMPLETED | OUTPATIENT
Start: 2021-03-16 | End: 2021-03-16

## 2021-03-16 RX ADMIN — LEVOFLOXACIN 500 MG: 500 TABLET, FILM COATED ORAL at 15:18

## 2021-03-16 RX ADMIN — ACETAMINOPHEN 975 MG: 325 TABLET ORAL at 09:57

## 2021-03-16 NOTE — DIALYSIS
[place] [facility]  [phone]    Vitals   Pre   Post   Assessment   Pre   Post     Temp  Temp: 97.9 °F (36.6 °C) (03/16/21 1915)  97.9 LOC  A+Ox3 same   HR   Pulse (Heart Rate): 64 (03/16/21 1915) 62 Lungs   Room air, diminished across bases  same   B/P   BP: 136/65 (03/16/21 1915) 152/59 Cardiac   NSR  same   Resp   Resp Rate: 29 (03/16/21 1915) 26 Skin   Warm/dry  same   Pain level  Pain Intensity 1: 0 (03/16/21 0908) 0 Edema  generalized     same   Orders:    Duration:   Start:    1915 End:    2300 Total:   3.5   Dialyzer:   Dialyzer/Set Up Inspection: Max Ka (03/16/21 1915)   K Bath:   Dialysate K (mEq/L): 3 (03/16/21 1915)   Ca Bath:   Dialysate CA (mEq/L): 2.5 (03/16/21 1915)   Na/Bicarb:   Dialysate NA (mEq/L): 138 (03/16/21 1915)   Target Fluid Removal:   Goal/Amount of Fluid to Remove (mL): 2000 mL (03/16/21 1915)   Access     Type & Location:   STEPHANIE-AVF-W/ Bruit/thrill, site intact, no S+S of infection. Cannulated w/ 2x 15g, [email or web address] [email or web address]   Labs     Obtained/Reviewed   Critical Results Called   Date when labs were drawn-  Hgb-    HGB   Date Value Ref Range Status   03/16/2021 8.6 (L) 12.1 - 17.0 g/dL Final     K-    Potassium   Date Value Ref Range Status   03/16/2021 4.6 3.5 - 5.1 mmol/L Final     Ca-   Calcium   Date Value Ref Range Status   03/16/2021 8.8 8.5 - 10.1 MG/DL Final     Bun-   BUN   Date Value Ref Range Status   03/16/2021 63 (H) 6 - 20 MG/DL Final     Creat-   Creatinine   Date Value Ref Range Status   03/16/2021 6.66 (H) 0.70 - 1.30 MG/DL Final        Medications/ Blood Products Given     Name   Dose   Route and Time                     Blood Volume Processed (BVP):    82.3 Net Fluid   Removed:  2,000ml   Comments   Time Out Done: 1910  Primary Nurse Rpt Pre: ER  Primary Nurse Rpt Post: ER  Pt Education: fluid resrictions  Care Plan: Continue Tx as ordered  Tx Summary: Pt returned after 30min due to moving around in bed and infiltrating venous site.   Pt [address] and machine restrung, UF goal raised to compensate. Finished rest of Tx without issue. All possible blood returned via NS rinseback. Needles pulled, sites secured, no excessive bleeding. Admiting Diagnosis: body aches  Pt's [facility] clinic-[facility]  Consent signed - Informed Consent Verified: Yes (03/16/21 1915)  [facility]  Hepatitis Status- Neg/Immune/1/9/21  Machine #- Machine Number: [id number] (03/16/21 1915)  Telemetry status-bedside  Pre-dialysis wt. - Pre-Dialysis Weight: 118.1 kg (260 lb 5.8 oz) (03/16/21 1915)

## 2021-03-16 NOTE — ED NOTES
Pt received from triage with c/o sob and a fever x 0330 this morning. Pt reports he was supposed to go to dialysis this morning (T,Thurs,Sat) and was unable to make it. His last dialysis session was Saturday. Pt reports dyspnea on exertion and \"bloating\" and diarrhea. Dr. Yogi Paige at bedside to evaluate pt.

## 2021-03-16 NOTE — ED NOTES
Called up to dialysis suite for update on ETA. They were \"unsure of who dayshift signed out to\" and referred ED RN to call the scheduling office. RN attempted scheduling with no response. ED RN called diaylsis suite x2 with no response. Charge nurse notified.

## 2021-03-16 NOTE — ED PROVIDER NOTES
EMERGENCY DEPARTMENT HISTORY AND PHYSICAL EXAM      Date: 3/16/2021  Patient Name: Garret Guzman    History of Presenting Illness     Chief Complaint   Patient presents with    Shortness of Breath     SOB this am, worse with exertion. Reports dialysis scheduled for this am.     Fever     Pt awoke with chills at 0330. Temp 101.6 reported       History Provided By: Patient and Patient's Wife    HPI: Garret Guzman, 70 y.o. male presents to the ED with cc of fever. 82-year-old male with history of hypertension, diabetes, end-stage renal disease on Saturday/Tuesday/Thursday dialysis who is not anuric presents emergency department with fever. Patient reports yesterday has normal state of health other than some shortness of breath. Patient reports feeling fluid overloaded. Patient reports he woke up with chills and fever at 0 330 this morning. T-max at home 101.6. Patient has first Covid vaccine 2 to 3 weeks ago. He denies any sick contacts. He denies any headache, loss of taste or smell. Chest pain, cough. Denies abdominal pain, nausea, vomiting or diarrhea. Denies rash. He does have a left AV graft which has not had any pain or swelling. There are no other complaints, changes, or physical findings at this time. PCP: Yuval Dia MD    No current facility-administered medications on file prior to encounter. Current Outpatient Medications on File Prior to Encounter   Medication Sig Dispense Refill    losartan (COZAAR) 25 mg tablet Take  by mouth daily.       gabapentin (NEURONTIN) 300 mg capsule TAKE 1 CAPSULE BY MOUTH EVERY DAY 90 Cap 3    insulin NPH/insulin regular (NOVOLIN 70/30 U-100 INSULIN) 100 unit/mL (70-30) injection Inject 18 Units with Breakfast, 20 Units with Dinner 4 Vial 3    Insulin Needles, Disposable, (REYNALDO PEN NEEDLE) 32 gauge x 5/32\" ndle FOR USE EVERY MORNING WITH VICTOZA  Pen Needle 3    sertraline (ZOLOFT) 50 mg tablet TAKE 1 T PO QD  0    aspirin 81 mg chewable tablet Take 81 mg by mouth daily.  carvedilol (COREG) 12.5 mg tablet Take 12.5 mg by mouth two (2) times daily (with meals).  Insulin Syringe-Needle U-100 1 mL 30 gauge x 5/16 syrg Use twice daily for insulin injection 100 Syringe 7    hydrALAZINE (APRESOLINE) 100 mg tablet 100 mg three (3) times daily.  Calcium-Cholecalciferol, D3, (CALCIUM 600 WITH VITAMIN D3) 600 mg(1,500mg) -400 unit chew Take 1 Tab by mouth every evening.  fluticasone (FLONASE) 50 mcg/actuation nasal spray 2 Sprays by Both Nostrils route daily.  tamsulosin (FLOMAX) 0.4 mg capsule Take 0.8 mg by mouth daily. Past History     Past Medical History:  Past Medical History:   Diagnosis Date    Arthritis     spine    CAD (coronary artery disease)     high cholesterol    Chronic kidney disease     dialysis    Diabetes ([address]     GERD (gastroesophageal reflux disease)     HX    Hypertension     Ill-defined condition     irritable bowel syndrome    [facility] [address] [address]     Unspecified sleep apnea     NO CPAP       Past Surgical History:  Past Surgical History:   Procedure Laterality Date    COLONOSCOPY N/A 2016    [facility] performed [facility]COPY    HX ORTHOPAEDIC      CERVICAL FUSION    HX UROLOGICAL      TURP    HX VASCULAR ACCESS      L arm dialysis access       Family History:  Family History   Problem Relation Age of Onset    Cancer Father         \"bone\"    Diabetes Brother        Social History:  Social History     Tobacco Use    Smoking status: Former Smoker     Quit date:      Years since quittin.2    Smokeless tobacco: Former User    Tobacco comment: cigars only   Substance Use Topics    Alcohol use: No    Drug use: No       Allergies: Allergies   Allergen Reactions    Albumin, Human 25 % Anaphylaxis and Hives    Niacin Hives         Review of Systems   Review of Systems   Constitutional: Positive for chills and fever.  Negative for appetite change. HENT: Negative for voice change. Eyes: Negative for pain and redness. Respiratory: Positive for shortness of breath. Negative for cough and chest tightness. Cardiovascular: Positive for leg swelling. Negative for chest pain. Gastrointestinal: Negative for abdominal pain, diarrhea, nausea and vomiting. Genitourinary: Negative for hematuria. Musculoskeletal: Negative for gait problem. Skin: Negative for color change, pallor and rash. Neurological: Negative for facial asymmetry, weakness and headaches. Hematological: Does not bruise/bleed easily. Psychiatric/Behavioral: Negative for behavioral problems. All other systems reviewed and are negative. Physical Exam   Physical Exam  Vitals signs and nursing note reviewed. Constitutional:       Comments: 51-year-old male, resting on stretcher, no acute distress   HENT:      Head: Normocephalic. Right Ear: External ear normal.      Left Ear: External ear normal.      Nose: Nose normal.   Eyes:      Conjunctiva/sclera: Conjunctivae normal.   Cardiovascular:      Rate and Rhythm: Normal rate and regular rhythm. Heart sounds: No murmur. No friction rub. No gallop. Pulmonary:      Effort: Pulmonary effort is normal.      Breath sounds: Examination of the right-middle field reveals rhonchi. Examination of the right-lower field reveals rhonchi. Examination of the left-lower field reveals rhonchi. Rhonchi present. No wheezing or rales. Abdominal:      Palpations: Abdomen is soft. Tenderness: There is no abdominal tenderness. Musculoskeletal: Normal range of motion. Comments: Left AV graft with palpable thrill, no surrounding erythema or purulence   Skin:     General: Skin is warm. Capillary Refill: Capillary refill takes less than 2 seconds. Neurological:      Mental Status: He is alert. Mental status is at baseline.    Psychiatric:         Mood and Affect: Mood normal.         Behavior: Behavior normal. Diagnostic Study Results     Labs -     Recent Results (from the past 12 hour(s))   EKG, 12 LEAD, INITIAL    Collection Time: 03/16/21  9:12 AM   Result Value Ref Range    Ventricular Rate 75 BPM    Atrial Rate 75 BPM    P-R Interval 168 ms    QRS Duration 132 ms    Q-T Interval 410 ms    QTC Calculation (Bezet) 457 ms    Calculated R Axis -54 degrees    Calculated T Axis 89 degrees    Diagnosis       ** Poor data quality, interpretation may be adversely affected  Normal sinus rhythm  Left axis deviation  Right bundle branch block  Left anterior ambrocio-block        Confirmed by Michelle Farris ([id number]) on 3/16/2021 1:00:29 PM     CBC WITH AUTOMATED DIFF    Collection Time: 03/16/21  9:17 AM   Result Value Ref Range    WBC 9.7 4.1 - 11.1 K/uL    RBC 2.84 (L) 4.10 - 5.70 M/uL    HGB 8.6 (L) 12.1 - 17.0 g/dL    HCT 26.0 (L) 36.6 - 50.3 %    MCV 91.5 80.0 - 99.0 FL    MCH 30.3 26.0 - 34.0 PG    MCHC 33.1 30.0 - 36.5 g/dL    RDW 14.3 11.5 - 14.5 %    PLATELET 037 964 - 175 K/uL    MPV 11.8 8.9 - 12.9 FL    NRBC 0.0 0  WBC    ABSOLUTE NRBC 0.00 0.00 - 0.01 K/uL    NEUTROPHILS 73 32 - 75 %    LYMPHOCYTES 15 12 - 49 %    MONOCYTES 8 5 - 13 %    EOSINOPHILS 3 0 - 7 %    BASOPHILS 0 0 - 1 %    IMMATURE GRANULOCYTES 1 (H) 0.0 - 0.5 %    ABS. NEUTROPHILS 7.1 1.8 - 8.0 K/UL    ABS. LYMPHOCYTES 1.5 0.8 - 3.5 K/UL    ABS. MONOCYTES 0.7 0.0 - 1.0 K/UL    ABS. EOSINOPHILS 0.3 0.0 - 0.4 K/UL    ABS. BASOPHILS 0.0 0.0 - 0.1 K/UL    ABS. IMM.  GRANS. 0.1 (H) 0.00 - 0.04 K/UL    DF AUTOMATED     METABOLIC PANEL, COMPREHENSIVE    Collection Time: 03/16/21  9:17 AM   Result Value Ref Range    Sodium 132 (L) 136 - 145 mmol/L    Potassium 4.6 3.5 - 5.1 mmol/L    Chloride 103 97 - 108 mmol/L    CO2 22 21 - 32 mmol/L    Anion gap 7 5 - 15 mmol/L    Glucose 296 (H) 65 - 100 mg/dL    BUN 63 (H) 6 - 20 MG/DL    Creatinine 6.66 (H) 0.70 - 1.30 MG/DL    BUN/Creatinine ratio 9 (L) 12 - 20      GFR est AA 10 (L) >60 ml/min/1.73m2    GFR est non-AA 8 (L) >60 ml/min/1.73m2    Calcium 8.8 8.5 - 10.1 MG/DL    Bilirubin, total 0.4 0.2 - 1.0 MG/DL    ALT (SGPT) 44 12 - 78 U/L    AST (SGOT) 28 15 - 37 U/L    Alk. phosphatase 80 45 - 117 U/L    Protein, total 7.6 6.4 - 8.2 g/dL    Albumin 3.4 (L) 3.5 - 5.0 g/dL    Globulin 4.2 (H) 2.0 - 4.0 g/dL    A-G Ratio 0.8 (L) 1.1 - 2.2     TROPONIN I    Collection Time: 03/16/21  9:17 AM   Result Value Ref Range    Troponin-I, Qt. 0.11 (H) <0.05 ng/mL   POC LACTIC ACID    Collection Time: 03/16/21  9:21 AM   Result Value Ref Range    Lactic Acid (POC) 1.02 0.40 - 2.00 mmol/L   COVID-19 RAPID TEST    Collection Time: 03/16/21  9:56 AM   Result Value Ref Range    Specimen source Please find results under separate order      COVID-19 rapid test Indeterminate (A) NOTD     INFLUENZA A+B VIRAL AGS    Collection Time: 03/16/21  9:56 AM   Result Value Ref Range    Influenza A Antigen Negative NEG      Influenza B Antigen Negative NEG     TROPONIN I    Collection Time: 03/16/21 11:47 AM   Result Value Ref Range    Troponin-I, Qt. 0.11 (H) <0.05 ng/mL   SARS-COV-2    Collection Time: 03/16/21 11:47 AM   Result Value Ref Range    SARS-CoV-2 Please find results under separate order         Radiologic Studies -   XR CHEST PORT   Final Result        CT Results  (Last 48 hours)    None        CXR Results  (Last 48 hours)               03/16/21 1220  XR CHEST PORT Final result    Impression:  Mild diffuse bilateral airspace disease, improved since the prior   study. May represent viral/atypical pneumonia, versus mild pulmonary edema. Narrative:  INDICATION:  Fever        COMPARISON: July 2020       FINDINGS: Single AP portable view of the chest obtained at 920 demonstrates a   stable cardiomediastinal silhouette. There is mild ill-defined diffuse bilateral   airspace disease, improved from the prior study. No osseous abnormalities are   seen.                  Medical Decision Making   I am the first provider for this patient. I reviewed the vital signs, available nursing notes, past medical history, past surgical history, family history and social history. Vital Signs-Reviewed the patient's vital signs. Patient Vitals for the past 12 hrs:   Temp Pulse Resp BP SpO2   03/16/21 2030  64 28 131/62 96 %   03/16/21 2015  65 27 137/66 98 %   03/16/21 1945  62 (!) 31 136/65 97 %   03/16/21 1930  63 28 (!) 142/66 98 %   03/16/21 1915 97.9 °F (36.6 °C) 64 29 136/65 98 %   03/16/21 1815  65 29 137/65 97 %   03/16/21 1800  63 30 135/66 95 %   03/16/21 1745  63 29 132/64 98 %   03/16/21 1730  63 26 131/68 97 %   03/16/21 1715  65 26 120/70 95 %   03/16/21 1700  71 19 (!) 178/87 96 %   03/16/21 1630  63 25 132/69 96 %   03/16/21 1615  64 21 (!) 143/68 96 %   03/16/21 1600  61 30 (!) 141/67 96 %   03/16/21 1545  63 28 (!) 159/86 97 %   03/16/21 1530  60 27 (!) 146/69 95 %   03/16/21 1515  60 23 (!) 131/57 97 %   03/16/21 1500  64 (!) 31 (!) 141/64 96 %   03/16/21 1445  63 26 (!) 141/66 95 %   03/16/21 1430  63 27 (!) 144/67 95 %   03/16/21 1415  64 28 128/64 97 %   03/16/21 1334  65 (!) 6 121/72 96 %   03/16/21 1130  68 27 (!) 145/61 95 %   03/16/21 1115  66 30 126/60 92 %   03/16/21 1100  71 28 111/79 94 %   03/16/21 1045  72 27 (!) 138/57 93 %   03/16/21 1030  69 (!) 33 127/66 96 %   03/16/21 1015  69 (!) 32 131/64 94 %   03/16/21 1000  69 23 (!) 130/57 97 %   03/16/21 0945  68 (!) 34 (!) 150/58 97 %   03/16/21 0930  70 29 (!) 156/59 97 %   03/16/21 0908 99.3 °F (37.4 °C) 68 24 (!) 159/65 97 %       EKG interpretation: (Preliminary)    Preliminary EKG interpreted by me. Shows sinus rhythm with a HR of 75. No ST elevations. Right bundle branch block.   LVH with repolarization normality    Records Reviewed: Nursing Notes and Old Medical Records    Provider Notes (Medical Decision Making):     81-year-old male with history of end-stage renal disease he is due for dialysis today presents with shortness of breath and fluid overload. Appears fluid overloaded on exam.  Patient does report a fever today, no other clear localizing symptoms. Appears quite well with normal vitals. Will give Tylenol. Check basic labs and lactate. Blood cultures drawn and held. We will check rapid Covid and flu. Check chest x-ray and urine. Doubt meningitis or encephalitis. Doubt bacteremia. Patient does appear fluid overloaded will require dialysis today, will speak to patient's nephrologist.    ED Course:   Initial assessment performed. The patients presenting problems have been discussed, and they are in agreement with the care plan formulated and outlined with them. I have encouraged them to ask questions as they arise throughout their visit. ED Course as of Mar 19 1423   Tue Mar 16, 2021   0958 D/w Dr. Jessica Finch with nephrology. [MB]   5947 Covid is indeterminate. Plan for dialysis today. Discussed with Dr. Jessica Finch. Labs reassuring.    [MB]   2136 Rpt troponin stable, patient reassessed, non-toxic, no distress    [MB]   1251 Plain film is consistent with pneumonia, given this, reasonable to treat with antibiotics while awaiting Covid results. [MB]   8651 Patient updated, still awaiting dialysis    [MB]   2258 Patient has now completed dialysis, VS have been within normal range. Will be discharged to home with close outpatient follow-up with primary care physician. Will continue outpatient dialysis as scheduled. [CC]      ED Course User Index  [CC] Lucia Webster MD  [MB] Moose Quintana MD       Updates as above, did offer patient admission for pneumonia and dialysis, patient declined and is agreeable to waiting in the ED as he will likely be able to be discharged. Patient care transition to Dr. Dian Oseguera to discharge after dialysis. Please note I will remain attending of record. Anticipate discharge with renally dosed Levaquin and nephrology follow-up. Patient informed of pending Covid PCR.     Merlene Steve Mariah Edge MD      Disposition:    Discharged      DISCHARGE PLAN:  1. Current Discharge Medication List      START taking these medications    Details   levoFLOXacin (Levaquin) 500 mg tablet Take 1 Tab by mouth every other day. Qty: 3 Tab, Refills: 0           2. Follow-up Information     Follow up With Specialties Details Why Contact Info    Tevin Kate MD Family Medicine In 3 days  [address]  [phone]          3. Return to ED if worse     Diagnosis     Clinical Impression:   1. Pneumonia due to infectious organism, unspecified laterality, unspecified part of lung    2. Fever, unspecified fever cause    3. Suspected COVID-19 virus infection        Attestations:    Rochelle Mullen MD    Please note that this dictation was completed with [email or web address], the computer voice recognition software. Quite often unanticipated grammatical, syntax, homophones, and other interpretive errors are inadvertently transcribed by the computer software. Please disregard these errors. Please excuse any errors that have escaped final proofreading. Thank you.

## 2021-03-16 NOTE — DISCHARGE INSTRUCTIONS
You were seen in the ER for fever. You received your scheduled dialysis. Please take the antibiotic to treat pneumonia. Your coronavirus test is pending. You will be called if this is positive. Please stay in touch with your nephrologist to schedule your additional dialysis sessions.

## 2021-03-16 NOTE — CONSULTS
NEPHROLOGY CONSULT NOTE           Patient: Doris Boles MRN: [id number]  PCP: Cherise Kee MD   :     1945  Age:   70 y.o. Sex:  male      Referring physician: Marvin Voss MD      REASON FOR CONSULT:  OLIVER    HPI:  Doris Boles is a 70 y.o. male  With PMH significant for ESRD sec to DKD currently on HD TTS at [facility] under Dr Lance Beltran with LUE AVG, had last HD on Saturday presented for c/o fever and chills since last 24 hours. He was vaccinated for Rosaura Dues COVID vaccine and has received first shot so far. He mentioned that he started to feel slight SOB and fever since last night. He decided to come to ED to get evaluated and did not go to HD unit. Renal consult was requested for HD needs. Review of Systems  All systems reviewed and were negative except for above    Past Medical History:   Diagnosis Date    Arthritis     spine    CAD (coronary artery disease)     high cholesterol    Chronic kidney disease     dialysis    Diabetes ([facility] [address]     GERD (gastroesophageal reflux disease)     HX    Hypertension     Ill-defined condition     irritable bowel syndrome    Systolic CHF ([address]     Unspecified sleep apnea     NO CPAP       Past Surgical History:   Procedure Laterality Date    COLONOSCOPY N/A 2016    COLONOSCOPY performed by Thomas Ugarte MD at [facility] ENDOSCOPY    HX ORTHOPAEDIC      CERVICAL FUSION    HX UROLOGICAL      TURP    HX VASCULAR ACCESS      L arm dialysis access       Allergies   Allergen Reactions    Albumin, Human 25 % Anaphylaxis and Hives    Niacin Hives       No current facility-administered medications for this encounter. Current Outpatient Medications   Medication Sig    losartan (COZAAR) 25 mg tablet Take  by mouth daily.     gabapentin (NEURONTIN) 300 mg capsule TAKE 1 CAPSULE BY MOUTH EVERY DAY    insulin NPH/insulin regular (NOVOLIN 70/30 U-100 INSULIN) 100 unit/mL (70-30) injection Inject 18 Units with Breakfast, 20 Units with Dinner    Insulin Needles, Disposable, (REYNALDO PEN NEEDLE) 32 gauge x 5\" ndle FOR USE EVERY MORNING WITH VICTOZA PEN    sertraline (ZOLOFT) 50 mg tablet TAKE 1 T PO QD    aspirin 81 mg chewable tablet Take 81 mg by mouth daily.  carvedilol (COREG) 12.5 mg tablet Take 12.5 mg by mouth two (2) times daily (with meals).  Insulin Syringe-Needle U-100 1 mL 30 gauge x 5/16 syrg Use twice daily for insulin injection    hydrALAZINE (APRESOLINE) 100 mg tablet 100 mg three (3) times daily.  Calcium-Cholecalciferol, D3, (CALCIUM 600 WITH VITAMIN D3) 600 mg(1,500mg) -400 unit chew Take 1 Tab by mouth every evening.  fluticasone (FLONASE) 50 mcg/actuation nasal spray 2 Sprays by Both Nostrils route daily.  tamsulosin (FLOMAX) 0.4 mg capsule Take 0.8 mg by mouth daily.        Family History   Problem Relation Age of Onset   Elva Ford Cancer Father         \"bone\"    Diabetes Brother        Social History     Socioeconomic History    Marital status:      Spouse name: Not on file    Number of children: Not on file    Years of education: Not on file    Highest education level: Not on file   Occupational History    Not on file   Social Needs    Financial resource strain: Not on file    Food insecurity     Worry: Not on file     Inability: Not on file    Transportation needs     Medical: Not on file     Non-medical: Not on file   Tobacco Use    Smoking status: Former Smoker     Quit date:      Years since quittin.2    Smokeless tobacco: Former User    Tobacco comment: cigars only   Substance and Sexual Activity    Alcohol use: No    Drug use: No    Sexual activity: Not Currently   Lifestyle    Physical activity     Days per week: Not on file     Minutes per session: Not on file    Stress: Not on file   Relationships    Social connections     Talks on phone: Not on file     Gets together: Not on file     Attends Scientologist service: Not on file     Active member of club or organization: Not on file     Attends meetings of clubs or organizations: Not on file     Relationship status: Not on file    Intimate partner violence     Fear of current or ex partner: Not on file     Emotionally abused: Not on file     Physically abused: Not on file     Forced sexual activity: Not on file   Other Topics Concern    Not on file   Social History Narrative    Not on file       Vitals:    03/16/21 0930 03/16/21 0945 03/16/21 1000 03/16/21 1015   BP: (!) 156/59 (!) 150/58 (!) 130/57 131/64   Pulse: 70 68 69 69   Resp: 29 (!) 34 23 (!) 32   Temp:       SpO2: 97% 97% 97% 94%   Weight:       Height:           No intake or output data in the 24 hours ending 03/16/21 1129    PHYSICAL EXAM:  GENERAL : Lying down in bed with no acute distress  HEENT: AT NC PEERLA   NECK: Supple no JVP  CVS: S1 S2 RRR, no murmur or gallops heard  RS: CTABL, no rhonchi,or wheezing heard  ABDOMEN: soft NT ND positive BS  EXTREMITY: No edema clubbing or cyanosis, pedal pulse +  NEUROLOGY: AAA X3, no focal deficit or asterixis  VASCULAR ACCESS:  LUE AVG      LABS/STUDIES:  BMP:   Lab Results   Component Value Date/Time     (L) 03/16/2021 09:17 AM    K 4.6 03/16/2021 09:17 AM     03/16/2021 09:17 AM    CO2 22 03/16/2021 09:17 AM    AGAP 7 03/16/2021 09:17 AM     (H) 03/16/2021 09:17 AM    BUN 63 (H) 03/16/2021 09:17 AM    CREA 6.66 (H) 03/16/2021 09:17 AM    GFRAA 10 (L) 03/16/2021 09:17 AM    GFRNA 8 (L) 03/16/2021 09:17 AM        CBC:    Lab Results   Component Value Date/Time    WBC 9.7 03/16/2021 09:17 AM    HGB 8.6 (L) 03/16/2021 09:17 AM    HCT 26.0 (L) 03/16/2021 09:17 AM     03/16/2021 09:17 AM       Lab Results   Component Value Date/Time    Microalb/Creat ratio (ug/mg creat.) 897.2 (H) 04/11/2019 12:27 PM       Lab Results   Component Value Date/Time    Color YELLOW/STRAW 07/21/2020 06:45 AM    Appearance CLEAR 07/21/2020 06:45 AM    Specific gravity 1.020 07/21/2020 06:45 AM    Specific gravity 1.016 06/19/2020 04:17 PM    pH (UA) 6.5 07/21/2020 06:45 AM    Protein >300 (A) 07/21/2020 06:45 AM    Glucose Negative 07/21/2020 06:45 AM    Ketone Negative 07/21/2020 06:45 AM    Bilirubin Negative 07/21/2020 06:45 AM    Urobilinogen 0.2 07/21/2020 06:45 AM    Nitrites Negative 07/21/2020 06:45 AM    Leukocyte Esterase Negative 07/21/2020 06:45 AM    Epithelial cells FEW 07/21/2020 06:45 AM    Bacteria Negative 07/21/2020 06:45 AM    WBC 0-4 07/21/2020 06:45 AM    RBC 5-10 07/21/2020 06:45 AM         ASSESSMENT/PLAN:  No new Assessment & Plan notes have been filed under this hospital service since the last note was generated. Service: Internal Medicine     ESRD  - Sec to DKD  - Gets Dialyzed at [facility] in [place] on TTS schedule, under Dr Monica Carrasco. - Will plan for HD today with 1.5-2L UF  - Labs were reviewed,CXR result pending  - monitor I/O's, daily weight    Fever:  -possible pneumonia  -CXR pending  -Antibiotics per primary team      Anemia of CKD  - Hb: 8  - will resume EPO   - monitor CBC      HTN  - uncontrolled  - resume homemeds     Thank you for allowing us to participate in this patient's care. D/W ED physician      Trang Alvarez MD  3/16/2021    [facility]:  [email or web address]  Sruthi Collazo office:  [address] [place] [address], [address]  [place], [address] [facility]  Phone: [phone]  Fax :     [phone]     [place] office:  [address] [facility], [address]  Phone - [phone]  Fax - [phone]

## 2021-03-16 NOTE — ED NOTES
TRANSFER - IN REPORT:    Verbal report received from [facility] (name) on [facility]. Report consisted of patients Situation, Background, Assessment and   Recommendations(SBAR). Information from the following report(s) SBAR and ED Summary was reviewed with the receiving nurse. Opportunity for questions and clarification was provided.         1946 Pt resting in bed dialysis at bedside no complaints at this time         2136 Pt resting in stretcher in POC, Call bell within reach no complaints at this time

## 2021-03-16 NOTE — ED NOTES
Luda- from [facility] dialysis states patient is on spread sheet as scheduled will be done tonight eta unknown

## 2021-03-17 ENCOUNTER — PATIENT OUTREACH (OUTPATIENT)
Dept: CASE MANAGEMENT | Age: 76
End: 2021-03-17

## 2021-03-17 LAB
SARS-COV-2, XPLCVT: NOT DETECTED
SOURCE, COVRS: NORMAL

## 2021-03-17 NOTE — ED NOTES
Pt discharged by MD. Pt provided with discharge instructions Rx and instructions on follow up care. Pt out of ED in wheelchair  accompanied by family.

## 2021-03-18 ENCOUNTER — PATIENT OUTREACH (OUTPATIENT)
Dept: CASE MANAGEMENT | Age: 76
End: 2021-03-18

## 2021-03-22 ENCOUNTER — TELEPHONE (OUTPATIENT)
Dept: ENDOCRINOLOGY | Age: 76
End: 2021-03-22

## 2021-03-22 NOTE — TELEPHONE ENCOUNTER
----- Message from Marni Duggan sent at 3/22/2021  3:11 PM EDT -----  Regarding: Dr. Anderson Reginaldo: [phone]  Medication Refill    Caller (if not patient):Pt      Relationship of caller (if not patient):n/a      Best contact number(s):[phone]      Name of medication and dosage if known:novolin 70/30      Is patient out of this medication (yes/no):yes      Pharmacy name:[facility]    Pharmacy listed in chart? (yes/no):yes  Pharmacy phone number:  [phone]  Fax:  [phone]       Details to clarify the request:n/a      Marni Duggan

## 2021-03-23 ENCOUNTER — VIRTUAL VISIT (OUTPATIENT)
Dept: ENDOCRINOLOGY | Age: 76
End: 2021-03-23
Payer: MEDICARE

## 2021-03-23 DIAGNOSIS — Z79.4 TYPE 2 DIABETES MELLITUS WITH STAGE 4 CHRONIC KIDNEY DISEASE, WITH LONG-TERM CURRENT USE OF INSULIN (HCC): Primary | ICD-10-CM

## 2021-03-23 DIAGNOSIS — E11.22 TYPE 2 DIABETES MELLITUS WITH STAGE 4 CHRONIC KIDNEY DISEASE, WITH LONG-TERM CURRENT USE OF INSULIN (HCC): Primary | ICD-10-CM

## 2021-03-23 DIAGNOSIS — E78.5 HYPERLIPIDEMIA, UNSPECIFIED HYPERLIPIDEMIA TYPE: ICD-10-CM

## 2021-03-23 DIAGNOSIS — I10 ESSENTIAL HYPERTENSION WITH GOAL BLOOD PRESSURE LESS THAN 130/80: ICD-10-CM

## 2021-03-23 DIAGNOSIS — N18.4 TYPE 2 DIABETES MELLITUS WITH STAGE 4 CHRONIC KIDNEY DISEASE, WITH LONG-TERM CURRENT USE OF INSULIN (HCC): Primary | ICD-10-CM

## 2021-03-23 PROCEDURE — G8432 DEP SCR NOT DOC, RNG: HCPCS | Performed by: INTERNAL MEDICINE

## 2021-03-23 PROCEDURE — 1101F PT FALLS ASSESS-DOCD LE1/YR: CPT | Performed by: INTERNAL MEDICINE

## 2021-03-23 PROCEDURE — 3046F HEMOGLOBIN A1C LEVEL >9.0%: CPT | Performed by: INTERNAL MEDICINE

## 2021-03-23 PROCEDURE — G8427 DOCREV CUR MEDS BY ELIG CLIN: HCPCS | Performed by: INTERNAL MEDICINE

## 2021-03-23 PROCEDURE — 3017F COLORECTAL CA SCREEN DOC REV: CPT | Performed by: INTERNAL MEDICINE

## 2021-03-23 PROCEDURE — 2022F DILAT RTA XM EVC RTNOPTHY: CPT | Performed by: INTERNAL MEDICINE

## 2021-03-23 PROCEDURE — 99443 PR PHYS/QHP TELEPHONE EVALUATION 21-30 MIN: CPT | Performed by: INTERNAL MEDICINE

## 2021-03-23 PROCEDURE — G9231 DOC ESRD DIA TRANS PREG: HCPCS | Performed by: INTERNAL MEDICINE

## 2021-03-23 RX ORDER — CARVEDILOL 25 MG/1
TABLET ORAL
Status: ON HOLD | COMMUNITY
Start: 2021-01-13 | End: 2021-05-06 | Stop reason: SDUPTHER

## 2021-03-23 RX ORDER — BUMETANIDE 2 MG/1
TABLET ORAL
COMMUNITY
Start: 2021-03-19

## 2021-03-23 RX ORDER — MIDODRINE HYDROCHLORIDE 5 MG/1
TABLET ORAL
COMMUNITY
Start: 2021-03-11 | End: 2021-05-06

## 2021-03-23 RX ORDER — FINASTERIDE 5 MG/1
TABLET, FILM COATED ORAL
COMMUNITY
Start: 2021-01-20

## 2021-03-23 RX ORDER — MIRABEGRON 25 MG/1
TABLET, FILM COATED, EXTENDED RELEASE ORAL
COMMUNITY
Start: 2021-03-19 | End: 2021-12-21 | Stop reason: ALTCHOICE

## 2021-03-23 NOTE — TELEPHONE ENCOUNTER
I attempted to return this call and reached his voice mail. I left a message asking him to give me a call back because he needs a follow up appointment before any further refills can be done. I offered him a 3:10 PM appointment today and said he needed to give me a call back to schedule that.   Cindy Keen

## 2021-03-23 NOTE — PROGRESS NOTES
**DUE TO PANDEMIC AND HEALTH CONCERNS IN THE COMMUNITY, THIS PATIENT WAS EITHER ILL OR FOUND TO BE HIGH RISK FOR IN-PERSON EVALUATION WITHIN THE CLINIC. THE FOLLOWING IS A VIRTUAL VISIT VIA A TELEPHONE ENCOUNTER TO WHICH THE PATIENT AGREED. THE PURPOSE IS TO LIMIT INTERRUPTIONS IN HEALTHCARE AND TO PROVIDE FOR ONGOING URGENT NEEDS UNDER THE CURRENT CONDITIONS. THE PATIENT CONFIRMS THEY ARE AWARE OF THE LIMITATIONS OF THE TELEPHONE VISIT. CHIEF COMPLAINT: f/u evaluation for uncontrolled type 2 diabetes    HISTORY OF PRESENT ILLNESS:   Queenie Fletcher is a 70 y.o. male with a PMHx as noted below who presents to the endocrinology clinic for f/u evaluation of uncontrolled type 2 diabetes.     Last seen in 2019,  Had a few hospital visits, \"but nothing serious\",    Currently taking the following meds:  Victoza 1.8mg daily (OFF x2 months due to cost)  Novolin 70/30:   15 units with breakfast and dinner (takign 20u AM and 30u AFTER Dinner)  Correction 1:15>150    Review of home blood glucose:  No long or meter with him today,  320, 295, past few mornings, this has been the case for the past month  Feels this is related to his diet,  No steroids received,     Review of most recent diabetes-related labs:  Lab Results   Component Value Date    HBA1C 10.5 (H) 11/16/2016    HBA1C 5.7 01/19/2011    PYD8WUMI 7.3 12/11/2019    ZXM4BIGW 6.4 08/14/2019    TQE7SMJM 7.5 04/11/2019    CHOL 157 02/27/2018    LDLC 61 02/27/2018    GFRAA 10 (L) 03/16/2021    GFRNA 8 (L) 03/16/2021    CREATEXT 5.20 09/13/2018    MCACR 897.2 (H) 04/11/2019    MALBEXT HIGH 4737.5 10/02/2017    TSH 1.39 10/29/2016    VITD3 30.9 07/22/2020         PAST MEDICAL/SURGICAL HISTORY:   Past Medical History:   Diagnosis Date    Arthritis     spine    CAD (coronary artery disease)     high cholesterol    Chronic kidney disease     dialysis    Diabetes ([facility] [address]     GERD (gastroesophageal reflux disease)     HX    Hypertension     Ill-defined condition     irritable bowel syndrome    [facility] [address]     Unspecified sleep apnea     NO CPAP     Past Surgical History:   Procedure Laterality Date    COLONOSCOPY N/A 11/9/2016    COLONOSCOPY performed by Carlos Pemberton MD at [facility] ENDOSCOPY    HX ORTHOPAEDIC      CERVICAL FUSION    HX UROLOGICAL      TURP    HX VASCULAR ACCESS      L arm dialysis access       ALLERGIES:   Allergies   Allergen Reactions    Albumin, Human 25 % Anaphylaxis and Hives    Niacin Hives       MEDICATIONS ON ADMISSION:     Current Outpatient Medications:     carvediloL (COREG) 25 mg tablet, TAKE 1 TABLET BY MOUTH TWICE DAILY WITH FOOD, Disp: , Rfl:     bumetanide (BUMEX) 2 mg tablet, TAKE 1 TABLET BY MOUTH EVERY DAY, Disp: , Rfl:     finasteride (PROSCAR) 5 mg tablet, TAKE 1 TABLET BY MOUTH DAILY, Disp: , Rfl:     midodrine (PROAMATINE) 5 mg tablet, TAKE 1 TABLET BY MOUTH 1 HOUR PRIOR TO DIALYSIS TREATMENT AND THEN TAKE ANOTHER TABLET senior living THROUGH TREATMENT, Disp: , Rfl:     Myrbetriq 25 mg ER tablet, TAKE 1 TABLET BY MOUTH DAILY, Disp: , Rfl:     gabapentin (NEURONTIN) 300 mg capsule, TAKE 1 CAPSULE BY MOUTH EVERY DAY, Disp: 90 Cap, Rfl: 3    sertraline (ZOLOFT) 50 mg tablet, TAKE 1 T PO QD, Disp: , Rfl: 0    aspirin 81 mg chewable tablet, Take 81 mg by mouth daily. , Disp: , Rfl:     Calcium-Cholecalciferol, D3, (CALCIUM 600 WITH VITAMIN D3) 600 mg(1,500mg) -400 unit chew, Take 1 Tab by mouth every evening., Disp: , Rfl:     fluticasone (FLONASE) 50 mcg/actuation nasal spray, 2 Sprays by Both Nostrils route daily. , Disp: , Rfl:     tamsulosin (FLOMAX) 0.4 mg capsule, Take 0.8 mg by mouth daily. , Disp: , Rfl:     losartan (COZAAR) 25 mg tablet, Take  by mouth daily. , Disp: , Rfl:     insulin NPH/insulin regular (NOVOLIN 70/30 U-100 INSULIN) 100 unit/mL (70-30) injection, Inject 18 Units with Breakfast, 20 Units with Dinner, Disp: 4 Vial, Rfl: 3    Insulin Needles, Disposable, (REYNALDO PEN NEEDLE) 32 gauge x 5/32\" ndle, FOR USE EVERY MORNING WITH VICTOZA PEN, Disp: 100 Pen Needle, Rfl: 3    carvedilol (COREG) 12.5 mg tablet, Take 12.5 mg by mouth two (2) times daily (with meals). , Disp: , Rfl:     Insulin Syringe-Needle U-100 1 mL 30 gauge x 5/16 syrg, Use twice daily for insulin injection, Disp: 100 Syringe, Rfl: 7    hydrALAZINE (APRESOLINE) 100 mg tablet, 100 mg three (3) times daily. , Disp: , Rfl:     SOCIAL HISTORY:   Social History     Socioeconomic History    Marital status:      Spouse name: Not on file    Number of children: Not on file    Years of education: Not on file    Highest education level: Not on file   Occupational History    Not on file   Social Needs    Financial resource strain: Not on file    Food insecurity     Worry: Not on file     Inability: Not on file    Transportation needs     Medical: Not on file     Non-medical: Not on file   Tobacco Use    Smoking status: Former Smoker     Quit date:      Years since quittin.2    Smokeless tobacco: Former User    Tobacco comment: cigars only   Substance and Sexual Activity    Alcohol use: No    Drug use: No    Sexual activity: Not Currently   Lifestyle    Physical activity     Days per week: Not on file     Minutes per session: Not on file    Stress: Not on file   Relationships    Social connections     Talks on phone: Not on file     Gets together: Not on file     Attends [employer]ism service: Not on file     Active member of club or organization: Not on file     Attends meetings of clubs or organizations: Not on file     Relationship status: Not on file    Intimate partner violence     Fear of current or ex partner: Not on file     Emotionally abused: Not on file     Physically abused: Not on file     Forced sexual activity: Not on file   Other Topics Concern    Not on file   Social History Narrative    Not on file       FAMILY HISTORY:  Family History   Problem Relation Age of Onset    Cancer Father         \"bone\"    Diabetes Brother REVIEW OF SYSTEMS: Complete ROS assessed and noted for that which is described above, all else are negative. Eyes: normal  ENT: normal  CVS: normal  Resp: normal  GI: normal  : normal  GYN: normal  Endocrine: normal  Integument: normal  Musculoskeletal: knee discomfort  Neuro: normal  Psych: normal    PHYSICAL EXAMINATION:  Telephone Visit    REVIEW OF LABORATORY AND RADIOLOGY DATA:   Labs and documentation have been reviewed as described above. ASSESSMENT AND PLAN:   True Mann is a 70 y.o. male with a PMHx as noted above who presents to the endocrinology clinic for evaluation of uncontrolled type 2 diabetes. DM2  HTN  HLD    Patient not seen since 2019, and the main issue at hand is that his sugars are very high and he needs a prescription for his insulin. His cost was getting high, noting that his copay for 70/30 insulin was over $50. I have asked him to check with his local [facility] as they had prev sold the vial for around $25 cash price and I am not aware of any changes with this. I am sending his prescription there. He was started on gabapentin previously for pain in his feet and he reports still taking this and that its providing relief for him. I did remind him however that since it is a controlled substance it requires that he does keep his follow up visits in clinic. As his sugars remain high, I have asked to increase his insulin doses as noted below. I have also advised that he should take the second dose BEFORE dinner and not after. We also noted that he should be checking his sugars more than once per day in the AM as he is on two insulin injections per day and knowledge of his sugars is important for both safety and also for better informed insulin dose adjustments. He demonstrated his understanding.      DM2:  OFF victoza due to cost  Novolin 70/30:    Breakfast: increase to 30 units   Dinner: increase to 40 units  Continue to check glucose 2-3 times daily,    HTN: Stable on current meds    HLD: on statin    DM Neuropathy: Previously offered trial of 300mg gabapentin per his request for relief with this pain, before dialysis on dialysis days since his feet hurt most those morning. Reports taking and it is helping with relief. 25 minutes spent toward telemedicine visit today of which >50% of this time was spent in counseling and coordination of care. Jose Sweet.  [address] [facility]

## 2021-03-23 NOTE — TELEPHONE ENCOUNTER
I called Mr. Suzette Novak again and let him know that he needs to do a follow up appointment before we can do any refills for him. I offered him the 3:10 PM appointment today and he said he would do that. I then transferred him to the  to make that appointment.   Elvira Zaman

## 2021-03-23 NOTE — TELEPHONE ENCOUNTER
----- Message from Boby Garza sent at 3/23/2021 10:22 AM EDT -----  Regarding: Dr Annah Pallas  Patient return call    Caller's first and last name and relationship (if not the patient):      Best contact number(s):[phone]      Whose call is being returned:nurses      Details to clarify the request:regarding rx refill and virtual visit      Boby Garza

## 2021-04-17 ENCOUNTER — HOSPITAL ENCOUNTER (EMERGENCY)
Age: 76
Discharge: HOME | End: 2021-04-17
Attending: STUDENT IN AN ORGANIZED HEALTH CARE EDUCATION/TRAINING PROGRAM
Payer: MEDICARE

## 2021-04-17 ENCOUNTER — APPOINTMENT (OUTPATIENT)
Dept: GENERAL RADIOLOGY | Age: 76
End: 2021-04-17
Attending: STUDENT IN AN ORGANIZED HEALTH CARE EDUCATION/TRAINING PROGRAM
Payer: MEDICARE

## 2021-04-17 VITALS
WEIGHT: 260 LBS | TEMPERATURE: 97.9 F | SYSTOLIC BLOOD PRESSURE: 154 MMHG | RESPIRATION RATE: 26 BRPM | BODY MASS INDEX: 33.37 KG/M2 | OXYGEN SATURATION: 100 % | HEIGHT: 74 IN | HEART RATE: 66 BPM | DIASTOLIC BLOOD PRESSURE: 72 MMHG

## 2021-04-17 DIAGNOSIS — R55 SYNCOPE AND COLLAPSE: Primary | ICD-10-CM

## 2021-04-17 LAB
ALBUMIN SERPL-MCNC: 3.6 G/DL (ref 3.5–5)
ALBUMIN/GLOB SERPL: 0.8 {RATIO} (ref 1.1–2.2)
ALP SERPL-CCNC: 72 U/L (ref 45–117)
ALT SERPL-CCNC: 23 U/L (ref 12–78)
ANION GAP SERPL CALC-SCNC: 5 MMOL/L (ref 5–15)
AST SERPL-CCNC: 8 U/L (ref 15–37)
BASOPHILS # BLD: 0.1 K/UL (ref 0–0.1)
BASOPHILS NFR BLD: 1 % (ref 0–1)
BILIRUB SERPL-MCNC: 0.4 MG/DL (ref 0.2–1)
BNP SERPL-MCNC: 2497 PG/ML
BUN SERPL-MCNC: 24 MG/DL (ref 6–20)
BUN/CREAT SERPL: 6 (ref 12–20)
CALCIUM SERPL-MCNC: 8.3 MG/DL (ref 8.5–10.1)
CHLORIDE SERPL-SCNC: 100 MMOL/L (ref 97–108)
CO2 SERPL-SCNC: 30 MMOL/L (ref 21–32)
CREAT SERPL-MCNC: 3.7 MG/DL (ref 0.7–1.3)
DIFFERENTIAL METHOD BLD: ABNORMAL
EOSINOPHIL # BLD: 0.3 K/UL (ref 0–0.4)
EOSINOPHIL NFR BLD: 4 % (ref 0–7)
ERYTHROCYTE [DISTWIDTH] IN BLOOD BY AUTOMATED COUNT: 14.4 % (ref 11.5–14.5)
GLOBULIN SER CALC-MCNC: 4.8 G/DL (ref 2–4)
GLUCOSE SERPL-MCNC: 217 MG/DL (ref 65–100)
HCT VFR BLD AUTO: 28.9 % (ref 36.6–50.3)
HGB BLD-MCNC: 9.6 G/DL (ref 12.1–17)
IMM GRANULOCYTES # BLD AUTO: 0.1 K/UL (ref 0–0.04)
IMM GRANULOCYTES NFR BLD AUTO: 1 % (ref 0–0.5)
LYMPHOCYTES # BLD: 1.1 K/UL (ref 0.8–3.5)
LYMPHOCYTES NFR BLD: 11 % (ref 12–49)
MAGNESIUM SERPL-MCNC: 1.8 MG/DL (ref 1.6–2.4)
MCH RBC QN AUTO: 31 PG (ref 26–34)
MCHC RBC AUTO-ENTMCNC: 33.2 G/DL (ref 30–36.5)
MCV RBC AUTO: 93.2 FL (ref 80–99)
MONOCYTES # BLD: 0.8 K/UL (ref 0–1)
MONOCYTES NFR BLD: 8 % (ref 5–13)
NEUTS SEG # BLD: 7.1 K/UL (ref 1.8–8)
NEUTS SEG NFR BLD: 75 % (ref 32–75)
NRBC # BLD: 0 K/UL (ref 0–0.01)
NRBC BLD-RTO: 0 PER 100 WBC
PLATELET # BLD AUTO: 174 K/UL (ref 150–400)
PMV BLD AUTO: 11.3 FL (ref 8.9–12.9)
POTASSIUM SERPL-SCNC: 3.9 MMOL/L (ref 3.5–5.1)
PROT SERPL-MCNC: 8.4 G/DL (ref 6.4–8.2)
RBC # BLD AUTO: 3.1 M/UL (ref 4.1–5.7)
SODIUM SERPL-SCNC: 135 MMOL/L (ref 136–145)
TROPONIN I SERPL-MCNC: 0.1 NG/ML
WBC # BLD AUTO: 9.5 K/UL (ref 4.1–11.1)

## 2021-04-17 PROCEDURE — 99285 EMERGENCY DEPT VISIT HI MDM: CPT

## 2021-04-17 PROCEDURE — 93005 ELECTROCARDIOGRAM TRACING: CPT

## 2021-04-17 PROCEDURE — 36415 COLL VENOUS BLD VENIPUNCTURE: CPT

## 2021-04-17 PROCEDURE — 71045 X-RAY EXAM CHEST 1 VIEW: CPT

## 2021-04-17 PROCEDURE — 85025 COMPLETE CBC W/AUTO DIFF WBC: CPT

## 2021-04-17 PROCEDURE — 83735 ASSAY OF MAGNESIUM: CPT

## 2021-04-17 PROCEDURE — 80053 COMPREHEN METABOLIC PANEL: CPT

## 2021-04-17 PROCEDURE — 83880 ASSAY OF NATRIURETIC PEPTIDE: CPT

## 2021-04-17 PROCEDURE — 84484 ASSAY OF TROPONIN QUANT: CPT

## 2021-04-17 NOTE — ED PROVIDER NOTES
EMERGENCY DEPARTMENT HISTORY AND PHYSICAL EXAM      Date: 4/17/2021  Patient Name: Cleo Olea    History of Presenting Illness     Chief Complaint   Patient presents with    Syncope         HPI: Cleo Olea, 70 y.o. male presents to the ED with cc of syncope. He was at dialysis today when he had 2 episodes of syncope, dialysis documentation is reviewed. This occurred towards the end of his dialysis, he states he finished all but about 12 minutes. He was lying down at the time. He states that he felt lightheaded, and his vision became blurry. He states that this is happened in the past before. No chest pain or shortness of breath other than his chronic shortness of breath, reports some associated nausea without vomiting, no diaphoresis, no recent fevers or coughing. He denies any abdominal pain, has been eating and drinking well recently. He is on his chronic 2 L home oxygen. Now feels back to his normal self. There are no other complaints, changes, or physical findings at this time. PCP: Denisa Bertrand MD    No current facility-administered medications on file prior to encounter. Current Outpatient Medications on File Prior to Encounter   Medication Sig Dispense Refill    carvediloL (COREG) 25 mg tablet TAKE 1 TABLET BY MOUTH TWICE DAILY WITH FOOD      bumetanide (BUMEX) 2 mg tablet TAKE 1 TABLET BY MOUTH EVERY DAY      finasteride (PROSCAR) 5 mg tablet TAKE 1 TABLET BY MOUTH DAILY      midodrine (PROAMATINE) 5 mg tablet TAKE 1 TABLET BY MOUTH 1 HOUR PRIOR TO DIALYSIS TREATMENT AND THEN TAKE ANOTHER TABLET USP THROUGH TREATMENT      Myrbetriq 25 mg ER tablet TAKE 1 TABLET BY MOUTH DAILY      insulin NPH/insulin regular (NovoLIN 70/30 U-100 Insulin) 100 unit/mL (70-30) injection ReliOn Novolin 70/30: Inject 30 Units with Breakfast, 40 Units with Dinner 7 Vial 3    losartan (COZAAR) 25 mg tablet Take  by mouth daily.       gabapentin (NEURONTIN) 300 mg capsule TAKE 1 CAPSULE BY MOUTH EVERY DAY 90 Cap 3    Insulin Needles, Disposable, (REYNALDO PEN NEEDLE) 32 gauge x \" ndle FOR USE EVERY MORNING WITH VICTOZA  Pen Needle 3    sertraline (ZOLOFT) 50 mg tablet TAKE 1 T PO QD  0    aspirin 81 mg chewable tablet Take 81 mg by mouth daily.  carvedilol (COREG) 12.5 mg tablet Take 12.5 mg by mouth two (2) times daily (with meals).  Insulin Syringe-Needle U-100 1 mL 30 gauge x 5/16 syrg Use twice daily for insulin injection 100 Syringe 7    hydrALAZINE (APRESOLINE) 100 mg tablet 100 mg three (3) times daily.  Calcium-Cholecalciferol, D3, (CALCIUM 600 WITH VITAMIN D3) 600 mg(1,500mg) -400 unit chew Take 1 Tab by mouth every evening.  fluticasone (FLONASE) 50 mcg/actuation nasal spray 2 Sprays by Both Nostrils route daily.  tamsulosin (FLOMAX) 0.4 mg capsule Take 0.8 mg by mouth daily. Past History     Past Medical History:  Past Medical History:   Diagnosis Date    Arthritis     spine    CAD (coronary artery disease)     high cholesterol    Chronic kidney disease     dialysis    Diabetes ([facility] [address]     GERD (gastroesophageal reflux disease)     HX    Hypertension     Ill-defined condition     irritable bowel syndrome    Systolic CHF ([facility] [address]     Unspecified sleep apnea     NO CPAP       Past Surgical History:  Past Surgical History:   Procedure Laterality Date    COLONOSCOPY N/A 2016    [facility] Tor[facility] Castle [facility]    HX ORTHOPAEDIC      CERVICAL FUSION    HX UROLOGICAL      TURP    HX VASCULAR ACCESS      L arm dialysis access       Family History:  Family History   Problem Relation Age of Onset    Cancer Father         \"bone\"    Diabetes Brother        Social History:  Social History     Tobacco Use    Smoking status: Former Smoker     Quit date:      Years since quittin.2    Smokeless tobacco: Former User    Tobacco comment: cigars only   Substance Use Topics    Alcohol use: No    Drug use:  No Allergies: Allergies   Allergen Reactions    Albumin, Human 25 % Anaphylaxis and Hives    Niacin Hives         Review of Systems   no fever  No eye pain  No ear pain  Reports shortness of breath  no chest pain  no abdominal pain  no dysuria  no leg pain  No rash  No lymphadenopathy  No weight loss    Physical Exam   Physical Exam  Constitutional:       General: He is not in acute distress. HENT:      Head: Normocephalic. Mouth/Throat:      Mouth: Mucous membranes are moist.   Eyes:      Extraocular Movements: Extraocular movements intact. Cardiovascular:      Rate and Rhythm: Normal rate and regular rhythm. Pulmonary:      Effort: Pulmonary effort is normal.      Breath sounds: Normal breath sounds. Abdominal:      Tenderness: There is no abdominal tenderness. Musculoskeletal:      Comments: Fistula left upper extremity with palpable thrill, no significant swelling of the lower extremities   Skin:     General: Skin is warm. Neurological:      General: No focal deficit present. Mental Status: He is alert and oriented to person, place, and time.    Psychiatric:         Mood and Affect: Mood normal.         Diagnostic Study Results     Labs -     Recent Results (from the past 24 hour(s))   EKG, 12 LEAD, INITIAL    Collection Time: 04/17/21 10:53 AM   Result Value Ref Range    Ventricular Rate 63 BPM    Atrial Rate 63 BPM    P-R Interval 190 ms    QRS Duration 134 ms    Q-T Interval 476 ms    QTC Calculation (Bezet) 487 ms    Calculated P Axis -13 degrees    Calculated R Axis -53 degrees    Calculated T Axis -36 degrees    Diagnosis       Normal sinus rhythm  Left axis deviation  Right bundle branch block  Voltage criteria for left ventricular hypertrophy  When compared with ECG of 16-MAR-2021 09:12,  T wave amplitude has decreased in Inferior leads     CBC WITH AUTOMATED DIFF    Collection Time: 04/17/21 10:54 AM   Result Value Ref Range    WBC 9.5 4.1 - 11.1 K/uL    RBC 3.10 (L) 4.10 - 5.70 M/uL    HGB 9.6 (L) 12.1 - 17.0 g/dL    HCT 28.9 (L) 36.6 - 50.3 %    MCV 93.2 80.0 - 99.0 FL    MCH 31.0 26.0 - 34.0 PG    MCHC 33.2 30.0 - 36.5 g/dL    RDW 14.4 11.5 - 14.5 %    PLATELET 685 349 - 399 K/uL    MPV 11.3 8.9 - 12.9 FL    NRBC 0.0 0  WBC    ABSOLUTE NRBC 0.00 0.00 - 0.01 K/uL    NEUTROPHILS 75 32 - 75 %    LYMPHOCYTES 11 (L) 12 - 49 %    MONOCYTES 8 5 - 13 %    EOSINOPHILS 4 0 - 7 %    BASOPHILS 1 0 - 1 %    IMMATURE GRANULOCYTES 1 (H) 0.0 - 0.5 %    ABS. NEUTROPHILS 7.1 1.8 - 8.0 K/UL    ABS. LYMPHOCYTES 1.1 0.8 - 3.5 K/UL    ABS. MONOCYTES 0.8 0.0 - 1.0 K/UL    ABS. EOSINOPHILS 0.3 0.0 - 0.4 K/UL    ABS. BASOPHILS 0.1 0.0 - 0.1 K/UL    ABS. IMM. GRANS. 0.1 (H) 0.00 - 0.04 K/UL    DF AUTOMATED     METABOLIC PANEL, COMPREHENSIVE    Collection Time: 04/17/21 10:54 AM   Result Value Ref Range    Sodium 135 (L) 136 - 145 mmol/L    Potassium 3.9 3.5 - 5.1 mmol/L    Chloride 100 97 - 108 mmol/L    CO2 30 21 - 32 mmol/L    Anion gap 5 5 - 15 mmol/L    Glucose 217 (H) 65 - 100 mg/dL    BUN 24 (H) 6 - 20 MG/DL    Creatinine 3.70 (H) 0.70 - 1.30 MG/DL    BUN/Creatinine ratio 6 (L) 12 - 20      GFR est AA 20 (L) >60 ml/min/1.73m2    GFR est non-AA 16 (L) >60 ml/min/1.73m2    Calcium 8.3 (L) 8.5 - 10.1 MG/DL    Bilirubin, total 0.4 0.2 - 1.0 MG/DL    ALT (SGPT) 23 12 - 78 U/L    AST (SGOT) 8 (L) 15 - 37 U/L    Alk. phosphatase 72 45 - 117 U/L    Protein, total 8.4 (H) 6.4 - 8.2 g/dL    Albumin 3.6 3.5 - 5.0 g/dL    Globulin 4.8 (H) 2.0 - 4.0 g/dL    A-G Ratio 0.8 (L) 1.1 - 2.2     TROPONIN I    Collection Time: 04/17/21 10:54 AM   Result Value Ref Range    Troponin-I, Qt. 0.10 (H) <0.05 ng/mL   MAGNESIUM    Collection Time: 04/17/21 10:54 AM   Result Value Ref Range    Magnesium 1.8 1.6 - 2.4 mg/dL   NT-PRO BNP    Collection Time: 04/17/21 10:54 AM   Result Value Ref Range    NT pro-BNP 2,497 (H) <125 PG/ML       Radiologic Studies -   XR CHEST PORT   Final Result   No acute process.            CT Results  (Last 48 hours)    None        CXR Results  (Last 48 hours)               04/17/21 1111  XR CHEST PORT Final result    Impression:  No acute process. Narrative: Indication: Chest pain       Comparison: 3/16/2021       Portable exam of the chest obtained at 1058 demonstrates normal heart size. There is no acute process in the lung fields. The osseous structures are   unremarkable. Medical Decision Making   I am the first provider for this patient. I reviewed the vital signs, available nursing notes, past medical history, past surgical history, family history and social history. Vital Signs-Reviewed the patient's vital signs. Patient Vitals for the past 24 hrs:   Temp Pulse Resp BP SpO2   04/17/21 1215  66 26 (!) 154/72 100 %   04/17/21 1200  67 23 (!) 148/77 100 %   04/17/21 1145  64 20 132/67 99 %   04/17/21 1130  63 21 139/66 99 %   04/17/21 1115  65 24 (!) 144/70 99 %   04/17/21 1102 97.9 °F (36.6 °C) 63 18 126/66 99 %   04/17/21 1100  65 17 133/67 99 %   04/17/21 1045  61 27 126/60 (!) 87 %         Provider Notes (Medical Decision Making):   79-year-old male presenting after syncope. Concern for possible dysrhythmia, volume shifts with dialysis, electrolyte/metabolic abnormalities, vasovagal syncope, volume depletion. EKG is performed at 10: 53, shows sinus rhythm at a rate of 63, , , QTc 487, left axis deviation, right bundle branch block is present, no ST segment elevation or depression concerning for ACS, EKG looks similar to prior. This is interpreted as sinus rhythm with left axis deviation right bundle branch block. ED Course:     Initial assessment performed. The patients presenting problems have been discussed, and they are in agreement with the care plan formulated and outlined with them. I have encouraged them to ask questions as they arise throughout their visit.         CBC is negative for leukocytosis, shows anemia hemoglobin of 9.6, improved from prior. Basic metabolic panel with elevated creatinine consistent with his known history of diabetes, slight hyperglycemia, otherwise unremarkable. Troponin is elevated at 0.1, however is chronically so, downtrending from prior. Chest x-ray is unremarkable. On reevaluation, patient is resting comfortably, denies complaints. I have recommended admission given high risk syncope, however he declines admission. He states that he did not actually pass out, that he was conscious the entire time, he states that this has happened before and he does not want to be admitted. Patient is counseled on supportive care and return precautions. Will return to the ED for any worsening lightheadedness, any chest pain or shortness of breath, any new or worrisome symptoms. Will followup with primary care doctor, nephrologist, cardiologist will call in 2 days. Critical Care Time:         Disposition:  Home    PLAN:  1. Discharge Medication List as of 4/17/2021 12:26 PM        2.    Follow-up Information     Follow up With Specialties Details Why Contact Info    Justin Knight MD Internal Medicine Call in 2 days  [id number]  [address] [address] [phone]      Your cardiologist  Call in 2 days      [facility] EMERGENCY DEPT Emergency Medicine  As needed, If symptoms worsen [address]  [address]  [phone]        Return to ED if worse     Diagnosis     Clinical Impression: Acute syncope

## 2021-04-17 NOTE — ED NOTES
Pt comes via EMS for two witnessed syncopal episodes during dialysis where pt had temporary double vision and nausea/ indigestion.  Pt's

## 2021-04-18 LAB
ATRIAL RATE: 63 BPM
CALCULATED P AXIS, ECG09: -13 DEGREES
CALCULATED R AXIS, ECG10: -53 DEGREES
CALCULATED T AXIS, ECG11: -36 DEGREES
DIAGNOSIS, 93000: NORMAL
P-R INTERVAL, ECG05: 190 MS
Q-T INTERVAL, ECG07: 476 MS
QRS DURATION, ECG06: 134 MS
QTC CALCULATION (BEZET), ECG08: 487 MS
VENTRICULAR RATE, ECG03: 63 BPM

## 2021-05-04 ENCOUNTER — APPOINTMENT (OUTPATIENT)
Dept: GENERAL RADIOLOGY | Age: 76
DRG: 291 | End: 2021-05-04
Attending: EMERGENCY MEDICINE
Payer: MEDICARE

## 2021-05-04 ENCOUNTER — HOSPITAL ENCOUNTER (INPATIENT)
Age: 76
LOS: 2 days | Discharge: HOME | DRG: 291 | End: 2021-05-06
Attending: EMERGENCY MEDICINE | Admitting: INTERNAL MEDICINE
Payer: MEDICARE

## 2021-05-04 DIAGNOSIS — Z79.4 TYPE 2 DIABETES MELLITUS WITH STAGE 4 CHRONIC KIDNEY DISEASE, WITH LONG-TERM CURRENT USE OF INSULIN (HCC): ICD-10-CM

## 2021-05-04 DIAGNOSIS — Z87.448 HISTORY OF END STAGE RENAL DISEASE: ICD-10-CM

## 2021-05-04 DIAGNOSIS — J81.0 ACUTE PULMONARY EDEMA (HCC): Primary | ICD-10-CM

## 2021-05-04 DIAGNOSIS — Z86.79 HISTORY OF HEART FAILURE: ICD-10-CM

## 2021-05-04 DIAGNOSIS — E11.22 TYPE 2 DIABETES MELLITUS WITH STAGE 4 CHRONIC KIDNEY DISEASE, WITH LONG-TERM CURRENT USE OF INSULIN (HCC): ICD-10-CM

## 2021-05-04 DIAGNOSIS — N18.4 TYPE 2 DIABETES MELLITUS WITH STAGE 4 CHRONIC KIDNEY DISEASE, WITH LONG-TERM CURRENT USE OF INSULIN (HCC): ICD-10-CM

## 2021-05-04 DIAGNOSIS — R06.02 SOB (SHORTNESS OF BREATH): ICD-10-CM

## 2021-05-04 PROBLEM — I50.20 SYSTOLIC HEART FAILURE (HCC): Status: ACTIVE | Noted: 2021-05-04

## 2021-05-04 LAB
ALBUMIN SERPL-MCNC: 3.6 G/DL (ref 3.5–5)
ALBUMIN/GLOB SERPL: 0.8 {RATIO} (ref 1.1–2.2)
ALP SERPL-CCNC: 74 U/L (ref 45–117)
ALT SERPL-CCNC: 43 U/L (ref 12–78)
ANION GAP SERPL CALC-SCNC: 8 MMOL/L (ref 5–15)
AST SERPL-CCNC: 21 U/L (ref 15–37)
ATRIAL RATE: 76 BPM
BASOPHILS # BLD: 0 K/UL (ref 0–0.1)
BASOPHILS NFR BLD: 0 % (ref 0–1)
BILIRUB SERPL-MCNC: 0.6 MG/DL (ref 0.2–1)
BNP SERPL-MCNC: 5474 PG/ML
BUN SERPL-MCNC: 66 MG/DL (ref 6–20)
BUN/CREAT SERPL: 9 (ref 12–20)
CALCIUM SERPL-MCNC: 9.4 MG/DL (ref 8.5–10.1)
CALCULATED P AXIS, ECG09: 7 DEGREES
CALCULATED R AXIS, ECG10: -57 DEGREES
CALCULATED T AXIS, ECG11: 95 DEGREES
CHLORIDE SERPL-SCNC: 103 MMOL/L (ref 97–108)
CO2 SERPL-SCNC: 25 MMOL/L (ref 21–32)
COMMENT, HOLDF: NORMAL
COVID-19 RAPID TEST, COVR: NOT DETECTED
CREAT SERPL-MCNC: 7.38 MG/DL (ref 0.7–1.3)
DIAGNOSIS, 93000: NORMAL
DIFFERENTIAL METHOD BLD: ABNORMAL
EOSINOPHIL # BLD: 0.2 K/UL (ref 0–0.4)
EOSINOPHIL NFR BLD: 3 % (ref 0–7)
ERYTHROCYTE [DISTWIDTH] IN BLOOD BY AUTOMATED COUNT: 14.6 % (ref 11.5–14.5)
GLOBULIN SER CALC-MCNC: 4.4 G/DL (ref 2–4)
GLUCOSE BLD STRIP.AUTO-MCNC: 145 MG/DL (ref 65–100)
GLUCOSE BLD STRIP.AUTO-MCNC: 215 MG/DL (ref 65–100)
GLUCOSE SERPL-MCNC: 194 MG/DL (ref 65–100)
HBV SURFACE AB SER QL: REACTIVE
HBV SURFACE AB SER-ACNC: 21.43 MIU/ML
HBV SURFACE AG SER QL: <0.1 INDEX
HBV SURFACE AG SER QL: NEGATIVE
HCT VFR BLD AUTO: 29.1 % (ref 36.6–50.3)
HGB BLD-MCNC: 9.4 G/DL (ref 12.1–17)
IMM GRANULOCYTES # BLD AUTO: 0.1 K/UL (ref 0–0.04)
IMM GRANULOCYTES NFR BLD AUTO: 1 % (ref 0–0.5)
LYMPHOCYTES # BLD: 1.1 K/UL (ref 0.8–3.5)
LYMPHOCYTES NFR BLD: 12 % (ref 12–49)
MCH RBC QN AUTO: 30.8 PG (ref 26–34)
MCHC RBC AUTO-ENTMCNC: 32.3 G/DL (ref 30–36.5)
MCV RBC AUTO: 95.4 FL (ref 80–99)
MONOCYTES # BLD: 0.6 K/UL (ref 0–1)
MONOCYTES NFR BLD: 7 % (ref 5–13)
NEUTS SEG # BLD: 6.9 K/UL (ref 1.8–8)
NEUTS SEG NFR BLD: 77 % (ref 32–75)
NRBC # BLD: 0 K/UL (ref 0–0.01)
NRBC BLD-RTO: 0 PER 100 WBC
P-R INTERVAL, ECG05: 164 MS
PLATELET # BLD AUTO: 196 K/UL (ref 150–400)
PMV BLD AUTO: 11.7 FL (ref 8.9–12.9)
POTASSIUM SERPL-SCNC: 4.7 MMOL/L (ref 3.5–5.1)
PROT SERPL-MCNC: 8 G/DL (ref 6.4–8.2)
Q-T INTERVAL, ECG07: 400 MS
QRS DURATION, ECG06: 134 MS
QTC CALCULATION (BEZET), ECG08: 450 MS
RBC # BLD AUTO: 3.05 M/UL (ref 4.1–5.7)
SAMPLES BEING HELD,HOLD: NORMAL
SERVICE CMNT-IMP: ABNORMAL
SERVICE CMNT-IMP: ABNORMAL
SODIUM SERPL-SCNC: 136 MMOL/L (ref 136–145)
SOURCE, COVRS: NORMAL
TROPONIN I BLD-MCNC: <0.04 NG/ML (ref 0–0.08)
TROPONIN I SERPL-MCNC: 0.13 NG/ML
VENTRICULAR RATE, ECG03: 76 BPM
WBC # BLD AUTO: 8.9 K/UL (ref 4.1–11.1)

## 2021-05-04 PROCEDURE — 84484 ASSAY OF TROPONIN QUANT: CPT

## 2021-05-04 PROCEDURE — 96374 THER/PROPH/DIAG INJ IV PUSH: CPT

## 2021-05-04 PROCEDURE — 90935 HEMODIALYSIS ONE EVALUATION: CPT

## 2021-05-04 PROCEDURE — 99285 EMERGENCY DEPT VISIT HI MDM: CPT

## 2021-05-04 PROCEDURE — 74011250637 HC RX REV CODE- 250/637: Performed by: INTERNAL MEDICINE

## 2021-05-04 PROCEDURE — 83880 ASSAY OF NATRIURETIC PEPTIDE: CPT

## 2021-05-04 PROCEDURE — 82962 GLUCOSE BLOOD TEST: CPT

## 2021-05-04 PROCEDURE — 87340 HEPATITIS B SURFACE AG IA: CPT

## 2021-05-04 PROCEDURE — 93005 ELECTROCARDIOGRAM TRACING: CPT

## 2021-05-04 PROCEDURE — 74011250636 HC RX REV CODE- 250/636: Performed by: INTERNAL MEDICINE

## 2021-05-04 PROCEDURE — 5A1D70Z PERFORMANCE OF URINARY FILTRATION, INTERMITTENT, LESS THAN 6 HOURS PER DAY: ICD-10-PCS | Performed by: INTERNAL MEDICINE

## 2021-05-04 PROCEDURE — 71045 X-RAY EXAM CHEST 1 VIEW: CPT

## 2021-05-04 PROCEDURE — 80053 COMPREHEN METABOLIC PANEL: CPT

## 2021-05-04 PROCEDURE — 85025 COMPLETE CBC W/AUTO DIFF WBC: CPT

## 2021-05-04 PROCEDURE — 74011000250 HC RX REV CODE- 250: Performed by: EMERGENCY MEDICINE

## 2021-05-04 PROCEDURE — 74011636637 HC RX REV CODE- 636/637: Performed by: INTERNAL MEDICINE

## 2021-05-04 PROCEDURE — 65270000029 HC RM PRIVATE

## 2021-05-04 PROCEDURE — 87635 SARS-COV-2 COVID-19 AMP PRB: CPT

## 2021-05-04 PROCEDURE — 36415 COLL VENOUS BLD VENIPUNCTURE: CPT

## 2021-05-04 PROCEDURE — 65660000000 HC RM CCU STEPDOWN

## 2021-05-04 PROCEDURE — 86706 HEP B SURFACE ANTIBODY: CPT

## 2021-05-04 RX ORDER — INSULIN LISPRO 100 [IU]/ML
INJECTION, SOLUTION INTRAVENOUS; SUBCUTANEOUS
Status: DISCONTINUED | OUTPATIENT
Start: 2021-05-04 | End: 2021-05-06 | Stop reason: HOSPADM

## 2021-05-04 RX ORDER — GUAIFENESIN 100 MG/5ML
81 LIQUID (ML) ORAL DAILY
Status: DISCONTINUED | OUTPATIENT
Start: 2021-05-04 | End: 2021-05-06 | Stop reason: HOSPADM

## 2021-05-04 RX ORDER — DEXTROSE 50 % IN WATER (D50W) INTRAVENOUS SYRINGE
12.5-25 AS NEEDED
Status: DISCONTINUED | OUTPATIENT
Start: 2021-05-04 | End: 2021-05-06 | Stop reason: HOSPADM

## 2021-05-04 RX ORDER — ONDANSETRON 2 MG/ML
4 INJECTION INTRAMUSCULAR; INTRAVENOUS
Status: DISCONTINUED | OUTPATIENT
Start: 2021-05-04 | End: 2021-05-06 | Stop reason: HOSPADM

## 2021-05-04 RX ORDER — ACETAMINOPHEN 325 MG/1
650 TABLET ORAL
Status: DISCONTINUED | OUTPATIENT
Start: 2021-05-04 | End: 2021-05-06 | Stop reason: HOSPADM

## 2021-05-04 RX ORDER — PROMETHAZINE HYDROCHLORIDE 25 MG/1
12.5 TABLET ORAL
Status: DISCONTINUED | OUTPATIENT
Start: 2021-05-04 | End: 2021-05-06 | Stop reason: HOSPADM

## 2021-05-04 RX ORDER — POLYETHYLENE GLYCOL 3350 17 G/17G
17 POWDER, FOR SOLUTION ORAL DAILY PRN
Status: DISCONTINUED | OUTPATIENT
Start: 2021-05-04 | End: 2021-05-06 | Stop reason: HOSPADM

## 2021-05-04 RX ORDER — GABAPENTIN 300 MG/1
300 CAPSULE ORAL DAILY
Status: DISCONTINUED | OUTPATIENT
Start: 2021-05-04 | End: 2021-05-06 | Stop reason: HOSPADM

## 2021-05-04 RX ORDER — HEPARIN SODIUM 5000 [USP'U]/ML
5000 INJECTION, SOLUTION INTRAVENOUS; SUBCUTANEOUS EVERY 8 HOURS
Status: DISCONTINUED | OUTPATIENT
Start: 2021-05-04 | End: 2021-05-06 | Stop reason: HOSPADM

## 2021-05-04 RX ORDER — SODIUM CHLORIDE 0.9 % (FLUSH) 0.9 %
5-40 SYRINGE (ML) INJECTION AS NEEDED
Status: DISCONTINUED | OUTPATIENT
Start: 2021-05-04 | End: 2021-05-06 | Stop reason: HOSPADM

## 2021-05-04 RX ORDER — SODIUM CHLORIDE 0.9 % (FLUSH) 0.9 %
5-40 SYRINGE (ML) INJECTION EVERY 8 HOURS
Status: DISCONTINUED | OUTPATIENT
Start: 2021-05-04 | End: 2021-05-06 | Stop reason: HOSPADM

## 2021-05-04 RX ORDER — ACETAMINOPHEN 650 MG/1
650 SUPPOSITORY RECTAL
Status: DISCONTINUED | OUTPATIENT
Start: 2021-05-04 | End: 2021-05-06 | Stop reason: HOSPADM

## 2021-05-04 RX ORDER — CARVEDILOL 12.5 MG/1
12.5 TABLET ORAL 2 TIMES DAILY WITH MEALS
Status: DISCONTINUED | OUTPATIENT
Start: 2021-05-04 | End: 2021-05-06 | Stop reason: HOSPADM

## 2021-05-04 RX ORDER — FINASTERIDE 5 MG/1
5 TABLET, FILM COATED ORAL DAILY
Status: DISCONTINUED | OUTPATIENT
Start: 2021-05-04 | End: 2021-05-06 | Stop reason: HOSPADM

## 2021-05-04 RX ORDER — LOSARTAN POTASSIUM 25 MG/1
25 TABLET ORAL DAILY
Status: DISCONTINUED | OUTPATIENT
Start: 2021-05-04 | End: 2021-05-06 | Stop reason: HOSPADM

## 2021-05-04 RX ORDER — HYDRALAZINE HYDROCHLORIDE 50 MG/1
100 TABLET, FILM COATED ORAL EVERY 8 HOURS
Status: DISCONTINUED | OUTPATIENT
Start: 2021-05-04 | End: 2021-05-06

## 2021-05-04 RX ORDER — IPRATROPIUM BROMIDE AND ALBUTEROL SULFATE 2.5; .5 MG/3ML; MG/3ML
3 SOLUTION RESPIRATORY (INHALATION)
Status: DISCONTINUED | OUTPATIENT
Start: 2021-05-04 | End: 2021-05-06 | Stop reason: HOSPADM

## 2021-05-04 RX ORDER — BUMETANIDE 0.25 MG/ML
2 INJECTION INTRAMUSCULAR; INTRAVENOUS
Status: COMPLETED | OUTPATIENT
Start: 2021-05-04 | End: 2021-05-04

## 2021-05-04 RX ORDER — MAGNESIUM SULFATE 100 %
4 CRYSTALS MISCELLANEOUS AS NEEDED
Status: DISCONTINUED | OUTPATIENT
Start: 2021-05-04 | End: 2021-05-06 | Stop reason: HOSPADM

## 2021-05-04 RX ORDER — BUMETANIDE 1 MG/1
2 TABLET ORAL DAILY
Status: DISCONTINUED | OUTPATIENT
Start: 2021-05-04 | End: 2021-05-06 | Stop reason: HOSPADM

## 2021-05-04 RX ORDER — SERTRALINE HYDROCHLORIDE 50 MG/1
50 TABLET, FILM COATED ORAL DAILY
Status: DISCONTINUED | OUTPATIENT
Start: 2021-05-04 | End: 2021-05-06 | Stop reason: HOSPADM

## 2021-05-04 RX ADMIN — BUMETANIDE 2 MG: 1 TABLET ORAL at 18:13

## 2021-05-04 RX ADMIN — INSULIN LISPRO 2 UNITS: 100 INJECTION, SOLUTION INTRAVENOUS; SUBCUTANEOUS at 23:21

## 2021-05-04 RX ADMIN — GABAPENTIN 300 MG: 300 CAPSULE ORAL at 18:13

## 2021-05-04 RX ADMIN — CARVEDILOL 12.5 MG: 12.5 TABLET, FILM COATED ORAL at 18:13

## 2021-05-04 RX ADMIN — BUMETANIDE 2 MG: 0.25 INJECTION, SOLUTION INTRAMUSCULAR; INTRAVENOUS at 07:41

## 2021-05-04 RX ADMIN — Medication 10 ML: at 18:17

## 2021-05-04 RX ADMIN — LOSARTAN POTASSIUM 25 MG: 25 TABLET, FILM COATED ORAL at 18:13

## 2021-05-04 RX ADMIN — HEPARIN SODIUM 5000 UNITS: 5000 INJECTION INTRAVENOUS; SUBCUTANEOUS at 18:17

## 2021-05-04 RX ADMIN — FINASTERIDE 5 MG: 5 TABLET, FILM COATED ORAL at 18:13

## 2021-05-04 RX ADMIN — SERTRALINE 50 MG: 50 TABLET, FILM COATED ORAL at 18:13

## 2021-05-04 RX ADMIN — ASPIRIN 81 MG: 81 TABLET, CHEWABLE ORAL at 18:13

## 2021-05-04 RX ADMIN — Medication 10 ML: at 18:16

## 2021-05-04 NOTE — PROGRESS NOTES
Nephrology Progress Note  Chavo Euceda     www. [facility]  Phone - [phone]   Patient: Lavonne Jarvis    YOB: 1945        Date- 5/4/2021   Admit Date: 5/4/2021  CC: Follow up for ESRD          IMPRESSION & PLAN:     ESRD - tts- pat coronado   SOB- likely due to fluid overload   HYPERTENSION   H/o chf    Anemia of ckd   Sec. hyperpar   abdo pain    PLAN-   Seen on hd today   Remove 2-2.5 kg as tolerated   Continue coreg, losartan   His bp stable. Hold midodrine for now   We will UF tomorrow   Hold hydralazine   epogen TTS     Subjective: Interval History:   -  Patient presented to er with c/o abdo pain and sob  He denies missing any hd   He has left arm graft      Objective:   Vitals:    05/04/21 1000 05/04/21 1015 05/04/21 1030 05/04/21 1045   BP: (!) 157/85 (!) 153/76 134/70 125/77   Pulse: 68 63 60 76   Resp: 20 20 20 20   Temp:       TempSrc:       SpO2:       Weight:       Height:          No intake/output data recorded. Last 3 Recorded Weights in this Encounter    05/04/21 0600   Weight: 112 kg (246 lb 14.6 oz)      Physical exam:   GEN: NAD  NECK- Supple, no mass  RESP: No wheezing, Clear b/l  CVS: S1,S2  RRR  NEURO: Normal speech,non focal  EXT: No Edema   PSYCH: Normal Mood  Left arm av graft    Chart reviewed. Pertinent Notes reviewed. Data Review :  Recent Labs     05/04/21  0609      K 4.7      CO2 25   BUN 66*   CREA 7.38*   *   CA 9.4     Recent Labs     05/04/21  0609   WBC 8.9   HGB 9.4*   HCT 29.1*        No results for input(s): FE, TIBC, PSAT, FERR in the last 72 hours.    Medication list  reviewed  Current Facility-Administered Medications   Medication Dose Route Frequency    sodium chloride (NS) flush 5-40 mL  5-40 mL IntraVENous Q8H    sodium chloride (NS) flush 5-40 mL  5-40 mL IntraVENous PRN    sodium chloride (NS) flush 5-40 mL  5-40 mL IntraVENous Q8H    sodium chloride (NS) flush 5-40 mL  5-40 mL IntraVENous PRN    acetaminophen (TYLENOL) tablet 650 mg  650 mg Oral Q6H PRN    Or    acetaminophen (TYLENOL) suppository 650 mg  650 mg Rectal Q6H PRN    polyethylene glycol (MIRALAX) packet 17 g  17 g Oral DAILY PRN    promethazine (PHENERGAN) tablet 12.5 mg  12.5 mg Oral Q6H PRN    Or    ondansetron (ZOFRAN) injection 4 mg  4 mg IntraVENous Q6H PRN    heparin (porcine) injection 5,000 Units  5,000 Units SubCUTAneous Q8H    aspirin chewable tablet 81 mg  81 mg Oral DAILY    carvediloL (COREG) tablet 12.5 mg  12.5 mg Oral BID WITH MEALS    bumetanide (BUMEX) tablet 2 mg  2 mg Oral DAILY    finasteride (PROSCAR) tablet 5 mg  5 mg Oral DAILY    gabapentin (NEURONTIN) capsule 300 mg  300 mg Oral DAILY    hydrALAZINE (APRESOLINE) tablet 100 mg  100 mg Oral Q8H    losartan (COZAAR) tablet 25 mg  25 mg Oral DAILY    sertraline (ZOLOFT) tablet 50 mg  50 mg Oral DAILY    albuterol-ipratropium (DUO-NEB) 2.5 MG-0.5 MG/3 ML  3 mL Nebulization Q4H PRN    insulin lispro (HUMALOG) injection   SubCUTAneous AC&HS    glucose chewable tablet 16 g  4 Tab Oral PRN    dextrose (D50W) injection syrg 12.5-25 g  12.5-25 g IntraVENous PRN    glucagon (GLUCAGEN) injection 1 mg  1 mg IntraMUSCular PRN     Current Outpatient Medications   Medication Sig    carvediloL (COREG) 25 mg tablet TAKE 1 TABLET BY MOUTH TWICE DAILY WITH FOOD    bumetanide (BUMEX) 2 mg tablet TAKE 1 TABLET BY MOUTH EVERY DAY    finasteride (PROSCAR) 5 mg tablet TAKE 1 TABLET BY MOUTH DAILY    midodrine (PROAMATINE) 5 mg tablet TAKE 1 TABLET BY MOUTH 1 HOUR PRIOR TO DIALYSIS TREATMENT AND THEN TAKE ANOTHER TABLET senior living THROUGH TREATMENT    Myrbetriq 25 mg ER tablet TAKE 1 TABLET BY MOUTH DAILY    insulin NPH/insulin regular (NovoLIN 70/30 U-100 Insulin) 100 unit/mL (70-30) injection ReliOn Novolin 70/30: Inject 30 Units with Breakfast, 40 Units with Dinner    losartan (COZAAR) 25 mg tablet Take  by mouth daily.     gabapentin (NEURONTIN) 300 mg capsule TAKE 1 CAPSULE BY MOUTH EVERY DAY    Insulin Needles, Disposable, (REYNALDO PEN NEEDLE) 32 gauge x 5/32\" ndle FOR USE EVERY MORNING WITH VICTOZA PEN    sertraline (ZOLOFT) 50 mg tablet TAKE 1 T PO QD    aspirin 81 mg chewable tablet Take 81 mg by mouth daily.  carvedilol (COREG) 12.5 mg tablet Take 12.5 mg by mouth two (2) times daily (with meals).  Insulin Syringe-Needle U-100 1 mL 30 gauge x 5/16 syrg Use twice daily for insulin injection    hydrALAZINE (APRESOLINE) 100 mg tablet 100 mg three (3) times daily.  Calcium-Cholecalciferol, D3, (CALCIUM 600 WITH VITAMIN D3) 600 mg(1,500mg) -400 unit chew Take 1 Tab by mouth every evening.  fluticasone (FLONASE) 50 mcg/actuation nasal spray 2 Sprays by Both Nostrils route daily.  tamsulosin (FLOMAX) 0.4 mg capsule Take 0.8 mg by mouth daily.           Hola Aviles, [address] [facility]  Formerly [facility]  [address], [address]  [address] [facility] [address], [address]  Phone - [phone]               Fax - [phone]

## 2021-05-04 NOTE — ED PROVIDER NOTES
EMERGENCY DEPARTMENT HISTORY AND PHYSICAL EXAM      Date: 5/4/2021  Patient Name: Rory Roldan    History of Presenting Illness     Chief Complaint   Patient presents with    Shortness of Breath     Pt woke up today with chest tightness and SOB. Pt recd Neb treatment from EMS and feeling better now. ESRD dialysis Tue, Thu, Sat, CHF wears 2 liters oxygen at home. History Provided By: Patient and EMS    HPI: Rory Roldan, 70 y.o. male presents to the ED with history of end-stage renal disease, receives dialysis Tuesday, Thursday and Saturday, no recent missed dialysis sessions but a recent increase in dry weight as well as difficulty with keeping blood pressure up during dialysis sessions, also with a history of systolic heart failure with a echo showing EF of 40% here with shortness of breath with some chest tightness today that woke him up. Patient normally has 2 L oxygen at home overnight and presents via EMS relays that he received a breathing treatment in route which patient said had some mild improvement on his symptoms. He does not weigh himself at home but noticed an increase in girth around his abdomen. He has not had an increase in salty foods. He denies any fever, cough or URI symptoms, still makes a small amount of urine in his had no problems with diarrhea or bleeding anywhere. His dialysis access is in his left arm and he has not had any problems with it lately. There are no other complaints, changes, or physical findings at this time. PCP: Tracie Ramsay MD    No current facility-administered medications on file prior to encounter.       Current Outpatient Medications on File Prior to Encounter   Medication Sig Dispense Refill    carvediloL (COREG) 25 mg tablet TAKE 1 TABLET BY MOUTH TWICE DAILY WITH FOOD      bumetanide (BUMEX) 2 mg tablet TAKE 1 TABLET BY MOUTH EVERY DAY      finasteride (PROSCAR) 5 mg tablet TAKE 1 TABLET BY MOUTH DAILY      midodrine (PROAMATINE) 5 mg tablet TAKE 1 TABLET BY MOUTH 1 HOUR PRIOR TO DIALYSIS TREATMENT AND THEN TAKE ANOTHER TABLET correction THROUGH TREATMENT      Myrbetriq 25 mg ER tablet TAKE 1 TABLET BY MOUTH DAILY      insulin NPH/insulin regular (NovoLIN 70/30 U-100 Insulin) 100 unit/mL (70-30) injection ReliOn Novolin 70/30: Inject 30 Units with Breakfast, 40 Units with Dinner 7 Vial 3    losartan (COZAAR) 25 mg tablet Take  by mouth daily.  gabapentin (NEURONTIN) 300 mg capsule TAKE 1 CAPSULE BY MOUTH EVERY DAY 90 Cap 3    Insulin Needles, Disposable, (REYNALDO PEN NEEDLE) 32 gauge x 5/32\" ndle FOR USE EVERY MORNING WITH VICTOZA  Pen Needle 3    sertraline (ZOLOFT) 50 mg tablet TAKE 1 T PO QD  0    aspirin 81 mg chewable tablet Take 81 mg by mouth daily.  carvedilol (COREG) 12.5 mg tablet Take 12.5 mg by mouth two (2) times daily (with meals).  Insulin Syringe-Needle U-100 1 mL 30 gauge x 5/16 syrg Use twice daily for insulin injection 100 Syringe 7    hydrALAZINE (APRESOLINE) 100 mg tablet 100 mg three (3) times daily.  Calcium-Cholecalciferol, D3, (CALCIUM 600 WITH VITAMIN D3) 600 mg(1,500mg) -400 unit chew Take 1 Tab by mouth every evening.  fluticasone (FLONASE) 50 mcg/actuation nasal spray 2 Sprays by Both Nostrils route daily.  tamsulosin (FLOMAX) 0.4 mg capsule Take 0.8 mg by mouth daily.          Past History     Past Medical History:  Past Medical History:   Diagnosis Date    Arthritis     spine    CAD (coronary artery disease)     high cholesterol    Chronic kidney disease     dialysis    Diabetes ([facility]     GERD (gastroesophageal reflux disease)     HX    Hypertension     Ill-defined condition     irritable bowel syndrome    Systolic CHF ([facility]     Unspecified sleep apnea     NO CPAP       Past Surgical History:  Past Surgical History:   Procedure Laterality Date    COLONOSCOPY N/A 11/9/2016    COLONOS[facility]abrina Mason MD at [facility] ENDOSCOPY    HX [address] [place] [address]  HX UROLOGICAL      TURP    HX VASCULAR ACCESS      L arm dialysis access       Family History:  Family History   Problem Relation Age of Onset    Cancer Father         \"bone\"    Diabetes Brother        Social History:  Social History     Tobacco Use    Smoking status: Former Smoker     Quit date:      Years since quittin.3    Smokeless tobacco: Former User    Tobacco comment: cigars only   Substance Use Topics    Alcohol use: No    Drug use: No       Allergies: Allergies   Allergen Reactions    Albumin, Human 25 % Anaphylaxis and Hives    Niacin Hives         Review of Systems   Review of Systems   Constitutional: Negative for activity change, appetite change and fatigue. HENT: Negative. Respiratory: Positive for chest tightness, shortness of breath and wheezing. Negative for apnea, cough, choking and stridor. Cardiovascular: Negative for chest pain, palpitations and leg swelling. Gastrointestinal: Positive for abdominal distention. Negative for abdominal pain, anal bleeding, blood in stool, constipation, diarrhea, nausea, rectal pain and vomiting. Genitourinary: Negative. Musculoskeletal: Negative. Negative for back pain. Neurological: Negative. All other systems reviewed and are negative. Physical Exam   Physical Exam   Vital signs and nursing notes reviewed    CONSTITUTIONAL: Alert, in moderate distress; well-developed; well-nourished. BMI greater than 30. HEAD:  Normocephalic, atraumatic  EYES: PERRL; EOM's intact. ENTM: Nose: no rhinorrhea; Throat: no erythema or exudate, mucous membranes moist  Neck:  Supple. trachea is midline. Mild JVD bilaterally. RESP: Bilateral crackles approximately MCFP up both lung fields. Respiratory rate 30, talking in 4-5 word sentences. CV: S1 and S2 WNL; No murmurs, gallops or rubs. 2+ radial and DP pulses bilaterally. Blood pressure 145/78. GI: mild-distention, normal bowel sounds, abdomen soft and non-tender.  No masses or organomegaly. No visible fluid wave. : No costo-vertebral angle tenderness. BACK:  Non-tender, normal appearance  UPPER EXT:  Normal inspection. no joint or soft tissue swelling  LOWER EXT: No edema, no calf tenderness. NEURO: Alert and oriented x3, 5/5 strength and light touch sensation intact in bilateral upper and lower extremities. SKIN: No rashes; Warm and dry  PSYCH: Normal mood, normal affect    Diagnostic Study Results     Labs -     Recent Results (from the past 12 hour(s))   EKG, 12 LEAD, INITIAL    Collection Time: 05/04/21  5:57 AM   Result Value Ref Range    Ventricular Rate 76 BPM    Atrial Rate 76 BPM    P-R Interval 164 ms    QRS Duration 134 ms    Q-T Interval 400 ms    QTC Calculation (Bezet) 450 ms    Calculated P Axis 7 degrees    Calculated R Axis -57 degrees    Calculated T Axis 95 degrees    Diagnosis       Normal sinus rhythm  Left axis deviation  Right bundle branch block  Left ventricular hypertrophy with repolarization abnormality  Cannot rule out Anteroseptal infarct , age undetermined  When compared with ECG of 17-APR-2021 10:53,  T wave amplitude has increased in Inferior leads     POC TROPONIN-I    Collection Time: 05/04/21  6:06 AM   Result Value Ref Range    Troponin-I (POC) <0.04 0.00 - 0.08 ng/mL   SAMPLES BEING HELD    Collection Time: 05/04/21  6:09 AM   Result Value Ref Range    SAMPLES BEING HELD  BLUE     COMMENT        Add-on orders for these samples will be processed based on acceptable specimen integrity and analyte stability, which may vary by analyte.    CBC WITH AUTOMATED DIFF    Collection Time: 05/04/21  6:09 AM   Result Value Ref Range    WBC 8.9 4.1 - 11.1 K/uL    RBC 3.05 (L) 4.10 - 5.70 M/uL    HGB 9.4 (L) 12.1 - 17.0 g/dL    HCT 29.1 (L) 36.6 - 50.3 %    MCV 95.4 80.0 - 99.0 FL    MCH 30.8 26.0 - 34.0 PG    MCHC 32.3 30.0 - 36.5 g/dL    RDW 14.6 (H) 11.5 - 14.5 %    PLATELET 708 339 - 028 K/uL    MPV 11.7 8.9 - 12.9 FL    NRBC 0.0 0  WBC ABSOLUTE NRBC 0.00 0.00 - 0.01 K/uL    NEUTROPHILS 77 (H) 32 - 75 %    LYMPHOCYTES 12 12 - 49 %    MONOCYTES 7 5 - 13 %    EOSINOPHILS 3 0 - 7 %    BASOPHILS 0 0 - 1 %    IMMATURE GRANULOCYTES 1 (H) 0.0 - 0.5 %    ABS. NEUTROPHILS 6.9 1.8 - 8.0 K/UL    ABS. LYMPHOCYTES 1.1 0.8 - 3.5 K/UL    ABS. MONOCYTES 0.6 0.0 - 1.0 K/UL    ABS. EOSINOPHILS 0.2 0.0 - 0.4 K/UL    ABS. BASOPHILS 0.0 0.0 - 0.1 K/UL    ABS. IMM. GRANS. 0.1 (H) 0.00 - 0.04 K/UL    DF AUTOMATED     METABOLIC PANEL, COMPREHENSIVE    Collection Time: 05/04/21  6:09 AM   Result Value Ref Range    Sodium 136 136 - 145 mmol/L    Potassium 4.7 3.5 - 5.1 mmol/L    Chloride 103 97 - 108 mmol/L    CO2 25 21 - 32 mmol/L    Anion gap 8 5 - 15 mmol/L    Glucose 194 (H) 65 - 100 mg/dL    BUN 66 (H) 6 - 20 MG/DL    Creatinine 7.38 (H) 0.70 - 1.30 MG/DL    BUN/Creatinine ratio 9 (L) 12 - 20      GFR est AA 9 (L) >60 ml/min/1.73m2    GFR est non-AA 7 (L) >60 ml/min/1.73m2    Calcium 9.4 8.5 - 10.1 MG/DL    Bilirubin, total 0.6 0.2 - 1.0 MG/DL    ALT (SGPT) 43 12 - 78 U/L    AST (SGOT) 21 15 - 37 U/L    Alk. phosphatase 74 45 - 117 U/L    Protein, total 8.0 6.4 - 8.2 g/dL    Albumin 3.6 3.5 - 5.0 g/dL    Globulin 4.4 (H) 2.0 - 4.0 g/dL    A-G Ratio 0.8 (L) 1.1 - 2.2     NT-PRO BNP    Collection Time: 05/04/21  6:09 AM   Result Value Ref Range    NT pro-BNP 5,474 (H) <125 PG/ML   TROPONIN I    Collection Time: 05/04/21  6:09 AM   Result Value Ref Range    Troponin-I, Qt. 0.13 (H) <0.05 ng/mL       Radiologic Studies -   XR CHEST PORT   Final Result      Mild patchy bilateral lung opacities can be seen with pulmonary edema or   atypical/viral infection. CT Results  (Last 48 hours)    None        CXR Results  (Last 48 hours)               05/04/21 0621  XR CHEST PORT Final result    Impression:      Mild patchy bilateral lung opacities can be seen with pulmonary edema or   atypical/viral infection.            Narrative:  EXAM:  XR CHEST PORT       INDICATION: Shortness of breath       COMPARISON: 4/17/2021       TECHNIQUE: Portable AP upright chest view at 0610 hours       FINDINGS: The cardiomediastinal contours are stable. The pulmonary vasculature   is within normal limits. There are mild patchy bilateral lung opacities. There is no pleural effusion or   pneumothorax. The bones and upper abdomen are stable. Medical Decision Making   I am the first provider for this patient. I reviewed the vital signs, available nursing notes, past medical history, past surgical history, family history and social history. Vital Signs-Reviewed the patient's vital signs. Patient Vitals for the past 12 hrs:   Temp Pulse Resp BP SpO2   05/04/21 0715  71 22 (!) 152/84 99 %   05/04/21 0615     100 %   05/04/21 0600 99.2 °F (37.3 °C) 77 30 (!) 161/78        EKG interpretation: (Preliminary)  EKG performed at 5:57 AM shows a normal sinus rhythm at a rate of 76 with a left axis deviation, right bundle branch block, voltage consistent with LVH. Similar pattern to 4/17/2021 EKG. Records Reviewed: Nursing Notes, Old Medical Records, Previous electrocardiograms, Ambulance Run Sheet and Previous Laboratory Studies    Provider Notes (Medical Decision Making):   77-year-old male here with shortness of breath, evidence of volume overload based on history and clinical exam.  Pattern of dialysis in terms of weight adjustment and difficulties with blood pressure during sessions may be contributing. Stable EKG and troponin elevated but within normal range in the setting of his known kidney disease. Chest x-ray report remarks possible atypical pneumonia but patient has been vaccinated x2 for COVID-19, will check rapid anyways. No immediate need for electrolyte management in the ED but does need dialysis to remove volume. Will discuss with nephrologist and hospitalist.    ED Course:   Initial assessment performed.  The patients presenting problems have been discussed, and they are in agreement with the care plan formulated and outlined with them. I have encouraged them to ask questions as they arise throughout their visit. ED Course as of May 04 0808   Tue May 04, 2021   6183 Dr. Steffanie Rehman called back and arranging for dialysis. [TL]      ED Course User Index  [TL] Tena Goins MD           Disposition:  Admit    Admit Note:  8:12 AM  Pt is being admitted by Dr. Luis Anderson. The results of their tests and reason(s) for their admission have been discussed with pt and/or available family. They convey agreement and understanding for the need to be admitted and for admission diagnosis. Diagnosis     Clinical Impression:   1. Acute pulmonary edema (HCC)    2. SOB (shortness of breath)    3. History of heart failure    4. History of end stage renal disease        Attestations:    Cat Somers MD    Please note that this dictation was completed with [employer], the computer voice recognition software. Quite often unanticipated grammatical, syntax, homophones, and other interpretive errors are inadvertently transcribed by the computer software. Please disregard these errors. Please excuse any errors that have escaped final proofreading. Thank you.

## 2021-05-04 NOTE — PROGRESS NOTES
HD TRANSFER - OUT REPORT:    Verbal report given to Tim Davila RN on [employer] being transferred to ED for routine progression of care       Report consisted of patient's Situation, Background, Assessment and   Recommendations(SBAR). Information from the following report(s) SBAR, Procedure Summary, Intake/Output and Recent Results was reviewed with the receiving nurse. Method:  $$ Method: Hemodialysis (05/04/21 7898)    Fluid Removed  NET Fluid Removed (mL): 2500 ml (05/04/21 1315)     Patient response to treatment:  Stable    End Time  Hemodialysis End Time: 5558 (05/04/21 1315)  If not documented, dialysis nurse to update post-dialysis row in HD/Filtration flowsheet     Medications /Volume expansion agents or Fluid boluses administered during treatment? no    Post-dialysis medication administration due?  yes  Remind nurse to administer post-HD medication upon return to unit. Fistula hemostasis? yes    Line heparinization? no    Lines: STEPHANIE AVG    Opportunity for questions and clarification was provided.       Patient transported with: O2 @ 2 liters  Tech

## 2021-05-04 NOTE — H&P
Hospitalist Admission Note    NAME: Doris Search   :     MRN:  [id number]     Date/Time:  2021 9:57 AM    Patient PCP: Libby Graves MD  ______________________________________________________________________  Given the patient's current clinical presentation, I have a high level of concern for decompensation if discharged from the emergency department. Complex decision making was performed, which includes reviewing the patient's available past medical records, laboratory results, and x-ray films. My assessment of this patient's clinical condition and my plan of care is as follows. Assessment / Plan:  Acute on chronic systolic heart failure, EF 40 - 45%  ESRD  CAD/HTN  Elevated troponin  Chest x-ray showed mild patchy bilateral lung opacity can be seen with pulmonary edema or atypical/viral infection  proBNP 5, 474, rapid COVID-19 negative. Patient has had both of his vaccines  Troponin 0.13, likely due to renal failure. EKG without acute ischemia  We will check a procalcitonin  For HD today with fluid removal 2.5L  Repeat chest x-ray in the AM  Resume home meds: ASA, bumex, coreg, losartan, hydralazine  Nephrology following    T2DM with neuropathy  . Diet controlled. He is currently not on any medications   Will check A1c  SSI  Cont' gabapentin    FRIDA, not on CPAP  Obesity, Body mass index is 33.49 kg/m². Uses 2 LNC at night  Dietary restrictions recommended. Depression  Cont' sertraline      Code Status: Full  Surrogate Decision Maker:  DVT Prophylaxis:   GI Prophylaxis: not indicated  Baseline:       Subjective:   CHIEF COMPLAINT: Shortness of breath, chest tightness    HISTORY OF PRESENT ILLNESS:     Sadie Kohler is a 70 y.o.    male with PMHx significant for chronic systolic heart failure, hypertension, type 2 diabetes, FRIDA, CAD, ESRD on HD, presents to the ER for evaluation of shortness of breath associated with chest tightness that woke him up today. Patient at baseline wears 2 L of oxygen only at night. He came to the ER stating he has had increased fluid retention in his abdomen with association to SOB. Patient has been compliant with HD session. He denies any associated fever, CP, palpitations, N/V/D. Vitals/ labs/imaging reviewed. We were asked to admit for work up and evaluation of the above problems. Past Medical History:   Diagnosis Date    Arthritis     spine    CAD (coronary artery disease)     high cholesterol    Chronic kidney disease     dialysis    Diabetes [facility]     GERD (gastroesophageal reflux disease)     HX    Hypertension     Ill-defined condition     irritable bowel syndrome    Systolic CHF ([facility]     Unspecified sleep apnea     NO CPAP        Past Surgical History:   Procedure Laterality Date    COLONOSCOPY N/A 2016    COLONOSCOPY performed by Val Dietz MD at [facility] ENDOSCOPY    HX ORTHOPAEDIC      CERVICAL FUSION    HX UROLOGICAL      TURP    HX VASCULAR ACCESS      L arm dialysis access       Social History     Tobacco Use    Smoking status: Former Smoker     Quit date:      Years since quittin.3    Smokeless tobacco: Former User    Tobacco comment: cigars only   Substance Use Topics    Alcohol use: No        Family History   Problem Relation Age of Onset    Cancer Father         \"bone\"    Diabetes Brother      Allergies   Allergen Reactions    Albumin, Human 25 % Anaphylaxis and Hives    Niacin Hives        Prior to Admission medications    Medication Sig Start Date End Date Taking?  Authorizing Provider   carvediloL (COREG) 25 mg tablet TAKE 1 TABLET BY MOUTH TWICE DAILY WITH FOOD 21   Provider, Historical   bumetanide (BUMEX) 2 mg tablet TAKE 1 TABLET BY MOUTH EVERY DAY 3/19/21   Provider, Historical   finasteride (PROSCAR) 5 mg tablet TAKE 1 TABLET BY MOUTH DAILY 21   Provider, Historical   midodrine (PROAMATINE) 5 mg tablet TAKE 1 TABLET BY MOUTH 1 HOUR PRIOR TO DIALYSIS TREATMENT AND THEN TAKE ANOTHER TABLET FPC THROUGH TREATMENT 3/11/21   Provider, Historical   Myrbetriq 25 mg ER tablet TAKE 1 TABLET BY MOUTH DAILY 3/19/21   Provider, Historical   insulin NPH/insulin regular (NovoLIN 70/30 U-100 Insulin) 100 unit/mL (70-30) injection ReliOn Novolin 70/30: Inject 30 Units with Breakfast, 40 Units with Dinner 3/23/21   Ted Ruelas MD   losartan (COZAAR) 25 mg tablet Take  by mouth daily. Anastasia, MD Mignon   gabapentin (NEURONTIN) 300 mg capsule TAKE 1 CAPSULE BY MOUTH EVERY DAY 9/1/20   Ted Ruelas MD   Insulin Needles, Disposable, (REYNALDO PEN NEEDLE) 32 gauge x 5/32\" ndle FOR USE EVERY MORNING WITH VICTOZA PEN 7/30/19   Ted Ruelas MD   sertraline (ZOLOFT) 50 mg tablet TAKE 1 T PO QD 2/25/19   Provider, Historical   aspirin 81 mg chewable tablet Take 81 mg by mouth daily. Provider, Historical   carvedilol (COREG) 12.5 mg tablet Take 12.5 mg by mouth two (2) times daily (with meals). 8/13/18   Provider, Historical   Insulin Syringe-Needle U-100 1 mL 30 gauge x 5/16 syrg Use twice daily for insulin injection 2/16/18   Ted Ruelas MD   hydrALAZINE (APRESOLINE) 100 mg tablet 100 mg three (3) times daily. 8/14/17   Provider, Historical   Calcium-Cholecalciferol, D3, (CALCIUM 600 WITH VITAMIN D3) 600 mg(1,500mg) -400 unit chew Take 1 Tab by mouth every evening. Provider, Historical   fluticasone (FLONASE) 50 mcg/actuation nasal spray 2 Sprays by Both Nostrils route daily. Provider, Historical   tamsulosin (FLOMAX) 0.4 mg capsule Take 0.8 mg by mouth daily. Other, MD Mignon       REVIEW OF SYSTEMS:     I am not able to complete the review of systems because:    The patient is intubated and sedated    The patient has altered mental status due to his acute medical problems    The patient has baseline aphasia from prior stroke(s)    The patient has baseline dementia and is not reliable historian    The patient is in acute medical distress and unable to provide information           Total of 12 systems reviewed as follows:       POSITIVE= BOLD text  Negative = text not BOLD  General:  fever, chills, sweats, generalized weakness, weight loss/gain,      loss of appetite   Eyes:    blurred vision, eye pain, loss of vision, double vision  ENT:    rhinorrhea, pharyngitis   Respiratory:   cough, sputum production, SOB, BRITT, wheezing, pleuritic pain   Cardiology:   chest pain, palpitations, orthopnea, PND, edema, syncope   Gastrointestinal:  abdominal pain , N/V, diarrhea, dysphagia, constipation, bleeding   Genitourinary:  frequency, urgency, dysuria, hematuria, incontinence   Muskuloskeletal :  arthralgia, myalgia, back pain  Hematology:  easy bruising, nose or gum bleeding, lymphadenopathy   Dermatological: rash, ulceration, pruritis, color change / jaundice  Endocrine:   hot flashes or polydipsia   Neurological:  headache, dizziness, confusion, focal weakness, paresthesia,     Speech difficulties, memory loss, gait difficulty  Psychological: Feelings of anxiety, depression, agitation    Objective:   VITALS:    Visit Vitals  BP (!) 148/83   Pulse 72   Temp 98.9 °F (37.2 °C) (Oral)   Resp 20   Ht 6' (1.829 m)   Wt 112 kg (246 lb 14.6 oz)   SpO2 99%   BMI 33.49 kg/m²       PHYSICAL EXAM:    General:    Alert, cooperative, no distress, appears stated age. HEENT: Atraumatic, anicteric sclerae, pink conjunctivae     No oral ulcers, mucosa moist, throat clear  Neck:  Supple, symmetrical,  thyroid: non tender  Lungs:   Crackles at lung bases. No wheezing or Rhonchi. No rales. Chest wall:  No tenderness. No accessory muscle use. Heart:   Regular  rhythm,  No  Murmur. No edema  Abdomen:   Distended but NT, +BS  Extremities: No cyanosis. No clubbing,      Skin turgor normal, Radial dial pulse 2+. Capillary refill normal  Skin:     Not pale. Not Jaundiced  No rashes   Psych:  Not depressed. Not anxious or agitated. Neurologic: No facial asymmetry.  No aphasia or slurred speech. Symmetrical strength, Sensation grossly intact. AAOx4.     _______________________________________________________________________  Care Plan discussed with:    Comments   Patient x    Family      RN x    Care Manager                    Consultant:  berny ED physician   _______________________________________________________________________  Expected  Disposition:   Home with Family    HH/PT/OT/RN x   SNF/LTC    AYAZ    ________________________________________________________________________  TOTAL TIME:  72 Minutes    Critical Care Provided     Minutes non procedure based      Comments    x Reviewed previous records   >50% of visit spent in counseling and coordination of care x Discussion with patient and/or family and questions answered       ________________________________________________________________________  Signed: Chari Guerra MD    Procedures: see electronic medical records for all procedures/Xrays and details which were not copied into this note but were reviewed prior to creation of Plan.     LAB DATA REVIEWED:    Recent Results (from the past 24 hour(s))   EKG, 12 LEAD, INITIAL    Collection Time: 05/04/21  5:57 AM   Result Value Ref Range    Ventricular Rate 76 BPM    Atrial Rate 76 BPM    P-R Interval 164 ms    QRS Duration 134 ms    Q-T Interval 400 ms    QTC Calculation (Bezet) 450 ms    Calculated P Axis 7 degrees    Calculated R Axis -57 degrees    Calculated T Axis 95 degrees    Diagnosis       Normal sinus rhythm  Left axis deviation  Right bundle branch block  Left ventricular hypertrophy with repolarization abnormality  Cannot rule out Anteroseptal infarct , age undetermined  When compared with ECG of 17-APR-2021 10:53,  T wave amplitude has increased in Inferior leads     POC TROPONIN-I    Collection Time: 05/04/21  6:06 AM   Result Value Ref Range    Troponin-I (POC) <0.04 0.00 - 0.08 ng/mL   SAMPLES BEING HELD    Collection Time: 05/04/21  6:09 AM   Result Value Ref Range    SAMPLES BEING HELD  BLUE     COMMENT        Add-on orders for these samples will be processed based on acceptable specimen integrity and analyte stability, which may vary by analyte. CBC WITH AUTOMATED DIFF    Collection Time: 05/04/21  6:09 AM   Result Value Ref Range    WBC 8.9 4.1 - 11.1 K/uL    RBC 3.05 (L) 4.10 - 5.70 M/uL    HGB 9.4 (L) 12.1 - 17.0 g/dL    HCT 29.1 (L) 36.6 - 50.3 %    MCV 95.4 80.0 - 99.0 FL    MCH 30.8 26.0 - 34.0 PG    MCHC 32.3 30.0 - 36.5 g/dL    RDW 14.6 (H) 11.5 - 14.5 %    PLATELET 695 112 - 482 K/uL    MPV 11.7 8.9 - 12.9 FL    NRBC 0.0 0  WBC    ABSOLUTE NRBC 0.00 0.00 - 0.01 K/uL    NEUTROPHILS 77 (H) 32 - 75 %    LYMPHOCYTES 12 12 - 49 %    MONOCYTES 7 5 - 13 %    EOSINOPHILS 3 0 - 7 %    BASOPHILS 0 0 - 1 %    IMMATURE GRANULOCYTES 1 (H) 0.0 - 0.5 %    ABS. NEUTROPHILS 6.9 1.8 - 8.0 K/UL    ABS. LYMPHOCYTES 1.1 0.8 - 3.5 K/UL    ABS. MONOCYTES 0.6 0.0 - 1.0 K/UL    ABS. EOSINOPHILS 0.2 0.0 - 0.4 K/UL    ABS. BASOPHILS 0.0 0.0 - 0.1 K/UL    ABS. IMM. GRANS. 0.1 (H) 0.00 - 0.04 K/UL    DF AUTOMATED     METABOLIC PANEL, COMPREHENSIVE    Collection Time: 05/04/21  6:09 AM   Result Value Ref Range    Sodium 136 136 - 145 mmol/L    Potassium 4.7 3.5 - 5.1 mmol/L    Chloride 103 97 - 108 mmol/L    CO2 25 21 - 32 mmol/L    Anion gap 8 5 - 15 mmol/L    Glucose 194 (H) 65 - 100 mg/dL    BUN 66 (H) 6 - 20 MG/DL    Creatinine 7.38 (H) 0.70 - 1.30 MG/DL    BUN/Creatinine ratio 9 (L) 12 - 20      GFR est AA 9 (L) >60 ml/min/1.73m2    GFR est non-AA 7 (L) >60 ml/min/1.73m2    Calcium 9.4 8.5 - 10.1 MG/DL    Bilirubin, total 0.6 0.2 - 1.0 MG/DL    ALT (SGPT) 43 12 - 78 U/L    AST (SGOT) 21 15 - 37 U/L    Alk.  phosphatase 74 45 - 117 U/L    Protein, total 8.0 6.4 - 8.2 g/dL    Albumin 3.6 3.5 - 5.0 g/dL    Globulin 4.4 (H) 2.0 - 4.0 g/dL    A-G Ratio 0.8 (L) 1.1 - 2.2     NT-PRO BNP    Collection Time: 05/04/21  6:09 AM   Result Value Ref Range    NT pro-BNP 5,474 (H) <125 PG/ML TROPONIN I    Collection Time: 05/04/21  6:09 AM   Result Value Ref Range    Troponin-I, Qt. 0.13 (H) <0.05 ng/mL   COVID-19 RAPID TEST    Collection Time: 05/04/21  8:15 AM   Result Value Ref Range    Specimen source Nasopharyngeal      COVID-19 rapid test Not detected NOTD

## 2021-05-04 NOTE — CONSULTS
Eve [id number]  LTQ:[id number]   :1945     Pt seen  esrd  On hd at present. - remove 2-2.5 kg as tolerated    Patient Active Problem List    Diagnosis Date Noted    UTI (lower urinary tract infection) 10/25/2013     Priority: 1 - One    Renal failure (ARF), acute on chronic (HCC) 10/25/2013     Priority: 1 - One    Systolic heart failure ([address] 2021    Accelerated hypertension 2020    Elevated troponin 2020    ESRD (end stage renal disease) on dialysis ([address] 2020    Acute respiratory failure with hypoxia ([address] 2020    Acute on chronic systolic CHF (congestive heart failure) ([address] 2020    Pneumonia 2019    Acute on chronic renal failure ([address] 10/29/2016    Hypotension 10/29/2016    Chest pain 10/29/2016    Type II diabetes mellitus with nephropathy ([address] 10/29/2016    Hyperlipidemia 10/29/2016    Nephrotic syndrome 2016    Renal anasarca 2016    Diarrhea 10/28/2013    SIRS (systemic inflammatory response syndrome) ([address] 10/28/2013    Acute pancreatitis 2011    LFT'S ABNORMAL 2011    Acute renal failure (ARF) ([address] 2011    CKD (chronic kidney disease) stage 3, GFR 30-59 ml/min ([address] 2011    HTN (hypertension) 2011    CAD (coronary artery disease) 2011    Abdominal pain, acute, epigastric 2011    Nausea & vomiting 2011                             [facility]  [facility] [address]  [facility], [address] [facility]  Phone - [phone]         Fax - [phone] [facility]  [address] [place] [address]  Phone - [phone]        Fax - [phone]     [email or web address]

## 2021-05-04 NOTE — ED NOTES
Bedside and Verbal shift change report given to 1710 Christofer Taylor (oncoming nurse) by Lucille Glaser  (offgoing nurse). Report included the following information SBAR, ED Summary, MAR and Recent Results. Aimee- Nephrology consult called. 9291- MD Jaciel Romo called back from Nephrology    0800- TRANSFER - OUT REPORT:    Verbal report given to Cindy(name) on Angelito Maradiaga  being transferred to Dialysis (unit) for routine progression of care       Report consisted of patients Situation, Background, Assessment and   Recommendations(SBAR). Information from the following report(s) SBAR, ED Summary and Recent Results was reviewed with the receiving nurse. Lines:       Opportunity for questions and clarification was provided. Patient to be transported with:  Transport      1340- Verbal report received from [address] with pharmacy regarding meds due while patient was in dialysis. Pharmacy states pt can have them now. 1925- Bedside shift change report given to Dung Florez (oncoming nurse) by Fercho Gloria (offgoing nurse). Report included the following information SBAR, ED Summary, MAR and Recent Results.

## 2021-05-04 NOTE — PROCEDURES
[place] [facility]  [phone]    Vitals   Pre   Post   Assessment   Pre   Post     Temp  Temp: 98.9 °F (37.2 °C) (05/04/21 0927)  98.2, oral LOC  A&Ox4 A&Ox4   HR   Pulse (Heart Rate): 70 (05/04/21 0927) 63 Lungs   Diminished Dim bases   B/P   BP: (!) 186/102 (05/04/21 0927) 141/83 Cardiac   Tele, NSR Tele, NSR   Resp   Resp Rate: 20 (05/04/21 0927) 20 Skin   CDI CDI   Pain level     Edema  abd distention abd distention   Orders:    Duration:   Start:    5379 End:    1315 Total:   3.75hr   Dialyzer:   Dialyzer/Set Up Inspection: Amarjit Bermudez (05/04/21 0927)   Iveth Semen Bath:   Dialysate K (mEq/L): 2 (05/04/21 0927)   Ca Bath:   Dialysate CA (mEq/L): 2.5 (05/04/21 0927)   Na/Bicarb:   Dialysate NA (mEq/L): 140 (05/04/21 0927)   Target Fluid Removal:   Goal/Amount of Fluid to Remove (mL): 2500 mL (05/04/21 0927)   Access     Type & Location:   STEPHANIE AVG: skin CDI. No s/s of infection. + B/T. No issues with cannulation of arterial site. Venous site is difficult for home clinic staff as well as acute HD nurses. 2nd attempt of venous site was successful. MD notified of difficulties. No issues with hemostasis post treatment. Running well at - 450.     Labs     Obtained/Reviewed   Critical Results Called   Date when labs were drawn-  Hgb-    HGB   Date Value Ref Range Status   05/04/2021 9.4 (L) 12.1 - 17.0 g/dL Final     K-    Potassium   Date Value Ref Range Status   05/04/2021 4.7 3.5 - 5.1 mmol/L Final     Ca-   Calcium   Date Value Ref Range Status   05/04/2021 9.4 8.5 - 10.1 MG/DL Final     Bun-   BUN   Date Value Ref Range Status   05/04/2021 66 (H) 6 - 20 MG/DL Final     Creat-   Creatinine   Date Value Ref Range Status   05/04/2021 7.38 (H) 0.70 - 1.30 MG/DL Final        Medications/ Blood Products Given     Name   Dose   Route and Time     None ordered                Blood Volume Processed (BVP):    86.2L Net Fluid   Removed:  2500ml   Comments   Time Out Done: 9514  Primary Nurse Rpt Pre: Santy Herrera RN  Primary Nurse Rpt Post: Santy Herrera RN  Pt Education: need for access assessment by vascular; procedural; consents  Care Plan: admit for observation  Tx Summary:  Pt arrived to HD suite A&Ox4. Consent signed & on file. SBAR received from Primary RN.  8292: Pt cannulated with 35V needles per policy & without issue. Labs drawn per request/ order. VSS. Dialysis Tx initiated. * no issues during HD*  1315: Tx ended. VSS. All possible blood returned to patient. Hemostasis achieved without issue. Bed locked and in the lowest position, call bell and belongings in reach. SBAR given to Primary, RN. Patient is stable at time of their departure. All Dialysis related medications have been reviewed. Admiting Diagnosis: Abd pain, SOB; pulmonary edema  Pt's previous clinic- [facility]  Consent signed - Informed Consent Verified: Yes (05/04/21 9003)  [place] Consent - obtained, signed, filed  Hepatitis Status- Neg Ag; >10 Ab 11  Machine #- Machine Number: [id number] (05/04/21 1938)  Telemetry status- Tele, NSR  Pre-dialysis wt. -

## 2021-05-04 NOTE — PROGRESS NOTES
Transition of Care Plan:    RUR: 20 %  Disposition: Home with Spouse   Follow up appointments: PCP, Nephrology   Outpatient HD: [facility] 6:00 am chair . H&P, Facesheet, Nephrologist note, HD flowsheet from 21 faxed to [place], [place] on 21  DME needed: Pt has a cane at home and nocturnal home oxygen 2 liters provided by [address] at Discharge: Wife to transport  [place] or means to access home:   Yes     IM Medicare letter:2nd Im letter before discharge   Caregiver Contact: Wife Jerry Ford [phone]  Discharge Caregiver contacted prior to discharge? Unit CM to follow up before discharge. Reason for Admission:  Acute on chronic systolic heart failure                    RUR Score:    20%, moderate risk for readmission              PCP: First and Last name:   Renetta Jones MD     Name of Practice:    Are you a current patient: Yes/No:  Yes   Approximate date of last visit: 5/3/21   Can you participate in a virtual visit if needed: Yes    Do you (patient/family) have any concerns for transition/discharge? Not at this time              Plan for utilizing home health:   TBD PT consult pending    Current Advanced Directive/Advance Care Plan:  Full Code  Advance Care Planning     General Advance Care Planning (ACP) Conversation    Date of Conversation: 2021  Conducted with: Patient with Decision Making Capacity    Healthcare Decision Maker: Wife [facility]    Today we documented Decision Maker(s) consistent with Legal Next of Kin hierarchy. Content/Action Overview:   DECLINED ACP conversation - will revisit periodically   Reviewed DNR/DNI and patient elects Full Code (Attempt Resuscitation)     Length of Voluntary ACP Conversation in minutes:  <16 minutes (Non-Billable)    Margaret Apodaca RN         Transition of Care Plan:        CM met with patient at the bedside to discuss discharge planning.   Patient name, , and demographics all verified in chart. Patient lives in a one level home with his spouse. Patient reports he is independent with his ADLS. Patient drives only DME is a cane and 2 litters of oxygen at night provided by [employer]. Patient attends OP HD at [place], [place] T,T,S, 6:00am chair time. Pt transports himself to HD. Patient has no SNF or IPR history. Patient has had [facility] in the past with an unknown agency. Patient is active with his PCP. Care Management Interventions  PCP Verified by CM: Yes  Mode of Transport at Discharge: Other (see comment)(Patient spouse to transport )  Transition of Care Consult (CM Consult): Discharge Planning  Discharge Durable Medical Equipment: No  Physical Therapy Consult: Yes  Occupational Therapy Consult: No  Speech Therapy Consult: No  Current Support Network: Lives with Spouse  Confirm Follow Up Transport: Self  Discharge Location  Discharge Placement: Home with family assistance    Unit CM to continue to follow for discharge needs and planning.     Chaya De Leon, JAYEN, RN, BSW   RN Care Manager

## 2021-05-05 ENCOUNTER — APPOINTMENT (OUTPATIENT)
Dept: GENERAL RADIOLOGY | Age: 76
DRG: 291 | End: 2021-05-05
Attending: INTERNAL MEDICINE
Payer: MEDICARE

## 2021-05-05 LAB
ANION GAP SERPL CALC-SCNC: 6 MMOL/L (ref 5–15)
BASOPHILS # BLD: 0 K/UL (ref 0–0.1)
BASOPHILS NFR BLD: 1 % (ref 0–1)
BUN SERPL-MCNC: 49 MG/DL (ref 6–20)
BUN/CREAT SERPL: 9 (ref 12–20)
CALCIUM SERPL-MCNC: 8.8 MG/DL (ref 8.5–10.1)
CHLORIDE SERPL-SCNC: 99 MMOL/L (ref 97–108)
CO2 SERPL-SCNC: 33 MMOL/L (ref 21–32)
CREAT SERPL-MCNC: 5.66 MG/DL (ref 0.7–1.3)
DIFFERENTIAL METHOD BLD: ABNORMAL
EOSINOPHIL # BLD: 0.3 K/UL (ref 0–0.4)
EOSINOPHIL NFR BLD: 4 % (ref 0–7)
ERYTHROCYTE [DISTWIDTH] IN BLOOD BY AUTOMATED COUNT: 14.5 % (ref 11.5–14.5)
EST. AVERAGE GLUCOSE BLD GHB EST-MCNC: 189 MG/DL
GLUCOSE BLD STRIP.AUTO-MCNC: 161 MG/DL (ref 65–100)
GLUCOSE BLD STRIP.AUTO-MCNC: 174 MG/DL (ref 65–100)
GLUCOSE BLD STRIP.AUTO-MCNC: 177 MG/DL (ref 65–100)
GLUCOSE BLD STRIP.AUTO-MCNC: 228 MG/DL (ref 65–100)
GLUCOSE SERPL-MCNC: 159 MG/DL (ref 65–100)
HBA1C MFR BLD: 8.2 % (ref 4–5.6)
HCT VFR BLD AUTO: 27.4 % (ref 36.6–50.3)
HGB BLD-MCNC: 8.7 G/DL (ref 12.1–17)
IMM GRANULOCYTES # BLD AUTO: 0 K/UL (ref 0–0.04)
IMM GRANULOCYTES NFR BLD AUTO: 0 % (ref 0–0.5)
LYMPHOCYTES # BLD: 1.1 K/UL (ref 0.8–3.5)
LYMPHOCYTES NFR BLD: 18 % (ref 12–49)
MCH RBC QN AUTO: 30.3 PG (ref 26–34)
MCHC RBC AUTO-ENTMCNC: 31.8 G/DL (ref 30–36.5)
MCV RBC AUTO: 95.5 FL (ref 80–99)
MONOCYTES # BLD: 0.7 K/UL (ref 0–1)
MONOCYTES NFR BLD: 10 % (ref 5–13)
NEUTS SEG # BLD: 4.3 K/UL (ref 1.8–8)
NEUTS SEG NFR BLD: 67 % (ref 32–75)
NRBC # BLD: 0 K/UL (ref 0–0.01)
NRBC BLD-RTO: 0 PER 100 WBC
PLATELET # BLD AUTO: 183 K/UL (ref 150–400)
PMV BLD AUTO: 11.2 FL (ref 8.9–12.9)
POTASSIUM SERPL-SCNC: 3.7 MMOL/L (ref 3.5–5.1)
RBC # BLD AUTO: 2.87 M/UL (ref 4.1–5.7)
SERVICE CMNT-IMP: ABNORMAL
SODIUM SERPL-SCNC: 138 MMOL/L (ref 136–145)
WBC # BLD AUTO: 6.4 K/UL (ref 4.1–11.1)

## 2021-05-05 PROCEDURE — 83036 HEMOGLOBIN GLYCOSYLATED A1C: CPT

## 2021-05-05 PROCEDURE — 36415 COLL VENOUS BLD VENIPUNCTURE: CPT

## 2021-05-05 PROCEDURE — 80048 BASIC METABOLIC PNL TOTAL CA: CPT

## 2021-05-05 PROCEDURE — 74011250637 HC RX REV CODE- 250/637: Performed by: INTERNAL MEDICINE

## 2021-05-05 PROCEDURE — 74011250636 HC RX REV CODE- 250/636: Performed by: INTERNAL MEDICINE

## 2021-05-05 PROCEDURE — 65660000001 HC RM ICU INTERMED STEPDOWN

## 2021-05-05 PROCEDURE — 74011636637 HC RX REV CODE- 636/637: Performed by: INTERNAL MEDICINE

## 2021-05-05 PROCEDURE — 82962 GLUCOSE BLOOD TEST: CPT

## 2021-05-05 PROCEDURE — 85025 COMPLETE CBC W/AUTO DIFF WBC: CPT

## 2021-05-05 PROCEDURE — 71045 X-RAY EXAM CHEST 1 VIEW: CPT

## 2021-05-05 PROCEDURE — 90935 HEMODIALYSIS ONE EVALUATION: CPT

## 2021-05-05 RX ADMIN — HEPARIN SODIUM 5000 UNITS: 5000 INJECTION INTRAVENOUS; SUBCUTANEOUS at 18:12

## 2021-05-05 RX ADMIN — HEPARIN SODIUM 5000 UNITS: 5000 INJECTION INTRAVENOUS; SUBCUTANEOUS at 02:47

## 2021-05-05 RX ADMIN — GABAPENTIN 300 MG: 300 CAPSULE ORAL at 08:44

## 2021-05-05 RX ADMIN — BUMETANIDE 2 MG: 1 TABLET ORAL at 08:45

## 2021-05-05 RX ADMIN — HEPARIN SODIUM 5000 UNITS: 5000 INJECTION INTRAVENOUS; SUBCUTANEOUS at 10:05

## 2021-05-05 RX ADMIN — SERTRALINE 50 MG: 50 TABLET, FILM COATED ORAL at 08:45

## 2021-05-05 RX ADMIN — Medication 10 ML: at 18:16

## 2021-05-05 RX ADMIN — Medication 10 ML: at 18:15

## 2021-05-05 RX ADMIN — ACETAMINOPHEN 650 MG: 325 TABLET ORAL at 19:29

## 2021-05-05 RX ADMIN — CARVEDILOL 12.5 MG: 12.5 TABLET, FILM COATED ORAL at 18:12

## 2021-05-05 RX ADMIN — INSULIN LISPRO 2 UNITS: 100 INJECTION, SOLUTION INTRAVENOUS; SUBCUTANEOUS at 12:03

## 2021-05-05 RX ADMIN — FINASTERIDE 5 MG: 5 TABLET, FILM COATED ORAL at 08:45

## 2021-05-05 RX ADMIN — LOSARTAN POTASSIUM 25 MG: 25 TABLET, FILM COATED ORAL at 08:44

## 2021-05-05 RX ADMIN — CARVEDILOL 12.5 MG: 12.5 TABLET, FILM COATED ORAL at 08:45

## 2021-05-05 RX ADMIN — Medication 10 ML: at 22:00

## 2021-05-05 RX ADMIN — ASPIRIN 81 MG: 81 TABLET, CHEWABLE ORAL at 08:44

## 2021-05-05 NOTE — PROGRESS NOTES
HD TRANSFER - OUT REPORT:    Verbal report given to Mckenzie Domínguez RN on Maulik Clarke being transferred to [facility] for routine progression of care       Report consisted of patient's Situation, Background, Assessment and   Recommendations(SBAR). Information from the following report(s) SBAR, Procedure Summary, Intake/Output and Recent Results was reviewed with the receiving nurse. Method:  $$ Method: Hemodialysis (05/05/21 1251)    Fluid Removed  NET Fluid Removed (mL): 3000 ml (05/05/21 1453)     Patient response to treatment:  Stable    End Time  Hemodialysis End Time: 6137 (05/05/21 1453)  If not documented, dialysis nurse to update post-dialysis row in HD/Filtration flowsheet     Medications /Volume expansion agents or Fluid boluses administered during treatment? no    Post-dialysis medication administration due?  yes  Remind nurse to administer post-HD medication upon return to unit. Fistula hemostasis? yes    Line heparinization? no    Lines: STEPHANIE AVG    Opportunity for questions and clarification was provided.       Patient transported with: O2 @ 2 liters  Tech

## 2021-05-05 NOTE — ED NOTES
TRANSFER - IN REPORT:    Verbal and bedside report received from Rell Montes (name) on [facility]. Report consisted of patients Situation, Background, Assessment and   Recommendations(SBAR). Information from the following report(s) SBAR and ED Summary was reviewed with the receiving nurse. Opportunity for questions and clarification was provided. 8581: Medicated pt per orders. 1001: Report given to Prince Smart from Dialysis for when pt goes upstairs to be dialyzed. 1210: Pt off the floor to dialysis. 1313: TRANSFER - OUT REPORT:    Verbal report given to Kellie Jarvis (name) on [facility]  being transferred to [facility] (unit) for routine progression of care       Report consisted of patients Situation, Background, Assessment and   Recommendations(SBAR). Information from the following report(s) SBAR, ED Summary and Recent Results was reviewed with the receiving nurse. Lines:       Opportunity for questions and clarification was provided.       Patient transported with:   [employer]

## 2021-05-05 NOTE — PROGRESS NOTES
Problem: Falls - Risk of  Goal: *Absence of Falls  Description: Document Melina Paz Fall Risk and appropriate interventions in the flowsheet.   Outcome: Progressing Towards Goal  Note: Fall Risk Interventions:  Mobility Interventions: Patient to call before getting OOB         Medication Interventions: Patient to call before getting OOB         History of Falls Interventions: Bed/chair exit alarm

## 2021-05-05 NOTE — PROGRESS NOTES
Nephrology Progress Note  Chavo Euceda     [email or web address]. [email or web address]  Phone - [phone]   Patient: Janet Mosley    YOB: 1945        Date- 5/5/2021   Admit Date: 5/4/2021  CC: Follow up for ESRD          IMPRESSION & PLAN:    ESRD - tts- pat coronado   SOB- likely due to fluid overload   HYPERTENSION   H/o chf    Anemia of ckd   Sec. hyperpar   abdo pain    PLAN-   UF TODAY   Continue coreg, losartan   His bp stable. Hold midodrine for now   Hold hydralazine   epogen TTS     Subjective: Interval History:   - sob improving  bp high        Objective:   Vitals:    05/04/21 2100 05/04/21 2200 05/05/21 0700 05/05/21 0844   BP: (!) 144/69 (!) 165/77 (!) 141/82 (!) 159/90   Pulse:  63 64 76   Resp:  16 16    Temp:  98.6 °F (37 °C) 98.6 °F (37 °C)    TempSrc:       SpO2: 96% 99% 99%    Weight:       Height:          05/04 0701 - 05/05 0700  In: -   Out: 2500   Last 3 Recorded Weights in this Encounter    05/04/21 0600   Weight: 112 kg (246 lb 14.6 oz)      Physical exam:   GEN: NAD  NECK- Supple, no mass  RESP: No wheezing, clear b/l, decreased air movement  CVS: S1,S2  RRR  NEURO: Normal speech,non focal  EXT: No Edema   PSYCH: Normal Mood  Left arm av graft    Chart reviewed. Pertinent Notes reviewed. Data Review :  Recent Labs     05/05/21  0329 05/04/21  0609    136   K 3.7 4.7   CL 99 103   CO2 33* 25   BUN 49* 66*   CREA 5.66* 7.38*   * 194*   CA 8.8 9.4     Recent Labs     05/05/21  0329 05/04/21  0609   WBC 6.4 8.9   HGB 8.7* 9.4*   HCT 27.4* 29.1*    196     No results for input(s): FE, TIBC, PSAT, FERR in the last 72 hours.    Medication list  reviewed  Current Facility-Administered Medications   Medication Dose Route Frequency    sodium chloride (NS) flush 5-40 mL  5-40 mL IntraVENous Q8H    sodium chloride (NS) flush 5-40 mL  5-40 mL IntraVENous PRN    sodium chloride (NS) flush 5-40 mL  5-40 mL IntraVENous Q8H    sodium chloride (NS) flush 5-40 mL  5-40 mL IntraVENous PRN    acetaminophen (TYLENOL) tablet 650 mg  650 mg Oral Q6H PRN    Or    acetaminophen (TYLENOL) suppository 650 mg  650 mg Rectal Q6H PRN    polyethylene glycol (MIRALAX) packet 17 g  17 g Oral DAILY PRN    promethazine (PHENERGAN) tablet 12.5 mg  12.5 mg Oral Q6H PRN    Or    ondansetron (ZOFRAN) injection 4 mg  4 mg IntraVENous Q6H PRN    heparin (porcine) injection 5,000 Units  5,000 Units SubCUTAneous Q8H    aspirin chewable tablet 81 mg  81 mg Oral DAILY    carvediloL (COREG) tablet 12.5 mg  12.5 mg Oral BID WITH MEALS    bumetanide (BUMEX) tablet 2 mg  2 mg Oral DAILY    finasteride (PROSCAR) tablet 5 mg  5 mg Oral DAILY    gabapentin (NEURONTIN) capsule 300 mg  300 mg Oral DAILY    [Held by provider] hydrALAZINE (APRESOLINE) tablet 100 mg  100 mg Oral Q8H    losartan (COZAAR) tablet 25 mg  25 mg Oral DAILY    sertraline (ZOLOFT) tablet 50 mg  50 mg Oral DAILY    albuterol-ipratropium (DUO-NEB) 2.5 MG-0.5 MG/3 ML  3 mL Nebulization Q4H PRN    insulin lispro (HUMALOG) injection   SubCUTAneous AC&HS    glucose chewable tablet 16 g  4 Tab Oral PRN    dextrose (D50W) injection syrg 12.5-25 g  12.5-25 g IntraVENous PRN    glucagon (GLUCAGEN) injection 1 mg  1 mg IntraMUSCular PRN     Current Outpatient Medications   Medication Sig    carvediloL (COREG) 25 mg tablet TAKE 1 TABLET BY MOUTH TWICE DAILY WITH FOOD    bumetanide (BUMEX) 2 mg tablet TAKE 1 TABLET BY MOUTH EVERY DAY    finasteride (PROSCAR) 5 mg tablet TAKE 1 TABLET BY MOUTH DAILY    midodrine (PROAMATINE) 5 mg tablet TAKE 1 TABLET BY MOUTH 1 HOUR PRIOR TO DIALYSIS TREATMENT AND THEN TAKE ANOTHER TABLET penitentiary THROUGH TREATMENT    Myrbetriq 25 mg ER tablet TAKE 1 TABLET BY MOUTH DAILY    insulin NPH/insulin regular (NovoLIN 70/30 U-100 Insulin) 100 unit/mL (70-30) injection ReliOn Novolin 70/30:  Inject 30 Units with Breakfast, 40 Units with Dinner    losartan (COZAAR) 25 mg tablet Take  by mouth daily.  gabapentin (NEURONTIN) 300 mg capsule TAKE 1 CAPSULE BY MOUTH EVERY DAY    Insulin Needles, Disposable, (REYNALDO PEN NEEDLE) 32 gauge x 5/32\" ndle FOR USE EVERY MORNING WITH VICTOZA PEN    sertraline (ZOLOFT) 50 mg tablet TAKE 1 T PO QD    aspirin 81 mg chewable tablet Take 81 mg by mouth daily.  carvedilol (COREG) 12.5 mg tablet Take 12.5 mg by mouth two (2) times daily (with meals).  Insulin Syringe-Needle U-100 1 mL 30 gauge x 5/16 syrg Use twice daily for insulin injection    hydrALAZINE (APRESOLINE) 100 mg tablet 100 mg three (3) times daily.  Calcium-Cholecalciferol, D3, (CALCIUM 600 WITH VITAMIN D3) 600 mg(1,500mg) -400 unit chew Take 1 Tab by mouth every evening.  fluticasone (FLONASE) 50 mcg/actuation nasal spray 2 Sprays by Both Nostrils route daily.  tamsulosin (FLOMAX) 0.4 mg capsule Take 0.8 mg by mouth daily.           Jesús Guerin, [address] [facility]  [facility] / [facility], [address]  [place], [address]  Phone - [phone]               Fax - [phone]

## 2021-05-05 NOTE — PROGRESS NOTES
Hospitalist Progress Note    NAME: Queenie Fletcher   :  1945   MRN:  [id number]       Assessment / Plan:  Acute on chronic systolic heart failure, EF 40 - 45%  ESRD on hemodialysis  Chest x-ray showed mild patchy bilateral lung opacity can be seen with pulmonary edema or atypical/viral infection  proBNP 5, 474, rapid COVID-19 negative. Patient has had both of his vaccines  Check procalcitonin level in a.m. Shortness of breath significantly improved with hemodialysis  Continue oxygen by nasal cannula and wean as tolerated  Continue Bumex 2 mg daily      CAD/HTN  Elevated troponin likely demand ischemia  -Continue aspirin, Coreg, losartan     T2DM with neuropathy  . Diet controlled. He is currently not on any medications   A1c is 8.2  SSI  Cont' gabapentin     FRIDA, not on CPAP  Obesity, Body mass index is 33.49 kg/m². Uses 2 LNC at night  Dietary restrictions recommended.       Depression  Cont' sertraline    Disposition:  PT OT eval  Discharge likely in a.m. tomorrow if okay with all consultants        Code Status: Full  Surrogate Decision Maker:  DVT Prophylaxis:   GI Prophylaxis: not indicated  Baseline: Body mass index is 33.49 kg/m². Subjective:     Chief Complaint / Reason for Physician Visit  shortness of breath is improving. Blood pressure was high this a.m. but is now improved after hemodialysis  Had hemodialysis and about 3000 mL of fluid was removed today    Review of Systems:  Symptom Y/N Comments  Symptom Y/N Comments   Fever/Chills    Chest Pain     Poor Appetite    Edema     Cough    Abdominal Pain     Sputum    Joint Pain     SOB/BRITT    Pruritis/Rash     Nausea/vomit    Tolerating PT/OT     Diarrhea    Tolerating Diet     Constipation    Other       Could NOT obtain due to:      Objective:     VITALS:   Last 24hrs VS reviewed since prior progress note.  Most recent are:  Patient Vitals for the past 24 hrs:   Temp Pulse Resp BP SpO2   21 1539 97.5 °F (36.4 °C) 73 18 136/85 97 %   05/05/21 1453 97.5 °F (36.4 °C) 63 18 107/75    05/05/21 1445  60 18 116/81    05/05/21 1430  (!) 50 18 133/75    05/05/21 1415  (!) 55 18 132/82    05/05/21 1400  (!) 59 18 113/84    05/05/21 1345  (!) 58 18 (!) 142/92    05/05/21 1330  (!) 47 18 125/74    05/05/21 1315  (!) 52 20 (!) 140/82    05/05/21 1300  (!) 58 20 (!) 150/87    05/05/21 1251 98 °F (36.7 °C) 63 20 (!) 151/86    05/05/21 1212  67 21  93 %   05/05/21 1200  67 21 (!) 146/73 95 %   05/05/21 1100 98.4 °F (36.9 °C) 75 17 132/73 90 %   05/05/21 1000  71 23 (!) 151/71 95 %   05/05/21 0900  65 16 (!) 148/78 94 %   05/05/21 0844  76  (!) 159/90    05/05/21 0800  75 22 (!) 159/90 91 %   05/05/21 0720  67 27  (!) 85 %   05/05/21 0700 98.6 °F (37 °C) 64 16 (!) 141/82 99 %   05/04/21 2200 98.6 °F (37 °C) 63 16 (!) 165/77 99 %   05/04/21 2100    (!) 144/69 96 %   05/04/21 1813  83  (!) 169/88    05/04/21 1715 98.6 °F (37 °C) 71 29 (!) 169/89 95 %   05/04/21 1630  69 24 (!) 148/83        Intake/Output Summary (Last 24 hours) at 5/5/2021 1610  Last data filed at 5/5/2021 1453  Gross per 24 hour   Intake    Output 3275 ml   Net -3275 ml        PHYSICAL EXAM:  General: Alert, cooperative, no acute distress    EENT:  EOMI. Anicteric sclerae. MMM  Resp:  Bilateral air entry present, crackles present, no wheezing  CV:  Regular  rhythm,  No edema  GI:  Soft, Non distended, Non tender.  +Bowel sounds  Neurologic:  Alert and awake, normal speech,   Psych:   Not anxious nor agitated  Skin:  No rashes.   No jaundice    Reviewed most current lab test results and cultures  YES  Reviewed most current radiology test results   YES  Review and summation of old records today    NO  Reviewed patient's current orders and MAR    YES  PMH/SH reviewed - no change compared to H&P          Current Facility-Administered Medications:     sodium chloride (NS) flush 5-40 mL, 5-40 mL, IntraVENous, Q8H, Elodia Dupree MD, 10 mL at 05/04/21 1816    sodium chloride (NS) flush 5-40 mL, 5-40 mL, IntraVENous, PRN, Elodia Dupree MD    sodium chloride (NS) flush 5-40 mL, 5-40 mL, IntraVENous, Q8H, Ezequiel Payne MD, 10 mL at 05/04/21 1817    sodium chloride (NS) flush 5-40 mL, 5-40 mL, IntraVENous, PRN, Hanna Hermosillo MD    acetaminophen (TYLENOL) tablet 650 mg, 650 mg, Oral, Q6H PRN **OR** acetaminophen (TYLENOL) suppository 650 mg, 650 mg, Rectal, Q6H PRN, Ezequiel Dias MD    polyethylene glycol (MIRALAX) packet 17 g, 17 g, Oral, DAILY PRN, Hanna Hermosillo MD    promethazine (PHENERGAN) tablet 12.5 mg, 12.5 mg, Oral, Q6H PRN **OR** ondansetron (ZOFRAN) injection 4 mg, 4 mg, IntraVENous, Q6H PRN, Hanna Hermosillo MD    heparin (porcine) injection 5,000 Units, 5,000 Units, SubCUTAneous, Q8H, Joanne Payne MD, 5,000 Units at 05/05/21 1005    aspirin chewable tablet 81 mg, 81 mg, Oral, DAILY, Hanna Hermosillo MD, 81 mg at 05/05/21 0844    carvediloL (COREG) tablet 12.5 mg, 12.5 mg, Oral, BID WITH MEALS, Joanne Dias MD, 12.5 mg at 05/05/21 0845    bumetanide (BUMEX) tablet 2 mg, 2 mg, Oral, DAILY, Hanna Hermosillo MD, 2 mg at 05/05/21 0845    finasteride (PROSCAR) tablet 5 mg, 5 mg, Oral, DAILY, Hanna Hermosillo MD, 5 mg at 05/05/21 0845    gabapentin (NEURONTIN) capsule 300 mg, 300 mg, Oral, DAILY, Hanna Hermosillo MD, 300 mg at 05/05/21 0844    [Held by provider] hydrALAZINE (APRESOLINE) tablet 100 mg, 100 mg, Oral, Q8H, LeEzequiel MD    losartan (COZAAR) tablet 25 mg, 25 mg, Oral, DAILY, Joanne Payne MD, 25 mg at 05/05/21 0844    sertraline (ZOLOFT) tablet 50 mg, 50 mg, Oral, DAILY, Joanne Payne MD, 50 mg at 05/05/21 0845    albuterol-ipratropium (DUO-NEB) 2.5 MG-0.5 MG/3 ML, 3 mL, Nebulization, Q4H PRN, Hanna Hermosillo MD    insulin lispro (HUMALOG) injection, , SubCUTAneous, AC&HS, Hanna Hermosillo MD, 2 Units at 05/05/21 1203    glucose chewable tablet 16 g, 4 Tab, Oral, PRN, Hanna Hermosillo MD    dextrose (D50W) injection syrg 12.5-25 g, 12.5-25 g, IntraVENous, PRN, Jessika Gudino MD KLESEA    glucagon (GLUCAGEN) injection 1 mg, 1 mg, IntraMUSCular, PRN, Ramon Grimm MD  ________________________________________________________________________  Care Plan discussed with:    Comments   Patient y    Family      RN y    Care Manager     Consultant                        Multidiciplinary team rounds were held today with , nursing, pharmacist and clinical coordinator. Patient's plan of care was discussed; medications were reviewed and discharge planning was addressed. ________________________________________________________________________  Total NON critical care TIME:  35  Minutes    Total CRITICAL CARE TIME Spent:   Minutes non procedure based      Comments   >50% of visit spent in counseling and coordination of care     ________________________________________________________________________  Heriberto Ha MD     Procedures: see electronic medical records for all procedures/Xrays and details which were not copied into this note but were reviewed prior to creation of Plan. LABS:  I reviewed today's most current labs and imaging studies.   Pertinent labs include:  Recent Labs     05/05/21  0329 05/04/21  0609   WBC 6.4 8.9   HGB 8.7* 9.4*   HCT 27.4* 29.1*    196     Recent Labs     05/05/21  0329 05/04/21  0609    136   K 3.7 4.7   CL 99 103   CO2 33* 25   * 194*   BUN 49* 66*   CREA 5.66* 7.38*   CA 8.8 9.4   ALB  --  3.6   TBILI  --  0.6   ALT  --  43       Signed: Heriberto Ha MD

## 2021-05-05 NOTE — PROGRESS NOTES
TRANSFER - IN REPORT:    Verbal report received from Nikolai Byrd [facility] on Osiris Reasons  being received from ED for ordered procedure      Report consisted of patients Situation, Background, Assessment and   Recommendations(SBAR). Information from the following report(s) SBAR, ED Summary and Cardiac Rhythm NSR was reviewed with the receiving nurse. Opportunity for questions and clarification was provided. Assessment completed upon patients arrival to unit and care assumed.        * coming to suite 2nd shift today*

## 2021-05-05 NOTE — PROCEDURES
[place] Dialysis Team [facility]  [phone]    Vitals   Pre   Post   Assessment   Pre   Post     Temp  Temp: 98 °F (36.7 °C) (05/05/21 1251)  97.5 LOC  A&Ox4 A&Ox4   HR   Pulse (Heart Rate): 63 (05/05/21 1251) 63 Lungs   diminished diminished   B/P   BP: (!) 151/86 (05/05/21 1251) 107/75 Cardiac   RRR RRR   Resp   Resp Rate: 20 (05/05/21 1251) 20 Skin   CDI CDI   Pain level   0 0 Edema  Abd dis Abd dis   Orders:    Duration:   Start:    8465 End:    1453 Total:   2 hours   Dialyzer:   Dialyzer/Set Up Inspection: [place] (05/05/21 1251)   K Bath:   Dialysate K (mEq/L): (ultrafiltration) (05/05/21 1251)   Ca Bath:   Dialysate CA (mEq/L): (ultrafiltration) (05/05/21 1251)   Na/Bicarb:   Dialysate NA (mEq/L): (ultrafiltration) (05/05/21 1251)   Target Fluid Removal:   Goal/Amount of Fluid to Remove (mL): 2500 mL (05/05/21 1251)   Access     Type & Location:   STEPHANIE AVF: skin CDI. No s/s of infection. + B/T. No issues with cannulation or hemostasis. Running well at . Pt arrived to HD suite A&Ox4. Consent signed & on file. SBAR received from Primary RN. Pt cannulated with 38C needles per policy & without issue. Labs drawn per request/ order. VSS. Dialysis Tx initiated.    Labs     Obtained/Reviewed   Critical Results Called   Date when labs were drawn-  Hgb-    HGB   Date Value Ref Range Status   05/05/2021 8.7 (L) 12.1 - 17.0 g/dL Final     K-    Potassium   Date Value Ref Range Status   05/05/2021 3.7 3.5 - 5.1 mmol/L Final     Comment:     INVESTIGATED PER DELTA CHECK PROTOCOL     Ca-   Calcium   Date Value Ref Range Status   05/05/2021 8.8 8.5 - 10.1 MG/DL Final     Bun-   BUN   Date Value Ref Range Status   05/05/2021 49 (H) 6 - 20 MG/DL Final     Creat-   Creatinine   Date Value Ref Range Status   05/05/2021 5.66 (H) 0.70 - 1.30 MG/DL Final     Comment:     INVESTIGATED PER DELTA CHECK PROTOCOL        Medications/ Blood Products Given     Name   Dose   Route and Time     None ordered Blood Volume Processed (BVP):    n/a Net Fluid   Removed:  3000mL   Comments   All dialysis related medications have been reviewed. Assessment performed by RN. Procedure and documentation observed and reviewed by Bonnie Aburto RN  Time Out Done: 7184  Primary Nurse Rpt Pre:  Cookie Benson RN  Primary Nurse Rpt Post:  aJvier Maldonado RN  Pt Education:  procedural  Care Plan:  On going  Tx Summary:  2326:  STEPHANIE AVF: skin CDI. No s/s of infection. + B/T. No issues with cannulation or hemostasis. Running well at . Pt arrived to HD suite A&Ox4. Consent signed & on file. SBAR received from Primary RN. Pt cannulated with 12A needles per policy & without issue. Labs drawn per request/ order. VSS. Dialysis Tx initiated. 1300:  Pt resting  1315:  Pt resting  1330:  Pt resting  1345:  Pt resting  1400:  Pt resting  1415:  Pt resting  1430:  Pt resting  1445:  Pt resting  1453: Tx ended. VSS. All possible blood returned to patient. Hemostasis achieved without issue. Bed locked and in the lowest position, call bell and belongings in reach. SBAR given to Primary, RN. Patient is stable at time of their/ my departure. Admiting Diagnosis:  Systolic Heart Faillure  Pt's previous clinic- [facility]  Consent signed - Informed Consent Verified: Yes (05/05/21 1251)  [place] Consent - signed and on file  Hepatitis Status- neg/imm 5/4/21  Machine #- Machine Number: [id number] (05/05/21 1251)  Telemetry status-  Pre-dialysis wt. -

## 2021-05-05 NOTE — PROGRESS NOTES
End of Shift Note    Bedside shift change report given to Yassine Judge (oncoming nurse) by Karina Woodall (offgoing nurse). Report included the following information SBAR, Kardex and ED Summary    Shift worked:  [id number]     Shift summary and any significant changes:     Continue BUMEX     Concerns for physician to address:  possible right lower quadrant hernia     Zone phone for oncoming shift:          Activity:  Activity Level: Up with Assistance  Number times ambulated in hallways past shift: 0  Number of times OOB to chair past shift: 2    Cardiac:   Cardiac Monitoring: Yes      Cardiac Rhythm: Sinus Rhythm    Access:   Current line(s): PIV     Genitourinary:   Urinary status: voiding    Respiratory:   O2 Device: None (Room air)  Chronic home O2 use?: YES  Incentive spirometer at bedside: YES     GI:  Last Bowel Movement Date: 05/05/21  Current diet:  DIET RENAL Regular  Passing flatus: YES  Tolerating current diet: YES       Pain Management:   Patient states pain is manageable on current regimen: N/A    Skin:  Kelby Score: 21  Interventions: increase time out of bed    Patient Safety:  Fall Score:  Total Score: 2  Interventions: bed/chair alarm, gripper socks and pt to call before getting OOB       Length of Stay:  Expected LOS: 4d 0h  Actual LOS: Drake Regan 1122

## 2021-05-05 NOTE — PROGRESS NOTES
Physical Therapy    Orders received for PT eval/treat. Patient off the floor to dialysis. Will follow up at a later time as able.        Alec Bain DPT

## 2021-05-06 VITALS
TEMPERATURE: 98.1 F | OXYGEN SATURATION: 100 % | HEART RATE: 61 BPM | DIASTOLIC BLOOD PRESSURE: 75 MMHG | HEIGHT: 72 IN | WEIGHT: 245.4 LBS | RESPIRATION RATE: 24 BRPM | BODY MASS INDEX: 33.24 KG/M2 | SYSTOLIC BLOOD PRESSURE: 130 MMHG

## 2021-05-06 LAB
ANION GAP SERPL CALC-SCNC: 6 MMOL/L (ref 5–15)
BUN SERPL-MCNC: 66 MG/DL (ref 6–20)
BUN/CREAT SERPL: 9 (ref 12–20)
CALCIUM SERPL-MCNC: 9.5 MG/DL (ref 8.5–10.1)
CHLORIDE SERPL-SCNC: 97 MMOL/L (ref 97–108)
CO2 SERPL-SCNC: 30 MMOL/L (ref 21–32)
CREAT SERPL-MCNC: 7.22 MG/DL (ref 0.7–1.3)
ERYTHROCYTE [DISTWIDTH] IN BLOOD BY AUTOMATED COUNT: 14.6 % (ref 11.5–14.5)
GLUCOSE BLD STRIP.AUTO-MCNC: 116 MG/DL (ref 65–100)
GLUCOSE BLD STRIP.AUTO-MCNC: 171 MG/DL (ref 65–100)
GLUCOSE BLD STRIP.AUTO-MCNC: 210 MG/DL (ref 65–100)
GLUCOSE SERPL-MCNC: 132 MG/DL (ref 65–100)
HCT VFR BLD AUTO: 30.1 % (ref 36.6–50.3)
HGB BLD-MCNC: 9.5 G/DL (ref 12.1–17)
MCH RBC QN AUTO: 30.3 PG (ref 26–34)
MCHC RBC AUTO-ENTMCNC: 31.6 G/DL (ref 30–36.5)
MCV RBC AUTO: 95.9 FL (ref 80–99)
NRBC # BLD: 0 K/UL (ref 0–0.01)
NRBC BLD-RTO: 0 PER 100 WBC
PLATELET # BLD AUTO: 205 K/UL (ref 150–400)
PMV BLD AUTO: 11.3 FL (ref 8.9–12.9)
POTASSIUM SERPL-SCNC: 3.9 MMOL/L (ref 3.5–5.1)
PROCALCITONIN SERPL-MCNC: 0.56 NG/ML
RBC # BLD AUTO: 3.14 M/UL (ref 4.1–5.7)
SERVICE CMNT-IMP: ABNORMAL
SODIUM SERPL-SCNC: 133 MMOL/L (ref 136–145)
WBC # BLD AUTO: 7.5 K/UL (ref 4.1–11.1)

## 2021-05-06 PROCEDURE — 77010033678 HC OXYGEN DAILY

## 2021-05-06 PROCEDURE — 82962 GLUCOSE BLOOD TEST: CPT

## 2021-05-06 PROCEDURE — 85027 COMPLETE CBC AUTOMATED: CPT

## 2021-05-06 PROCEDURE — 36415 COLL VENOUS BLD VENIPUNCTURE: CPT

## 2021-05-06 PROCEDURE — 84145 PROCALCITONIN (PCT): CPT

## 2021-05-06 PROCEDURE — 74011250636 HC RX REV CODE- 250/636: Performed by: INTERNAL MEDICINE

## 2021-05-06 PROCEDURE — 74011250637 HC RX REV CODE- 250/637: Performed by: INTERNAL MEDICINE

## 2021-05-06 PROCEDURE — 80048 BASIC METABOLIC PNL TOTAL CA: CPT

## 2021-05-06 PROCEDURE — 90935 HEMODIALYSIS ONE EVALUATION: CPT

## 2021-05-06 RX ORDER — AZITHROMYCIN 500 MG/1
500 TABLET, FILM COATED ORAL DAILY
Qty: 3 TAB | Refills: 0 | Status: SHIPPED | OUTPATIENT
Start: 2021-05-06 | End: 2021-05-09

## 2021-05-06 RX ORDER — CARVEDILOL 12.5 MG/1
12.5 TABLET ORAL 2 TIMES DAILY WITH MEALS
Qty: 60 TAB | Refills: 0 | Status: SHIPPED | OUTPATIENT
Start: 2021-05-06

## 2021-05-06 RX ORDER — GABAPENTIN 300 MG/1
300 CAPSULE ORAL
Qty: 15 CAP | Refills: 0 | Status: SHIPPED | OUTPATIENT
Start: 2021-05-06 | End: 2021-05-27 | Stop reason: SDUPTHER

## 2021-05-06 RX ADMIN — Medication 10 ML: at 05:22

## 2021-05-06 RX ADMIN — BUMETANIDE 2 MG: 1 TABLET ORAL at 17:00

## 2021-05-06 RX ADMIN — CARVEDILOL 12.5 MG: 12.5 TABLET, FILM COATED ORAL at 16:59

## 2021-05-06 RX ADMIN — ASPIRIN 81 MG: 81 TABLET, CHEWABLE ORAL at 17:00

## 2021-05-06 RX ADMIN — Medication 10 ML: at 14:00

## 2021-05-06 RX ADMIN — FINASTERIDE 5 MG: 5 TABLET, FILM COATED ORAL at 17:00

## 2021-05-06 RX ADMIN — SERTRALINE 50 MG: 50 TABLET, FILM COATED ORAL at 16:59

## 2021-05-06 RX ADMIN — GABAPENTIN 300 MG: 300 CAPSULE ORAL at 17:00

## 2021-05-06 RX ADMIN — HEPARIN SODIUM 5000 UNITS: 5000 INJECTION INTRAVENOUS; SUBCUTANEOUS at 17:02

## 2021-05-06 RX ADMIN — LOSARTAN POTASSIUM 25 MG: 25 TABLET, FILM COATED ORAL at 16:59

## 2021-05-06 RX ADMIN — HEPARIN SODIUM 5000 UNITS: 5000 INJECTION INTRAVENOUS; SUBCUTANEOUS at 05:21

## 2021-05-06 NOTE — PROCEDURES
[place] Dialysis Team [facility]  [phone]    Vitals   Pre   Post   Assessment   Pre   Post     Temp  Temp: 98 °F (36.7 °C) (05/06/21 1217)  98.0  oral LOC  A&Ox4 A&Ox4   HR   Pulse (Heart Rate): 62 (05/06/21 1217) 56 Lungs   Dim bases Dim bases   B/P   BP: 133/83 (05/06/21 1217) 119/71 Cardiac   Tele, NSR Tele, NSR   Resp   Resp Rate: 18 (05/06/21 1217) 18 Skin   CDI CDI   Pain level  Pain Intensity 1: 0 (05/06/21 0735) 0 Edema  Abd distention   Abd distention   Orders:    Duration:   Start:    6871 End:    1538 Total:   3.5hr   Dialyzer:   Dialyzer/Set Up Inspection: Revaclear (05/06/21 1217)   K Bath:   Dialysate K (mEq/L): 2 (05/06/21 1217)   Ca Bath:   Dialysate CA (mEq/L): 2.5 (05/06/21 1217)   Na/Bicarb:   Dialysate NA (mEq/L): 140 (05/06/21 1217)   Target Fluid Removal:   Goal/Amount of Fluid to Remove (mL): 3000 mL (05/06/21 1217)   Access     Type & Location:   STEPHANIE AV access: skin CDI. No s/s of infection. + B/T. No issues with cannulation or hemostasis of arterial site. MD made that venous site is consistently very difficult to stick and thread. After 3 attempts today, venous site cannulation was successful. No issues with hemostasis. Patient's clinic manager called and ask that patient be made aware of an appt that was made for him at [facility] for noon tomorrow. Patient was made aware and states understanding. Running well at .     Labs     Obtained/Reviewed   Critical Results Called   Date when labs were drawn-  Hgb-    HGB   Date Value Ref Range Status   05/06/2021 9.5 (L) 12.1 - 17.0 g/dL Final     K-    Potassium   Date Value Ref Range Status   05/06/2021 3.9 3.5 - 5.1 mmol/L Final     Ca-   Calcium   Date Value Ref Range Status   05/06/2021 9.5 8.5 - 10.1 MG/DL Final     Bun-   BUN   Date Value Ref Range Status   05/06/2021 66 (H) 6 - 20 MG/DL Final     Creat-   Creatinine   Date Value Ref Range Status   05/06/2021 7.22 (H) 0.70 - 1.30 MG/DL Final        Medications/ Blood Products Given     Name   Dose   Route and Time     None ordered                Blood Volume Processed (BVP):    80.4L Net Fluid   Removed:  1600ml   Comments   Time Out Done: 7696  Primary Nurse Rpt Pre: Corrin Lanes, RN  Primary Nurse Rpt Post: Corrin Lanes, RN  Pt Education: need for regular access care from vascular access center  Care Plan:  Tx Summary:  Pt arrived to HD suite A&Ox4. Consent signed & on file. SBAR received from Primary RN. 1206: Pt cannulated with 11E needles per policy & without issue. Labs drawn per request/ order. VSS. Dialysis Tx initiated. 1400: Patient called out and stated that something was wrong. Patient c/o blurred vision. UF turned off. 100ml NS bolus given. BP: 82/48. 100ml NS given again for symptomatic hypotension. Patient states interventions were helpful. Vision back to normal. Md made aware of BP drop and interventions. 1407: BP 95/61. Patient states he feels better. Uf still off.   1445: /75. UF back on at decreased UFR. No patient complaints at this time. * no further issues on HD*    1538: Tx ended. VSS. All possible blood returned to patient. Hemostasis achieved without issue. Bed locked and in the lowest position, call bell and belongings in reach. SBAR given to Primary, RN; including patient stating that his stomach is still upset and stomach pain has yet to be resolved. Patient is stable at time of their departure. All Dialysis related medications have been reviewed. Admiting Diagnosis: Systolic heart failure/ fluid overload  Pt's previous clinic- [facility]  Consent signed - Informed Consent Verified: Yes (05/06/21 0949)  [place] Consent - on file  Hepatitis Status- Immune 5/4/21  Machine #- Machine Number: [id number] (05/06/21 1217)  Telemetry status- Tele, NSR  Pre-dialysis wt. -

## 2021-05-06 NOTE — DISCHARGE SUMMARY
Hospitalist Discharge Summary     Patient ID:  Anna Lyles  [id number]  67 y.o.  1945 5/4/2021    PCP on record: Dionicio Bell MD    Admit date: 5/4/2021  Discharge date and time: 5/6/2021    DISCHARGE DIAGNOSIS:    Acute on chronic systolic heart failure, EF 40 - 45%  ESRD on hemodialysis  CAD/HTN  Elevated troponin likely demand ischemia  T2DM with neuropathy  FRIDA, not on CPAP      CONSULTATIONS:  IP CONSULT TO NEPHROLOGY    Excerpted HPI from H&P of Chrissy Macias MD:  Olive Hill is a 70 y.o.  male with PMHx significant for chronic systolic heart failure, hypertension, type 2 diabetes, FRIDA, CAD, ESRD on HD, presents to the ER for evaluation of shortness of breath associated with chest tightness that woke him up today. Patient at baseline wears 2 L of oxygen only at night. He came to the ER stating he has had increased fluid retention in his abdomen with association to SOB. Patient has been compliant with HD session. He denies any associated fever, CP, palpitations, N/V/D. Vitals/ labs/imaging reviewed. We were asked to admit for work up and evaluation of the above problems.        ______________________________________________________________________  DISCHARGE SUMMARY/HOSPITAL COURSE:  for full details see H&P, daily progress notes, labs, consult notes. Acute on chronic systolic heart failure, EF 40 - 45%  ESRD on hemodialysis  Chest x-ray showed mild patchy bilateral lung opacity can be seen with pulmonary edema or atypical/viral infection  proBNP 5, 474, rapid COVID-19 negative.  Patient has had both of his vaccines    His presentation likely secondary to volume overload. Which has improved with the dialysis.   His procalcitonin was borderline, will give azithromycin for 3 days as outpatient  Reviewed nephrology recommendation, will DC hydralazine and midodrine on discharge         CAD/HTN  Elevated troponin likely demand ischemia  -Continue aspirin, Coreg, losartan     T2DM with neuropathy  Confirmed with the patient, patient takes insulin at home, follows endocrinologist     FRIDA, not on CPAP  Obesity, Body mass index is 33.49 kg/m². Uses 2 LNC at night  Dietary restrictions recommended.       Depression  Cont' sertraline           _______________________________________________________________________  Patient seen and examined by me on discharge day. Pertinent Findings:  Gen:    Not in distress  Chest: Clear lungs  CVS:   Regular rhythm. No edema  Abd:  Soft, not distended, not tender  Neuro:  Alert, oriented x3  _______________________________________________________________________  DISCHARGE MEDICATIONS:   Current Discharge Medication List      START taking these medications    Details   azithromycin (ZITHROMAX) 500 mg tab Take 1 Tab by mouth daily for 3 days. Qty: 3 Tab, Refills: 0         CONTINUE these medications which have CHANGED    Details   carvediloL (COREG) 12.5 mg tablet Take 1 Tab by mouth two (2) times daily (with meals). Qty: 60 Tab, Refills: 0      gabapentin (NEURONTIN) 300 mg capsule Take 1 Cap by mouth nightly. Max Daily Amount: 300 mg. Qty: 15 Cap, Refills: 0    Associated Diagnoses: Type 2 diabetes mellitus with stage 4 chronic kidney disease, with long-term current use of insulin (HCC)         CONTINUE these medications which have NOT CHANGED    Details   bumetanide (BUMEX) 2 mg tablet TAKE 1 TABLET BY MOUTH EVERY DAY      finasteride (PROSCAR) 5 mg tablet TAKE 1 TABLET BY MOUTH DAILY      Myrbetriq 25 mg ER tablet TAKE 1 TABLET BY MOUTH DAILY      insulin NPH/insulin regular (NovoLIN 70/30 U-100 Insulin) 100 unit/mL (70-30) injection ReliOn Novolin 70/30: Inject 30 Units with Breakfast, 40 Units with Dinner  Qty: 7 Vial, Refills: 3    Comments: Please consider 90 day supplies to promote better adherence      losartan (COZAAR) 25 mg tablet Take 25 mg by mouth daily.       sertraline (ZOLOFT) 50 mg tablet Take 50 mg by mouth daily. Refills: 0      aspirin 81 mg chewable tablet Take 81 mg by mouth daily. Calcium-Cholecalciferol, D3, (CALCIUM 600 WITH VITAMIN D3) 600 mg(1,500mg) -400 unit chew Take 1 Tab by mouth every evening. fluticasone (FLONASE) 50 mcg/actuation nasal spray 2 Sprays by Both Nostrils route daily. tamsulosin (FLOMAX) 0.4 mg capsule Take 0.8 mg by mouth daily. STOP taking these medications       midodrine (PROAMATINE) 5 mg tablet Comments:   Reason for Stopping:         hydrALAZINE (APRESOLINE) 100 mg tablet Comments:   Reason for Stopping:                 Patient Follow Up Instructions:    Activity: Activity as tolerated  Diet: Renal Diet  Wound Care: None needed      Follow-up Information     Follow up With Specialties Details Why Contact Leola High MD Internal Medicine Go on 5/20/2021  [address]  [facility].  [phone]          ________________________________________________________________    Risk of deterioration: Moderate    Condition at Discharge:  Stable  __________________________________________________________________    Disposition  Home with family and home health services    ____________________________________________________________________    Code Status: Full Code  ___________________________________________________________________      Total time in minutes spent coordinating this discharge (includes going over instructions, follow-up, prescriptions, and preparing report for sign off to her PCP) :  >30 minutes    Signed:  Belem Ayala MD

## 2021-05-06 NOTE — PROGRESS NOTES
Pharmacist Discharge Medication Reconciliation    Significant PMH:   Past Medical History:   Diagnosis Date    Arthritis     spine    CAD (coronary artery disease)     high cholesterol    Chronic kidney disease     dialysis    Diabetes ([facility]     ESRD (end stage renal disease) ([facility])     GERD (gastroesophageal reflux disease)     HX    Hypertension     Ill-defined condition     irritable bowel syndrome    Systolic CHF ([facility]     Unspecified sleep apnea     NO CPAP     Chief Complaint for this Admission:   Chief Complaint   Patient presents with    Shortness of Breath     Pt woke up today with chest tightness and SOB. Pt recd Neb treatment from EMS and feeling better now. ESRD dialysis Tue, Thu, Sat, CHF wears 2 liters oxygen at home. Allergies: Albumin, human 25 % and Niacin    Discharge Medications:   Current Discharge Medication List        CONTINUE these medications which have CHANGED    Details   carvediloL (COREG) 12.5 mg tablet Take 1 Tab by mouth two (2) times daily (with meals). Qty: 60 Tab, Refills: 0      gabapentin (NEURONTIN) 300 mg capsule Take 1 Cap by mouth nightly. Max Daily Amount: 300 mg. Qty: 15 Cap, Refills: 0    Associated Diagnoses: Type 2 diabetes mellitus with stage 4 chronic kidney disease, with long-term current use of insulin ([facility])           CONTINUE these medications which have NOT CHANGED    Details   bumetanide (BUMEX) 2 mg tablet TAKE 1 TABLET BY MOUTH EVERY DAY      finasteride (PROSCAR) 5 mg tablet TAKE 1 TABLET BY MOUTH DAILY      midodrine (PROAMATINE) 5 mg tablet TAKE 1 TABLET BY MOUTH 1 HOUR PRIOR TO DIALYSIS TREATMENT AND THEN TAKE ANOTHER TABLET detention THROUGH TREATMENT      Myrbetriq 25 mg ER tablet TAKE 1 TABLET BY MOUTH DAILY      insulin NPH/insulin regular (NovoLIN 70/30 U-100 Insulin) 100 unit/mL (70-30) injection ReliOn Novolin 70/30:  Inject 30 Units with Breakfast, 40 Units with Dinner  Qty: 7 Vial, Refills: 3    Comments: Please consider 90 day supplies to promote better adherence      losartan (COZAAR) 25 mg tablet Take 25 mg by mouth daily. sertraline (ZOLOFT) 50 mg tablet Take 50 mg by mouth daily. Refills: 0      aspirin 81 mg chewable tablet Take 81 mg by mouth daily. Calcium-Cholecalciferol, D3, (CALCIUM 600 WITH VITAMIN D3) 600 mg(1,500mg) -400 unit chew Take 1 Tab by mouth every evening. fluticasone (FLONASE) 50 mcg/actuation nasal spray 2 Sprays by Both Nostrils route daily. tamsulosin (FLOMAX) 0.4 mg capsule Take 0.8 mg by mouth daily. hydrALAZINE (APRESOLINE) 100 mg tablet 100 mg three (3) times daily. The patient's chart, MAR and AVS were reviewed by Sawyer Jose RP.     Discharging Provider: Lata Parra    Thank You,     Sawyer Jose, [facility]

## 2021-05-06 NOTE — PROGRESS NOTES
Home Oxygen Test  Date of test: 5/6/2021  Time of test: 0800    Sa02 92 % on room air AT REST. Sa02 88 % on room air DURING AMBULATION. Sa02 94 % on 1 Liters DURING AMBULATION. Sa02 91 % on 1 Liters AT REST/AFTER AMBULATION.

## 2021-05-06 NOTE — PROGRESS NOTES
Transition of Care Plan:     RUR: 20 %  Disposition: Home with Spouse   Follow up appointments: PCP-done 5/6   Outpatient HD: JEFF Crowder S 6:00 am chair . H&P, Facesheet, Nephrologist note, HD flowsheet from 5/4/21 faxed to [place], [place] on 5/4/21. I ecin'd updates on 5/6  DME needed: Pt has a cane at home and home oxygen 2 liters provided by [employer] at Discharge: Wife to transport  [place] or means to access home:   Yes     IM Medicare letter:2nd Im letter before discharge   Caregiver Contact: Wife Norah Trinh [phone]  Discharge Caregiver contacted prior to discharge? Unit CM to follow up before discharge.     Reason for Admission:  Acute on chronic systolic heart failure      I confirmed with Malgorzata Thayer he has portable tanks and concentrator at home. His wife will need to bring in a portable at d/c. I left a VM for the wife. PCP followup has been made    Care Management Interventions  PCP Verified by CM: Yes  Mode of Transport at Discharge:  Other (see comment)(Patient spouse to transport )  Transition of Care Consult (CM Consult): Discharge Planning  MyChart Signup: No  Discharge Durable Medical Equipment: No  Physical Therapy Consult: Yes  Occupational Therapy Consult: No  Speech Therapy Consult: No  Current Support Network: Lives with Spouse  Confirm Follow Up Transport: Family  The Plan for Transition of Care is Related to the Following Treatment Goals : patient  The Patient and/or Patient Representative was Provided with a Choice of Provider and Agrees with the Discharge Plan?: Yes  Name of the Patient Representative Who was Provided with a Choice of Provider and Agrees with the Discharge Plan: d/c planning  Freedom of Choice List was Provided with Basic Dialogue that Supports the Patient's Individualized Plan of Care/Goals, Treatment Preferences and Shares the Quality Data Associated with the Providers?: Yes  Discharge Location  Discharge Placement: Home

## 2021-05-06 NOTE — PROGRESS NOTES
Nephrology Progress Note  Chavo Euceda     [facility]. [facility]  Phone - [phone]   Patient: Lou Hernadez    YOB: 1945        Date- 5/6/2021   Admit Date: 5/4/2021  CC: Follow up for    ESRD  IMPRESSION & PLAN:    ESRD - tts- pat coronaod   SOB- likely due to fluid overload   HYPERTENSION   H/o chf    Anemia of ckd   Sec. hyperpar   abdo pain    PLAN-   Hd today   Continue coreg, losartan   don't restart hydralazine   Don't restart midodrine   epogen TTS     Subjective: Interval History:   -on room air  No sob  bp stable          Objective:   Vitals:    05/05/21 2251 05/06/21 0227 05/06/21 0543 05/06/21 0735   BP: (!) 144/82 (!) 150/93  138/76   Pulse: 63 69  66   Resp: 20 18 18   Temp: 98.6 °F (37 °C) 98.2 °F (36.8 °C)  98.1 °F (36.7 °C)   TempSrc:       SpO2: 97% (!) 70%  97%   Weight:   111.3 kg (245 lb 6.4 oz)    Height:          05/05 0701 - 05/06 0700  In: -   Out: 3475 [Urine:475]  Last 3 Recorded Weights in this Encounter    05/04/21 0600 05/05/21 1628 05/06/21 0543   Weight: 112 kg (246 lb 14.6 oz) 107.7 kg (237 lb 8 oz) 111.3 kg (245 lb 6.4 oz)      Physical exam:   GEN: NAD  NECK- Supple, no mass  RESP: No wheezing, CLEAR B/L  CVS: S1,S2  RRR  NEURO: Normal speech, non focal  EXT: No Edema   PSYCH: Normal Mood  Left arm av graft    Chart reviewed. Pertinent Notes reviewed. Data Review :  Recent Labs     05/06/21  0232 05/05/21  0329 05/04/21  0609   * 138 136   K 3.9 3.7 4.7   CL 97 99 103   CO2 30 33* 25   BUN 66* 49* 66*   CREA 7.22* 5.66* 7.38*   * 159* 194*   CA 9.5 8.8 9.4     Recent Labs     05/06/21  0232 05/05/21  0329 05/04/21  0609   WBC 7.5 6.4 8.9   HGB 9.5* 8.7* 9.4*   HCT 30.1* 27.4* 29.1*    183 196     No results for input(s): FE, TIBC, PSAT, FERR in the last 72 hours.    Medication list  reviewed  Current Facility-Administered Medications   Medication Dose Route Frequency    sodium chloride (NS) flush 5-40 mL  5-40 mL IntraVENous Q8H    sodium chloride (NS) flush 5-40 mL  5-40 mL IntraVENous PRN    sodium chloride (NS) flush 5-40 mL  5-40 mL IntraVENous Q8H    sodium chloride (NS) flush 5-40 mL  5-40 mL IntraVENous PRN    acetaminophen (TYLENOL) tablet 650 mg  650 mg Oral Q6H PRN    Or    acetaminophen (TYLENOL) suppository 650 mg  650 mg Rectal Q6H PRN    polyethylene glycol (MIRALAX) packet 17 g  17 g Oral DAILY PRN    promethazine (PHENERGAN) tablet 12.5 mg  12.5 mg Oral Q6H PRN    Or    ondansetron (ZOFRAN) injection 4 mg  4 mg IntraVENous Q6H PRN    heparin (porcine) injection 5,000 Units  5,000 Units SubCUTAneous Q8H    aspirin chewable tablet 81 mg  81 mg Oral DAILY    carvediloL (COREG) tablet 12.5 mg  12.5 mg Oral BID WITH MEALS    bumetanide (BUMEX) tablet 2 mg  2 mg Oral DAILY    finasteride (PROSCAR) tablet 5 mg  5 mg Oral DAILY    gabapentin (NEURONTIN) capsule 300 mg  300 mg Oral DAILY    [Held by provider] hydrALAZINE (APRESOLINE) tablet 100 mg  100 mg Oral Q8H    losartan (COZAAR) tablet 25 mg  25 mg Oral DAILY    sertraline (ZOLOFT) tablet 50 mg  50 mg Oral DAILY    albuterol-ipratropium (DUO-NEB) 2.5 MG-0.5 MG/3 ML  3 mL Nebulization Q4H PRN    insulin lispro (HUMALOG) injection   SubCUTAneous AC&HS    glucose chewable tablet 16 g  4 Tab Oral PRN    dextrose (D50W) injection syrg 12.5-25 g  12.5-25 g IntraVENous PRN    glucagon (GLUCAGEN) injection 1 mg  1 mg IntraMUSCular PRN          Toni Saab MD              [facility]  [facility] / [facility]  [place], [address]  Phone - [phone]               Fax - [phone]

## 2021-05-06 NOTE — PROGRESS NOTES
End of Shift Note  1930 Bedside and Verbal shift change report received Van Beckfordr. Report included the following information SBAR, Kardex and Recent Results. Shift worked:  [id number]     Shift summary and any significant changes:         Concerns for physician to address:       Zone phone for oncoming shift:        Activity:  Activity Level: Up with Assistance  Number times ambulated in hallways past shift: 0  Number of times OOB to chair past shift: 0    Cardiac:   Cardiac Monitoring: Yes      Cardiac Rhythm: Sinus Rhythm    Access:   Current line(s): PIV     Genitourinary:   Urinary status: voiding    Respiratory:   O2 Device: Nasal cannula  Chronic home O2 use?: N/A  Incentive spirometer at bedside: N/A     GI:  Last Bowel Movement Date: 05/05/21  Current diet:  DIET RENAL Regular  Passing flatus: YES  Tolerating current diet: YES       Pain Management:   Patient states pain is manageable on current regimen: YES    Skin:  Kelby Score: 22  Interventions: turn team, increase time out of bed and PT/OT consult    Patient Safety:  Fall Score:  Total Score: 2  Interventions: bed/chair alarm and gripper socks       Length of Stay:  Expected LOS: 4d 0h  Actual LOS: John 66, RN

## 2021-05-06 NOTE — PROGRESS NOTES
HD TRANSFER - OUT REPORT:    Verbal report given to NEHA Davis on Irven Search being transferred to [facility] for routine progression of care       Report consisted of patient's Situation, Background, Assessment and   Recommendations(SBAR). Information from the following report(s) SBAR, Procedure Summary, Intake/Output, MAR and Recent Results was reviewed with the receiving nurse. Method:  $$ Method: Hemodialysis (05/06/21 1217)    Fluid Removed  NET Fluid Removed (mL): 1600 ml (05/06/21 1537)     Patient response to treatment:  Stable    End Time  Hemodialysis End Time: 9899 (05/06/21 1537)  If not documented, dialysis nurse to update post-dialysis row in HD/Filtration flowsheet     Medications /Volume expansion agents or Fluid boluses administered during treatment? yes    Post-dialysis medication administration due?  yes  Remind nurse to administer post-HD medication upon return to unit. Fistula hemostasis? yes    Line heparinization? no    Lines: STEPHANIE AVF    Opportunity for questions and clarification was provided.       Patient transported with: [employer]

## 2021-05-06 NOTE — PROGRESS NOTES
TRANSFER - IN REPORT:    Verbal report received from Nii Henriquez RN on Ky Loveless  being received from [address] for ordered procedure      Report consisted of patients Situation, Background, Assessment and   Recommendations(SBAR). Information from the following report(s) SBAR, Kardex and Cardiac Rhythm NSR was reviewed with the receiving nurse. Opportunity for questions and clarification was provided. Assessment completed upon patients arrival to unit and care assumed.          * 2nd shift suite today*

## 2021-05-06 NOTE — PROGRESS NOTES
Problem: Falls - Risk of  Goal: *Absence of Falls  Description: Document Sondra Freeman Fall Risk and appropriate interventions in the flowsheet. Outcome: Progressing Towards Goal  Note: Fall Risk Interventions:  Mobility Interventions: Assess mobility with egress test         Medication Interventions: Assess postural VS orthostatic hypotension         History of Falls Interventions: Door open when patient unattended         Problem: Patient Education: Go to Patient Education Activity  Goal: Patient/Family Education  Outcome: Progressing Towards Goal     Problem: Diabetes Self-Management  Goal: *Disease process and treatment process  Description: Define diabetes and identify own type of diabetes; list 3 options for treating diabetes.   Outcome: Progressing Towards Goal

## 2021-05-06 NOTE — DISCHARGE INSTRUCTIONS
HOSPITALIST DISCHARGE INSTRUCTIONS    NAME: Marcella Carrizales   :  1945   MRN:  [id number]     Date/Time:  2021 10:10 AM    ADMIT DATE: 2021   DISCHARGE DATE: 2021       Acute on chronic systolic heart failure, EF 40 - 45%  ESRD on hemodialysis  CAD/HTN  Elevated troponin likely demand ischemia  T2DM with neuropathy  FRIDA, not on CPAP    · It is important that you take the medication exactly as they are prescribed. · Keep your medication in the bottles provided by the pharmacist and keep a list of the medication names, dosages, and times to be taken in your wallet. · Do not take other medications without consulting your doctor. What to do at [address]:  Cardiac Diet    Recommended activity: Activity as tolerated      If you have questions regarding the hospital related prescriptions or hospital related issues please call [facility] at [phone]. You can always direct your questions to your primary care doctor if you are unable to reach your hospital physician; your PCP works as an extension of your hospital doctor just like your hospital doctor is an extension of your PCP for your time at the hospital [facility])    If you experience any of the following symptoms then please call your primary care physician or return to the emergency room if you cannot get hold of your doctor:    Fever, chills, nausea, vomiting, or persistent diarrhea  Worsening weakness or new problems with your speech or balance  Dark stools or visible blood in your stools  New Leg swelling or shortness of breath as these could be signs of a clot    Additional Instructions:      Bring these papers with you to your follow up appointments. The papers will help your doctors be sure to continue the care plan from the hospital.              Information obtained by :  I understand that if any problems occur once I am at home I am to contact my physician.     I understand and acknowledge receipt of the instructions indicated above.                                                                                                                                            Physician's or R.N.'s Signature                                                                  Date/Time                                                                                                                                              Patient or Representative Signature

## 2021-05-07 ENCOUNTER — PATIENT OUTREACH (OUTPATIENT)
Dept: CASE MANAGEMENT | Age: 76
End: 2021-05-07

## 2021-05-11 ENCOUNTER — PATIENT OUTREACH (OUTPATIENT)
Dept: CASE MANAGEMENT | Age: 76
End: 2021-05-11

## 2021-05-11 NOTE — PROGRESS NOTES
Patient called on mobile number on file. Message left on voicemail with contact information to return call to this writer.

## 2021-05-19 NOTE — TELEPHONE ENCOUNTER
Detail Level: Simple
I called Mr Josi Bernardo and relayed the message from Dr. Mila Guzman. He understood the information and wrote this down. He will do as instructed and will get back to us in a couple of weeks to let us know how he is doing.   Jessica Guevara
I returned Mr. Hunter Balloon call. He gave me a sugar log and said he has been taking 50 units of his insulin BID. I gave this to Dr. Melissa Ferreira.   [facility]
Patient is requesting a call back regarding his blood sugars. He stated that for the past week they have been ranging from 300-450. He can be reached at [phone].
Thank you darell,   Numbers reviewed,   Since 18: All numbers 300 - HIGH  This is as soon as he was given a dose of ACTHAR,    This medication results in increased cortisol from his adrenal gland and thus worsens his diabetes and can raise his blood pressure. The problem is that the increase in blood sugar will damage his kidneys and thus this should be weighed by his treating physician in terms of risks and benefits. For now, he can temporarily increase his dose to the followin units twice daily. Can also use this sliding scale for additional insulin based on his sugars:     IF GLUCOSE IS:                 THEN TAKE:      0   Extra Unit  151-165   1   Extra Unit  166-180   2   Extra Units  181-195   3   Extra Units  196-210   4   Extra Units  211-225   5   Extra Units  226-240   6   Extra Units  241-255   7   Extra Units  256-270   8   Extra Units  271-285   9   Extra Units  286-300   10 Extra Units    Blood sugars may return to normal anywhere in days to weeks so he should be careful to reduce his dose back down if that is the case. Aicha Talamantes.  [address] [facility]  Mikro Odeme | [employer]
Detail Level: Zone
Detail Level: Generalized

## 2021-05-27 ENCOUNTER — TELEPHONE (OUTPATIENT)
Dept: ENDOCRINOLOGY | Age: 76
End: 2021-05-27

## 2021-05-27 DIAGNOSIS — N18.4 TYPE 2 DIABETES MELLITUS WITH STAGE 4 CHRONIC KIDNEY DISEASE, WITH LONG-TERM CURRENT USE OF INSULIN (HCC): ICD-10-CM

## 2021-05-27 DIAGNOSIS — E11.22 TYPE 2 DIABETES MELLITUS WITH STAGE 4 CHRONIC KIDNEY DISEASE, WITH LONG-TERM CURRENT USE OF INSULIN (HCC): ICD-10-CM

## 2021-05-27 DIAGNOSIS — Z79.4 TYPE 2 DIABETES MELLITUS WITH STAGE 4 CHRONIC KIDNEY DISEASE, WITH LONG-TERM CURRENT USE OF INSULIN (HCC): ICD-10-CM

## 2021-05-27 RX ORDER — GABAPENTIN 300 MG/1
300 CAPSULE ORAL
Qty: 90 CAPSULE | Refills: 0 | Status: SHIPPED | OUTPATIENT
Start: 2021-05-27 | End: 2021-12-21 | Stop reason: SDUPTHER

## 2021-05-27 NOTE — TELEPHONE ENCOUNTER
5/27/2021  12:41 PM    Answered questions on Dr Schuster Sin departure. Please call concerning denial of his gabapentin.     [phone]    Thanks,  Justyn Pham

## 2021-05-27 NOTE — TELEPHONE ENCOUNTER
----- Message from [facility] [facility] sent at 5/27/2021 11:32 AM EDT -----  Regarding: Dr. Jonathan Talamantes telephone  Appointment not available    Caller's first and last name and relationship to patient (if not the patient): n/a       Best contact number: [phone]        Preferred date and time: soonest available      Scheduled appointment date and time: n/a       Reason for appointment: diabetic care and medication refill      Details to clarify the request: Patient would like to be contacted as soon as possible to be scheduled with another doctor in the office as Dr. Joselin Winston has retired.       [employer]

## 2021-05-27 NOTE — TELEPHONE ENCOUNTER
I returned this call and let Mr. Javier Dwyer know that because  no longer works here, he will have to follow up with his PCP for refills on the Gabapentin. Dr. Parish Gomez has agreed to fill a three month supply with the instructions that Mr. Javier Dwyer follows up with is PCP for any further refills.   Marga Ballard

## 2021-08-18 ENCOUNTER — HOSPITAL ENCOUNTER (INPATIENT)
Age: 76
LOS: 5 days | Discharge: HOME HEALTH | DRG: 871 | End: 2021-08-23
Attending: EMERGENCY MEDICINE | Admitting: STUDENT IN AN ORGANIZED HEALTH CARE EDUCATION/TRAINING PROGRAM
Payer: MEDICARE

## 2021-08-18 ENCOUNTER — APPOINTMENT (OUTPATIENT)
Dept: GENERAL RADIOLOGY | Age: 76
DRG: 871 | End: 2021-08-18
Attending: EMERGENCY MEDICINE
Payer: MEDICARE

## 2021-08-18 DIAGNOSIS — Z99.2 ESRD ON DIALYSIS (HCC): ICD-10-CM

## 2021-08-18 DIAGNOSIS — N18.6 ESRD ON DIALYSIS (HCC): ICD-10-CM

## 2021-08-18 DIAGNOSIS — M62.82 NON-TRAUMATIC RHABDOMYOLYSIS: ICD-10-CM

## 2021-08-18 DIAGNOSIS — A41.9 SEPSIS, DUE TO UNSPECIFIED ORGANISM, UNSPECIFIED WHETHER ACUTE ORGAN DYSFUNCTION PRESENT (HCC): Primary | ICD-10-CM

## 2021-08-18 PROBLEM — J96.01 ACUTE HYPOXEMIC RESPIRATORY FAILURE (HCC): Status: ACTIVE | Noted: 2021-08-18

## 2021-08-18 LAB
ALBUMIN SERPL-MCNC: 3.4 G/DL (ref 3.5–5)
ALBUMIN/GLOB SERPL: 0.7 {RATIO} (ref 1.1–2.2)
ALP SERPL-CCNC: 56 U/L (ref 45–117)
ALT SERPL-CCNC: 33 U/L (ref 12–78)
ANION GAP SERPL CALC-SCNC: 8 MMOL/L (ref 5–15)
AST SERPL-CCNC: 48 U/L (ref 15–37)
BASOPHILS # BLD: 0 K/UL (ref 0–0.1)
BASOPHILS NFR BLD: 0 % (ref 0–1)
BILIRUB SERPL-MCNC: 0.8 MG/DL (ref 0.2–1)
BUN SERPL-MCNC: 64 MG/DL (ref 6–20)
BUN/CREAT SERPL: 8 (ref 12–20)
CALCIUM SERPL-MCNC: 9.1 MG/DL (ref 8.5–10.1)
CHLORIDE SERPL-SCNC: 102 MMOL/L (ref 97–108)
CK MB CFR SERPL CALC: 0.2 % (ref 0–2.5)
CK MB CFR SERPL CALC: 0.2 % (ref 0–2.5)
CK MB SERPL-MCNC: 3 NG/ML (ref 5–25)
CK MB SERPL-MCNC: 3.3 NG/ML (ref 5–25)
CK SERPL-CCNC: 1836 U/L (ref 39–308)
CK SERPL-CCNC: 1918 U/L (ref 39–308)
CO2 SERPL-SCNC: 26 MMOL/L (ref 21–32)
COVID-19 RAPID TEST, COVR: NOT DETECTED
CREAT SERPL-MCNC: 7.53 MG/DL (ref 0.7–1.3)
DIFFERENTIAL METHOD BLD: ABNORMAL
EOSINOPHIL # BLD: 0 K/UL (ref 0–0.4)
EOSINOPHIL NFR BLD: 0 % (ref 0–7)
ERYTHROCYTE [DISTWIDTH] IN BLOOD BY AUTOMATED COUNT: 16.3 % (ref 11.5–14.5)
GLOBULIN SER CALC-MCNC: 4.7 G/DL (ref 2–4)
GLUCOSE BLD STRIP.AUTO-MCNC: 125 MG/DL (ref 65–117)
GLUCOSE BLD STRIP.AUTO-MCNC: 156 MG/DL (ref 65–117)
GLUCOSE BLD STRIP.AUTO-MCNC: 90 MG/DL (ref 65–117)
GLUCOSE SERPL-MCNC: 164 MG/DL (ref 65–100)
HCT VFR BLD AUTO: 32.1 % (ref 36.6–50.3)
HGB BLD-MCNC: 10 G/DL (ref 12.1–17)
IMM GRANULOCYTES # BLD AUTO: 0.1 K/UL (ref 0–0.04)
IMM GRANULOCYTES NFR BLD AUTO: 1 % (ref 0–0.5)
LACTATE BLD-SCNC: 1.19 MMOL/L (ref 0.4–2)
LYMPHOCYTES # BLD: 1 K/UL (ref 0.8–3.5)
LYMPHOCYTES NFR BLD: 8 % (ref 12–49)
MCH RBC QN AUTO: 30.6 PG (ref 26–34)
MCHC RBC AUTO-ENTMCNC: 31.2 G/DL (ref 30–36.5)
MCV RBC AUTO: 98.2 FL (ref 80–99)
MONOCYTES # BLD: 1.2 K/UL (ref 0–1)
MONOCYTES NFR BLD: 9 % (ref 5–13)
NEUTS SEG # BLD: 10.9 K/UL (ref 1.8–8)
NEUTS SEG NFR BLD: 82 % (ref 32–75)
NRBC # BLD: 0 K/UL (ref 0–0.01)
NRBC BLD-RTO: 0 PER 100 WBC
PLATELET # BLD AUTO: 154 K/UL (ref 150–400)
PMV BLD AUTO: 11.4 FL (ref 8.9–12.9)
POTASSIUM SERPL-SCNC: 4.9 MMOL/L (ref 3.5–5.1)
PROT SERPL-MCNC: 8.1 G/DL (ref 6.4–8.2)
RBC # BLD AUTO: 3.27 M/UL (ref 4.1–5.7)
SARS-COV-2, COV2: NORMAL
SARS-COV-2, COV2: NORMAL
SARS-COV-2, XPLCVT: NOT DETECTED
SERVICE CMNT-IMP: ABNORMAL
SERVICE CMNT-IMP: ABNORMAL
SERVICE CMNT-IMP: NORMAL
SODIUM SERPL-SCNC: 136 MMOL/L (ref 136–145)
SOURCE, COVRS: NORMAL
SOURCE, COVRS: NORMAL
TROPONIN I SERPL-MCNC: 0.21 NG/ML
TROPONIN I SERPL-MCNC: 0.23 NG/ML
WBC # BLD AUTO: 13.2 K/UL (ref 4.1–11.1)

## 2021-08-18 PROCEDURE — 77010033678 HC OXYGEN DAILY

## 2021-08-18 PROCEDURE — 74011250636 HC RX REV CODE- 250/636: Performed by: STUDENT IN AN ORGANIZED HEALTH CARE EDUCATION/TRAINING PROGRAM

## 2021-08-18 PROCEDURE — 87635 SARS-COV-2 COVID-19 AMP PRB: CPT

## 2021-08-18 PROCEDURE — 71045 X-RAY EXAM CHEST 1 VIEW: CPT

## 2021-08-18 PROCEDURE — 74011250637 HC RX REV CODE- 250/637: Performed by: HOSPITALIST

## 2021-08-18 PROCEDURE — 82553 CREATINE MB FRACTION: CPT

## 2021-08-18 PROCEDURE — U0005 INFEC AGEN DETEC AMPLI PROBE: HCPCS

## 2021-08-18 PROCEDURE — 74011250637 HC RX REV CODE- 250/637: Performed by: EMERGENCY MEDICINE

## 2021-08-18 PROCEDURE — 74011000258 HC RX REV CODE- 258: Performed by: STUDENT IN AN ORGANIZED HEALTH CARE EDUCATION/TRAINING PROGRAM

## 2021-08-18 PROCEDURE — 74011000258 HC RX REV CODE- 258: Performed by: HOSPITALIST

## 2021-08-18 PROCEDURE — 83605 ASSAY OF LACTIC ACID: CPT

## 2021-08-18 PROCEDURE — 74011250636 HC RX REV CODE- 250/636: Performed by: INTERNAL MEDICINE

## 2021-08-18 PROCEDURE — 36415 COLL VENOUS BLD VENIPUNCTURE: CPT

## 2021-08-18 PROCEDURE — 82962 GLUCOSE BLOOD TEST: CPT

## 2021-08-18 PROCEDURE — 99285 EMERGENCY DEPT VISIT HI MDM: CPT

## 2021-08-18 PROCEDURE — 74011000250 HC RX REV CODE- 250: Performed by: HOSPITALIST

## 2021-08-18 PROCEDURE — 94640 AIRWAY INHALATION TREATMENT: CPT

## 2021-08-18 PROCEDURE — 77030012341 HC CHMB SPCR OPTC MDI VYRM -A

## 2021-08-18 PROCEDURE — 65660000001 HC RM ICU INTERMED STEPDOWN

## 2021-08-18 PROCEDURE — 74011636637 HC RX REV CODE- 636/637: Performed by: HOSPITALIST

## 2021-08-18 PROCEDURE — 80053 COMPREHEN METABOLIC PANEL: CPT

## 2021-08-18 PROCEDURE — 85025 COMPLETE CBC W/AUTO DIFF WBC: CPT

## 2021-08-18 PROCEDURE — 74011250636 HC RX REV CODE- 250/636: Performed by: HOSPITALIST

## 2021-08-18 PROCEDURE — 87040 BLOOD CULTURE FOR BACTERIA: CPT

## 2021-08-18 PROCEDURE — 84484 ASSAY OF TROPONIN QUANT: CPT

## 2021-08-18 RX ORDER — INSULIN GLARGINE 100 [IU]/ML
22 INJECTION, SOLUTION SUBCUTANEOUS
Status: DISCONTINUED | OUTPATIENT
Start: 2021-08-18 | End: 2021-08-23 | Stop reason: HOSPADM

## 2021-08-18 RX ORDER — LEVOFLOXACIN 5 MG/ML
500 INJECTION, SOLUTION INTRAVENOUS
Status: DISCONTINUED | OUTPATIENT
Start: 2021-08-20 | End: 2021-08-23

## 2021-08-18 RX ORDER — FLUTICASONE PROPIONATE 50 MCG
2 SPRAY, SUSPENSION (ML) NASAL DAILY
Status: DISCONTINUED | OUTPATIENT
Start: 2021-08-18 | End: 2021-08-23 | Stop reason: HOSPADM

## 2021-08-18 RX ORDER — ACETAMINOPHEN 650 MG/1
650 SUPPOSITORY RECTAL
Status: DISCONTINUED | OUTPATIENT
Start: 2021-08-18 | End: 2021-08-23 | Stop reason: HOSPADM

## 2021-08-18 RX ORDER — SERTRALINE HYDROCHLORIDE 50 MG/1
50 TABLET, FILM COATED ORAL DAILY
Status: DISCONTINUED | OUTPATIENT
Start: 2021-08-18 | End: 2021-08-23 | Stop reason: HOSPADM

## 2021-08-18 RX ORDER — INSULIN LISPRO 100 [IU]/ML
INJECTION, SOLUTION INTRAVENOUS; SUBCUTANEOUS
Status: DISCONTINUED | OUTPATIENT
Start: 2021-08-18 | End: 2021-08-23 | Stop reason: HOSPADM

## 2021-08-18 RX ORDER — SODIUM CHLORIDE 0.9 % (FLUSH) 0.9 %
5-40 SYRINGE (ML) INJECTION AS NEEDED
Status: DISCONTINUED | OUTPATIENT
Start: 2021-08-18 | End: 2021-08-23 | Stop reason: HOSPADM

## 2021-08-18 RX ORDER — SODIUM CHLORIDE 0.9 % (FLUSH) 0.9 %
5-40 SYRINGE (ML) INJECTION EVERY 8 HOURS
Status: DISCONTINUED | OUTPATIENT
Start: 2021-08-18 | End: 2021-08-23 | Stop reason: HOSPADM

## 2021-08-18 RX ORDER — TROSPIUM CHLORIDE 20 MG/1
20 TABLET, FILM COATED ORAL DAILY
Status: DISCONTINUED | OUTPATIENT
Start: 2021-08-18 | End: 2021-08-23 | Stop reason: HOSPADM

## 2021-08-18 RX ORDER — TAMSULOSIN HYDROCHLORIDE 0.4 MG/1
0.8 CAPSULE ORAL DAILY
Status: DISCONTINUED | OUTPATIENT
Start: 2021-08-18 | End: 2021-08-23 | Stop reason: HOSPADM

## 2021-08-18 RX ORDER — METRONIDAZOLE 500 MG/100ML
500 INJECTION, SOLUTION INTRAVENOUS EVERY 12 HOURS
Status: DISCONTINUED | OUTPATIENT
Start: 2021-08-18 | End: 2021-08-18

## 2021-08-18 RX ORDER — ONDANSETRON 4 MG/1
4 TABLET, ORALLY DISINTEGRATING ORAL
Status: DISCONTINUED | OUTPATIENT
Start: 2021-08-18 | End: 2021-08-23 | Stop reason: HOSPADM

## 2021-08-18 RX ORDER — ALBUTEROL SULFATE 90 UG/1
2 AEROSOL, METERED RESPIRATORY (INHALATION)
Status: DISCONTINUED | OUTPATIENT
Start: 2021-08-18 | End: 2021-08-20

## 2021-08-18 RX ORDER — BUMETANIDE 0.25 MG/ML
2 INJECTION INTRAMUSCULAR; INTRAVENOUS ONCE
Status: COMPLETED | OUTPATIENT
Start: 2021-08-18 | End: 2021-08-18

## 2021-08-18 RX ORDER — INSULIN GLARGINE 100 [IU]/ML
17 INJECTION, SOLUTION SUBCUTANEOUS DAILY
Status: DISCONTINUED | OUTPATIENT
Start: 2021-08-19 | End: 2021-08-23 | Stop reason: HOSPADM

## 2021-08-18 RX ORDER — ONDANSETRON 2 MG/ML
4 INJECTION INTRAMUSCULAR; INTRAVENOUS
Status: DISCONTINUED | OUTPATIENT
Start: 2021-08-18 | End: 2021-08-23 | Stop reason: HOSPADM

## 2021-08-18 RX ORDER — DEXTROSE 50 % IN WATER (D50W) INTRAVENOUS SYRINGE
12.5-25 AS NEEDED
Status: DISCONTINUED | OUTPATIENT
Start: 2021-08-18 | End: 2021-08-23 | Stop reason: HOSPADM

## 2021-08-18 RX ORDER — POLYETHYLENE GLYCOL 3350 17 G/17G
17 POWDER, FOR SOLUTION ORAL DAILY PRN
Status: DISCONTINUED | OUTPATIENT
Start: 2021-08-18 | End: 2021-08-23 | Stop reason: HOSPADM

## 2021-08-18 RX ORDER — BUMETANIDE 1 MG/1
2 TABLET ORAL DAILY
Status: DISCONTINUED | OUTPATIENT
Start: 2021-08-19 | End: 2021-08-23 | Stop reason: HOSPADM

## 2021-08-18 RX ORDER — CARVEDILOL 12.5 MG/1
12.5 TABLET ORAL 2 TIMES DAILY WITH MEALS
Status: DISCONTINUED | OUTPATIENT
Start: 2021-08-18 | End: 2021-08-23 | Stop reason: HOSPADM

## 2021-08-18 RX ORDER — LEVOFLOXACIN 5 MG/ML
750 INJECTION, SOLUTION INTRAVENOUS
Status: DISCONTINUED | OUTPATIENT
Start: 2021-08-18 | End: 2021-08-18

## 2021-08-18 RX ORDER — MAGNESIUM SULFATE 100 %
4 CRYSTALS MISCELLANEOUS AS NEEDED
Status: DISCONTINUED | OUTPATIENT
Start: 2021-08-18 | End: 2021-08-23 | Stop reason: HOSPADM

## 2021-08-18 RX ORDER — HEPARIN SODIUM 5000 [USP'U]/ML
5000 INJECTION, SOLUTION INTRAVENOUS; SUBCUTANEOUS EVERY 8 HOURS
Status: DISCONTINUED | OUTPATIENT
Start: 2021-08-18 | End: 2021-08-23 | Stop reason: HOSPADM

## 2021-08-18 RX ORDER — LOSARTAN POTASSIUM 50 MG/1
25 TABLET ORAL DAILY
Status: DISCONTINUED | OUTPATIENT
Start: 2021-08-18 | End: 2021-08-23 | Stop reason: HOSPADM

## 2021-08-18 RX ORDER — LEVOFLOXACIN 5 MG/ML
750 INJECTION, SOLUTION INTRAVENOUS ONCE
Status: COMPLETED | OUTPATIENT
Start: 2021-08-18 | End: 2021-08-18

## 2021-08-18 RX ORDER — IBUPROFEN 600 MG/1
600 TABLET ORAL
Status: DISCONTINUED | OUTPATIENT
Start: 2021-08-18 | End: 2021-08-18

## 2021-08-18 RX ORDER — INSULIN LISPRO 100 [IU]/ML
7 INJECTION, SOLUTION INTRAVENOUS; SUBCUTANEOUS
Status: DISCONTINUED | OUTPATIENT
Start: 2021-08-18 | End: 2021-08-23 | Stop reason: HOSPADM

## 2021-08-18 RX ORDER — ACETAMINOPHEN 325 MG/1
650 TABLET ORAL
Status: DISCONTINUED | OUTPATIENT
Start: 2021-08-18 | End: 2021-08-23 | Stop reason: HOSPADM

## 2021-08-18 RX ORDER — FINASTERIDE 5 MG/1
5 TABLET, FILM COATED ORAL DAILY
Status: DISCONTINUED | OUTPATIENT
Start: 2021-08-18 | End: 2021-08-23 | Stop reason: HOSPADM

## 2021-08-18 RX ORDER — ACETAMINOPHEN 500 MG
1000 TABLET ORAL ONCE
Status: COMPLETED | OUTPATIENT
Start: 2021-08-18 | End: 2021-08-18

## 2021-08-18 RX ORDER — GUAIFENESIN 100 MG/5ML
81 LIQUID (ML) ORAL DAILY
Status: DISCONTINUED | OUTPATIENT
Start: 2021-08-18 | End: 2021-08-23 | Stop reason: HOSPADM

## 2021-08-18 RX ORDER — INSULIN GLARGINE 100 [IU]/ML
19 INJECTION, SOLUTION SUBCUTANEOUS DAILY
Status: DISCONTINUED | OUTPATIENT
Start: 2021-08-18 | End: 2021-08-18

## 2021-08-18 RX ORDER — INSULIN GLARGINE 100 [IU]/ML
20 INJECTION, SOLUTION SUBCUTANEOUS
Status: DISCONTINUED | OUTPATIENT
Start: 2021-08-18 | End: 2021-08-18

## 2021-08-18 RX ORDER — GABAPENTIN 300 MG/1
300 CAPSULE ORAL
Status: DISCONTINUED | OUTPATIENT
Start: 2021-08-18 | End: 2021-08-23 | Stop reason: HOSPADM

## 2021-08-18 RX ADMIN — PIPERACILLIN AND TAZOBACTAM 3.38 G: 3; .375 INJECTION, POWDER, LYOPHILIZED, FOR SOLUTION INTRAVENOUS at 08:44

## 2021-08-18 RX ADMIN — HEPARIN SODIUM 5000 UNITS: 5000 INJECTION INTRAVENOUS; SUBCUTANEOUS at 15:49

## 2021-08-18 RX ADMIN — LEVOFLOXACIN 750 MG: 5 INJECTION, SOLUTION INTRAVENOUS at 09:33

## 2021-08-18 RX ADMIN — ACETAMINOPHEN 1000 MG: 500 TABLET ORAL at 02:13

## 2021-08-18 RX ADMIN — INSULIN LISPRO 2 UNITS: 100 INJECTION, SOLUTION INTRAVENOUS; SUBCUTANEOUS at 13:17

## 2021-08-18 RX ADMIN — Medication 10 ML: at 15:51

## 2021-08-18 RX ADMIN — GABAPENTIN 300 MG: 300 CAPSULE ORAL at 22:19

## 2021-08-18 RX ADMIN — HEPARIN SODIUM 5000 UNITS: 5000 INJECTION INTRAVENOUS; SUBCUTANEOUS at 08:40

## 2021-08-18 RX ADMIN — METRONIDAZOLE 500 MG: 500 INJECTION, SOLUTION INTRAVENOUS at 04:39

## 2021-08-18 RX ADMIN — Medication 10 ML: at 07:54

## 2021-08-18 RX ADMIN — INSULIN GLARGINE 22 UNITS: 100 INJECTION, SOLUTION SUBCUTANEOUS at 22:19

## 2021-08-18 RX ADMIN — VANCOMYCIN HYDROCHLORIDE 2500 MG: 10 INJECTION, POWDER, LYOPHILIZED, FOR SOLUTION INTRAVENOUS at 05:57

## 2021-08-18 RX ADMIN — BUMETANIDE 2 MG: 0.25 INJECTION INTRAMUSCULAR; INTRAVENOUS at 08:40

## 2021-08-18 RX ADMIN — PIPERACILLIN AND TAZOBACTAM 3.38 G: 3; .375 INJECTION, POWDER, LYOPHILIZED, FOR SOLUTION INTRAVENOUS at 20:20

## 2021-08-18 RX ADMIN — CARVEDILOL 12.5 MG: 12.5 TABLET, FILM COATED ORAL at 08:26

## 2021-08-18 RX ADMIN — LOSARTAN POTASSIUM 25 MG: 50 TABLET, FILM COATED ORAL at 08:25

## 2021-08-18 RX ADMIN — ALBUTEROL SULFATE 2 PUFF: 90 AEROSOL, METERED RESPIRATORY (INHALATION) at 14:56

## 2021-08-18 RX ADMIN — TAMSULOSIN HYDROCHLORIDE 0.8 MG: 0.4 CAPSULE ORAL at 08:25

## 2021-08-18 RX ADMIN — CEFEPIME HYDROCHLORIDE 1 G: 1 INJECTION, POWDER, FOR SOLUTION INTRAMUSCULAR; INTRAVENOUS at 04:38

## 2021-08-18 RX ADMIN — Medication 10 ML: at 22:25

## 2021-08-18 RX ADMIN — HEPARIN SODIUM 5000 UNITS: 5000 INJECTION INTRAVENOUS; SUBCUTANEOUS at 22:19

## 2021-08-18 RX ADMIN — INSULIN LISPRO 7 UNITS: 100 INJECTION, SOLUTION INTRAVENOUS; SUBCUTANEOUS at 13:16

## 2021-08-18 RX ADMIN — ALBUTEROL SULFATE 2 PUFF: 90 AEROSOL, METERED RESPIRATORY (INHALATION) at 08:27

## 2021-08-18 RX ADMIN — TROSPIUM CHLORIDE 20 MG: 20 TABLET, FILM COATED ORAL at 13:20

## 2021-08-18 RX ADMIN — SERTRALINE 50 MG: 50 TABLET, FILM COATED ORAL at 13:20

## 2021-08-18 RX ADMIN — INSULIN GLARGINE 19 UNITS: 100 INJECTION, SOLUTION SUBCUTANEOUS at 09:34

## 2021-08-18 RX ADMIN — ASPIRIN 81 MG: 81 TABLET, CHEWABLE ORAL at 08:26

## 2021-08-18 RX ADMIN — INSULIN LISPRO 7 UNITS: 100 INJECTION, SOLUTION INTRAVENOUS; SUBCUTANEOUS at 18:54

## 2021-08-18 RX ADMIN — CARVEDILOL 12.5 MG: 12.5 TABLET, FILM COATED ORAL at 18:54

## 2021-08-18 RX ADMIN — EPOETIN ALFA-EPBX 4000 UNITS: 4000 INJECTION, SOLUTION INTRAVENOUS; SUBCUTANEOUS at 22:20

## 2021-08-18 RX ADMIN — FLUTICASONE PROPIONATE 2 SPRAY: 50 SPRAY, METERED NASAL at 13:27

## 2021-08-18 RX ADMIN — FINASTERIDE 5 MG: 5 TABLET, FILM COATED ORAL at 13:20

## 2021-08-18 NOTE — PROGRESS NOTES
Pt just arrived to ED and is currently being ruled out for COVID-19. Noted rapid test is negative but PCR and full work up is pending. Pt will also have dialysis today. Will await testing results prior to start of OT services.

## 2021-08-18 NOTE — PROGRESS NOTES
Transition of Care Plan:    RUR: 22% moderate risk for readmission   Disposition: Home with spouse  Follow up appointments: PCP, Nephrology  OP HD: MWF 6:15 AM [facility] - send flowsheets and d/c summary to location prior to d/c   DME needed: Pt has a cane and home oxygen  Transportation at Discharge: Pt's spouse - will need to bring portable tank at d/c if needed for transport home  [place] or means to access home: Spouse will have        IM Medicare Letter: Will need 2nd [facility] letter prior to d/c  Is patient a BCPI-A Bundle: N/A           If yes, was Bundle Letter given?: N/A    Caregiver Contact: Pt's spouse, Melody Caballero p) [phone]  Discharge Caregiver contacted prior to discharge? To be contacted prior to d/c               Reason for Admission:   Sepsis, acute on chronic hypoxic respiratory failure, rule out pneumonia, covid-19 infection                    RUR Score: 22% moderate risk for readmission                 PCP: First and Last name:   Lorraine Mcneil MD     Name of Practice:    Are you a current patient: Yes/No: Yes   Approximate date of last visit: Couple month ago   Can you participate in a virtual visit if needed: No    Do you (patient/family) have any concerns for transition/discharge?     No concerns voiced at this time by pt                 Plan for utilizing home health: Pending therapy recommendations - pt currently attended outpatient rehab 2x/week at [facility] therapy      Current Advanced Directive/Advance Care Plan:  Full [address] (ACP) Conversation      Date of Conversation: 8/18/2021  Conducted with: Patient with Decision Making Capacity    Healthcare Decision Maker:     Primary Decision Maker: Lizandro Zambrano - Spouse - [phone]  Click here to complete [address] including selection of the Healthcare Decision Maker Relationship (ie \"Primary\")      Today we documented Decision Maker(s) consistent with Legal Next of Kin hierarchy. Content/Action Overview:   DECLINED ACP conversation - will revisit periodically   Reviewed DNR/DNI and patient elects Full Code (Attempt Resuscitation)      Length of Voluntary ACP Conversation in minutes:  <16 minutes (Non-Billable)    [employer] Decision Maker:             Primary Decision Maker: Ele Kohler - Spouse - [phone]    Transition of Care Plan: Home with spouse, resumption of OP HD    CM reviewed chart. CM completed assessment with pt via room phone due to COVID-19 rule out. CM introduced self/role, verified demographics, and discussed discharge planning. Pt is alert and oriented during encounter. Pt lives with his spouse in 1 level home with 2 OBDULIA. Reports GLF prior to admission to hospital. Pt has a cane and home oxygen which he uses at night. Pt drives himself to dialysis, but states his spouse is available to assist with transport. Pt's spouse to transport at d/c. Denies SNF/IPR/HH, attends outpatient therapy 2x/week at [facility]. Pending PT/OT consults. Pharmacy preference is [facility] [address] [facility] Care Management Interventions  PCP Verified by CM: Yes  Palliative Care Criteria Met (RRAT>21 & CHF Dx)?: No  Mode of Transport at Discharge: Other (see comment) (Pt's spouse)  Transition of Care Consult (CM Consult): Discharge Planning  Discharge Durable Medical Equipment: No (Pt has a cane and home oxygen ([employer]))  Physical Therapy Consult: Yes  Occupational Therapy Consult: Yes  Speech Therapy Consult: No  Current Support Network: Lives with Spouse  Confirm Follow Up Transport: Self (Self/spouse)  Discharge Location  Discharge Placement: Home (Home with resumption of OP HD)     Unit care management will continue to follow for transition of care planning needs.       [facility], [address]

## 2021-08-18 NOTE — PROGRESS NOTES
Pharmacy Antimicrobial Kinetic Dosing    Indication for Antimicrobials: sepsis     Current Regimen of Each Antimicrobial:  Vancomycin pharmacy to dose (Start Date ; Day # 1)  Zosyn 3.375g IV q 12h (start: , day 1)  Levofloxacin 750mg IV x 1, then 500mg IV q 48 (start: , day 1)    Previous Antimicrobial Therapy:  Cefepime 2g IV in ED -21    Goal Level: VANCOMYCIN TROUGH GOAL RANGE    Vancomycin Trough: 20 - 25 mcg/mL    Date Dose & Interval Measured (mcg/mL) Extrapolated (mcg/mL)                       Date & time of next level:  with AM labs, entered    Significant Positive Cultures:   : blood - prelim    Conditions for Dosing Consideration: Hemodialysis    Labs:  Recent Labs     21  0200   CREA 7.53*   BUN 64*     Recent Labs     21  0200   WBC 13.2*     Temp (24hrs), Av.8 °F (38.2 °C), Min:99.1 °F (37.3 °C), Max:103.1 °F (39.5 °C)        Creatinine Clearance (mL/min):   CrCl (Ideal Body Weight): 9.6   If actual weight < IBW: CrCl (Actual Body Weight) 15.1    Impression/Plan:   Vancomycin pharmacy to dose - HD dosing: Vancomcyin 1250mg IV after each HD session, no dose  due to already received Loading dose  Renal dosed zosyn to 3.375g IV q 12h  Continue renal dosed levofloxacin  Cmp/ cbc ordered for 819  Antimicrobial stop date pending     Pharmacy will follow daily and adjust medications as appropriate for renal function and/or serum levels.     Thank you,  [facility] Abe Ulrich, PHARMD    Vancomycin Dosing Document    Documents located on pharmacy Teams site: Clinical Practice -> Antimicrobial Stewardship -> Antibiotics_Vancomycin     Aminoglycoside Dosing Document    Documents located on pharmacy Teams site: Clinical Practice -> Antimicrobial Stewardship -> Antibiotics_Aminoglycosides

## 2021-08-18 NOTE — PROGRESS NOTES
CM reviewed chart, called pt via room phone due to isolaton precautions for COVID-19 rule out. Pt states he has a nurse in his room and requests for CM to call back in a few minutes.     Kiley Velazquez [facility] [address], [address]

## 2021-08-18 NOTE — PROGRESS NOTES
PHARMACY ANTIBIOTIC DOSING  Levofloxacin 750 mg IV q48h ordered. Order adjusted to 750 mg x 1 f/b 500 mg q48h according pharmacy dosing protocol.   Ray Macias, [facility]

## 2021-08-18 NOTE — ED NOTES
Pt is a transfer from [facility]. Per EMS and primary nurse, pt fell at home on 8/17/21 and was on the ground for 8 hours before getting assistance. Pt's trop was elevated at 0.18 and had a fever of 102.8. Was positive for a UTI and received abx. Pt is currently resting comfortably in stretcher, NAD, on monitor x 3.

## 2021-08-18 NOTE — H&P
Hospitalist Admission Note    NAME: Fidelina Domínguez   :     MRN:  [id number]     Date/Time:  2021 7:06 AM    Patient PCP: Lorraine Mcneil MD    Please note that this dictation was completed with [employer], the computer voice recognition software. Quite often unanticipated grammatical, syntax, homophones, and other interpretive errors are inadvertently transcribed by the computer software. Please disregard these errors. Please excuse any errors that have escaped final proofreading  ______________________________________________________________________   Assessment & Plan:  Sepsis, POA unclear etiology as evidence by fever 103, WBC 13.2, RR 24-30  Acute on chronic hypoxic respiratory failure, POA  90% RA during eval; 98% on 2L. Rule out pneumonia, covid-19 infection  Bilateral patchy pulmonary opacities read as mild pulmonary edema, POA    Acute on chronic systolic chf EF 99-59% 5/39, POA possible b/l pneumonia, rule out covid-19 pneumonia  --continue empiric abx with vancomycin, zosyn, levaquin. Procalcitonin 0.56  -- Rapid covid test negative. Ordered covid-19 pcr test  --diurese with bumex 2mg IV x 1. Renal consult for dialysis    Rhabdomyolysis due to fall and on floor x 8 hours at home. Generalized weakness  --not on statin. --monitor CK  --frequent turns  --consult pt/ot    Elevated troponin 0.23, suspect demand ischemia  --continue aspirin  --repeat troponin    ESRD on HD MWF  --renal consult for dialysis; not until tonight per Dr. Marta Almaguer due to staffing. Monitor in stepdown due to delay with dialysis and ongoing tachypnia RR 30s    IDDM type 2  --continue 70/30 30 units AM, 40 units PM    FRIDA on 2L O2 at night and prn at home  --continue O2    Body mass index is 36.62 kg/m².     Code:  full  DVT prophylaxis: heparin  Surrogate decision maker:  Wife Norma Aden          Subjective:   CHIEF COMPLAINT:  \"I lost my balance\"    HISTORY OF PRESENT ILLNESS:     Fidelina Domínguez is a 70 y.o. male developed generalized weakness and fall on Monday evening after dialysis. On floor for 8 hours and sustained abrasions to elbows, knees, toes. Low grade temp 99 at home, mild HA, mild SOB. Denies cough, abd pain, n/v/d. No chest pain. In ER fever 103    Hospitalized  with chf exac with pulmonary edema, elevated troponin due to demand, also tx with Z-pack. We were asked to admit for work up and evaluation of the above problems. Past Medical History:   Diagnosis Date    Arthritis     spine    CAD (coronary artery disease)     high cholesterol    Chronic kidney disease     dialysis    [facility] [address] [address]     ESRD (end stage renal disease) (HCC)     GERD (gastroesophageal reflux disease)     HX    Hypertension     Ill-defined condition     irritable bowel syndrome    Systolic CHF ([facility] [address]     Unspecified sleep apnea     NO CPAP      Past Surgical History:   Procedure Laterality Date    COLONOSCOPY N/A 2016    COLONOSCOPY [facility] by [facility]iberto Hull [facility] at [facility] ENDOSCOPY    HX ORTHOPAEDIC      CERVICAL FUSION    HX UROLOGICAL      TURP    HX VASCULAR ACCESS      L arm dialysis access     Social History     Tobacco Use    Smoking status: Former Smoker     Quit date:      Years since quittin.6    Smokeless tobacco: Former User    Tobacco comment: cigars only   Substance Use Topics    Alcohol use: No      Drug use:        Family History   Problem Relation Age of Onset    Cancer Father         \"bone\"    Diabetes Brother      Allergies   Allergen Reactions    Albumin, Human 25 % Anaphylaxis and Hives    Niacin Hives        Prior to Admission medications    Medication Sig Start Date End Date Taking? Authorizing Provider   gabapentin (NEURONTIN) 300 mg capsule Take 1 Capsule by mouth nightly. Max Daily Amount: 300 mg.  PLEASE SEND FUTURE REFILLS TO PCP OR NEW ENDOCRINOLOGIST AS DR TYLER IS NO LONGER AT THIS PRACTICE 21   Shiloh Major MD   carvediloL (COREG) 12.5 mg tablet Take 1 Tab by mouth two (2) times daily (with meals). 5/6/21   Sarah Pineda MD   bumetanide (BUMEX) 2 mg tablet TAKE 1 TABLET BY MOUTH EVERY DAY 3/19/21   Provider, Historical   finasteride (PROSCAR) 5 mg tablet TAKE 1 TABLET BY MOUTH DAILY 1/20/21   Provider, Historical   Myrbetriq 25 mg ER tablet TAKE 1 TABLET BY MOUTH DAILY 3/19/21   Provider, Historical   insulin NPH/insulin regular (NovoLIN 70/30 U-100 Insulin) 100 unit/mL (70-30) injection ReliOn Novolin 70/30: Inject 30 Units with Breakfast, 40 Units with Dinner 3/23/21   Wilver Whatley MD   losartan (COZAAR) 25 mg tablet Take 25 mg by mouth daily. Other, MD Mignon   sertraline (ZOLOFT) 50 mg tablet Take 50 mg by mouth daily. 2/25/19   Provider, Historical   aspirin 81 mg chewable tablet Take 81 mg by mouth daily. Provider, Historical   Calcium-Cholecalciferol, D3, (CALCIUM 600 WITH VITAMIN D3) 600 mg(1,500mg) -400 unit chew Take 1 Tab by mouth every evening. Provider, Historical   fluticasone (FLONASE) 50 mcg/actuation nasal spray 2 Sprays by Both Nostrils route daily. Provider, Historical   tamsulosin (FLOMAX) 0.4 mg capsule Take 0.8 mg by mouth daily. Other, MD Mignon     REVIEW OF SYSTEMS:  POSITIVE= Bold.   Negative = normal text  General:  fever, chills, sweats, generalized weakness, weight loss/gain, loss of appetite  Eyes:  blurred vision, eye pain, loss of vision, diplopia  Ear Nose and Throat:  rhinorrhea, pharyngitis  Respiratory:   cough, sputum production, SOB, wheezing, BRITT, pleuritic pain  Cardiology:  chest pain, palpitations, orthopnea, PND, edema, syncope   Gastrointestinal:  abdominal pain, N/V, dysphagia, diarrhea, constipation, bleeding  Genitourinary:  frequency, urgency, dysuria, hematuria, incontinence  Muskuloskeletal :  Arthralgia low back pain chronic, myalgia  Hematology:  easy bruising, bleeding, lymphadenopathy  Dermatological:  rash, ulceration, pruritis  Endocrine:  hot flashes or polydipsia  Neurological:  headache, dizziness, confusion, focal weakness, paresthesia, memory loss today, gait disturbance  Psychological: anxiety, depression, agitation      Objective:   VITALS:    Visit Vitals  /81   Pulse 75   Temp (!) 100.5 °F (38.1 °C)   Resp 23   Ht 6' (1.829 m)   Wt 122.5 kg (270 lb)   SpO2 97%   BMI 36.62 kg/m²     Temp (24hrs), Av.2 °F (38.4 °C), Min:100.5 °F (38.1 °C), Max:103.1 °F (39.5 °C)    Body mass index is 36.62 kg/m². PHYSICAL EXAM:    General:    Alert, cooperative, tachypneic but talking in full sentences, appears stated age. HEENT: Atraumatic, anicteric sclerae, pink conjunctivae     No oral ulcers, mucosa moist, throat clear. Hearing intact. Neck:  Supple, symmetrical,  thyroid: non tender  Lungs:   Mild crackles and rhonchi with occasional wheezing. Chest wall:  No tenderness  No Accessory muscle use. Heart:   Regular  rhythm,  2/6 systolic  murmur   No gallop. No edema. Abdomen:   Obese, Soft, non-tender. Not tympanic. Bowel sounds normal. No masses  Extremities: No cyanosis. No clubbing. Left upper graft dialysis graft  Skin:     Not pale Not Jaundiced +abrasions on b/l knees and great toes b/l   Psych:  Poor insight. Not depressed. Not anxious or agitated. Neurologic: EOMs intact. No facial asymmetry. No aphasia or slurred speech. Symmetrical strength 3/5 in arms and legs, equal , Alert and oriented X self, year, president.   Did not know month   Peripheral pulse: Bilateral, DP, 2+  Capillary refill:  normal    IMAGING RESULTS:   []       I have personally reviewed the actual   []     CXR  []     CT scan  CXR:  CT :  EKG:   ________________________________________________________________________  Care Plan discussed with:    Comments   Patient y    Family      RN     Care Manager                    Consultant:  mick Hudson   ________________________________________________________________________  Prophylaxis:  GI none   DVT heparin ________________________________________________________________________  Recommended Disposition:   Home with Family y   HH/PT/OT/RN y   SNF/LTC    AYAZ    ________________________________________________________________________  Code Status:  Full Code y   DNR/DNI    ________________________________________________________________________  TOTAL TIME:  45 minutes      Comments    y Reviewed previous records   >50% of visit spent in counseling and coordination of care  Discussion with patient and/or family and questions answered         ______________________________________________________________________  Heather James MD      Procedures: see electronic medical records for all procedures/Xrays and details which were not copied into this note but were reviewed prior to creation of Plan. LAB DATA REVIEWED:    Recent Results (from the past 24 hour(s))   CBC WITH AUTOMATED DIFF    Collection Time: 08/18/21  2:00 AM   Result Value Ref Range    WBC 13.2 (H) 4.1 - 11.1 K/uL    RBC 3.27 (L) 4.10 - 5.70 M/uL    HGB 10.0 (L) 12.1 - 17.0 g/dL    HCT 32.1 (L) 36.6 - 50.3 %    MCV 98.2 80.0 - 99.0 FL    MCH 30.6 26.0 - 34.0 PG    MCHC 31.2 30.0 - 36.5 g/dL    RDW 16.3 (H) 11.5 - 14.5 %    PLATELET 675 199 - 118 K/uL    MPV 11.4 8.9 - 12.9 FL    NRBC 0.0 0  WBC    ABSOLUTE NRBC 0.00 0.00 - 0.01 K/uL    NEUTROPHILS 82 (H) 32 - 75 %    LYMPHOCYTES 8 (L) 12 - 49 %    MONOCYTES 9 5 - 13 %    EOSINOPHILS 0 0 - 7 %    BASOPHILS 0 0 - 1 %    IMMATURE GRANULOCYTES 1 (H) 0.0 - 0.5 %    ABS. NEUTROPHILS 10.9 (H) 1.8 - 8.0 K/UL    ABS. LYMPHOCYTES 1.0 0.8 - 3.5 K/UL    ABS. MONOCYTES 1.2 (H) 0.0 - 1.0 K/UL    ABS. EOSINOPHILS 0.0 0.0 - 0.4 K/UL    ABS. BASOPHILS 0.0 0.0 - 0.1 K/UL    ABS. IMM.  GRANS. 0.1 (H) 0.00 - 0.04 K/UL    DF AUTOMATED     METABOLIC PANEL, COMPREHENSIVE    Collection Time: 08/18/21  2:00 AM   Result Value Ref Range    Sodium 136 136 - 145 mmol/L    Potassium 4.9 3.5 - 5.1 mmol/L    Chloride 102 97 - 108 mmol/L    CO2 26 21 - 32 mmol/L    Anion gap 8 5 - 15 mmol/L    Glucose 164 (H) 65 - 100 mg/dL    BUN 64 (H) 6 - 20 MG/DL    Creatinine 7.53 (H) 0.70 - 1.30 MG/DL    BUN/Creatinine ratio 8 (L) 12 - 20      GFR est AA 9 (L) >60 ml/min/1.73m2    GFR est non-AA 7 (L) >60 ml/min/1.73m2    Calcium 9.1 8.5 - 10.1 MG/DL    Bilirubin, total 0.8 0.2 - 1.0 MG/DL    ALT (SGPT) 33 12 - 78 U/L    AST (SGOT) 48 (H) 15 - 37 U/L    Alk.  phosphatase 56 45 - 117 U/L    Protein, total 8.1 6.4 - 8.2 g/dL    Albumin 3.4 (L) 3.5 - 5.0 g/dL    Globulin 4.7 (H) 2.0 - 4.0 g/dL    A-G Ratio 0.7 (L) 1.1 - 2.2     CK W/ CKMB & INDEX    Collection Time: 08/18/21  2:00 AM   Result Value Ref Range    CK - MB 3.0 <3.6 NG/ML    CK-MB Index 0.2 0.0 - 2.5      CK 1,836 (H) 39 - 308 U/L   TROPONIN I    Collection Time: 08/18/21  2:00 AM   Result Value Ref Range    Troponin-I, Qt. 0.23 (H) <0.05 ng/mL   POC LACTIC ACID    Collection Time: 08/18/21  2:04 AM   Result Value Ref Range    Lactic Acid (POC) 1.19 0.40 - 2.00 mmol/L   SARS-COV-2    Collection Time: 08/18/21  3:11 AM   Result Value Ref Range    SARS-CoV-2 Please find results under separate order     COVID-19 RAPID TEST    Collection Time: 08/18/21  3:11 AM   Result Value Ref Range    Specimen source Nasopharyngeal      COVID-19 rapid test Not detected NOTD

## 2021-08-18 NOTE — ED NOTES
TRANSFER - IN REPORT:    Verbal report received from Elissa (name) on [address] [place] [address]. Report consisted of patients Situation, Background, Assessment and   Recommendations(SBAR). Information from the following report(s) SBAR and ED Summary was reviewed with the receiving nurse. Opportunity for questions and clarification was provided. Pt asleep in stretcher       1330 Incontinence care performed and draw sheet changed, pt placed on bed pan w/ unsuccessful attempt for BM. Pt sitting on edge of stretcher to eat lunch, pt reporting mild SOB and dizziness however sx seemed to resolve and pt is is in no obvious distress. [address] notified of negative COVID rapid and PCR, will remove droplet plus isolation. Nursing supervisor notified as well     1900 Pt at bedside eating dinner tray     1930 Patient is being transferred to [address], Room # [phone]. Report given to Susan Lara RN on [address] [place] [address] for routine progression of care. Report consisted of the following information SBAR, Kardex, ED Summary, MAR and Recent Results. Patient transferred to receiving unit by: RN (RN or tech name). Outstanding consults needed: PT/OT    Next labs due: 0400    The following personal items will be sent with the patient during transfer to the floor:     All valuables:    Cardiac monitoring ordered: Yes    The following CURRENT information was reported to the receiving RN:    Code status: Full Code at time of transfer    Last set of vital signs:  Vital Signs  Level of Consciousness: Alert (0) (08/18/21 1854)  Temp: 99.4 °F (37.4 °C) (08/18/21 1854)  Temp Source: Oral (08/18/21 1854)  Pulse (Heart Rate): 82 (08/18/21 1900)  Resp Rate: 21 (08/18/21 1900)  BP: (!) 148/86 (08/18/21 1900)  MAP (Monitor): 105 (08/18/21 1900)  MAP (Calculated): 107 (08/18/21 1900)  BP 1 Location: Right upper arm (08/18/21 0058)  BP Patient Position: At rest;Supine (08/18/21 0058)  MEWS Score: 1 (08/18/21 1854)         Oxygen Therapy  O2 Sat (%): 94 % (08/18/21 1854)  Pulse via Oximetry: 79 beats per minute (08/18/21 1830)  O2 Device: Nasal cannula (08/18/21 1457)  O2 Flow Rate (L/min): 2 l/min (08/18/21 1457)      Last pain assessment:  Pain 1  Pain Scale 1: Numeric (0 - 10)  Pain Intensity 1: 0      Wounds: No     Urinary catheter: oliguric  Is there a smith order: No     LDAs:       Peripheral IV Right Forearm (Active)   Site Assessment Clean, dry, & intact 08/18/21 0105   Phlebitis Assessment 0 08/18/21 0105   Infiltration Assessment 0 08/18/21 0105   Dressing Status Clean, dry, & intact 08/18/21 0105         Opportunity for questions and clarification was provided.     Tato Mcmullen RN

## 2021-08-18 NOTE — PROGRESS NOTES
Nephrology Progress Note  Chavo Eucdea     [email or web address]  Phone - [phone]   Patient: Micaela Duggan    YOB: 1945        Date- 8/18/2021   Admit Date: 8/18/2021  CC: Follow up for esrd          IMPRESSION & PLAN:     esrd   Fever    Anemia of ckd   S/p fall   Elevated ck- rhabdomyolysis   Sob - pulmonary edema vs pneumonia   Sec. hyperpara    PLAN-   Hd today   Remove 2 kg as tolerated   Abx per primary team   epogen mwf   D/w DR. Ilir Connell     Subjective: Interval History:   -  Patient was tx from OSH with fever and fall. His covid rapid test -ve, he is on hd mwf- pat coronado  His last hd on monday    Objective:   Vitals:    08/18/21 0305 08/18/21 0345 08/18/21 0545 08/18/21 0715   BP: (!) 143/71 (!) 149/74 138/81 (!) 150/85   Pulse: 87 91 75 78   Resp: (!) 31 28 23 24   Temp: (!) 100.5 °F (38.1 °C) (!) 100.5 °F (38.1 °C)  99.1 °F (37.3 °C)   SpO2: 95% 93% 97% 90%   Weight:       Height:          No intake/output data recorded. Last 3 Recorded Weights in this Encounter    08/18/21 0058   Weight: 122.5 kg (270 lb)      Physical exam:   GEN: NAD  NECK- Supple  RESP: mild distress  NEURO:non focal    Exam deferred due to COVID-19 infection in order to preserve PPE AND minimize risk of   transmission to dialysis and renal transplant patient per ASN and RPA guidelines:        Chart reviewed. Pertinent Notes reviewed. Data Review :  Recent Labs     08/18/21  0200      K 4.9      CO2 26   BUN 64*   CREA 7.53*   *   CA 9.1     Recent Labs     08/18/21  0200   WBC 13.2*   HGB 10.0*   HCT 32.1*        No results for input(s): FE, TIBC, PSAT, FERR in the last 72 hours.    Medication list  reviewed  Current Facility-Administered Medications   Medication Dose Route Frequency    vancomycin (VANCOCIN) 2500 mg in  mL infusion  2,500 mg IntraVENous NOW    VANCOMYCIN INFORMATION NOTE   Other Rx Dosing/Monitoring    insulin lispro (HUMALOG) injection   SubCUTAneous AC&HS    glucose chewable tablet 16 g  4 Tablet Oral PRN    dextrose (D50W) injection syrg 12.5-25 g  12.5-25 g IntraVENous PRN    glucagon (GLUCAGEN) injection 1 mg  1 mg IntraMUSCular PRN    levoFLOXacin (LEVAQUIN) 750 mg in D5W IVPB  750 mg IntraVENous ONCE    [START ON 8/20/2021] levoFLOXacin (LEVAQUIN) 500 mg in D5W IVPB  500 mg IntraVENous Q48H    sodium chloride (NS) flush 5-40 mL  5-40 mL IntraVENous Q8H    sodium chloride (NS) flush 5-40 mL  5-40 mL IntraVENous PRN    acetaminophen (TYLENOL) tablet 650 mg  650 mg Oral Q6H PRN    Or    acetaminophen (TYLENOL) suppository 650 mg  650 mg Rectal Q6H PRN    polyethylene glycol (MIRALAX) packet 17 g  17 g Oral DAILY PRN    ondansetron (ZOFRAN ODT) tablet 4 mg  4 mg Oral Q8H PRN    Or    ondansetron (ZOFRAN) injection 4 mg  4 mg IntraVENous Q6H PRN    heparin (porcine) injection 5,000 Units  5,000 Units SubCUTAneous Q8H    tamsulosin (FLOMAX) capsule 0.8 mg  0.8 mg Oral DAILY    sertraline (ZOLOFT) tablet 50 mg  50 mg Oral DAILY    trospium (SANCTURA) tablet 20 mg  20 mg Oral DAILY    losartan (COZAAR) tablet 25 mg  25 mg Oral DAILY    insulin lispro protamine/insulin lispro (HUMALOG MIX 75/25) injection 30 Units  30 Units SubCUTAneous ACB    gabapentin (NEURONTIN) capsule 300 mg  300 mg Oral QHS    fluticasone propionate (FLONASE) 50 mcg/actuation nasal spray 2 Spray  2 Spray Both Nostrils DAILY    finasteride (PROSCAR) tablet 5 mg  5 mg Oral DAILY    carvediloL (COREG) tablet 12.5 mg  12.5 mg Oral BID WITH MEALS    [START ON 8/19/2021] bumetanide (BUMEX) tablet 2 mg  2 mg Oral DAILY    aspirin chewable tablet 81 mg  81 mg Oral DAILY    bumetanide (BUMEX) injection 2 mg  2 mg IntraVENous ONCE    albuterol (PROVENTIL HFA, VENTOLIN HFA, PROAIR HFA) inhaler 2 Puff  2 Puff Inhalation Q6H RT    piperacillin-tazobactam (ZOSYN) 3.375 g in 0.9% sodium chloride (MBP/ADV) 100 mL MBP  3.375 g IntraVENous ONCE    Followed by   [facility] piperacillin-tazobactam (ZOSYN) 3.375 g in 0.9% sodium chloride (MBP/ADV) 100 mL MBP  3.375 g IntraVENous Q8H     Current Outpatient Medications   Medication Sig    gabapentin (NEURONTIN) 300 mg capsule Take 1 Capsule by mouth nightly. Max Daily Amount: 300 mg. PLEASE SEND FUTURE REFILLS TO PCP OR NEW ENDOCRINOLOGIST AS DR TYLER IS NO LONGER AT THIS PRACTICE    carvediloL (COREG) 12.5 mg tablet Take 1 Tab by mouth two (2) times daily (with meals).  bumetanide (BUMEX) 2 mg tablet TAKE 1 TABLET BY MOUTH EVERY DAY    finasteride (PROSCAR) 5 mg tablet TAKE 1 TABLET BY MOUTH DAILY    Myrbetriq 25 mg ER tablet TAKE 1 TABLET BY MOUTH DAILY    insulin NPH/insulin regular (NovoLIN 70/30 U-100 Insulin) 100 unit/mL (70-30) injection ReliOn Novolin 70/30: Inject 30 Units with Breakfast, 40 Units with Dinner    losartan (COZAAR) 25 mg tablet Take 25 mg by mouth daily.  sertraline (ZOLOFT) 50 mg tablet Take 50 mg by mouth daily.  aspirin 81 mg chewable tablet Take 81 mg by mouth daily.  tamsulosin (FLOMAX) 0.4 mg capsule Take 0.8 mg by mouth daily.  Calcium-Cholecalciferol, D3, (CALCIUM 600 WITH VITAMIN D3) 600 mg(1,500mg) -400 unit chew Take 1 Tab by mouth every evening.  fluticasone (FLONASE) 50 mcg/actuation nasal spray 2 Sprays by Both Nostrils route daily.           Melissa Mayfield, [address] [facility]  [facility] / [facility], [address]  [place], [address]  Phone - [phone]               Fax - [phone]

## 2021-08-18 NOTE — CONSULTS
Chavo Kinsey         NAME:Amado Marrufo  CCI:[id number]   :1945          Pt seen    esrd  Presented with fever and fall--covid rapid test -ve    Plan for hd today  imanita notified      Patient Active Problem List   Diagnosis Code    Acute pancreatitis K85.90    LFT'S ABNORMAL     Acute renal failure (ARF) ([address] N17.9    CKD (chronic kidney disease) stage 3, GFR 30-59 ml/min ([facility]) N18.30    CAD (coronary artery disease) I25.10    Abdominal pain, acute, epigastric R10.13    Nausea & vomiting R11.2    UTI (lower urinary tract infection) N39.0    Renal failure (ARF), acute on chronic (HCC) N17.9, N18.9    Diarrhea R19.7    SIRS (systemic inflammatory response syndrome) ([facility]) R65.10    Nephrotic syndrome N04.9    Renal anasarca N04.9    Acute on chronic renal failure (HCC) N17.9, N18.9    Hypotension I95.9    Chest pain R07.9    Type II diabetes mellitus with nephropathy ([address] E11.21    Hyperlipidemia E78.5    Pneumonia J18.9    Accelerated hypertension I10    Elevated troponin R77.8    ESRD (end stage renal disease) on dialysis ([address] N18.6, Z99.2    Acute respiratory failure with hypoxia ([address] J96.01    Acute on chronic systolic CHF (congestive heart failure) ([address] I93.70    Systolic heart failure ([address] I50.20    Sepsis ([address] A41.9    Acute hypoxemic respiratory failure ([address] J96.01                        Rita Hernandez MD  [facility]  [facility], [address]  [place], [address]  Phone - [phone]         Fax - [phone] [facility] Office  [address] [place] [address]  Phone - [phone]        Fax - [phone]     www. [facility]

## 2021-08-19 LAB
ALBUMIN SERPL-MCNC: 2.8 G/DL (ref 3.5–5)
ALBUMIN/GLOB SERPL: 0.6 {RATIO} (ref 1.1–2.2)
ALP SERPL-CCNC: 52 U/L (ref 45–117)
ALT SERPL-CCNC: 44 U/L (ref 12–78)
ANION GAP SERPL CALC-SCNC: 9 MMOL/L (ref 5–15)
AST SERPL-CCNC: 81 U/L (ref 15–37)
BILIRUB SERPL-MCNC: 0.8 MG/DL (ref 0.2–1)
BUN SERPL-MCNC: 87 MG/DL (ref 6–20)
BUN/CREAT SERPL: 10 (ref 12–20)
CALCIUM SERPL-MCNC: 9 MG/DL (ref 8.5–10.1)
CHLORIDE SERPL-SCNC: 104 MMOL/L (ref 97–108)
CK SERPL-CCNC: 2541 U/L (ref 39–308)
CO2 SERPL-SCNC: 22 MMOL/L (ref 21–32)
CREAT SERPL-MCNC: 8.37 MG/DL (ref 0.7–1.3)
ERYTHROCYTE [DISTWIDTH] IN BLOOD BY AUTOMATED COUNT: 16.2 % (ref 11.5–14.5)
GLOBULIN SER CALC-MCNC: 4.9 G/DL (ref 2–4)
GLUCOSE BLD STRIP.AUTO-MCNC: 110 MG/DL (ref 65–117)
GLUCOSE BLD STRIP.AUTO-MCNC: 110 MG/DL (ref 65–117)
GLUCOSE BLD STRIP.AUTO-MCNC: 68 MG/DL (ref 65–117)
GLUCOSE BLD STRIP.AUTO-MCNC: 85 MG/DL (ref 65–117)
GLUCOSE BLD STRIP.AUTO-MCNC: 94 MG/DL (ref 65–117)
GLUCOSE SERPL-MCNC: 62 MG/DL (ref 65–100)
HCT VFR BLD AUTO: 28.4 % (ref 36.6–50.3)
HGB BLD-MCNC: 8.8 G/DL (ref 12.1–17)
MCH RBC QN AUTO: 30 PG (ref 26–34)
MCHC RBC AUTO-ENTMCNC: 31 G/DL (ref 30–36.5)
MCV RBC AUTO: 96.9 FL (ref 80–99)
NRBC # BLD: 0 K/UL (ref 0–0.01)
NRBC BLD-RTO: 0 PER 100 WBC
PHOSPHATE SERPL-MCNC: 5.9 MG/DL (ref 2.6–4.7)
PLATELET # BLD AUTO: 165 K/UL (ref 150–400)
PMV BLD AUTO: 11.4 FL (ref 8.9–12.9)
POTASSIUM SERPL-SCNC: 4.7 MMOL/L (ref 3.5–5.1)
PROT SERPL-MCNC: 7.7 G/DL (ref 6.4–8.2)
RBC # BLD AUTO: 2.93 M/UL (ref 4.1–5.7)
SERVICE CMNT-IMP: NORMAL
SODIUM SERPL-SCNC: 135 MMOL/L (ref 136–145)
WBC # BLD AUTO: 11.9 K/UL (ref 4.1–11.1)

## 2021-08-19 PROCEDURE — 36415 COLL VENOUS BLD VENIPUNCTURE: CPT

## 2021-08-19 PROCEDURE — 5A1D70Z PERFORMANCE OF URINARY FILTRATION, INTERMITTENT, LESS THAN 6 HOURS PER DAY: ICD-10-PCS | Performed by: INTERNAL MEDICINE

## 2021-08-19 PROCEDURE — 77010033678 HC OXYGEN DAILY

## 2021-08-19 PROCEDURE — 80053 COMPREHEN METABOLIC PANEL: CPT

## 2021-08-19 PROCEDURE — 82550 ASSAY OF CK (CPK): CPT

## 2021-08-19 PROCEDURE — 90935 HEMODIALYSIS ONE EVALUATION: CPT

## 2021-08-19 PROCEDURE — 94640 AIRWAY INHALATION TREATMENT: CPT

## 2021-08-19 PROCEDURE — 84100 ASSAY OF PHOSPHORUS: CPT

## 2021-08-19 PROCEDURE — 74011250636 HC RX REV CODE- 250/636: Performed by: HOSPITALIST

## 2021-08-19 PROCEDURE — 97116 GAIT TRAINING THERAPY: CPT

## 2021-08-19 PROCEDURE — 97161 PT EVAL LOW COMPLEX 20 MIN: CPT

## 2021-08-19 PROCEDURE — 74011250637 HC RX REV CODE- 250/637: Performed by: HOSPITALIST

## 2021-08-19 PROCEDURE — 82962 GLUCOSE BLOOD TEST: CPT

## 2021-08-19 PROCEDURE — 74011250636 HC RX REV CODE- 250/636: Performed by: STUDENT IN AN ORGANIZED HEALTH CARE EDUCATION/TRAINING PROGRAM

## 2021-08-19 PROCEDURE — 74011636637 HC RX REV CODE- 636/637: Performed by: HOSPITALIST

## 2021-08-19 PROCEDURE — 74011000258 HC RX REV CODE- 258: Performed by: HOSPITALIST

## 2021-08-19 PROCEDURE — 85027 COMPLETE CBC AUTOMATED: CPT

## 2021-08-19 PROCEDURE — 65660000001 HC RM ICU INTERMED STEPDOWN

## 2021-08-19 RX ORDER — VANCOMYCIN HYDROCHLORIDE
1250 ONCE
Status: COMPLETED | OUTPATIENT
Start: 2021-08-19 | End: 2021-08-19

## 2021-08-19 RX ADMIN — INSULIN GLARGINE 17 UNITS: 100 INJECTION, SOLUTION SUBCUTANEOUS at 08:47

## 2021-08-19 RX ADMIN — SERTRALINE 50 MG: 50 TABLET, FILM COATED ORAL at 08:46

## 2021-08-19 RX ADMIN — TAMSULOSIN HYDROCHLORIDE 0.8 MG: 0.4 CAPSULE ORAL at 08:46

## 2021-08-19 RX ADMIN — CARVEDILOL 12.5 MG: 12.5 TABLET, FILM COATED ORAL at 16:56

## 2021-08-19 RX ADMIN — Medication 10 ML: at 22:13

## 2021-08-19 RX ADMIN — Medication 10 ML: at 13:53

## 2021-08-19 RX ADMIN — FINASTERIDE 5 MG: 5 TABLET, FILM COATED ORAL at 08:46

## 2021-08-19 RX ADMIN — ASPIRIN 81 MG: 81 TABLET, CHEWABLE ORAL at 08:46

## 2021-08-19 RX ADMIN — PIPERACILLIN AND TAZOBACTAM 3.38 G: 3; .375 INJECTION, POWDER, LYOPHILIZED, FOR SOLUTION INTRAVENOUS at 08:45

## 2021-08-19 RX ADMIN — PIPERACILLIN AND TAZOBACTAM 3.38 G: 3; .375 INJECTION, POWDER, LYOPHILIZED, FOR SOLUTION INTRAVENOUS at 22:09

## 2021-08-19 RX ADMIN — Medication 10 ML: at 05:59

## 2021-08-19 RX ADMIN — HEPARIN SODIUM 5000 UNITS: 5000 INJECTION INTRAVENOUS; SUBCUTANEOUS at 22:13

## 2021-08-19 RX ADMIN — BUMETANIDE 2 MG: 1 TABLET ORAL at 08:46

## 2021-08-19 RX ADMIN — ALBUTEROL SULFATE 2 PUFF: 90 AEROSOL, METERED RESPIRATORY (INHALATION) at 07:34

## 2021-08-19 RX ADMIN — GABAPENTIN 300 MG: 300 CAPSULE ORAL at 22:13

## 2021-08-19 RX ADMIN — VANCOMYCIN HYDROCHLORIDE 1250 MG: 10 INJECTION, POWDER, LYOPHILIZED, FOR SOLUTION INTRAVENOUS at 15:41

## 2021-08-19 RX ADMIN — TROSPIUM CHLORIDE 20 MG: 20 TABLET, FILM COATED ORAL at 08:56

## 2021-08-19 RX ADMIN — ALBUTEROL SULFATE 2 PUFF: 90 AEROSOL, METERED RESPIRATORY (INHALATION) at 16:02

## 2021-08-19 RX ADMIN — FLUTICASONE PROPIONATE 2 SPRAY: 50 SPRAY, METERED NASAL at 08:57

## 2021-08-19 RX ADMIN — HEPARIN SODIUM 5000 UNITS: 5000 INJECTION INTRAVENOUS; SUBCUTANEOUS at 05:59

## 2021-08-19 RX ADMIN — HEPARIN SODIUM 5000 UNITS: 5000 INJECTION INTRAVENOUS; SUBCUTANEOUS at 14:14

## 2021-08-19 RX ADMIN — ALBUTEROL SULFATE 2 PUFF: 90 AEROSOL, METERED RESPIRATORY (INHALATION) at 01:46

## 2021-08-19 RX ADMIN — ALBUTEROL SULFATE 2 PUFF: 90 AEROSOL, METERED RESPIRATORY (INHALATION) at 19:25

## 2021-08-19 NOTE — PROGRESS NOTES
Problem: Mobility Impaired (Adult and Pediatric)  Goal: *Acute Goals and Plan of Care (Insert Text)  Description: FUNCTIONAL STATUS PRIOR TO ADMISSION: Mod I with use of SPC. History of 1 fall. Wears 2L O2 at baseline. Still driving (drives himself to dialysis). Sedentary. HOME SUPPORT PRIOR TO ADMISSION: The patient lived with wife however states wife works full-time. Physical Therapy Goals  Initiated 8/19/2021  1. Patient will move from supine to sit and sit to supine , scoot up and down, and roll side to side in bed with supervision/set-up within 7 day(s). 2.  Patient will transfer from bed to chair and chair to bed with supervision/set-up using the least restrictive device within 7 day(s). 3.  Patient will perform sit to stand with supervision/set-up within 7 day(s). 4.  Patient will ambulate with supervision/set-up for 150 feet with the least restrictive device within 7 day(s). 5.  Patient will ascend/descend 2 stairs with 0 handrail(s) with supervision/set-up within 7 day(s). Outcome: Progressing Towards Goal   PHYSICAL THERAPY EVALUATION  Patient: Elaine Santana (70 y.o. male)  Date: 8/19/2021  Primary Diagnosis: Sepsis ([facility] [A41.9]  Acute hypoxemic respiratory failure ([facility] [J96.01]  ESRD (end stage renal disease) on dialysis ([facility] [N18.6, Z99.2]        Precautions:        ASSESSMENT  Based on the objective data described below, the patient presents with increased weakness, impaired activity tolerance/endurance, impaired sitting and standing balance, and overall impaired functional mobility. Pt fatigued quickly, only able to tolerate brief ambulation trial from EOB>>bedside chair w/ RW and minAx1. BLE tremulous during ambulation however no buckling or LOB noted. All mobility occurred on 2L O2 with VSS throughout. Pt is functioning below his baseline mod I level and would benefit from additional skilled therapy to address the above mentioned deficits.  Pending further progress with therapy, pt may benefit from continued use of RW. Recommend pt return home w/ assist of family and HHPT at discharge. Current Level of Function Impacting Discharge (mobility/balance): modAx1 for bed mobility, modAx1 for sit<>stand transfers, minAx1 during ambulation trial with RW support    Functional Outcome Measure: The patient scored 50/100 on the Barthel Index outcome measure which is indicative of moderate impairment in ADLs and functional mobility. Other factors to consider for discharge: hx of falls, home alone while wife works     Patient will benefit from skilled therapy intervention to address the above noted impairments. PLAN :  Recommendations and Planned Interventions: bed mobility training, transfer training, gait training, therapeutic exercises, patient and family training/education, and therapeutic activities      Frequency/Duration: Patient will be followed by physical therapy:  4 times a week to address goals. Recommendation for discharge: (in order for the patient to meet his/her long term goals)  Physical therapy at least 2 days/week in the home AND ensure assist and/or supervision for safety with functional mobility and ADLS    This discharge recommendation:  Has been made in collaboration with the attending provider and/or case management    IF patient discharges home will need the following DME: to be determined (TBD) may benefit from RW pending progress         SUBJECTIVE:   Patient stated I can't do that today, I am just so weak.     OBJECTIVE DATA SUMMARY:   HISTORY:    Past Medical History:   Diagnosis Date    Arthritis     spine    CAD (coronary artery disease)     high cholesterol    Chronic kidney disease     dialysis    [facility]     ESRD (end stage renal disease) ([facility]     GERD (gastroesophageal reflux disease)     HX    Hypertension     Ill-defined condition     irritable bowel syndrome    Systolic CHF ([facility]     Unspecified sleep apnea     NO CPAP     Past Surgical History:   Procedure Laterality Date    COLONOSCOPY N/A 11/9/2016    COLONOS[facility] performed [facility]eno [facility] ENDOSCOPY    HX ORTHOPAEDIC      CERVICAL FUSION    HX UROLOGICAL      TURP    HX VASCULAR ACCESS      L arm dialysis access       Personal factors and/or comorbidities impacting plan of care:     Home Situation  Home Environment: Private residence  # Steps to Enter: 2  Rails to Enter: No  One/Two Story Residence: One story  Living Alone: No  Support Systems: Spouse/Significant Other/Partner  Patient Expects to be Discharged to[de-identified] [employer]  Current DME Used/Available at Home: Cane, straight, Grab bars  Tub or Shower Type: Tub/Shower combination    EXAMINATION/PRESENTATION/DECISION MAKING:   Critical Behavior:  Neurologic State: Alert  Orientation Level: Oriented X4  Cognition: Appropriate decision making, Follows commands     Hearing: Auditory  Auditory Impairment: None  Skin:  intact  Edema: none noted   Range Of Motion:  AROM: Generally decreased, functional                       Strength:    Strength: Generally decreased, functional                    Tone & Sensation:   Tone: Normal              Sensation: Intact               Coordination:  Coordination: Within functional limits  Vision:      Functional Mobility:  Bed Mobility:  Rolling: Moderate assistance;Assist x1  Supine to Sit: Moderate assistance;Assist x1     Scooting: Contact guard assistance  Transfers:  Sit to Stand: Moderate assistance;Assist x1  Stand to Sit: Contact guard assistance        Bed to Chair: Minimum assistance;Assist x1              Balance:   Sitting: Impaired  Sitting - Static: Good (unsupported)  Sitting - Dynamic: Fair (occasional)  Standing: Impaired; With support (RW)  Standing - Static: Good;Constant support  Standing - Dynamic : Fair;Constant support  Ambulation/Gait Training:  Distance (ft): 3 Feet (ft)  Assistive Device: Walker, rolling;Gait belt  Ambulation - Level of Assistance: Minimal assistance;Assist x1        Gait Abnormalities: Decreased step clearance;Shuffling gait        Base of Support: Narrowed     Speed/Deanna: Shuffled  Step Length: Left shortened;Right shortened                Functional Measure:  Barthel Index:    Bathin  Bladder: 10  Bowels: 10  Groomin  Dressin  Feeding: 10  Mobility: 0  Stairs: 0  Toilet Use: 5  Transfer (Bed to Chair and Back): 10  Total: 50/100       The Barthel ADL Index: Guidelines  1. The index should be used as a record of what a patient does, not as a record of what a patient could do. 2. The main aim is to establish degree of independence from any help, physical or verbal, however minor and for whatever reason. 3. The need for supervision renders the patient not independent. 4. A patient's performance should be established using the best available evidence. Asking the patient, friends/relatives and nurses are the usual sources, but direct observation and common sense are also important. However direct testing is not needed. 5. Usually the patient's performance over the preceding 24-48 hours is important, but occasionally longer periods will be relevant. 6. Middle categories imply that the patient supplies over 50 per cent of the effort. 7. Use of aids to be independent is allowed. Angelique Hanson., Barthel, DYOAN. (1650). Functional evaluation: the Barthel Index. [address] [facility] (14)2. Fei [employer] milo [place], J.J.M.F, Charles Watkins., Priscila Ibarra., [place], [address] [place] [address] (). Measuring the change indisability after inpatient rehabilitation; comparison of the responsiveness of the Barthel Index and Functional Damon Measure. Journal of Neurology, Neurosurgery, and Psychiatry, 66(4), 715-969. Grabiel Burr, N.NERI.A, VITO Beatty, & Lucero Chen, MEdilsonA. (2004.) Assessment of post-stroke quality of life in cost-effectiveness studies: The usefulness of the Barthel Index and the EuroQoL-5D.  Quality of Life Research, 13, 233-92           Physical Therapy Evaluation Charge Determination   History Examination Presentation Decision-Making   MEDIUM  Complexity : 1-2 comorbidities / personal factors will impact the outcome/ POC  MEDIUM Complexity : 3 Standardized tests and measures addressing body structure, function, activity limitation and / or participation in recreation  MEDIUM Complexity : Evolving with changing characteristics  MEDIUM Complexity : FOTO score of 26-74      Based on the above components, the patient evaluation is determined to be of the following complexity level: MEDIUM    Pain Rating:  Denied c/o pain    Activity Tolerance:   Good, VSS on 2L O2 however pt fatigues quickly    After treatment patient left in no apparent distress:   Sitting in chair and Call bell within reach    COMMUNICATION/EDUCATION:   The patients plan of care was discussed with: Registered nurse. Fall prevention education was provided and the patient/caregiver indicated understanding., Patient/family have participated as able in goal setting and plan of care. , and Patient/family agree to work toward stated goals and plan of care.     Thank you for this referral.  Rico Lai, PT, DPT   Time Calculation: 20 mins

## 2021-08-19 NOTE — PROGRESS NOTES
HD TRANSFER - OUT REPORT:    Verbal report given to Jonelle Snyder on Jw Care being transferred to PCU for routine progression of care       Report consisted of patient's Situation, Background, Assessment and   Recommendations(SBAR). Information from the following report(s) SBAR, Procedure Summary, Intake/Output and Recent Results was reviewed with the receiving nurse. Method:  $$ Method: Hemodialysis (08/19/21 6328)    Fluid Removed  NET Fluid Removed (mL): 2000 ml (08/19/21 1303)     Patient response to treatment:  Stable    End Time  Hemodialysis End Time: 2067 (08/19/21 1303)  If not documented, dialysis nurse to update post-dialysis row in HD/Filtration flowsheet     Medications /Volume expansion agents or Fluid boluses administered during treatment? no    Post-dialysis medication administration due?  yes  Remind nurse to administer post-HD medication upon return to unit. Fistula hemostasis? yes    Line heparinization? no    Lines: STEPHANIE AVF    Opportunity for questions and clarification was provided.       Patient transported with: O2 @ 1 liters  Tech

## 2021-08-19 NOTE — PROGRESS NOTES
Received notification from bedside RN about patient with regards to: nasal congestion, requesting nasal spray  VS: /79, HR 79, RR 29, O2 sat 96% on NC 1 L    Intervention given: Nasal saline spray PRN ordered

## 2021-08-19 NOTE — ED PROVIDER NOTES
EMERGENCY DEPARTMENT HISTORY AND PHYSICAL EXAM      Date: 8/18/2021  Patient Name: Stanislav Dunlap    History of Presenting Illness     Chief Complaint   Patient presents with    Transfer Of Care     Transfer from [facility] d/t elevated cardiac markers and fever. History Provided By: Patient and review of medical records from [address] [place] [address] ED    HPI: Stanislav Dunlap, 70 y.o. male with PMHx significant for end-stage renal disease on dialysis Monday, Wednesday, Friday, arthritis, hyperlipidemia,, congestive heart failure, coronary artery disease, GERD, hypertension presents to the ED with chief complaint of fever and shortness of breath with cough. .  Patient started feeling weak after his dialysis on August 16. He had a fall in the bathroom due to weakness and was unable to get up for at least 6 or 8 hours. He had some abrasions on his knees and elbows related to the fall. Patient was seen at [facility] and noted to be febrile. He has also developed shortness of breath, cough over the past 2 days. He had a rapid Covid and rapid flu test that were negative. He is status post Covid vaccines. Also noted to have elevated troponin of 0.18 that becky to 9.27 on repeat. His CPK was elevated at 1023. Patient noted to have high fever of 102.8 on arrival to [address] [place] [address] ED. On x-ray he had bilateral infiltrates consistent with either infectious process or fluid overload. Denies any nausea or vomiting. He was transferred to [facility] for further treatment and need for dialysis. PCP: Wyatt Pryor MD    No current facility-administered medications on file prior to encounter. Current Outpatient Medications on File Prior to Encounter   Medication Sig Dispense Refill    gabapentin (NEURONTIN) 300 mg capsule Take 1 Capsule by mouth nightly. Max Daily Amount: 300 mg.  PLEASE SEND FUTURE REFILLS TO PCP OR NEW ENDOCRINOLOGIST AS DR TYLER IS NO LONGER AT THIS PRACTICE 90 Capsule 0    carvediloL (COREG) 12.5 mg tablet Take 1 Tab by mouth two (2) times daily (with meals). 60 Tab 0    bumetanide (BUMEX) 2 mg tablet TAKE 1 TABLET BY MOUTH EVERY DAY      finasteride (PROSCAR) 5 mg tablet TAKE 1 TABLET BY MOUTH DAILY      Myrbetriq 25 mg ER tablet TAKE 1 TABLET BY MOUTH DAILY      insulin NPH/insulin regular (NovoLIN 70/30 U-100 Insulin) 100 unit/mL (70-30) injection ReliOn Novolin 70/30: Inject 30 Units with Breakfast, 40 Units with Dinner 7 Vial 3    losartan (COZAAR) 25 mg tablet Take 25 mg by mouth daily.  sertraline (ZOLOFT) 50 mg tablet Take 50 mg by mouth daily. 0    aspirin 81 mg chewable tablet Take 81 mg by mouth daily.  tamsulosin (FLOMAX) 0.4 mg capsule Take 0.8 mg by mouth daily.  Calcium-Cholecalciferol, D3, (CALCIUM 600 WITH VITAMIN D3) 600 mg(1,500mg) -400 unit chew Take 1 Tab by mouth every evening.  fluticasone (FLONASE) 50 mcg/actuation nasal spray 2 Sprays by Both Nostrils route daily.          Past History     Past Medical History:  Past Medical History:   Diagnosis Date    Arthritis     spine    CAD (coronary artery disease)     high cholesterol    Chronic kidney disease     dialysis    [facility]     ESRD (end stage renal disease) (HCC)     GERD (gastroesophageal reflux disease)     HX    Hypertension     Ill-defined condition     irritable bowel syndrome    Systolic CHF ([facility] [address] [facility]     Unspecified sleep apnea     NO CPAP       Past Surgical History:  Past Surgical History:   Procedure Laterality Date    COLONOSCOPY N/A 11/9/2016    COLONOSCOPY performed by Segundo Hewitt MD at [facility] ENDOSCOPY    HX ORTHOPAEDIC      CERVICAL FUSION    HX UROLOGICAL      TURP    HX VASCULAR ACCESS      L arm dialysis access       Family History:  Family History   Problem Relation Age of Onset    Cancer Father         \"bone\"    Diabetes Brother        Social History:  Social History     Tobacco Use    Smoking status: Former Smoker     Quit date: 2009     Years since quittin.6    Smokeless tobacco: Former User    Tobacco comment: cigars only   Substance Use Topics    Alcohol use: No    Drug use: No       Allergies: Allergies   Allergen Reactions    Albumin, Human 25 % Anaphylaxis and Hives    Niacin Hives         Review of Systems   Review of Systems   Constitutional: Positive for chills, fatigue and fever. HENT: Negative for congestion, ear pain, rhinorrhea and sore throat. Respiratory: Positive for cough and shortness of breath. Negative for wheezing. Cardiovascular: Negative for chest pain, palpitations and leg swelling. Gastrointestinal: Negative for abdominal pain, diarrhea, nausea and vomiting. Genitourinary: Negative for dysuria, flank pain and hematuria. Musculoskeletal: Negative for back pain and myalgias. Skin: Positive for wound ( Abrasions anterior knees). Negative for rash. Neurological: Negative for dizziness, syncope, light-headedness and headaches. Psychiatric/Behavioral: Negative for confusion. The patient is not nervous/anxious. All other systems reviewed and are negative.         Physical Exam    General appearance -obese, well appearing, and in no distress  Eyes - pupils equal and reactive, extraocular eye movements intact  ENT - mucous membranes moist, pharynx normal without lesions  Neck - supple, no significant adenopathy; non-tender to palpation  Chest - clear to auscultation, no wheezes, rales or rhonchi; non-tender to palpation  Heart - normal rate and regular rhythm, S1 and S2 normal, no murmurs noted  Abdomen - soft, nontender, nondistended, no masses or organomegaly  Musculoskeletal - no joint tenderness, deformity or swelling; normal ROM  Extremities - peripheral pulses normal, no pedal edema  Skin -hot to touch, normal coloration and turgor, no rashes  Neurological - alert, oriented x3, normal speech, no focal findings or movement disorder noted    Diagnostic Study Results     Labs -     Recent Results (from the past 12 hour(s))   METABOLIC PANEL, COMPREHENSIVE    Collection Time: 08/19/21  6:35 AM   Result Value Ref Range    Sodium 135 (L) 136 - 145 mmol/L    Potassium 4.7 3.5 - 5.1 mmol/L    Chloride 104 97 - 108 mmol/L    CO2 22 21 - 32 mmol/L    Anion gap 9 5 - 15 mmol/L    Glucose 62 (L) 65 - 100 mg/dL    BUN 87 (H) 6 - 20 MG/DL    Creatinine 8.37 (H) 0.70 - 1.30 MG/DL    BUN/Creatinine ratio 10 (L) 12 - 20      GFR est AA 8 (L) >60 ml/min/1.73m2    GFR est non-AA 6 (L) >60 ml/min/1.73m2    Calcium 9.0 8.5 - 10.1 MG/DL    Bilirubin, total 0.8 0.2 - 1.0 MG/DL    ALT (SGPT) 44 12 - 78 U/L    AST (SGOT) 81 (H) 15 - 37 U/L    Alk.  phosphatase 52 45 - 117 U/L    Protein, total 7.7 6.4 - 8.2 g/dL    Albumin 2.8 (L) 3.5 - 5.0 g/dL    Globulin 4.9 (H) 2.0 - 4.0 g/dL    A-G Ratio 0.6 (L) 1.1 - 2.2     CBC W/O DIFF    Collection Time: 08/19/21  6:35 AM   Result Value Ref Range    WBC 11.9 (H) 4.1 - 11.1 K/uL    RBC 2.93 (L) 4.10 - 5.70 M/uL    HGB 8.8 (L) 12.1 - 17.0 g/dL    HCT 28.4 (L) 36.6 - 50.3 %    MCV 96.9 80.0 - 99.0 FL    MCH 30.0 26.0 - 34.0 PG    MCHC 31.0 30.0 - 36.5 g/dL    RDW 16.2 (H) 11.5 - 14.5 %    PLATELET 183 211 - 813 K/uL    MPV 11.4 8.9 - 12.9 FL    NRBC 0.0 0  WBC    ABSOLUTE NRBC 0.00 0.00 - 0.01 K/uL   PHOSPHORUS    Collection Time: 08/19/21  6:35 AM   Result Value Ref Range    Phosphorus 5.9 (H) 2.6 - 4.7 MG/DL   CK    Collection Time: 08/19/21  6:35 AM   Result Value Ref Range    CK 2,541 (H) 39 - 308 U/L   GLUCOSE, POC    Collection Time: 08/19/21  7:39 AM   Result Value Ref Range    Glucose (POC) 68 65 - 117 mg/dL    Performed by Christie Apple    GLUCOSE, POC    Collection Time: 08/19/21  7:39 AM   Result Value Ref Range    Glucose (POC) 68 65 - 117 mg/dL    Performed by Christie Apple    GLUCOSE, POC    Collection Time: 08/19/21  7:39 AM   Result Value Ref Range    Glucose (POC) 68 65 - 117 mg/dL    Performed by Дмитрий Doe, POC    Collection Time: 08/19/21  7:59 AM   Result Value Ref Range    Glucose (POC) 94 65 - 117 mg/dL    Performed by [facility]        Radiologic Studies -   XR CHEST PORT   Final Result      Mild central pulmonary vascular congestion without overt pulmonary edema. CT Results  (Last 48 hours)    None        CXR Results  (Last 48 hours)               08/18/21 0204  XR CHEST PORT Final result    Impression:      Mild central pulmonary vascular congestion without overt pulmonary edema. Narrative:  EXAM:  XR CHEST PORT       INDICATION: Chest pain       COMPARISON: 5/5/2021       TECHNIQUE: Portable AP semiupright chest view at 0149 hours       FINDINGS: The cardiomediastinal contours are stable. There is mild central   pulmonary vascular congestion. The lungs and pleural spaces are clear. There is no pneumothorax. The bones and   upper abdomen are stable. Medical Decision Making   I am the first provider for this patient. I reviewed the vital signs, available nursing notes, past medical history, past surgical history, family history and social history. Vital Signs-Reviewed the patient's vital signs.   Patient Vitals for the past 12 hrs:   Temp Pulse Resp BP SpO2   08/19/21 1303 97.8 °F (36.6 °C) 66 16 122/77    08/19/21 1245  70 16 131/76    08/19/21 1230  72 16 (!) 145/79    08/19/21 1215  77 16 137/83    08/19/21 1200  64 16 122/68    08/19/21 1145  67 16 104/70    08/19/21 1130  66 16 120/69    08/19/21 1115  71 16 120/77    08/19/21 1100  70 16 105/67    08/19/21 1045  74 16 106/65    08/19/21 1030  73 16 136/74    08/19/21 1015  66 16 128/71    08/19/21 1000  77 18 125/74    08/19/21 0945  71 16 117/75    08/19/21 0930  66 16 128/71    08/19/21 0917 97.9 °F (36.6 °C) 67 16 137/76    08/19/21 0800  71      08/19/21 0735     100 %   08/19/21 0715  74 27 (!) 142/88 99 %   08/19/21 0602 98.9 °F (37.2 °C) 72 20 (!) 140/73 99 %   08/19/21 0400  92      08/19/21 0147     95 %           Records Reviewed: Nursing Notes and Old Medical Records    Provider Notes (Medical Decision Making):   Differential diagnosis: Pneumonia, fluid overload, viral syndrome, UTI, flu, Covid  We will check CBC, CMP, paired cultures, lactate, chest x-ray, Covid PCR    ED Course:   Initial assessment performed. The patients presenting problems have been discussed, and they are in agreement with the care plan formulated and outlined with them. I have encouraged them to ask questions as they arise throughout their visit. Progress Notes:  ED Course as of Aug 19 1335   Wed Aug 18, 2021   0201 Case discussed with Dr. Katelin Ruiz (hospitalist) who will see and admit the patient. Requests that paired cultures to be sent at our facility.    [AO]      ED Course User Index  [AO] Elodia Dupree MD       Disposition:  Admit to hospitalist         Diagnosis     Clinical Impression:   1. Sepsis, due to unspecified organism, unspecified whether acute organ dysfunction present ([facility] [address]    2. Non-traumatic rhabdomyolysis    3.  ESRD on dialysis [facility])

## 2021-08-19 NOTE — PROGRESS NOTES
Hospitalist Progress Note    NAME: Harlan Mcgregor   :  1945   MRN:  [id number]       Assessment / Plan:  Sepsis, POA unclear etiology as evidence by fever 103, WBC 13.2, RR 24-30  Acute on chronic hypoxic respiratory failure, POA  90% RA during eval; 98% on 2L. Rule out pneumonia, covid-19 infection  Bilateral patchy pulmonary opacities read as mild pulmonary edema, POA    Acute on chronic systolic chf EF 98-36% 3/11, POA possible b/l pneumonia, rule out covid-19 pneumonia  --continue empiric abx with vancomycin, zosyn, levaquin. Procalcitonin 0.56  --covid testing negative  --appreciate nephrology consult, plan for HD today and tomorrow noted     Rhabdomyolysis due to fall and on floor x 8 hours at home. Generalized weakness  --not on statin. --monitor CK  --frequent turns  --consult pt/ot     Elevated troponin 0.23, suspect demand ischemia  --continue aspirin  --repeat troponin     ESRD on HD MWF  --renal consult for dialysis; not until tonight per Dr. Oziel Sorto due to staffing. Monitor in stepdown due to delay with dialysis and ongoing tachypnia RR 30s     IDDM type 2  --continue 70/30 30 units AM, 40 units PM     FRIDA on 2L O2 at night and prn at home  --continue O2     Body mass index is 36.62 kg/m².     Code:  full  DVT prophylaxis: heparin  Surrogate decision maker:  Wife Cristiano Buenrostro     Subjective:     Chief Complaint / Reason for Physician Visit  Pt admits to feeling of gen weakness over past few weeks    Discussed with RN events overnight. Review of Systems:  Symptom Y/N Comments  Symptom Y/N Comments   Fever/Chills n   Chest Pain n    Poor Appetite n   Edema n    Cough n   Abdominal Pain n    Sputum n   Joint Pain     SOB/SORTO n   Pruritis/Rash     Nausea/vomit n   Tolerating PT/OT     Diarrhea n   Tolerating Diet y    Constipation n   Other       Could NOT obtain due to:      Objective:     VITALS:   Last 24hrs VS reviewed since prior progress note.  Most recent are:  Patient Vitals for the past 24 hrs:   Temp Pulse Resp BP SpO2   08/19/21 0735     100 %   08/19/21 0715  74 27 (!) 142/88 99 %   08/19/21 0602 98.9 °F (37.2 °C) 72 20 (!) 140/73 99 %   08/19/21 0400  92      08/19/21 0147     95 %   08/19/21 0000  89      08/18/21 2329 98.9 °F (37.2 °C) 79 29 (!) 149/79 96 %   08/18/21 2242  77 23 (!) 148/77 94 %   08/18/21 2000  76      08/18/21 1955 99.3 °F (37.4 °C) 77 23 (!) 145/74 95 %   08/18/21 1900  82 21 (!) 148/86    08/18/21 1854 99.4 °F (37.4 °C) 84 20 (!) 141/77 94 %   08/18/21 1830  79 28 (!) 141/77 95 %   08/18/21 1500  79 27 134/68 97 %   08/18/21 1457     98 %   08/18/21 1300 99.1 °F (37.3 °C) 76 23 (!) 140/74 94 %   08/18/21 1100 99.5 °F (37.5 °C) 79 23 (!) 155/74 92 %   08/18/21 0827  71  (!) 171/78 95 %   08/18/21 0825  78  (!) 171/78    08/18/21 0823  81   96 %       Intake/Output Summary (Last 24 hours) at 8/19/2021 5323  Last data filed at 8/19/2021 0602  Gross per 24 hour   Intake 1950 ml   Output 825 ml   Net 1125 ml        I had a face to face encounter and independently examined this patient on 8/19/2021, as outlined below:  PHYSICAL EXAM:  General: WD, WN. Alert, cooperative, no acute distress    EENT:  EOMI. Anicteric sclerae. MMM  Resp:  CTA bilaterally, no wheezing or rales. No accessory muscle use  CV:  Regular  rhythm,  No edema  GI:  Soft, Non distended, Non tender. +Bowel sounds  Neurologic:  Alert and oriented X 3, normal speech,   Psych:   Good insight. Not anxious nor agitated  Skin:  No rashes.   No jaundice    Reviewed most current lab test results and cultures  YES  Reviewed most current radiology test results   YES  Review and summation of old records today    NO  Reviewed patient's current orders and MAR    YES  PMH/ reviewed - no change compared to H&P  ________________________________________________________________________  Care Plan discussed with:    Comments   Patient x    Family      RN x    Care Manager     Consultant x Multidiciplinary team rounds were held today with , nursing, pharmacist and clinical coordinator. Patient's plan of care was discussed; medications were reviewed and discharge planning was addressed. ________________________________________________________________________  Total NON critical care TIME:  30   Minutes    Total CRITICAL CARE TIME Spent:   Minutes non procedure based      Comments   >50% of visit spent in counseling and coordination of care x    ________________________________________________________________________  Calvin Silvestre DO     Procedures: see electronic medical records for all procedures/Xrays and details which were not copied into this note but were reviewed prior to creation of Plan. LABS:  I reviewed today's most current labs and imaging studies.   Pertinent labs include:  Recent Labs     08/19/21  0635 08/18/21  0200   WBC 11.9* 13.2*   HGB 8.8* 10.0*   HCT 28.4* 32.1*    154     Recent Labs     08/19/21  0635 08/18/21  0200   * 136   K 4.7 4.9    102   CO2 22 26   GLU 62* 164*   BUN 87* 64*   CREA 8.37* 7.53*   CA 9.0 9.1   PHOS 5.9*  --    ALB 2.8* 3.4*   TBILI 0.8 0.8   ALT 44 33       Signed: Jackelin Miller DO

## 2021-08-19 NOTE — PROGRESS NOTES
Nephrology Progress Note  Chavo Euceda     www. [email or web address]  Phone - [phone]   Patient: Lynda Linares    YOB: 1945        Date- 8/19/2021   Admit Date: 8/18/2021  CC: Follow up for        ESRD  IMPRESSION & PLAN:    ESRD- pat [facility]   Fever  - covid -ve   Anemia of ckd   S/p fall   Elevated ck- rhabdomyolysis   Sob - pulmonary edema vs pneumonia   Sec. hyperpara    PLAN-   Hd today and tomorrow   Remove 2 -3 kg as tolerated   Abx per primary team   epogen [facility]   Subjective: Interval History:   - he didn't receive hd yesteday  bp stable    8/18   Patient was tx from OSH with fever and fall. His covid rapid test -ve, [facility]  His last hd on monday    Objective:   Vitals:    08/19/21 1115 08/19/21 1130 08/19/21 1145 08/19/21 1200   BP: 120/77 120/69 104/70 122/68   Pulse: 71 66 67 64   Resp: 16 16 16 16   Temp:       TempSrc:       SpO2:       Weight:       Height:          08/18 0701 - 08/19 0700  In: 1950 [P.O.:750; I.V.:1200]  Out: 825 [Urine:825]  Last 3 Recorded Weights in this Encounter    08/18/21 0058 08/18/21 1955   Weight: 122.5 kg (270 lb) 114.1 kg (251 lb 8.7 oz)      Physical exam:   GEN:  NAD  NECK:  Supple, no thyromegaly  RESP:  rales B/l, wheezing,   CVS: RRR,S1,S2   NEURO: non focal, normal speech  EXT: Edema +nt           Chart reviewed. Pertinent Notes reviewed. Data Review :  Recent Labs     08/19/21  0635 08/18/21  0200   * 136   K 4.7 4.9    102   CO2 22 26   BUN 87* 64*   CREA 8.37* 7.53*   GLU 62* 164*   CA 9.0 9.1   PHOS 5.9*  --      Recent Labs     08/19/21  0635 08/18/21  0200   WBC 11.9* 13.2*   HGB 8.8* 10.0*   HCT 28.4* 32.1*    154     No results for input(s): FE, TIBC, PSAT, FERR in the last 72 hours.    Medication list  reviewed  Current Facility-Administered Medications   Medication Dose Route Frequency    sodium chloride (OCEAN) 0.65 % nasal squeeze bottle 2 Spray  2 Spray Both Nostrils Q2H PRN    VANCOMYCIN INFORMATION NOTE   Other Rx Dosing/Monitoring    insulin lispro (HUMALOG) injection   SubCUTAneous AC&HS    glucose chewable tablet 16 g  4 Tablet Oral PRN    dextrose (D50W) injection syrg 12.5-25 g  12.5-25 g IntraVENous PRN    glucagon (GLUCAGEN) injection 1 mg  1 mg IntraMUSCular PRN    [START ON 8/20/2021] levoFLOXacin (LEVAQUIN) 500 mg in D5W IVPB  500 mg IntraVENous Q48H    sodium chloride (NS) flush 5-40 mL  5-40 mL IntraVENous Q8H    sodium chloride (NS) flush 5-40 mL  5-40 mL IntraVENous PRN    acetaminophen (TYLENOL) tablet 650 mg  650 mg Oral Q6H PRN    Or    acetaminophen (TYLENOL) suppository 650 mg  650 mg Rectal Q6H PRN    polyethylene glycol (MIRALAX) packet 17 g  17 g Oral DAILY PRN    ondansetron (ZOFRAN ODT) tablet 4 mg  4 mg Oral Q8H PRN    Or    ondansetron (ZOFRAN) injection 4 mg  4 mg IntraVENous Q6H PRN    heparin (porcine) injection 5,000 Units  5,000 Units SubCUTAneous Q8H    tamsulosin (FLOMAX) capsule 0.8 mg  0.8 mg Oral DAILY    sertraline (ZOLOFT) tablet 50 mg  50 mg Oral DAILY    trospium (SANCTURA) tablet 20 mg  20 mg Oral DAILY    losartan (COZAAR) tablet 25 mg  25 mg Oral DAILY    gabapentin (NEURONTIN) capsule 300 mg  300 mg Oral QHS    fluticasone propionate (FLONASE) 50 mcg/actuation nasal spray 2 Spray  2 Spray Both Nostrils DAILY    finasteride (PROSCAR) tablet 5 mg  5 mg Oral DAILY    carvediloL (COREG) tablet 12.5 mg  12.5 mg Oral BID WITH MEALS    bumetanide (BUMEX) tablet 2 mg  2 mg Oral DAILY    aspirin chewable tablet 81 mg  81 mg Oral DAILY    albuterol (PROVENTIL HFA, VENTOLIN HFA, PROAIR HFA) inhaler 2 Puff  2 Puff Inhalation Q6H RT    insulin lispro (HUMALOG) injection 7 Units  7 Units SubCUTAneous TIDAC    epoetin oksana-epbx (RETACRIT) injection 4,000 Units  4,000 Units SubCUTAneous Q MON, WED & FRI    insulin glargine (LANTUS) injection 17 Units  17 Units SubCUTAneous DAILY    insulin glargine (LANTUS) injection 22 Units  22 Units SubCUTAneous QHS    piperacillin-tazobactam (ZOSYN) 3.375 g in 0.9% sodium chloride (MBP/ADV) 100 mL MBP  3.375 g IntraVENous Q12H    [START ON 8/20/2021] Vancomycin Random levle: Friday, 8/20 with Morning labs, RN please collect, thank you   Other [address] [place] [address], [address] [facility]  Formerly [facility], [address]  [place], [address]  Phone - [phone]               Fax - [phone]

## 2021-08-19 NOTE — PROGRESS NOTES
Orders received, chart reviewed. Attempted to see pt for PT evaluation however pt currently leaving the floor for dialysis. Will defer however continue to follow.     Joni Mcelroy, PT, DPT

## 2021-08-19 NOTE — PROGRESS NOTES
TRANSFER - IN REPORT:    Verbal report received from Mishel, [address] on [address]  being received from PCU for ordered procedure      Report consisted of patients Situation, Background, Assessment and   Recommendations(SBAR). Information from the following report(s) SBAR, Kardex and Cardiac Rhythm NSR was reviewed with the receiving nurse. Opportunity for questions and clarification was provided. Assessment completed upon patients arrival to unit and care assumed.

## 2021-08-19 NOTE — PROGRESS NOTES
1953: TRANSFER - IN REPORT:    Verbal report received from Segun Lindsay RN(name) on Ed Clack  being received from ED(unit) for routine progression of care      Report consisted of patients Situation, Background, Assessment and   Recommendations(SBAR). Information from the following report(s) SBAR, Kardex, Intake/Output, MAR, Recent Results, Med Rec Status and Cardiac Rhythm NSR was reviewed with the receiving nurse. Opportunity for questions and clarification was provided. Assessment completed upon patients arrival to unit and care assumed. 0700: End of Shift Note    Bedside shift change report given to Kaci Browne RN (oncoming nurse) by Jaja Ruiz (offgoing nurse). Report included the following information SBAR, Kardex, Intake/Output, MAR, Recent Results, Med Rec Status and Cardiac Rhythm NSR    Shift worked:  7p-7a     Shift summary and any significant changes:     No significant changes     Concerns for physician to address:  None     Zone phone for oncoming shift:   [phone]       Activity:  Activity Level: Up with Assistance  Number times ambulated in hallways past shift: 0  Number of times OOB to chair past shift: 0    Cardiac:   Cardiac Monitoring: Yes      Cardiac Rhythm: Sinus Rhythm    Access:   Current line(s): PIV     Genitourinary:   Urinary status: voiding    Respiratory:   O2 Device: Nasal cannula  Chronic home O2 use?: YES  Incentive spirometer at bedside: NO     GI:  Last Bowel Movement Date: 08/17/21  Current diet:  ADULT DIET Regular; 3 carb choices (45 gm/meal); Low Fat/Low Chol/High Fiber/RANI  Passing flatus: YES  Tolerating current diet: YES       Pain Management:   Patient states pain is manageable on current regimen: YES    Skin:  Kelby Score: 19  Interventions: increase time out of bed    Patient Safety:  Fall Score:  Total Score: 3  Interventions: bed/chair alarm, gripper socks and pt to call before getting OOB  High Fall Risk: Yes    Length of Stay:  Expected LOS: 4d CTS Progress Note       LOS: 8 days   Patient Care Team:  Harinder Aranda MD as PCP - General  Harinder Aranda MD as PCP - Family Medicine  Arsalan Feliciano MD as Surgeon (Cardiothoracic Surgery)        Vital Signs  Temp:  [97.9 °F (36.6 °C)-98.8 °F (37.1 °C)] 97.9 °F (36.6 °C)  Heart Rate:  [56-82] 80  Resp:  [16-20] 18  BP: (108-175)/(56-73) 108/56    Physical Exam: Patient is in good spirits this afternoon in bed with foot elevated.   Results     Results from last 7 days  Lab Units 03/03/17  0416   WBC 10*3/mm3 13.13*   HEMOGLOBIN g/dL 9.7*   HEMATOCRIT % 31.8*   PLATELETS 10*3/mm3 247       Results from last 7 days  Lab Units 03/03/17  0416   SODIUM mmol/L 137   POTASSIUM mmol/L 4.0  4.0   CHLORIDE mmol/L 102   TOTAL CO2 mmol/L 30.0   BUN mg/dL 16   CREATININE mg/dL 0.80   GLUCOSE mg/dL 126*   CALCIUM mg/dL 10.3           Imaging Results (last 24 hours)     ** No results found for the last 24 hours. **          Assessment    Principal Problem:    Osteomyelitis of left foot  Active Problems:    COPD (chronic obstructive pulmonary disease)    Obstructive sleep apnea syndrome    Chronic back pain    Type 2 diabetes mellitus    Dyslipidemia    Fibromyalgia    Gastroesophageal reflux disease without esophagitis    Hypertension    Hypothyroidism    Peripheral vascular disease    Diabetic polyneuropathy associated with type 2 diabetes mellitus    Anemia, blood loss    Chronic pain    Chronic, continuous use of opioids    Chronic anxiety    Chronically on benzodiazepine therapy    Postoperative day 2 following amputation.  Continuing with antibiotics  per infection disease.    Plan   As stated above    Yogesh Saba MD  03/04/17  7:48 AM       19h  Actual LOS: 1      Samantha Flor

## 2021-08-19 NOTE — PROGRESS NOTES
Occupational Therapy    Order's acknowledged, chart reviewed; however, pt leaving floor for HD. Will defer and follow up later as able and appropriate.     Thank you,  Angela Ruiz, OT

## 2021-08-19 NOTE — PROGRESS NOTES
0700- report received from Kaye Dance, [facility]- [employer] to Dr. Fabian Orozco \"patients blood sugar was 68, we are giving juice and going to recheck. just wanted to see if you wanted me to still give his scheduled 7 units with meal or hold it\"    0746- informed by Dr. Fabian Orozco to hold medication     0902- patient taken off unit for dialysis    1652- [employer] to Dr. Fabian Orozco \"patient blood sugar was 110. did you want me to continue to hold the scheduled 7 units with meals or change the dosing\"    1718- Dr. Fabian Orozco replied \"will put all insulins info hold for now thx, except sliding scale\"    [phone]- notified by tele that patient had 5 beats of VTACH. [employer] sent to Dr. Fabian Orozco to inform of [facility] patient not symptomatic and VS stable. 1900- End of Shift Note    Bedside shift change report given to John Du RN (oncoming nurse) by Davy Plaza RN (offgoing nurse). Report included the following information SBAR, Kardex, Med Rec Status and Cardiac Rhythm NSR    Shift worked:  7a-7p     Shift summary and any significant changes:    HD today 2L removed. Plan for tomorrow as well. IV ABX     Concerns for physician to address:       Zone phone for oncoming shift:          Activity:  Activity Level: Up with Assistance  Number times ambulated in hallways past shift: 0  Number of times OOB to chair past shift: 1    Cardiac:   Cardiac Monitoring: Yes      Cardiac Rhythm: Sinus Rhythm    Access:   Current line(s): PIV     Genitourinary:   Urinary status: voiding    Respiratory:   O2 Device: Nasal cannula  Chronic home O2 use?: YES  Incentive spirometer at bedside: NO     GI:  Last Bowel Movement Date: 08/17/21  Current diet:  ADULT DIET Regular; 3 carb choices (45 gm/meal);  Low Fat/Low Chol/High Fiber/RANI  Passing flatus: YES  Tolerating current diet: YES       Pain Management:   Patient states pain is manageable on current regimen: YES    Skin:  Kelby Score: 19  Interventions: increase time out of bed, PT/OT consult and nutritional support     Patient Safety:  Fall Score:  Total Score: 4  Interventions: bed/chair alarm, gripper socks and pt to call before getting OOB  High Fall Risk: Yes    Length of Stay:  Expected LOS: 4d 19h  Actual LOS: 1      Yosi Macias RN

## 2021-08-19 NOTE — PROCEDURES
Hemodialysis / [facility] / [phone]    Vitals   Pre   Post   Assessment   Pre   Post     Temp  Temp: 97.9 °F (36.6 °C) (08/19/21 0917)  97.8 LOC  A/Ox4 A/Ox4   HR   Pulse (Heart Rate): 67 (08/19/21 0917) 66 Lungs   Clear  Clear   B/P   BP: 137/76 (08/19/21 0917) 122/77 Cardiac   NSR  NSR   Resp   Resp Rate: 16 (08/19/21 0917) 16 Skin   Gash to L Elbow  Gash to L Elbow   Pain level  Pain Intensity 1: 0 (08/19/21 0715) 0 Edema    None   none   Orders:    Duration:   Start:    6510 End:    7200 Total:   3.75hrs   Dialyzer:   Dialyzer/Set Up Inspection: Revaclear (08/19/21 0917)   Ozella Mario Bath:   Dialysate K (mEq/L): 2 (08/19/21 0917)   Ca Bath:   Dialysate CA (mEq/L): 2.5 (08/19/21 0917)   Na/Bicarb:   Dialysate NA (mEq/L): 138 (08/19/21 0917)   Target Fluid Removal:   Goal/Amount of Fluid to Remove (mL): 2000 mL (08/19/21 0917)   Access     Type & Location:   STEPHANIE AVF: skin CDI. No s/s of infection. + B/T. Difficulty cannulating venous, clot removed. No issues with hemostasis. Running well at . Labs     Obtained/Reviewed   Critical Results Called   Date when labs were drawn-  Hgb-    HGB   Date Value Ref Range Status   08/19/2021 8.8 (L) 12.1 - 17.0 g/dL Final     K-    Potassium   Date Value Ref Range Status   08/19/2021 4.7 3.5 - 5.1 mmol/L Final     Ca-   Calcium   Date Value Ref Range Status   08/19/2021 9.0 8.5 - 10.1 MG/DL Final     Bun-   BUN   Date Value Ref Range Status   08/19/2021 87 (H) 6 - 20 MG/DL Final     Creat-   Creatinine   Date Value Ref Range Status   08/19/2021 8.37 (H) 0.70 - 1.30 MG/DL Final        Medications/ Blood Products Given     Name   Dose   Route and Time     None ordered                Blood Volume Processed (BVP):    73.0L Net Fluid   Removed:  2000ml   Comments   Time Out Done: (CGLZ)1534  Primary Nurse Rpt Pre:Sushma Teixeira Sa  Primary Nurse Rpt Post:Sushma Teixeira Sa  Pt Education:Consent, Procedural  Care Plan:Ongoing  Tx Summary:  Pt arrived to HD suite A&Ox4. Consent signed & on file. SBAR received from Primary RN.  2510: Pt cannulated with 05T needles per policy & without issue. VSS. Dialysis Tx initiated. *Pt stable throughout treatment. No issues. 1303: Tx ended. VSS. All possible blood returned to patient. Hemostasis achieved without issue. Bed locked and in the lowest position, call bell and belongings in reach. SBAR given to Primary, RN. Patient is stable at time of their departure. All Dialysis related medications have been reviewed. Admiting Diagnosis:Sepsis  [facility]  Consent signed - Informed Consent Verified: Yes (08/19/21 9517)  [facility] [place] [facility] - Obtained  Hepatitis Status- Immune 5/24/21  Machine #- Machine Number: [id number] (08/19/21 8645)  Telemetry status-NSR  Pre-dialysis wt. -

## 2021-08-20 LAB
ANION GAP SERPL CALC-SCNC: 7 MMOL/L (ref 5–15)
BASOPHILS # BLD: 0 K/UL (ref 0–0.1)
BASOPHILS NFR BLD: 0 % (ref 0–1)
BUN SERPL-MCNC: 65 MG/DL (ref 6–20)
BUN/CREAT SERPL: 10 (ref 12–20)
CALCIUM SERPL-MCNC: 8.7 MG/DL (ref 8.5–10.1)
CHLORIDE SERPL-SCNC: 101 MMOL/L (ref 97–108)
CK SERPL-CCNC: 2398 U/L (ref 39–308)
CO2 SERPL-SCNC: 27 MMOL/L (ref 21–32)
CREAT SERPL-MCNC: 6.25 MG/DL (ref 0.7–1.3)
DIFFERENTIAL METHOD BLD: ABNORMAL
EOSINOPHIL # BLD: 0.3 K/UL (ref 0–0.4)
EOSINOPHIL NFR BLD: 4 % (ref 0–7)
ERYTHROCYTE [DISTWIDTH] IN BLOOD BY AUTOMATED COUNT: 16 % (ref 11.5–14.5)
GLUCOSE BLD STRIP.AUTO-MCNC: 114 MG/DL (ref 65–117)
GLUCOSE BLD STRIP.AUTO-MCNC: 202 MG/DL (ref 65–117)
GLUCOSE BLD STRIP.AUTO-MCNC: 91 MG/DL (ref 65–117)
GLUCOSE SERPL-MCNC: 94 MG/DL (ref 65–100)
HCT VFR BLD AUTO: 29.2 % (ref 36.6–50.3)
HGB BLD-MCNC: 9.1 G/DL (ref 12.1–17)
IMM GRANULOCYTES # BLD AUTO: 0.1 K/UL (ref 0–0.04)
IMM GRANULOCYTES NFR BLD AUTO: 1 % (ref 0–0.5)
LYMPHOCYTES # BLD: 1 K/UL (ref 0.8–3.5)
LYMPHOCYTES NFR BLD: 11 % (ref 12–49)
MCH RBC QN AUTO: 30.3 PG (ref 26–34)
MCHC RBC AUTO-ENTMCNC: 31.2 G/DL (ref 30–36.5)
MCV RBC AUTO: 97.3 FL (ref 80–99)
MONOCYTES # BLD: 0.8 K/UL (ref 0–1)
MONOCYTES NFR BLD: 9 % (ref 5–13)
NEUTS SEG # BLD: 6.8 K/UL (ref 1.8–8)
NEUTS SEG NFR BLD: 75 % (ref 32–75)
NRBC # BLD: 0 K/UL (ref 0–0.01)
NRBC BLD-RTO: 0 PER 100 WBC
PLATELET # BLD AUTO: 178 K/UL (ref 150–400)
PMV BLD AUTO: 11.6 FL (ref 8.9–12.9)
POTASSIUM SERPL-SCNC: 4.2 MMOL/L (ref 3.5–5.1)
PROCALCITONIN SERPL-MCNC: 4.68 NG/ML
RBC # BLD AUTO: 3 M/UL (ref 4.1–5.7)
SERVICE CMNT-IMP: ABNORMAL
SERVICE CMNT-IMP: NORMAL
SERVICE CMNT-IMP: NORMAL
SODIUM SERPL-SCNC: 135 MMOL/L (ref 136–145)
VANCOMYCIN SERPL-MCNC: 36.8 UG/ML
WBC # BLD AUTO: 9 K/UL (ref 4.1–11.1)

## 2021-08-20 PROCEDURE — 74011250637 HC RX REV CODE- 250/637: Performed by: HOSPITALIST

## 2021-08-20 PROCEDURE — 94640 AIRWAY INHALATION TREATMENT: CPT

## 2021-08-20 PROCEDURE — 74011636637 HC RX REV CODE- 636/637: Performed by: HOSPITALIST

## 2021-08-20 PROCEDURE — 82962 GLUCOSE BLOOD TEST: CPT

## 2021-08-20 PROCEDURE — 97116 GAIT TRAINING THERAPY: CPT

## 2021-08-20 PROCEDURE — 97530 THERAPEUTIC ACTIVITIES: CPT

## 2021-08-20 PROCEDURE — 80048 BASIC METABOLIC PNL TOTAL CA: CPT

## 2021-08-20 PROCEDURE — 90935 HEMODIALYSIS ONE EVALUATION: CPT

## 2021-08-20 PROCEDURE — 65660000001 HC RM ICU INTERMED STEPDOWN

## 2021-08-20 PROCEDURE — 36415 COLL VENOUS BLD VENIPUNCTURE: CPT

## 2021-08-20 PROCEDURE — 85025 COMPLETE CBC W/AUTO DIFF WBC: CPT

## 2021-08-20 PROCEDURE — 74011250636 HC RX REV CODE- 250/636: Performed by: HOSPITALIST

## 2021-08-20 PROCEDURE — 77010033678 HC OXYGEN DAILY

## 2021-08-20 PROCEDURE — 74011250636 HC RX REV CODE- 250/636: Performed by: INTERNAL MEDICINE

## 2021-08-20 PROCEDURE — 80202 ASSAY OF VANCOMYCIN: CPT

## 2021-08-20 PROCEDURE — 82550 ASSAY OF CK (CPK): CPT

## 2021-08-20 PROCEDURE — 97535 SELF CARE MNGMENT TRAINING: CPT

## 2021-08-20 PROCEDURE — 84145 PROCALCITONIN (PCT): CPT

## 2021-08-20 PROCEDURE — 74011000258 HC RX REV CODE- 258: Performed by: HOSPITALIST

## 2021-08-20 PROCEDURE — 97165 OT EVAL LOW COMPLEX 30 MIN: CPT

## 2021-08-20 RX ORDER — ALBUTEROL SULFATE 90 UG/1
2 AEROSOL, METERED RESPIRATORY (INHALATION)
Status: DISCONTINUED | OUTPATIENT
Start: 2021-08-20 | End: 2021-08-23 | Stop reason: HOSPADM

## 2021-08-20 RX ADMIN — PIPERACILLIN AND TAZOBACTAM 3.38 G: 3; .375 INJECTION, POWDER, LYOPHILIZED, FOR SOLUTION INTRAVENOUS at 20:43

## 2021-08-20 RX ADMIN — LOSARTAN POTASSIUM 25 MG: 50 TABLET, FILM COATED ORAL at 08:54

## 2021-08-20 RX ADMIN — GABAPENTIN 300 MG: 300 CAPSULE ORAL at 22:01

## 2021-08-20 RX ADMIN — CARVEDILOL 12.5 MG: 12.5 TABLET, FILM COATED ORAL at 08:52

## 2021-08-20 RX ADMIN — HEPARIN SODIUM 5000 UNITS: 5000 INJECTION INTRAVENOUS; SUBCUTANEOUS at 17:12

## 2021-08-20 RX ADMIN — SERTRALINE 50 MG: 50 TABLET, FILM COATED ORAL at 08:52

## 2021-08-20 RX ADMIN — ASPIRIN 81 MG: 81 TABLET, CHEWABLE ORAL at 08:52

## 2021-08-20 RX ADMIN — HEPARIN SODIUM 5000 UNITS: 5000 INJECTION INTRAVENOUS; SUBCUTANEOUS at 06:25

## 2021-08-20 RX ADMIN — EPOETIN ALFA-EPBX 4000 UNITS: 4000 INJECTION, SOLUTION INTRAVENOUS; SUBCUTANEOUS at 20:43

## 2021-08-20 RX ADMIN — PIPERACILLIN AND TAZOBACTAM 3.38 G: 3; .375 INJECTION, POWDER, LYOPHILIZED, FOR SOLUTION INTRAVENOUS at 08:52

## 2021-08-20 RX ADMIN — Medication 10 ML: at 22:02

## 2021-08-20 RX ADMIN — TAMSULOSIN HYDROCHLORIDE 0.8 MG: 0.4 CAPSULE ORAL at 08:52

## 2021-08-20 RX ADMIN — Medication 10 ML: at 06:25

## 2021-08-20 RX ADMIN — FINASTERIDE 5 MG: 5 TABLET, FILM COATED ORAL at 08:52

## 2021-08-20 RX ADMIN — HEPARIN SODIUM 5000 UNITS: 5000 INJECTION INTRAVENOUS; SUBCUTANEOUS at 22:02

## 2021-08-20 RX ADMIN — CARVEDILOL 12.5 MG: 12.5 TABLET, FILM COATED ORAL at 17:12

## 2021-08-20 RX ADMIN — TROSPIUM CHLORIDE 20 MG: 20 TABLET, FILM COATED ORAL at 08:55

## 2021-08-20 RX ADMIN — ALBUTEROL SULFATE 2 PUFF: 90 AEROSOL, METERED RESPIRATORY (INHALATION) at 02:17

## 2021-08-20 RX ADMIN — BUMETANIDE 2 MG: 1 TABLET ORAL at 08:52

## 2021-08-20 RX ADMIN — FLUTICASONE PROPIONATE 2 SPRAY: 50 SPRAY, METERED NASAL at 08:54

## 2021-08-20 RX ADMIN — INSULIN LISPRO 2 UNITS: 100 INJECTION, SOLUTION INTRAVENOUS; SUBCUTANEOUS at 22:01

## 2021-08-20 RX ADMIN — LEVOFLOXACIN 500 MG: 500 INJECTION, SOLUTION INTRAVENOUS at 08:52

## 2021-08-20 NOTE — PROCEDURES
Hemodialysis / [facility] [place] / [phone]    Vitals   Pre   Post   Assessment   Pre   Post     Temp  Temp: 97.9 °F (36.6 °C) (08/20/21 1310)  97.8 LOC  AXOX3 AXOX3   HR   Pulse (Heart Rate): 80 (08/20/21 1415) 63 Lungs   clear  clear   B/P   BP: 113/77 (08/20/21 1415) 145/88 Cardiac   wnl  wnl   Resp   Resp Rate: 12 (08/20/21 1310) 12 Skin   Warm,dry  warm,dry   Pain level  Pain Intensity 1: 0 (08/20/21 1155) 0 Edema  none     none   Orders:    Duration:   Start:    1310 End:    1640 Total:   3.5 hrs   Dialyzer:   Dialyzer/Set Up Inspection: Revaclear (08/20/21 1310)   K Bath:   Dialysate K (mEq/L): 2 (08/20/21 1310)   Ca Bath:   Dialysate CA (mEq/L): 2.5 (08/20/21 1310)   Na/Bicarb:   Dialysate NA (mEq/L): 140 (08/20/21 1310)   Target Fluid Removal:   Goal/Amount of Fluid to Remove (mL): 2000 mL (08/20/21 1310)   Access     Type & Location:   L AVF  Site no s/s infection; good pressure and flows   Labs     Obtained/Reviewed   Critical Results Called   Date when labs were drawn-  Hgb-    HGB   Date Value Ref Range Status   08/20/2021 9.1 (L) 12.1 - 17.0 g/dL Final     K-    Potassium   Date Value Ref Range Status   08/20/2021 4.2 3.5 - 5.1 mmol/L Final     Ca-   Calcium   Date Value Ref Range Status   08/20/2021 8.7 8.5 - 10.1 MG/DL Final     Bun-   BUN   Date Value Ref Range Status   08/20/2021 65 (H) 6 - 20 MG/DL Final     Creat-   Creatinine   Date Value Ref Range Status   08/20/2021 6.25 (H) 0.70 - 1.30 MG/DL Final     Comment:     INVESTIGATED PER DELTA CHECK PROTOCOL        Medications/ Blood Products Given     Name   Dose   Route and Time                     Blood Volume Processed (BVP):    85.1 Net Fluid   Removed:  2 kg   Comments   Time Out Done: (Time) 1300  Primary Nurse Rpt Pre: Terrell Rader  Primary Nurse Rpt Post: Ly Carroll  Pt Education: access care  Care Plan: dialysis per md order  Tx Summary:    Pt completed full tx. All blood returned.     Admiting Diagnosis:caesars  Pt's previous clinic-  Consent signed - Informed Consent Verified: Yes (08/20/21 1310)  [facility] Consent -   Hepatitis Status- hep b neg 8/18/21, immune 5/4/21  Machine #- Machine Number: [id number] (08/20/21 1310)  Telemetry status-  Pre-dialysis wt. -

## 2021-08-20 NOTE — PROGRESS NOTES
Bedside shift change report given to NEHA Logan (oncoming nurse) by [facility], RN (offgoing nurse). Report included the following information SBAR.         2030: patient up to bedside commode x1 BM very loose    0000: patient up to bedside commode x1 BM loose    0400: vanc trough taken  0630: report given to Dialysis Nurse    patient is to have HD today

## 2021-08-20 NOTE — PROGRESS NOTES
End of Shift Note    Bedside shift change report given to Maria Luz Son RN (oncoming nurse) by Souleymane Ryder RN (offgoing nurse). Report included the following information SBAR, Kardex, ED Summary, OR Summary and Procedure Summary    Shift worked:  7A-7P     Shift summary and any significant changes:     patient had dialysis today, removed 2 liters. VSS. Patient is afebrile. Patient receiving Vanc and Zosyn. Vanc was held today. Plan home vs. inpt rehab. Concerns for physician to address:  none     Zone phone for oncoming shift:   [phone]       Activity:  Activity Level: Up with Assistance  Number times ambulated in hallways past shift: 0  Number of times OOB to chair past shift: 3    Cardiac:   Cardiac Monitoring: Yes      Cardiac Rhythm: Sinus Rhythm    Access:   Current line(s): PIV     Genitourinary:   Urinary status: voiding    Respiratory:   O2 Device: None (Room air)  Chronic home O2 use?: NO  Incentive spirometer at bedside: YES     GI:  Last Bowel Movement Date: 08/19/21  Current diet:  ADULT DIET Regular; 3 carb choices (45 gm/meal); Low Fat/Low Chol/High Fiber/RANI  Passing flatus: YES  Tolerating current diet: YES       Pain Management:   Patient states pain is manageable on current regimen: YES    Skin:  Kelby Score: 20  Interventions: increase time out of bed    Patient Safety:  Fall Score:  Total Score: 4  Interventions: gripper socks and pt to call before getting OOB  High Fall Risk: Yes    Length of Stay:  Expected LOS: 4d 19h  Actual LOS: 2      Pattie Carroll RN

## 2021-08-20 NOTE — PROGRESS NOTES
Problem: Self Care Deficits Care Plan (Adult)  Goal: *Acute Goals and Plan of Care (Insert Text)  Description:   FUNCTIONAL STATUS PRIOR TO ADMISSION: Patient was modified independent using a single point cane for functional mobility. Stands to shower. Admits to falls PTA. Drives. On HD MWF. HOME SUPPORT: The patient lived with wife but did not require assist. Wife works FT. Occupational Therapy Goals  Initiated 8/20/2021  1. Patient will perform grooming tasks standing at the sink with supervision/set-up within 7 day(s). 2.  Patient will perform upper body dressing with independence within 7 day(s). 3.  Patient will perform lower body dressing with supervision within 7 day(s). 4.  Patient will perform toilet transfers with supervision using least restrictive device within 7 day(s). 5.  Patient will perform all aspects of toileting with supervision within 7 day(s). 6.  Patient will perform item retrieval from various heights during functional tasks with Supervision within 7 days. Outcome: Not Met   OCCUPATIONAL THERAPY EVALUATION  Patient: Inés Carbajal (60 y.o. male)  Date: 8/20/2021  Primary Diagnosis: Sepsis ([facility] [address] [A41.9]  Acute hypoxemic respiratory failure ([facility] [address] [J96.01]  ESRD (end stage renal disease) on dialysis ([facility] [address] [N18.6, Z99.2]        Precautions:   Fall    ASSESSMENT  Based on the objective data described below, the patient presents with SOB, decreased endurance, decreased static/dynamic standing balance, decreased insight into deficits, decreased awareness of his need for assistance and impaired decision making following admission for sepsis, respiratory failure and ESRD. VSS on RA with activity however Pt visibly SOB following bathroom mobility, denying symptoms. O2 sats remained stable b/w % on RA. Pt was received seated in chair on arrival and agreeable to session.  Pt required Min A to stand from chair and refused RW this session, requiring CGA-Min A with mobility and c/o BLE weakness. Pt with x1 major LOB ambulating toward commode, requiring Min A from therapist to correct and grab bar use on L side. Pt observed with difficulty advancing LLE while turning and backing up to commode, yet Pt denied LOB and difficulty with transfer. Following short rest break, Pt ambulated back to EOB with CGA w/o DME and doffed/donned socks with CGA d/t x1 minor posterior LOB while crossing legs. After second brief rest break d/t mild SOB, Pt required CGA-Min A again to ambulate around bed and back to chair and cues to reach back for armrests before sitting. Pt was left seated in chair, all needs met and RN aware of session. Pt would benefit from acute skilled OT followed by rehab to maximize safe return to Mod I PLOF with ADL routine using SPC once medically stable before D/C home. Pt is home alone during the day and states his wife works night shifts. Pt is unsafe to return home alone at this time given high risk for falls and decreased insight. Current Level of Function Impacting Discharge (ADLs/self-care): Setup to Mod A with ADL, CGA-Min A with transfers (Pt refused RW this session). Functional Outcome Measure: The patient scored Total: 55/100 on the Barthel Index outcome measure which is indicative of 45% impaired ability to care for basic self needs/dependency on others; inferred 45% dependency on others for instrumental ADLs. Other factors to consider for discharge: Mod I PLOF, Falls PTA, Poor insight, Home alone overnight (wife works night shift, per Pt). Patient will benefit from skilled therapy intervention to address the above noted impairments.        PLAN :  Recommendations and Planned Interventions: self care training, functional mobility training, therapeutic exercise, balance training, therapeutic activities, endurance activities, patient education, and home safety training    Frequency/Duration: Patient will be followed by occupational therapy 4 times a week to address goals. Recommendation for discharge: (in order for the patient to meet his/her long term goals)  Therapy 3 hours per day 5-7 days per week    This discharge recommendation:  Has been made in collaboration with the attending provider and/or case management    IF patient discharges home will need the following DME: walker: rolling, safety belt. SUBJECTIVE:   Patient stated Oh there they go again, getting stuck. \" (re: BLEs during ambulation). OBJECTIVE DATA SUMMARY:   HISTORY:   Past Medical History:   Diagnosis Date    Arthritis     spine    CAD (coronary artery disease)     high cholesterol    Chronic kidney disease     dialysis    Diabetes ([facility] [address]     ESRD (end stage renal disease) ([facility] [address]     GERD (gastroesophageal reflux disease)     HX    Hypertension     Ill-defined condition     irritable bowel syndrome    Systolic CHF ([facility] [address]     Unspecified sleep apnea     NO CPAP     Past Surgical History:   Procedure Laterality Date    COLONOSCOPY N/A 11/9/2016    COLONOSCOPY performed by Italo Mart[facility]nez MD [facility] ENDOSCOPY    HX ORTHOPAEDIC      CERVICAL FUSION    HX UROLOGICAL      TURP    HX VASCULAR ACCESS      L arm dialysis access       Expanded or extensive additional review of patient history:     Home Situation  Home Environment: Private residence  # Steps to Enter: 2  Rails to Enter: No  One/Two Story Residence: One story  Living Alone: No  Support Systems: Spouse/Significant Other/Partner  Patient Expects to be Discharged to[de-identified] [employer]  Current DME Used/Available at Home: Cane, straight, Grab bars  Tub or Shower Type: Tub/Shower combination    Hand dominance: Right    EXAMINATION OF PERFORMANCE DEFICITS:  Cognitive/Behavioral Status:  Neurologic State: Alert  Orientation Level: Oriented X4  Cognition: Impaired decision making; Follows commands  Perception: Cues to maintain midline in standing  Perseveration: No perseveration noted  Safety/Judgement: Fall prevention;Decreased insight into deficits    Skin: Lines intact. Edema: None observed    Hearing: Auditory  Auditory Impairment: None    Vision/Perceptual:                           Acuity: Within Defined Limits;Able to read clock/calendar on wall without difficulty; Able to read employee name badge without difficulty    Corrective Lenses: Glasses    Range of Motion:  AROM: Generally decreased, functional                         Strength:  Strength: Generally decreased, functional                Coordination:  Coordination: Generally decreased, functional  Fine Motor Skills-Upper: Left Intact; Right Intact    Gross Motor Skills-Upper: Left Intact; Right Intact    Tone & Sensation:  Tone: Normal  Sensation: Intact                      Balance:  Sitting: Impaired  Sitting - Static: Good (unsupported)  Sitting - Dynamic: Fair (occasional)  Standing: Impaired; Without support  Standing - Static: Fair;Unsupported  Standing - Dynamic : Poor;Fair;Unsupported (x1 major LOB approaching commode)    Functional Mobility and Transfers for ADLs:  Bed Mobility:  Supine to Sit:  (received in chair)  Scooting: Contact guard assistance    Transfers:  Sit to Stand: Minimum assistance  Stand to Sit: Minimum assistance  Bed to Chair: Minimum assistance  Toilet Transfer : Minimum assistance (needs to use grab bar)    ADL Assessment:  Feeding: Setup    Oral Facial Hygiene/Grooming: Contact guard assistance    Bathing: Moderate assistance    Upper Body Dressing: Minimum assistance    Lower Body Dressing: Moderate assistance    Toileting: Moderate assistance (inferred, likely needs A with rear meeta)        ADL Intervention and task modifications: Instructed Pt to complete OOB functional tasks with staff assist d/t balance deficits. Educated Pt on benefit to DME use d/t LOB during bathroom mobility approaching commode, requiring Min A from therapist to correct.      Lower Body Dressing Assistance  Socks: Contact guard assistance (d/t minor posterior LOB while crossing legs)  Leg Crossed Method Used: Yes  Position Performed: Seated edge of bed    Cognitive Retraining  Safety/Judgement: Fall prevention;Decreased insight into deficits    Functional Measure:  Barthel Index:    Bathin  Bladder: 10  Bowels: 10  Groomin  Dressin  Feeding: 10  Mobility: 0  Stairs: 0  Toilet Use: 5  Transfer (Bed to Chair and Back): 10  Total: 55/100        The Barthel ADL Index: Guidelines  1. The index should be used as a record of what a patient does, not as a record of what a patient could do. 2. The main aim is to establish degree of independence from any help, physical or verbal, however minor and for whatever reason. 3. The need for supervision renders the patient not independent. 4. A patient's performance should be established using the best available evidence. Asking the patient, friends/relatives and nurses are the usual sources, but direct observation and common sense are also important. However direct testing is not needed. 5. Usually the patient's performance over the preceding 24-48 hours is important, but occasionally longer periods will be relevant. 6. Middle categories imply that the patient supplies over 50 per cent of the effort. 7. Use of aids to be independent is allowed. Valerio Duobn., Barthel, D.W. (5630). Functional evaluation: the Barthel Index. [facility] (14)2. BENI Ness, Christopher Simeon.Babak., Cindi, [address] [place] [address] (). Measuring the change indisability after inpatient rehabilitation; comparison of the responsiveness of the Barthel Index and Functional [place] Measure. Journal of Neurology, Neurosurgery, and Psychiatry, 66(4), 311-973. Gilbert Cobian, N.J.A, VITO Beatty, & Diana Cristina M.A. (2004.) Assessment of post-stroke quality of life in cost-effectiveness studies: The usefulness of the Barthel Index and the EuroQoL-5D.  Quality of Life Research, 15, 520-33         Occupational Therapy Evaluation Charge Determination   History Examination Decision-Making   LOW Complexity : Brief history review  LOW Complexity : 1-3 performance deficits relating to physical, cognitive , or psychosocial skils that result in activity limitations and / or participation restrictions  LOW Complexity : No comorbidities that affect functional and no verbal or physical assistance needed to complete eval tasks       Based on the above components, the patient evaluation is determined to be of the following complexity level: LOW   Pain Rating:  Denied pain. Activity Tolerance:   Fair and observed SOB with activity  VSS on RA  Oxygen saturation levels stable b/w % with activity. 100% on arrival, decreased to mid-low 90s with activity. After treatment patient left in no apparent distress:    Sitting in chair and Call bell within reach    COMMUNICATION/EDUCATION:   The patients plan of care was discussed with: Physical therapist and Registered nurse. Home safety education was provided and the patient/caregiver indicated understanding., Patient/family have participated as able in goal setting and plan of care. , and Patient/family agree to work toward stated goals and plan of care.       Thank you for this referral.  Wilfrido Kulkarni, OTR/L  Time Calculation: 29 mins

## 2021-08-20 NOTE — PROGRESS NOTES
Nephrology Progress Note  Chavo Euceda     [facility]. [email or web address]  Phone - [phone]   Patient: Elaine Santana    YOB: 1945        Date- 8/20/2021   Admit Date: 8/18/2021  CC: Follow up for end-stage renal disease  IMPRESSION & PLAN:    ESRD- [facility]   Fever  - covid -ve   Anemia of ckd   S/p fall   Elevated ck- rhabdomyolysis   Sob - pulmonary edema vs pneumonia   Sec. hyperpara    PLAN-   Dialysis today   Continue HD Monday Wednesday Friday   Remove 2 -3 kg as tolerated   Abx per primary team   Continue losartan, coreg   epogen mwf   Subjective: Interval History:   - he didn't receive hd yesteday  bp stable    8/18   Patient was tx from OSH with fever and fall. His covid rapid test -ve, he is on hd mwf- [facility]  His last hd on monday    Objective:   Vitals:    08/20/21 0217 08/20/21 0341 08/20/21 0846 08/20/21 1038   BP:  (!) 140/68 (!) 144/75 130/67   Pulse:  72 72 74   Resp:  18 21 24   Temp:  98.3 °F (36.8 °C) 98.3 °F (36.8 °C) 98.2 °F (36.8 °C)   TempSrc:       SpO2: 97% 98% 97%    Weight:       Height:          08/19 0701 - 08/20 0700  In: 600 [P.O.:600]  Out: 2775 [Urine:775]  Last 3 Recorded Weights in this Encounter    08/18/21 0058 08/18/21 1955   Weight: 122.5 kg (270 lb) 114.1 kg (251 lb 8.7 oz)      Physical exam:   GEN: nad  NECK:  Supple, no thyromegaly  RESP:  clear B/l, wheezing, decreased BS b/l  CVS: RRR,S1,S2   NEURO: non focal, normal speech  EXT: Edema +nt           Chart reviewed. Pertinent Notes reviewed.      Data Review :  Recent Labs     08/20/21  0349 08/19/21  0635 08/18/21  0200   * 135* 136   K 4.2 4.7 4.9    104 102   CO2 27 22 26   BUN 65* 87* 64*   CREA 6.25* 8.37* 7.53*   GLU 94 62* 164*   CA 8.7 9.0 9.1   PHOS  --  5.9*  --      Recent Labs     08/20/21  0349 08/19/21  0635 08/18/21  0200   WBC 9.0 11.9* 13.2*   HGB 9.1* 8.8* 10.0*   HCT 29.2* 28.4* 32.1*    165 154     No results for input(s): FE, TIBC, PSAT, FERR in the last 72 hours. Medication list  reviewed  Current Facility-Administered Medications   Medication Dose Route Frequency    albuterol (PROVENTIL HFA, VENTOLIN HFA, PROAIR HFA) inhaler 2 Puff  2 Puff Inhalation Q6H PRN    [START ON 8/23/2021] Vanc random level with am labs  1 Each Other ONCE    Do NOT give vancomycin today after dialysis.  Level high this am.  1 Each Other ONCE    sodium chloride (OCEAN) 0.65 % nasal squeeze bottle 2 Spray  2 Spray Both Nostrils Q2H PRN    VANCOMYCIN INFORMATION NOTE   Other Rx Dosing/Monitoring    insulin lispro (HUMALOG) injection   SubCUTAneous AC&HS    glucose chewable tablet 16 g  4 Tablet Oral PRN    dextrose (D50W) injection syrg 12.5-25 g  12.5-25 g IntraVENous PRN    glucagon (GLUCAGEN) injection 1 mg  1 mg IntraMUSCular PRN    levoFLOXacin (LEVAQUIN) 500 mg in D5W IVPB  500 mg IntraVENous Q48H    sodium chloride (NS) flush 5-40 mL  5-40 mL IntraVENous Q8H    sodium chloride (NS) flush 5-40 mL  5-40 mL IntraVENous PRN    acetaminophen (TYLENOL) tablet 650 mg  650 mg Oral Q6H PRN    Or    acetaminophen (TYLENOL) suppository 650 mg  650 mg Rectal Q6H PRN    polyethylene glycol (MIRALAX) packet 17 g  17 g Oral DAILY PRN    ondansetron (ZOFRAN ODT) tablet 4 mg  4 mg Oral Q8H PRN    Or    ondansetron (ZOFRAN) injection 4 mg  4 mg IntraVENous Q6H PRN    heparin (porcine) injection 5,000 Units  5,000 Units SubCUTAneous Q8H    tamsulosin (FLOMAX) capsule 0.8 mg  0.8 mg Oral DAILY    sertraline (ZOLOFT) tablet 50 mg  50 mg Oral DAILY    trospium (SANCTURA) tablet 20 mg  20 mg Oral DAILY    losartan (COZAAR) tablet 25 mg  25 mg Oral DAILY    gabapentin (NEURONTIN) capsule 300 mg  300 mg Oral QHS    fluticasone propionate (FLONASE) 50 mcg/actuation nasal spray 2 Spray  2 Spray Both Nostrils DAILY    finasteride (PROSCAR) tablet 5 mg  5 mg Oral DAILY    carvediloL (COREG) tablet 12.5 mg  12.5 mg Oral BID WITH MEALS    bumetanide (BUMEX) tablet 2 mg  2 mg Oral DAILY    aspirin chewable tablet 81 mg  81 mg Oral DAILY    [Held by provider] insulin lispro (HUMALOG) injection 7 Units  7 Units SubCUTAneous TIDAC    epoetin oksana-epbx (RETACRIT) injection 4,000 Units  4,000 Units SubCUTAneous Q MON, WED & FRI    [Held by provider] insulin glargine (LANTUS) injection 17 Units  17 Units SubCUTAneous DAILY    [Held by provider] insulin glargine (LANTUS) injection 22 Units  22 Units SubCUTAneous QHS    piperacillin-tazobactam (ZOSYN) 3.375 g in 0.9% sodium chloride (MBP/ADV) 100 mL MBP  3.375 g IntraVENous Q12H          [facility]  [facility], [address], [address]  Phone - [phone]               Fax - [phone]

## 2021-08-20 NOTE — PROGRESS NOTES
Transition of Care Plan:     RUR: 25%  Disposition: Home with spouse Vs. Inpatient Rehab  Follow up appointments: PCP, Nephrology  OP HD: MWF 6:15 AM Marti Myers - send flowsheets and d/c summary to location prior to d/c   DME needed: Pt has a cane and home oxygen  Transportation at Discharge: Pt's spouse - will need to bring portable tank at d/c if needed for transport home  [place] or means to access home: Spouse will have        IM Medicare Letter: Will need 2nd IMM letter prior to d/c  Is patient a BCPI-A Bundle: N/A                      If yes, was Bundle Letter given?: N/A    Caregiver Contact: Pt's spouse, Chavo Cox p) [phone]  Discharge Caregiver contacted prior to discharge? To be contacted prior to d/c      CM spoke to the pt regarding the recommendation if for inpatient rehab. CM explain is options of inpatient rehabs, [facility], [facility], and [facility].    Pt express to CM that he would like to speak to his spouse regarding the recommendation. CM will continue to follow and assist with d/c planning. aRji Palma.Delaney.   Care Manager [id number] [address] [facility]  [phone]

## 2021-08-20 NOTE — PROGRESS NOTES
Pharmacy Antimicrobial Kinetic Dosing    Indication for Antimicrobials: sepsis     Current Regimen of Each Antimicrobial:  Vancomycin pharmacy to dose (Start Date ; Day # 3)  Zosyn 3.375g IV q 12h (start: , day 3)  Levofloxacin 750mg IV x 1, then 500mg IV q 48 (start: , day 3)    Previous Antimicrobial Therapy:  Cefepime 2g IV in ED -21    Goal Level: VANCOMYCIN TROUGH GOAL RANGE    Vancomycin Trough: 20 - 25 mcg/mL    Date Dose & Interval Measured (mcg/mL) Extrapolated (mcg/mL)   21 1250 mg post HD 36.8                  Date & time of next level: Monday with AM labs    Significant Positive Cultures:   : blood - prelim    Conditions for Dosing Consideration: Hemodialysis  MWF    Labs:  Recent Labs     21  0635 21  0200   CREA 6.25* 8.37* 7.53*   BUN 65* 87* 64*     Recent Labs     21  0635 21  0200   WBC 9.0 11.9* 13.2*     Temp (24hrs), Av.2 °F (36.8 °C), Min:97.8 °F (36.6 °C), Max:98.4 °F (36.9 °C)    Creatinine Clearance (mL/min):   CrCl (Ideal Body Weight): 11.6   If actual weight < IBW: CrCl (Actual Body Weight) 17.0    Impression/Plan:   Vanc random level Monday with AM labs  Per protocol please HOLD today's dose for supratherapeutic level. Renal dosed zosyn to 3.375g IV q 12h  Continue renal dosed levofloxacin  WBC trending down  Afebrile  Antimicrobial stop date pending     Pharmacy will follow daily and adjust medications as appropriate for renal function and/or serum levels.     Thank you,  Du Chun, LEED    Vancomycin Dosing Document    Documents located on pharmacy Teams site: Clinical Practice -> Antimicrobial Stewardship -> Antibiotics_Vancomycin     Aminoglycoside Dosing Document    Documents located on pharmacy Teams site: Clinical Practice -> Antimicrobial Stewardship -> Antibiotics_Aminoglycosides

## 2021-08-20 NOTE — PROGRESS NOTES
Hospitalist Progress Note    NAME: Stanislav Dunlap   :  1945   MRN:  [id number]       Assessment / Plan:  Sepsis, POA unclear etiology as evidence by fever 103, WBC 13.2, RR 24-30  Acute on chronic hypoxic respiratory failure, POA  90% RA during eval; 98% on 2L. Rule out pneumonia, covid-19 infection  Bilateral patchy pulmonary opacities read as mild pulmonary edema, POA    Acute on chronic systolic chf EF 55-13% 8/15, POA possible b/l pneumonia, rule out covid-19 pneumonia  --continue empiric abx with vancomycin, zosyn, levaquin. Procalcitonin 0.56  --covid testing negative  --procal 4.68  --appreciate nephrology consult, plan for HD today and tomorrow noted     Rhabdomyolysis due to fall and on floor x 8 hours at home. Generalized weakness  --not on statin. --monitor CK, currently 2398  --frequent turns  --consult pt/ot     Elevated troponin 0.23, suspect demand ischemia  --continue aspirin  --repeat troponin     ESRD on HD MWF  --HD today (Fri)  --appreciate nephrology consult     IDDM type 2  --continue 70/30 30 units AM, 40 units PM     FRIDA on 2L O2 at night and prn at home  --continue O2    PT/OT recommending therapy up to 3hrs/daily    Estimated DC   Barriers: placement     Body mass index is 36.62 kg/m².     Code:  full  DVT prophylaxis: heparin  Surrogate decision maker:  Wife Angela Haley     Subjective:     Chief Complaint / Reason for Physician Visit  Seen on HD. Denies CP/n/v/abd pain. Still feels weak, no other acute complaints    Discussed with RN events overnight. Review of Systems:  Symptom Y/N Comments  Symptom Y/N Comments   Fever/Chills n   Chest Pain n    Poor Appetite n   Edema n    Cough n   Abdominal Pain n    Sputum n   Joint Pain     SOB/BRITT n   Pruritis/Rash     Nausea/vomit n   Tolerating PT/OT     Diarrhea n   Tolerating Diet y    Constipation n   Other       Could NOT obtain due to:      Objective:     VITALS:   Last 24hrs VS reviewed since prior progress note.  Most recent are:  Patient Vitals for the past 24 hrs:   Temp Pulse Resp BP SpO2   08/20/21 1315    118/77    08/20/21 1310 97.9 °F (36.6 °C)       08/20/21 1155  75   96 %   08/20/21 1038 98.2 °F (36.8 °C) 74 24 130/67    08/20/21 0846 98.3 °F (36.8 °C) 72 21 (!) 144/75 97 %   08/20/21 0341 98.3 °F (36.8 °C) 72 18 (!) 140/68 98 %   08/20/21 0217     97 %   08/19/21 2347 98.4 °F (36.9 °C) 78 18 (!) 151/80 99 %   08/19/21 1926 98.1 °F (36.7 °C) 74 21 (!) 155/78 98 %   08/19/21 1602     96 %   08/19/21 1600  67          Intake/Output Summary (Last 24 hours) at 8/20/2021 1351  Last data filed at 8/20/2021 3234  Gross per 24 hour   Intake 360 ml   Output 650 ml   Net -290 ml        I had a face to face encounter and independently examined this patient on 8/20/2021, as outlined below:  PHYSICAL EXAM:  General: WD, WN. Alert, cooperative, no acute distress    EENT:  EOMI. Anicteric sclerae. MMM  Resp:  CTA bilaterally, no wheezing or rales. No accessory muscle use  CV:  Regular  rhythm,  No edema  GI:  Soft, Non distended, Non tender. +Bowel sounds  Neurologic:  Alert and oriented X 3, normal speech,   Psych:   Good insight. Not anxious nor agitated  Skin:  No rashes. No jaundice    Reviewed most current lab test results and cultures  YES  Reviewed most current radiology test results   YES  Review and summation of old records today    NO  Reviewed patient's current orders and MAR    YES  PMH/SH reviewed - no change compared to H&P  ________________________________________________________________________  Care Plan discussed with:    Comments   Patient x    Family      RN x    Care Manager     Consultant                       x Multidiciplinary team rounds were held today with , nursing, pharmacist and clinical coordinator. Patient's plan of care was discussed; medications were reviewed and discharge planning was addressed. ________________________________________________________________________  Total NON critical care TIME:  30   Minutes    Total CRITICAL CARE TIME Spent:   Minutes non procedure based      Comments   >50% of visit spent in counseling and coordination of care x    ________________________________________________________________________  Alejandra Cancer, DO     Procedures: see electronic medical records for all procedures/Xrays and details which were not copied into this note but were reviewed prior to creation of Plan. LABS:  I reviewed today's most current labs and imaging studies.   Pertinent labs include:  Recent Labs     08/20/21  0349 08/19/21  0635 08/18/21  0200   WBC 9.0 11.9* 13.2*   HGB 9.1* 8.8* 10.0*   HCT 29.2* 28.4* 32.1*    165 154     Recent Labs     08/20/21  0349 08/19/21  0635 08/18/21  0200   * 135* 136   K 4.2 4.7 4.9    104 102   CO2 27 22 26   GLU 94 62* 164*   BUN 65* 87* 64*   CREA 6.25* 8.37* 7.53*   CA 8.7 9.0 9.1   PHOS  --  5.9*  --    ALB  --  2.8* 3.4*   TBILI  --  0.8 0.8   ALT  --  44 33       Signed: Chavo Miller DO

## 2021-08-21 LAB
GLUCOSE BLD STRIP.AUTO-MCNC: 118 MG/DL (ref 65–117)
GLUCOSE BLD STRIP.AUTO-MCNC: 126 MG/DL (ref 65–117)
GLUCOSE BLD STRIP.AUTO-MCNC: 147 MG/DL (ref 65–117)
GLUCOSE BLD STRIP.AUTO-MCNC: 171 MG/DL (ref 65–117)
SERVICE CMNT-IMP: ABNORMAL

## 2021-08-21 PROCEDURE — 82962 GLUCOSE BLOOD TEST: CPT

## 2021-08-21 PROCEDURE — 74011250637 HC RX REV CODE- 250/637: Performed by: HOSPITALIST

## 2021-08-21 PROCEDURE — 77010033678 HC OXYGEN DAILY

## 2021-08-21 PROCEDURE — 65660000001 HC RM ICU INTERMED STEPDOWN

## 2021-08-21 PROCEDURE — 74011000258 HC RX REV CODE- 258: Performed by: HOSPITALIST

## 2021-08-21 PROCEDURE — 74011250636 HC RX REV CODE- 250/636: Performed by: HOSPITALIST

## 2021-08-21 RX ADMIN — Medication 10 ML: at 21:54

## 2021-08-21 RX ADMIN — SERTRALINE 50 MG: 50 TABLET, FILM COATED ORAL at 09:22

## 2021-08-21 RX ADMIN — HEPARIN SODIUM 5000 UNITS: 5000 INJECTION INTRAVENOUS; SUBCUTANEOUS at 21:53

## 2021-08-21 RX ADMIN — Medication 10 ML: at 06:48

## 2021-08-21 RX ADMIN — TROSPIUM CHLORIDE 20 MG: 20 TABLET, FILM COATED ORAL at 14:56

## 2021-08-21 RX ADMIN — ASPIRIN 81 MG: 81 TABLET, CHEWABLE ORAL at 09:21

## 2021-08-21 RX ADMIN — TAMSULOSIN HYDROCHLORIDE 0.8 MG: 0.4 CAPSULE ORAL at 09:21

## 2021-08-21 RX ADMIN — HEPARIN SODIUM 5000 UNITS: 5000 INJECTION INTRAVENOUS; SUBCUTANEOUS at 14:45

## 2021-08-21 RX ADMIN — CARVEDILOL 12.5 MG: 12.5 TABLET, FILM COATED ORAL at 09:21

## 2021-08-21 RX ADMIN — PIPERACILLIN AND TAZOBACTAM 3.38 G: 3; .375 INJECTION, POWDER, LYOPHILIZED, FOR SOLUTION INTRAVENOUS at 20:27

## 2021-08-21 RX ADMIN — BUMETANIDE 2 MG: 1 TABLET ORAL at 09:22

## 2021-08-21 RX ADMIN — FLUTICASONE PROPIONATE 2 SPRAY: 50 SPRAY, METERED NASAL at 09:24

## 2021-08-21 RX ADMIN — FINASTERIDE 5 MG: 5 TABLET, FILM COATED ORAL at 09:20

## 2021-08-21 RX ADMIN — LOSARTAN POTASSIUM 25 MG: 50 TABLET, FILM COATED ORAL at 09:22

## 2021-08-21 RX ADMIN — PIPERACILLIN AND TAZOBACTAM 3.38 G: 3; .375 INJECTION, POWDER, LYOPHILIZED, FOR SOLUTION INTRAVENOUS at 09:25

## 2021-08-21 RX ADMIN — GABAPENTIN 300 MG: 300 CAPSULE ORAL at 21:54

## 2021-08-21 RX ADMIN — HEPARIN SODIUM 5000 UNITS: 5000 INJECTION INTRAVENOUS; SUBCUTANEOUS at 06:48

## 2021-08-21 NOTE — PROGRESS NOTES
End of Shift Note     Bedside shift change report given  by Ashley Cheney (offgoing nurse). Report included the following information SBAR, Kardex, Intake/Output, MAR, Recent Results and Cardiac Rhythm NSR     Shift worked: 3p - 730p      Shift summary and any significant changes:      none       Concerns for physician to address:  not at this time       Zone phone for oncoming shift:            Activity:  Activity Level: Up with Assistance  Number times ambulated in hallways past shift: 0  Number of times OOB to chair past shift: 0     Cardiac:   Cardiac Monitoring: Yes      Cardiac Rhythm: Sinus Rhythm     Access:   Current line(s): PIV      Genitourinary:   Urinary status: voiding     Respiratory:   O2 Device: Nasal cannula  Chronic home O2 use?: yes  Incentive spirometer at bedside: NO  GI:  Last Bowel Movement Date: 08/20/21  Current diet:  ADULT DIET Regular; 3 carb choices (45 gm/meal); Low Fat/Low Chol/High Fiber/RANI  Passing flatus: YES  Tolerating current diet: YES     Pain Management:   Patient states pain is manageable on current regimen: YES     Skin:  Kelby Score: 20  Interventions: increase time out of bed    Patient Safety:  Fall Score: Total Score: 4  Interventions: bed/chair alarm and assistive device (walker, cane, etc)  High Fall Risk:  Yes     Length of Stay:  Expected LOS: 4d 19h  Actual LOS: 3        Luanne Chun

## 2021-08-21 NOTE — PROGRESS NOTES
Hospitalist Progress Note    NAME: Natalya Acosta   :  1945   MRN:  [id number]       Assessment / Plan:  Sepsis, POA unclear etiology as evidence by fever 103, WBC 13.2, RR 24-30  Acute on chronic hypoxic respiratory failure, POA  90% RA during eval; 98% on 2L. Rule out pneumonia, covid-19 infection  Bilateral patchy pulmonary opacities read as mild pulmonary edema, POA    Acute on chronic systolic chf EF 99-83% , POA possible b/l pneumonia, rule out covid-19 pneumonia  --continue empiric abx with vancomycin, zosyn, levaquin. Procalcitonin 0.56  --covid testing negative  --procal 4.68  --appreciate nephrology consult/HD management     Rhabdomyolysis due to fall and on floor x 8 hours at home. Generalized weakness  --not on statin. --CK mildly elevated  --frequent turns  --PT/OT  --pt looks clinically better and participating in OP PT prior to admission. Would like to see him work with PT/OT again prior to DC to see if recommendation for placement has changed     Elevated troponin 0.23, suspect demand ischemia  --continue aspirin  --repeat troponin     ESRD on HD MWF  --HD today (Fri)  --appreciate nephrology consult     IDDM type 2  --continue 70/30 30 units AM, 40 units PM     FRIDA on 2L O2 at night and prn at home  --continue O2    PT/OT recommending therapy up to 3hrs/daily    Estimated DC -  Barriers: placement     Body mass index is 36.62 kg/m².     Code:  full  DVT prophylaxis: heparin  Surrogate decision maker:  Wife Eleanor Short     Subjective:     Chief Complaint / Reason for Physician Visit  Patient feeling much better, sitting up in bedside chair    Discussed with RN events overnight.      Review of Systems:  Symptom Y/N Comments  Symptom Y/N Comments   Fever/Chills n   Chest Pain n    Poor Appetite n   Edema n    Cough n   Abdominal Pain n    Sputum n   Joint Pain     SOB/BRITT n   Pruritis/Rash     Nausea/vomit n   Tolerating PT/OT     Diarrhea n   Tolerating Diet y    Constipation n Other       Could NOT obtain due to:      Objective:     VITALS:   Last 24hrs VS reviewed since prior progress note. Most recent are:  Patient Vitals for the past 24 hrs:   Temp Pulse Resp BP SpO2   08/21/21 1056 98.7 °F (37.1 °C) 71 16 129/71 98 %   08/21/21 0921  78  (!) 155/89    08/21/21 0800 98.4 °F (36.9 °C) 72 16 132/71 100 %   08/21/21 0450 98.7 °F (37.1 °C) 71 14 (!) 141/75 100 %   08/20/21 2328 98.5 °F (36.9 °C) 74 16 (!) 147/71 97 %   08/20/21 1920 97.8 °F (36.6 °C) 70 21 (!) 140/74    08/20/21 1640 97.8 °F (36.6 °C)   (!) 145/88 97 %   08/20/21 1630  67  (!) 141/87    08/20/21 1615  65  129/77    08/20/21 1600  64  117/75    08/20/21 1545  62  127/78    08/20/21 1530  63  133/81    08/20/21 1515  63  (!) 146/79        Intake/Output Summary (Last 24 hours) at 8/21/2021 1512  Last data filed at 8/21/2021 1427  Gross per 24 hour   Intake 830 ml   Output 2725 ml   Net -1895 ml        I had a face to face encounter and independently examined this patient on 8/21/2021, as outlined below:  PHYSICAL EXAM:  General: WD, WN. Alert, cooperative, no acute distress    EENT:  EOMI. Anicteric sclerae. MMM  Resp:  CTA bilaterally, no wheezing or rales. No accessory muscle use  CV:  Regular  rhythm,  No edema  GI:  Soft, Non distended, Non tender. +Bowel sounds  Neurologic:  Alert and oriented X 3, normal speech,   Psych:   Good insight. Not anxious nor agitated  Skin:  No rashes.   No jaundice    Reviewed most current lab test results and cultures  YES  Reviewed most current radiology test results   YES  Review and summation of old records today    NO  Reviewed patient's current orders and MAR    YES  PMH/SH reviewed - no change compared to H&P  ________________________________________________________________________  Care Plan discussed with:    Comments   Patient x    Family      RN x    Care Manager     Consultant                       x Multidiciplinary team rounds were held today with case manager, nursing, pharmacist and clinical coordinator. Patient's plan of care was discussed; medications were reviewed and discharge planning was addressed. ________________________________________________________________________  Total NON critical care TIME:  30   Minutes    Total CRITICAL CARE TIME Spent:   Minutes non procedure based      Comments   >50% of visit spent in counseling and coordination of care x    ________________________________________________________________________  Suzette Barragan DO     Procedures: see electronic medical records for all procedures/Xrays and details which were not copied into this note but were reviewed prior to creation of Plan. LABS:  I reviewed today's most current labs and imaging studies.   Pertinent labs include:  Recent Labs     08/20/21 0349 08/19/21  0635   WBC 9.0 11.9*   HGB 9.1* 8.8*   HCT 29.2* 28.4*    165     Recent Labs     08/20/21 0349 08/19/21  0635   * 135*   K 4.2 4.7    104   CO2 27 22   GLU 94 62*   BUN 65* 87*   CREA 6.25* 8.37*   CA 8.7 9.0   PHOS  --  5.9*   ALB  --  2.8*   TBILI  --  0.8   ALT  --  44       Signed: Suzette Barragan DO

## 2021-08-21 NOTE — PROGRESS NOTES
Problem: Falls - Risk of  Goal: *Absence of Falls  Description: Document Emmanuel Melgar Fall Risk and appropriate interventions in the flowsheet. Outcome: Progressing Towards Goal  Note: Fall Risk Interventions:  Mobility Interventions: Strengthening exercises (ROM-active/passive), PT Consult for mobility concerns         Medication Interventions: Patient to call before getting OOB, Bed/chair exit alarm    Elimination Interventions: Call light in reach, Bed/chair exit alarm    History of Falls Interventions: Vital signs minimum Q4HRs X 24 hrs (comment for end date), Bed/chair exit alarm         Problem: Patient Education: Go to Patient Education Activity  Goal: Patient/Family Education  Outcome: Progressing Towards Goal     Problem: Pressure Injury - Risk of  Goal: *Prevention of pressure injury  Description: Document Kelby Scale and appropriate interventions in the flowsheet. Outcome: Progressing Towards Goal  Note: Pressure Injury Interventions:       Moisture Interventions: Absorbent underpads    Activity Interventions: Increase time out of bed, PT/OT evaluation    Mobility Interventions: Assess need for specialty bed, Turn and reposition approx.  every two hours(pillow and wedges)    Nutrition Interventions: Document food/fluid/supplement intake                     Problem: Patient Education: Go to Patient Education Activity  Goal: Patient/Family Education  Outcome: Progressing Towards Goal     Problem: Patient Education: Go to Patient Education Activity  Goal: Patient/Family Education  Outcome: Progressing Towards Goal     Problem: Patient Education: Go to Patient Education Activity  Goal: Patient/Family Education  Outcome: Progressing Towards Goal

## 2021-08-21 NOTE — PROGRESS NOTES
1900: Bedside shift change report given to Kevin Ventura rn (oncoming nurse) by Terri Cheng RN (offgoing nurse). Report included the following information SBAR, Kardex, MAR and Cardiac Rhythm Nsr. End of Shift Note    Bedside shift change report given  by Sunshine Howard (offgoing nurse). Report included the following information SBAR, Kardex, Intake/Output, MAR, Recent Results and Cardiac Rhythm NSR    Shift worked:  3384-4503     Shift summary and any significant changes:     none      Concerns for physician to address:  not at this time      Zone phone for oncoming shift:          Activity:  Activity Level: Up with Assistance  Number times ambulated in hallways past shift: 0  Number of times OOB to chair past shift: 0    Cardiac:   Cardiac Monitoring: Yes      Cardiac Rhythm: Sinus Rhythm    Access:   Current line(s): PIV     Genitourinary:   Urinary status: voiding    Respiratory:   O2 Device: Nasal cannula  Chronic home O2 use?: NO  Incentive spirometer at bedside: NO     GI:  Last Bowel Movement Date: 08/20/21  Current diet:  ADULT DIET Regular; 3 carb choices (45 gm/meal); Low Fat/Low Chol/High Fiber/RANI  Passing flatus: YES  Tolerating current diet: YES       Pain Management:   Patient states pain is manageable on current regimen: YES    Skin:  Kelby Score: 20  Interventions: increase time out of bed    Patient Safety:  Fall Score:  Total Score: 4  Interventions: bed/chair alarm and assistive device (walker, cane, etc)  High Fall Risk: Yes    Length of Stay:  Expected LOS: 4d 19h  Actual LOS: 3      Sunshine Howard

## 2021-08-21 NOTE — PROGRESS NOTES
Patient in stable condition. Offers no c/o. Up ambulating in room w/ walker w/ stand by assist; up to chair at bedside, tolerating activity well. Adequate PO intake, voiding in urinal. Hourly rounding conducted for safety. Encouraged to call for assist w/ ambulation. Mild BLE weakness noted. Reinforced plan of care w/ patient and patient wife. Verbalizes understanding. Offgoing shift report provided; discussion included kardez, assessment, medications and plan of care.

## 2021-08-22 LAB
ANION GAP SERPL CALC-SCNC: 7 MMOL/L (ref 5–15)
BUN SERPL-MCNC: 64 MG/DL (ref 6–20)
BUN/CREAT SERPL: 10 (ref 12–20)
CALCIUM SERPL-MCNC: 8.8 MG/DL (ref 8.5–10.1)
CHLORIDE SERPL-SCNC: 99 MMOL/L (ref 97–108)
CK SERPL-CCNC: 944 U/L (ref 39–308)
CO2 SERPL-SCNC: 29 MMOL/L (ref 21–32)
CREAT SERPL-MCNC: 6.37 MG/DL (ref 0.7–1.3)
ERYTHROCYTE [DISTWIDTH] IN BLOOD BY AUTOMATED COUNT: 15.5 % (ref 11.5–14.5)
GLUCOSE BLD STRIP.AUTO-MCNC: 116 MG/DL (ref 65–117)
GLUCOSE BLD STRIP.AUTO-MCNC: 118 MG/DL (ref 65–117)
GLUCOSE BLD STRIP.AUTO-MCNC: 125 MG/DL (ref 65–117)
GLUCOSE BLD STRIP.AUTO-MCNC: 128 MG/DL (ref 65–117)
GLUCOSE SERPL-MCNC: 116 MG/DL (ref 65–100)
HCT VFR BLD AUTO: 31.3 % (ref 36.6–50.3)
HGB BLD-MCNC: 9.7 G/DL (ref 12.1–17)
MCH RBC QN AUTO: 30.4 PG (ref 26–34)
MCHC RBC AUTO-ENTMCNC: 31 G/DL (ref 30–36.5)
MCV RBC AUTO: 98.1 FL (ref 80–99)
NRBC # BLD: 0 K/UL (ref 0–0.01)
NRBC BLD-RTO: 0 PER 100 WBC
PLATELET # BLD AUTO: 207 K/UL (ref 150–400)
PMV BLD AUTO: 11.1 FL (ref 8.9–12.9)
POTASSIUM SERPL-SCNC: 4 MMOL/L (ref 3.5–5.1)
RBC # BLD AUTO: 3.19 M/UL (ref 4.1–5.7)
SERVICE CMNT-IMP: ABNORMAL
SERVICE CMNT-IMP: NORMAL
SODIUM SERPL-SCNC: 135 MMOL/L (ref 136–145)
WBC # BLD AUTO: 7.3 K/UL (ref 4.1–11.1)

## 2021-08-22 PROCEDURE — 74011250637 HC RX REV CODE- 250/637: Performed by: HOSPITALIST

## 2021-08-22 PROCEDURE — 82550 ASSAY OF CK (CPK): CPT

## 2021-08-22 PROCEDURE — 82962 GLUCOSE BLOOD TEST: CPT

## 2021-08-22 PROCEDURE — 36415 COLL VENOUS BLD VENIPUNCTURE: CPT

## 2021-08-22 PROCEDURE — 80048 BASIC METABOLIC PNL TOTAL CA: CPT

## 2021-08-22 PROCEDURE — 85027 COMPLETE CBC AUTOMATED: CPT

## 2021-08-22 PROCEDURE — 74011250636 HC RX REV CODE- 250/636: Performed by: HOSPITALIST

## 2021-08-22 PROCEDURE — 74011000258 HC RX REV CODE- 258: Performed by: HOSPITALIST

## 2021-08-22 PROCEDURE — 65270000029 HC RM PRIVATE

## 2021-08-22 PROCEDURE — 77010033678 HC OXYGEN DAILY

## 2021-08-22 RX ADMIN — TAMSULOSIN HYDROCHLORIDE 0.8 MG: 0.4 CAPSULE ORAL at 09:19

## 2021-08-22 RX ADMIN — PIPERACILLIN AND TAZOBACTAM 3.38 G: 3; .375 INJECTION, POWDER, LYOPHILIZED, FOR SOLUTION INTRAVENOUS at 20:51

## 2021-08-22 RX ADMIN — HEPARIN SODIUM 5000 UNITS: 5000 INJECTION INTRAVENOUS; SUBCUTANEOUS at 23:07

## 2021-08-22 RX ADMIN — ASPIRIN 81 MG: 81 TABLET, CHEWABLE ORAL at 09:19

## 2021-08-22 RX ADMIN — CARVEDILOL 12.5 MG: 12.5 TABLET, FILM COATED ORAL at 17:14

## 2021-08-22 RX ADMIN — LOSARTAN POTASSIUM 25 MG: 50 TABLET, FILM COATED ORAL at 09:21

## 2021-08-22 RX ADMIN — TROSPIUM CHLORIDE 20 MG: 20 TABLET, FILM COATED ORAL at 10:46

## 2021-08-22 RX ADMIN — CARVEDILOL 12.5 MG: 12.5 TABLET, FILM COATED ORAL at 09:19

## 2021-08-22 RX ADMIN — HEPARIN SODIUM 5000 UNITS: 5000 INJECTION INTRAVENOUS; SUBCUTANEOUS at 14:33

## 2021-08-22 RX ADMIN — BUMETANIDE 2 MG: 1 TABLET ORAL at 09:19

## 2021-08-22 RX ADMIN — SERTRALINE 50 MG: 50 TABLET, FILM COATED ORAL at 09:19

## 2021-08-22 RX ADMIN — FINASTERIDE 5 MG: 5 TABLET, FILM COATED ORAL at 09:18

## 2021-08-22 RX ADMIN — PIPERACILLIN AND TAZOBACTAM 3.38 G: 3; .375 INJECTION, POWDER, LYOPHILIZED, FOR SOLUTION INTRAVENOUS at 09:20

## 2021-08-22 RX ADMIN — Medication 10 ML: at 06:02

## 2021-08-22 RX ADMIN — GABAPENTIN 300 MG: 300 CAPSULE ORAL at 23:08

## 2021-08-22 RX ADMIN — LEVOFLOXACIN 500 MG: 500 INJECTION, SOLUTION INTRAVENOUS at 09:20

## 2021-08-22 RX ADMIN — FLUTICASONE PROPIONATE 2 SPRAY: 50 SPRAY, METERED NASAL at 09:19

## 2021-08-22 RX ADMIN — Medication 10 ML: at 14:33

## 2021-08-22 RX ADMIN — HEPARIN SODIUM 5000 UNITS: 5000 INJECTION INTRAVENOUS; SUBCUTANEOUS at 06:02

## 2021-08-22 NOTE — ROUTINE PROCESS
Bedside shift change report given to [address] [place] [address] (oncoming nurse) by Karen CALLEJAS RN (offgoing nurse). Report included the following information SBAR, Kardex and MAR.

## 2021-08-22 NOTE — ROUTINE PROCESS
TRANSFER - IN REPORT:    Verbal report received from Xiang Charles(name) on [place] Progress  being received from PCU(unit) for routine progression of care      Report consisted of patients Situation, Background, Assessment and   Recommendations(SBAR). Information from the following report(s) SBAR, Kardex, Intake/Output, MAR and Accordion was reviewed with the receiving nurse. Opportunity for questions and clarification was provided. Assessment completed upon patients arrival to unit and care assumed.

## 2021-08-22 NOTE — PROGRESS NOTES
Problem: Falls - Risk of  Goal: *Absence of Falls  Description: Document Nancy Martinez Fall Risk and appropriate interventions in the flowsheet. Outcome: Progressing Towards Goal  Note: Fall Risk Interventions:  Mobility Interventions: Bed/chair exit alarm, Patient to call before getting OOB, Strengthening exercises (ROM-active/passive)         Medication Interventions: Teach patient to arise slowly, Bed/chair exit alarm    Elimination Interventions: Call light in reach, Bed/chair exit alarm    History of Falls Interventions: Bed/chair exit alarm         Problem: Patient Education: Go to Patient Education Activity  Goal: Patient/Family Education  Outcome: Progressing Towards Goal     Problem: Pressure Injury - Risk of  Goal: *Prevention of pressure injury  Description: Document Kelby Scale and appropriate interventions in the flowsheet. Outcome: Progressing Towards Goal  Note: Pressure Injury Interventions:       Moisture Interventions: Absorbent underpads, Check for incontinence Q2 hours and as needed    Activity Interventions: Pressure redistribution bed/mattress(bed type)    Mobility Interventions: Turn and reposition approx.  every two hours(pillow and wedges), Assess need for specialty bed, PT/OT evaluation    Nutrition Interventions: Document food/fluid/supplement intake                     Problem: Patient Education: Go to Patient Education Activity  Goal: Patient/Family Education  Outcome: Progressing Towards Goal     Problem: Patient Education: Go to Patient Education Activity  Goal: Patient/Family Education  Outcome: Progressing Towards Goal     Problem: Patient Education: Go to Patient Education Activity  Goal: Patient/Family Education  Outcome: Progressing Towards Goal

## 2021-08-22 NOTE — PROGRESS NOTES
TRANSFER - OUT REPORT:    Verbal report given to Magdalena Medina RN (name) on Yen Peralta  being transferred to [facility](unit) for routine progression of care       Report consisted of patients Situation, Background, Assessment and   Recommendations(SBAR). Information from the following report(s) SBAR was reviewed with the receiving nurse. Lines:       Opportunity for questions and clarification was provided.       Patient transported with:   O2 @ 2 liters  Tech

## 2021-08-22 NOTE — PROGRESS NOTES
1900: Bedside shift change report given to Fercho russell RN  (oncoming nurse) by Candelaria Sherwood RN  (offgoing nurse). Report included the following information SBAR, Kardex, MAR, Recent Results and Cardiac Rhythm SNR.    0700: End of Shift Note    Bedside shift change report given to Rajat Elizalde RN  (oncoming nurse) by Margo Martin (offgoing nurse). Report included the following information SBAR, Kardex, MAR, Recent Results and Cardiac Rhythm NSR    Shift worked:  [id number]     Shift summary and any significant changes:     none at this time      Concerns for physician to address:  no      Zone phone for oncoming shift:          Activity:  Activity Level: Bath Room Privileges  Number times ambulated in hallways past shift: 0  Number of times OOB to chair past shift: 0    Cardiac:   Cardiac Monitoring: Yes      Cardiac Rhythm: Sinus Rhythm    Access:   Current line(s): PIV     Genitourinary:   Urinary status: voiding and oliguric    Respiratory:   O2 Device: Nasal cannula  Chronic home O2 use?: YES  Incentive spirometer at bedside: NO     GI:  Last Bowel Movement Date: 08/21/21  Current diet:  ADULT DIET Regular; 3 carb choices (45 gm/meal); Low Fat/Low Chol/High Fiber/RANI  Passing flatus: YES  Tolerating current diet: YES       Pain Management:   Patient states pain is manageable on current regimen: YES    Skin:  Kelby Score: 20  Interventions: turn team, increase time out of bed and PT/OT consult    Patient Safety:  Fall Score:  Total Score: 4  Interventions: bed/chair alarm, assistive device (walker, cane, etc), gripper socks and pt to call before getting OOB  High Fall Risk: Yes    Length of Stay:  Expected LOS: 4d 19h  Actual LOS: Kiley Young 371

## 2021-08-23 VITALS
DIASTOLIC BLOOD PRESSURE: 70 MMHG | TEMPERATURE: 97.9 F | WEIGHT: 244.71 LBS | RESPIRATION RATE: 17 BRPM | BODY MASS INDEX: 33.15 KG/M2 | SYSTOLIC BLOOD PRESSURE: 140 MMHG | HEART RATE: 87 BPM | HEIGHT: 72 IN | OXYGEN SATURATION: 95 %

## 2021-08-23 LAB
ALBUMIN SERPL-MCNC: 2.7 G/DL (ref 3.5–5)
ANION GAP SERPL CALC-SCNC: 10 MMOL/L (ref 5–15)
BACTERIA SPEC CULT: NORMAL
BUN SERPL-MCNC: 79 MG/DL (ref 6–20)
BUN/CREAT SERPL: 11 (ref 12–20)
CALCIUM SERPL-MCNC: 8.7 MG/DL (ref 8.5–10.1)
CHLORIDE SERPL-SCNC: 101 MMOL/L (ref 97–108)
CO2 SERPL-SCNC: 25 MMOL/L (ref 21–32)
CREAT SERPL-MCNC: 7.16 MG/DL (ref 0.7–1.3)
ERYTHROCYTE [DISTWIDTH] IN BLOOD BY AUTOMATED COUNT: 15.5 % (ref 11.5–14.5)
GLUCOSE BLD STRIP.AUTO-MCNC: 126 MG/DL (ref 65–117)
GLUCOSE BLD STRIP.AUTO-MCNC: 129 MG/DL (ref 65–117)
GLUCOSE SERPL-MCNC: 134 MG/DL (ref 65–100)
HCT VFR BLD AUTO: 29.2 % (ref 36.6–50.3)
HGB BLD-MCNC: 9.3 G/DL (ref 12.1–17)
MCH RBC QN AUTO: 30.4 PG (ref 26–34)
MCHC RBC AUTO-ENTMCNC: 31.8 G/DL (ref 30–36.5)
MCV RBC AUTO: 95.4 FL (ref 80–99)
NRBC # BLD: 0 K/UL (ref 0–0.01)
NRBC BLD-RTO: 0 PER 100 WBC
PHOSPHATE SERPL-MCNC: 6.1 MG/DL (ref 2.6–4.7)
PLATELET # BLD AUTO: 211 K/UL (ref 150–400)
PMV BLD AUTO: 11.2 FL (ref 8.9–12.9)
POTASSIUM SERPL-SCNC: 4 MMOL/L (ref 3.5–5.1)
RBC # BLD AUTO: 3.06 M/UL (ref 4.1–5.7)
SERVICE CMNT-IMP: ABNORMAL
SERVICE CMNT-IMP: ABNORMAL
SERVICE CMNT-IMP: NORMAL
SODIUM SERPL-SCNC: 136 MMOL/L (ref 136–145)
VANCOMYCIN SERPL-MCNC: 19.2 UG/ML
WBC # BLD AUTO: 7.9 K/UL (ref 4.1–11.1)

## 2021-08-23 PROCEDURE — 97535 SELF CARE MNGMENT TRAINING: CPT | Performed by: OCCUPATIONAL THERAPIST

## 2021-08-23 PROCEDURE — 80202 ASSAY OF VANCOMYCIN: CPT

## 2021-08-23 PROCEDURE — 74011250636 HC RX REV CODE- 250/636: Performed by: HOSPITALIST

## 2021-08-23 PROCEDURE — 94760 N-INVAS EAR/PLS OXIMETRY 1: CPT

## 2021-08-23 PROCEDURE — 90935 HEMODIALYSIS ONE EVALUATION: CPT

## 2021-08-23 PROCEDURE — 77010033678 HC OXYGEN DAILY

## 2021-08-23 PROCEDURE — 97110 THERAPEUTIC EXERCISES: CPT

## 2021-08-23 PROCEDURE — 80069 RENAL FUNCTION PANEL: CPT

## 2021-08-23 PROCEDURE — 74011000258 HC RX REV CODE- 258: Performed by: HOSPITALIST

## 2021-08-23 PROCEDURE — 97116 GAIT TRAINING THERAPY: CPT

## 2021-08-23 PROCEDURE — 82962 GLUCOSE BLOOD TEST: CPT

## 2021-08-23 PROCEDURE — 74011250637 HC RX REV CODE- 250/637: Performed by: HOSPITALIST

## 2021-08-23 PROCEDURE — 85027 COMPLETE CBC AUTOMATED: CPT

## 2021-08-23 PROCEDURE — 36415 COLL VENOUS BLD VENIPUNCTURE: CPT

## 2021-08-23 RX ORDER — LEVOFLOXACIN 500 MG/1
500 TABLET, FILM COATED ORAL
Status: DISCONTINUED | OUTPATIENT
Start: 2021-08-24 | End: 2021-08-23 | Stop reason: HOSPADM

## 2021-08-23 RX ADMIN — FINASTERIDE 5 MG: 5 TABLET, FILM COATED ORAL at 13:47

## 2021-08-23 RX ADMIN — TROSPIUM CHLORIDE 20 MG: 20 TABLET, FILM COATED ORAL at 13:49

## 2021-08-23 RX ADMIN — CARVEDILOL 12.5 MG: 12.5 TABLET, FILM COATED ORAL at 17:16

## 2021-08-23 RX ADMIN — PIPERACILLIN AND TAZOBACTAM 3.38 G: 3; .375 INJECTION, POWDER, LYOPHILIZED, FOR SOLUTION INTRAVENOUS at 13:51

## 2021-08-23 RX ADMIN — TAMSULOSIN HYDROCHLORIDE 0.8 MG: 0.4 CAPSULE ORAL at 13:50

## 2021-08-23 RX ADMIN — Medication 10 ML: at 06:00

## 2021-08-23 RX ADMIN — LOSARTAN POTASSIUM 25 MG: 50 TABLET, FILM COATED ORAL at 13:48

## 2021-08-23 RX ADMIN — SERTRALINE 50 MG: 50 TABLET, FILM COATED ORAL at 13:49

## 2021-08-23 RX ADMIN — FLUTICASONE PROPIONATE 2 SPRAY: 50 SPRAY, METERED NASAL at 09:00

## 2021-08-23 RX ADMIN — Medication 10 ML: at 13:51

## 2021-08-23 RX ADMIN — BUMETANIDE 2 MG: 1 TABLET ORAL at 13:46

## 2021-08-23 RX ADMIN — ASPIRIN 81 MG: 81 TABLET, CHEWABLE ORAL at 13:46

## 2021-08-23 RX ADMIN — HEPARIN SODIUM 5000 UNITS: 5000 INJECTION INTRAVENOUS; SUBCUTANEOUS at 13:50

## 2021-08-23 NOTE — PROGRESS NOTES
End of Shift Note    Bedside shift change report given to  (oncoming nurse) by New Lisa RN (offgoing nurse). Report included the following information SBAR, Kardex, ED Summary, Intake/Output and MAR    Shift worked:  night     Shift summary and any significant changes:     No significant change     Concerns for physician to address:       Zone phone for oncoming shift:          Activity:  Activity Level: Up with Assistance  Number times ambulated in hallways past shift: 0  Number of times OOB to chair past shift: 1    Cardiac:   Cardiac Monitoring: No      Cardiac Rhythm: Sinus Rhythm    Access:   Current line(s): PIV     Genitourinary:   Urinary status: voiding    Respiratory:   O2 Device: Nasal cannula  Chronic home O2 use?: YES  Incentive spirometer at bedside: YES     GI:  Last Bowel Movement Date: 08/21/21  Current diet:  ADULT DIET Regular; 3 carb choices (45 gm/meal); Low Fat/Low Chol/High Fiber/RANI  Passing flatus: YES  Tolerating current diet: YES       Pain Management:   Patient states pain is manageable on current regimen: YES    Skin:  Kelby Score: 19  Interventions: increase time out of bed and PT/OT consult    Patient Safety:  Fall Score:  Total Score: 4  Interventions: gripper socks and pt to call before getting OOB  High Fall Risk: Yes    Length of Stay:  Expected LOS: 4d 19h  Actual LOS: 888 Martín Meyer RN

## 2021-08-23 NOTE — PROGRESS NOTES
Nephrology Progress Note  Chavo Euceda     [email or web address]  Phone - [phone]   Patient: Ghanshyam Double    YOB: 1945        Date- 8/23/2021   Admit Date: 8/18/2021  CC: Follow up for end-stage renal disease  IMPRESSION & PLAN:    ESRD- [facility]   Fever  - covid -ve   Anemia of ckd   S/p fall   Elevated ck- rhabdomyolysis   Sob - pulmonary edema vs pneumonia   Sec. hyperpara    PLAN-   Dialysis today   Continue HD Monday Wednesday Friday   Remove 2 -3 kg as tolerated   Abx per primary team   Continue losartan, coreg   epogen mwf   Subjective: Interval History:   - Seen and examine don HD  - Feels better  - No issue overnight    Objective:   Vitals:    08/23/21 1230 08/23/21 1245 08/23/21 1300 08/23/21 1344   BP: (!) 144/87 (!) 150/88 (!) 157/87 138/70   Pulse: 66 62 64 62   Resp: 16 16 16 17   Temp:   98 °F (36.7 °C) 98.2 °F (36.8 °C)   TempSrc:   Oral    SpO2:    96%   Weight:       Height:          08/22 0701 - 08/23 0700  In: -   Out: 100 [Urine:100]  Last 3 Recorded Weights in this Encounter    08/18/21 1955 08/20/21 2328 08/22/21 0600   Weight: 114.1 kg (251 lb 8.7 oz) 109.5 kg (241 lb 6.5 oz) 111 kg (244 lb 11.4 oz)      Physical exam:   GEN: nad  NECK:  Supple, no thyromegaly  RESP:  clear B/l, wheezing, decreased BS b/l  CVS: RRR,S1,S2   NEURO: non focal, normal speech  EXT: Edema +nt           Chart reviewed. Pertinent Notes reviewed. Data Review :  Recent Labs     08/23/21  0945 08/22/21  0603    135*   K 4.0 4.0    99   CO2 25 29   BUN 79* 64*   CREA 7.16* 6.37*   * 116*   CA 8.7 8.8   PHOS 6.1*  --      Recent Labs     08/23/21  0945 08/22/21  0603   WBC 7.9 7.3   HGB 9.3* 9.7*   HCT 29.2* 31.3*    207     No results for input(s): FE, TIBC, PSAT, FERR in the last 72 hours.    Medication list  reviewed  Current Facility-Administered Medications   Medication Dose Route Frequency    VANCOMYCIN INFORMATION NOTE   Other ONCE    albuterol (PROVENTIL HFA, VENTOLIN HFA, PROAIR HFA) inhaler 2 Puff  2 Puff Inhalation Q6H PRN    Vanc random level with am labs  1 Each Other ONCE    sodium chloride (OCEAN) 0.65 % nasal squeeze bottle 2 Spray  2 Spray Both Nostrils Q2H PRN    VANCOMYCIN INFORMATION NOTE   Other Rx Dosing/Monitoring    insulin lispro (HUMALOG) injection   SubCUTAneous AC&HS    glucose chewable tablet 16 g  4 Tablet Oral PRN    dextrose (D50W) injection syrg 12.5-25 g  12.5-25 g IntraVENous PRN    glucagon (GLUCAGEN) injection 1 mg  1 mg IntraMUSCular PRN    levoFLOXacin (LEVAQUIN) 500 mg in D5W IVPB  500 mg IntraVENous Q48H    sodium chloride (NS) flush 5-40 mL  5-40 mL IntraVENous Q8H    sodium chloride (NS) flush 5-40 mL  5-40 mL IntraVENous PRN    acetaminophen (TYLENOL) tablet 650 mg  650 mg Oral Q6H PRN    Or    acetaminophen (TYLENOL) suppository 650 mg  650 mg Rectal Q6H PRN    polyethylene glycol (MIRALAX) packet 17 g  17 g Oral DAILY PRN    ondansetron (ZOFRAN ODT) tablet 4 mg  4 mg Oral Q8H PRN    Or    ondansetron (ZOFRAN) injection 4 mg  4 mg IntraVENous Q6H PRN    heparin (porcine) injection 5,000 Units  5,000 Units SubCUTAneous Q8H    tamsulosin (FLOMAX) capsule 0.8 mg  0.8 mg Oral DAILY    sertraline (ZOLOFT) tablet 50 mg  50 mg Oral DAILY    trospium (SANCTURA) tablet 20 mg  20 mg Oral DAILY    losartan (COZAAR) tablet 25 mg  25 mg Oral DAILY    gabapentin (NEURONTIN) capsule 300 mg  300 mg Oral QHS    fluticasone propionate (FLONASE) 50 mcg/actuation nasal spray 2 Spray  2 Spray Both Nostrils DAILY    finasteride (PROSCAR) tablet 5 mg  5 mg Oral DAILY    carvediloL (COREG) tablet 12.5 mg  12.5 mg Oral BID WITH MEALS    bumetanide (BUMEX) tablet 2 mg  2 mg Oral DAILY    aspirin chewable tablet 81 mg  81 mg Oral DAILY    [Held by provider] insulin lispro (HUMALOG) injection 7 Units  7 Units SubCUTAneous TIDAC    epoetin oksana-epbx (RETACRIT) injection 4,000 Units  4,000 Units SubCUTAneous Q MON, WED & FRI    [Held by provider] insulin glargine (LANTUS) injection 17 Units  17 Units SubCUTAneous DAILY    [Held by provider] insulin glargine (LANTUS) injection 22 Units  22 Units SubCUTAneous QHS    piperacillin-tazobactam (ZOSYN) 3.375 g in 0.9% sodium chloride (MBP/ADV) 100 mL MBP  3.375 g IntraVENous Q12H          Ayde Quinonezg, [address] [facility]  [facility] / [facility] [address]  [place], [address]  Phone - [phone]               Fax - [phone]

## 2021-08-23 NOTE — PROGRESS NOTES
Hospitalist Progress Note    NAME: Stanislav Dunlap   :  1945   MRN:  [id number]       Assessment / Plan:  Sepsis, POA unclear etiology as evidence by fever 103, WBC 13.2, RR 24-30  Acute on chronic hypoxic respiratory failure, POA  90% RA during eval; 98% on 2L. Rule out pneumonia, covid-19 infection  Bilateral patchy pulmonary opacities read as mild pulmonary edema, POA    Acute on chronic systolic chf EF 60-08% , POA possible b/l pneumonia, rule out covid-19 pneumonia  --continue empiric abx with vancomycin, zosyn, levaquin. Procalcitonin 0.56  --covid testing negative  --procal 4.68  --appreciate nephrology consult/HD management     Rhabdomyolysis due to fall and on floor x 8 hours at home. Generalized weakness  --not on statin. --CK mildly elevated  --frequent turns  --PT/OT  --pt looks clinically better and participating in OP PT prior to admission. Would like to see him work with PT/OT again prior to DC to see if recommendation for placement has changed     Elevated troponin 0.23, suspect demand ischemia  --continue aspirin  --repeat troponin     ESRD on HD MWF  --HD today (Fri)  --appreciate nephrology consult     IDDM type 2  --continue 70/30 30 units AM, 40 units PM     FRIDA on 2L O2 at night and prn at home  --continue O2    PT/OT recommending therapy up to 3hrs/daily    Estimated DC -  Barriers: placement     Body mass index is 36.62 kg/m².     Code:  full  DVT prophylaxis: heparin  Surrogate decision maker:  Wife Angela Haley     Subjective:     Chief Complaint / Reason for Physician Visit  Patient feeling better, denies SOB,cough, chest pain, fevers/chills    Discussed with RN events overnight.      Review of Systems:  Symptom Y/N Comments  Symptom Y/N Comments   Fever/Chills n   Chest Pain n    Poor Appetite n   Edema n    Cough n   Abdominal Pain n    Sputum n   Joint Pain     SOB/BRITT n   Pruritis/Rash     Nausea/vomit n   Tolerating PT/OT     Diarrhea n   Tolerating Diet y Constipation n   Other       Could NOT obtain due to:      Objective:     VITALS:   Last 24hrs VS reviewed since prior progress note. Most recent are:  Patient Vitals for the past 24 hrs:   Temp Pulse Resp BP SpO2   08/22/21 2009 97.9 °F (36.6 °C) 65 17 (!) 162/75 99 %   08/22/21 1816 98.4 °F (36.9 °C) 99 16 (!) 162/77 99 %   08/22/21 1432 97.7 °F (36.5 °C) 68 15 (!) 142/66 99 %   08/22/21 1121 98 °F (36.7 °C) 60 18 134/72 100 %   08/22/21 0726 97.6 °F (36.4 °C) 61 19 (!) 147/68 98 %   08/22/21 0339 98.7 °F (37.1 °C) 65 17 127/75    08/21/21 2317 98.8 °F (37.1 °C) 62 20 (!) 155/71        Intake/Output Summary (Last 24 hours) at 8/22/2021 2156  Last data filed at 8/21/2021 2317  Gross per 24 hour   Intake 250 ml   Output 75 ml   Net 175 ml        I had a face to face encounter and independently examined this patient on 8/22/2021, as outlined below:  PHYSICAL EXAM:  General: WD, WN. Alert, cooperative, no acute distress    EENT:  EOMI. Anicteric sclerae. MMM  Resp:  CTA bilaterally, no wheezing or rales. No accessory muscle use  CV:  Regular  rhythm,  No edema  GI:  Soft, Non distended, Non tender. +Bowel sounds  Neurologic:  Alert and oriented X 3, normal speech,   Psych:   Good insight. Not anxious nor agitated  Skin:  No rashes. No jaundice    Reviewed most current lab test results and cultures  YES  Reviewed most current radiology test results   YES  Review and summation of old records today    NO  Reviewed patient's current orders and MAR    YES  PMH/SH reviewed - no change compared to H&P  ________________________________________________________________________  Care Plan discussed with:    Comments   Patient x    Family      RN x    Care Manager     Consultant                       x Multidiciplinary team rounds were held today with , nursing, pharmacist and clinical coordinator. Patient's plan of care was discussed; medications were reviewed and discharge planning was addressed. ________________________________________________________________________  Total NON critical care TIME:  30   Minutes    Total CRITICAL CARE TIME Spent:   Minutes non procedure based      Comments   >50% of visit spent in counseling and coordination of care x    ________________________________________________________________________  Wil Singh DO     Procedures: see electronic medical records for all procedures/Xrays and details which were not copied into this note but were reviewed prior to creation of Plan. LABS:  I reviewed today's most current labs and imaging studies.   Pertinent labs include:  Recent Labs     08/22/21  0603 08/20/21 0349   WBC 7.3 9.0   HGB 9.7* 9.1*   HCT 31.3* 29.2*    178     Recent Labs     08/22/21  0603 08/20/21 0349   * 135*   K 4.0 4.2   CL 99 101   CO2 29 27   * 94   BUN 64* 65*   CREA 6.37* 6.25*   CA 8.8 8.7       Signed: Wil Singh DO

## 2021-08-23 NOTE — PROGRESS NOTES
Problem: Self Care Deficits Care Plan (Adult)  Goal: *Acute Goals and Plan of Care (Insert Text)  Description:   FUNCTIONAL STATUS PRIOR TO ADMISSION: Patient was modified independent using a single point cane for functional mobility. Stands to shower. Admits to falls PTA. Drives. HOME SUPPORT: The patient lived with wife but did not require assist. Wife works FT. Occupational Therapy Goals  Initiated 8/20/2021  1. Patient will perform grooming tasks standing at the sink with supervision/set-up within 7 day(s). 2.  Patient will perform upper body dressing with independence within 7 day(s). 3.  Patient will perform lower body dressing with supervision within 7 day(s). 4.  Patient will perform toilet transfers with supervision using least restrictive device within 7 day(s). 5.  Patient will perform all aspects of toileting with supervision within 7 day(s). 6.  Patient will perform item retrieval from various heights during functional tasks with Supervision within 7 days. Outcome: Progressing Towards Goal   OCCUPATIONAL THERAPY TREATMENT  Patient: Jw Spencer (14 y.o. male)  Date: 8/23/2021  Diagnosis: Sepsis ([facility] [address] [A41.9]  Acute hypoxemic respiratory failure ([facility] [address] [J96.01]  ESRD (end stage renal disease) on dialysis ([facility] [address] [N18.6, Z99.2] <principal problem not specified>       Precautions: Fall  Chart, occupational therapy assessment, plan of care, and goals were reviewed. ASSESSMENT  Patient continues with skilled OT services and is progressing towards goals. Pt was seated upon arrival and had recently had PT and HD earlier this date. Pt reports that he is feeling well post dialysis today, but sometimes drives himself home and needs to take a nap. Pt demonstrated generally supervision/stand by assist assistance levels for adls and simulated IADLs performed this date. He had no complaints of pain nor fatigue this session.   Recommended that pt have [facility] OT at discharge, however, pt declines and reports that he had O/P therapy services PTA and would like to resume. Pt has no concerns re: returning home and hopes to return to Outpatient therapy services. He is interested in sitting for tub showers and educated on available adaptive aids, and DME for increasing safety during adls.  (grab bar styles, bench style seat over the tub). Educated on home safety and fall prevention; pt verbalized understanding. Current Level of Function Impacting Discharge (ADLs): supervision/stand by assist    Other factors to consider for discharge: Home safety (has scatter rugs) and fall prevention. PLAN :  Patient continues to benefit from skilled intervention to address the above impairments. Continue treatment per established plan of care to address goals. Recommend with staff: encourage frequent position changes      Recommendation for discharge: (in order for the patient to meet his/her long term goals)  Occupational therapy at least 2 days/week in the home AND ensure assist and/or supervision for safety with adls and adl mobility. Pt does not want [facility] therapy services. This discharge recommendation:  Has been made in collaboration with the attending provider and/or case management    IF patient discharges home will need the following DME: transfer bench       SUBJECTIVE:   Patient stated I could use one of those.    (tub bench--pt will check online)    OBJECTIVE DATA SUMMARY:   Cognitive/Behavioral Status:  Neurologic State: Alert  Orientation Level: Appropriate for age  Cognition: Appropriate decision making; Appropriate for age attention/concentration; Follows commands  Perception: Appears intact  Perseveration: No perseveration noted  Safety/Judgement: Awareness of environment; Fall prevention;Home safety; Insight into deficits    Functional Mobility and Transfers for ADLs:  Bed Mobility:  Rolling:  (pt was seated upon arrival)    Transfers:  Sit to Stand: Stand-by assistance  Functional Transfers  Bathroom Mobility: Supervision/set up  Toilet Transfer : Supervision (educ in safe technique)  Tub Transfer:  (educ on appropriate equipment rec. tub bench/ or grab bar)  Adaptive Equipment:  (rec. tub bench, grab bar, sitting for showers)  Bed to Chair: Stand-by assistance;Contact guard assistance    Balance:  Sitting: Intact  Sitting - Static: Good (unsupported)  Sitting - Dynamic: Good (unsupported)  Standing: Intact; With support  Standing - Static: Constant support;Good (using RW--educ in safe technique throughout adls/IADLs)  Standing - Dynamic : Constant support;Good (encouraged to use reacher for picking up object from floor)    ADL Intervention:  Feeding  Container Management: Independent (able to open milk container)    Grooming  Grooming Assistance: Stand-by assistance  Position Performed: Standing  Washing Hands: Stand-by assistance  Adaptive Equipment:  (educ on safe use of RW for standing grooming)              Upper Body [address]: Independent    Lower Body Dressing Assistance  Protective Undergarmet: Independent    Toileting  Clothing Management: Independent  Adaptive Equipment: Walker (uises sink for support on L at home)    Cognitive Retraining  Safety/Judgement: Awareness of environment; Fall prevention;Home safety; Insight into deficits      Pain:  No complaints of pain    Activity Tolerance:   Good    After treatment patient left in no apparent distress:   Sitting in chair, Call bell within reach, and nurse and  informed    COMMUNICATION/COLLABORATION:   The patients plan of care was discussed with: Physical therapy assistant, Registered nurse, and Case management.      VERO Macias/L  Time Calculation: 29 mins

## 2021-08-23 NOTE — PROCEDURES
Hemodialysis / [facility] / [phone]    Vitals   Pre   Post   Assessment   Pre   Post     Temp  Temp: 97.8 °F (36.6 °C) (08/23/21 0930)  98.0 LOC  A/Ox4 A/Ox4   HR   Pulse (Heart Rate): 61 (08/23/21 0930) 64 Lungs   Clear  Clear   B/P   BP: (!) 147/80 (08/23/21 0930) 157/87 Cardiac   HR Steady  HR Steady   Resp   Resp Rate: 16 (08/23/21 0930) 16 Skin   Intact  Intact   Pain level  Pain Intensity 1: 0 (08/22/21 2019) 0 Edema  BLE Trace     BLE Trace   Orders:    Duration:   Start:    0930 End:    1300 Total:   3.5hrs   Dialyzer:   Dialyzer/Set Up Inspection: Revaclear (08/23/21 0930)   K Bath:   Dialysate K (mEq/L): 3 (08/23/21 0930)   Ca Bath:   Dialysate CA (mEq/L): 2.5 (08/23/21 0930)   Na/Bicarb:   Dialysate NA (mEq/L): 140 (08/23/21 0930)   Target Fluid Removal:   Goal/Amount of Fluid to Remove (mL): 2000 mL (08/23/21 0930)   Access     Type & Location:   STEPHANIE AVF: skin CDI. No s/s of infection. + B/T. No issues with cannulation or hemostasis. Running well at . Labs     Obtained/Reviewed   Critical Results Called   Date when labs were drawn-  Hgb-    HGB   Date Value Ref Range Status   08/22/2021 9.7 (L) 12.1 - 17.0 g/dL Final     K-    Potassium   Date Value Ref Range Status   08/22/2021 4.0 3.5 - 5.1 mmol/L Final     Ca-   Calcium   Date Value Ref Range Status   08/22/2021 8.8 8.5 - 10.1 MG/DL Final     Bun-   BUN   Date Value Ref Range Status   08/22/2021 64 (H) 6 - 20 MG/DL Final     Creat-   Creatinine   Date Value Ref Range Status   08/22/2021 6.37 (H) 0.70 - 1.30 MG/DL Final        Medications/ Blood Products Given     Name   Dose   Route and Time     None ordered                Blood Volume Processed (BVP):    86.8L Net Fluid   Removed:  2000ml   Comments   Time Out Done: (PBNK)0186  Primary Nurse Rpt Pre:Malia Mott, RN  Primary Nurse Rpt Jackson Frias RN  Pt Education:Procedural  Care Plan:Ongoing  Tx Summary:  Pt arrived to HD [address].  Consent signed & on file. SBAR received from Primary RN.  0930: Pt cannulated with 51N needles per policy & without issue. Labs drawn per request/ order. VSS. Dialysis Tx initiated. *Pt stable throughout treatment. No issues noted. 1300: Tx ended. VSS. All possible blood returned to patient. Hemostasis achieved without issue. Bed locked and in the lowest position, call bell and belongings in reach. SBAR given to Primary, RN. Patient is stable at time of their departure. All Dialysis related medications have been reviewed. Admiting Diagnosis:Sepsis  Pt's previous [facility]  Consent signed - Informed Consent Verified: Yes (08/20/21 1310)  [place] Consent - On File  Hepatitis Status- Immune 5/4/21  Machine #- Machine Number: [id number] (08/23/21 0930)  Telemetry status-N/A  Pre-dialysis wt. -

## 2021-08-23 NOTE — PROGRESS NOTES
Occupational Therapy  Medical record reviewed. Pt is CHEPE in HD at this time and not available to participate in OT treatment. On OT/PT Evaluations on 8/20, intensive rehab was recommended--3 hours per day, 5-7 days per week. Will defer at this time and continue to follow.

## 2021-08-23 NOTE — PROGRESS NOTES
Pharmacy Antimicrobial Kinetic Dosing    Indication for Antimicrobials: sepsis     Current Regimen of Each Antimicrobial:  Levofloxacin 500mg IV q 48 (start: , day 6)  Vancomycin pharmacy to dose (Start Date ; Day 6)  Zosyn 3.375g IV q 12h (start: , day 6)    Previous Antimicrobial Therapy:  Cefepime 2g IV in ED -21    Goal Level: VANCOMYCIN TROUGH GOAL RANGE    Vancomycin Trough: 20 - 25 mcg/mL    Date Dose & Interval Measured (mcg/mL) Extrapolated (mcg/mL)   21 1250 mg on  36.8     -- 19.2            Date & time of next level: Monday with AM labs    Significant Positive Cultures:   : blood. NG. Final    Procalcitonin  : 4.68    Conditions for Dosing Consideration: Hemodialysis  MWF    Labs:  Recent Labs     21  0945 21  0603   CREA 7.16* 6.37*   BUN 79* 64*     Recent Labs     21  0945 21  0603   WBC 7.9 7.3     Temp (24hrs), Av °F (36.7 °C), Min:97.7 °F (36.5 °C), Max:98.4 °F (36.9 °C)    Creatinine Clearance (mL/min):   CrCl (Ideal Body Weight): 10.1   If actual weight < IBW: CrCl (Actual Body Weight) 14.4    Impression/Plan:   Vancomycin on  HELD for supratherapeutic level. Vancomycin random level down to a therapeutic level, decrease Vancomycin to 500 mg post HD and recheck level before next HD. Continue zosyn 3.375g IV q 12h and levofloxacin 500 mg q48h  WBC down. Afebrile  Antimicrobial stop date pending     Pharmacy will follow daily and adjust medications as appropriate for renal function and/or serum levels.     Thank you,  Mina Anna, PHARMD

## 2021-08-23 NOTE — PROGRESS NOTES
HD TRANSFER - OUT REPORT:    Verbal report given to Christine Cordero RN on Sandra Valdes being transferred to [facility] for routine progression of care       Report consisted of patient's Situation, Background, Assessment and   Recommendations(SBAR). Information from the following report(s) SBAR, Procedure Summary, Intake/Output and Recent Results was reviewed with the receiving nurse. Method:  $$ Method: Hemodialysis (08/23/21 0930)    Fluid Removed  NET Fluid Removed (mL): 2000 ml (08/23/21 1300)     Patient response to treatment:  Stable    End Time  Hemodialysis End Time: 1300 (08/23/21 1300)  If not documented, dialysis nurse to update post-dialysis row in HD/Filtration flowsheet     Medications /Volume expansion agents or Fluid boluses administered during treatment? no    Post-dialysis medication administration due?  yes  Remind nurse to administer post-HD medication upon return to unit. Fistula hemostasis? yes    Line heparinization? no    Lines: STEPHANIE AVF    Opportunity for questions and clarification was provided.       Patient transported with: [employer]

## 2021-08-23 NOTE — DISCHARGE SUMMARY
Hospitalist Discharge Summary     Patient ID:  Jessi Corbett  [id number]  52 y.o.  1945 8/18/2021    PCP on record: Gayle Norris MD    Admit date: 8/18/2021  Discharge date and time: 8/23/2021    DISCHARGE DIAGNOSIS:    See below    CONSULTATIONS:  IP CONSULT TO NEPHROLOGY    Excerpted HPI from H&P of Eh Zavala MD:  Jessi Corbett is a 70 y.o. male developed generalized weakness and fall on Monday evening after dialysis. On floor for 8 hours and sustained abrasions to elbows, knees, toes. Low grade temp 99 at home, mild HA, mild SOB. Denies cough, abd pain, n/v/d. No chest pain. In ER fever 103     Hospitalized 5/21 with chf exac with pulmonary edema, elevated troponin due to demand, also tx with Z-pack.  ______________________________________________________________________  DISCHARGE SUMMARY/HOSPITAL COURSE:  for full details see H&P, daily progress notes, labs, consult notes. Sepsis, POA unclear etiology as evidence by fever 103, WBC 13.2, RR 24-30  Acute on chronic hypoxic respiratory failure, POA  90% RA during eval; 98% on 2L.  Rule out pneumonia, covid-19 infection  Bilateral patchy pulmonary opacities read as mild pulmonary edema, POA    Acute on chronic systolic chf EF 93-53% 2/67, QJS [id number] b/l pneumonia, rule out covid-19 pneumonia  --continue empiric abx with vancomycin, zosyn, levaquin.  Procalcitonin 0.56  --covid testing negative  --procal 4.68  --appreciate nephrology consult/HD management     Rhabdomyolysis due to fall and on floor x 8 hours at home.  Generalized weakness  --not on statin. --CK mildly elevated  --frequent turns  --PT/OT  --pt looks clinically better and participating in OP PT prior to admission. Would like to see him work with PT/OT again prior to DC to see if recommendation for placement has changed    ? Parkinson's Dz  -shuffling gait, tremors when working with PT  -OP follow up with neuro     Elevated troponin 0.23, suspect demand ischemia  --continue aspirin  --repeat troponin     ESRD on HD MWF  --HD today (Fri)  --appreciate nephrology consult     IDDM type 2  --continue 70/30 30 units AM, 40 units PM     FRIDA on 2L O2 at night and prn at home  --continue O2    _______________________________________________________________________  Patient seen and examined by me on discharge day. Pertinent Findings:  Gen:    Not in distress  Chest: Clear lungs  CVS:   Regular rhythm. No edema  Abd:  Soft, not distended, not tender  Neuro:  Alert, oriented x 3  _______________________________________________________________________  DISCHARGE MEDICATIONS:   Current Discharge Medication List      CONTINUE these medications which have NOT CHANGED    Details   gabapentin (NEURONTIN) 300 mg capsule Take 1 Capsule by mouth nightly. Max Daily Amount: 300 mg. PLEASE SEND FUTURE REFILLS TO PCP OR NEW ENDOCRINOLOGIST AS DR TYELR IS NO LONGER AT THIS PRACTICE  Qty: 90 Capsule, Refills: 0    Associated Diagnoses: Type 2 diabetes mellitus with stage 4 chronic kidney disease, with long-term current use of insulin (HCC)      carvediloL (COREG) 12.5 mg tablet Take 1 Tab by mouth two (2) times daily (with meals). Qty: 60 Tab, Refills: 0      bumetanide (BUMEX) 2 mg tablet TAKE 1 TABLET BY MOUTH EVERY DAY      finasteride (PROSCAR) 5 mg tablet TAKE 1 TABLET BY MOUTH DAILY      Myrbetriq 25 mg ER tablet TAKE 1 TABLET BY MOUTH DAILY      insulin NPH/insulin regular (NovoLIN 70/30 U-100 Insulin) 100 unit/mL (70-30) injection ReliOn Novolin 70/30: Inject 30 Units with Breakfast, 40 Units with Dinner  Qty: 7 Vial, Refills: 3    Comments: Please consider 90 day supplies to promote better adherence      losartan (COZAAR) 25 mg tablet Take 25 mg by mouth daily. sertraline (ZOLOFT) 50 mg tablet Take 50 mg by mouth daily. Refills: 0      aspirin 81 mg chewable tablet Take 81 mg by mouth daily. tamsulosin (FLOMAX) 0.4 mg capsule Take 0.8 mg by mouth daily. Calcium-Cholecalciferol, D3, (CALCIUM 600 WITH VITAMIN D3) 600 mg(1,500mg) -400 unit chew Take 1 Tab by mouth every evening. fluticasone (FLONASE) 50 mcg/actuation nasal spray 2 Sprays by Both Nostrils route daily. Patient Follow Up Instructions:    Activity: PT/OT per Home Health  Diet: Resume previous diet      Follow-up Information     Follow up With Specialties Details Why Contact John Stallworth 68, DO Neurology Schedule an appointment as soon as possible for a visit  [address] [place] [address] 29.  [phone]          ________________________________________________________________    Risk of deterioration: Moderate    Condition at Discharge:  Stable  __________________________________________________________________    Disposition  Home with family and home health services    ____________________________________________________________________    Code Status: Full Code  ___________________________________________________________________      Total time in minutes spent coordinating this discharge (includes going over instructions, follow-up, prescriptions, and preparing report for sign off to her PCP) :  >30 minutes    Signed:  Arleth Bello DO

## 2021-08-23 NOTE — PROGRESS NOTES
Problem: Mobility Impaired (Adult and Pediatric)  Goal: *Acute Goals and Plan of Care (Insert Text)  Description: FUNCTIONAL STATUS PRIOR TO ADMISSION: Mod I with use of SPC. History of 1 fall. Wears 2L O2 at baseline. Still driving (drives himself to dialysis). Sedentary. HOME SUPPORT PRIOR TO ADMISSION: The patient lived with wife however states wife works full-time. Physical Therapy Goals  Initiated 8/19/2021  1. Patient will move from supine to sit and sit to supine , scoot up and down, and roll side to side in bed with supervision/set-up within 7 day(s). 2.  Patient will transfer from bed to chair and chair to bed with supervision/set-up using the least restrictive device within 7 day(s). 3.  Patient will perform sit to stand with supervision/set-up within 7 day(s). 4.  Patient will ambulate with supervision/set-up for 150 feet with the least restrictive device within 7 day(s). 5.  Patient will ascend/descend 2 stairs with 0 handrail(s) with supervision/set-up within 7 day(s). Outcome: Progressing Towards Goal   PHYSICAL THERAPY TREATMENT  Patient: Marques Lawrence (85 y.o. male)  Date: 8/23/2021    Diagnosis: Sepsis ([facility] [address] [A41.9]        Acute hypoxemic respiratory failure ([facility] [address] [J96.01]        ESRD (end stage renal disease) on dialysis ([facility] [address] [N18.6, Z99.2]        Precautions: Fall    Chart, physical therapy assessment, plan of care and goals were reviewed. ASSESSMENT: Patient continues with skilled PT services and is progressing towards goals, no LOB (stable with RW) or SOB (on room air), does well with bed mob and transfers, good motivation, did well with ther-ex, pt is safe to go home with HHPT and support from wife and son. Current Level of Function Impacting Discharge (mobility/balance): Stand-by assistance/CGA         PLAN : Patient continues to benefit from skilled intervention to address the above impairments.   Continue treatment per established plan of care  to address goals. Recommendation for discharge: (in order for the patient to meet his/her long term goals) Physical therapy at least 2 days/week in the home     This discharge recommendation: Has been made in collaboration with the attending provider and/or case management    IF patient discharges home will need the following DME: rolling walker     OBJECTIVE DATA SUMMARY:     Critical Behavior:  Neurologic State: Alert  Orientation Level: Oriented X4  Cognition: Appropriate decision making  Safety/Judgement: Fall prevention, Decreased insight into deficits    Functional Mobility Training:  Bed Mobility:  Supine to Sit: Stand-by assistance  Scooting: Stand-by assistance  Level of Assistance: Stand-by assistance  Interventions: Verbal cues    Transfers:  Sit to Stand: Stand-by assistance  Stand to Sit: Stand-by assistance  Bed to Chair: Stand-by assistance;Contact guard assistance  Interventions: Tactile cues; Verbal cues  Level of Assistance: Contact guard assistance    Balance:  Sitting: Intact; Without support  Sitting - Static: Good (unsupported)  Sitting - Dynamic: Not tested  Standing: Intact; With support  Standing - Static: Good;Constant support  Standing - Dynamic : Good;Constant support    Ambulation/Gait Training:  Distance (ft): 30 Feet (ft)  Assistive Device: Gait belt;Walker, rolling  Ambulation - Level of Assistance: Stand-by assistance  Gait Abnormalities: Decreased step clearance  Right Side Weight Bearing: Full  Left Side Weight Bearing: Full  Base of Support: Widened  Speed/Deanna: Pace decreased (<100 feet/min)  Step Length: Left shortened;Right shortened  Interventions: Tactile cues; Verbal cues    Therapeutic Exercises:   sitting  EXERCISE   Sets   Reps   Active Active Assist   Passive   Comments   Ankle pumps 1 10 [x] [] [] bilat   Heel raises 1 10 [x] [] [] \"   Toe tap 1 10 [x] [] [] \"   Knee ext 1 10 [x] [] [] \"   Hip flex 1 10 [x] [] [] \"     Pain Ratin    Activity Tolerance: Fair    After treatment patient left in no apparent distress: Sitting in chair and Call bell within reach    COMMUNICATION/COLLABORATION:   The patients plan of care was discussed with: Registered nurse.      Sahil Mcdonald PTA   Time Calculation: 28 mins

## 2021-08-23 NOTE — PROGRESS NOTES
TRANSFER - IN REPORT:    Verbal report received from Jackie Seigel RN(name) on Dinora Oar  being received from Ortho(unit) for ordered procedure      Report consisted of patients Situation, Background, Assessment and   Recommendations(SBAR). Information from the following report(s) SBAR, Intake/Output, MAR and Recent Results was reviewed with the receiving nurse. Opportunity for questions and clarification was provided. Assessment completed upon patients arrival to unit and care assumed.

## 2021-08-23 NOTE — PROGRESS NOTES
Occupational Therapy  Medical record reviewed. Pt participated in OT treatment and states that he feels better than before he came in. He wants to discharge home and has no concerns re: managing at home. His wife is with him at home. [facility] and reported that he was receiving Outpatient therapy PTA (ordered by his \"arthritis doctor\")  Pt performed adls and simulated IADLs with supervision using the RW. Pt plans on using his cane at home. Educated on home safety and fall prevention including-no scatter rugs, having a well lit home, can use a urinal at night at bedside, keeping pathways clear and using appropriate DME for self care--recommended a tub bench for seated bathing. Pt verbalized understanding. Full OT note to follow.

## 2021-08-23 NOTE — PROGRESS NOTES
Problem: Falls - Risk of  Goal: *Absence of Falls  Description: Document Kierra Bergeron Fall Risk and appropriate interventions in the flowsheet. Outcome: Progressing Towards Goal  Note: Fall Risk Interventions:  Mobility Interventions: Bed/chair exit alarm         Medication Interventions: Bed/chair exit alarm, Patient to call before getting OOB, Evaluate medications/consider consulting pharmacy    Elimination Interventions: Call light in reach, Bed/chair exit alarm, Patient to call for help with toileting needs    History of Falls Interventions: Bed/chair exit alarm         Problem: Pressure Injury - Risk of  Goal: *Prevention of pressure injury  Description: Document Kelby Scale and appropriate interventions in the flowsheet. Outcome: Progressing Towards Goal  Note: Pressure Injury Interventions:       Moisture Interventions: Absorbent underpads, Check for incontinence Q2 hours and as needed    Activity Interventions: Pressure redistribution bed/mattress(bed type)    Mobility Interventions: PT/OT evaluation, Turn and reposition approx.  every two hours(pillow and wedges)    Nutrition Interventions: Document food/fluid/supplement intake

## 2021-10-11 ENCOUNTER — HOSPITAL ENCOUNTER (EMERGENCY)
Age: 76
Discharge: HOME | End: 2021-10-11
Attending: STUDENT IN AN ORGANIZED HEALTH CARE EDUCATION/TRAINING PROGRAM
Payer: MEDICARE

## 2021-10-11 ENCOUNTER — APPOINTMENT (OUTPATIENT)
Dept: GENERAL RADIOLOGY | Age: 76
End: 2021-10-11
Attending: STUDENT IN AN ORGANIZED HEALTH CARE EDUCATION/TRAINING PROGRAM
Payer: MEDICARE

## 2021-10-11 ENCOUNTER — APPOINTMENT (OUTPATIENT)
Dept: CT IMAGING | Age: 76
End: 2021-10-11
Attending: STUDENT IN AN ORGANIZED HEALTH CARE EDUCATION/TRAINING PROGRAM
Payer: MEDICARE

## 2021-10-11 VITALS
HEIGHT: 72 IN | HEART RATE: 69 BPM | RESPIRATION RATE: 24 BRPM | BODY MASS INDEX: 32.43 KG/M2 | SYSTOLIC BLOOD PRESSURE: 139 MMHG | OXYGEN SATURATION: 97 % | TEMPERATURE: 100 F | DIASTOLIC BLOOD PRESSURE: 67 MMHG | WEIGHT: 239.42 LBS

## 2021-10-11 DIAGNOSIS — Z99.2 ESRD (END STAGE RENAL DISEASE) ON DIALYSIS (HCC): ICD-10-CM

## 2021-10-11 DIAGNOSIS — N18.6 ESRD (END STAGE RENAL DISEASE) ON DIALYSIS (HCC): ICD-10-CM

## 2021-10-11 DIAGNOSIS — J18.9 MULTIFOCAL PNEUMONIA: Primary | ICD-10-CM

## 2021-10-11 LAB
ALBUMIN SERPL-MCNC: 3.6 G/DL (ref 3.5–5)
ALBUMIN/GLOB SERPL: 0.8 {RATIO} (ref 1.1–2.2)
ALP SERPL-CCNC: 56 U/L (ref 45–117)
ALT SERPL-CCNC: 16 U/L (ref 12–78)
ANION GAP SERPL CALC-SCNC: 2 MMOL/L (ref 5–15)
APPEARANCE UR: CLEAR
AST SERPL-CCNC: 12 U/L (ref 15–37)
BACTERIA URNS QL MICRO: NEGATIVE /HPF
BASOPHILS # BLD: 0 K/UL (ref 0–0.1)
BASOPHILS NFR BLD: 0 % (ref 0–1)
BILIRUB SERPL-MCNC: 0.8 MG/DL (ref 0.2–1)
BILIRUB UR QL: NEGATIVE
BUN SERPL-MCNC: 31 MG/DL (ref 6–20)
BUN/CREAT SERPL: 6 (ref 12–20)
CALCIUM SERPL-MCNC: 8.9 MG/DL (ref 8.5–10.1)
CHLORIDE SERPL-SCNC: 102 MMOL/L (ref 97–108)
CO2 SERPL-SCNC: 31 MMOL/L (ref 21–32)
COLOR UR: ABNORMAL
COVID-19 RAPID TEST, COVR: NOT DETECTED
CREAT SERPL-MCNC: 5.41 MG/DL (ref 0.7–1.3)
DIFFERENTIAL METHOD BLD: ABNORMAL
EOSINOPHIL # BLD: 0.2 K/UL (ref 0–0.4)
EOSINOPHIL NFR BLD: 2 % (ref 0–7)
EPITH CASTS URNS QL MICRO: ABNORMAL /LPF
ERYTHROCYTE [DISTWIDTH] IN BLOOD BY AUTOMATED COUNT: 15.5 % (ref 11.5–14.5)
GLOBULIN SER CALC-MCNC: 4.6 G/DL (ref 2–4)
GLUCOSE SERPL-MCNC: 129 MG/DL (ref 65–100)
GLUCOSE UR STRIP.AUTO-MCNC: NEGATIVE MG/DL
HCT VFR BLD AUTO: 31.4 % (ref 36.6–50.3)
HGB BLD-MCNC: 10 G/DL (ref 12.1–17)
HGB UR QL STRIP: NEGATIVE
HYALINE CASTS URNS QL MICRO: ABNORMAL /LPF (ref 0–5)
IMM GRANULOCYTES # BLD AUTO: 0 K/UL (ref 0–0.04)
IMM GRANULOCYTES NFR BLD AUTO: 0 % (ref 0–0.5)
KETONES UR QL STRIP.AUTO: NEGATIVE MG/DL
LACTATE BLD-SCNC: 1.32 MMOL/L (ref 0.4–2)
LEUKOCYTE ESTERASE UR QL STRIP.AUTO: NEGATIVE
LIPASE SERPL-CCNC: 180 U/L (ref 73–393)
LYMPHOCYTES # BLD: 1 K/UL (ref 0.8–3.5)
LYMPHOCYTES NFR BLD: 11 % (ref 12–49)
MCH RBC QN AUTO: 30 PG (ref 26–34)
MCHC RBC AUTO-ENTMCNC: 31.8 G/DL (ref 30–36.5)
MCV RBC AUTO: 94.3 FL (ref 80–99)
MONOCYTES # BLD: 1 K/UL (ref 0–1)
MONOCYTES NFR BLD: 10 % (ref 5–13)
NEUTS SEG # BLD: 7.4 K/UL (ref 1.8–8)
NEUTS SEG NFR BLD: 77 % (ref 32–75)
NITRITE UR QL STRIP.AUTO: NEGATIVE
NRBC # BLD: 0 K/UL (ref 0–0.01)
NRBC BLD-RTO: 0 PER 100 WBC
PH UR STRIP: 7.5 [PH] (ref 5–8)
PLATELET # BLD AUTO: 189 K/UL (ref 150–400)
PMV BLD AUTO: 11.1 FL (ref 8.9–12.9)
POTASSIUM SERPL-SCNC: 4 MMOL/L (ref 3.5–5.1)
PROT SERPL-MCNC: 8.2 G/DL (ref 6.4–8.2)
PROT UR STRIP-MCNC: 300 MG/DL
RBC # BLD AUTO: 3.33 M/UL (ref 4.1–5.7)
RBC #/AREA URNS HPF: ABNORMAL /HPF (ref 0–5)
SARS-COV-2, COV2: NORMAL
SODIUM SERPL-SCNC: 135 MMOL/L (ref 136–145)
SOURCE, COVRS: NORMAL
SP GR UR REFRACTOMETRY: 1.01 (ref 1–1.03)
TROPONIN-HIGH SENSITIVITY: 212 NG/L (ref 0–76)
TROPONIN-HIGH SENSITIVITY: 216 NG/L (ref 0–76)
UA: UC IF INDICATED,UAUC: ABNORMAL
UROBILINOGEN UR QL STRIP.AUTO: 1 EU/DL (ref 0.2–1)
WBC # BLD AUTO: 9.6 K/UL (ref 4.1–11.1)
WBC URNS QL MICRO: ABNORMAL /HPF (ref 0–4)

## 2021-10-11 PROCEDURE — 83690 ASSAY OF LIPASE: CPT

## 2021-10-11 PROCEDURE — 99284 EMERGENCY DEPT VISIT MOD MDM: CPT

## 2021-10-11 PROCEDURE — 74011250636 HC RX REV CODE- 250/636: Performed by: STUDENT IN AN ORGANIZED HEALTH CARE EDUCATION/TRAINING PROGRAM

## 2021-10-11 PROCEDURE — 83605 ASSAY OF LACTIC ACID: CPT

## 2021-10-11 PROCEDURE — 74011000250 HC RX REV CODE- 250: Performed by: STUDENT IN AN ORGANIZED HEALTH CARE EDUCATION/TRAINING PROGRAM

## 2021-10-11 PROCEDURE — 80053 COMPREHEN METABOLIC PANEL: CPT

## 2021-10-11 PROCEDURE — 96374 THER/PROPH/DIAG INJ IV PUSH: CPT

## 2021-10-11 PROCEDURE — 74011000636 HC RX REV CODE- 636: Performed by: STUDENT IN AN ORGANIZED HEALTH CARE EDUCATION/TRAINING PROGRAM

## 2021-10-11 PROCEDURE — 74011250637 HC RX REV CODE- 250/637: Performed by: STUDENT IN AN ORGANIZED HEALTH CARE EDUCATION/TRAINING PROGRAM

## 2021-10-11 PROCEDURE — 85025 COMPLETE CBC W/AUTO DIFF WBC: CPT

## 2021-10-11 PROCEDURE — 71045 X-RAY EXAM CHEST 1 VIEW: CPT

## 2021-10-11 PROCEDURE — 84484 ASSAY OF TROPONIN QUANT: CPT

## 2021-10-11 PROCEDURE — 87635 SARS-COV-2 COVID-19 AMP PRB: CPT

## 2021-10-11 PROCEDURE — 93005 ELECTROCARDIOGRAM TRACING: CPT

## 2021-10-11 PROCEDURE — 74177 CT ABD & PELVIS W/CONTRAST: CPT

## 2021-10-11 PROCEDURE — 36415 COLL VENOUS BLD VENIPUNCTURE: CPT

## 2021-10-11 PROCEDURE — 87040 BLOOD CULTURE FOR BACTERIA: CPT

## 2021-10-11 PROCEDURE — 81001 URINALYSIS AUTO W/SCOPE: CPT

## 2021-10-11 RX ORDER — AMOXICILLIN AND CLAVULANATE POTASSIUM 562.5; 437.5; 62.5 MG/1; MG/1; MG/1
2 TABLET, FILM COATED, EXTENDED RELEASE ORAL 2 TIMES DAILY
Qty: 20 TABLET | Refills: 0 | Status: SHIPPED | OUTPATIENT
Start: 2021-10-11 | End: 2021-10-16

## 2021-10-11 RX ORDER — AZITHROMYCIN 250 MG/1
250 TABLET, FILM COATED ORAL DAILY
Qty: 5 TABLET | Refills: 0 | Status: SHIPPED | OUTPATIENT
Start: 2021-10-11 | End: 2021-10-16

## 2021-10-11 RX ORDER — AMOXICILLIN 250 MG/1
1000 CAPSULE ORAL
Status: COMPLETED | OUTPATIENT
Start: 2021-10-11 | End: 2021-10-11

## 2021-10-11 RX ORDER — AZITHROMYCIN 250 MG/1
500 TABLET, FILM COATED ORAL
Status: COMPLETED | OUTPATIENT
Start: 2021-10-11 | End: 2021-10-11

## 2021-10-11 RX ADMIN — AMOXICILLIN 1000 MG: 250 CAPSULE ORAL at 23:17

## 2021-10-11 RX ADMIN — IOPAMIDOL 100 ML: 755 INJECTION, SOLUTION INTRAVENOUS at 21:41

## 2021-10-11 RX ADMIN — SODIUM CHLORIDE 5 MG: 9 INJECTION INTRAMUSCULAR; INTRAVENOUS; SUBCUTANEOUS at 21:07

## 2021-10-11 RX ADMIN — AZITHROMYCIN 500 MG: 250 TABLET, FILM COATED ORAL at 23:17

## 2021-10-12 LAB
ATRIAL RATE: 73 BPM
CALCULATED P AXIS, ECG09: -17 DEGREES
CALCULATED R AXIS, ECG10: -53 DEGREES
CALCULATED T AXIS, ECG11: 71 DEGREES
DIAGNOSIS, 93000: NORMAL
P-R INTERVAL, ECG05: 172 MS
Q-T INTERVAL, ECG07: 446 MS
QRS DURATION, ECG06: 132 MS
QTC CALCULATION (BEZET), ECG08: 491 MS
VENTRICULAR RATE, ECG03: 73 BPM

## 2021-10-12 NOTE — ED PROVIDER NOTES
EMERGENCY DEPARTMENT HISTORY AND PHYSICAL EXAM      Date: 10/11/2021  Patient Name: Christy Dominguez    History of Presenting Illness     Chief Complaint   Patient presents with    Fever     today after dialysis    Fatigue     today after dialysis    Abdominal Pain     feels a little queezy       History Provided By: Patient    HPI: Christy Dominguez, 78 y.o. male with past medical history of ESRD on hemodialysis Monday, Wednesday, Friday, pancreatitis, CKD, CAD, type 2 diabetes, CHF, hypertension, presents to the ED with cc of not feeling well after dialysis session today. Patient reports experiencing generalized weakness for several hours associated with subjective fevers- \"feeling hot\" shortly after finishing dialysis. He states that he is now feeling better, and is now \"doing pretty good. \"  Patient tells me that since mid last week, he has been having symptoms of ongoing generalized abdominal discomfort, associated with intermittent nausea and vomiting episodes. States that while he was feeling unwell earlier today, he had some diffuse abdominal pain associated with nausea and one episode of emesis that was nonbloody and nonbilious. He reports loose stools, no overt diarrhea. States that he has a BM every day, denies recent constipation. Patient does make urine, he denies any dysuria, hematuria, urgency or frequency. He denies any recent cough, congestion, sore throat, or other URI symptoms. He denies any chest pain. Patient reports mild intermittent shortness of breath, with 1 notable episode of more severe symptoms last week which resolved without intervention. He currently denies any active shortness of breath. No associated lower extremity edema, calf tenderness, orthopnea, etc.  Patient reports he is due for his Bumex, which he normally takes midday, but missed the dose today due to not feeling well and needing to come to the ED.   Patient has a left upper extremity dialysis graft, which he reports has not been erythematous, swollen, or tender to touch. PCP: Gurpreet Joyce MD    No current facility-administered medications on file prior to encounter. Current Outpatient Medications on File Prior to Encounter   Medication Sig Dispense Refill    gabapentin (NEURONTIN) 300 mg capsule Take 1 Capsule by mouth nightly. Max Daily Amount: 300 mg. PLEASE SEND FUTURE REFILLS TO PCP OR NEW ENDOCRINOLOGIST AS DR TYLER IS NO LONGER AT THIS PRACTICE 90 Capsule 0    carvediloL (COREG) 12.5 mg tablet Take 1 Tab by mouth two (2) times daily (with meals). 60 Tab 0    bumetanide (BUMEX) 2 mg tablet TAKE 1 TABLET BY MOUTH EVERY DAY      finasteride (PROSCAR) 5 mg tablet TAKE 1 TABLET BY MOUTH DAILY      Myrbetriq 25 mg ER tablet TAKE 1 TABLET BY MOUTH DAILY      insulin NPH/insulin regular (NovoLIN 70/30 U-100 Insulin) 100 unit/mL (70-30) injection ReliOn Novolin 70/30: Inject 30 Units with Breakfast, 40 Units with Dinner 7 Vial 3    losartan (COZAAR) 25 mg tablet Take 25 mg by mouth daily.  sertraline (ZOLOFT) 50 mg tablet Take 50 mg by mouth daily. 0    aspirin 81 mg chewable tablet Take 81 mg by mouth daily.  Calcium-Cholecalciferol, D3, (CALCIUM 600 WITH VITAMIN D3) 600 mg(1,500mg) -400 unit chew Take 1 Tab by mouth every evening.  fluticasone (FLONASE) 50 mcg/actuation nasal spray 2 Sprays by Both Nostrils route daily.  tamsulosin (FLOMAX) 0.4 mg capsule Take 0.8 mg by mouth daily.          Past History     Past Medical History:  Past Medical History:   Diagnosis Date    Arthritis     spine    CAD (coronary artery disease)     high cholesterol    Chronic kidney disease     dialysis    Diabetes ([facility] [address]     ESRD (end stage renal disease) (HCC)     GERD (gastroesophageal reflux disease)     HX    Hypertension     Ill-defined condition     irritable bowel syndrome    Systolic CHF ([facility] [address]     Unspecified sleep apnea     NO CPAP       Past Surgical History:  Past Surgical History:   Procedure Laterality Date    COLONOSCOPY N/A 2016    COLONOSCOPY performed [facility] Cecille Londono MD [facility] ENDOSCOPY    HX ORTHOPAEDIC      CERVICAL FUSION    HX UROLOGICAL      TURP    HX VASCULAR ACCESS      L arm dialysis access       Family History:  Family History   Problem Relation Age of Onset    Cancer Father         \"bone\"    Diabetes Brother        Social History:  Social History     Tobacco Use    Smoking status: Former Smoker     Quit date:      Years since quittin.7    Smokeless tobacco: Former User    Tobacco comment: cigars only   Substance Use Topics    Alcohol use: No    Drug use: No       Allergies: Allergies   Allergen Reactions    Albumin, Human 25 % Anaphylaxis and Hives    Niacin Hives         Review of Systems   Review of Systems   Constitutional: Positive for fatigue and fever. Negative for chills. HENT: Negative for congestion and rhinorrhea. Eyes: Negative for visual disturbance. Respiratory: Positive for shortness of breath. Negative for apnea, cough, chest tightness and wheezing. Cardiovascular: Negative for chest pain, palpitations and leg swelling. Gastrointestinal: Positive for abdominal pain, nausea and vomiting. Negative for diarrhea. Genitourinary: Negative for dysuria, flank pain and hematuria. Musculoskeletal: Negative for back pain and neck pain. Skin: Negative for rash. Allergic/Immunologic: Negative for immunocompromised state. Neurological: Negative for dizziness, speech difficulty, weakness, numbness and headaches. Hematological: Negative for adenopathy. Psychiatric/Behavioral: Negative for dysphoric mood and suicidal ideas. Physical Exam   Physical Exam  Vitals and nursing note reviewed. Constitutional:       General: He is not in acute distress. Appearance: Normal appearance. He is not ill-appearing or toxic-appearing. HENT:      Head: Normocephalic and atraumatic.       Nose: Nose normal.      Mouth/Throat:      Mouth: Mucous membranes are moist.   Eyes:      Extraocular Movements: Extraocular movements intact. Pupils: Pupils are equal, round, and reactive to light. Cardiovascular:      Rate and Rhythm: Normal rate and regular rhythm. Pulses: Normal pulses. Pulmonary:      Effort: Pulmonary effort is normal. No respiratory distress. Breath sounds: Normal breath sounds. No stridor. No wheezing or rhonchi. Abdominal:      General: Abdomen is flat. There is no distension. Palpations: Abdomen is soft. There is no mass. Tenderness: There is generalized abdominal tenderness. Comments: Minimal diffuse tenderness to palpation without peritoneal signs   Musculoskeletal:         General: Normal range of motion. Cervical back: Normal range of motion and neck supple. Skin:     General: Skin is warm and dry. Neurological:      General: No focal deficit present. Mental Status: He is alert. Mental status is at baseline. Sensory: No sensory deficit. Motor: No weakness.          Diagnostic Study Results     Labs -     Recent Results (from the past 24 hour(s))   EKG, 12 LEAD, INITIAL    Collection Time: 10/11/21  5:19 PM   Result Value Ref Range    Ventricular Rate 73 BPM    Atrial Rate 73 BPM    P-R Interval 172 ms    QRS Duration 132 ms    Q-T Interval 446 ms    QTC Calculation (Bezet) 491 ms    Calculated P Axis -17 degrees    Calculated R Axis -53 degrees    Calculated T Axis 71 degrees    Diagnosis       Normal sinus rhythm  Ventricular pre-excitation, WPW pattern type A  When compared with ECG of 04-MAY-2021 05:57,  Diego-Parkinson-White is now present     CBC WITH AUTOMATED DIFF    Collection Time: 10/11/21  5:29 PM   Result Value Ref Range    WBC 9.6 4.1 - 11.1 K/uL    RBC 3.33 (L) 4.10 - 5.70 M/uL    HGB 10.0 (L) 12.1 - 17.0 g/dL    HCT 31.4 (L) 36.6 - 50.3 %    MCV 94.3 80.0 - 99.0 FL    MCH 30.0 26.0 - 34.0 PG    MCHC 31.8 30.0 - 36.5 g/dL    RDW 15.5 (H) 11.5 - 14.5 % PLATELET 113 291 - 859 K/uL    MPV 11.1 8.9 - 12.9 FL    NRBC 0.0 0  WBC    ABSOLUTE NRBC 0.00 0.00 - 0.01 K/uL    NEUTROPHILS 77 (H) 32 - 75 %    LYMPHOCYTES 11 (L) 12 - 49 %    MONOCYTES 10 5 - 13 %    EOSINOPHILS 2 0 - 7 %    BASOPHILS 0 0 - 1 %    IMMATURE GRANULOCYTES 0 0.0 - 0.5 %    ABS. NEUTROPHILS 7.4 1.8 - 8.0 K/UL    ABS. LYMPHOCYTES 1.0 0.8 - 3.5 K/UL    ABS. MONOCYTES 1.0 0.0 - 1.0 K/UL    ABS. EOSINOPHILS 0.2 0.0 - 0.4 K/UL    ABS. BASOPHILS 0.0 0.0 - 0.1 K/UL    ABS. IMM. GRANS. 0.0 0.00 - 0.04 K/UL    DF AUTOMATED     LIPASE    Collection Time: 10/11/21  5:29 PM   Result Value Ref Range    Lipase 180 73 - 749 U/L   METABOLIC PANEL, COMPREHENSIVE    Collection Time: 10/11/21  5:29 PM   Result Value Ref Range    Sodium 135 (L) 136 - 145 mmol/L    Potassium 4.0 3.5 - 5.1 mmol/L    Chloride 102 97 - 108 mmol/L    CO2 31 21 - 32 mmol/L    Anion gap 2 (L) 5 - 15 mmol/L    Glucose 129 (H) 65 - 100 mg/dL    BUN 31 (H) 6 - 20 MG/DL    Creatinine 5.41 (H) 0.70 - 1.30 MG/DL    BUN/Creatinine ratio 6 (L) 12 - 20      GFR est AA 13 (L) >60 ml/min/1.73m2    GFR est non-AA 10 (L) >60 ml/min/1.73m2    Calcium 8.9 8.5 - 10.1 MG/DL    Bilirubin, total 0.8 0.2 - 1.0 MG/DL    ALT (SGPT) 16 12 - 78 U/L    AST (SGOT) 12 (L) 15 - 37 U/L    Alk.  phosphatase 56 45 - 117 U/L    Protein, total 8.2 6.4 - 8.2 g/dL    Albumin 3.6 3.5 - 5.0 g/dL    Globulin 4.6 (H) 2.0 - 4.0 g/dL    A-G Ratio 0.8 (L) 1.1 - 2.2     TROPONIN-HIGH SENSITIVITY    Collection Time: 10/11/21  5:29 PM   Result Value Ref Range    Troponin-High Sensitivity 212 (HH) 0 - 76 ng/L   TROPONIN-HIGH SENSITIVITY    Collection Time: 10/11/21  7:34 PM   Result Value Ref Range    Troponin-High Sensitivity 216 (HH) 0 - 76 ng/L   URINALYSIS W/ REFLEX CULTURE    Collection Time: 10/11/21  9:00 PM    Specimen: Urine   Result Value Ref Range    Color YELLOW/STRAW      Appearance CLEAR CLEAR      Specific gravity 1.015 1.003 - 1.030      pH (UA) 7.5 5.0 - 8.0 Protein 300 (A) NEG mg/dL    Glucose Negative NEG mg/dL    Ketone Negative NEG mg/dL    Bilirubin Negative NEG      Blood Negative NEG      Urobilinogen 1.0 0.2 - 1.0 EU/dL    Nitrites Negative NEG      Leukocyte Esterase Negative NEG      WBC 0-4 0 - 4 /hpf    RBC 0-5 0 - 5 /hpf    Epithelial cells FEW FEW /lpf    Bacteria Negative NEG /hpf    UA:UC IF INDICATED CULTURE NOT INDICATED BY UA RESULT CNI      Hyaline cast 0-2 0 - 5 /lpf   SARS-COV-2    Collection Time: 10/11/21  9:00 PM   Result Value Ref Range    SARS-CoV-2 Please find results under separate order     COVID-19 RAPID TEST    Collection Time: 10/11/21  9:00 PM   Result Value Ref Range    Specimen source Nasopharyngeal      COVID-19 rapid test Not detected NOTD     POC LACTIC ACID    Collection Time: 10/11/21  9:18 PM   Result Value Ref Range    Lactic Acid (POC) 1.32 0.40 - 2.00 mmol/L       Radiologic Studies -   XR CHEST PORT   Final Result   Minimal scattered parenchymal opacities suggestive of a mild multifocal   pneumonia. CT ABD PELV W CONT   Final Result   No acute process. CT Results  (Last 48 hours)               10/11/21 2141  CT ABD PELV W CONT Final result    Impression:  No acute process. Narrative:  INDICATION: abd pain, fever, n/v       COMPARISON: June 19, 2020       TECHNIQUE:   Following the uneventful intravenous administration of IV contrast, thin axial   images were obtained through the abdomen and pelvis. Coronal and sagittal   reconstructions were generated. Oral contrast was not administered. CT dose   reduction was achieved through use of a standardized protocol tailored for this   examination and automatic exposure control for dose modulation. FINDINGS:   LUNG BASES: Trace right pleural effusion. LIVER: No mass or biliary dilatation. GALLBLADDER: Layering gallstones/sludge. SPLEEN: No enlargement or lesion. PANCREAS: No mass or ductal dilatation. ADRENALS: No mass.    KIDNEYS: Small size of both kidneys with several cysts. No hydronephrosis. GI TRACT: No bowel obstruction. Difficult to assess bowel wall thickening given   lack of oral contrast material.   PERITONEUM: No free air or free fluid. APPENDIX: Unremarkable. RETROPERITONEUM: No aortic aneurysm. LYMPH NODES: None enlarged. ADDITIONAL COMMENTS: N/A.       URINARY BLADDER: Unremarkable. REPRODUCTIVE ORGANS: Enlarged prostate, indenting the floor of the urinary   bladder similar to prior study. LYMPH NODES: None enlarged. FREE FLUID: None. BONES: No destructive bone lesion. ADDITIONAL COMMENTS: N/A. CXR Results  (Last 48 hours)               10/11/21 2221  XR CHEST PORT Final result    Impression:  Minimal scattered parenchymal opacities suggestive of a mild multifocal   pneumonia. Narrative:  Clinical history: fever, generalized weakness   INDICATION:   fever, generalized weakness   COMPARISON: None       FINDINGS:   AP portable upright view of the chest demonstrates a stable  cardiopericardial   silhouette. There is no pleural effusion. .Scattered parenchymal opacities   suggestive of multifocal pneumonia. .There is no pneumothorax. . Patient is on a   cardiac monitor. Medical Decision Making   I, Janusz Hart MD am the first provider for this patient, and I am the attending of record for this patient encounter. I reviewed the vital signs, available nursing notes, past medical history, past surgical history, family history and social history. Vital Signs-Reviewed the patient's vital signs. Patient Vitals for the past 24 hrs:   Temp Pulse Resp BP SpO2   10/11/21 2115  69 24 139/67 97 %   10/11/21 1705 100 °F (37.8 °C) 72 18 (!) 143/70 95 %       EKG interpretation: (Preliminary)  Normal sinus rhythm, 73 bpm, nonspecific ST changesnot significantly changed compared to previous EKG. QTc prolonged at 491.  EKG interpretation by Janusz Hart MD    Records Reviewed: Nursing Notes, Old Medical Records, Previous electrocardiograms and Previous Laboratory Studies    Provider Notes (Medical Decision Making):   Patient has a low-grade temp of 100 °F on arrival to the ED. All other vital signs stable. On exam, he is well-appearing, in no acute distress. Alert and oriented x3. Nontoxic. Abdomen is soft, nondistended, minimally tender to palpation throughout. No peritoneal signs. Will check EKG, troponin, CBC, CMP, lipase, lactic acid, UA, Covid swab, chest x-ray, and CT abdomen pelvis to further evaluate. Will medicate patient with Compazine for nausea. Differential diagnosis considered include sepsis, postdialysis hypotension, Covid infection, bacterial pneumonia, UTI, colitis, gastroenteritis, pancreatitis, CHF exacerbation, etc.  Doubt acute abdomen. Labs without leukocytosis, however there is slight leftward shift. Lactic acid is not elevated1.32. He has baseline anemia with hemoglobin of 10. Initial high-sensitivity troponin critically high at 212, likely normal in setting of ESRD. Patient has no associated EKG changes. He also never had symptoms of chest pain or angina equivalent symptoms such as dyspnea on exertion. Doubt ACS. Will check repeat 2-hour troponin, and if no significant change, will not further pursue possibility of cardiac ischemia as I have very low clinical suspicion for this possibility. Rapid Covid is negative. UA is negative for infection. CT abdomen pelvis negative for acute process. Chest x-ray reveal scattered minimal parenchymal opacities suggestive of a mild multifocal pneumonia. Patient's repeat 2-hour high-sensitivity troponin is minimally changed compared to his previous. Again, low suspicion for ACS. His blood cultures have been ordered and sent from triage prior to my evaluation. This is currently pending. Results of today's work-up discussed with the patient as well as his wife at bedside.  Patient continues to endorse feeling back to his baseline at this time without any further ongoing symptoms of generalized weakness or subjective fevers or chills. He denies ongoing shortness of breath. His vital signs are completely stable at this time. Discussed with patient possibilities of inpatient management with IV antibiotics and observation versus outpatient treatment with p.o. antibiotics and close PCP follow-up. Patient strongly expressed wishes to go home if at all possible, and wants to try a course of p.o. antibiotics to treat his pneumonia. As patient currently has no concerning symptoms, and his labs do not demonstrate sepsis or signs of endorgan damage, I feel that this is reasonable. He is given an initial dose of antibiotics in the ED. He will be discharged home with several more days of Zithromax as well as high-dose Augmentin. He is advised to follow-up with his PCP within the next 2 days for repeat evaluation. Strict return precautions to the ED were discussed at length. Patient is discharged in stable condition. ED Course:   Initial assessment performed. The patient's presenting problems have been discussed, and they are in agreement with the care plan formulated and outlined with them. I have encouraged them to ask questions as they arise throughout their visit. Britton Alpers, MD      Disposition:  Discharge      DISCHARGE PLAN:  1. Discharge Medication List as of 10/11/2021 11:07 PM      START taking these medications    Details   azithromycin (ZITHROMAX) 250 mg tablet Take 1 Tablet by mouth daily for 5 days. , Normal, Disp-5 Tablet, R-0      amoxicillin-clavulanate (AUGMENTIN) 1,000-62.5 mg ER tablet Take 2 Tablets by mouth two (2) times a day for 5 days. , Normal, Disp-20 Tablet, R-0         CONTINUE these medications which have NOT CHANGED    Details   gabapentin (NEURONTIN) 300 mg capsule Take 1 Capsule by mouth nightly. Max Daily Amount: 300 mg.  PLEASE SEND FUTURE REFILLS TO PCP OR NEW ENDOCRINOLOGIST AS DR TYLER IS NO LONGER AT THIS PRACTICE, Normal, Disp-90 Capsule, R-0      carvediloL (COREG) 12.5 mg tablet Take 1 Tab by mouth two (2) times daily (with meals). , Normal, Disp-60 Tab, R-0      bumetanide (BUMEX) 2 mg tablet TAKE 1 TABLET BY MOUTH EVERY DAY, Historical Med      finasteride (PROSCAR) 5 mg tablet TAKE 1 TABLET BY MOUTH DAILY, Historical Med      Myrbetriq 25 mg ER tablet TAKE 1 TABLET BY MOUTH DAILY, Historical Med, DANIEL      insulin NPH/insulin regular (NovoLIN 70/30 U-100 Insulin) 100 unit/mL (70-30) injection ReliOn Novolin 70/30: Inject 30 Units with Breakfast, 40 Units with Dinner, Normal, Disp-7 Vial, R-3Please consider 90 day supplies to promote better adherence      losartan (COZAAR) 25 mg tablet Take 25 mg by mouth daily. , Historical Med      sertraline (ZOLOFT) 50 mg tablet Take 50 mg by mouth daily. , Historical Med, R-0      aspirin 81 mg chewable tablet Take 81 mg by mouth daily. , Historical Med      Calcium-Cholecalciferol, D3, (CALCIUM 600 WITH VITAMIN D3) 600 mg(1,500mg) -400 unit chew Take 1 Tab by mouth every evening., Historical Med      fluticasone (FLONASE) 50 mcg/actuation nasal spray 2 Sprays by Both Nostrils route daily. , Historical Med      tamsulosin (FLOMAX) 0.4 mg capsule Take 0.8 mg by mouth daily. , Historical Med           2. Follow-up Information     Follow up With Specialties Details Why Contact Info    Phong Barrientos MD Internal Medicine In 2 days  [id number]  [address] [id number] [phone]      [facility] EMERGENCY DEPT Emergency Medicine  If symptoms worsen, As needed [address] [place] [address] Drive  [address] [facility]  [phone]        3. Return to ED if worse     Diagnosis     Clinical Impression:   1. Multifocal pneumonia    2. ESRD (end stage renal disease) on dialysis [facility])        Attestations:    Roxanne Forte MD    Please note that this dictation was completed with [employer]ek[employer], the [employer] voice recognition software.   Quite often unanticipated grammatical, syntax, homophones, and other interpretive errors are inadvertently transcribed by the computer software. Please disregard these errors. Please excuse any errors that have escaped final proofreading. Thank you.

## 2021-10-12 NOTE — DISCHARGE INSTRUCTIONS
It was a pleasure taking care of you at [facility] Emergency Department today. We know that when you come to [facility], you are entrusting us with your health, comfort, and safety. Our physicians and nurses honor that trust, and we truly appreciate the opportunity to care for you and your loved ones. We also value your feedback. If you receive a survey about your Emergency Department experience today, please fill it out. We care about our patients' feedback, and we listen to what you have to say. Thank you!     Lexi Obando MD

## 2021-10-16 LAB
BACTERIA SPEC CULT: NORMAL
SERVICE CMNT-IMP: NORMAL

## 2021-12-07 ENCOUNTER — HOSPITAL ENCOUNTER (EMERGENCY)
Age: 76
Discharge: HOME | End: 2021-12-07
Attending: EMERGENCY MEDICINE
Payer: MEDICARE

## 2021-12-07 ENCOUNTER — APPOINTMENT (OUTPATIENT)
Dept: GENERAL RADIOLOGY | Age: 76
End: 2021-12-07
Attending: EMERGENCY MEDICINE
Payer: MEDICARE

## 2021-12-07 VITALS
HEIGHT: 72 IN | DIASTOLIC BLOOD PRESSURE: 75 MMHG | SYSTOLIC BLOOD PRESSURE: 132 MMHG | RESPIRATION RATE: 18 BRPM | OXYGEN SATURATION: 93 % | TEMPERATURE: 98.6 F | BODY MASS INDEX: 32.94 KG/M2 | WEIGHT: 243.17 LBS | HEART RATE: 74 BPM

## 2021-12-07 DIAGNOSIS — J06.9 ACUTE UPPER RESPIRATORY INFECTION: Primary | ICD-10-CM

## 2021-12-07 LAB
ALBUMIN SERPL-MCNC: 3.5 G/DL (ref 3.5–5)
ALBUMIN/GLOB SERPL: 0.7 {RATIO} (ref 1.1–2.2)
ALP SERPL-CCNC: 60 U/L (ref 45–117)
ALT SERPL-CCNC: 18 U/L (ref 12–78)
ANION GAP SERPL CALC-SCNC: 8 MMOL/L (ref 5–15)
AST SERPL-CCNC: 31 U/L (ref 15–37)
BASOPHILS # BLD: 0 K/UL (ref 0–0.1)
BASOPHILS NFR BLD: 1 % (ref 0–1)
BILIRUB SERPL-MCNC: 0.8 MG/DL (ref 0.2–1)
BUN SERPL-MCNC: 31 MG/DL (ref 6–20)
BUN/CREAT SERPL: 5 (ref 12–20)
CALCIUM SERPL-MCNC: 8.5 MG/DL (ref 8.5–10.1)
CHLORIDE SERPL-SCNC: 101 MMOL/L (ref 97–108)
CO2 SERPL-SCNC: 26 MMOL/L (ref 21–32)
CREAT SERPL-MCNC: 5.84 MG/DL (ref 0.7–1.3)
DEPRECATED S PYO AG THROAT QL EIA: NEGATIVE
DIFFERENTIAL METHOD BLD: ABNORMAL
EOSINOPHIL # BLD: 0.6 K/UL (ref 0–0.4)
EOSINOPHIL NFR BLD: 8 % (ref 0–7)
ERYTHROCYTE [DISTWIDTH] IN BLOOD BY AUTOMATED COUNT: 16.1 % (ref 11.5–14.5)
FLUAV AG NPH QL IA: NEGATIVE
FLUBV AG NOSE QL IA: NEGATIVE
GLOBULIN SER CALC-MCNC: 4.7 G/DL (ref 2–4)
GLUCOSE SERPL-MCNC: 141 MG/DL (ref 65–100)
HCT VFR BLD AUTO: 31.8 % (ref 36.6–50.3)
HGB BLD-MCNC: 10.2 G/DL (ref 12.1–17)
IMM GRANULOCYTES # BLD AUTO: 0 K/UL (ref 0–0.04)
IMM GRANULOCYTES NFR BLD AUTO: 0 % (ref 0–0.5)
LYMPHOCYTES # BLD: 0.9 K/UL (ref 0.8–3.5)
LYMPHOCYTES NFR BLD: 11 % (ref 12–49)
MCH RBC QN AUTO: 31.2 PG (ref 26–34)
MCHC RBC AUTO-ENTMCNC: 32.1 G/DL (ref 30–36.5)
MCV RBC AUTO: 97.2 FL (ref 80–99)
MONOCYTES # BLD: 0.8 K/UL (ref 0–1)
MONOCYTES NFR BLD: 10 % (ref 5–13)
NEUTS SEG # BLD: 5.8 K/UL (ref 1.8–8)
NEUTS SEG NFR BLD: 70 % (ref 32–75)
NRBC # BLD: 0 K/UL (ref 0–0.01)
NRBC BLD-RTO: 0 PER 100 WBC
PLATELET # BLD AUTO: 178 K/UL (ref 150–400)
PMV BLD AUTO: 11.4 FL (ref 8.9–12.9)
POTASSIUM SERPL-SCNC: 4.4 MMOL/L (ref 3.5–5.1)
PROT SERPL-MCNC: 8.2 G/DL (ref 6.4–8.2)
RBC # BLD AUTO: 3.27 M/UL (ref 4.1–5.7)
SARS-COV-2, COV2: NORMAL
SODIUM SERPL-SCNC: 135 MMOL/L (ref 136–145)
WBC # BLD AUTO: 8.2 K/UL (ref 4.1–11.1)

## 2021-12-07 PROCEDURE — 36415 COLL VENOUS BLD VENIPUNCTURE: CPT

## 2021-12-07 PROCEDURE — 87070 CULTURE OTHR SPECIMN AEROBIC: CPT

## 2021-12-07 PROCEDURE — 87804 INFLUENZA ASSAY W/OPTIC: CPT

## 2021-12-07 PROCEDURE — 71045 X-RAY EXAM CHEST 1 VIEW: CPT

## 2021-12-07 PROCEDURE — 74011250637 HC RX REV CODE- 250/637: Performed by: EMERGENCY MEDICINE

## 2021-12-07 PROCEDURE — 85025 COMPLETE CBC W/AUTO DIFF WBC: CPT

## 2021-12-07 PROCEDURE — U0005 INFEC AGEN DETEC AMPLI PROBE: HCPCS

## 2021-12-07 PROCEDURE — 94640 AIRWAY INHALATION TREATMENT: CPT

## 2021-12-07 PROCEDURE — 87880 STREP A ASSAY W/OPTIC: CPT

## 2021-12-07 PROCEDURE — 99283 EMERGENCY DEPT VISIT LOW MDM: CPT

## 2021-12-07 PROCEDURE — 80053 COMPREHEN METABOLIC PANEL: CPT

## 2021-12-07 RX ORDER — HYDROCODONE POLISTIREX AND CHLORPHENIRAMINE POLISTIREX 10; 8 MG/5ML; MG/5ML
5 SUSPENSION, EXTENDED RELEASE ORAL
Status: COMPLETED | OUTPATIENT
Start: 2021-12-07 | End: 2021-12-07

## 2021-12-07 RX ORDER — DOXYCYCLINE HYCLATE 100 MG
100 TABLET ORAL 2 TIMES DAILY
Qty: 14 TABLET | Refills: 0 | Status: SHIPPED | OUTPATIENT
Start: 2021-12-07 | End: 2021-12-14

## 2021-12-07 RX ORDER — HYDROCODONE POLISTIREX AND CHLORPHENIRAMINE POLISTIREX 10; 8 MG/5ML; MG/5ML
5 SUSPENSION, EXTENDED RELEASE ORAL
Qty: 60 ML | Refills: 0 | Status: SHIPPED | OUTPATIENT
Start: 2021-12-07 | End: 2021-12-13

## 2021-12-07 RX ORDER — ALBUTEROL SULFATE 90 UG/1
1 AEROSOL, METERED RESPIRATORY (INHALATION)
Status: COMPLETED | OUTPATIENT
Start: 2021-12-07 | End: 2021-12-07

## 2021-12-07 RX ADMIN — HYDROCODONE POLISTIREX AND CHLORPHENIRAMINE POLISTIREX 5 ML: 10; 8 SUSPENSION, EXTENDED RELEASE ORAL at 08:37

## 2021-12-07 RX ADMIN — ALBUTEROL SULFATE 1 PUFF: 90 AEROSOL, METERED RESPIRATORY (INHALATION) at 08:35

## 2021-12-07 NOTE — DISCHARGE INSTRUCTIONS
Increase flonase to twice a day for 7 days    Over the counter Mucinex (plain) or Coricidin     Hold Ab Rx for 48 hours fill if symptoms remain      Schedule Tylenol 625mg every 4 hours or 1000mg every 6 hours for the next 2 days

## 2021-12-07 NOTE — ED PROVIDER NOTES
EMERGENCY DEPARTMENT HISTORY AND PHYSICAL EXAM      Date: 12/7/2021  Patient Name: Mario Hutchinson    History of Presenting Illness     Chief Complaint   Patient presents with    Cough     pt presents w/ c/o fever of 101 last night, cough x2 days and sore throat x2 days. pt reports getting booster shot for covid friday     Fever    Sore Throat       History Provided By: Patient    HPI: Mario Hutchinson, 78 y.o. male presents to the ED with cc of cough. Pt with PMHx sig for HTN, DM, ESRD on HD MWF, did have dialysis yesterday. Pt states COVID Booster 4 days ago, began with symptoms yesterday with nasal congestion, post-nasal drip, sore throat. Yesterday he did have fever Tmax 101. He states he has had dry, non-productive cough. There has been mild SOA worsened with exertion. No orthopnea, CP, abd pain, n/v/d. No known sick exposures. There are no other complaints, changes, or physical findings at this time. PCP: Isidro Coats MD    No current facility-administered medications on file prior to encounter. Current Outpatient Medications on File Prior to Encounter   Medication Sig Dispense Refill    gabapentin (NEURONTIN) 300 mg capsule Take 1 Capsule by mouth nightly. Max Daily Amount: 300 mg. PLEASE SEND FUTURE REFILLS TO PCP OR NEW ENDOCRINOLOGIST AS DR TYLER IS NO LONGER AT THIS PRACTICE 90 Capsule 0    carvediloL (COREG) 12.5 mg tablet Take 1 Tab by mouth two (2) times daily (with meals). 60 Tab 0    bumetanide (BUMEX) 2 mg tablet TAKE 1 TABLET BY MOUTH EVERY DAY      finasteride (PROSCAR) 5 mg tablet TAKE 1 TABLET BY MOUTH DAILY      Myrbetriq 25 mg ER tablet TAKE 1 TABLET BY MOUTH DAILY      insulin NPH/insulin regular (NovoLIN 70/30 U-100 Insulin) 100 unit/mL (70-30) injection ReliOn Novolin 70/30: Inject 30 Units with Breakfast, 40 Units with Dinner 7 Vial 3    losartan (COZAAR) 25 mg tablet Take 25 mg by mouth daily.  sertraline (ZOLOFT) 50 mg tablet Take 50 mg by mouth daily.   0    aspirin 81 mg chewable tablet Take 81 mg by mouth daily.  Calcium-Cholecalciferol, D3, (CALCIUM 600 WITH VITAMIN D3) 600 mg(1,500mg) -400 unit chew Take 1 Tab by mouth every evening.  fluticasone (FLONASE) 50 mcg/actuation nasal spray 2 Sprays by Both Nostrils route daily.  tamsulosin (FLOMAX) 0.4 mg capsule Take 0.8 mg by mouth daily. Past History     Past Medical History:  Past Medical History:   Diagnosis Date    Arthritis     spine    CAD (coronary artery disease)     high cholesterol    Chronic kidney disease     dialysis    [facility] [address]     [facility]     GERD (gastroesophageal reflux disease)     HX    Hypertension     Ill-defined condition     irritable bowel syndrome    Systolic CHF ([facility] [address]     Unspecified sleep apnea     NO CPAP       Past Surgical History:  Past Surgical History:   Procedure Laterality Date    COLONOSCOPY N/A 2016    COLONOSCOPY performed by Kaci Nixon MD at [facility] ENDOSCOPY    HX ORTHOPAEDIC      CERVICAL FUSION    HX UROLOGICAL      TURP    HX VASCULAR ACCESS      L arm dialysis access       Family History:  Family History   Problem Relation Age of Onset    Cancer Father         \"bone\"    Diabetes Brother        Social History:  Social History     Tobacco Use    Smoking status: Former Smoker     Quit date:      Years since quittin.9    Smokeless tobacco: Former User    Tobacco comment: cigars only   Substance Use Topics    Alcohol use: No    Drug use: No       Allergies: Allergies   Allergen Reactions    Albumin, Human 25 % Anaphylaxis and Hives    Niacin Hives         Review of Systems   Review of Systems   Constitutional: Positive for fatigue and fever. Negative for appetite change and chills. HENT: Positive for congestion, postnasal drip and sore throat. Negative for rhinorrhea and sinus pressure. Eyes: Negative. Respiratory: Positive for cough (non-productive) and shortness of breath. Negative for choking, chest tightness and wheezing. Cardiovascular: Negative. Negative for chest pain, palpitations and leg swelling. Gastrointestinal: Negative for abdominal pain, constipation, diarrhea, nausea and vomiting. Endocrine: Negative. Genitourinary: Negative. Negative for difficulty urinating, dysuria, flank pain and urgency. Musculoskeletal: Negative. Skin: Negative. Neurological: Negative. Negative for dizziness, speech difficulty, weakness, light-headedness, numbness and headaches. Psychiatric/Behavioral: Negative. All other systems reviewed and are negative. Physical Exam   Physical Exam  Vitals and nursing note reviewed. Constitutional:       General: He is not in acute distress. Appearance: He is well-developed. He is obese. He is not diaphoretic. HENT:      Head: Normocephalic and atraumatic. Right Ear: Tympanic membrane normal.      Left Ear: Tympanic membrane normal.      Nose: Congestion present. Mouth/Throat:      Mouth: Mucous membranes are moist.      Pharynx: Posterior oropharyngeal erythema (Mild) present. No oropharyngeal exudate. Eyes:      Extraocular Movements: Extraocular movements intact. Conjunctiva/sclera: Conjunctivae normal.      Pupils: Pupils are equal, round, and reactive to light. Neck:      Vascular: No JVD. Trachea: No tracheal deviation. Cardiovascular:      Rate and Rhythm: Normal rate and regular rhythm. Heart sounds: Normal heart sounds. No murmur heard. Pulmonary:      Effort: Pulmonary effort is normal. No respiratory distress. Breath sounds: Normal breath sounds. No stridor. No wheezing or rales. Abdominal:      General: There is no distension. Palpations: Abdomen is soft. Tenderness: There is no abdominal tenderness. There is no guarding or rebound. Hernia: A hernia (Umbilical, reduces easily ) is present. Musculoskeletal:         General: No tenderness.  Normal range of motion. Cervical back: Normal range of motion and neck supple. Comments: AV graft to left upper extremity     Skin:     General: Skin is warm and dry. Capillary Refill: Capillary refill takes less than 2 seconds. Neurological:      Mental Status: He is alert and oriented to person, place, and time. Cranial Nerves: No cranial nerve deficit. Comments: No gross motor or sensory deficits    Psychiatric:         Mood and Affect: Mood normal.         Behavior: Behavior normal.         Diagnostic Study Results     Labs -     Recent Results (from the past 12 hour(s))   CBC WITH AUTOMATED DIFF    Collection Time: 12/07/21  7:04 AM   Result Value Ref Range    WBC 8.2 4.1 - 11.1 K/uL    RBC 3.27 (L) 4.10 - 5.70 M/uL    HGB 10.2 (L) 12.1 - 17.0 g/dL    HCT 31.8 (L) 36.6 - 50.3 %    MCV 97.2 80.0 - 99.0 FL    MCH 31.2 26.0 - 34.0 PG    MCHC 32.1 30.0 - 36.5 g/dL    RDW 16.1 (H) 11.5 - 14.5 %    PLATELET 812 539 - 340 K/uL    MPV 11.4 8.9 - 12.9 FL    NRBC 0.0 0  WBC    ABSOLUTE NRBC 0.00 0.00 - 0.01 K/uL    NEUTROPHILS 70 32 - 75 %    LYMPHOCYTES 11 (L) 12 - 49 %    MONOCYTES 10 5 - 13 %    EOSINOPHILS 8 (H) 0 - 7 %    BASOPHILS 1 0 - 1 %    IMMATURE GRANULOCYTES 0 0.0 - 0.5 %    ABS. NEUTROPHILS 5.8 1.8 - 8.0 K/UL    ABS. LYMPHOCYTES 0.9 0.8 - 3.5 K/UL    ABS. MONOCYTES 0.8 0.0 - 1.0 K/UL    ABS. EOSINOPHILS 0.6 (H) 0.0 - 0.4 K/UL    ABS. BASOPHILS 0.0 0.0 - 0.1 K/UL    ABS. IMM.  GRANS. 0.0 0.00 - 0.04 K/UL    DF AUTOMATED     METABOLIC PANEL, COMPREHENSIVE    Collection Time: 12/07/21  7:04 AM   Result Value Ref Range    Sodium 135 (L) 136 - 145 mmol/L    Potassium 4.4 3.5 - 5.1 mmol/L    Chloride 101 97 - 108 mmol/L    CO2 26 21 - 32 mmol/L    Anion gap 8 5 - 15 mmol/L    Glucose 141 (H) 65 - 100 mg/dL    BUN 31 (H) 6 - 20 MG/DL    Creatinine 5.84 (H) 0.70 - 1.30 MG/DL    BUN/Creatinine ratio 5 (L) 12 - 20      GFR est AA 12 (L) >60 ml/min/1.73m2    GFR est non-AA 10 (L) >60 ml/min/1.73m2 Calcium 8.5 8.5 - 10.1 MG/DL    Bilirubin, total 0.8 0.2 - 1.0 MG/DL    ALT (SGPT) 18 12 - 78 U/L    AST (SGOT) 31 15 - 37 U/L    Alk. phosphatase 60 45 - 117 U/L    Protein, total 8.2 6.4 - 8.2 g/dL    Albumin 3.5 3.5 - 5.0 g/dL    Globulin 4.7 (H) 2.0 - 4.0 g/dL    A-G Ratio 0.7 (L) 1.1 - 2.2     STREP AG SCREEN, GROUP A    Collection Time: 12/07/21  7:04 AM    Specimen: Swab   Result Value Ref Range    Group A Strep Ag ID Negative NEG     INFLUENZA A+B VIRAL AGS    Collection Time: 12/07/21  7:04 AM   Result Value Ref Range    Influenza A Antigen Negative NEG      Influenza B Antigen Negative NEG         Radiologic Studies -   XR CHEST PORT   Final Result   Clear lungs. CT Results  (Last 48 hours)    None        CXR Results  (Last 48 hours)               12/07/21 0629  XR CHEST PORT Final result    Impression:  Clear lungs. Narrative:  EXAM: XR CHEST PORT       INDICATION: cough       COMPARISON: October 11, 2021       FINDINGS: A portable AP radiograph of the chest was obtained at 0622 hours. The   patient is on a cardiac monitor. The lungs are clear. The cardiac and   mediastinal contours and pulmonary vascularity are normal.  The bones and soft   tissues are grossly within normal limits. Medical Decision Making   I am the first provider for this patient. I reviewed the vital signs, available nursing notes, past medical history, past surgical history, family history and social history. Vital Signs-Reviewed the patient's vital signs.   Patient Vitals for the past 12 hrs:   Temp Pulse Resp BP SpO2   12/07/21 0535 98.6 °F (37 °C) 74 18 133/65 98 %       Records Reviewed: Nursing Notes, Old Medical Records, Previous Radiology Studies and Previous Laboratory Studies, 10/11/21- ED visit for multi-focal pneumonia, Admission 8/18/21- Sepsis with pneumonia, ESRD on HD       Provider Notes (Medical Decision Making):   DDx- Pneumonia, bronchitis, strep, flu, COVID       ED Course: Initial assessment performed. The patients presenting problems have been discussed, and they are in agreement with the care plan formulated and outlined with them. I have encouraged them to ask questions as they arise throughout their visit. Labs reassuring, CXR no acute process. Will dc home with cough syrup for cough for sleep. Ab Rx written, pt will hold. Pt has had both vaccines and COVID booster, will send COVID PCR test. Discussed likely viral URI, increase Flonase to twice a day for 1 week, OTC plain Mucinex or Coricidin. Fill rx if symptoms persists more than 48 hours or if he develops fever, worsening cough. Disposition:  Discharge home     DISCHARGE PLAN:  1. Discharge Medication List as of 12/7/2021  9:20 AM      START taking these medications    Details   HYDROcodone-chlorpheniramine (Tussionex Pennkinetic ER) 10-8 mg/5 mL suspension Take 5 mL by mouth every twelve (12) hours as needed for Cough for up to 6 days. Max Daily Amount: 10 mL., Normal, Disp-60 mL, R-0      doxycycline (VIBRA-TABS) 100 mg tablet Take 1 Tablet by mouth two (2) times a day for 7 days. , Print, Disp-14 Tablet, R-0         CONTINUE these medications which have NOT CHANGED    Details   gabapentin (NEURONTIN) 300 mg capsule Take 1 Capsule by mouth nightly. Max Daily Amount: 300 mg. PLEASE SEND FUTURE REFILLS TO PCP OR NEW ENDOCRINOLOGIST AS DR TYLER IS NO LONGER AT THIS PRACTICE, Normal, Disp-90 Capsule, R-0      carvediloL (COREG) 12.5 mg tablet Take 1 Tab by mouth two (2) times daily (with meals). , Normal, Disp-60 Tab, R-0      bumetanide (BUMEX) 2 mg tablet TAKE 1 TABLET BY MOUTH EVERY DAY, Historical Med      finasteride (PROSCAR) 5 mg tablet TAKE 1 TABLET BY MOUTH DAILY, Historical Med      Myrbetriq 25 mg ER tablet TAKE 1 TABLET BY MOUTH DAILY, Historical Med, DANIEL      insulin NPH/insulin regular (NovoLIN 70/30 U-100 Insulin) 100 unit/mL (70-30) injection ReliOn Novolin 70/30:  Inject 30 Units with Breakfast, 40 Units with Dinner, Normal, Disp-7 Vial, R-3Please consider 90 day supplies to promote better adherence      losartan (COZAAR) 25 mg tablet Take 25 mg by mouth daily. , Historical Med      sertraline (ZOLOFT) 50 mg tablet Take 50 mg by mouth daily. , Historical Med, R-0      aspirin 81 mg chewable tablet Take 81 mg by mouth daily. , Historical Med      Calcium-Cholecalciferol, D3, (CALCIUM 600 WITH VITAMIN D3) 600 mg(1,500mg) -400 unit chew Take 1 Tab by mouth every evening., Historical Med      fluticasone (FLONASE) 50 mcg/actuation nasal spray 2 Sprays by Both Nostrils route daily. , Historical Med      tamsulosin (FLOMAX) 0.4 mg capsule Take 0.8 mg by mouth daily. , Historical Med           2. Follow-up Information    None       3. Return to ED if worse     Diagnosis     Clinical Impression:   1. Acute upper respiratory infection        Attestations:    Katie Garcia, DO    Please note that this dictation was completed with [employer], the computer voice recognition software. Quite often unanticipated grammatical, syntax, homophones, and other interpretive errors are inadvertently transcribed by the computer software. Please disregard these errors. Please excuse any errors that have escaped final proofreading. Thank you.

## 2021-12-07 NOTE — ED NOTES
Called respiratory at this time re: pt inhaler; RT Charity acknowledged Lab work is in there from [facility] from [facility]. She'll contact the hospital get a copy of her PFTs. She will be seeing pulmonary in the next week or 2. They will be able to see everything on Epic also. Discussed with her

## 2021-12-08 ENCOUNTER — PATIENT OUTREACH (OUTPATIENT)
Dept: CASE MANAGEMENT | Age: 76
End: 2021-12-08

## 2021-12-08 LAB
SARS-COV-2, XPLCVT: NOT DETECTED
SOURCE, COVRS: NORMAL

## 2021-12-08 NOTE — PROGRESS NOTES
Patient contacted regarding COVID-19 risk. Discussed COVID-19 related testing which was available at this time. Test results were negative. Patient informed of results, if available? yes. Ambulatory Care Manager contacted the patient by telephone to perform post discharge assessment. Call within 2 business days of discharge: Yes Verified name and  with patient as identifiers. Provided introduction to self, and explanation of the CTN/ACM role, and reason for call due to risk factors for infection and/or exposure to COVID-19. Symptoms reviewed with patient who verbalized the following symptoms: no new symptoms and no worsening symptoms      Due to no new or worsening symptoms encounter was not routed to provider for escalation. Discussed follow-up appointments. If no appointment was previously scheduled, appointment scheduling offered:  Patient will call PCP to seek advice on follow up. [facility] follow up appointment(s):   Future Appointments   Date Time Provider Soo Villavicencio   2021  9:40 AM MD NAS Flores BS AMB            Advance Care Planning:   Does patient have an Advance Directive: not on file. Educated patient about risk for severe COVID-19 due to risk factors according to CDC guidelines. ACM reviewed discharge instructions, medical action plan and red flag symptoms with the patient who verbalized understanding. Discussed COVID vaccination status: yes. Education provided on COVID-19 vaccination as appropriate. Discussed exposure protocols and quarantine with CDC Guidelines. Parent was given an opportunity to verbalize any questions and concerns and agrees to contact ACM or health care provider for questions related to their healthcare. Reviewed and educated patient on any new and changed medications related to discharge diagnosis     Was patient discharged with a pulse oximeter? no     ACM provided contact information.  Plan for follow-up call in 5-7 days based on severity of symptoms and risk factors.

## 2021-12-09 LAB
BACTERIA SPEC CULT: NORMAL
SERVICE CMNT-IMP: NORMAL

## 2021-12-15 ENCOUNTER — PATIENT OUTREACH (OUTPATIENT)
Dept: CASE MANAGEMENT | Age: 76
End: 2021-12-15

## 2021-12-21 ENCOUNTER — OFFICE VISIT (OUTPATIENT)
Dept: NEUROLOGY | Age: 76
End: 2021-12-21
Payer: MEDICARE

## 2021-12-21 VITALS
TEMPERATURE: 98 F | WEIGHT: 238.6 LBS | HEIGHT: 72 IN | RESPIRATION RATE: 16 BRPM | HEART RATE: 84 BPM | DIASTOLIC BLOOD PRESSURE: 80 MMHG | SYSTOLIC BLOOD PRESSURE: 139 MMHG | BODY MASS INDEX: 32.32 KG/M2 | OXYGEN SATURATION: 98 %

## 2021-12-21 DIAGNOSIS — G25.3 MYOCLONUS: ICD-10-CM

## 2021-12-21 DIAGNOSIS — E53.8 B12 DEFICIENCY: ICD-10-CM

## 2021-12-21 DIAGNOSIS — E11.22 TYPE 2 DIABETES MELLITUS WITH STAGE 4 CHRONIC KIDNEY DISEASE, WITH LONG-TERM CURRENT USE OF INSULIN (HCC): ICD-10-CM

## 2021-12-21 DIAGNOSIS — R41.82 ACUTE ALTERATION IN MENTAL STATUS: ICD-10-CM

## 2021-12-21 DIAGNOSIS — R56.9 CONVULSIONS, UNSPECIFIED CONVULSION TYPE (HCC): ICD-10-CM

## 2021-12-21 DIAGNOSIS — E11.42 DIABETIC PERIPHERAL NEUROPATHY ASSOCIATED WITH TYPE 2 DIABETES MELLITUS (HCC): Primary | ICD-10-CM

## 2021-12-21 DIAGNOSIS — E55.9 VITAMIN D DEFICIENCY: ICD-10-CM

## 2021-12-21 DIAGNOSIS — G63 IMMUNE-MEDIATED NEUROPATHY (HCC): ICD-10-CM

## 2021-12-21 DIAGNOSIS — Z79.4 TYPE 2 DIABETES MELLITUS WITH STAGE 4 CHRONIC KIDNEY DISEASE, WITH LONG-TERM CURRENT USE OF INSULIN (HCC): ICD-10-CM

## 2021-12-21 DIAGNOSIS — M47.12 CERVICAL SPONDYLOSIS WITH MYELOPATHY: ICD-10-CM

## 2021-12-21 DIAGNOSIS — R26.0 SENSORY ATAXIC GAIT: ICD-10-CM

## 2021-12-21 DIAGNOSIS — N18.4 TYPE 2 DIABETES MELLITUS WITH STAGE 4 CHRONIC KIDNEY DISEASE, WITH LONG-TERM CURRENT USE OF INSULIN (HCC): ICD-10-CM

## 2021-12-21 DIAGNOSIS — D89.89 IMMUNE-MEDIATED NEUROPATHY (HCC): ICD-10-CM

## 2021-12-21 PROCEDURE — 1101F PT FALLS ASSESS-DOCD LE1/YR: CPT | Performed by: PSYCHIATRY & NEUROLOGY

## 2021-12-21 PROCEDURE — 3052F HG A1C>EQUAL 8.0%<EQUAL 9.0%: CPT | Performed by: PSYCHIATRY & NEUROLOGY

## 2021-12-21 PROCEDURE — 99205 OFFICE O/P NEW HI 60 MIN: CPT | Performed by: PSYCHIATRY & NEUROLOGY

## 2021-12-21 PROCEDURE — G8427 DOCREV CUR MEDS BY ELIG CLIN: HCPCS | Performed by: PSYCHIATRY & NEUROLOGY

## 2021-12-21 PROCEDURE — 3017F COLORECTAL CA SCREEN DOC REV: CPT | Performed by: PSYCHIATRY & NEUROLOGY

## 2021-12-21 PROCEDURE — G8536 NO DOC ELDER MAL SCRN: HCPCS | Performed by: PSYCHIATRY & NEUROLOGY

## 2021-12-21 PROCEDURE — G8510 SCR DEP NEG, NO PLAN REQD: HCPCS | Performed by: PSYCHIATRY & NEUROLOGY

## 2021-12-21 PROCEDURE — G9231 DOC ESRD DIA TRANS PREG: HCPCS | Performed by: PSYCHIATRY & NEUROLOGY

## 2021-12-21 PROCEDURE — 2022F DILAT RTA XM EVC RTNOPTHY: CPT | Performed by: PSYCHIATRY & NEUROLOGY

## 2021-12-21 PROCEDURE — G8417 CALC BMI ABV UP PARAM F/U: HCPCS | Performed by: PSYCHIATRY & NEUROLOGY

## 2021-12-21 RX ORDER — GABAPENTIN 300 MG/1
300 CAPSULE ORAL
Qty: 90 CAPSULE | Refills: 5 | Status: SHIPPED | OUTPATIENT
Start: 2021-12-21 | End: 2022-06-09 | Stop reason: SDUPTHER

## 2021-12-21 NOTE — LETTER
12/21/2021    Patient: True Mann   YOB: 1945   Date of Visit: 12/21/2021     Jaciel Diego, [address] [facility] [address]  Via Fax: [phone]     Marzena Bose MD  [address] [place] [id number]  Via Fax: [phone]    Dear MD Marzena Greene MD,      Thank you for referring Mr. Sarah Paige to [address] [place] [address] for evaluation. My notes for this consultation are attached. Consult  REFERRED BY:  Digna Jacome MD    CHIEF COMPLAINT: Twitching and abnormal movements and progressive loss of balance. Subjective:     True Mann is a 78 y.o. right-handed -American male we are seeing as a new patient, at the request of Dr. Thelma Sorenson on urgent work in basis for new problem of progressive twitching and muscle jerks and involuntary movements in his arms and legs, that were much worse in August when he was admitted to the hospital with sepsis and some renal insufficiency and was fairly sick, and has gotten a little bit better with time since that time with not quite so prominent twitches. He also says he has loss of balance and difficulty walking, and has to use a cane for ambulation now almost all the time when before that he had to use it only intermittently before August and when he got sick. He had a normal CPK but is a diabetic since 2008 and been on insulin for the last 3 years with somewhat poorly controlled diabetes. He said it may be doing better recently his hemoglobin A1c seems to be coming down better. He is on dialysis. He has had previous cervical fusion for myelopathy in the past.  None of those records are available at this time. He said congestive heart failure and sleep apnea but does not use CPAP.   He has not had any recent head trauma or neck trauma or back trauma, or fever or meningismus since August, but does complain of some mild low back pain and had an MRI scan ordered of his back but no MRI scan is on the chart. His bowel and bladder function is stable. He has had 1 or 2 falls because of his unsteady gait and is very concerned that he might be high risk for injury due to his progressive symptoms are becoming more disabling. He has never had a seizure or loss of consciousness with this myoclonic type jerks. He does not think he has had any cognitive issues or other cranial nerve problems. Past Medical History:   Diagnosis Date    Arthritis     spine    CAD (coronary artery disease)     high cholesterol    Chronic kidney disease     dialysis    Diabetes ([facility]     ESRD (end stage renal disease) (HCC)     GERD (gastroesophageal reflux disease)     HX    Hypertension     Ill-defined condition     irritable bowel syndrome    Systolic CHF ([facility]     Unspecified sleep apnea     NO CPAP      Past Surgical History:   Procedure Laterality Date    COLONOSCOPY N/A 2016    COLONOSCOPY performed by Cecily Abad MD at [facility] ENDOSCOPY    HX ORTHOPAEDIC      CERVICAL FUSION    HX UROLOGICAL      TURP    HX VASCULAR ACCESS      L arm dialysis access     Family History   Problem Relation Age of Onset    Cancer Father         \"bone\"    Diabetes Brother       Social History     Tobacco Use    Smoking status: Former Smoker     Quit date:      Years since quittin.9    Smokeless tobacco: Former User    Tobacco comment: cigars only   Substance Use Topics    Alcohol use: No         Current Outpatient Medications:     gabapentin (NEURONTIN) 300 mg capsule, Take 1 Capsule by mouth nightly. Max Daily Amount: 300 mg., Disp: 90 Capsule, Rfl: 5    carvediloL (COREG) 12.5 mg tablet, Take 1 Tab by mouth two (2) times daily (with meals). , Disp: 60 Tab, Rfl: 0    bumetanide (BUMEX) 2 mg tablet, TAKE 1 TABLET BY MOUTH EVERY DAY, Disp: , Rfl:     finasteride (PROSCAR) 5 mg tablet, TAKE 1 TABLET BY MOUTH DAILY, Disp: , Rfl:     insulin NPH/insulin regular (NovoLIN 70/30 U-100 Insulin) 100 unit/mL (70-30) injection, ReliOn Novolin 70/30: Inject 30 Units with Breakfast, 40 Units with Dinner, Disp: 7 Vial, Rfl: 3    losartan (COZAAR) 25 mg tablet, Take 25 mg by mouth daily. , Disp: , Rfl:     sertraline (ZOLOFT) 50 mg tablet, Take 50 mg by mouth daily. , Disp: , Rfl: 0    aspirin 81 mg chewable tablet, Take 81 mg by mouth daily. , Disp: , Rfl:     fluticasone (FLONASE) 50 mcg/actuation nasal spray, 2 Sprays by Both Nostrils route daily. , Disp: , Rfl:     tamsulosin (FLOMAX) 0.4 mg capsule, Take 0.8 mg by mouth daily. , Disp: , Rfl:         Allergies   Allergen Reactions    Albumin, Human 25 % Anaphylaxis and Hives    Niacin Hives      MRI Results (most recent):  No results found for this or any previous visit. No results found for this or any previous visit. Review of Systems:  A comprehensive review of systems was negative except for: Constitutional: positive for fatigue and malaise  Ears, nose, mouth, throat, and face: positive for hearing loss  Musculoskeletal: positive for myalgias, arthralgias, stiff joints, neck pain, back pain and muscle weakness  Neurological: positive for paresthesia, coordination problems, gait problems, tremor and weakness  Behvioral/Psych: positive for anxiety   Vitals:    12/21/21 0958 12/21/21 1032   BP: (!) 154/86 139/80   Pulse: 72 84   Resp:  16   Temp: 98 °F (36.7 °C)    TempSrc: Temporal    SpO2: 98%    Weight: 238 lb 9.6 oz (108.2 kg)    Height: 6' (1.829 m)      Objective:     I      NEUROLOGICAL EXAM:    Appearance: The patient is well developed, well nourished, provides a fair history and is in no acute distress. Mental Status: Oriented to time, place and person, and the president, cognitive function is slow but probably normal and speech is fluent and no aphasia or dysarthria. Mood and affect appropriate but mildly depressed. Cranial Nerves:   Intact visual fields. Fundi are benign, disc are flat, no lesions seen on funduscopy. ANGELITO, EOM's full, no nystagmus, no ptosis. Facial sensation is normal. Corneal reflexes are not tested. Facial movement is symmetric. Hearing is abnormal bilaterally. Palate is midline with normal sternocleidomastoid and trapezius muscles are normal. Tongue is midline. Neck without meningismus or bruits  Temporal arteries are not tender or enlarged  TMJ areas are not tender on palpation   Motor:  4/5 strength in upper and lower proximal and distal muscles. Normal bulk and slight increased tone in the legs but normal in the arms. No fasciculations. Rapid alternating movement is symmetric and slow bilaterally   Reflexes:   Deep tendon reflexes 3+/4 and symmetrical in both legs, and 2+/4 in the arms. No babinski or clonus present   Sensory:   Abnormal to touch, pinprick and vibration and temperature in both feet to midcalf level. DSS is intact   Gait:  Abnormal gait for patient's age with wide-based and unsteady and ataxic and almost spondylitic gait. Tremor:   No tremor noted. Cerebellar: Moderately abnormal cerebellar signs present on Romberg and tandem testing and finger-nose-finger exam.   Neurovascular:  Normal heart sounds and regular rhythm, peripheral pulses decreased, and no carotid bruits. Assessment:       ICD-10-CM ICD-9-CM    1. Diabetic peripheral neuropathy associated with type 2 diabetes mellitus ([facility])  E11.42 250.60 MRI BRAIN WO CONT     357.2 MRI CERV SPINE WO CONT      MANUELA COMPREHENSIVE PLUS PANEL      VITAMIN D, 25 HYDROXY      VITAMIN B12 & FOLATE      GM1 IGG AUTOANTIBODY      HEMOGLOBIN A1C WITH EAG      IMMUNOELECTROPHORESIS (IMMUNOFIX.)      ANTINEURONAL CELL AB      EMG LIMITED      CK      MYELIN ASSOC. GLYCOPROTEIN AB,IGM      gabapentin (NEURONTIN) 300 mg capsule   2.  Immune-mediated neuropathy ([facility])  D89.89 279.8 MRI BRAIN WO CONT    G63 357.4 MRI CERV SPINE WO CONT      MANUELA COMPREHENSIVE PLUS PANEL      VITAMIN D, 25 HYDROXY      VITAMIN B12 & FOLATE      GM1 IGG AUTOANTIBODY      HEMOGLOBIN A1C WITH EAG      IMMUNOELECTROPHORESIS (IMMUNOFIX.)      ANTINEURONAL CELL AB      EMG LIMITED      CK      MYELIN ASSOC. GLYCOPROTEIN AB,IGM      gabapentin (NEURONTIN) 300 mg capsule   3. B12 deficiency  E53.8 266.2 VITAMIN B12 & FOLATE   4. Vitamin D deficiency  E55.9 268.9 VITAMIN D, 25 HYDROXY   5. Sensory ataxic gait  R26.0 781.2 MRI BRAIN WO CONT      MRI CERV SPINE WO CONT      MANUELA COMPREHENSIVE PLUS PANEL      VITAMIN D, 25 HYDROXY      VITAMIN B12 & FOLATE      GM1 IGG AUTOANTIBODY      HEMOGLOBIN A1C WITH EAG      IMMUNOELECTROPHORESIS (IMMUNOFIX.)      ANTINEURONAL CELL AB      EMG LIMITED      CK      MYELIN ASSOC. GLYCOPROTEIN AB,IGM   6. Cervical spondylosis with myelopathy  M47.12 721.1 MRI BRAIN WO CONT      MRI CERV SPINE WO CONT      MANUELA COMPREHENSIVE PLUS PANEL      VITAMIN D, 25 HYDROXY      VITAMIN B12 & FOLATE      GM1 IGG AUTOANTIBODY      HEMOGLOBIN A1C WITH EAG      IMMUNOELECTROPHORESIS (IMMUNOFIX.)      ANTINEURONAL CELL AB      EMG LIMITED   7. Type 2 diabetes mellitus with stage 4 chronic kidney disease, with long-term current use of insulin ([facility])  E11.22 250.40 gabapentin (NEURONTIN) 300 mg capsule    N18.4 585.4     Z79.4 V58.67      Active Problems:    * No active hospital problems. *      Plan:     Patient with complicated history of progressive gait disorder and unsteadiness and frequent falls, making him a high risk for further neurologic injury and dysfunction. To make it more complicated he also has diffuse hyperreflexia, which could indicate a recurrent cervical myelopathy, or just be residual of his previous problem requiring surgery but we have no records to compare to this. He also obviously has a diabetic neuropathy and diabetic sensory ataxia complicating his gait problems. He apparently has never had a work-up for his neuropathy. Supposedly he has been to physical therapy gotten some benefit.   He also has difficulty with these myoclonic type jerks, and muscle twitches, and even though they are somewhat better we will check an EEG to make sure there is no seizure activity most likely representing some type of metabolic problem related to his renal insufficiency and end-stage renal disease. Complete metabolic panel sent off to rule out causes of memory loss, ataxia, neuropathy, or other neuromuscular disease for causes of his tremors and gait instability. He will need an EMG and nerve conduction study in addition to evaluate his neuropathy which has never been evaluated. Complicated case with multiple neurologic problems placing the patient at high risk for neurologic injury and dysfunction if treatable causes are found. He could be a high risk for injury and paralysis if he does have a myelopathy that is progressive and pretty would be life-threatening. Patient advised that the best treatment for his diabetic neuropathy is good control of his diabetes. He is to take a multivitamin and vitamin D on a daily basis also. Follow-up after the above-mentioned testing and treatment, and he will call us if any problems. We will check my chart for results of this test.    Signed By: Parth Villasenor MD     December 21, 2021       CC: Dionicio Bell MD  FAX: [phone]        If you have questions, please do not hesitate to call me. I look forward to following your patient along with you.       Sincerely,    Parth Villasenor MD

## 2021-12-22 NOTE — PROGRESS NOTES
Consult  REFERRED BY:  Gurpreet Joyce MD    CHIEF COMPLAINT: Twitching and abnormal movements and progressive loss of balance. Subjective:     Janet Mosley is a 78 y.o. right-handed -American male we are seeing as a new patient, at the request of Dr. Chente Angel on urgent work in basis for new problem of progressive twitching and muscle jerks and involuntary movements in his arms and legs, that were much worse in August when he was admitted to the hospital with sepsis and some renal insufficiency and was fairly sick, and has gotten a little bit better with time since that time with not quite so prominent twitches. He also says he has loss of balance and difficulty walking, and has to use a cane for ambulation now almost all the time when before that he had to use it only intermittently before August and when he got sick. He had a normal CPK but is a diabetic since 2008 and been on insulin for the last 3 years with somewhat poorly controlled diabetes. He said it may be doing better recently his hemoglobin A1c seems to be coming down better. He is on dialysis. He has had previous cervical fusion for myelopathy in the past.  None of those records are available at this time. He said congestive heart failure and sleep apnea but does not use CPAP. He has not had any recent head trauma or neck trauma or back trauma, or fever or meningismus since August, but does complain of some mild low back pain and had an MRI scan ordered of his back but no MRI scan is on the chart. His bowel and bladder function is stable. He has had 1 or 2 falls because of his unsteady gait and is very concerned that he might be high risk for injury due to his progressive symptoms are becoming more disabling. He has never had a seizure or loss of consciousness with this myoclonic type jerks. He does not think he has had any cognitive issues or other cranial nerve problems.     Past Medical History:   Diagnosis Date    Arthritis spine    CAD (coronary artery disease)     high cholesterol    Chronic kidney disease     dialysis    [facility]     ESRD (end stage renal disease) ([facility]     GERD (gastroesophageal reflux disease)     HX    Hypertension     Ill-defined condition     irritable bowel syndrome    Systolic CHF ([facility]     Unspecified sleep apnea     NO CPAP      Past Surgical History:   Procedure Laterality Date    COLONOSCOPY N/A 2016    COLONOSCOPY performed by Ivy Daniels MD at [facility] ENDOSCOPY    HX ORTHOPAEDIC      CERVICAL FUSION    HX UROLOGICAL      TURP    HX VASCULAR ACCESS      L arm dialysis access     Family History   Problem Relation Age of Onset    Cancer Father         \"bone\"    Diabetes Brother       Social History     Tobacco Use    Smoking status: Former Smoker     Quit date:      Years since quittin.9    Smokeless tobacco: Former User    Tobacco comment: cigars only   Substance Use Topics    Alcohol use: No         Current Outpatient Medications:     gabapentin (NEURONTIN) 300 mg capsule, Take 1 Capsule by mouth nightly. Max Daily Amount: 300 mg., Disp: 90 Capsule, Rfl: 5    carvediloL (COREG) 12.5 mg tablet, Take 1 Tab by mouth two (2) times daily (with meals). , Disp: 60 Tab, Rfl: 0    bumetanide (BUMEX) 2 mg tablet, TAKE 1 TABLET BY MOUTH EVERY DAY, Disp: , Rfl:     finasteride (PROSCAR) 5 mg tablet, TAKE 1 TABLET BY MOUTH DAILY, Disp: , Rfl:     insulin NPH/insulin regular (NovoLIN 70/30 U-100 Insulin) 100 unit/mL (70-30) injection, ReliOn Novolin 70/30: Inject 30 Units with Breakfast, 40 Units with Dinner, Disp: 7 Vial, Rfl: 3    losartan (COZAAR) 25 mg tablet, Take 25 mg by mouth daily. , Disp: , Rfl:     sertraline (ZOLOFT) 50 mg tablet, Take 50 mg by mouth daily. , Disp: , Rfl: 0    aspirin 81 mg chewable tablet, Take 81 mg by mouth daily. , Disp: , Rfl:     fluticasone (FLONASE) 50 mcg/actuation nasal spray, 2 Sprays by Both Nostrils route daily. , Disp: , Rfl:    tamsulosin (FLOMAX) 0.4 mg capsule, Take 0.8 mg by mouth daily. , Disp: , Rfl:         Allergies   Allergen Reactions    Albumin, Human 25 % Anaphylaxis and Hives    Niacin Hives      MRI Results (most recent):  No results found for this or any previous visit. No results found for this or any previous visit. Review of Systems:  A comprehensive review of systems was negative except for: Constitutional: positive for fatigue and malaise  Ears, nose, mouth, throat, and face: positive for hearing loss  Musculoskeletal: positive for myalgias, arthralgias, stiff joints, neck pain, back pain and muscle weakness  Neurological: positive for paresthesia, coordination problems, gait problems, tremor and weakness  Behvioral/Psych: positive for anxiety   Vitals:    12/21/21 0958 12/21/21 1032   BP: (!) 154/86 139/80   Pulse: 72 84   Resp:  16   Temp: 98 °F (36.7 °C)    TempSrc: Temporal    SpO2: 98%    Weight: 238 lb 9.6 oz (108.2 kg)    Height: 6' (1.829 m)      Objective:     I      NEUROLOGICAL EXAM:    Appearance: The patient is well developed, well nourished, provides a fair history and is in no acute distress. Mental Status: Oriented to time, place and person, and the president, cognitive function is slow but probably normal and speech is fluent and no aphasia or dysarthria. Mood and affect appropriate but mildly depressed. Cranial Nerves:   Intact visual fields. Fundi are benign, disc are flat, no lesions seen on funduscopy. ANGELITO, EOM's full, no nystagmus, no ptosis. Facial sensation is normal. Corneal reflexes are not tested. Facial movement is symmetric. Hearing is abnormal bilaterally. Palate is midline with normal sternocleidomastoid and trapezius muscles are normal. Tongue is midline. Neck without meningismus or bruits  Temporal arteries are not tender or enlarged  TMJ areas are not tender on palpation   Motor:  4/5 strength in upper and lower proximal and distal muscles.  Normal bulk and slight increased tone in the legs but normal in the arms. No fasciculations. Rapid alternating movement is symmetric and slow bilaterally   Reflexes:   Deep tendon reflexes 3+/4 and symmetrical in both legs, and 2+/4 in the arms. No babinski or clonus present   Sensory:   Abnormal to touch, pinprick and vibration and temperature in both feet to midcalf level. DSS is intact   Gait:  Abnormal gait for patient's age with wide-based and unsteady and ataxic and almost spondylitic gait. Tremor:   No tremor noted. Cerebellar: Moderately abnormal cerebellar signs present on Romberg and tandem testing and finger-nose-finger exam.   Neurovascular:  Normal heart sounds and regular rhythm, peripheral pulses decreased, and no carotid bruits. Assessment:       ICD-10-CM ICD-9-CM    1. Diabetic peripheral neuropathy associated with type 2 diabetes mellitus (HCC)  E11.42 250.60 MRI BRAIN WO CONT     357.2 MRI CERV SPINE WO CONT      MANUELA COMPREHENSIVE PLUS PANEL      VITAMIN D, 25 HYDROXY      VITAMIN B12 & FOLATE      GM1 IGG AUTOANTIBODY      HEMOGLOBIN A1C WITH EAG      IMMUNOELECTROPHORESIS (IMMUNOFIX.)      ANTINEURONAL CELL AB      EMG LIMITED      CK      MYELIN ASSOC. GLYCOPROTEIN AB,IGM      gabapentin (NEURONTIN) 300 mg capsule   2. Immune-mediated neuropathy ([facility])  D89.89 279.8 MRI BRAIN WO CONT    G63 357.4 MRI CERV SPINE WO CONT      MANUELA COMPREHENSIVE PLUS PANEL      VITAMIN D, 25 HYDROXY      VITAMIN B12 & FOLATE      GM1 IGG AUTOANTIBODY      HEMOGLOBIN A1C WITH EAG      IMMUNOELECTROPHORESIS (IMMUNOFIX.)      ANTINEURONAL CELL AB      EMG LIMITED      CK      MYELIN ASSOC. GLYCOPROTEIN AB,IGM      gabapentin (NEURONTIN) 300 mg capsule   3. B12 deficiency  E53.8 266.2 VITAMIN B12 & FOLATE   4.  Vitamin D deficiency  E55.9 268.9 VITAMIN D, 25 HYDROXY   5. Sensory ataxic gait  R26.0 781.2 MRI BRAIN WO CONT      MRI CERV SPINE WO CONT      MANUELA COMPREHENSIVE PLUS PANEL      VITAMIN D, 25 HYDROXY      VITAMIN B12 & FOLATE      GM1 IGG AUTOANTIBODY      HEMOGLOBIN A1C WITH EAG      IMMUNOELECTROPHORESIS (IMMUNOFIX.)      ANTINEURONAL CELL AB      EMG LIMITED      CK      MYELIN ASSOC. GLYCOPROTEIN AB,IGM   6. Cervical spondylosis with myelopathy  M47.12 721.1 MRI BRAIN WO CONT      MRI CERV SPINE WO CONT      MANUELA COMPREHENSIVE PLUS PANEL      VITAMIN D, 25 HYDROXY      VITAMIN B12 & FOLATE      GM1 IGG AUTOANTIBODY      HEMOGLOBIN A1C WITH EAG      IMMUNOELECTROPHORESIS (IMMUNOFIX.)      ANTINEURONAL CELL AB      EMG LIMITED   7. Type 2 diabetes mellitus with stage 4 chronic kidney disease, with long-term current use of insulin (HCC)  E11.22 250.40 gabapentin (NEURONTIN) 300 mg capsule    N18.4 585.4     Z79.4 V58.67      Active Problems:    * No active hospital problems. *      Plan:     Patient with complicated history of progressive gait disorder and unsteadiness and frequent falls, making him a high risk for further neurologic injury and dysfunction. To make it more complicated he also has diffuse hyperreflexia, which could indicate a recurrent cervical myelopathy, or just be residual of his previous problem requiring surgery but we have no records to compare to this. He also obviously has a diabetic neuropathy and diabetic sensory ataxia complicating his gait problems. He apparently has never had a work-up for his neuropathy. Supposedly he has been to physical therapy gotten some benefit. He also has difficulty with these myoclonic type jerks, and muscle twitches, and even though they are somewhat better we will check an EEG to make sure there is no seizure activity most likely representing some type of metabolic problem related to his renal insufficiency and end-stage renal disease. Complete metabolic panel sent off to rule out causes of memory loss, ataxia, neuropathy, or other neuromuscular disease for causes of his tremors and gait instability.   He will need an EMG and nerve conduction study in addition to evaluate his neuropathy which has never been evaluated. Complicated case with multiple neurologic problems placing the patient at high risk for neurologic injury and dysfunction if treatable causes are found. He could be a high risk for injury and paralysis if he does have a myelopathy that is progressive and pretty would be life-threatening. Patient advised that the best treatment for his diabetic neuropathy is good control of his diabetes. He is to take a multivitamin and vitamin D on a daily basis also. Follow-up after the above-mentioned testing and treatment, and he will call us if any problems.   We will check my chart for results of this test.    Signed By: Barbara Armendariz MD     December 21, 2021       CC: Amita Shah MD  FAX: [phone]

## 2022-01-13 ENCOUNTER — HOSPITAL ENCOUNTER (OUTPATIENT)
Dept: MRI IMAGING | Age: 77
Discharge: HOME | End: 2022-01-13
Attending: PSYCHIATRY & NEUROLOGY
Payer: MEDICARE

## 2022-01-13 ENCOUNTER — DOCUMENTATION ONLY (OUTPATIENT)
Dept: NEUROLOGY | Age: 77
End: 2022-01-13

## 2022-01-13 DIAGNOSIS — G63 IMMUNE-MEDIATED NEUROPATHY (HCC): ICD-10-CM

## 2022-01-13 DIAGNOSIS — M47.12 CERVICAL SPONDYLOSIS WITH MYELOPATHY: ICD-10-CM

## 2022-01-13 DIAGNOSIS — E11.42 DIABETIC PERIPHERAL NEUROPATHY ASSOCIATED WITH TYPE 2 DIABETES MELLITUS (HCC): ICD-10-CM

## 2022-01-13 DIAGNOSIS — D89.89 IMMUNE-MEDIATED NEUROPATHY (HCC): ICD-10-CM

## 2022-01-13 DIAGNOSIS — R26.0 SENSORY ATAXIC GAIT: ICD-10-CM

## 2022-01-13 PROCEDURE — 72141 MRI NECK SPINE W/O DYE: CPT

## 2022-01-14 NOTE — PROGRESS NOTES
I called the patient, no answer, left message he should contact his surgeon because he needs them to check his spine again, because he does have 3 areas where he has spinal stenosis down to 6 mm, and this may be complicating his gait, and it may just be old compression and residual from his previous myelopathy, I am just not sure because we did not know him then. I told him to call me back if he has any questions.

## 2022-03-15 ENCOUNTER — APPOINTMENT (OUTPATIENT)
Dept: GENERAL RADIOLOGY | Age: 77
End: 2022-03-15
Attending: EMERGENCY MEDICINE
Payer: MEDICARE

## 2022-03-15 ENCOUNTER — HOSPITAL ENCOUNTER (EMERGENCY)
Age: 77
Discharge: HOME | End: 2022-03-16
Attending: EMERGENCY MEDICINE
Payer: MEDICARE

## 2022-03-15 VITALS
SYSTOLIC BLOOD PRESSURE: 178 MMHG | OXYGEN SATURATION: 98 % | DIASTOLIC BLOOD PRESSURE: 83 MMHG | HEART RATE: 82 BPM | TEMPERATURE: 98.1 F | WEIGHT: 250 LBS | RESPIRATION RATE: 22 BRPM | BODY MASS INDEX: 33.86 KG/M2 | HEIGHT: 72 IN

## 2022-03-15 DIAGNOSIS — Z99.2 ESRD ON HEMODIALYSIS (HCC): ICD-10-CM

## 2022-03-15 DIAGNOSIS — N18.6 ESRD ON HEMODIALYSIS (HCC): ICD-10-CM

## 2022-03-15 DIAGNOSIS — R07.9 ACUTE CHEST PAIN: Primary | ICD-10-CM

## 2022-03-15 LAB
ALBUMIN SERPL-MCNC: 3.7 G/DL (ref 3.5–5)
ALBUMIN/GLOB SERPL: 0.8 {RATIO} (ref 1.1–2.2)
ALP SERPL-CCNC: 87 U/L (ref 45–117)
ALT SERPL-CCNC: 21 U/L (ref 12–78)
ANION GAP SERPL CALC-SCNC: 8 MMOL/L (ref 5–15)
AST SERPL-CCNC: 15 U/L (ref 15–37)
BASOPHILS # BLD: 0 K/UL (ref 0–0.1)
BASOPHILS NFR BLD: 0 % (ref 0–1)
BILIRUB SERPL-MCNC: 0.4 MG/DL (ref 0.2–1)
BNP SERPL-MCNC: 5709 PG/ML
BUN SERPL-MCNC: 59 MG/DL (ref 6–20)
BUN/CREAT SERPL: 8 (ref 12–20)
CALCIUM SERPL-MCNC: 9.5 MG/DL (ref 8.5–10.1)
CHLORIDE SERPL-SCNC: 104 MMOL/L (ref 97–108)
CO2 SERPL-SCNC: 27 MMOL/L (ref 21–32)
CREAT SERPL-MCNC: 7.83 MG/DL (ref 0.7–1.3)
DIFFERENTIAL METHOD BLD: ABNORMAL
EOSINOPHIL # BLD: 0.5 K/UL (ref 0–0.4)
EOSINOPHIL NFR BLD: 7 % (ref 0–7)
ERYTHROCYTE [DISTWIDTH] IN BLOOD BY AUTOMATED COUNT: 15.4 % (ref 11.5–14.5)
GLOBULIN SER CALC-MCNC: 4.6 G/DL (ref 2–4)
GLUCOSE SERPL-MCNC: 148 MG/DL (ref 65–100)
HCT VFR BLD AUTO: 33.4 % (ref 36.6–50.3)
HGB BLD-MCNC: 10.6 G/DL (ref 12.1–17)
IMM GRANULOCYTES # BLD AUTO: 0 K/UL (ref 0–0.04)
IMM GRANULOCYTES NFR BLD AUTO: 0 % (ref 0–0.5)
LYMPHOCYTES # BLD: 1.3 K/UL (ref 0.8–3.5)
LYMPHOCYTES NFR BLD: 19 % (ref 12–49)
MCH RBC QN AUTO: 31.8 PG (ref 26–34)
MCHC RBC AUTO-ENTMCNC: 31.7 G/DL (ref 30–36.5)
MCV RBC AUTO: 100.3 FL (ref 80–99)
MONOCYTES # BLD: 0.7 K/UL (ref 0–1)
MONOCYTES NFR BLD: 10 % (ref 5–13)
NEUTS SEG # BLD: 4.4 K/UL (ref 1.8–8)
NEUTS SEG NFR BLD: 64 % (ref 32–75)
NRBC # BLD: 0 K/UL (ref 0–0.01)
NRBC BLD-RTO: 0 PER 100 WBC
PLATELET # BLD AUTO: 197 K/UL (ref 150–400)
PMV BLD AUTO: 11.1 FL (ref 8.9–12.9)
POTASSIUM SERPL-SCNC: 4.4 MMOL/L (ref 3.5–5.1)
PROT SERPL-MCNC: 8.3 G/DL (ref 6.4–8.2)
RBC # BLD AUTO: 3.33 M/UL (ref 4.1–5.7)
SODIUM SERPL-SCNC: 139 MMOL/L (ref 136–145)
TROPONIN-HIGH SENSITIVITY: 291 NG/L (ref 0–76)
WBC # BLD AUTO: 7 K/UL (ref 4.1–11.1)

## 2022-03-15 PROCEDURE — 93005 ELECTROCARDIOGRAM TRACING: CPT

## 2022-03-15 PROCEDURE — 83880 ASSAY OF NATRIURETIC PEPTIDE: CPT

## 2022-03-15 PROCEDURE — 71045 X-RAY EXAM CHEST 1 VIEW: CPT

## 2022-03-15 PROCEDURE — 99285 EMERGENCY DEPT VISIT HI MDM: CPT

## 2022-03-15 PROCEDURE — 85025 COMPLETE CBC W/AUTO DIFF WBC: CPT

## 2022-03-15 PROCEDURE — 80053 COMPREHEN METABOLIC PANEL: CPT

## 2022-03-15 PROCEDURE — 84484 ASSAY OF TROPONIN QUANT: CPT

## 2022-03-15 PROCEDURE — 36415 COLL VENOUS BLD VENIPUNCTURE: CPT

## 2022-03-16 LAB
ATRIAL RATE: 74 BPM
ATRIAL RATE: 78 BPM
CALCULATED P AXIS, ECG09: -13 DEGREES
CALCULATED P AXIS, ECG09: -17 DEGREES
CALCULATED R AXIS, ECG10: -56 DEGREES
CALCULATED R AXIS, ECG10: -57 DEGREES
CALCULATED T AXIS, ECG11: 67 DEGREES
CALCULATED T AXIS, ECG11: 89 DEGREES
DIAGNOSIS, 93000: NORMAL
DIAGNOSIS, 93000: NORMAL
LIPASE SERPL-CCNC: 228 U/L (ref 73–393)
P-R INTERVAL, ECG05: 172 MS
P-R INTERVAL, ECG05: 188 MS
Q-T INTERVAL, ECG07: 414 MS
Q-T INTERVAL, ECG07: 450 MS
QRS DURATION, ECG06: 132 MS
QRS DURATION, ECG06: 132 MS
QTC CALCULATION (BEZET), ECG08: 471 MS
QTC CALCULATION (BEZET), ECG08: 499 MS
TROPONIN-HIGH SENSITIVITY: 286 NG/L (ref 0–76)
VENTRICULAR RATE, ECG03: 74 BPM
VENTRICULAR RATE, ECG03: 78 BPM

## 2022-03-16 PROCEDURE — 93005 ELECTROCARDIOGRAM TRACING: CPT

## 2022-03-16 PROCEDURE — 96374 THER/PROPH/DIAG INJ IV PUSH: CPT

## 2022-03-16 PROCEDURE — 74011250637 HC RX REV CODE- 250/637: Performed by: EMERGENCY MEDICINE

## 2022-03-16 PROCEDURE — 84484 ASSAY OF TROPONIN QUANT: CPT

## 2022-03-16 PROCEDURE — 74011000250 HC RX REV CODE- 250: Performed by: EMERGENCY MEDICINE

## 2022-03-16 PROCEDURE — 36415 COLL VENOUS BLD VENIPUNCTURE: CPT

## 2022-03-16 PROCEDURE — 74011250636 HC RX REV CODE- 250/636: Performed by: EMERGENCY MEDICINE

## 2022-03-16 PROCEDURE — 83690 ASSAY OF LIPASE: CPT

## 2022-03-16 RX ORDER — TRAMADOL HYDROCHLORIDE 50 MG/1
50 TABLET ORAL
Qty: 10 TABLET | Refills: 0 | Status: SHIPPED | OUTPATIENT
Start: 2022-03-16 | End: 2022-03-19

## 2022-03-16 RX ORDER — FENTANYL CITRATE 50 UG/ML
50 INJECTION, SOLUTION INTRAMUSCULAR; INTRAVENOUS
Status: COMPLETED | OUTPATIENT
Start: 2022-03-16 | End: 2022-03-16

## 2022-03-16 RX ADMIN — FENTANYL CITRATE 50 MCG: 50 INJECTION, SOLUTION INTRAMUSCULAR; INTRAVENOUS at 00:32

## 2022-03-16 RX ADMIN — ALUMINUM HYDROXIDE AND MAGNESIUM HYDROXIDE 40 ML: 200; 200 SUSPENSION ORAL at 00:32

## 2022-03-16 NOTE — DISCHARGE INSTRUCTIONS
It was a pleasure taking care of you in our Emergency Department today. We know that when you come to [facility] you are entrusting us with your health, comfort, and safety. Our physicians and nurses honor that trust, and truly appreciate the opportunity to care for you and your loved ones. We also value your feedback. If you receive a survey about your Emergency Department experience today, please fill it out. We care about our patients' feedback, and we listen to what you have to say. Thank you!       Dr. Natalya Correa MD.

## 2022-03-16 NOTE — ED PROVIDER NOTES
EMERGENCY DEPARTMENT HISTORY AND PHYSICAL EXAM     ----------------------------------------------------------------------------  Please note that this dictation was completed with FluGen, the computer voice recognition software. Quite often unanticipated grammatical, syntax, homophones, and other interpretive errors are inadvertently transcribed by the computer software. Please disregard these errors. Please excuse any errors that have escaped final proofreading  ----------------------------------------------------------------------------      Date: 3/15/2022  Patient Name: Shiva Edouard    History of Presenting Illness     Chief Complaint   Patient presents with    Chest Pain     with shortness of breath starting this afternoon, MWF dialysis pt, pt was dialyzed today as extra session to remove fluid       History Provided By:  Patient    HPI: Shiva Edouard is a 78 y.o. male, with significant pmhx of diabetes, hypertension, CAD, reflux, systolic heart failure with an EF of 40 to 45%, chronic kidney disease dialysis M/W/F, who presents via private vehicle to the ED with c/o left-sided chest discomfort with radiation substernally it is aching in nature and started earlier this evening while at rest.  Patient not taking medication prior to presenting to the emergency department. Notes that he follows with cardiology, (Dr. Darren Gongora). Note that he is been going to ask her sessions of dialysis due to fact that he was having hypertension issues with longer sessions. Patient also specifically denies any associated fevers, chills, nausea, vomiting, diarrhea, abd pain, changes in BM, urinary sxs, or headache. Social Hx: deniestobacco  denies EtOH , denies recreational/Illicit Drugs    There are no other complaints, changes, or physical findings at this time.      PCP: Henry Gomez MD    Allergies   Allergen Reactions    Albumin, Human 25 % Anaphylaxis and Hives    Niacin Hives       Current Outpatient Medications Medication Sig Dispense Refill    traMADoL (Ultram) 50 mg tablet Take 1 Tablet by mouth every six (6) hours as needed for Pain for up to 3 days. Max Daily Amount: 200 mg. 10 Tablet 0    gabapentin (NEURONTIN) 300 mg capsule Take 1 Capsule by mouth nightly. Max Daily Amount: 300 mg. 90 Capsule 5    carvediloL (COREG) 12.5 mg tablet Take 1 Tab by mouth two (2) times daily (with meals). 60 Tab 0    bumetanide (BUMEX) 2 mg tablet TAKE 1 TABLET BY MOUTH EVERY DAY      finasteride (PROSCAR) 5 mg tablet TAKE 1 TABLET BY MOUTH DAILY      insulin NPH/insulin regular (NovoLIN 70/30 U-100 Insulin) 100 unit/mL (70-30) injection ReliOn Novolin 70/30: Inject 30 Units with Breakfast, 40 Units with Dinner 7 Vial 3    losartan (COZAAR) 25 mg tablet Take 25 mg by mouth daily.  sertraline (ZOLOFT) 50 mg tablet Take 50 mg by mouth daily. 0    aspirin 81 mg chewable tablet Take 81 mg by mouth daily.  fluticasone (FLONASE) 50 mcg/actuation nasal spray 2 Sprays by Both Nostrils route daily.  tamsulosin (FLOMAX) 0.4 mg capsule Take 0.8 mg by mouth daily.          Past History     Past Medical History:  Past Medical History:   Diagnosis Date    Arthritis     spine    CAD (coronary artery disease)     high cholesterol    Chronic kidney disease     dialysis    Diabetes ([facility] [address]     ESRD (end stage renal disease) (HCC)     GERD (gastroesophageal reflux disease)     HX    Hypertension     Ill-defined condition     irritable bowel syndrome    Systolic CHF ([facility] [address]     Unspecified sleep apnea     NO CPAP       Past Surgical History:  Past Surgical History:   Procedure Laterality Date    COLONOSCOPY N/A 11/9/2016    COLONOSCOPY performed by [facility] at [facility] ENDOSCOPY    HX ORTHOPAEDIC      CERVICAL FUSION    HX UROLOGICAL      TURP    HX VASCULAR ACCESS      L arm dialysis access       Family History:  Family History   Problem Relation Age of Onset    Cancer Father         \"bone\"    Diabetes Brother Social History:  Social History     Tobacco Use    Smoking status: Former Smoker     Quit date:      Years since quittin.2    Smokeless tobacco: Former User    Tobacco comment: cigars only   Vaping Use    Vaping Use: Never used   Substance Use Topics    Alcohol use: No    Drug use: No       Allergies: Allergies   Allergen Reactions    Albumin, Human 25 % Anaphylaxis and Hives    Niacin Hives         Review of Systems   Review of Systems   Constitutional: Negative for chills and fever. HENT: Negative. Eyes: Negative. Respiratory: Negative for cough, chest tightness and shortness of breath. Cardiovascular: Positive for chest pain. Negative for leg swelling. Gastrointestinal: Negative for abdominal pain, diarrhea, nausea and vomiting. Endocrine: Negative. Genitourinary: Negative for difficulty urinating and dysuria. Musculoskeletal: Negative for myalgias. Skin: Negative. Neurological: Negative. Psychiatric/Behavioral: Negative. All other systems reviewed and are negative. Physical Exam   Physical Exam  Vitals and nursing note reviewed. Constitutional:       General: He is not in acute distress. Appearance: He is well-developed. He is not diaphoretic. HENT:      Head: Normocephalic and atraumatic. Nose: Nose normal.      Mouth/Throat:      Pharynx: No oropharyngeal exudate. Eyes:      Conjunctiva/sclera: Conjunctivae normal.      Pupils: Pupils are equal, round, and reactive to light. Neck:      Vascular: No JVD. Cardiovascular:      Rate and Rhythm: Normal rate and regular rhythm. Heart sounds: Normal heart sounds. No murmur heard. No friction rub. Pulmonary:      Effort: Pulmonary effort is normal. No respiratory distress. Breath sounds: Normal breath sounds. No stridor. No wheezing or rales. Chest:      Chest wall: No tenderness or crepitus. Abdominal:      General: Bowel sounds are normal. There is no distension. Palpations: Abdomen is soft. Tenderness: There is no abdominal tenderness. There is no rebound. Musculoskeletal:         General: No tenderness. Normal range of motion. Cervical back: Normal range of motion and neck supple. Skin:     General: Skin is warm and dry. Findings: No rash. Neurological:      Mental Status: He is alert and oriented to person, place, and time. Cranial Nerves: No cranial nerve deficit. Psychiatric:         Speech: Speech normal.         Behavior: Behavior normal.         Thought Content:  Thought content normal.         Judgment: Judgment normal.           Diagnostic Study Results     Labs -     Recent Results (from the past 12 hour(s))   EKG, 12 LEAD, INITIAL    Collection Time: 03/15/22  8:32 PM   Result Value Ref Range    Ventricular Rate 78 BPM    Atrial Rate 78 BPM    P-R Interval 172 ms    QRS Duration 132 ms    Q-T Interval 414 ms    QTC Calculation (Bezet) 471 ms    Calculated P Axis -13 degrees    Calculated R Axis -56 degrees    Calculated T Axis 89 degrees    Diagnosis       ** Poor data quality, interpretation may be adversely affected  Normal sinus rhythm  Left axis deviation  Right bundle branch block  Left ventricular hypertrophy with repolarization abnormality  Cannot rule out Anterior infarct , age undetermined  When compared with ECG of 11-OCT-2021 17:19,  Diego-Parkinson-White is no longer present     CBC WITH AUTOMATED DIFF    Collection Time: 03/15/22  8:39 PM   Result Value Ref Range    WBC 7.0 4.1 - 11.1 K/uL    RBC 3.33 (L) 4.10 - 5.70 M/uL    HGB 10.6 (L) 12.1 - 17.0 g/dL    HCT 33.4 (L) 36.6 - 50.3 %    .3 (H) 80.0 - 99.0 FL    MCH 31.8 26.0 - 34.0 PG    MCHC 31.7 30.0 - 36.5 g/dL    RDW 15.4 (H) 11.5 - 14.5 %    PLATELET 414 777 - 929 K/uL    MPV 11.1 8.9 - 12.9 FL    NRBC 0.0 0  WBC    ABSOLUTE NRBC 0.00 0.00 - 0.01 K/uL    NEUTROPHILS 64 32 - 75 %    LYMPHOCYTES 19 12 - 49 %    MONOCYTES 10 5 - 13 %    EOSINOPHILS 7 0 - 7 %    BASOPHILS 0 0 - 1 %    IMMATURE GRANULOCYTES 0 0.0 - 0.5 %    ABS. NEUTROPHILS 4.4 1.8 - 8.0 K/UL    ABS. LYMPHOCYTES 1.3 0.8 - 3.5 K/UL    ABS. MONOCYTES 0.7 0.0 - 1.0 K/UL    ABS. EOSINOPHILS 0.5 (H) 0.0 - 0.4 K/UL    ABS. BASOPHILS 0.0 0.0 - 0.1 K/UL    ABS. IMM. GRANS. 0.0 0.00 - 0.04 K/UL    DF AUTOMATED     METABOLIC PANEL, COMPREHENSIVE    Collection Time: 03/15/22  8:39 PM   Result Value Ref Range    Sodium 139 136 - 145 mmol/L    Potassium 4.4 3.5 - 5.1 mmol/L    Chloride 104 97 - 108 mmol/L    CO2 27 21 - 32 mmol/L    Anion gap 8 5 - 15 mmol/L    Glucose 148 (H) 65 - 100 mg/dL    BUN 59 (H) 6 - 20 MG/DL    Creatinine 7.83 (H) 0.70 - 1.30 MG/DL    BUN/Creatinine ratio 8 (L) 12 - 20      GFR est AA 8 (L) >60 ml/min/1.73m2    GFR est non-AA 7 (L) >60 ml/min/1.73m2    Calcium 9.5 8.5 - 10.1 MG/DL    Bilirubin, total 0.4 0.2 - 1.0 MG/DL    ALT (SGPT) 21 12 - 78 U/L    AST (SGOT) 15 15 - 37 U/L    Alk.  phosphatase 87 45 - 117 U/L    Protein, total 8.3 (H) 6.4 - 8.2 g/dL    Albumin 3.7 3.5 - 5.0 g/dL    Globulin 4.6 (H) 2.0 - 4.0 g/dL    A-G Ratio 0.8 (L) 1.1 - 2.2     TROPONIN-HIGH SENSITIVITY    Collection Time: 03/15/22  8:39 PM   Result Value Ref Range    Troponin-High Sensitivity 291 (HH) 0 - 76 ng/L   NT-PRO BNP    Collection Time: 03/15/22  8:39 PM   Result Value Ref Range    NT pro-BNP 5,709 (H) <125 PG/ML   EKG, 12 LEAD, INITIAL    Collection Time: 03/16/22 12:13 AM   Result Value Ref Range    Ventricular Rate 74 BPM    Atrial Rate 74 BPM    P-R Interval 188 ms    QRS Duration 132 ms    Q-T Interval 450 ms    QTC Calculation (Bezet) 499 ms    Calculated P Axis -17 degrees    Calculated R Axis -57 degrees    Calculated T Axis 67 degrees    Diagnosis       Normal sinus rhythm  Left axis deviation  Right bundle branch block  Left ventricular hypertrophy with repolarization abnormality  When compared with ECG of 15-MAR-2022 20:32,  MANUAL COMPARISON REQUIRED, DATA IS UNCONFIRMED     TROPONIN-HIGH SENSITIVITY    Collection Time: 03/16/22 12:41 AM   Result Value Ref Range    Troponin-High Sensitivity 286 (HH) 0 - 76 ng/L   LIPASE    Collection Time: 03/16/22 12:41 AM   Result Value Ref Range    Lipase 228 73 - 393 U/L       Radiologic Studies -   XR CHEST PORT   Final Result   Interstitial edema pattern is suspected. .  . CT Results  (Last 48 hours)    None        CXR Results  (Last 48 hours)               03/15/22 2133  XR CHEST PORT Final result    Impression:  Interstitial edema pattern is suspected. .  . Narrative:  INDICATION:  Shortness of breath        EXAM: Chest single view. COMPARISON: 12/7/2021. FINDINGS: A single frontal view of the chest at 2125 hours shows diffuse   perihilar and bibasilar interstitial prominence, new since the prior study. No   definite pleural effusions or focal consolidation. .  The heart, mediastinum and   pulmonary vasculature are stable with heart size mildly enlarged. .  The bony   thorax is unremarkable for age. .                   Medical Decision Making   I am the first provider for this patient. I reviewed the vital signs, available nursing notes, past medical history, past surgical history, family history and social history. Vital Signs-Reviewed the patient's vital signs. Patient Vitals for the past 12 hrs:   Temp Pulse Resp BP SpO2   03/15/22 2029 98.1 °F (36.7 °C) 82 22 (!) 178/83 98 %       Pulse Oximetry Analysis - 98% on RA, normal  Rate: 82 bpm  Rhythm: Normal sinus      Provider Notes (Medical Decision Making):     DDX:  ACS, arrhythmia, NSTEMI, pulmonary edema, reflux, pancreatitis    Plan:  EKG, labs, troponin x2, chest x-ray, lipase, analgesic    Impression:  Acute chest pain, chronic renal disease on dialysis    ED Course:   Initial assessment performed. The patients presenting problems have been discussed, and they are in agreement with the care plan formulated and outlined with them.   I have encouraged them to ask questions as they arise throughout their visit. I reviewed the nursing notes and and vital signs from today's visit, as well as the electronic medical record system for any past medical records that were available that may contribute to the patients current condition, including noting previous ER visit in December for acute respiratory infection    Nursing notes will be reviewed as they become available in realtime while the pt has been in the ED. Kendra Colindres MD    EKG interpretation 0013: NSR, L Axis, rate 74; , , QTc 499; NO STEMI; interpreted by Kendra Colindres MD    I personally reviewed/interpreted pt's imaging. Agree with official read by radiology as noted above. Kendra Colindres MD    2:31 AM  Progress note:  Pt noted to be feeling better, reports complete resolution of previously noted pain, ready for discharge. Discussed lab and imaging findings with pt, specifically noting downtrending troponin. Pt will follow up with cardiology as instructed. All questions have been answered, pt voiced understanding and agreement with plan. Specific return precautions provided in addition to instructions for pt to return to the ED immediately should sx worsen at any time. Kendra Colindres MD           Critical Care Time:     none      Diagnosis     Clinical Impression:   1. Acute chest pain    2. ESRD on hemodialysis ([address]        PLAN:  1. Current Discharge Medication List      START taking these medications    Details   traMADoL (Ultram) 50 mg tablet Take 1 Tablet by mouth every six (6) hours as needed for Pain for up to 3 days. Max Daily Amount: 200 mg. Qty: 10 Tablet, Refills: 0  Start date: 3/16/2022, End date: 3/19/2022    Associated Diagnoses: Acute chest pain           2.    Follow-up Information     Follow up With Specialties Details Why Contact Info    Manuel Martin MD Internal Medicine Schedule an appointment as soon as possible for a visit in 2 days  [address] [facility] [address]  [phone]      Chris Doll MD Cardiology, Internal Medicine Schedule an appointment as soon as possible for a visit in 2 days  [address]  [address]  [place] [phone]      [facility] EMERGENCY DEPT Emergency Medicine  As needed, If symptoms worsen [address]  [address]  [phone]        Return to ED if worse     Disposition:  2:31 AM  The patient's results have been reviewed with family and/or caregiver. They verbally convey their understanding and agreement of the patient's signs, symptoms, diagnosis, treatment and prognosis and additionally agree to follow up as recommended in the discharge instructions or to return to the Emergency Room should the patient's condition change prior to their follow-up appointment. The family and/or caregiver verbally agrees with the care-plan and all of their questions have been answered. The discharge instructions have also been provided to the them with educational information regarding the patient's diagnosis as well a list of reasons why the patient would want to return to the ER prior to their follow-up appointment should their condition change.   Dean Carlisle MD

## 2022-03-18 PROBLEM — I50.23 ACUTE ON CHRONIC SYSTOLIC CHF (CONGESTIVE HEART FAILURE) (HCC): Status: ACTIVE | Noted: 2020-07-21

## 2022-03-18 PROBLEM — N18.6 ESRD (END STAGE RENAL DISEASE) ON DIALYSIS (HCC): Status: ACTIVE | Noted: 2020-07-21

## 2022-03-18 PROBLEM — R77.8 ELEVATED TROPONIN: Status: ACTIVE | Noted: 2020-07-21

## 2022-03-18 PROBLEM — R79.89 ELEVATED TROPONIN: Status: ACTIVE | Noted: 2020-07-21

## 2022-03-18 PROBLEM — R41.82 ACUTE ALTERATION IN MENTAL STATUS: Status: ACTIVE | Noted: 2021-12-21

## 2022-03-18 PROBLEM — Z99.2 ESRD (END STAGE RENAL DISEASE) ON DIALYSIS (HCC): Status: ACTIVE | Noted: 2020-07-21

## 2022-03-18 PROBLEM — R26.0 SENSORY ATAXIC GAIT: Status: ACTIVE | Noted: 2021-12-21

## 2022-03-18 PROBLEM — E11.42 DIABETIC PERIPHERAL NEUROPATHY ASSOCIATED WITH TYPE 2 DIABETES MELLITUS (HCC): Status: ACTIVE | Noted: 2021-12-21

## 2022-03-18 PROBLEM — A41.9 SEPSIS (HCC): Status: ACTIVE | Noted: 2021-08-18

## 2022-03-19 PROBLEM — G63 IMMUNE-MEDIATED NEUROPATHY (HCC): Status: ACTIVE | Noted: 2021-12-21

## 2022-03-19 PROBLEM — I50.20 SYSTOLIC HEART FAILURE (HCC): Status: ACTIVE | Noted: 2021-05-04

## 2022-03-19 PROBLEM — E11.22 TYPE 2 DIABETES MELLITUS WITH STAGE 4 CHRONIC KIDNEY DISEASE, WITH LONG-TERM CURRENT USE OF INSULIN (HCC): Status: ACTIVE | Noted: 2021-12-21

## 2022-03-19 PROBLEM — I10 ACCELERATED HYPERTENSION: Status: ACTIVE | Noted: 2020-07-21

## 2022-03-19 PROBLEM — Z79.4 TYPE 2 DIABETES MELLITUS WITH STAGE 4 CHRONIC KIDNEY DISEASE, WITH LONG-TERM CURRENT USE OF INSULIN (HCC): Status: ACTIVE | Noted: 2021-12-21

## 2022-03-19 PROBLEM — G25.3 MYOCLONUS: Status: ACTIVE | Noted: 2021-12-21

## 2022-03-19 PROBLEM — J96.01 ACUTE HYPOXEMIC RESPIRATORY FAILURE (HCC): Status: ACTIVE | Noted: 2021-08-18

## 2022-03-19 PROBLEM — D89.89 IMMUNE-MEDIATED NEUROPATHY (HCC): Status: ACTIVE | Noted: 2021-12-21

## 2022-03-19 PROBLEM — J18.9 PNEUMONIA: Status: ACTIVE | Noted: 2019-05-26

## 2022-03-19 PROBLEM — E55.9 VITAMIN D DEFICIENCY: Status: ACTIVE | Noted: 2021-12-21

## 2022-03-19 PROBLEM — N18.4 TYPE 2 DIABETES MELLITUS WITH STAGE 4 CHRONIC KIDNEY DISEASE, WITH LONG-TERM CURRENT USE OF INSULIN (HCC): Status: ACTIVE | Noted: 2021-12-21

## 2022-03-19 PROBLEM — M47.12 CERVICAL SPONDYLOSIS WITH MYELOPATHY: Status: ACTIVE | Noted: 2021-12-21

## 2022-03-19 PROBLEM — R56.9 CONVULSIONS (HCC): Status: ACTIVE | Noted: 2021-12-21

## 2022-03-20 PROBLEM — E53.8 B12 DEFICIENCY: Status: ACTIVE | Noted: 2021-12-21

## 2022-03-20 PROBLEM — J96.01 ACUTE RESPIRATORY FAILURE WITH HYPOXIA (HCC): Status: ACTIVE | Noted: 2020-07-21

## 2022-06-09 ENCOUNTER — OFFICE VISIT (OUTPATIENT)
Dept: NEUROLOGY | Age: 77
End: 2022-06-09
Payer: MEDICARE

## 2022-06-09 VITALS
HEIGHT: 72 IN | DIASTOLIC BLOOD PRESSURE: 80 MMHG | HEART RATE: 78 BPM | TEMPERATURE: 98 F | SYSTOLIC BLOOD PRESSURE: 136 MMHG | WEIGHT: 245.6 LBS | BODY MASS INDEX: 33.26 KG/M2 | OXYGEN SATURATION: 94 % | RESPIRATION RATE: 16 BRPM

## 2022-06-09 DIAGNOSIS — E11.42 DIABETIC PERIPHERAL NEUROPATHY ASSOCIATED WITH TYPE 2 DIABETES MELLITUS (HCC): Primary | ICD-10-CM

## 2022-06-09 DIAGNOSIS — G63 IMMUNE-MEDIATED NEUROPATHY (HCC): ICD-10-CM

## 2022-06-09 DIAGNOSIS — M47.12 CERVICAL SPONDYLOSIS WITH MYELOPATHY: ICD-10-CM

## 2022-06-09 DIAGNOSIS — Z79.4 TYPE 2 DIABETES MELLITUS WITH STAGE 4 CHRONIC KIDNEY DISEASE, WITH LONG-TERM CURRENT USE OF INSULIN (HCC): ICD-10-CM

## 2022-06-09 DIAGNOSIS — N18.4 TYPE 2 DIABETES MELLITUS WITH STAGE 4 CHRONIC KIDNEY DISEASE, WITH LONG-TERM CURRENT USE OF INSULIN (HCC): ICD-10-CM

## 2022-06-09 DIAGNOSIS — M96.1 CERVICAL POST-LAMINECTOMY SYNDROME: ICD-10-CM

## 2022-06-09 DIAGNOSIS — D89.89 IMMUNE-MEDIATED NEUROPATHY (HCC): ICD-10-CM

## 2022-06-09 DIAGNOSIS — E11.22 TYPE 2 DIABETES MELLITUS WITH STAGE 4 CHRONIC KIDNEY DISEASE, WITH LONG-TERM CURRENT USE OF INSULIN (HCC): ICD-10-CM

## 2022-06-09 DIAGNOSIS — M48.02 DEGENERATIVE CERVICAL SPINAL STENOSIS: ICD-10-CM

## 2022-06-09 PROCEDURE — 3046F HEMOGLOBIN A1C LEVEL >9.0%: CPT | Performed by: PSYCHIATRY & NEUROLOGY

## 2022-06-09 PROCEDURE — G8417 CALC BMI ABV UP PARAM F/U: HCPCS | Performed by: PSYCHIATRY & NEUROLOGY

## 2022-06-09 PROCEDURE — 1101F PT FALLS ASSESS-DOCD LE1/YR: CPT | Performed by: PSYCHIATRY & NEUROLOGY

## 2022-06-09 PROCEDURE — G9231 DOC ESRD DIA TRANS PREG: HCPCS | Performed by: PSYCHIATRY & NEUROLOGY

## 2022-06-09 PROCEDURE — 1123F ACP DISCUSS/DSCN MKR DOCD: CPT | Performed by: PSYCHIATRY & NEUROLOGY

## 2022-06-09 PROCEDURE — 2022F DILAT RTA XM EVC RTNOPTHY: CPT | Performed by: PSYCHIATRY & NEUROLOGY

## 2022-06-09 PROCEDURE — 99214 OFFICE O/P EST MOD 30 MIN: CPT | Performed by: PSYCHIATRY & NEUROLOGY

## 2022-06-09 PROCEDURE — 3017F COLORECTAL CA SCREEN DOC REV: CPT | Performed by: PSYCHIATRY & NEUROLOGY

## 2022-06-09 PROCEDURE — G8536 NO DOC ELDER MAL SCRN: HCPCS | Performed by: PSYCHIATRY & NEUROLOGY

## 2022-06-09 PROCEDURE — G8432 DEP SCR NOT DOC, RNG: HCPCS | Performed by: PSYCHIATRY & NEUROLOGY

## 2022-06-09 PROCEDURE — G8427 DOCREV CUR MEDS BY ELIG CLIN: HCPCS | Performed by: PSYCHIATRY & NEUROLOGY

## 2022-06-09 RX ORDER — GABAPENTIN 300 MG/1
300 CAPSULE ORAL
Qty: 90 CAPSULE | Refills: 5 | Status: SHIPPED | OUTPATIENT
Start: 2022-06-09

## 2022-06-09 RX ORDER — SEVELAMER CARBONATE 800 MG/1
800 TABLET, FILM COATED ORAL 2 TIMES DAILY
COMMUNITY
Start: 2022-06-07

## 2022-06-09 NOTE — LETTER
6/9/2022    Patient: Diane Edge   YOB: 1945   Date of Visit: 6/9/2022     Magy Vega, [address] [facility] [place] [id number]  Via Fax: [phone]    Dear Magy Vega MD,      Thank you for referring Mr. Juan Iyer to [address] [place] [address] for evaluation. My notes for this consultation are attached. Chief Complaint   Patient presents with    Follow-up     Diabetic Neuropathy      1. Have you been to the ER, urgent care clinic since your last visit? No  Hospitalized since your last visit? Yes [facility]  2. Have you seen or consulted any other health care providers outside of the [address] [place] [address] since your last visit? Yes Urology  Include any pap smears or colon screening. NO    Patient C/O leg and right arm tingling. Consult  REFERRED BY:  Xavier Acevedo MD    CHIEF COMPLAINT: Twitching and abnormal movements and progressive loss of balance.       Subjective:     Diane Edge is a 78 y.o. right-handed -American male we are seeing at the request of Dr. Anthony Whalen on urgent work in basis for new problem of having an MRI scan done in January that showed he had severe recurrent cord compression at C6-7 and at C3-4 and C5-6 secondary to central disc herniations and arthritis, and had postop changes at C4-5 and probably C5-6 levels with myelomalacia and significant previous cord compression, and we had called the patient and sent him a referral to a neurosurgeon, but he never got it and missed the message because he said he had phone trouble in January, he said he has not really changed at all, and seems to be about the same, and has his old myelopathy symptoms, but his muscle cramps have gotten better and his muscle weakness is gotten better and he does not think he wants to see his surgeon again at this time but would be willing to try physical therapy so we will send him to physical therapy because he does look a little better today as compared to his last visit 6 months ago. His muscle twitching is all better, but he is having more pain from his diabetic neuropathy in his feet, and wonders what he can do so we will put him on some gabapentin that may help the neck pain also so hopefully he will do better there. He is again advised that the single best treatment for his neuropathy is good control of his blood sugars and he is trying to do that. Metabolic causes for other causes of his neuropathy have been ruled out at his last visit. He also says he has loss of balance and difficulty walking, and has to use a cane for ambulation now almost all the time when before that he had to use it only intermittently before August and when he got sick. He had a normal CPK but is a diabetic since 2008 and been on insulin for the last 3 years with somewhat poorly controlled diabetes. He said it may be doing better recently his hemoglobin A1c seems to be coming down better. He is on dialysis. He has had previous cervical fusion for myelopathy in the past.  None of those records are available at this time. He said congestive heart failure and sleep apnea but does not use CPAP. He has not had any recent head trauma or neck trauma or back trauma, or fever or meningismus since August, but does complain of some mild low back pain and had an MRI scan ordered of his back but no MRI scan is on the chart. His bowel and bladder function is stable. He has had 1 or 2 falls because of his unsteady gait and is very concerned that he might be high risk for injury due to his progressive symptoms are becoming more disabling. He has never had a seizure or loss of consciousness with this myoclonic type jerks. He does not think he has had any cognitive issues or other cranial nerve problems.     Past Medical History:   Diagnosis Date    Arthritis     spine    CAD (coronary artery disease)     high cholesterol    Chronic kidney disease     dialysis  Diabetes [facility]     ESRD (end stage renal disease) ([facility]     GERD (gastroesophageal reflux disease)     HX    Hypertension     Ill-defined condition     irritable bowel syndrome    Systolic CHF ([facility] [facility]     Unspecified sleep apnea     NO CPAP      Past Surgical History:   Procedure Laterality Date    COLONOSCOPY N/A 2016    COLONOSCOPY performed [facility] Tatianna Bennett MD [facility] ENDOSCOPY    HX ORTHOPAEDIC      CERVICAL FUSION    HX UROLOGICAL      TURP    HX VASCULAR ACCESS      L arm dialysis access     Family History   Problem Relation Age of Onset    Cancer Father         \"bone\"    Diabetes Brother       Social History     Tobacco Use    Smoking status: Former Smoker     Quit date:      Years since quittin.4    Smokeless tobacco: Former User    Tobacco comment: cigars only   Substance Use Topics    Alcohol use: No         Current Outpatient Medications:     sevelamer carbonate (RENVELA) 800 mg tab tab, Take 800 mg by mouth two (2) times a day., Disp: , Rfl:     gabapentin (NEURONTIN) 300 mg capsule, Take 1 Capsule by mouth three (3) times daily as needed for Pain. Max Daily Amount: 900 mg., Disp: 90 Capsule, Rfl: 5    carvediloL (COREG) 12.5 mg tablet, Take 1 Tab by mouth two (2) times daily (with meals). (Patient taking differently: Take 12.5 mg by mouth daily.), Disp: 60 Tab, Rfl: 0    bumetanide (BUMEX) 2 mg tablet, TAKE 1 TABLET BY MOUTH EVERY DAY, Disp: , Rfl:     finasteride (PROSCAR) 5 mg tablet, TAKE 1 TABLET BY MOUTH DAILY, Disp: , Rfl:     insulin NPH/insulin regular (NovoLIN 70/30 U-100 Insulin) 100 unit/mL (70-30) injection, ReliOn Novolin 70/30: Inject 30 Units with Breakfast, 40 Units with Dinner, Disp: 7 Vial, Rfl: 3    losartan (COZAAR) 25 mg tablet, Take 25 mg by mouth daily. , Disp: , Rfl:     sertraline (ZOLOFT) 50 mg tablet, Take 50 mg by mouth daily. , Disp: , Rfl: 0    aspirin 81 mg chewable tablet, Take 81 mg by mouth daily. , Disp: , Rfl:     fluticasone (FLONASE) 50 mcg/actuation nasal spray, 2 Sprays by Both Nostrils route daily. , Disp: , Rfl:     tamsulosin (FLOMAX) 0.4 mg capsule, Take 0.8 mg by mouth daily. , Disp: , Rfl:         Allergies   Allergen Reactions    Albumin, Human 25 % Anaphylaxis and Hives    Niacin Hives      MRI Results (most recent):  Results from Hospital Encounter encounter on 01/13/22    MRI CERV SPINE WO CONT    Narrative  INDICATION: ataxia r/O myelopathy, needs open unit for claustrophobia    COMPARISON: None    EXAM: Sagittal T1-weighted spin-echo, sagittal T2-weighted fast spin-echo,  sagittal inversion recovery, axial T1-weighted spin-echo, axial gradient echo  and axial T2-weighted fast spin-echo MR images of the cervical spine are  obtained. FINDINGS:    Cord: Diminished transaxial cord size at C4-5 with at C5 level, greater on the  right side. Craniocaudal extent T2 hyperintensity measures approximately  millimeters Findings are consistent with mild dilation with cord atrophy. Visualized portions of brainstem and cerebellum appear normal.    No intraspinal or paraspinal soft tissue mass is shown. There are artifacts related to interbody placement at C4-5 with anterior  fixation device at C4-5. A prominent metallic artifact is also shown in the  anterolateral right C6 vertebral body though without intervening C5-6 graft. Visualized bone signal and vertebral body height appear normal. There is no  listhesis. C2-3: Normal disc height. No disc herniation or bulging. No substantial facet  arthrosis. No canal or foraminal stenosis. C3-C4: Mild disc space narrowing with mild-moderate diffuse disc bulging and  superimposed central disc herniation. There is effacement of the anterior CSF  space with midline AP canal dimension diminished to 6 to 7 mm, and associated  with moderate anterior cord compression. Moderate to severe right and moderate  left foraminal stenosis is also shown.     C4-5: No canal or substantial foraminal stenosis demonstrated. C5-6: Nearly normal disc height. Left paracentral disc herniation with  effacement of the anterior CSF space and mild cord compression. Moderate left  and mild right facet osteoarthrosis. Midline AP canal dimension measures 6 to 7  mm. Moderate right and mild-moderate left foraminal stenosis. C6-7: Moderate disc space narrowing with moderate diffuse disc osteophyte  complex formation appearing accentuated in the right lateral region. No  substantial facet arthrosis. Midline AP canal dimension reduced to 6 mm, with  moderate to severe appearing cord compression and severe bilateral foraminal  stenosis. C7-T1: Moderate disc space narrowing with mild-moderate diffuse disc osteophyte  complex formation, accentuated in the right greater than left uncovertebral  regions. Mild bilateral facet osteoarthrosis. No canal stenosis. Moderate  bilateral foraminal stenosis. T1-T2: Shown on sagittal images only. Minimal central disc bulging. Mild right  facet osteoarthrosis. No canal stenosis. Mild-moderate right foraminal  narrowing. T2-T3: Shown on sagittal images only. No disc herniation or stenosis. Impression  1. Cord atrophy with T2 hyperintensity consistent with myelomalacia centered at  the C4-5 level. 2. Postoperative and degenerative findings detailed by level above. Findings  predominate at C3-4, C5-6 and C6-7 levels with cord compression. Severe cord  compression at C6-7, and cord compression at C3-4 and C5-6 related to central  C3-4 and left paracentral C5-6 disc herniations.       Results from [place] encounter on 01/13/22    MRI CERV SPINE WO CONT    Narrative  INDICATION: ataxia r/O myelopathy, needs open unit for claustrophobia    COMPARISON: None    EXAM: Sagittal T1-weighted spin-echo, sagittal T2-weighted fast spin-echo,  sagittal inversion recovery, axial T1-weighted spin-echo, axial gradient echo  and axial T2-weighted fast spin-echo MR images of the cervical spine are  obtained. FINDINGS:    Cord: Diminished transaxial cord size at C4-5 with at C5 level, greater on the  right side. Craniocaudal extent T2 hyperintensity measures approximately  millimeters Findings are consistent with mild dilation with cord atrophy. Visualized portions of brainstem and cerebellum appear normal.    No intraspinal or paraspinal soft tissue mass is shown. There are artifacts related to interbody placement at C4-5 with anterior  fixation device at C4-5. A prominent metallic artifact is also shown in the  anterolateral right C6 vertebral body though without intervening C5-6 graft. Visualized bone signal and vertebral body height appear normal. There is no  listhesis. C2-3: Normal disc height. No disc herniation or bulging. No substantial facet  arthrosis. No canal or foraminal stenosis. C3-C4: Mild disc space narrowing with mild-moderate diffuse disc bulging and  superimposed central disc herniation. There is effacement of the anterior CSF  space with midline AP canal dimension diminished to 6 to 7 mm, and associated  with moderate anterior cord compression. Moderate to severe right and moderate  left foraminal stenosis is also shown. C4-5: No canal or substantial foraminal stenosis demonstrated. C5-6: Nearly normal disc height. Left paracentral disc herniation with  effacement of the anterior CSF space and mild cord compression. Moderate left  and mild right facet osteoarthrosis. Midline AP canal dimension measures 6 to 7  mm. Moderate right and mild-moderate left foraminal stenosis. C6-7: Moderate disc space narrowing with moderate diffuse disc osteophyte  complex formation appearing accentuated in the right lateral region. No  substantial facet arthrosis. Midline AP canal dimension reduced to 6 mm, with  moderate to severe appearing cord compression and severe bilateral foraminal  stenosis.     C7-T1: Moderate disc space narrowing with mild-moderate diffuse disc osteophyte  complex formation, accentuated in the right greater than left uncovertebral  regions. Mild bilateral facet osteoarthrosis. No canal stenosis. Moderate  bilateral foraminal stenosis. T1-T2: Shown on sagittal images only. Minimal central disc bulging. Mild right  facet osteoarthrosis. No canal stenosis. Mild-moderate right foraminal  narrowing. T2-T3: Shown on sagittal images only. No disc herniation or stenosis. Impression  1. Cord atrophy with T2 hyperintensity consistent with myelomalacia centered at  the C4-5 level. 2. Postoperative and degenerative findings detailed by level above. Findings  predominate at C3-4, C5-6 and C6-7 levels with cord compression. Severe cord  compression at C6-7, and cord compression at C3-4 and C5-6 related to central  C3-4 and left paracentral C5-6 disc herniations. Review of Systems:  A comprehensive review of systems was negative except for: Constitutional: positive for fatigue and malaise  Ears, nose, mouth, throat, and face: positive for hearing loss  Musculoskeletal: positive for myalgias, arthralgias, stiff joints, neck pain, back pain and muscle weakness  Neurological: positive for paresthesia, coordination problems, gait problems, tremor and weakness  Behvioral/Psych: positive for anxiety   Vitals:    06/09/22 0940 06/09/22 1000   BP: (!) 140/80 136/80   Pulse: 78    Resp: 16    Temp: 98 °F (36.7 °C)    TempSrc: Temporal    SpO2: 94%    Weight: 245 lb 9.6 oz (111.4 kg)    Height: 6' (1.829 m)      Objective:     I      NEUROLOGICAL EXAM:    Appearance: The patient is well developed, well nourished, provides a fair history and is in no acute distress. Mental Status: Oriented to time, place and person, and the president, cognitive function is slow but probably normal and speech is fluent and no aphasia or dysarthria. Mood and affect appropriate but mildly depressed. Cranial Nerves:   Intact visual fields.  Fundi are benign, disc are flat, no lesions seen on funduscopy. ANGELITO, EOM's full, no nystagmus, no ptosis. Facial sensation is normal. Corneal reflexes are not tested. Facial movement is symmetric. Hearing is abnormal bilaterally. Palate is midline with normal sternocleidomastoid and trapezius muscles are normal. Tongue is midline. Neck without meningismus or bruits  Temporal arteries are not tender or enlarged  TMJ areas are not tender on palpation   Motor:  4/5 strength in upper and lower proximal and distal muscles. Normal bulk and slight increased tone in the legs but normal in the arms. No fasciculations. Rapid alternating movement is symmetric and slow bilaterally   Reflexes:   Deep tendon reflexes 3+/4 and symmetrical in both legs, and 2+/4 in the arms. No babinski or clonus present   Sensory:   Abnormal to touch, pinprick and vibration and temperature in both feet to midcalf level. DSS is intact   Gait:  Abnormal gait for patient's age with wide-based and unsteady and ataxic and almost spondylitic gait. Tremor:   No tremor noted. Cerebellar: Moderately abnormal cerebellar signs present on Romberg and tandem testing and finger-nose-finger exam.   Neurovascular:  Normal heart sounds and regular rhythm, peripheral pulses decreased, and no carotid bruits. Assessment:       ICD-10-CM ICD-9-CM    1. Diabetic peripheral neuropathy associated with type 2 diabetes mellitus ([facility])  E11.42 250.60 gabapentin (NEURONTIN) 300 mg capsule     357.2 REFERRAL TO PHYSICAL THERAPY   2.  Immune-mediated neuropathy ([facility])  D89.89 279.8 gabapentin (NEURONTIN) 300 mg capsule    G63 357.4 REFERRAL TO PHYSICAL THERAPY   3. Cervical spondylosis with myelopathy  M47.12 721.1 gabapentin (NEURONTIN) 300 mg capsule      REFERRAL TO PHYSICAL THERAPY   4. Cervical post-laminectomy syndrome  M96.1 722.81 gabapentin (NEURONTIN) 300 mg capsule      REFERRAL TO PHYSICAL THERAPY   5. Degenerative cervical spinal stenosis  M48.02 723.0 gabapentin (NEURONTIN) 300 mg capsule      REFERRAL TO PHYSICAL THERAPY   6. Type 2 diabetes mellitus with stage 4 chronic kidney disease, with long-term current use of insulin (HCC)  E11.22 250.40 gabapentin (NEURONTIN) 300 mg capsule    N18.4 585.4 REFERRAL TO PHYSICAL THERAPY    Z79.4 V58.67      Active Problems:    * No active hospital problems. *      Plan:     Patient with new problem of severe spinal stenosis at multiple levels in his spine above and below his surgery, but the patient said he is actually better since his last visit 6 months ago, and does not really want to see a neurosurgeon to consider decompression and would rather try physical therapy and continue his current treatment as he is better on the muscle relaxants and no longer having that much muscle spasm and is trying to exercise some on his own. His MRI scan did show the severe stenosis as mentioned above and severe myelomalacia of the cord which explains a lot of his residual neurologic deficit from his first surgery. He is having more pain from his diabetic neuropathy, and for that we will give him some gabapentin to see if we can help him, because he said it seemed to help in the past and that may help some of his myelopathic pain and cervical pain in addition. Referral for physical therapy at his home area was given today he is willing to try the therapy and will call if there is any change. Patient with complicated history of progressive gait disorder and unsteadiness and frequent falls, making him a high risk for further neurologic injury and dysfunction. To make it more complicated he also has diffuse hyperreflexia, which could indicate a recurrent cervical myelopathy, or just be residual of his previous problem requiring surgery but we have no records to compare to this. He also obviously has a diabetic neuropathy and diabetic sensory ataxia complicating his gait problems.   His myoclonic jerks and muscle twitching probably got better because he is try to stay more hydrated eating better, and taking care of his fluid balance better and does not have any asterixis or myoclonus at this time. He is also taking his multivitamins and vitamin D on a regular basis in addition. Complete metabolic panel sent off to rule out causes of memory loss, ataxia, neuropathy, or other neuromuscular disease for causes of his tremors and gait instability and these are relatively unremarkable except for the hemoglobin A1c. He was supposed to have an EEG and an EMG but he never returned for those tests after his last visit. .  Complicated case with multiple neurologic problems placing the patient at high risk for neurologic injury and dysfunction if treatable causes are found. He could be a high risk for injury and paralysis if he does have a myelopathy that is progressive and pretty would be life-threatening. Patient advised that the best treatment for his diabetic neuropathy is good control of his diabetes. He is to take a multivitamin and vitamin D on a daily basis also. Follow-up after the above-mentioned testing and treatment, and he will call us if any problems. We will check my chart for results of this test.  34 minutes spent with the patient today going over all these problems, discussing his diagnosis prognosis treatment options, and because he does not want surgery we will try the conservative management but he will call us immediately there is any progression of disease at all. He may need a neurosurgery referral then. Signed By: Gomez Gomez MD     June 9, 2022       CC: Rosio Miller MD  FAX: [phone]      If you have questions, please do not hesitate to call me. I look forward to following your patient along with you.       Sincerely,    Gomez Gomez MD

## 2022-06-09 NOTE — PROGRESS NOTES
Chief Complaint   Patient presents with    Follow-up     Diabetic Neuropathy      1. Have you been to the ER, urgent care clinic since your last visit? No  Hospitalized since your last visit? Yes [facility]  2. Have you seen or consulted any other health care providers outside of the [address] [place] [address] since your last visit? Yes Urology  Include any pap smears or colon screening. NO    Patient C/O leg and right arm tingling.

## 2022-06-10 NOTE — PROGRESS NOTES
Consult  REFERRED BY:  Marlon Castrejon MD    CHIEF COMPLAINT: Twitching and abnormal movements and progressive loss of balance. Subjective:     Tammy Draper is a 78 y.o. right-handed -American male we are seeing at the request of Dr. Otto Zamora on urgent work in basis for new problem of having an MRI scan done in January that showed he had severe recurrent cord compression at C6-7 and at C3-4 and C5-6 secondary to central disc herniations and arthritis, and had postop changes at C4-5 and probably C5-6 levels with myelomalacia and significant previous cord compression, and we had called the patient and sent him a referral to a neurosurgeon, but he never got it and missed the message because he said he had phone trouble in January, he said he has not really changed at all, and seems to be about the same, and has his old myelopathy symptoms, but his muscle cramps have gotten better and his muscle weakness is gotten better and he does not think he wants to see his surgeon again at this time but would be willing to try physical therapy so we will send him to physical therapy because he does look a little better today as compared to his last visit 6 months ago. His muscle twitching is all better, but he is having more pain from his diabetic neuropathy in his feet, and wonders what he can do so we will put him on some gabapentin that may help the neck pain also so hopefully he will do better there. He is again advised that the single best treatment for his neuropathy is good control of his blood sugars and he is trying to do that. Metabolic causes for other causes of his neuropathy have been ruled out at his last visit. He also says he has loss of balance and difficulty walking, and has to use a cane for ambulation now almost all the time when before that he had to use it only intermittently before August and when he got sick.   He had a normal CPK but is a diabetic since 2008 and been on insulin for the last 3 years with somewhat poorly controlled diabetes. He said it may be doing better recently his hemoglobin A1c seems to be coming down better. He is on dialysis. He has had previous cervical fusion for myelopathy in the past.  None of those records are available at this time. He said congestive heart failure and sleep apnea but does not use CPAP. He has not had any recent head trauma or neck trauma or back trauma, or fever or meningismus since August, but does complain of some mild low back pain and had an MRI scan ordered of his back but no MRI scan is on the chart. His bowel and bladder function is stable. He has had 1 or 2 falls because of his unsteady gait and is very concerned that he might be high risk for injury due to his progressive symptoms are becoming more disabling. He has never had a seizure or loss of consciousness with this myoclonic type jerks. He does not think he has had any cognitive issues or other cranial nerve problems.     Past Medical History:   Diagnosis Date    Arthritis     spine    CAD (coronary artery disease)     high cholesterol    Chronic kidney disease     dialysis    Diabetes ([facility] [address]     [facility] stage [facility]     GERD (gastroesophageal reflux disease)     HX    Hypertension     Ill-defined condition     irritable bowel syndrome    Systolic CHF ([address]     Unspecified sleep apnea     NO CPAP      Past Surgical History:   Procedure Laterality Date    COLONOSCOPY N/A 2016    COLONOSCOPY performed by Farrah Cedeno MD at [facility]    HX ORTHOPAEDIC      CERVICAL FUSION    HX UROLOGICAL      TURP    HX VASCULAR ACCESS      L arm dialysis access     Family History   Problem Relation Age of Onset    Cancer Father         \"bone\"    Diabetes Brother       Social History     Tobacco Use    Smoking status: Former Smoker     Quit date:      Years since quittin.4    Smokeless tobacco: Former User    Tobacco comment: cigars only   Substance Use Topics    Alcohol use: No         Current Outpatient Medications:     sevelamer carbonate (RENVELA) 800 mg tab tab, Take 800 mg by mouth two (2) times a day., Disp: , Rfl:     gabapentin (NEURONTIN) 300 mg capsule, Take 1 Capsule by mouth three (3) times daily as needed for Pain. Max Daily Amount: 900 mg., Disp: 90 Capsule, Rfl: 5    carvediloL (COREG) 12.5 mg tablet, Take 1 Tab by mouth two (2) times daily (with meals). (Patient taking differently: Take 12.5 mg by mouth daily.), Disp: 60 Tab, Rfl: 0    bumetanide (BUMEX) 2 mg tablet, TAKE 1 TABLET BY MOUTH EVERY DAY, Disp: , Rfl:     finasteride (PROSCAR) 5 mg tablet, TAKE 1 TABLET BY MOUTH DAILY, Disp: , Rfl:     insulin NPH/insulin regular (NovoLIN 70/30 U-100 Insulin) 100 unit/mL (70-30) injection, ReliOn Novolin 70/30: Inject 30 Units with Breakfast, 40 Units with Dinner, Disp: 7 Vial, Rfl: 3    losartan (COZAAR) 25 mg tablet, Take 25 mg by mouth daily. , Disp: , Rfl:     sertraline (ZOLOFT) 50 mg tablet, Take 50 mg by mouth daily. , Disp: , Rfl: 0    aspirin 81 mg chewable tablet, Take 81 mg by mouth daily. , Disp: , Rfl:     fluticasone (FLONASE) 50 mcg/actuation nasal spray, 2 Sprays by Both Nostrils route daily. , Disp: , Rfl:     tamsulosin (FLOMAX) 0.4 mg capsule, Take 0.8 mg by mouth daily. , Disp: , Rfl:         Allergies   Allergen Reactions    Albumin, Human 25 % Anaphylaxis and Hives    Niacin Hives      MRI Results (most recent):  Results from Hospital Encounter encounter on 01/13/22    MRI CERV SPINE WO CONT    Narrative  INDICATION: ataxia r/O myelopathy, needs open unit for claustrophobia    COMPARISON: None    EXAM: Sagittal T1-weighted spin-echo, sagittal T2-weighted fast spin-echo,  sagittal inversion recovery, axial T1-weighted spin-echo, axial gradient echo  and axial T2-weighted fast spin-echo MR images of the cervical spine are  obtained.     FINDINGS:    Cord: Diminished transaxial cord size at C4-5 with at C5 level, greater on the  right side. Craniocaudal extent T2 hyperintensity measures approximately  millimeters Findings are consistent with mild dilation with cord atrophy. Visualized portions of brainstem and cerebellum appear normal.    No intraspinal or paraspinal soft tissue mass is shown. There are artifacts related to interbody placement at C4-5 with anterior  fixation device at C4-5. A prominent metallic artifact is also shown in the  anterolateral right C6 vertebral body though without intervening C5-6 graft. Visualized bone signal and vertebral body height appear normal. There is no  listhesis. C2-3: Normal disc height. No disc herniation or bulging. No substantial facet  arthrosis. No canal or foraminal stenosis. C3-C4: Mild disc space narrowing with mild-moderate diffuse disc bulging and  superimposed central disc herniation. There is effacement of the anterior CSF  space with midline AP canal dimension diminished to 6 to 7 mm, and associated  with moderate anterior cord compression. Moderate to severe right and moderate  left foraminal stenosis is also shown. C4-5: No canal or substantial foraminal stenosis demonstrated. C5-6: Nearly normal disc height. Left paracentral disc herniation with  effacement of the anterior CSF space and mild cord compression. Moderate left  and mild right facet osteoarthrosis. Midline AP canal dimension measures 6 to 7  mm. Moderate right and mild-moderate left foraminal stenosis. C6-7: Moderate disc space narrowing with moderate diffuse disc osteophyte  complex formation appearing accentuated in the right lateral region. No  substantial facet arthrosis. Midline AP canal dimension reduced to 6 mm, with  moderate to severe appearing cord compression and severe bilateral foraminal  stenosis. C7-T1: Moderate disc space narrowing with mild-moderate diffuse disc osteophyte  complex formation, accentuated in the right greater than left uncovertebral  regions.  Mild bilateral facet osteoarthrosis. No canal stenosis. Moderate  bilateral foraminal stenosis. T1-T2: Shown on sagittal images only. Minimal central disc bulging. Mild right  facet osteoarthrosis. No canal stenosis. Mild-moderate right foraminal  narrowing. T2-T3: Shown on sagittal images only. No disc herniation or stenosis. Impression  1. Cord atrophy with T2 hyperintensity consistent with myelomalacia centered at  the C4-5 level. 2. Postoperative and degenerative findings detailed by level above. Findings  predominate at C3-4, C5-6 and C6-7 levels with cord compression. Severe cord  compression at C6-7, and cord compression at C3-4 and C5-6 related to central  C3-4 and left paracentral C5-6 disc herniations. Results from [place] encounter on 01/13/22    MRI CERV SPINE WO CONT    Narrative  INDICATION: ataxia r/O myelopathy, needs open unit for claustrophobia    COMPARISON: None    EXAM: Sagittal T1-weighted spin-echo, sagittal T2-weighted fast spin-echo,  sagittal inversion recovery, axial T1-weighted spin-echo, axial gradient echo  and axial T2-weighted fast spin-echo MR images of the cervical spine are  obtained. FINDINGS:    Cord: Diminished transaxial cord size at C4-5 with at C5 level, greater on the  right side. Craniocaudal extent T2 hyperintensity measures approximately  millimeters Findings are consistent with mild dilation with cord atrophy. Visualized portions of brainstem and cerebellum appear normal.    No intraspinal or paraspinal soft tissue mass is shown. There are artifacts related to interbody placement at C4-5 with anterior  fixation device at C4-5. A prominent metallic artifact is also shown in the  anterolateral right C6 vertebral body though without intervening C5-6 graft. Visualized bone signal and vertebral body height appear normal. There is no  listhesis. C2-3: Normal disc height. No disc herniation or bulging. No substantial facet  arthrosis.  No canal or foraminal stenosis. C3-C4: Mild disc space narrowing with mild-moderate diffuse disc bulging and  superimposed central disc herniation. There is effacement of the anterior CSF  space with midline AP canal dimension diminished to 6 to 7 mm, and associated  with moderate anterior cord compression. Moderate to severe right and moderate  left foraminal stenosis is also shown. C4-5: No canal or substantial foraminal stenosis demonstrated. C5-6: Nearly normal disc height. Left paracentral disc herniation with  effacement of the anterior CSF space and mild cord compression. Moderate left  and mild right facet osteoarthrosis. Midline AP canal dimension measures 6 to 7  mm. Moderate right and mild-moderate left foraminal stenosis. C6-7: Moderate disc space narrowing with moderate diffuse disc osteophyte  complex formation appearing accentuated in the right lateral region. No  substantial facet arthrosis. Midline AP canal dimension reduced to 6 mm, with  moderate to severe appearing cord compression and severe bilateral foraminal  stenosis. C7-T1: Moderate disc space narrowing with mild-moderate diffuse disc osteophyte  complex formation, accentuated in the right greater than left uncovertebral  regions. Mild bilateral facet osteoarthrosis. No canal stenosis. Moderate  bilateral foraminal stenosis. T1-T2: Shown on sagittal images only. Minimal central disc bulging. Mild right  facet osteoarthrosis. No canal stenosis. Mild-moderate right foraminal  narrowing. T2-T3: Shown on sagittal images only. No disc herniation or stenosis. Impression  1. Cord atrophy with T2 hyperintensity consistent with myelomalacia centered at  the C4-5 level. 2. Postoperative and degenerative findings detailed by level above. Findings  predominate at C3-4, C5-6 and C6-7 levels with cord compression.  Severe cord  compression at C6-7, and cord compression at C3-4 and C5-6 related to central  C3-4 and left paracentral C5-6 disc herniations. Review of Systems:  A comprehensive review of systems was negative except for: Constitutional: positive for fatigue and malaise  Ears, nose, mouth, throat, and face: positive for hearing loss  Musculoskeletal: positive for myalgias, arthralgias, stiff joints, neck pain, back pain and muscle weakness  Neurological: positive for paresthesia, coordination problems, gait problems, tremor and weakness  Behvioral/Psych: positive for anxiety   Vitals:    06/09/22 0940 06/09/22 1000   BP: (!) 140/80 136/80   Pulse: 78    Resp: 16    Temp: 98 °F (36.7 °C)    TempSrc: Temporal    SpO2: 94%    Weight: 245 lb 9.6 oz (111.4 kg)    Height: 6' (1.829 m)      Objective:     I      NEUROLOGICAL EXAM:    Appearance: The patient is well developed, well nourished, provides a fair history and is in no acute distress. Mental Status: Oriented to time, place and person, and the president, cognitive function is slow but probably normal and speech is fluent and no aphasia or dysarthria. Mood and affect appropriate but mildly depressed. Cranial Nerves:   Intact visual fields. Fundi are benign, disc are flat, no lesions seen on funduscopy. ANGELITO, EOM's full, no nystagmus, no ptosis. Facial sensation is normal. Corneal reflexes are not tested. Facial movement is symmetric. Hearing is abnormal bilaterally. Palate is midline with normal sternocleidomastoid and trapezius muscles are normal. Tongue is midline. Neck without meningismus or bruits  Temporal arteries are not tender or enlarged  TMJ areas are not tender on palpation   Motor:  4/5 strength in upper and lower proximal and distal muscles. Normal bulk and slight increased tone in the legs but normal in the arms. No fasciculations. Rapid alternating movement is symmetric and slow bilaterally   Reflexes:   Deep tendon reflexes 3+/4 and symmetrical in both legs, and 2+/4 in the arms.   No babinski or clonus present   Sensory:   Abnormal to touch, pinprick and vibration and temperature in both feet to midcalf level. DSS is intact   Gait:  Abnormal gait for patient's age with wide-based and unsteady and ataxic and almost spondylitic gait. Tremor:   No tremor noted. Cerebellar: Moderately abnormal cerebellar signs present on Romberg and tandem testing and finger-nose-finger exam.   Neurovascular:  Normal heart sounds and regular rhythm, peripheral pulses decreased, and no carotid bruits. Assessment:       ICD-10-CM ICD-9-CM    1. Diabetic peripheral neuropathy associated with type 2 diabetes mellitus ([facility])  E11.42 250.60 gabapentin (NEURONTIN) 300 mg capsule     357.2 REFERRAL TO PHYSICAL THERAPY   2. Immune-mediated neuropathy ([facility])  D89.89 279.8 gabapentin (NEURONTIN) 300 mg capsule    G63 357.4 REFERRAL TO PHYSICAL THERAPY   3. Cervical spondylosis with myelopathy  M47.12 721.1 gabapentin (NEURONTIN) 300 mg capsule      REFERRAL TO PHYSICAL THERAPY   4. Cervical post-laminectomy syndrome  M96.1 722.81 gabapentin (NEURONTIN) 300 mg capsule      REFERRAL TO PHYSICAL THERAPY   5. Degenerative cervical spinal stenosis  M48.02 723.0 gabapentin (NEURONTIN) 300 mg capsule      REFERRAL TO PHYSICAL THERAPY   6. Type 2 diabetes mellitus with stage 4 chronic kidney disease, with long-term current use of insulin ([facility])  E11.22 250.40 gabapentin (NEURONTIN) 300 mg capsule    N18.4 585.4 REFERRAL TO PHYSICAL THERAPY    Z79.4 V58.67      Active Problems:    * No active hospital problems. *      Plan:     Patient with new problem of severe spinal stenosis at multiple levels in his spine above and below his surgery, but the patient said he is actually better since his last visit 6 months ago, and does not really want to see a neurosurgeon to consider decompression and would rather try physical therapy and continue his current treatment as he is better on the muscle relaxants and no longer having that much muscle spasm and is trying to exercise some on his own.   His MRI scan did show the severe stenosis as mentioned above and severe myelomalacia of the cord which explains a lot of his residual neurologic deficit from his first surgery. He is having more pain from his diabetic neuropathy, and for that we will give him some gabapentin to see if we can help him, because he said it seemed to help in the past and that may help some of his myelopathic pain and cervical pain in addition. Referral for physical therapy at his home area was given today he is willing to try the therapy and will call if there is any change. Patient with complicated history of progressive gait disorder and unsteadiness and frequent falls, making him a high risk for further neurologic injury and dysfunction. To make it more complicated he also has diffuse hyperreflexia, which could indicate a recurrent cervical myelopathy, or just be residual of his previous problem requiring surgery but we have no records to compare to this. He also obviously has a diabetic neuropathy and diabetic sensory ataxia complicating his gait problems. His myoclonic jerks and muscle twitching probably got better because he is try to stay more hydrated eating better, and taking care of his fluid balance better and does not have any asterixis or myoclonus at this time. He is also taking his multivitamins and vitamin D on a regular basis in addition. Complete metabolic panel sent off to rule out causes of memory loss, ataxia, neuropathy, or other neuromuscular disease for causes of his tremors and gait instability and these are relatively unremarkable except for the hemoglobin A1c. He was supposed to have an EEG and an EMG but he never returned for those tests after his last visit. .  Complicated case with multiple neurologic problems placing the patient at high risk for neurologic injury and dysfunction if treatable causes are found.   He could be a high risk for injury and paralysis if he does have a myelopathy that is progressive and pretty would be life-threatening. Patient advised that the best treatment for his diabetic neuropathy is good control of his diabetes. He is to take a multivitamin and vitamin D on a daily basis also. Follow-up after the above-mentioned testing and treatment, and he will call us if any problems. We will check my chart for results of this test.  34 minutes spent with the patient today going over all these problems, discussing his diagnosis prognosis treatment options, and because he does not want surgery we will try the conservative management but he will call us immediately there is any progression of disease at all. He may need a neurosurgery referral then.     Signed By: Chad Rinaldi MD     June 9, 2022       CC: Xavier Acevedo MD  FAX: [phone]

## 2022-09-12 ENCOUNTER — HOSPITAL ENCOUNTER (EMERGENCY)
Age: 77
Discharge: HOME | End: 2022-09-12
Attending: EMERGENCY MEDICINE
Payer: MEDICARE

## 2022-09-12 ENCOUNTER — APPOINTMENT (OUTPATIENT)
Dept: GENERAL RADIOLOGY | Age: 77
End: 2022-09-12
Attending: EMERGENCY MEDICINE
Payer: MEDICARE

## 2022-09-12 VITALS
HEART RATE: 71 BPM | WEIGHT: 245.59 LBS | HEIGHT: 72 IN | OXYGEN SATURATION: 97 % | DIASTOLIC BLOOD PRESSURE: 74 MMHG | SYSTOLIC BLOOD PRESSURE: 131 MMHG | BODY MASS INDEX: 33.26 KG/M2 | RESPIRATION RATE: 17 BRPM | TEMPERATURE: 98.1 F

## 2022-09-12 DIAGNOSIS — N18.6 ESRD (END STAGE RENAL DISEASE) (HCC): Primary | ICD-10-CM

## 2022-09-12 LAB
ALBUMIN SERPL-MCNC: 3.8 G/DL (ref 3.5–5)
ALBUMIN/GLOB SERPL: 0.9 {RATIO} (ref 1.1–2.2)
ALP SERPL-CCNC: 84 U/L (ref 45–117)
ALT SERPL-CCNC: 23 U/L (ref 12–78)
ANION GAP SERPL CALC-SCNC: 7 MMOL/L (ref 5–15)
AST SERPL-CCNC: 12 U/L (ref 15–37)
BASOPHILS # BLD: 0.1 K/UL (ref 0–0.1)
BASOPHILS NFR BLD: 1 % (ref 0–1)
BILIRUB SERPL-MCNC: 0.7 MG/DL (ref 0.2–1)
BNP SERPL-MCNC: ABNORMAL PG/ML
BUN SERPL-MCNC: 63 MG/DL (ref 6–20)
BUN/CREAT SERPL: 7 (ref 12–20)
CALCIUM SERPL-MCNC: 8.7 MG/DL (ref 8.5–10.1)
CHLORIDE SERPL-SCNC: 105 MMOL/L (ref 97–108)
CO2 SERPL-SCNC: 23 MMOL/L (ref 21–32)
COMMENT, HOLDF: NORMAL
COVID-19 RAPID TEST, COVR: NOT DETECTED
CREAT SERPL-MCNC: 8.77 MG/DL (ref 0.7–1.3)
DIFFERENTIAL METHOD BLD: ABNORMAL
EOSINOPHIL # BLD: 0.4 K/UL (ref 0–0.4)
EOSINOPHIL NFR BLD: 5 % (ref 0–7)
ERYTHROCYTE [DISTWIDTH] IN BLOOD BY AUTOMATED COUNT: 15.6 % (ref 11.5–14.5)
GLOBULIN SER CALC-MCNC: 4.4 G/DL (ref 2–4)
GLUCOSE SERPL-MCNC: 189 MG/DL (ref 65–100)
HCT VFR BLD AUTO: 30.4 % (ref 36.6–50.3)
HGB BLD-MCNC: 9.6 G/DL (ref 12.1–17)
IMM GRANULOCYTES # BLD AUTO: 0 K/UL (ref 0–0.04)
IMM GRANULOCYTES NFR BLD AUTO: 0 % (ref 0–0.5)
LIPASE SERPL-CCNC: 195 U/L (ref 73–393)
LYMPHOCYTES # BLD: 1.2 K/UL (ref 0.8–3.5)
LYMPHOCYTES NFR BLD: 13 % (ref 12–49)
MCH RBC QN AUTO: 31.2 PG (ref 26–34)
MCHC RBC AUTO-ENTMCNC: 31.6 G/DL (ref 30–36.5)
MCV RBC AUTO: 98.7 FL (ref 80–99)
MONOCYTES # BLD: 0.6 K/UL (ref 0–1)
MONOCYTES NFR BLD: 7 % (ref 5–13)
NEUTS SEG # BLD: 6.6 K/UL (ref 1.8–8)
NEUTS SEG NFR BLD: 74 % (ref 32–75)
NRBC # BLD: 0 K/UL (ref 0–0.01)
NRBC BLD-RTO: 0 PER 100 WBC
PLATELET # BLD AUTO: 207 K/UL (ref 150–400)
PMV BLD AUTO: 11.2 FL (ref 8.9–12.9)
POTASSIUM SERPL-SCNC: 4.9 MMOL/L (ref 3.5–5.1)
PROT SERPL-MCNC: 8.2 G/DL (ref 6.4–8.2)
RBC # BLD AUTO: 3.08 M/UL (ref 4.1–5.7)
SAMPLES BEING HELD,HOLD: NORMAL
SODIUM SERPL-SCNC: 135 MMOL/L (ref 136–145)
SOURCE, COVRS: NORMAL
TROPONIN-HIGH SENSITIVITY: 261 NG/L (ref 0–76)
WBC # BLD AUTO: 8.9 K/UL (ref 4.1–11.1)

## 2022-09-12 PROCEDURE — 80053 COMPREHEN METABOLIC PANEL: CPT

## 2022-09-12 PROCEDURE — 83880 ASSAY OF NATRIURETIC PEPTIDE: CPT

## 2022-09-12 PROCEDURE — 71045 X-RAY EXAM CHEST 1 VIEW: CPT

## 2022-09-12 PROCEDURE — 84484 ASSAY OF TROPONIN QUANT: CPT

## 2022-09-12 PROCEDURE — 36415 COLL VENOUS BLD VENIPUNCTURE: CPT

## 2022-09-12 PROCEDURE — 93005 ELECTROCARDIOGRAM TRACING: CPT

## 2022-09-12 PROCEDURE — 83690 ASSAY OF LIPASE: CPT

## 2022-09-12 PROCEDURE — 99285 EMERGENCY DEPT VISIT HI MDM: CPT

## 2022-09-12 PROCEDURE — 87635 SARS-COV-2 COVID-19 AMP PRB: CPT

## 2022-09-12 PROCEDURE — 94761 N-INVAS EAR/PLS OXIMETRY MLT: CPT

## 2022-09-12 PROCEDURE — 85025 COMPLETE CBC W/AUTO DIFF WBC: CPT

## 2022-09-12 NOTE — ED NOTES
Prolonged DC time while awaiting phone call from dialysis facility to ensure pt could be rescheduled for dialysis prior to Wednesday. Pt will be put into schedule tomorrow at normal dialysis facility, MD okay with this.  Pt DC

## 2022-09-12 NOTE — ED PROVIDER NOTES
EMERGENCY DEPARTMENT HISTORY AND PHYSICAL EXAM      Date: 9/12/2022  Patient Name: Narendra Rios    History of Presenting Illness     Chief Complaint   Patient presents with    Shortness of Breath     ED visit d/t  SOB - onset of sxs, 2 days ago - hx of dialysis, (L) arm graft, Monday Wednesday Friday - Denies fevers / sick contacts         HPI: Narendra Rios, 78 y.o. male with history of end-stage renal disease on dialysis, last dialysis Friday and scheduled for today, diabetes, hypertension presenting to ED with chief complaint of shortness of breath and abdominal discomfort. He states the onset was yesterday. He describes his abdominal discomfort as bloating especially with eating as well as burping. He denies vomiting but states he has had a bit of diarrhea. He denies cough, fevers, chest pain. There are no other complaints, changes, or physical findings at this time. PCP: Shantal Morales MD    No current facility-administered medications on file prior to encounter. Current Outpatient Medications on File Prior to Encounter   Medication Sig Dispense Refill    sevelamer carbonate (RENVELA) 800 mg tab tab Take 800 mg by mouth two (2) times a day.      gabapentin (NEURONTIN) 300 mg capsule Take 1 Capsule by mouth three (3) times daily as needed for Pain. Max Daily Amount: 900 mg. 90 Capsule 5    carvediloL (COREG) 12.5 mg tablet Take 1 Tab by mouth two (2) times daily (with meals). (Patient taking differently: Take 12.5 mg by mouth daily.) 60 Tab 0    bumetanide (BUMEX) 2 mg tablet TAKE 1 TABLET BY MOUTH EVERY DAY      finasteride (PROSCAR) 5 mg tablet TAKE 1 TABLET BY MOUTH DAILY      insulin NPH/insulin regular (NovoLIN 70/30 U-100 Insulin) 100 unit/mL (70-30) injection ReliOn Novolin 70/30: Inject 30 Units with Breakfast, 40 Units with Dinner 7 Vial 3    losartan (COZAAR) 25 mg tablet Take 25 mg by mouth daily. sertraline (ZOLOFT) 50 mg tablet Take 50 mg by mouth daily.   0    aspirin 81 mg chewable tablet Take 81 mg by mouth daily. fluticasone (FLONASE) 50 mcg/actuation nasal spray 2 Sprays by Both Nostrils route daily. tamsulosin (FLOMAX) 0.4 mg capsule Take 0.8 mg by mouth daily. Past History     Past Medical History:  Past Medical History:   Diagnosis Date    Arthritis     spine    CAD (coronary artery disease)     high cholesterol    Chronic kidney disease     dialysis    Diabetes ([facility]     ESRD (end stage renal disease) (HCC)     GERD (gastroesophageal reflux disease)     HX    Hypertension     Ill-defined condition     irritable bowel syndrome    Systolic CHF ([facility]     Unspecified sleep apnea     NO CPAP       Past Surgical History:  Past Surgical History:   Procedure Laterality Date    COLONOSCOPY N/A 2016    COLONOSCOPY performed by Thomas Ugarte MD at [facility] ENDOSCOPY    HX ORTHOPAEDIC      CERVICAL FUSION    HX UROLOGICAL      TURP    HX VASCULAR ACCESS      L arm dialysis access       Family History:  Family History   Problem Relation Age of Onset    Cancer Father         \"bone\"    Diabetes Brother        Social History:  Social History     Tobacco Use    Smoking status: Former     Types: Cigarettes     Quit date:      Years since quittin.7    Smokeless tobacco: Former    Tobacco comments:     cigars only   Vaping Use    Vaping Use: Never used   Substance Use Topics    Alcohol use: No    Drug use: No       Allergies: Allergies   Allergen Reactions    Albumin, Human 25 % Anaphylaxis and Hives    Niacin Hives         Review of Systems   Review of Systems   Constitutional:  Negative for chills and fever. HENT:  Negative for sore throat. Eyes:  Negative for redness. Respiratory:  Positive for shortness of breath. Cardiovascular:  Negative for chest pain. Gastrointestinal:  Positive for constipation, diarrhea and nausea. Negative for abdominal pain. Genitourinary:  Negative for dysuria. Musculoskeletal:  Negative for back pain.    Neurological: Negative for syncope. Psychiatric/Behavioral:  The patient is not nervous/anxious. All other systems reviewed and are negative. Physical Exam   Physical Exam  Vitals and nursing note reviewed. Constitutional:       Appearance: Normal appearance. HENT:      Head: Normocephalic and atraumatic. Mouth/Throat:      Mouth: Mucous membranes are moist.   Cardiovascular:      Rate and Rhythm: Normal rate and regular rhythm. Pulmonary:      Effort: Pulmonary effort is normal.      Breath sounds: Normal breath sounds. Abdominal:      Palpations: Abdomen is soft. Tenderness: There is no abdominal tenderness. Musculoskeletal:         General: No deformity. Cervical back: Neck supple. Right lower leg: Edema present. Left lower leg: Edema present. Skin:     General: Skin is warm and dry. Neurological:      General: No focal deficit present. Mental Status: He is alert. Psychiatric:         Mood and Affect: Mood normal.         Behavior: Behavior normal.       Diagnostic Study Results     Labs -     Recent Results (from the past 24 hour(s))   EKG, 12 LEAD, INITIAL    Collection Time: 09/12/22  6:00 AM   Result Value Ref Range    Ventricular Rate 82 BPM    Atrial Rate 82 BPM    P-R Interval 174 ms    QRS Duration 132 ms    Q-T Interval 418 ms    QTC Calculation (Bezet) 488 ms    Calculated P Axis 26 degrees    Calculated R Axis -50 degrees    Calculated T Axis 113 degrees    Diagnosis       Normal sinus rhythm  Left axis deviation  Right bundle branch block  Left ventricular hypertrophy with repolarization abnormality  Anteroseptal infarct , age undetermined  When compared with ECG of 16-MAR-2022 00:13,  Anteroseptal infarct is now present         Radiologic Studies -   XR CHEST PORT    (Results Pending)     CT Results  (Last 48 hours)      None          CXR Results  (Last 48 hours)      None              Medical Decision Making   I am the first provider for this patient.     I reviewed the vital signs, available nursing notes, past medical history, past surgical history, family history and social history. Vital Signs-Reviewed the patient's vital signs. Patient Vitals for the past 24 hrs:   Temp Pulse Resp BP SpO2   09/12/22 0551 98.1 °F (36.7 °C) 86 24 (!) 181/85 92 %         Provider Notes (Medical Decision Making):   Very pleasant, well-appearing 29-year-old with history of end-stage renal disease on dialysis presenting to ED with chief complaint of shortness of breath, abdominal distention. Has no abdominal pain on exam.  His oxygen is mid 90s on room air. Chest x-ray shows mild pulmonary edema, unchanged from previous. BNP is slightly elevated from previous, no signs of acute worsening of congestive heart failure but he would likely need cardiology follow-up and this was discussed with him. Troponin is lower than previous visits and no symptoms otherwise suggestive of ACS. Low suspicion for pulmonary embolus. No signs of cholecystitis, bowel obstruction, appendicitis or other acute abdominal pathology. Ultimately, I think patient will benefit from dialysis. We confirmed with his dialysis chair that he will be able to get it tomorrow. He is amenable to discharge with plan for follow-up with dialysis tomorrow. Return precautions given. EKG sinus, rate 82, nonspecific ST T changes, normal QT, unchanged from previous    ED Course:     Initial assessment performed. The patients presenting problems have been discussed, and they are in agreement with the care plan formulated and outlined with them. I have encouraged them to ask questions as they arise throughout their visit. Disposition:  dc    PLAN:  1. Current Discharge Medication List        2.    Follow-up Information    None       Return to ED if worse     Diagnosis     Clinical Impression:SOA

## 2022-09-13 LAB
ATRIAL RATE: 82 BPM
CALCULATED P AXIS, ECG09: 26 DEGREES
CALCULATED R AXIS, ECG10: -50 DEGREES
CALCULATED T AXIS, ECG11: 113 DEGREES
DIAGNOSIS, 93000: NORMAL
P-R INTERVAL, ECG05: 174 MS
Q-T INTERVAL, ECG07: 418 MS
QRS DURATION, ECG06: 132 MS
QTC CALCULATION (BEZET), ECG08: 488 MS
VENTRICULAR RATE, ECG03: 82 BPM

## 2022-10-07 NOTE — DISCHARGE INSTRUCTIONS
Patient Education        Blood in the Urine: Care Instructions  Your Care Instructions    Blood in the urine, or hematuria, may make the urine look red, brown, or pink. There may be blood every time you urinate or just from time to time. You cannot always see blood in the urine, but it will show up in a urine test.  Blood in the urine may be serious. It should always be checked by a doctor. Your doctor may recommend more tests, including an X-ray, a CT scan, or a cystoscopy (which lets a doctor look inside the urethra and bladder). Blood in the urine can be a sign of another problem. Common causes are bladder infections and kidney stones. An injury to your groin or your genital area can also cause bleeding in the urinary tract. Very hard exercise--such as running a marathon--can cause blood in the urine. Blood in the urine can also be a sign of kidney disease or cancer in the bladder or kidney. Many cases of blood in the urine are caused by a harmless condition that runs in families. This is called benign familial hematuria. It does not need any treatment. Sometimes your urine may look red or brown even though it does not contain blood. For example, not getting enough fluids (dehydration), taking certain medicines, or having a liver problem can change the color of your urine. Eating foods such as beets, rhubarb, or blackberries or foods with red food coloring can make your urine look red or pink. Follow-up care is a key part of your treatment and safety. Be sure to make and go to all appointments, and call your doctor if you are having problems. It's also a good idea to know your test results and keep a list of the medicines you take. When should you call for help? Call your doctor now or seek immediate medical care if:    · You have symptoms of a urinary infection. For example:  ? You have pus in your urine. ? You have pain in your back just below your rib cage. This is called flank pain. ?  You have a fever, chills, or body aches. ? It hurts to urinate. ? You have groin or belly pain.     · You have more blood in your urine.    Watch closely for changes in your health, and be sure to contact your doctor if:    · You have new urination problems.     · You do not get better as expected. Where can you learn more? Go to http://maria r-fran.info/. Enter X044 in the search box to learn more about \"Blood in the Urine: Care Instructions. \"  Current as of: December 19, 2018  Content Version: 12.1  © 8922-8456 [email or web address]. Care instructions adapted under license by [employer] (which disclaims liability or warranty for this information). If you have questions about a medical condition or this instruction, always ask your healthcare professional. [address] any warranty or liability for your use of this information. Fluid sent for gram stain and culture

## 2022-10-21 NOTE — PROGRESS NOTES
Problem: Mobility Impaired (Adult and Pediatric)  Goal: *Acute Goals and Plan of Care (Insert Text)  Description: FUNCTIONAL STATUS PRIOR TO ADMISSION: Mod I with use of SPC. History of 1 fall. Wears 2L O2 at baseline. Still driving (drives himself to dialysis). Sedentary. HOME SUPPORT PRIOR TO ADMISSION: The patient lived with wife however states wife works full-time. Physical Therapy Goals  Initiated 8/19/2021  1. Patient will move from supine to sit and sit to supine , scoot up and down, and roll side to side in bed with supervision/set-up within 7 day(s). 2.  Patient will transfer from bed to chair and chair to bed with supervision/set-up using the least restrictive device within 7 day(s). 3.  Patient will perform sit to stand with supervision/set-up within 7 day(s). 4.  Patient will ambulate with supervision/set-up for 150 feet with the least restrictive device within 7 day(s). 5.  Patient will ascend/descend 2 stairs with 0 handrail(s) with supervision/set-up within 7 day(s). Outcome: Progressing Towards Goal     PHYSICAL THERAPY TREATMENT  Patient: Lynda Linares (7 y.o. male)  Date: 8/20/2021  Diagnosis: Sepsis ([facility] [A41.9]  Acute hypoxemic respiratory failure ([facility] [J96.01]  ESRD (end stage renal disease) on dialysis ([facility] [N18.6, Z99.2] <principal problem not specified>       Precautions: Fall  Chart, physical therapy assessment, plan of care and goals were reviewed. ASSESSMENT  Nurse clears pt for mobility. Pt is with stable sats. On RA trial today (pt reports use of O2 only at night at home), and pt is educated on pulse oximeter use in the home. Patient continues with skilled PT services and is slowly progressing towards goals.  He is oriented today with questions asked ; however, presents with mild confusion at this time and decreased insight into safety noted with pt requiring cuing for safe hand placement with stand to sit efforts and with significant LOB due to L foot getting \"stuck\" on floor and lateral LOB in bathroom today. Anticipate rehab. Needed for safety at discharge, continue to follow. Current Level of Function Impacting Discharge (mobility/balance): min. Assist, RW recommended at this time    Other factors to consider for discharge: recent fall in home out of bed per pt report ; pt alone during the night (wife works)         PLAN :  Patient continues to benefit from skilled intervention to address the above impairments. Continue treatment per established plan of care. to address goals. Recommendation for discharge: (in order for the patient to meet his/her long term goals)  Therapy 3 hours per day 5-7 days per week    This discharge recommendation:  A follow-up discussion with the attending provider and/or case management is planned    IF patient discharges home will need the following DME: to be determined (TBD)       SUBJECTIVE:   Patient stated What's that for?   Pt referring to RW. OBJECTIVE DATA SUMMARY:   Critical Behavior:  Neurologic State: Alert  Orientation Level: Oriented X4  Cognition: Impaired decision making, Follows commands  Safety/Judgement: Fall prevention, Decreased insight into deficits  Functional Mobility Training:  Bed Mobility:     Supine to Sit:  (received in chair)     Scooting: Contact guard assistance        Transfers:  Sit to Stand: Minimum assistance  Stand to Sit: Minimum assistance          Balance:  Sitting: Impaired  Sitting - Static: Good (unsupported)  Sitting - Dynamic: Fair (occasional)  Standing: Impaired; Without support  Standing - Static: Fair;Unsupported  Standing - Dynamic : Poor;Fair;Unsupported (x1 major LOB approaching commode)    Ambulation/Gait Training:  Distance (ft): 30 Feet (ft)  Assistive Device: Gait belt  Ambulation - Level of Assistance: Minimal assistance ; R HHA        Gait Abnormalities: Decreased step clearance;Trunk sway increased        Base of Support: Widened     Speed/Deanna: Pace decreased (<100 feet/min)  Step Length: Left shortened;Right shortened        Interventions: Safety awareness training;Verbal cues       Pain Rating:  No c/o pain    Activity Tolerance:   Fair and requires rest breaks    After treatment patient left in no apparent distress:   Sitting in chair, Call bell within reach, and nurse notified     COMMUNICATION/COLLABORATION:   The patients plan of care was discussed with: Occupational therapist and Registered nurse.      Jeremy Michaels, PT, DPT   Time Calculation: 23 mins Yes

## 2022-11-15 ENCOUNTER — OFFICE VISIT (OUTPATIENT)
Dept: ENDOCRINOLOGY | Age: 77
End: 2022-11-15
Payer: MEDICARE

## 2022-11-15 VITALS
HEART RATE: 62 BPM | DIASTOLIC BLOOD PRESSURE: 60 MMHG | WEIGHT: 240 LBS | BODY MASS INDEX: 32.51 KG/M2 | SYSTOLIC BLOOD PRESSURE: 100 MMHG | HEIGHT: 72 IN

## 2022-11-15 DIAGNOSIS — Z99.2 TYPE 2 DIABETES MELLITUS WITH CHRONIC KIDNEY DISEASE ON CHRONIC DIALYSIS, WITH LONG-TERM CURRENT USE OF INSULIN (HCC): Primary | ICD-10-CM

## 2022-11-15 DIAGNOSIS — N18.6 TYPE 2 DIABETES MELLITUS WITH CHRONIC KIDNEY DISEASE ON CHRONIC DIALYSIS, WITH LONG-TERM CURRENT USE OF INSULIN (HCC): Primary | ICD-10-CM

## 2022-11-15 DIAGNOSIS — Z79.4 TYPE 2 DIABETES MELLITUS WITH CHRONIC KIDNEY DISEASE ON CHRONIC DIALYSIS, WITH LONG-TERM CURRENT USE OF INSULIN (HCC): Primary | ICD-10-CM

## 2022-11-15 DIAGNOSIS — I10 ESSENTIAL HYPERTENSION WITH GOAL BLOOD PRESSURE LESS THAN 130/80: ICD-10-CM

## 2022-11-15 DIAGNOSIS — E11.22 TYPE 2 DIABETES MELLITUS WITH CHRONIC KIDNEY DISEASE ON CHRONIC DIALYSIS, WITH LONG-TERM CURRENT USE OF INSULIN (HCC): Primary | ICD-10-CM

## 2022-11-15 LAB
ANION GAP SERPL CALC-SCNC: 3 MMOL/L (ref 5–15)
BASOPHILS # BLD: 0 K/UL (ref 0–0.1)
BASOPHILS NFR BLD: 1 % (ref 0–1)
BUN SERPL-MCNC: 48 MG/DL (ref 6–20)
BUN/CREAT SERPL: 7 (ref 12–20)
CALCIUM SERPL-MCNC: 9.5 MG/DL (ref 8.5–10.1)
CHLORIDE SERPL-SCNC: 103 MMOL/L (ref 97–108)
CO2 SERPL-SCNC: 32 MMOL/L (ref 21–32)
CREAT SERPL-MCNC: 6.56 MG/DL (ref 0.7–1.3)
DIFFERENTIAL METHOD BLD: ABNORMAL
EOSINOPHIL # BLD: 0.3 K/UL (ref 0–0.4)
EOSINOPHIL NFR BLD: 6 % (ref 0–7)
ERYTHROCYTE [DISTWIDTH] IN BLOOD BY AUTOMATED COUNT: 15.5 % (ref 11.5–14.5)
GLUCOSE SERPL-MCNC: 263 MG/DL (ref 65–100)
HCT VFR BLD AUTO: 35.7 % (ref 36.6–50.3)
HGB BLD-MCNC: 11.1 G/DL (ref 12.1–17)
IMM GRANULOCYTES # BLD AUTO: 0 K/UL (ref 0–0.04)
IMM GRANULOCYTES NFR BLD AUTO: 0 % (ref 0–0.5)
LYMPHOCYTES # BLD: 1.2 K/UL (ref 0.8–3.5)
LYMPHOCYTES NFR BLD: 19 % (ref 12–49)
MCH RBC QN AUTO: 29.8 PG (ref 26–34)
MCHC RBC AUTO-ENTMCNC: 31.1 G/DL (ref 30–36.5)
MCV RBC AUTO: 95.7 FL (ref 80–99)
MONOCYTES # BLD: 0.5 K/UL (ref 0–1)
MONOCYTES NFR BLD: 8 % (ref 5–13)
NEUTS SEG # BLD: 4.2 K/UL (ref 1.8–8)
NEUTS SEG NFR BLD: 66 % (ref 32–75)
NRBC # BLD: 0 K/UL (ref 0–0.01)
NRBC BLD-RTO: 0 PER 100 WBC
PLATELET # BLD AUTO: 221 K/UL (ref 150–400)
PMV BLD AUTO: 10.9 FL (ref 8.9–12.9)
POTASSIUM SERPL-SCNC: 4.5 MMOL/L (ref 3.5–5.1)
RBC # BLD AUTO: 3.73 M/UL (ref 4.1–5.7)
SODIUM SERPL-SCNC: 138 MMOL/L (ref 136–145)
TROPONIN-HIGH SENSITIVITY: 216 NG/L (ref 0–76)
WBC # BLD AUTO: 6.2 K/UL (ref 4.1–11.1)

## 2022-11-15 PROCEDURE — 99215 OFFICE O/P EST HI 40 MIN: CPT | Performed by: INTERNAL MEDICINE

## 2022-11-15 PROCEDURE — 3078F DIAST BP <80 MM HG: CPT | Performed by: INTERNAL MEDICINE

## 2022-11-15 PROCEDURE — 3074F SYST BP LT 130 MM HG: CPT | Performed by: INTERNAL MEDICINE

## 2022-11-15 PROCEDURE — 93005 ELECTROCARDIOGRAM TRACING: CPT

## 2022-11-15 PROCEDURE — 84484 ASSAY OF TROPONIN QUANT: CPT

## 2022-11-15 PROCEDURE — G8417 CALC BMI ABV UP PARAM F/U: HCPCS | Performed by: INTERNAL MEDICINE

## 2022-11-15 PROCEDURE — 3017F COLORECTAL CA SCREEN DOC REV: CPT | Performed by: INTERNAL MEDICINE

## 2022-11-15 PROCEDURE — 3046F HEMOGLOBIN A1C LEVEL >9.0%: CPT | Performed by: INTERNAL MEDICINE

## 2022-11-15 PROCEDURE — 1101F PT FALLS ASSESS-DOCD LE1/YR: CPT | Performed by: INTERNAL MEDICINE

## 2022-11-15 PROCEDURE — 83735 ASSAY OF MAGNESIUM: CPT

## 2022-11-15 PROCEDURE — 99285 EMERGENCY DEPT VISIT HI MDM: CPT

## 2022-11-15 PROCEDURE — 84443 ASSAY THYROID STIM HORMONE: CPT

## 2022-11-15 PROCEDURE — G8427 DOCREV CUR MEDS BY ELIG CLIN: HCPCS | Performed by: INTERNAL MEDICINE

## 2022-11-15 PROCEDURE — G8432 DEP SCR NOT DOC, RNG: HCPCS | Performed by: INTERNAL MEDICINE

## 2022-11-15 PROCEDURE — 2022F DILAT RTA XM EVC RTNOPTHY: CPT | Performed by: INTERNAL MEDICINE

## 2022-11-15 PROCEDURE — 85025 COMPLETE CBC W/AUTO DIFF WBC: CPT

## 2022-11-15 PROCEDURE — 80048 BASIC METABOLIC PNL TOTAL CA: CPT

## 2022-11-15 PROCEDURE — 1123F ACP DISCUSS/DSCN MKR DOCD: CPT | Performed by: INTERNAL MEDICINE

## 2022-11-15 PROCEDURE — 80076 HEPATIC FUNCTION PANEL: CPT

## 2022-11-15 PROCEDURE — G9231 DOC ESRD DIA TRANS PREG: HCPCS | Performed by: INTERNAL MEDICINE

## 2022-11-15 PROCEDURE — G8536 NO DOC ELDER MAL SCRN: HCPCS | Performed by: INTERNAL MEDICINE

## 2022-11-15 PROCEDURE — 36415 COLL VENOUS BLD VENIPUNCTURE: CPT

## 2022-11-15 RX ORDER — GLIPIZIDE 5 MG/1
5 TABLET ORAL 2 TIMES DAILY
Qty: 180 TABLET | Refills: 1 | Status: ON HOLD | OUTPATIENT
Start: 2022-11-15

## 2022-11-15 RX ORDER — HYDROXYZINE 25 MG/1
TABLET, FILM COATED ORAL
Status: ON HOLD | COMMUNITY
Start: 2022-11-03

## 2022-11-15 RX ORDER — POLYETHYLENE GLYCOL 3350 17 G/17G
POWDER, FOR SOLUTION ORAL
Status: ON HOLD | COMMUNITY
Start: 2022-10-15

## 2022-11-15 RX ORDER — SACUBITRIL AND VALSARTAN 24; 26 MG/1; MG/1
1 TABLET, FILM COATED ORAL 2 TIMES DAILY
Status: ON HOLD | COMMUNITY
Start: 2022-11-03

## 2022-11-15 NOTE — LETTER
11/15/2022    Patient: Belem Malone   YOB: 1945   Date of Visit: 11/15/2022     Kemi Breaux MD  [id number]  [address] [address]  Via Fax: [phone]    Dear Kemi Breaux MD,      Thank you for referring Mr. Lowell Wilson to [address] [place] [address] for evaluation. My notes for this consultation are attached. If you have questions, please do not hesitate to call me. I look forward to following your patient along with you.       Sincerely,    Clemente Moran MD

## 2022-11-15 NOTE — PROGRESS NOTES
Chief Complaint   Patient presents with    Diabetes     Pcp and pharmacy verified    New Patient     History of Present Illness: Janet Torres is a 78 y.o. male who I was asked to see in consult by Dr. Mary Edmond for evaluation of diabetes. He was previously followed by Dr. Obey Ac. He last saw Dr. Joao Nowak in March 2021. At the time he was taking  Victoza 1.8mg daily and Novolin 70/30:   15 units with breakfast and dinner (takign 20u AM and 30u AFTER Dinner)  Correction 1:15>150. \"It has been awhile since I saw a diabetic doctor, since Dr. Joao Nowak left. I have been doing OK, but when things happen I don't know if it is due to diabetes and I don't know who to go to. \"    He was diagnosed with diabetes in 2007. Current regimen is Novolin 70/30 30 units in the morning and 40 units with dinner. \"If my sugars are under 130 I don't take the insulin, because it will drive the sugar too low. \"  Pt notes that   \"I went through a period when I was not feeling well and I was not eating dinner and I lost 15 pounds. But recently my appetite has been better and my BGs have been in the 120-130s. For the last 2 weeks I have only taken the dinner insulin 3 times. My BGs the next day have been in the 110-130s. If my AM BG is under 120 I am not taking the AM insulin. I have only taken the AM insulin 5 times in the last 2 weeks. \"  Despite not taking the insulin regularly, he notes his BGs have been \"running good, unless I treat myself\". \"The Novolin is cheap and I have taken it a couple years, it costs me $28 and I don't want to get on anything that is expensive. \"    He tests his BGs twice per day, his FBGs are running in the 120-140s \"Unless I treat myself with a piece of cake and it can then been higher the next day. His pre-dinner BGs run in the 140-170s. He has not had BGs under the 70. His last A1C was in October 2022 \"they said it was 6.3% I think. \"  I will request records from Dr. Thao Cinthya.   Pt follows with Dr. Camila Gamez. Pt has ESRD and is on HD M/W/F from 545AM-10AM.    Pt wakes around 4AM on his HD days, (6-8AM on Non-HD days). - He will eat a sandwich before he leaves for HD (\"Sausage, egg and cheese sandwich). - He will have breakfast on his Non-HD days, he did not eat breakfast this AM.  - He has lunch around 11AM-1PM, yesterday he did not eat lunch \"I slept all day after HD\". - He has dinner around 5-6PM, last night he had one slice of pizza, grape juice. - He will occasionally have an evening snack. Exercise consists of \"nothing\". Pt has hx or ESRD on HD. Pt has hx of CAD, he is followed by Dr. Chavo Swain. Last eye exam was \"about a year and a half ago. They sent me a notice saying I was due for a check-up\". He reports that he has never been told he had retinopathy. Pt has hx of Neuropathy and he is taking Gabapentin 300mg TID, which he notes is helping with the DM pains. Past Medical History:   Diagnosis Date    Arthritis     spine    CAD (coronary artery disease)     high cholesterol    Chronic kidney disease     dialysis    Diabetes ([address]     ESRD (end stage renal disease) ([address]     GERD (gastroesophageal reflux disease)     HX    Hypertension     Ill-defined condition     irritable bowel syndrome    Systolic CHF ([address]     Unspecified sleep apnea     NO CPAP     Past Surgical History:   Procedure Laterality Date    COLONOSCOPY N/A 11/9/2016    COLONOSCOPY performed by Karina Meadows MD at [facility] ENDOSCOPY    HX ORTHOPAEDIC      CERVICAL FUSION    HX UROLOGICAL      TURP    HX VASCULAR ACCESS      L arm dialysis access     Current Outpatient Medications   Medication Sig    hydrOXYzine HCL (ATARAX) 25 mg tablet TAKE 1 TABLET BY MOUTH EVERY EVENING AS NEEDED    Miralax 17 gram/dose powder USE 1 SCOOP AND MIX WITH WATER AND DRINK BY MOUTH ONCE DAILY    Entresto 24-26 mg tablet Take 1 Tablet by mouth two (2) times a day.     glipiZIDE (GLUCOTROL) 5 mg tablet Take 1 Tablet by mouth two (2) times a day. sevelamer carbonate (RENVELA) 800 mg tab tab Take 800 mg by mouth two (2) times a day.    gabapentin (NEURONTIN) 300 mg capsule Take 1 Capsule by mouth three (3) times daily as needed for Pain. Max Daily Amount: 900 mg.    carvediloL (COREG) 12.5 mg tablet Take 1 Tab by mouth two (2) times daily (with meals). bumetanide (BUMEX) 2 mg tablet TAKE 1 TABLET BY MOUTH EVERY DAY    finasteride (PROSCAR) 5 mg tablet TAKE 1 TABLET BY MOUTH DAILY    insulin NPH/insulin regular (NovoLIN 70/30 U-100 Insulin) 100 unit/mL (70-30) injection ReliOn Novolin 70/30: Inject 30 Units with Breakfast, 40 Units with Dinner (Patient taking differently: ReliOn Novolin 70/30: Inject 30 Units with Breakfast, 40 Units with Dinner (if needed))    aspirin 81 mg chewable tablet Take 81 mg by mouth daily. fluticasone propionate (FLONASE) 50 mcg/actuation nasal spray 2 Sprays by Both Nostrils route daily. tamsulosin (FLOMAX) 0.4 mg capsule Take 0.8 mg by mouth daily. No current facility-administered medications for this visit.      Allergies   Allergen Reactions    Albumin, Human 25 % Anaphylaxis and Hives    Niacin Hives     Family History   Problem Relation Age of Onset    No Known Problems Mother     Cancer Father         \"bone\"    Cancer Sister         leukemia and breast    No Known Problems Sister     Diabetes Brother     Diabetes Brother     Emphysema Brother     No Known Problems Maternal Grandmother     No Known Problems Maternal Grandfather     No Known Problems Paternal Grandmother     No Known Problems Paternal Grandfather      Social History     Socioeconomic History    Marital status:      Spouse name: Not on file    Number of children: Not on file    Years of education: Not on file    Highest education level: Not on file   Occupational History    Not on file   Tobacco Use    Smoking status: Former     Types: Cigarettes     Quit date:      Years since quittin.8    Smokeless tobacco: Former    Tobacco comments:     cigars only   Vaping Use    Vaping Use: Never used   Substance and Sexual Activity    Alcohol use: No    Drug use: No    Sexual activity: Not Currently   Other Topics Concern    Not on file   Social History Narrative    Not on file     Social Determinants of Health     Financial Resource Strain: Not on file   Food Insecurity: Not on file   Transportation Needs: Not on file   Physical Activity: Not on file   Stress: Not on file   Social Connections: Not on file   Intimate Partner Violence: Not on file   Housing Stability: Not on file     Review of Systems:  Constitutional Symptoms: no fevers, chills, + weight loss  Eyes: no blurry vision or double vision  Cardiovascular: no chest pain or palpitations  Respiratory: occasional shortness of breath  Gastrointestinal: no dysphagia or abdominal pain  Musculoskeletal: no joint pains or weakness  Integumentary: no rashes  Neurological: + tremor and weakness in his hands. + Neuropathy pain  Psychiatric: no depression or anxiety  Endocrine: no heat or cold intolerance, no polyuria or polydipsia    Physical Examination:  Blood pressure 100/60, pulse 62, height 6' (1.829 m), weight 240 lb (108.9 kg).   General: pleasant, no distress, good eye contact  HEENT: no exopthalmos, no periorbital edema, no scleral/conjunctival injection, EOMI, no lid lag or stare  Neck: supple, no thyromegaly, masses, lymph nodes, or carotid bruits, no supraclavicular or dorsocervical fat pads  Cardiovascular: regular, normal rate, normal S1 and S2, no murmurs/rubs/gallops, 2+ dorsalis pedis pulses bilaterally  Respiratory: clear to auscultation bilaterally  Gastrointestinal: soft, nontender, nondistended, no masses, no hepatosplenomegaly  Musculoskeletal: no proximal muscle weakness in upper or lower extremities  Integumentary: no acanthosis nigricans, no rashes, no edema, no foot ulcers  Neurological: DTR 2+, mild tremor  Psychiatric: normal mood and affect    Diabetic foot exam:     Left Foot:   Visual Exam: callous - present   Pulse DP: 1+ (weak)   Filament test: reduced sensation    Vibratory sensation: diminished      Right Foot:   Visual Exam: callous - present   Pulse DP: 1+ (weak)   Filament test: reduced sensation    Vibratory sensation: diminished      Data Reviewed:   Component      Latest Ref Rng & Units 9/12/2022   WBC      4.1 - 11.1 K/uL 8.9   RBC      4.10 - 5.70 M/uL 3.08 (L)   HGB      12.1 - 17.0 g/dL 9.6 (L)   HCT      36.6 - 50.3 % 30.4 (L)   MCV      80.0 - 99.0 FL 98.7   MCH      26.0 - 34.0 PG 31.2   MCHC      30.0 - 36.5 g/dL 31.6   RDW      11.5 - 14.5 % 15.6 (H)   PLATELET      740 - 407 K/uL 207   MPV      8.9 - 12.9 FL 11.2   NRBC      0  WBC 0.0   ABSOLUTE NRBC      0.00 - 0.01 K/uL 0.00   NEUTROPHILS      32 - 75 % 74   LYMPHOCYTES      12 - 49 % 13   MONOCYTES      5 - 13 % 7   EOSINOPHILS      0 - 7 % 5   BASOPHILS      0 - 1 % 1   IMMATURE GRANULOCYTES      0.0 - 0.5 % 0   ABS. NEUTROPHILS      1.8 - 8.0 K/UL 6.6   ABS. LYMPHOCYTES      0.8 - 3.5 K/UL 1.2   ABS. MONOCYTES      0.0 - 1.0 K/UL 0.6   ABS. EOSINOPHILS      0.0 - 0.4 K/UL 0.4   ABS. BASOPHILS      0.0 - 0.1 K/UL 0.1   ABS. IMM. GRANS.      0.00 - 0.04 K/UL 0.0   DF       AUTOMATED   Sodium      136 - 145 mmol/L 135 (L)   Potassium      3.5 - 5.1 mmol/L 4.9   Chloride      97 - 108 mmol/L 105   CO2      21 - 32 mmol/L 23   Anion gap      5 - 15 mmol/L 7   Glucose      65 - 100 mg/dL 189 (H)   BUN      6 - 20 MG/DL 63 (H)   Creatinine      0.70 - 1.30 MG/DL 8.77 (H)   BUN/Creatinine ratio      12 - 20   7 (L)   GFR est AA      >60 ml/min/1.73m2 7 (L)   GFR est non-AA      >60 ml/min/1.73m2 6 (L)   Calcium      8.5 - 10.1 MG/DL 8.7   Bilirubin, total      0.2 - 1.0 MG/DL 0.7   ALT      12 - 78 U/L 23   AST      15 - 37 U/L 12 (L)   Alk.  phosphatase      45 - 117 U/L 84   Protein, total      6.4 - 8.2 g/dL 8.2   Albumin      3.5 - 5.0 g/dL 3.8   Globulin      2.0 - 4.0 g/dL 4.4 (H) A-G Ratio      1.1 - 2.2   0.9 (L)       Assessment/Plan:   1. Type 2 diabetes mellitus with chronic kidney disease on chronic dialysis, with long-term current use of insulin ([address]    2. Essential hypertension with goal blood pressure less than 130/80    1) Pt reports that his A1C was 6.3% \"recently\" and he notes that he has only taken the insulin if his BGs are over 130. It sounds like he does not need the insulin so I will have pt stop the insulin and I will start him on Glipizide 5mg BID, since this is a generic and very inexpensive. Pt to test his BGs twice per day and send me his BG logs in 3 weeks. With his hx of neuropathy and calluses, he would benefit from DM shoes and inserts. Pt to continue the Gabapentin 300mg TID. 2) His BP today was 100/60. I will not make any changes to his HTN regimen at this time. Patient Instructions   1) Stop taking the insulin and I am going to order Glipizide as an alternative. 2) Glipizide 5mg, one pill with breakfast and one pill with dinner. 3) Keep a log of your blood sugars and send them to me in 3-4 weeks. Follow-up and Dispositions    Return in about 4 months (around 3/15/2023). I spent 40 minutes on this case and > 50% of the time was spent in counseling on the above issues. Copy sent to:  Ron Luther and Tamela

## 2022-11-15 NOTE — PATIENT INSTRUCTIONS
1) Stop taking the insulin and I am going to order Glipizide as an alternative. 2) Glipizide 5mg, one pill with breakfast and one pill with dinner. 3) Keep a log of your blood sugars and send them to me in 3-4 weeks.

## 2022-11-16 ENCOUNTER — HOSPITAL ENCOUNTER (INPATIENT)
Age: 77
LOS: 17 days | Discharge: NURSING, REHAB OR LONG-TERM CARE | End: 2022-12-03
Attending: EMERGENCY MEDICINE | Admitting: STUDENT IN AN ORGANIZED HEALTH CARE EDUCATION/TRAINING PROGRAM
Payer: MEDICARE

## 2022-11-16 ENCOUNTER — APPOINTMENT (OUTPATIENT)
Dept: CT IMAGING | Age: 77
End: 2022-11-16
Attending: EMERGENCY MEDICINE
Payer: MEDICARE

## 2022-11-16 ENCOUNTER — APPOINTMENT (OUTPATIENT)
Dept: GENERAL RADIOLOGY | Age: 77
End: 2022-11-16
Attending: EMERGENCY MEDICINE
Payer: MEDICARE

## 2022-11-16 DIAGNOSIS — M96.1 CERVICAL POST-LAMINECTOMY SYNDROME: ICD-10-CM

## 2022-11-16 DIAGNOSIS — N18.6 ESRD ON DIALYSIS (HCC): ICD-10-CM

## 2022-11-16 DIAGNOSIS — N18.4 TYPE 2 DIABETES MELLITUS WITH STAGE 4 CHRONIC KIDNEY DISEASE, WITH LONG-TERM CURRENT USE OF INSULIN (HCC): ICD-10-CM

## 2022-11-16 DIAGNOSIS — D89.89 IMMUNE-MEDIATED NEUROPATHY (HCC): ICD-10-CM

## 2022-11-16 DIAGNOSIS — Z79.4 TYPE 2 DIABETES MELLITUS WITH STAGE 4 CHRONIC KIDNEY DISEASE, WITH LONG-TERM CURRENT USE OF INSULIN (HCC): ICD-10-CM

## 2022-11-16 DIAGNOSIS — E11.22 TYPE 2 DIABETES MELLITUS WITH STAGE 4 CHRONIC KIDNEY DISEASE, WITH LONG-TERM CURRENT USE OF INSULIN (HCC): ICD-10-CM

## 2022-11-16 DIAGNOSIS — E11.42 DIABETIC PERIPHERAL NEUROPATHY ASSOCIATED WITH TYPE 2 DIABETES MELLITUS (HCC): ICD-10-CM

## 2022-11-16 DIAGNOSIS — G63 IMMUNE-MEDIATED NEUROPATHY (HCC): ICD-10-CM

## 2022-11-16 DIAGNOSIS — R29.6 MULTIPLE FALLS: ICD-10-CM

## 2022-11-16 DIAGNOSIS — M47.12 CERVICAL SPONDYLOSIS WITH MYELOPATHY: Primary | ICD-10-CM

## 2022-11-16 DIAGNOSIS — M48.02 DEGENERATIVE CERVICAL SPINAL STENOSIS: ICD-10-CM

## 2022-11-16 DIAGNOSIS — R53.1 GENERALIZED WEAKNESS: ICD-10-CM

## 2022-11-16 DIAGNOSIS — Z99.2 ESRD ON DIALYSIS (HCC): ICD-10-CM

## 2022-11-16 PROBLEM — R26.2 UNABLE TO AMBULATE: Status: ACTIVE | Noted: 2022-11-16

## 2022-11-16 LAB
ALBUMIN SERPL-MCNC: 3.4 G/DL (ref 3.5–5)
ALBUMIN/GLOB SERPL: 0.9 {RATIO} (ref 1.1–2.2)
ALP SERPL-CCNC: 90 U/L (ref 45–117)
ALT SERPL-CCNC: 20 U/L (ref 12–78)
APPEARANCE UR: ABNORMAL
AST SERPL-CCNC: 12 U/L (ref 15–37)
ATRIAL RATE: 68 BPM
BACTERIA URNS QL MICRO: NEGATIVE /HPF
BILIRUB DIRECT SERPL-MCNC: 0.2 MG/DL (ref 0–0.2)
BILIRUB SERPL-MCNC: 0.4 MG/DL (ref 0.2–1)
BILIRUB UR QL: NEGATIVE
CALCULATED R AXIS, ECG10: -56 DEGREES
CALCULATED T AXIS, ECG11: 54 DEGREES
COLOR UR: ABNORMAL
COMMENT, HOLDF: NORMAL
DIAGNOSIS, 93000: NORMAL
EPITH CASTS URNS QL MICRO: ABNORMAL /LPF
GLOBULIN SER CALC-MCNC: 4 G/DL (ref 2–4)
GLUCOSE BLD STRIP.AUTO-MCNC: 106 MG/DL (ref 65–117)
GLUCOSE BLD STRIP.AUTO-MCNC: 155 MG/DL (ref 65–117)
GLUCOSE BLD STRIP.AUTO-MCNC: 245 MG/DL (ref 65–117)
GLUCOSE UR STRIP.AUTO-MCNC: NEGATIVE MG/DL
HBV SURFACE AB SER QL: NONREACTIVE
HBV SURFACE AB SER-ACNC: 4.25 MIU/ML
HBV SURFACE AG SER QL: <0.1 INDEX
HBV SURFACE AG SER QL: NEGATIVE
HGB UR QL STRIP: NEGATIVE
HYALINE CASTS URNS QL MICRO: ABNORMAL /LPF (ref 0–2)
KETONES UR QL STRIP.AUTO: NEGATIVE MG/DL
LEUKOCYTE ESTERASE UR QL STRIP.AUTO: ABNORMAL
MAGNESIUM SERPL-MCNC: 3.6 MG/DL (ref 1.6–2.4)
NITRITE UR QL STRIP.AUTO: NEGATIVE
P-R INTERVAL, ECG05: 208 MS
PH UR STRIP: 8 [PH] (ref 5–8)
PROT SERPL-MCNC: 7.4 G/DL (ref 6.4–8.2)
PROT UR STRIP-MCNC: 300 MG/DL
Q-T INTERVAL, ECG07: 430 MS
QRS DURATION, ECG06: 146 MS
QTC CALCULATION (BEZET), ECG08: 457 MS
RBC #/AREA URNS HPF: ABNORMAL /HPF (ref 0–5)
SAMPLES BEING HELD,HOLD: NORMAL
SERVICE CMNT-IMP: ABNORMAL
SERVICE CMNT-IMP: ABNORMAL
SERVICE CMNT-IMP: NORMAL
SP GR UR REFRACTOMETRY: 1.01
TROPONIN-HIGH SENSITIVITY: 216 NG/L (ref 0–76)
TSH SERPL DL<=0.05 MIU/L-ACNC: 2.54 UIU/ML (ref 0.36–3.74)
UA: UC IF INDICATED,UAUC: ABNORMAL
UROBILINOGEN UR QL STRIP.AUTO: 0.2 EU/DL (ref 0.2–1)
VENTRICULAR RATE, ECG03: 68 BPM
WBC URNS QL MICRO: ABNORMAL /HPF (ref 0–4)

## 2022-11-16 PROCEDURE — 70450 CT HEAD/BRAIN W/O DYE: CPT

## 2022-11-16 PROCEDURE — 65270000046 HC RM TELEMETRY

## 2022-11-16 PROCEDURE — 74011250636 HC RX REV CODE- 250/636: Performed by: STUDENT IN AN ORGANIZED HEALTH CARE EDUCATION/TRAINING PROGRAM

## 2022-11-16 PROCEDURE — 84484 ASSAY OF TROPONIN QUANT: CPT

## 2022-11-16 PROCEDURE — 86706 HEP B SURFACE ANTIBODY: CPT

## 2022-11-16 PROCEDURE — 5A1D70Z PERFORMANCE OF URINARY FILTRATION, INTERMITTENT, LESS THAN 6 HOURS PER DAY: ICD-10-PCS | Performed by: INTERNAL MEDICINE

## 2022-11-16 PROCEDURE — 71045 X-RAY EXAM CHEST 1 VIEW: CPT

## 2022-11-16 PROCEDURE — 72125 CT NECK SPINE W/O DYE: CPT

## 2022-11-16 PROCEDURE — 82962 GLUCOSE BLOOD TEST: CPT

## 2022-11-16 PROCEDURE — 81001 URINALYSIS AUTO W/SCOPE: CPT

## 2022-11-16 PROCEDURE — 74011636637 HC RX REV CODE- 636/637: Performed by: STUDENT IN AN ORGANIZED HEALTH CARE EDUCATION/TRAINING PROGRAM

## 2022-11-16 PROCEDURE — 90935 HEMODIALYSIS ONE EVALUATION: CPT

## 2022-11-16 PROCEDURE — 36415 COLL VENOUS BLD VENIPUNCTURE: CPT

## 2022-11-16 PROCEDURE — 87340 HEPATITIS B SURFACE AG IA: CPT

## 2022-11-16 PROCEDURE — 74011250637 HC RX REV CODE- 250/637: Performed by: STUDENT IN AN ORGANIZED HEALTH CARE EDUCATION/TRAINING PROGRAM

## 2022-11-16 PROCEDURE — 74011000250 HC RX REV CODE- 250: Performed by: STUDENT IN AN ORGANIZED HEALTH CARE EDUCATION/TRAINING PROGRAM

## 2022-11-16 RX ORDER — INSULIN LISPRO 100 [IU]/ML
INJECTION, SOLUTION INTRAVENOUS; SUBCUTANEOUS
Status: DISCONTINUED | OUTPATIENT
Start: 2022-11-16 | End: 2022-12-04 | Stop reason: HOSPADM

## 2022-11-16 RX ORDER — ONDANSETRON 2 MG/ML
4 INJECTION INTRAMUSCULAR; INTRAVENOUS
Status: DISCONTINUED | OUTPATIENT
Start: 2022-11-16 | End: 2022-12-04 | Stop reason: HOSPADM

## 2022-11-16 RX ORDER — ACETAMINOPHEN 650 MG/1
650 SUPPOSITORY RECTAL
Status: DISCONTINUED | OUTPATIENT
Start: 2022-11-16 | End: 2022-11-24

## 2022-11-16 RX ORDER — CARVEDILOL 12.5 MG/1
12.5 TABLET ORAL 2 TIMES DAILY WITH MEALS
Status: DISCONTINUED | OUTPATIENT
Start: 2022-11-16 | End: 2022-12-04 | Stop reason: HOSPADM

## 2022-11-16 RX ORDER — GABAPENTIN 300 MG/1
300 CAPSULE ORAL
Status: DISCONTINUED | OUTPATIENT
Start: 2022-11-16 | End: 2022-12-04 | Stop reason: HOSPADM

## 2022-11-16 RX ORDER — HEPARIN SODIUM 5000 [USP'U]/ML
5000 INJECTION, SOLUTION INTRAVENOUS; SUBCUTANEOUS EVERY 8 HOURS
Status: DISCONTINUED | OUTPATIENT
Start: 2022-11-16 | End: 2022-12-04 | Stop reason: HOSPADM

## 2022-11-16 RX ORDER — BUMETANIDE 1 MG/1
2 TABLET ORAL DAILY
Status: DISCONTINUED | OUTPATIENT
Start: 2022-11-17 | End: 2022-12-04 | Stop reason: HOSPADM

## 2022-11-16 RX ORDER — SODIUM CHLORIDE 0.9 % (FLUSH) 0.9 %
5-40 SYRINGE (ML) INJECTION EVERY 8 HOURS
Status: DISCONTINUED | OUTPATIENT
Start: 2022-11-16 | End: 2022-11-22

## 2022-11-16 RX ORDER — FLUTICASONE PROPIONATE 50 MCG
2 SPRAY, SUSPENSION (ML) NASAL DAILY
Status: DISCONTINUED | OUTPATIENT
Start: 2022-11-17 | End: 2022-12-04 | Stop reason: HOSPADM

## 2022-11-16 RX ORDER — DEXTROSE MONOHYDRATE 100 MG/ML
0-250 INJECTION, SOLUTION INTRAVENOUS AS NEEDED
Status: DISCONTINUED | OUTPATIENT
Start: 2022-11-16 | End: 2022-12-04 | Stop reason: HOSPADM

## 2022-11-16 RX ORDER — TAMSULOSIN HYDROCHLORIDE 0.4 MG/1
0.8 CAPSULE ORAL DAILY
Status: DISCONTINUED | OUTPATIENT
Start: 2022-11-17 | End: 2022-12-04 | Stop reason: HOSPADM

## 2022-11-16 RX ORDER — MAGNESIUM SULFATE 100 %
4 CRYSTALS MISCELLANEOUS AS NEEDED
Status: DISCONTINUED | OUTPATIENT
Start: 2022-11-16 | End: 2022-12-04 | Stop reason: HOSPADM

## 2022-11-16 RX ORDER — POLYETHYLENE GLYCOL 3350 17 G/17G
17 POWDER, FOR SOLUTION ORAL DAILY PRN
Status: DISCONTINUED | OUTPATIENT
Start: 2022-11-16 | End: 2022-12-04 | Stop reason: HOSPADM

## 2022-11-16 RX ORDER — SODIUM CHLORIDE 0.9 % (FLUSH) 0.9 %
5-40 SYRINGE (ML) INJECTION AS NEEDED
Status: DISCONTINUED | OUTPATIENT
Start: 2022-11-16 | End: 2022-12-02

## 2022-11-16 RX ORDER — FINASTERIDE 5 MG/1
5 TABLET, FILM COATED ORAL DAILY
Status: DISCONTINUED | OUTPATIENT
Start: 2022-11-17 | End: 2022-12-04 | Stop reason: HOSPADM

## 2022-11-16 RX ORDER — ACETAMINOPHEN 325 MG/1
650 TABLET ORAL
Status: DISCONTINUED | OUTPATIENT
Start: 2022-11-16 | End: 2022-11-24

## 2022-11-16 RX ORDER — GUAIFENESIN 100 MG/5ML
81 LIQUID (ML) ORAL DAILY
Status: DISCONTINUED | OUTPATIENT
Start: 2022-11-17 | End: 2022-12-04 | Stop reason: HOSPADM

## 2022-11-16 RX ORDER — ONDANSETRON 4 MG/1
4 TABLET, ORALLY DISINTEGRATING ORAL
Status: DISCONTINUED | OUTPATIENT
Start: 2022-11-16 | End: 2022-12-04 | Stop reason: HOSPADM

## 2022-11-16 RX ORDER — SEVELAMER CARBONATE 800 MG/1
800 TABLET, FILM COATED ORAL 2 TIMES DAILY
Status: DISCONTINUED | OUTPATIENT
Start: 2022-11-16 | End: 2022-12-04 | Stop reason: HOSPADM

## 2022-11-16 RX ADMIN — CARVEDILOL 12.5 MG: 12.5 TABLET, FILM COATED ORAL at 17:48

## 2022-11-16 RX ADMIN — Medication 2 UNITS: at 21:30

## 2022-11-16 RX ADMIN — SEVELAMER CARBONATE 800 MG: 800 TABLET, FILM COATED ORAL at 21:29

## 2022-11-16 RX ADMIN — HEPARIN SODIUM 5000 UNITS: 5000 INJECTION INTRAVENOUS; SUBCUTANEOUS at 21:29

## 2022-11-16 RX ADMIN — SODIUM CHLORIDE, PRESERVATIVE FREE 10 ML: 5 INJECTION INTRAVENOUS at 21:30

## 2022-11-16 RX ADMIN — SACUBITRIL AND VALSARTAN 1 TABLET: 24; 26 TABLET, FILM COATED ORAL at 17:47

## 2022-11-16 RX ADMIN — SEVELAMER CARBONATE 800 MG: 800 TABLET, FILM COATED ORAL at 17:47

## 2022-11-16 NOTE — PROCEDURES
Hemodialysis / [phone]    Vitals Pre Post Assessment Pre Post   BP BP: 128/72 (11/16/22 1235) 132/71 LOC A&Ox4 A&Ox4   HR Pulse (Heart Rate): 60 (11/16/22 1235) 60 Lungs clear clear   Resp Resp Rate: 18 (11/16/22 1235) 18 Cardiac RRR RRR   Temp Temp: 98.3 °F (36.8 °C) (11/16/22 1235) 98.6 Skin intact intact   Weight  N/a N/a Edema 1+2+ BLE 1+2+ BLE   Tele status None ordered None ordered Pain 0 0     Orders   Duration: Start: 3121 End: 6149 Total: 3.5 hours   Dialyzer: Dialyzer/Set Up Inspection: Pacehco Oswald (11/16/22 1235)   K Bath: Dialysate K (mEq/L): 2 (11/16/22 1235)   Ca Bath: Dialysate CA (mEq/L): 2.5 (11/16/22 1235)   Na: Dialysate NA (mEq/L): 140 (11/16/22 1235)   Bicarb: Dialysate HCO3 (mEq/L): 40 (11/16/22 1235)   Target Fluid Removal: Goal/Amount of Fluid to Remove (mL): 2000 mL (11/16/22 1235)     Access   Type & Location: [facility]   Comments:  Pre-assessment: skin CDI. No s/s of infection. + B/T. No issues with cannulation. Running well at . Post assessment: No issues with hemostasis, + B/T remains. Dressing CDI.      Labs   HBsAg (Antigen) / date: Negative 11/16/22                                              HBsAb (Antibody) / date: Susceptible 11/16/22   Source: Epic   Obtained/Reviewed  Critical Results Called HGB   Date Value Ref Range Status   11/15/2022 11.1 (L) 12.1 - 17.0 g/dL Final     Potassium   Date Value Ref Range Status   11/15/2022 4.5 3.5 - 5.1 mmol/L Final     Calcium   Date Value Ref Range Status   11/15/2022 9.5 8.5 - 10.1 MG/DL Final     BUN   Date Value Ref Range Status   11/15/2022 48 (H) 6 - 20 MG/DL Final     Creatinine   Date Value Ref Range Status   11/15/2022 6.56 (H) 0.70 - 1.30 MG/DL Final        Meds Given   Name Dose Route   none                 Adequacy / Fluid    Total Liters Process: 76.8L   Net Fluid Removed: 2000 ml      Comments   Time Out Done:   (Time) 1233   Admitting Diagnosis: Unable to ambulate   Consent obtained/signed: Informed Consent Verified: Yes (Chronic dialysis, no consent needed) (11/16/22 1238)   Machine / RO # Machine Number: Delaney Damon (11/16/22 1235)   Primary Nurse Rpt Pre: Elicia Finn RN   Primary Nurse Rpt Post: Elicia Finn RN   Pt Education: Procedure   Care Plan: Ongoing   Pts outpatient clinic: [facility]     Tx Summary   Comments:  SBAR received from Primary RN. Pt arrived to HD [address]. Chronic consent applies. Pt agreeable to treatment. 0800: Pt cannulated with 49O needles per policy & without issue. VSS. Dialysis Tx initiated. 1445: 100 ml NS given to prevent clotting. **Patient tolerated treatment well without complaints or complications. All lines and connections remained intact and visible throughout entire treatment. **    1605: Tx ended. VSS. All possible blood returned to patient. Hemostasis achieved without issue. Bed locked and in the lowest position, call bell and belongings in reach. SBAR given to Primary, RN. Patient is stable at time of their departure. All Dialysis related medications have been reviewed.

## 2022-11-16 NOTE — H&P
Hospitalist Admission Note    NAME: Kiley Monet   :     MRN:  [id number]     Date/Time:  2022 10:56 AM    Patient PCP: Serina Grey MD  ______________________________________________________________________  Given the patient's current clinical presentation, I have a high level of concern for decompensation if discharged from the emergency department. Complex decision making was performed, which includes reviewing the patient's available past medical records, laboratory results, and x-ray films. My assessment of this patient's clinical condition and my plan of care is as follows. Assessment / Plan:  Generalized weakness  Recurrent falls    CT head/CT spine negative for acute finding  Power 4/5 in upper ext, 3/5 in lower, also complains of some numbness we will get neurology consult  Check vitamin B12, folate  PT OT consult  May need SNF    Elevated troponin  Likely due to ESRD, slight fluid overload  Troponin 216--216  No chest pain. ESRD on HD  Nephrology consult for HD    Chronic systolic heart failure:  C/w entresto, coreg, bumex, aspirin  On 2 liters oxygen at baseline    DM:  NPH q12 hours  Sliding scale    Code Status: Full  Surrogate Decision Maker: His wife    DVT Prophylaxis: Heparin  GI Prophylaxis: not indicated    Baseline: From home, now unable to ambulate d/t his weakness      Subjective:   CHIEF COMPLAINT: Generalized weakness, falls    HISTORY OF PRESENT ILLNESS:     Zuri Merino is a 78 y.o. -American male with past medical history of ESRD on HD, diabetes, GERD, hypertension, systolic heart failure presented with worsening weakness and recurrent fall. He mentions having this generalized weakness for a while but it got worse yesterday and he had 2 falls. He denies any loss of consciousness, did not hit his head. He mentions having some shortness of breath on exertion and he is on 2 L oxygen at home.   He denies any chest pain, belly pain, change in bladder bowel habits, lower extremity edema or tenderness. In the ED cannot ambulate due to his weakness. We were asked to admit for work up and evaluation of the above problems. Past Medical History:   Diagnosis Date    Arthritis     spine    CAD (coronary artery disease)     high cholesterol    Chronic kidney disease     dialysis    Diabetes ([facility]     ESRD (end stage renal disease) (HCC)     GERD (gastroesophageal reflux disease)     HX    Hypertension     Ill-defined condition     irritable bowel syndrome    Systolic CHF ([facility]     Unspecified sleep apnea     NO CPAP        Past Surgical History:   Procedure Laterality Date    COLONOSCOPY N/A 2016    COLONOSCOPY performed by Flakita Doyle MD at [facility] ENDOSCOPY    HX ORTHOPAEDIC      CERVICAL FUSION    HX UROLOGICAL      TURP    HX VASCULAR ACCESS      L arm dialysis access       Social History     Tobacco Use    Smoking status: Former     Types: Cigarettes     Quit date:      Years since quittin.8    Smokeless tobacco: Former    Tobacco comments:     cigars only   Substance Use Topics    Alcohol use: No        Family History   Problem Relation Age of Onset    No Known Problems Mother     Cancer Father         \"bone\"    Cancer Sister         leukemia and breast    No Known Problems Sister     Diabetes Brother     Diabetes Brother     Emphysema Brother     No Known Problems Maternal Grandmother     No Known Problems Maternal Grandfather     No Known Problems Paternal Grandmother     No Known Problems Paternal Grandfather      Allergies   Allergen Reactions    Albumin, Human 25 % Anaphylaxis and Hives    Niacin Hives        Prior to Admission medications    Medication Sig Start Date End Date Taking?  Authorizing Provider   hydrOXYzine HCL (ATARAX) 25 mg tablet TAKE 1 TABLET BY MOUTH EVERY EVENING AS NEEDED 11/3/22   Provider, Historical   Miralax 17 gram/dose powder USE 1 SCOOP AND MIX WITH WATER AND DRINK BY MOUTH ONCE DAILY 10/15/22 Provider, Historical   Entresto 24-26 mg tablet Take 1 Tablet by mouth two (2) times a day. 11/3/22   Provider, Historical   glipiZIDE (GLUCOTROL) 5 mg tablet Take 1 Tablet by mouth two (2) times a day. 11/15/22   Meka Salgado MD   sevelamer carbonate (RENVELA) 800 mg tab tab Take 800 mg by mouth two (2) times a day. 6/7/22   Provider, Historical   gabapentin (NEURONTIN) 300 mg capsule Take 1 Capsule by mouth three (3) times daily as needed for Pain. Max Daily Amount: 900 mg. 6/9/22   Florian Meneses MD   carvediloL (COREG) 12.5 mg tablet Take 1 Tab by mouth two (2) times daily (with meals). 5/6/21   Adriana Watts MD   bumetanide (BUMEX) 2 mg tablet TAKE 1 TABLET BY MOUTH EVERY DAY 3/19/21   Provider, Historical   finasteride (PROSCAR) 5 mg tablet TAKE 1 TABLET BY MOUTH DAILY 1/20/21   Provider, Historical   insulin NPH/insulin regular (NovoLIN 70/30 U-100 Insulin) 100 unit/mL (70-30) injection ReliOn Novolin 70/30: Inject 30 Units with Breakfast, 40 Units with Dinner  Patient taking differently: ReliOn Novolin 70/30: Inject 30 Units with Breakfast, 40 Units with Dinner (if needed) 3/23/21   Tabatha Ledbetter MD   aspirin 81 mg chewable tablet Take 81 mg by mouth daily. Provider, Historical   fluticasone propionate (FLONASE) 50 mcg/actuation nasal spray 2 Sprays by Both Nostrils route daily. Provider, Historical   tamsulosin (FLOMAX) 0.4 mg capsule Take 0.8 mg by mouth daily.     Anastasia, MD Mignon       REVIEW OF SYSTEMS:       Total of 12 systems reviewed as follows:       POSITIVE= underlined text  Negative = text not underlined  General:  fever, chills, sweats, generalized weakness, weight loss/gain,      loss of appetite, fall  Eyes:    blurred vision, eye pain, loss of vision, double vision  ENT:    rhinorrhea, pharyngitis   Respiratory:   cough, sputum production, SOB, chronic BRITT, wheezing, pleuritic pain   Cardiology:   chest pain, palpitations, orthopnea, PND, edema, syncope Gastrointestinal:  abdominal pain , N/V, diarrhea, dysphagia, constipation, bleeding   Genitourinary:  frequency, urgency, dysuria, hematuria, incontinence   Muskuloskeletal :  arthralgia, myalgia, back pain  Hematology:  easy bruising, nose or gum bleeding, lymphadenopathy   Dermatological: rash, ulceration, pruritis, color change / jaundice  Endocrine:   hot flashes or polydipsia   Neurological:  headache, dizziness, confusion, focal weakness, paresthesia,     Speech difficulties, memory loss, gait difficulty  Psychological: Feelings of anxiety, depression, agitation    Objective:   VITALS:    Visit Vitals  /79   Pulse (!) 57   Temp 98.1 °F (36.7 °C)   Resp 13   Ht 6' (1.829 m)   Wt 108.9 kg (240 lb)   SpO2 97%   BMI 32.55 kg/m²       PHYSICAL EXAM:    General:    Alert, cooperative, no distress, appears stated age. HEENT: Atraumatic, anicteric sclerae, pink conjunctivae     No oral ulcers, mucosa moist, throat clear, dentition fair  Neck:  Supple, symmetrical,  thyroid: non tender  Lungs:   Clear to auscultation bilaterally. No Wheezing or Rhonchi. No rales. Chest wall:  No tenderness  No Accessory muscle use. Heart:   Regular  rhythm,  No  murmur   No edema  Abdomen:   Soft, non-tender. Not distended. Bowel sounds normal  Extremities: No cyanosis. No clubbing,      Skin turgor normal, Capillary refill normal, Radial dial pulse 2+  Skin:     Not pale. Not Jaundiced  No rashes   Psych:  Good insight. Not depressed. Not anxious or agitated.   Neurologic: Power 4/5 in upper ext, 3/5 in lower ext    _______________________________________________________________________  Care Plan discussed with:    Comments   Patient y    Family  y Wife at bedside   RN y    Care Manager                    Consultant:      _______________________________________________________________________  Expected  Disposition:   Home with Family    HH/PT/OT/RN    SNF/LTC mick Guillen ________________________________________________________________________  TOTAL TIME: 75 Minutes    Critical Care Provided     Minutes non procedure based      Comments    x Reviewed previous records   >50% of visit spent in counseling and coordination of care x Discussion with patient and/or family and questions answered       ________________________________________________________________________  Signed: Radha Del Cid MD    Procedures: see electronic medical records for all procedures/Xrays and details which were not copied into this note but were reviewed prior to creation of Plan.     LAB DATA REVIEWED:    Recent Results (from the past 24 hour(s))   EKG, 12 LEAD, INITIAL    Collection Time: 11/15/22 10:44 PM   Result Value Ref Range    Ventricular Rate 68 BPM    Atrial Rate 68 BPM    P-R Interval 208 ms    QRS Duration 146 ms    Q-T Interval 430 ms    QTC Calculation (Bezet) 457 ms    Calculated R Axis -56 degrees    Calculated T Axis 54 degrees    Diagnosis       Normal sinus rhythm  Left axis deviation  Right bundle branch block  Left ventricular hypertrophy with repolarization abnormality  Possible Anterior infarct (cited on or before 15-NOV-2022)  When compared with ECG of 12-SEP-2022 06:00,  Serial changes of Anterior infarct present     TROPONIN-HIGH SENSITIVITY    Collection Time: 11/15/22 10:50 PM   Result Value Ref Range    Troponin-High Sensitivity 216 (HH) 0 - 76 ng/L   CBC WITH AUTOMATED DIFF    Collection Time: 11/15/22 10:50 PM   Result Value Ref Range    WBC 6.2 4.1 - 11.1 K/uL    RBC 3.73 (L) 4.10 - 5.70 M/uL    HGB 11.1 (L) 12.1 - 17.0 g/dL    HCT 35.7 (L) 36.6 - 50.3 %    MCV 95.7 80.0 - 99.0 FL    MCH 29.8 26.0 - 34.0 PG    MCHC 31.1 30.0 - 36.5 g/dL    RDW 15.5 (H) 11.5 - 14.5 %    PLATELET 533 715 - 025 K/uL    MPV 10.9 8.9 - 12.9 FL    NRBC 0.0 0  WBC    ABSOLUTE NRBC 0.00 0.00 - 0.01 K/uL    NEUTROPHILS 66 32 - 75 %    LYMPHOCYTES 19 12 - 49 %    MONOCYTES 8 5 - 13 % EOSINOPHILS 6 0 - 7 %    BASOPHILS 1 0 - 1 %    IMMATURE GRANULOCYTES 0 0.0 - 0.5 %    ABS. NEUTROPHILS 4.2 1.8 - 8.0 K/UL    ABS. LYMPHOCYTES 1.2 0.8 - 3.5 K/UL    ABS. MONOCYTES 0.5 0.0 - 1.0 K/UL    ABS. EOSINOPHILS 0.3 0.0 - 0.4 K/UL    ABS. BASOPHILS 0.0 0.0 - 0.1 K/UL    ABS. IMM. GRANS. 0.0 0.00 - 0.04 K/UL    DF AUTOMATED     METABOLIC PANEL, BASIC    Collection Time: 11/15/22 10:50 PM   Result Value Ref Range    Sodium 138 136 - 145 mmol/L    Potassium 4.5 3.5 - 5.1 mmol/L    Chloride 103 97 - 108 mmol/L    CO2 32 21 - 32 mmol/L    Anion gap 3 (L) 5 - 15 mmol/L    Glucose 263 (H) 65 - 100 mg/dL    BUN 48 (H) 6 - 20 MG/DL    Creatinine 6.56 (H) 0.70 - 1.30 MG/DL    BUN/Creatinine ratio 7 (L) 12 - 20      eGFR 8 (L) >60 ml/min/1.73m2    Calcium 9.5 8.5 - 10.1 MG/DL   MAGNESIUM    Collection Time: 11/15/22 10:50 PM   Result Value Ref Range    Magnesium 3.6 (H) 1.6 - 2.4 mg/dL   TSH 3RD GENERATION    Collection Time: 11/15/22 10:50 PM   Result Value Ref Range    TSH 2.54 0.36 - 3.74 uIU/mL   HEPATIC FUNCTION PANEL    Collection Time: 11/15/22 10:50 PM   Result Value Ref Range    Protein, total 7.4 6.4 - 8.2 g/dL    Albumin 3.4 (L) 3.5 - 5.0 g/dL    Globulin 4.0 2.0 - 4.0 g/dL    A-G Ratio 0.9 (L) 1.1 - 2.2      Bilirubin, total 0.4 0.2 - 1.0 MG/DL    Bilirubin, direct 0.2 0.0 - 0.2 MG/DL    Alk. phosphatase 90 45 - 117 U/L    AST (SGOT) 12 (L) 15 - 37 U/L    ALT (SGPT) 20 12 - 78 U/L   SAMPLES BEING HELD    Collection Time: 11/16/22  1:00 AM   Result Value Ref Range    SAMPLES BEING HELD RD SST PST LV BL     COMMENT        Add-on orders for these samples will be processed based on acceptable specimen integrity and analyte stability, which may vary by analyte.    URINALYSIS W/ REFLEX CULTURE    Collection Time: 11/16/22  1:00 AM    Specimen: Urine   Result Value Ref Range    Color YELLOW/STRAW      Appearance CLOUDY (A) CLEAR      Specific gravity 1.014      pH (UA) 8.0 5.0 - 8.0      Protein 300 (A) NEG mg/dL Glucose Negative NEG mg/dL    Ketone Negative NEG mg/dL    Bilirubin Negative NEG      Blood Negative NEG      Urobilinogen 0.2 0.2 - 1.0 EU/dL    Nitrites Negative NEG      Leukocyte Esterase TRACE (A) NEG      UA:UC IF INDICATED CULTURE NOT INDICATED BY UA RESULT      WBC 0-4 0 - 4 /hpf    RBC 0-5 0 - 5 /hpf    Epithelial cells FEW FEW /lpf    Bacteria Negative NEG /hpf    Hyaline cast 0-2 0 - 2 /lpf   HEP B SURFACE AB    Collection Time: 11/16/22  1:00 AM   Result Value Ref Range    Hepatitis B surface Ab 4.25 mIU/mL    Hep B surface Ab Interp. NONREACTIVE NR     HEP B SURFACE AG    Collection Time: 11/16/22  1:00 AM   Result Value Ref Range    Hepatitis B surface Ag <0.10 Index    Hep B surface Ag Interp.  Negative NEG     TROPONIN-HIGH SENSITIVITY    Collection Time: 11/16/22  2:57 AM   Result Value Ref Range    Troponin-High Sensitivity 216 (HH) 0 - 76 ng/L

## 2022-11-16 NOTE — CONSULTS
[address]  [facility]:[id number]   :1945       Pt seen  Esrd  Fall    Anemia    Plan  Hd today    Patient Active Problem List   Diagnosis Code    Acute pancreatitis K85.90    LFT'S ABNORMAL     Acute renal failure (ARF) ([facility]) N17.9    CKD (chronic kidney disease) stage 3, GFR 30-59 ml/min ([facility]) N18.30    CAD (coronary artery disease) I25.10    Abdominal pain, acute, epigastric R10.13    Nausea & vomiting R11.2    UTI (lower urinary tract infection) N39.0    Renal failure (ARF), acute on chronic (HCC) N17.9, N18.9    Diarrhea R19.7    SIRS (systemic inflammatory response syndrome) ([facility]) R65.10    Nephrotic syndrome N04.9    Renal anasarca N04.9    Acute on chronic renal failure ([facility]) N17.9, N18.9    Hypotension I95.9    Chest pain R07.9    Type II diabetes mellitus with nephropathy ([facility]) E11.21    Hyperlipidemia E78.5    Pneumonia J18.9    Accelerated hypertension I10    Elevated troponin R77.8    ESRD (end stage renal disease) on dialysis ([facility]) N18.6, Z99.2    Acute respiratory failure with hypoxia ([facility]) J96.01    Acute on chronic systolic CHF (congestive heart failure) ([facility]) F70.35    Systolic heart failure ([facility]) I50.20    Sepsis ([facility] A41.9    Acute hypoxemic respiratory failure ([facility]) J96.01    Vitamin D deficiency E55.9    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, G63    Diabetic peripheral neuropathy associated with type 2 diabetes mellitus ([facility] E11.42    B12 deficiency E53.8    Sensory ataxic gait R26.0    Cervical spondylosis with myelopathy M47.12    Type 2 diabetes mellitus with stage 4 chronic kidney disease, with long-term current use of insulin ([facility]) E11.22, N18.4, Z79.4    Convulsions ([facility] R56.9    Acute alteration in mental status R41.82    Myoclonus G25.3    Cervical post-laminectomy syndrome M96.1    Degenerative cervical spinal stenosis M48.02    Unable to ambulate R26.2         Unable to ambulate [R26.2]     Kelle Larsen MD  [facility]  [facility] [address], [place], [address]  Phone - [phone]         Fax - [phone] [facility] Office  [address] [place], [place] [address]  Phone - [phone]        Fax - [phone]

## 2022-11-16 NOTE — ED NOTES
Bedside and Verbal shift change report given to Jazzy Dale RN (oncoming nurse) by Samara Portillo RN (offgoing nurse). Report included the following information SBAR, Kardex, ED Summary, MAR, and Recent Results. Pt will be returning from dialysis, oncoming RN updated of dialysis report.

## 2022-11-16 NOTE — ED NOTES
Unable to ambulate patient at this time, patient high fall risk and is unbalanced with an unsteady gait. Patient also drowsy and disoriented to situation at this time. Dr. Maris Talamantes notified.

## 2022-11-16 NOTE — PROGRESS NOTES
CM acknowledges consult that pt is anticipated to need SNF at d/c. PT/OT consults in place, awaiting evaluations and recommendations. Will meet with pt/family and further discuss.     Clearance Kiley Ramos [phone]

## 2022-11-16 NOTE — Clinical Note
Status[de-identified] INPATIENT [101]   Type of Bed: Medical [8]   Inpatient Hospitalization Certified Necessary for the Following Reasons: 9.  Other (further clarification in H&P documentation)   Admitting Diagnosis: Unable to ambulate [[id number]]   Admitting Physician: Rolande Collet [[id number]]   Attending Physician: Abhijeet Quarles [[id number]]   Estimated Length of Stay: 2 Midnights   Discharge Plan[de-identified] Home with Office Follow-up

## 2022-11-16 NOTE — PROGRESS NOTES
1615 Verbal shift change report given to Olena Whitaker RN (oncoming nurse) by Marc Malik RN (offgoing nurse). Report included the following information SBAR, Kardex, Recent Results, and Cardiac Rhythm Sinus Rhythm . 36 Patient returned from dialysis    1975 Livia Taylor Attempted to call report, receiving nurse is in a room with another patient, nurse will call back when available. 1813 TRANSFER - OUT REPORT:    Verbal report given to Leilani(name) on Sami Mendoza  being transferred to NSTU(unit) for routine progression of care       Report consisted of patients Situation, Background, Assessment and   Recommendations(SBAR). Information from the following report(s) SBAR, Kardex, and Recent Results was reviewed with the receiving nurse. Lines:   Peripheral IV 11/15/22 Right Antecubital (Active)   Site Assessment Clean, dry, & intact 11/15/22 2253   Phlebitis Assessment 0 11/15/22 2253   Infiltration Assessment 0 11/15/22 2253   Dressing Status Clean, dry, & intact 11/15/22 2253   Dressing Type Tape;Transparent 11/15/22 2253   Hub Color/Line Status Pink;Flushed;Patent 11/15/22 2253   Action Taken Blood drawn 11/15/22 2253        Opportunity for questions and clarification was provided.       Patient transported with:   O2 @ 2 liters

## 2022-11-16 NOTE — PROGRESS NOTES
Nephrology Progress Note  [facility] / [address] [place] [address]  [place], [address]  Phone - [phone]  Fax - [phone]                 Patient: Sarita Cross                   YOB: 1945        Date- 11/16/2022                      Admit Date: 11/16/2022  CC: Follow up for esrd          IMPRESSION & PLAN:   Esrd- hd mwf at [facility]  Anemia of ckd  Sec. Hyperpara  S/p fall  Weakness  H/o CHF  Dm 2      PLAN-  Hd today  Remove 2 kg fluid  Hold epogen     Subjective: Interval History:   -  Patient is on hd mwf at [facility]. He missed hd today  He had multiple fall at home    Objective:   Vitals:    11/15/22 2238 11/16/22 0039 11/16/22 0734 11/16/22 0854   BP: (!) 154/76 137/78 139/65 131/79   Pulse: 71 61 65 (!) 57   Resp: 18 20 10 13   Temp: 98.1 °F (36.7 °C)      SpO2: 96% 96% 98% 97%   Weight: 108.9 kg (240 lb)      Height: 6' (1.829 m)         No intake/output data recorded. Last 3 Recorded Weights in this Encounter    11/15/22 2238   Weight: 108.9 kg (240 lb)        Physical exam:    GEN: NAD  NECK- no mass  RESP: No wheezing, decreased BS b/l  CVS: S1,S2  RRR  NEURO: Normal speech, Non focal  EXT: + Edema   PSYCH: Normal Mood  Upper arm avf +    Chart reviewed. Pertinent Notes reviewed. Data Review :  Recent Labs     11/15/22  2250      K 4.5      CO2 32   BUN 48*   CREA 6.56*   *   CA 9.5   MG 3.6*     Recent Labs     11/15/22  2250   WBC 6.2   HGB 11.1*   HCT 35.7*        No results for input(s): FE, TIBC, PSAT, FERR in the last 72 hours.    Lab Results   Component Value Date/Time    Hemoglobin A1c 5.6 12/21/2021 11:12 AM    Hemoglobin A1c 8.2 (H) 05/05/2021 03:29 AM    Hemoglobin A1c 10.5 (H) 11/16/2016 10:45 AM      Lab Results   Component Value Date/Time    Microalb/Creat ratio (ug/mg creat.) 897.2 (H) 04/11/2019 12:27 PM     Lab Results   Component Value Date/Time    NT pro-BNP 12,079 (H) 09/12/2022 06:02 AM    NT pro-BNP 5,709 (H) 03/15/2022 08:39 PM    NT pro-BNP 5,474 (H) 05/04/2021 06:09 AM    NT pro-BNP 2,497 (H) 04/17/2021 10:54 AM    NT pro-BNP 9,341 (H) 07/21/2020 05:33 AM     US Results (most recent):  Medication list  reviewed  Current Facility-Administered Medications   Medication Dose Route Frequency    sodium chloride (NS) flush 5-40 mL  5-40 mL IntraVENous Q8H    sodium chloride (NS) flush 5-40 mL  5-40 mL IntraVENous PRN    acetaminophen (TYLENOL) tablet 650 mg  650 mg Oral Q6H PRN    Or    acetaminophen (TYLENOL) suppository 650 mg  650 mg Rectal Q6H PRN    polyethylene glycol (MIRALAX) packet 17 g  17 g Oral DAILY PRN    ondansetron (ZOFRAN ODT) tablet 4 mg  4 mg Oral Q8H PRN    Or    ondansetron (ZOFRAN) injection 4 mg  4 mg IntraVENous Q6H PRN    heparin (porcine) injection 5,000 Units  5,000 Units SubCUTAneous Q8H    insulin NPH (NOVOLIN N, HUMULIN N) injection 15 Units  15 Units SubCUTAneous ACB&D    insulin lispro (HUMALOG) injection   SubCUTAneous AC&HS    glucose chewable tablet 16 g  4 Tablet Oral PRN    glucagon (GLUCAGEN) injection 1 mg  1 mg IntraMUSCular PRN    dextrose 10% infusion 0-250 mL  0-250 mL IntraVENous PRN    [START ON 11/17/2022] aspirin chewable tablet 81 mg  81 mg Oral DAILY    [START ON 11/17/2022] bumetanide (BUMEX) tablet 2 mg  2 mg Oral DAILY    carvediloL (COREG) tablet 12.5 mg  12.5 mg Oral BID WITH MEALS    sacubitriL-valsartan (ENTRESTO) 24-26 mg tablet 1 Tablet  1 Tablet Oral BID    [START ON 11/17/2022] finasteride (PROSCAR) tablet 5 mg  5 mg Oral DAILY    [START ON 11/17/2022] fluticasone propionate (FLONASE) 50 mcg/actuation nasal spray 2 Spray  2 Spray Both Nostrils DAILY    gabapentin (NEURONTIN) capsule 300 mg  300 mg Oral TID PRN    sevelamer carbonate (RENVELA) tab 800 mg  800 mg Oral BID    [START ON 11/17/2022] tamsulosin (FLOMAX) capsule 0.8 mg  0.8 mg Oral DAILY     Current Outpatient Medications Medication Sig    hydrOXYzine HCL (ATARAX) 25 mg tablet TAKE 1 TABLET BY MOUTH EVERY EVENING AS NEEDED    Miralax 17 gram/dose powder USE 1 SCOOP AND MIX WITH WATER AND DRINK BY MOUTH ONCE DAILY    Entresto 24-26 mg tablet Take 1 Tablet by mouth two (2) times a day. glipiZIDE (GLUCOTROL) 5 mg tablet Take 1 Tablet by mouth two (2) times a day. sevelamer carbonate (RENVELA) 800 mg tab tab Take 800 mg by mouth two (2) times a day.    gabapentin (NEURONTIN) 300 mg capsule Take 1 Capsule by mouth three (3) times daily as needed for Pain. Max Daily Amount: 900 mg.    carvediloL (COREG) 12.5 mg tablet Take 1 Tab by mouth two (2) times daily (with meals). bumetanide (BUMEX) 2 mg tablet TAKE 1 TABLET BY MOUTH EVERY DAY    finasteride (PROSCAR) 5 mg tablet TAKE 1 TABLET BY MOUTH DAILY    insulin NPH/insulin regular (NovoLIN 70/30 U-100 Insulin) 100 unit/mL (70-30) injection ReliOn Novolin 70/30: Inject 30 Units with Breakfast, 40 Units with Dinner (Patient taking differently: ReliOn Novolin 70/30: Inject 30 Units with Breakfast, 40 Units with Dinner (if needed))    aspirin 81 mg chewable tablet Take 81 mg by mouth daily. fluticasone propionate (FLONASE) 50 mcg/actuation nasal spray 2 Sprays by Both Nostrils route daily. tamsulosin (FLOMAX) 0.4 mg capsule Take 0.8 mg by mouth daily.         Justyn Pires MD  11/16/2022

## 2022-11-16 NOTE — PROGRESS NOTES
Physical Therapy    Chart reviewed and orders acknowledged. Patient currently CHEPE in process of admitting to room from ED.  Will hold and follow up later for PT eval.    Gladys Patel PT, DPT, NCS

## 2022-11-16 NOTE — ED NOTES
Bedside and Verbal shift change report given to [facility], ANA M, RN and Jonathan Cox RN  (oncoming nurse) by Fady Wilson RN (offgoing nurse). Report included the following information SBAR, Kardex, ED Summary, [facility] and Recent Results.

## 2022-11-17 ENCOUNTER — APPOINTMENT (OUTPATIENT)
Dept: MRI IMAGING | Age: 77
End: 2022-11-17
Attending: PSYCHIATRY & NEUROLOGY
Payer: MEDICARE

## 2022-11-17 LAB
ANION GAP SERPL CALC-SCNC: 7 MMOL/L (ref 5–15)
BUN SERPL-MCNC: 34 MG/DL (ref 6–20)
BUN/CREAT SERPL: 7 (ref 12–20)
CALCIUM SERPL-MCNC: 8.5 MG/DL (ref 8.5–10.1)
CHLORIDE SERPL-SCNC: 100 MMOL/L (ref 97–108)
CO2 SERPL-SCNC: 32 MMOL/L (ref 21–32)
CREAT SERPL-MCNC: 5.11 MG/DL (ref 0.7–1.3)
ERYTHROCYTE [DISTWIDTH] IN BLOOD BY AUTOMATED COUNT: 15.7 % (ref 11.5–14.5)
FOLATE SERPL-MCNC: 3 NG/ML (ref 5–21)
GLUCOSE BLD STRIP.AUTO-MCNC: 133 MG/DL (ref 65–117)
GLUCOSE BLD STRIP.AUTO-MCNC: 145 MG/DL (ref 65–117)
GLUCOSE BLD STRIP.AUTO-MCNC: 148 MG/DL (ref 65–117)
GLUCOSE BLD STRIP.AUTO-MCNC: 239 MG/DL (ref 65–117)
GLUCOSE SERPL-MCNC: 164 MG/DL (ref 65–100)
HCT VFR BLD AUTO: 34.3 % (ref 36.6–50.3)
HGB BLD-MCNC: 10.5 G/DL (ref 12.1–17)
MCH RBC QN AUTO: 29.2 PG (ref 26–34)
MCHC RBC AUTO-ENTMCNC: 30.6 G/DL (ref 30–36.5)
MCV RBC AUTO: 95.5 FL (ref 80–99)
NRBC # BLD: 0 K/UL (ref 0–0.01)
NRBC BLD-RTO: 0 PER 100 WBC
PLATELET # BLD AUTO: 222 K/UL (ref 150–400)
PMV BLD AUTO: 10.9 FL (ref 8.9–12.9)
POTASSIUM SERPL-SCNC: 4.1 MMOL/L (ref 3.5–5.1)
RBC # BLD AUTO: 3.59 M/UL (ref 4.1–5.7)
SERVICE CMNT-IMP: ABNORMAL
SODIUM SERPL-SCNC: 139 MMOL/L (ref 136–145)
VIT B12 SERPL-MCNC: 330 PG/ML (ref 193–986)
WBC # BLD AUTO: 7 K/UL (ref 4.1–11.1)

## 2022-11-17 PROCEDURE — 36415 COLL VENOUS BLD VENIPUNCTURE: CPT

## 2022-11-17 PROCEDURE — 72148 MRI LUMBAR SPINE W/O DYE: CPT

## 2022-11-17 PROCEDURE — 97535 SELF CARE MNGMENT TRAINING: CPT | Performed by: OCCUPATIONAL THERAPIST

## 2022-11-17 PROCEDURE — 94760 N-INVAS EAR/PLS OXIMETRY 1: CPT

## 2022-11-17 PROCEDURE — 82607 VITAMIN B-12: CPT

## 2022-11-17 PROCEDURE — 74011250637 HC RX REV CODE- 250/637: Performed by: STUDENT IN AN ORGANIZED HEALTH CARE EDUCATION/TRAINING PROGRAM

## 2022-11-17 PROCEDURE — 82962 GLUCOSE BLOOD TEST: CPT

## 2022-11-17 PROCEDURE — 74011250636 HC RX REV CODE- 250/636: Performed by: STUDENT IN AN ORGANIZED HEALTH CARE EDUCATION/TRAINING PROGRAM

## 2022-11-17 PROCEDURE — 97161 PT EVAL LOW COMPLEX 20 MIN: CPT | Performed by: PHYSICAL THERAPIST

## 2022-11-17 PROCEDURE — 85027 COMPLETE CBC AUTOMATED: CPT

## 2022-11-17 PROCEDURE — 80048 BASIC METABOLIC PNL TOTAL CA: CPT

## 2022-11-17 PROCEDURE — 65270000046 HC RM TELEMETRY

## 2022-11-17 PROCEDURE — 82746 ASSAY OF FOLIC ACID SERUM: CPT

## 2022-11-17 PROCEDURE — 74011636637 HC RX REV CODE- 636/637: Performed by: STUDENT IN AN ORGANIZED HEALTH CARE EDUCATION/TRAINING PROGRAM

## 2022-11-17 PROCEDURE — 77010033678 HC OXYGEN DAILY

## 2022-11-17 PROCEDURE — 97165 OT EVAL LOW COMPLEX 30 MIN: CPT | Performed by: OCCUPATIONAL THERAPIST

## 2022-11-17 PROCEDURE — 97116 GAIT TRAINING THERAPY: CPT | Performed by: PHYSICAL THERAPIST

## 2022-11-17 PROCEDURE — 72141 MRI NECK SPINE W/O DYE: CPT

## 2022-11-17 PROCEDURE — 74011000250 HC RX REV CODE- 250: Performed by: STUDENT IN AN ORGANIZED HEALTH CARE EDUCATION/TRAINING PROGRAM

## 2022-11-17 PROCEDURE — 99223 1ST HOSP IP/OBS HIGH 75: CPT | Performed by: PSYCHIATRY & NEUROLOGY

## 2022-11-17 RX ADMIN — SACUBITRIL AND VALSARTAN 1 TABLET: 24; 26 TABLET, FILM COATED ORAL at 17:38

## 2022-11-17 RX ADMIN — ASPIRIN 81 MG: 81 TABLET, CHEWABLE ORAL at 08:33

## 2022-11-17 RX ADMIN — SEVELAMER CARBONATE 800 MG: 800 TABLET, FILM COATED ORAL at 22:13

## 2022-11-17 RX ADMIN — Medication 20 UNITS: at 17:38

## 2022-11-17 RX ADMIN — CARVEDILOL 12.5 MG: 12.5 TABLET, FILM COATED ORAL at 17:38

## 2022-11-17 RX ADMIN — Medication 15 UNITS: at 08:32

## 2022-11-17 RX ADMIN — SEVELAMER CARBONATE 800 MG: 800 TABLET, FILM COATED ORAL at 08:33

## 2022-11-17 RX ADMIN — SACUBITRIL AND VALSARTAN 1 TABLET: 24; 26 TABLET, FILM COATED ORAL at 08:40

## 2022-11-17 RX ADMIN — SODIUM CHLORIDE, PRESERVATIVE FREE 10 ML: 5 INJECTION INTRAVENOUS at 22:13

## 2022-11-17 RX ADMIN — HEPARIN SODIUM 5000 UNITS: 5000 INJECTION INTRAVENOUS; SUBCUTANEOUS at 22:13

## 2022-11-17 RX ADMIN — SODIUM CHLORIDE, PRESERVATIVE FREE 10 ML: 5 INJECTION INTRAVENOUS at 13:13

## 2022-11-17 RX ADMIN — CARVEDILOL 12.5 MG: 12.5 TABLET, FILM COATED ORAL at 08:33

## 2022-11-17 RX ADMIN — BUMETANIDE 2 MG: 1 TABLET ORAL at 08:33

## 2022-11-17 RX ADMIN — SODIUM CHLORIDE, PRESERVATIVE FREE 10 ML: 5 INJECTION INTRAVENOUS at 05:44

## 2022-11-17 RX ADMIN — HEPARIN SODIUM 5000 UNITS: 5000 INJECTION INTRAVENOUS; SUBCUTANEOUS at 13:09

## 2022-11-17 RX ADMIN — HEPARIN SODIUM 5000 UNITS: 5000 INJECTION INTRAVENOUS; SUBCUTANEOUS at 05:44

## 2022-11-17 RX ADMIN — FINASTERIDE 5 MG: 5 TABLET, FILM COATED ORAL at 08:33

## 2022-11-17 RX ADMIN — FLUTICASONE PROPIONATE 2 SPRAY: 50 SPRAY, METERED NASAL at 08:42

## 2022-11-17 RX ADMIN — Medication 3 UNITS: at 11:07

## 2022-11-17 RX ADMIN — TAMSULOSIN HYDROCHLORIDE 0.8 MG: 0.4 CAPSULE ORAL at 08:33

## 2022-11-17 NOTE — CONSULTS
Date of Consultation:  November 17, 2022    Referring Physician: Josselyn Reyes MD     Reason for Consultation: Bilateral lower extremity weakness    Chief Complaint   Patient presents with    Fatigue     WEAKNESS OF BOTH HANDS AND FEET, HAS BEEN HAPPENING FOR A FEW WEEKS. HAS HAD INCREASED DIFFICULTY WALKING. HX OF HTN, DM, DOES DIALYSIS M,W,F. History of Present Illness:   Norah Corona is a 78 y.o. male with a history of end-stage renal disease on dialysis, diabetes, hypertension and CAD who is currently admitted to the hospital with progressive lower extremity weakness. Patient reports that he has had difficulty with walking and balance that started several years ago. He reports that initially his symptoms started with difficulty with balance and describes a sensation of not being aware of where his feet are. He started initially using a cane however over the years he has progressed to develop weakness in the bilateral lower extremities. He reports that he has had difficulty with stumbling over the years however in the last few days he has had 2 falls. He reports that he has never had falls previously. He does complain of numbness in the toes for which he had been taking gabapentin. He does report that his symptoms improved with gabapentin however his gait instability persisted. Additionally he does complain of paresthesias in his hands specifically at the fingertips. He also describes that it is difficult for him to  objects. This has been ongoing for the last 6 months. He otherwise denies any difficulty with his speech, language or vision.     Past Medical History:   Diagnosis Date    Arthritis     spine    CAD (coronary artery disease)     high cholesterol    Chronic kidney disease     dialysis    Diabetes ([facility]     ESRD (end stage renal disease) ([facility]     GERD (gastroesophageal reflux disease)     HX    Hypertension     Ill-defined condition     irritable bowel syndrome    Systolic CHF ([address]     Unspecified sleep apnea     NO CPAP        Past Surgical History:   Procedure Laterality Date    COLONOSCOPY N/A 2016    COLONOSCOPY performed by Adalberto Wright MD at [facility] ENDOSCOPY    HX ORTHOPAEDIC      CERVICAL FUSION    HX UROLOGICAL      TURP    HX VASCULAR ACCESS      L arm dialysis access        Family History   Problem Relation Age of Onset    No Known Problems Mother     Cancer Father         \"bone\"    Cancer Sister         leukemia and breast    No Known Problems Sister     Diabetes Brother     Diabetes Brother     Emphysema Brother     No Known Problems Maternal Grandmother     No Known Problems Maternal Grandfather     No Known Problems Paternal Grandmother     No Known Problems Paternal Grandfather         Social History     Tobacco Use    Smoking status: Former     Types: Cigarettes     Quit date:      Years since quittin.8    Smokeless tobacco: Former    Tobacco comments:     cigars only   Substance Use Topics    Alcohol use: No        Allergies   Allergen Reactions    Albumin, Human 25 % Anaphylaxis and Hives    Niacin Hives        Prior to Admission medications    Medication Sig Start Date End Date Taking? Authorizing Provider   hydrOXYzine HCL (ATARAX) 25 mg tablet TAKE 1 TABLET BY MOUTH EVERY EVENING AS NEEDED 11/3/22   Provider, Historical   Miralax 17 gram/dose powder USE 1 SCOOP AND MIX WITH WATER AND DRINK BY MOUTH ONCE DAILY 10/15/22   Provider, Historical   Entresto 24-26 mg tablet Take 1 Tablet by mouth two (2) times a day. 11/3/22   Provider, Historical   glipiZIDE (GLUCOTROL) 5 mg tablet Take 1 Tablet by mouth two (2) times a day. 11/15/22   Tricia Lin MD   sevelamer carbonate (RENVELA) 800 mg tab tab Take 800 mg by mouth two (2) times a day. 22   Provider, Historical   gabapentin (NEURONTIN) 300 mg capsule Take 1 Capsule by mouth three (3) times daily as needed for Pain.  Max Daily Amount: 900 mg. 22   Mireya Llanes MD   carvediloL (COREG) 12.5 mg tablet Take 1 Tab by mouth two (2) times daily (with meals). 5/6/21   Veronique Soler MD   bumetanide (BUMEX) 2 mg tablet TAKE 1 TABLET BY MOUTH EVERY DAY 3/19/21   Provider, Historical   finasteride (PROSCAR) 5 mg tablet TAKE 1 TABLET BY MOUTH DAILY 1/20/21   Provider, Historical   insulin NPH/insulin regular (NovoLIN 70/30 U-100 Insulin) 100 unit/mL (70-30) injection ReliOn Novolin 70/30: Inject 30 Units with Breakfast, 40 Units with Dinner  Patient taking differently: ReliOn Novolin 70/30: Inject 30 Units with Breakfast, 40 Units with Dinner (if needed) 3/23/21   Mike Ratliff MD   aspirin 81 mg chewable tablet Take 81 mg by mouth daily. Provider, Historical   fluticasone propionate (FLONASE) 50 mcg/actuation nasal spray 2 Sprays by Both Nostrils route daily. Provider, Historical   tamsulosin (FLOMAX) 0.4 mg capsule Take 0.8 mg by mouth daily. Other, MD Mignon       Review of Systems:    General, constitutional: negative  Eyes, vision: negative  Ears, nose, throat: negative  Cardiovascular, heart: negative  Respiratory: negative  Gastrointestinal: negative  Genitourinary: negative  Musculoskeletal: negative  Skin and integumentary: negative  Psychiatric: negative  Endocrine: negative  Neurological: negative, except for HPI  Hematologic/lymphatic: negative  Allergy/immunology: negative    PHYSICAL EXAMINATION:   Visit Vitals  BP (!) 145/68   Pulse (!) 59   Temp 97.5 °F (36.4 °C)   Resp 16   Ht 6' (1.829 m)   Wt 240 lb (108.9 kg)   SpO2 100%   BMI 32.55 kg/m²       Physical Exam:  General:  no acute distress  Neck: no carotid bruits  Lungs: clear to auscultation  Heart:  no murmurs, regular rate and rhythm   Lower extremity: no edema    Neurological exam:  Mental Status: Awake, alert, oriented to person, place and time  Attention and Concentration: able to state the days of the week backwards   Speech and Language: No dysarthria.  Able to name, repeat and follow commands   Fund of knowledge was preserved    Cranial nerves: II-XII:  Pupils equal and reactive, visual fields intact by confrontation   Extraocular movements intact, no evidence of nystagmus or ptosis   Facial sensation intact   Facial movements symmetric   Hearing intact to soft rub bilaterally   Shoulder shrug symmetric and strong   Tongue protrusion full and midline without fasciculation or atrophy    Motor:   Normal tone and Bulk   Drift: No evidence of pronator drift     Strength testing:   deltoid triceps biceps Wrist ext. Wrist flex. intrinsics   Right 4+ 5 5 5 5 4+   Left 4+ 5 5 5 5 4+      Hip flex. Hip ext. Knee ext. Knee flex Dorsi flex Plantar flex   Right  4 4 4+ 4+ 5 5   Left  4 4 4+ 4+ 5 5       Sensory:  Sensation was intact to light touch. Patient did have decreased absent vibratory sensation at the toes bilaterally. Vibratory sensation at the ankles was approximately 7 seconds and greater than 10 seconds at the knees. Reflexes:   Biceps Triceps  Brachiorad Patellar Achilles Plantar Hoffmans   Right  3 2 3 2 1 Down Pos   Left  3 2 2 2 1 Down Neg      Cerebellar testing: Finger-nose-finger was intact.     Gait was deferred due to concerns over patient safety    Data:     Lab Results   Component Value Date/Time    Hemoglobin A1c 5.6 12/21/2021 11:12 AM    Sodium 139 11/17/2022 02:39 AM    Potassium 4.1 11/17/2022 02:39 AM    Chloride 100 11/17/2022 02:39 AM    Glucose 164 (H) 11/17/2022 02:39 AM    BUN 34 (H) 11/17/2022 02:39 AM    Creatinine 5.11 (H) 11/17/2022 02:39 AM    Calcium 8.5 11/17/2022 02:39 AM    WBC 7.0 11/17/2022 02:39 AM    HCT 34.3 (L) 11/17/2022 02:39 AM    HGB 10.5 (L) 11/17/2022 02:39 AM    PLATELET 145 73/45/4316 02:39 AM       Imaging:    Results from Hospital Encounter encounter on 01/13/22    MRI CERV SPINE WO CONT    Narrative  INDICATION: ataxia r/O myelopathy, needs open unit for claustrophobia    COMPARISON: None    EXAM: Sagittal T1-weighted spin-echo, sagittal T2-weighted fast spin-echo,  sagittal inversion recovery, axial T1-weighted spin-echo, axial gradient echo  and axial T2-weighted fast spin-echo MR images of the cervical spine are  obtained. FINDINGS:    Cord: Diminished transaxial cord size at C4-5 with at C5 level, greater on the  right side. Craniocaudal extent T2 hyperintensity measures approximately  millimeters Findings are consistent with mild dilation with cord atrophy. Visualized portions of brainstem and cerebellum appear normal.    No intraspinal or paraspinal soft tissue mass is shown. There are artifacts related to interbody placement at C4-5 with anterior  fixation device at C4-5. A prominent metallic artifact is also shown in the  anterolateral right C6 vertebral body though without intervening C5-6 graft. Visualized bone signal and vertebral body height appear normal. There is no  listhesis. C2-3: Normal disc height. No disc herniation or bulging. No substantial facet  arthrosis. No canal or foraminal stenosis. C3-C4: Mild disc space narrowing with mild-moderate diffuse disc bulging and  superimposed central disc herniation. There is effacement of the anterior CSF  space with midline AP canal dimension diminished to 6 to 7 mm, and associated  with moderate anterior cord compression. Moderate to severe right and moderate  left foraminal stenosis is also shown. C4-5: No canal or substantial foraminal stenosis demonstrated. C5-6: Nearly normal disc height. Left paracentral disc herniation with  effacement of the anterior CSF space and mild cord compression. Moderate left  and mild right facet osteoarthrosis. Midline AP canal dimension measures 6 to 7  mm. Moderate right and mild-moderate left foraminal stenosis. C6-7: Moderate disc space narrowing with moderate diffuse disc osteophyte  complex formation appearing accentuated in the right lateral region. No  substantial facet arthrosis.  Midline AP canal dimension reduced to 6 mm, with  moderate to severe appearing cord compression and severe bilateral foraminal  stenosis. C7-T1: Moderate disc space narrowing with mild-moderate diffuse disc osteophyte  complex formation, accentuated in the right greater than left uncovertebral  regions. Mild bilateral facet osteoarthrosis. No canal stenosis. Moderate  bilateral foraminal stenosis. T1-T2: Shown on sagittal images only. Minimal central disc bulging. Mild right  facet osteoarthrosis. No canal stenosis. Mild-moderate right foraminal  narrowing. T2-T3: Shown on sagittal images only. No disc herniation or stenosis. Impression  1. Cord atrophy with T2 hyperintensity consistent with myelomalacia centered at  the C4-5 level. 2. Postoperative and degenerative findings detailed by level above. Findings  predominate at C3-4, C5-6 and C6-7 levels with cord compression. Severe cord  compression at C6-7, and cord compression at C3-4 and C5-6 related to central  C3-4 and left paracentral C5-6 disc herniations. Results from [place] encounter on 11/16/22    CT SPINE CERV WO CONT    Narrative  INDICATION: fall weakness    COMPARISON: None. TECHNIQUE:   Noncontrast axial CT imaging of the cervical spine was performed. Coronal and sagittal reconstructions were obtained. CT dose reduction was achieved through use of a standardized protocol tailored  for this examination and automatic exposure control for dose modulation. FINDINGS:    There is normal alignment of the cervical spine. There is no acute fracture or  subluxation. The craniocervical junction is intact. There is no prevertebral  soft tissue swelling. Previous anterior cervical discectomy and fusion at C4-5. Extensive degenerative changes with large ventral osteophytes throughout the  cervical spine. Impression  No acute fracture.         IMPRESSION/RECOMMENDATIONS:  Janet Torres is a 78 y.o. male with a history of end-stage renal disease on dialysis, diabetes, hypertension and CAD who is currently admitted to the hospital with progressive lower extremity weakness. His neurological exam does reveal evidence of a length dependent neuropathy as well as hyperreflexia which may indicate the cervical spine lesion. Progressive lower extremity weakness associated with gait instability: Per patient has been occurring over the last several years. His neurological exam does reveal both a length dependent neuropathy as well as hyperreflexia which may indicate the spinal stenosis. It is more than likely that his symptoms are multifactorial  -Would recommend obtaining an MRI of the cervical and lumbar spine to determine if there is evidence of spinal stenosis. -He will also benefit from an EMG as an outpatient to determine the degree of neuropathy.  -Patient was also noted to have a low B12 level, for this reason would recommend oral supplementation. This may help some of his symptoms of neuropathy  -Patient will benefit from discal therapy and Occupational Therapy. He also may benefit from short stay at inpatient rehab. Thank you very much for this consultation, we will continue to follow. Please call with any additional questions.     Angelica Chavarria MD

## 2022-11-17 NOTE — PROGRESS NOTES
End of Shift Note    Bedside shift change report given to RN (oncoming nurse) by Tobias Faulkner RN (offgoing nurse).   Report included the following information SBAR, Kardex, ED Summary, and MAR    Shift worked:  Nights    Shift summary and any significant changes:     No significant events over the night        Concerns for physician to address:  None    Zone phone for oncoming shift:   [phone]     Patient Information  Sami Mendoza  78 y.o.  11/16/2022 12:20 AM by Yosi Escudero was admitted from Home    Problem List  Patient Active Problem List    Diagnosis Date Noted    UTI (lower urinary tract infection) 10/25/2013    Renal failure (ARF), acute on chronic ([address] 10/25/2013    Unable to ambulate 11/16/2022    Cervical post-laminectomy syndrome 06/09/2022    Degenerative cervical spinal stenosis 06/09/2022    Vitamin D deficiency 12/21/2021    Immune-mediated neuropathy ([address] 12/21/2021    Diabetic peripheral neuropathy associated with type 2 diabetes mellitus ([address] 12/21/2021    B12 deficiency 12/21/2021    Sensory ataxic gait 12/21/2021    Cervical spondylosis with myelopathy 12/21/2021    Type 2 diabetes mellitus with stage 4 chronic kidney disease, with long-term current use of insulin ([address] 12/21/2021    Convulsions ([address] 12/21/2021    Acute alteration in mental status 12/21/2021    Myoclonus 12/21/2021    Sepsis ([address] 08/18/2021    Acute hypoxemic respiratory failure ([address] 52/52/4428    Systolic heart failure ([address] 05/04/2021    Accelerated hypertension 07/21/2020    Elevated troponin 07/21/2020    ESRD (end stage renal disease) on dialysis ([address] 07/21/2020    Acute respiratory failure with hypoxia ([address] 07/21/2020    Acute on chronic systolic CHF (congestive heart failure) ([address] 07/21/2020    Pneumonia 05/26/2019    Acute on chronic renal failure ([address] 10/29/2016    Hypotension 10/29/2016    Chest pain 10/29/2016    Type II diabetes mellitus with nephropathy ([address] 10/29/2016 Hyperlipidemia 10/29/2016    Nephrotic syndrome 08/20/2016    Renal anasarca 08/20/2016    Diarrhea 10/28/2013    SIRS (systemic inflammatory response syndrome) ([facility] 10/28/2013    Acute pancreatitis 01/18/2011    LFT'S ABNORMAL 01/18/2011    Acute renal failure (ARF) ([facility] 01/18/2011    CKD (chronic kidney disease) stage 3, GFR 30-59 ml/min ([facility] 01/18/2011    CAD (coronary artery disease) 01/18/2011    Abdominal pain, acute, epigastric 01/18/2011    Nausea & vomiting 01/18/2011     Past Medical History:   Diagnosis Date    Arthritis     spine    CAD (coronary artery disease)     high cholesterol    Chronic kidney disease     dialysis    Diabetes ([facility]     ESRD (end stage renal disease) ([facility])     GERD (gastroesophageal reflux disease)     HX    Hypertension     Ill-defined condition     irritable bowel syndrome    Systolic CHF ([facility]     Unspecified sleep apnea     NO CPAP       Core Measures:  CVA: No Not applicable  CHF:No Not applicable  PNA:No Not applicable    Activity:  Activity Level: Up with Assistance  Number times ambulated in hallways past shift: 0  Number of times OOB to chair past shift: 0    Cardiac:   Cardiac Monitoring: No          Access:   Current line(s): PIV   Central Line? No Placement date   PICC LINE? No Placement date     Genitourinary:   Urinary status: voiding  Urinary Catheter? No Placement Date     Respiratory:   O2 Device: Nasal cannula  Chronic home O2 use?: YES  Incentive spirometer at bedside: N/A       GI:     Current diet:  ADULT DIET Regular; Low Fat/Low Chol/High Fiber/RANI  Passing flatus: YES  Tolerating current diet: YES       Pain Management:   Patient states pain is manageable on current regimen: YES    Skin:  Kelby Score: 20  Interventions: turn team, speciality bed, float heels, increase time out of bed, foam dressing, and PT/OT consult    Patient Safety:  Fall Score:  Total Score: 4  Interventions: bed/chair alarm, assistive device (walker, cane, etc), gripper socks, pt to call before getting OOB, and stay with me (per policy)  High Fall Risk: Yes  @Rollbelt  @dexterity to release roll belt  Yes/No ( must document dexterity  here by stating Yes or No here, otherwise this is a restraint and must follow restraint documentation policy.)    DVT prophylaxis:  DVT prophylaxis Med- Yes  DVT prophylaxis SCD or CINDY- No     Wounds: (If Applicable)  Wounds- No  Location None     Active Consults:  IP CONSULT TO NEUROLOGY  IP CONSULT TO NEPHROLOGY    Length of Stay:  Expected LOS: - - -  Actual LOS: 1  Discharge Plan: No Home       Raz Mccall RN

## 2022-11-17 NOTE — PROGRESS NOTES
Problem: Falls - Risk of  Goal: *Absence of Falls  Description: Document [place] Fail Fall Risk and appropriate interventions in the flowsheet.   Outcome: Progressing Towards Goal  Note: Fall Risk Interventions:  Mobility Interventions: Bed/chair exit alarm, Communicate number of staff needed for ambulation/transfer, Patient to call before getting OOB, OT consult for ADLs, Strengthening exercises (ROM-active/passive), Utilize walker, cane, or other assistive device         Medication Interventions: Assess postural VS orthostatic hypotension, Bed/chair exit alarm, Patient to call before getting OOB    Elimination Interventions: Call light in reach, Stay With Me (per policy), Toileting schedule/hourly rounds, Urinal in reach    History of Falls Interventions: Bed/chair exit alarm, Consult care management for discharge planning, Door open when patient unattended, Evaluate medications/consider consulting pharmacy         Problem: Patient Education: Go to Patient Education Activity  Goal: Patient/Family Education  Outcome: Progressing Towards Goal

## 2022-11-17 NOTE — PROGRESS NOTES
Problem: Self Care Deficits Care Plan (Adult)  Goal: *Acute Goals and Plan of Care (Insert Text)  Description: FUNCTIONAL STATUS PRIOR TO ADMISSION: Pt reports that he lives with his wife. He reports having difficulty lately and is reluctant to ask his wife to assist.  Pt reports independence in adls and drives himself to HD appointments. He states he feels tired after HD, bt manages his drive home     HOME SUPPORT: The patient lived with wife. Occupational Therapy Goals  Initiated 11/17/2022  1. Patient will perform grooming in standing  with contact guard assist within 7 day(s). 2.  Patient will perform lower body dressing with supervision/set-up, using AE prn, within 7 day(s). 3.  Patient will perform bathing with supervision/set-up within 7 day(s). 4.  Patient will perform toilet transfers with minimal assistance/contact guard assist within 7 day(s). 5.  Patient will perform all aspects of toileting with minimal assistance/contact guard assist within 7 day(s). 6.  Patient will participate in upper extremity therapeutic exercise/activities with supervision/set-up for 5 minutes within 7 day(s). 7.  Patient will tolerate at least 8 minutes of standing adls within 7 days. Outcome: Not Met   OCCUPATIONAL THERAPY EVALUATION  Patient: Pj Gallardo (86 y.o. male)  Date: 11/17/2022  Primary Diagnosis: Unable to ambulate [R26.2]       Precautions:   Fall    ASSESSMENT  Based on the objective data described below, the patient presents with mild confusion, decreased balance, generalized weakness and self reported BLE weakness,  baseline left shoulder decreased ROM, numbness in B fingers, decreased tolerance for adls and mobility impairing independence and safety. Pt reports that he has been struggling at home with adls and mobility, and his wife cannot assist him when he falls. Overall, pt is functioning below his baseline and will benefit from acute OT services.   At discharge, pt will benefit from rehab admission. .    Current Level of Function Impacting Discharge (ADLs/self-care): assist X2 for functional mobility using RW and up to max A for adls    Functional Outcome Measure: The patient scored Total: 50/100 on the Barthel Index outcome measure which is indicative of being \"partially dependent\"  in basic self-care. Other factors to consider for discharge: lives with wife, but doesn't assist him     Patient will benefit from skilled therapy intervention to address the above noted impairments. PLAN :  Recommendations and Planned Interventions: self care training, functional mobility training, therapeutic exercise, balance training, therapeutic activities, endurance activities, patient education, home safety training, and family training/education    Frequency/Duration: Patient will be followed by occupational therapy 4 times a week to address goals. Recommendation for discharge: (in order for the patient to meet his/her long term goals)  Therapy up to 5 days/week in SNF setting    This discharge recommendation:  Has not yet been discussed the attending provider and/or case management    IF patient discharges home will need the following DME: shower chair, walker: rolling, and grab bars, AE for lower body adls       SUBJECTIVE:   Patient stated she can't pick me up off the floor.     OBJECTIVE DATA SUMMARY:   HISTORY:   Past Medical History:   Diagnosis Date    Arthritis     spine    CAD (coronary artery disease)     high cholesterol    Chronic kidney disease     dialysis    Diabetes ([address]     ESRD (end stage renal disease) ([address]     GERD (gastroesophageal reflux disease)     HX    Hypertension     Ill-defined condition     irritable bowel syndrome    Systolic CHF ([address]     Unspecified sleep apnea     NO CPAP     Past Surgical History:   Procedure Laterality Date    COLONOSCOPY N/A 11/9/2016    COLONOSCOPY performed by David Suárez MD at [facility] ENDOSCOPY    HX ORTHOPAEDIC      CERVICAL FUSION    HX UROLOGICAL      TURP    HX VASCULAR ACCESS      L arm dialysis access       Expanded or extensive additional review of patient history:     Home Situation  Home Environment: Private residence  # Steps to Enter: 1  Rails to Enter: No  One/Two Story Residence: One story  Living Alone: No  Support Systems: Spouse/Significant Other  Patient Expects to be Discharged to[de-identified] Skilled nursing facility  Current DME Used/Available at Home: Walker, rolling  Tub or Shower Type: Tub/Shower combination    Hand dominance: Right    EXAMINATION OF PERFORMANCE DEFICITS:  Cognitive/Behavioral Status:  Neurologic State: Alert  Orientation Level: Oriented X4  Cognition: Follows commands  Perception: Appears intact  Perseveration: No perseveration noted  Safety/Judgement: Decreased awareness of need for assistance; Fall prevention    Skin: appears dry    Edema: none observed    Hearing: Auditory  Auditory Impairment: None    Vision/Perceptual:                           Acuity:  (not formally tested, pt reports that he needs a vision exam)    Corrective Lenses: Glasses    Range of Motion:  BUEs   L shoulder ROM decreased with discomfort at end range  AROM: Generally decreased, functional                         Strength:  BUEs    Strength: Generally decreased, functional      Generalized weakness          Coordination:     Fine Motor Skills-Upper: Left Impaired;Right Impaired (due to pt reporting numbness in fingers--uses 2 hand to hold cup)    Gross Motor Skills-Upper: Left Impaired;Right Intact (functional for adls, but has L shoulder ROM decreased and discomfort at baseline)    Tone & Sensation: Tone is normal  Semsation is impaired-feet and B fingers                            Balance:  Sitting: Intact  Standing: Impaired  Standing - Static: Constant support; Fair  Standing - Dynamic : Constant support; Fair    Functional Mobility and Transfers for ADLs:  Bed Mobility:  Rolling: Minimum assistance  Supine to Sit: Minimum assistance  Scooting: Supervision; Additional time    Transfers:  Sit to Stand: Assist x2;Minimum assistance  Stand to Sit: Contact guard assistance;Assist x2    ADL Assessment:  Feeding: Independent (due to numbness B fingers, needs to use 2 hand to hold cup)     Bathing: moderate assistance       Toilet transfer: min A X2      Upper Body Dressing: Setup    Lower Body Dressing: Maximum assistance (would benefit from trial of AE)    Toileting: Stand by assistance                ADL Intervention and task modifications:     Pt able to ambulate to bathroom using RW and min A X2. Pt is unsteady and has impaired sensation BLEs. Needs education to use RW safely during adls/mobility-education provided. Pt would benefit from trial of AE for lower body adls. Cognitive Retraining  Safety/Judgement: Decreased awareness of need for assistance; Fall prevention      Functional Measure:    Barthel Index:  Bathin  Bladder: 5  Bowels: 10  Groomin  Dressin  Feeding: 10  Mobility: 0  Stairs: 0  Toilet Use: 5  Transfer (Bed to Chair and Back): 10  Total: 50/100      The Barthel ADL Index: Guidelines  1. The index should be used as a record of what a patient does, not as a record of what a patient could do. 2. The main aim is to establish degree of independence from any help, physical or verbal, however minor and for whatever reason. 3. The need for supervision renders the patient not independent. 4. A patient's performance should be established using the best available evidence. Asking the patient, friends/relatives and nurses are the usual sources, but direct observation and common sense are also important. However direct testing is not needed. 5. Usually the patient's performance over the preceding 24-48 hours is important, but occasionally longer periods will be relevant. 6. Middle categories imply that the patient supplies over 50 per cent of the effort.   7. Use of aids to be independent is allowed. Score Interpretation (from [address] [facility])    Independent   60-79 Minimally independent   40-59 Partially dependent   20-39 Very dependent   <20 Totally dependent     -Stephen Driver., Barthel, D.W. (1965). Functional evaluation: the Barthel Index. [address] ([address] (1997). The Barthel activities of daily living index: self-reporting versus actual performance in the old (> or = 75 years). Journal of [address] [place] [address] 45(7), [facility], JPASCUAL, Teofilo Paz., Maria Eugenia Ballard. (1999). Measuring the change in disability after inpatient rehabilitation; comparison of the responsiveness of the Barthel Index and Functional [place] Measure. Journal of Neurology, Neurosurgery, and Psychiatry, 66(4), 963-542. BRI Clay, VITO Beatty, & Shalom Redding MDA. (2004) Assessment of post-stroke quality of life in cost-effectiveness studies: The usefulness of the Barthel Index and the EuroQoL-5D. Quality of Life Research, 15, 298-12     Occupational Therapy Evaluation Charge Determination   History Examination Decision-Making   MEDIUM Complexity : Expanded review of history including physical, cognitive and psychosocial  history  MEDIUM Complexity : 3-5 performance deficits relating to physical, cognitive , or psychosocial skils that result in activity limitations and / or participation restrictions MEDIUM Complexity : Patient may present with comorbidities that affect occupational performnce.  Miniml to moderate modification of tasks or assistance (eg, physical or verbal ) with assesment(s) is necessary to enable patient to complete evaluation       Based on the above components, the patient evaluation is determined to be of the following complexity level: MEDIUM  Pain Rating:  No complaint of pain    Activity Tolerance:   Fair    After treatment patient left in no apparent distress:    Sitting in chair, Call bell within reach, and Bed / chair alarm activated    COMMUNICATION/EDUCATION:   The patients plan of care was discussed with: Physical therapist and Registered nurse. Patient/family have participated as able in goal setting and plan of care. This patients plan of care is appropriate for delegation to [facility].     Thank you for this referral.  Jerry Soto OTR/L  Time Calculation: 24 mins

## 2022-11-17 NOTE — ED PROVIDER NOTES
EMERGENCY DEPARTMENT HISTORY AND PHYSICAL EXAM      Date: 11/16/2022  Patient Name: Megan Delgadillo    History of Presenting Illness     Chief Complaint   Patient presents with    Fatigue     WEAKNESS OF BOTH HANDS AND FEET, HAS BEEN HAPPENING FOR A FEW WEEKS. HAS HAD INCREASED DIFFICULTY WALKING. HX OF HTN, DM, DOES DIALYSIS M,W,F. History Provided By: Patient    HPI: Megan Delgadillo, 78 y.o. male with PMHx significant for diabetes, hypertension, congestive heart failure end-stage renal disease on dialysis Monday, Wednesday, Friday, GERD, sleep apnea presents to the ED with chief complaint of generalized weakness. Patient reports that for months he has been having weakness in his arms and legs. He intermittently drops things and his fingertips feel numb. Today he \"lost control of all my limbs\". At about 1 PM he fell. He was able to get back up. He has been weak throughout the day and had another fall tonight. And now was unable to walk. Denies hitting his head or head injury with the fall. He is not currently on any blood thinners. Denies headache, fever, chills. He denies nausea, vomiting, diarrhea, black or bloody stool. He does report feeling constipated and he is on MiraLAX. He makes urine and denies any dysuria, hematuria, urinary frequency. Denies any slurred speech, facial droop, vision changes. Denies any shortness of breath, chest pain, cough. Patient noted to be extremely weak on arrival to ED and unable to stand without assistance. Nurses staff note patient was very difficult to get from wheelchair to stretcher due to severe weakness PCP: Sarah Jimenes MD    No current facility-administered medications on file prior to encounter.      Current Outpatient Medications on File Prior to Encounter   Medication Sig Dispense Refill    hydrOXYzine HCL (ATARAX) 25 mg tablet TAKE 1 TABLET BY MOUTH EVERY EVENING AS NEEDED      Miralax 17 gram/dose powder USE 1 SCOOP AND MIX WITH WATER AND DRINK BY MOUTH ONCE DAILY      Entresto 24-26 mg tablet Take 1 Tablet by mouth two (2) times a day. glipiZIDE (GLUCOTROL) 5 mg tablet Take 1 Tablet by mouth two (2) times a day. 180 Tablet 1    sevelamer carbonate (RENVELA) 800 mg tab tab Take 800 mg by mouth two (2) times a day.      gabapentin (NEURONTIN) 300 mg capsule Take 1 Capsule by mouth three (3) times daily as needed for Pain. Max Daily Amount: 900 mg. 90 Capsule 5    carvediloL (COREG) 12.5 mg tablet Take 1 Tab by mouth two (2) times daily (with meals). 60 Tab 0    bumetanide (BUMEX) 2 mg tablet TAKE 1 TABLET BY MOUTH EVERY DAY      finasteride (PROSCAR) 5 mg tablet TAKE 1 TABLET BY MOUTH DAILY      insulin NPH/insulin regular (NovoLIN 70/30 U-100 Insulin) 100 unit/mL (70-30) injection ReliOn Novolin 70/30: Inject 30 Units with Breakfast, 40 Units with Dinner (Patient taking differently: ReliOn Novolin 70/30: Inject 30 Units with Breakfast, 40 Units with Dinner (if needed)) 7 Vial 3    aspirin 81 mg chewable tablet Take 81 mg by mouth daily. fluticasone propionate (FLONASE) 50 mcg/actuation nasal spray 2 Sprays by Both Nostrils route daily. tamsulosin (FLOMAX) 0.4 mg capsule Take 0.8 mg by mouth daily.          Past History     Past Medical History:  Past Medical History:   Diagnosis Date    Arthritis     spine    CAD (coronary artery disease)     high cholesterol    Chronic kidney disease     dialysis    Diabetes [address]     ESRD (end stage renal disease) ([facility]     GERD (gastroesophageal reflux disease)     HX    Hypertension     Ill-defined condition     irritable bowel syndrome    Systolic CHF ([facility]     Unspecified sleep apnea     NO CPAP       Past Surgical History:  Past Surgical History:   Procedure Laterality Date    COLONOSCOPY N/A 11/9/2016    COLONOSCOPY performed by Rozina Love MD at [facility] ENDOSCOPY    HX ORTHOPAEDIC      CERVICAL FUSION    HX UROLOGICAL      TURP    HX VASCULAR ACCESS      L arm dialysis access       Family History:  Family History   Problem Relation Age of Onset    No Known Problems Mother     Cancer Father         \"bone\"    Cancer Sister         leukemia and breast    No Known Problems Sister     Diabetes Brother     Diabetes Brother     Emphysema Brother     No Known Problems Maternal Grandmother     No Known Problems Maternal Grandfather     No Known Problems Paternal Grandmother     No Known Problems Paternal Grandfather        Social History:  Social History     Tobacco Use    Smoking status: Former     Types: Cigarettes     Quit date:      Years since quittin.8    Smokeless tobacco: Former    Tobacco comments:     cigars only   Vaping Use    Vaping Use: Never used   Substance Use Topics    Alcohol use: No    Drug use: No       Allergies: Allergies   Allergen Reactions    Albumin, Human 25 % Anaphylaxis and Hives    Niacin Hives         Review of Systems   Review of Systems   Constitutional:  Positive for fatigue. Negative for chills, diaphoresis and fever. HENT:  Negative for congestion, rhinorrhea, sinus pressure and sore throat. Respiratory:  Negative for cough, chest tightness, shortness of breath and wheezing. Cardiovascular:  Negative for chest pain and palpitations. Gastrointestinal:  Positive for constipation. Negative for abdominal pain, diarrhea, nausea and vomiting. Genitourinary:  Negative for dysuria, flank pain and hematuria. Musculoskeletal:  Positive for gait problem. Negative for myalgias and neck pain. Skin:  Negative for rash and wound. Neurological:  Positive for weakness and numbness. Psychiatric/Behavioral:  Positive for confusion. The patient is nervous/anxious. All other systems reviewed and are negative.       Physical Exam   General appearance -overweight, well appearing, and in no distress  Eyes - pupils equal and reactive, extraocular eye movements intact  ENT - mucous membranes moist, pharynx normal without lesions  Neck - supple, no significant adenopathy; non-tender to palpation  Chest - clear to auscultation, no wheezes, rales or rhonchi; non-tender to palpation  Heart - normal rate and regular rhythm, S1 and S2 normal, no murmurs noted  Abdomen - soft, nontender, nondistended, no masses or organomegaly  Musculoskeletal - no joint tenderness, deformity or swelling; normal ROM  Extremities - peripheral pulses normal, no pedal edema left upper extremity dialysis fistula present  Skin - normal coloration and turgor, no rashes  Neurological -drowsy, oriented x3, normal speech, no focal findings or movement disorder noted, generalized weakness noted in both arms and both legs    Diagnostic Study Results     Labs -     Recent Results (from the past 12 hour(s))   METABOLIC PANEL, BASIC    Collection Time: 11/17/22  2:39 AM   Result Value Ref Range    Sodium 139 136 - 145 mmol/L    Potassium 4.1 3.5 - 5.1 mmol/L    Chloride 100 97 - 108 mmol/L    CO2 32 21 - 32 mmol/L    Anion gap 7 5 - 15 mmol/L    Glucose 164 (H) 65 - 100 mg/dL    BUN 34 (H) 6 - 20 MG/DL    Creatinine 5.11 (H) 0.70 - 1.30 MG/DL    BUN/Creatinine ratio 7 (L) 12 - 20      eGFR 11 (L) >60 ml/min/1.73m2    Calcium 8.5 8.5 - 10.1 MG/DL   CBC W/O DIFF    Collection Time: 11/17/22  2:39 AM   Result Value Ref Range    WBC 7.0 4.1 - 11.1 K/uL    RBC 3.59 (L) 4.10 - 5.70 M/uL    HGB 10.5 (L) 12.1 - 17.0 g/dL    HCT 34.3 (L) 36.6 - 50.3 %    MCV 95.5 80.0 - 99.0 FL    MCH 29.2 26.0 - 34.0 PG    MCHC 30.6 30.0 - 36.5 g/dL    RDW 15.7 (H) 11.5 - 14.5 %    PLATELET 726 657 - 417 K/uL    MPV 10.9 8.9 - 12.9 FL    NRBC 0.0 0  WBC    ABSOLUTE NRBC 0.00 0.00 - 0.01 K/uL   VITAMIN B12    Collection Time: 11/17/22  2:39 AM   Result Value Ref Range    Vitamin B12 330 193 - 986 pg/mL   FOLATE    Collection Time: 11/17/22  2:39 AM   Result Value Ref Range    Folate 3.0 (L) 5.0 - 21.0 ng/mL   GLUCOSE, POC    Collection Time: 11/17/22  8:07 AM   Result Value Ref Range    Glucose (POC) 145 (H) 65 - 117 mg/dL Performed by Ciara Gallego ([place])    GLUCOSE, POC    Collection Time: 11/17/22 11:01 AM   Result Value Ref Range    Glucose (POC) 239 (H) 65 - 117 mg/dL    Performed by Ciara Gallego ([place])        Radiologic Studies -   XR CHEST PORT   Final Result   Mild pulmonary edema similar to prior studies. CT HEAD WO CONT   Final Result      No acute traumatic injury. CT SPINE CERV WO CONT   Final Result      No acute fracture. CT Results  (Last 48 hours)                 11/16/22 0154  CT HEAD WO CONT Final result    Impression:      No acute traumatic injury. Narrative:  INDICATION:   fall, weakness       EXAMINATION:  CT HEAD WO CONTRAST       COMPARISON:  None       TECHNIQUE:  Routine noncontrast axial head CT was performed. Sagittal and   coronal reconstructions were generated. CT dose reduction was achieved through   use of a standardized protocol tailored for this examination and automatic   exposure control for dose modulation. FINDINGS:       Ventricles:  Midline, no hydrocephalus. Intracranial Hemorrhage:  None. Brain Parenchyma/Brainstem:  Chronic white matter hypodensities in the   supratentorial brain. Small chronic left cerebellar infarction. Basal Cisterns:  Normal.    Paranasal Sinuses:  Visualized sinuses are clear. Soft Tissues:  No significant soft tissue swelling. Osseous Structures:  No acute fracture. Additional Comments:  N/A.            11/16/22 0154  CT SPINE CERV WO CONT Final result    Impression:      No acute fracture. Narrative:  INDICATION: fall weakness       COMPARISON: None. TECHNIQUE:   Noncontrast axial CT imaging of the cervical spine was performed. Coronal and sagittal reconstructions were obtained. CT dose reduction was achieved through use of a standardized protocol tailored   for this examination and automatic exposure control for dose modulation.        FINDINGS:       There is normal alignment of the cervical spine. There is no acute fracture or   subluxation. The craniocervical junction is intact. There is no prevertebral   soft tissue swelling. Previous anterior cervical discectomy and fusion at C4-5. Extensive degenerative changes with large ventral osteophytes throughout the   cervical spine. CXR Results  (Last 48 hours)                 11/16/22 0515  XR CHEST PORT Final result    Impression:  Mild pulmonary edema similar to prior studies. Narrative:  INDICATION: Chest pain       EXAM:  AP CHEST RADIOGRAPH       COMPARISON: September 12, 2022       FINDINGS:       AP portable view of the chest demonstrates a normal cardiomediastinal   silhouette. Mild pulmonary edema without significant change. No focal   consolidation. No pneumothorax. The osseous structures are unremarkable. Medical Decision Making   I am the first provider for this patient. I reviewed the vital signs, available nursing notes, past medical history, past surgical history, family history and social history. Vital Signs-Reviewed the patient's vital signs. Patient Vitals for the past 12 hrs:   Temp Pulse Resp BP SpO2   11/17/22 0808 97.5 °F (36.4 °C) (!) 59 16 (!) 145/68 100 %       EKG:  sinus rhythm, 68 bpm, left axis deviation, first-degree AV block, right bundle branch block, LVH with strain    Records Reviewed: Nursing Notes and Old Medical Records    Provider Notes (Medical Decision Making):   Patient presents to the ED with generalized progressive weakness and multiple falls today. No loss of consciousness or head injury. Patient unable to walk due to severe weakness. Differential diagnosis: Dehydration, electrolyte abnormality, TIA/CVA, diabetic neuropathy  We will check CBC, CMP, troponin, chest x-ray, head CT and C-spine CT. Since patient is not able to ambulate, he will likely need admission    ED Course:   Initial assessment performed.  The patients presenting problems have been discussed, and they are in agreement with the care plan formulated and outlined with them. I have encouraged them to ask questions as they arise throughout their visit. Progress Notes:  ED Course as of 11/17/22 1254   Wed Nov 16, 2022   0544 Labs and imaging reviewed. CBC shows mild anemia with hemoglobin of 10.5. CMP remarkable for elevated BUN and creatinine consistent with end-stage renal disease. Blood sugar is elevated at 263. High-sensitivity troponin was elevated at 216 but on repeat, it was stable. Head CT and C-spine CT did not show any acute findings. UA does not show evidence of UTI. Discussed with Dr. Alexander Joyce (hospitalist) who will see and admit patient. [AO]      ED Course User Index  [AO] George Thomason MD       Disposition:  Admit to hospitalist        Diagnosis     Clinical Impression:   1. Generalized weakness    2. ESRD on dialysis ([facility] [address]    3.  Multiple falls

## 2022-11-17 NOTE — PROGRESS NOTES
Hospitalist Progress Note    NAME: Jarrod Celestin   :  1945   MRN:  [id number]       Assessment / Plan:  Generalized weakness  Recurrent falls    CT head/CT spine negative for acute finding  Power 4/5 in upper ext, 3/5 in lower  Neurology consulted. Check vitamin B12, folate  PT OT consult: recommend SNF    Elevated troponin  Likely due to ESRD, slight fluid overload  Troponin 216--216  No chest pain. ESRD on HD  Nephrology following     Chronic systolic heart failure:  C/w entresto, coreg, bumex, aspirin  On 2 liters oxygen at baseline     DM:  NPH q12 hours, increased to 20 today  Sliding scale     Code Status: Full  Surrogate Decision Maker: His wife     DVT Prophylaxis: Heparin  GI Prophylaxis: not indicated     Baseline: From home, now unable to ambulate d/t his weakness    ANDRIY:   Barriers: neurology evaluation, needs SNF     Subjective:     Chief Complaint / Reason for Physician Visit  Seen in his room  Sitting comfortably in his chair  Weakness improved today    Review of Systems:  Symptom Y/N Comments  Symptom Y/N Comments   Fever/Chills n   Chest Pain n    Poor Appetite n   Edema n    Cough n   Abdominal Pain n    Sputum    Joint Pain     SOB/BRITT    Pruritis/Rash     Nausea/vomit    Tolerating PT/OT     Diarrhea    Tolerating Diet     Constipation    Other       Could NOT obtain due to:      Objective:     VITALS:   Last 24hrs VS reviewed since prior progress note.  Most recent are:  Patient Vitals for the past 24 hrs:   Temp Pulse Resp BP SpO2   22 0808 97.5 °F (36.4 °C) (!) 59 16 (!) 145/68 100 %   22 2349 99.1 °F (37.3 °C) 64 18 116/71 --   22 1857 98.4 °F (36.9 °C) 67 15 (!) 149/74 100 %   22 1747 -- 65 -- (!) 131/90 --   22 1605 98.6 °F (37 °C) 60 18 132/71 --   22 1600 -- (!) 59 18 121/60 --   22 1545 -- (!) 56 18 122/69 --   22 1530 -- (!) 58 18 122/67 --   22 1515 -- (!) 57 18 124/68 --   22 1500 -- (!) 56 18 119/61 -- 11/16/22 1445 -- (!) 58 18 (!) 124/57 --   11/16/22 1430 -- (!) 57 18 116/60 --       Intake/Output Summary (Last 24 hours) at 11/17/2022 1429  Last data filed at 11/16/2022 1605  Gross per 24 hour   Intake --   Output 2000 ml   Net -2000 ml        I had a face to face encounter and independently examined this patient on 11/17/2022, as outlined below:  PHYSICAL EXAM:  General: Awake, No acute distress  EENT:  EOMI. Anicteric sclerae. MMM  Resp:  CTA bilaterally, no wheezing or rales. No accessory muscle use  CV:  Regular  rhythm,  No edema  GI:  Soft, Non distended, Non tender. +Bowel sounds  Neurologic:  Power 4/5 in all ext  Psych:   Not anxious nor agitated  Skin:  No rashes. No jaundice    Reviewed most current lab test results and cultures  YES  Reviewed most current radiology test results   YES  Review and summation of old records today    NO  Reviewed patient's current orders and MAR    YES  PMH/ reviewed - no change compared to H&P  ________________________________________________________________________  Care Plan discussed with:    Comments   Patient y    Family      RN y    Care Manager     Consultant                        Multidiciplinary team rounds were held today with , nursing, pharmacist and clinical coordinator. Patient's plan of care was discussed; medications were reviewed and discharge planning was addressed. ________________________________________________________________________  Total NON critical care TIME:  36   Minutes    Total CRITICAL CARE TIME Spent:   Minutes non procedure based      Comments   >50% of visit spent in counseling and coordination of care     ________________________________________________________________________  Federico Hollis MD     Procedures: see electronic medical records for all procedures/Xrays and details which were not copied into this note but were reviewed prior to creation of Plan.       LABS:  I reviewed today's most current labs and imaging studies.   Pertinent labs include:  Recent Labs     11/17/22  0239 11/15/22  2250   WBC 7.0 6.2   HGB 10.5* 11.1*   HCT 34.3* 35.7*    221     Recent Labs     11/17/22  0239 11/15/22  2250    138   K 4.1 4.5    103   CO2 32 32   * 263*   BUN 34* 48*   CREA 5.11* 6.56*   CA 8.5 9.5   MG  --  3.6*   ALB  --  3.4*   TBILI  --  0.4   ALT  --  20       Signed: Meryl Worrell MD

## 2022-11-17 NOTE — PROGRESS NOTES
Transition of Care Plan:    RUR: 15%  Disposition: SNF (referrals pending)   If SNF or IPR: Date FOC offered: 11/17/2022 (verbal)  Date [facility] received: 11/17/2022  Date authorization started with reference number:TBD  Date authorization received and expires: TBD  Accepting facility: TBD  Follow up appointments: PCP, specialists as indicated   DME needed: has cane, RW, and home O2   Transportation at Discharge: medical transport likely   [address] or means to access home: yes      Medicare Letter: to be reviewed prior to d/c   Is patient a  and connected with the [place]? No             If yes, was [place] transfer form completed and [facility] notified? Caregiver Contact: Lavonne Garcia (spouse) [phone]  Discharge Caregiver contacted prior to discharge? To be contacted prior to d/c   Care Conference needed?: No    Reason for Admission:   unable to ambulate                     RUR Score: 15%               PCP: First and Last name:   Arnaud Carroll MD     Name of Practice:    Are you a current patient: Yes/No: yes   Approximate date of last visit: this week    Can you participate in a virtual visit if needed: yes    Do you (patient/family) have any concerns for transition/discharge? None voiced                Plan for utilizing home health:  no, SNF placement      Current Advanced Directive/Advance Care Plan:  Full Code      Healthcare Decision Maker:   Click here to complete [address] including selection of the Healthcare Decision Maker Relationship (ie \"Primary\")            Primary Decision Maker: Jacinda Shankar - [phone]    Transition of Care Plan:  SNF placement, referrals pending    Chart reviewed. Consult received. CM met with pt at bedside to introduce self and role. Verified contact information and demographics. Pt resides with spouse Mickey Jolley) in a one level home with one step to enter. Pt's step son also resides there. His son lives 15 minutes away.  Prior to admission, pt was noted to have 2 falls recently. He is typically independent with ADLs and ambulatory with a cane or RW as needed. Pt has home O2 provided by Sam Cogan (2L) and goes to [facility] for HD (MWF, 6AM). Pt typically drives self to HD and his spouse can assist when needed. Pt's spouse still works part time for [employer]. No prior hx of SNF/rehab stays or HHC reported. PCP is [address] with last visit earlier this week. Preferred pharmacy is [email or web address] on [address] No ACP on file. Spouse is legal NOK. Inquired if pt has applied for Medicaid. Pt reports that he has and is over assets/income for Medicaid at this time. Discussed recommendation for SNF placement at discharge. Offered FOC. Pt agreeable to referrals being sent to [facility]. Referrals sent via CC link and pending review. Pt will require insurance authorization for placement. Pt may require a rapid covid test as well. Discussed transportation to/from HD while at rehab. Pt is interested in paying for wheelchair Marti steve service if needed. CM to provided  [employer] resources prior to d/c so that pt can arrange. Will continue to follow. Care Management Interventions  PCP Verified by CM:  Yes  Palliative Care Criteria Met (RRAT>21 & CHF Dx)?: No  Mode of Transport at Discharge: BLS  Transition of Care Consult (CM Consult): Discharge Planning  Discharge Durable Medical Equipment: No  Physical Therapy Consult: Yes  Occupational Therapy Consult: Yes  Speech Therapy Consult: No  Support Systems: Spouse/Significant Other, Child(franklyn), Other Family Member(s)  Confirm Follow Up Transport: Family  The Plan for Transition of Care is Related to the Following Treatment Goals : SNF  The Patient and/or Patient Representative was Provided with a Choice of Provider and Agrees with the Discharge Plan?: Yes  Freedom of Choice List was Provided with Basic Dialogue that Supports the Patient's Individualized Plan of Care/Goals, Treatment Preferences and Shares the Quality Data Associated with the Providers?: Yes   Resource Information Provided?: No  Discharge Location  Patient Expects to be Discharged to[de-identified] [place] [id number], [address] [facility]  [phone]

## 2022-11-17 NOTE — PROGRESS NOTES
Problem: Mobility Impaired (Adult and Pediatric)  Goal: *Acute Goals and Plan of Care (Insert Text)  Description: FUNCTIONAL STATUS PRIOR TO ADMISSION: Patient was modified independent using a rolling walker for functional mobility. States he drives himself to dialysis. Has been [place] some difficulty with walking\" but \"hasn't told anyone\". Admits to 2 recent falls. HOME SUPPORT PRIOR TO ADMISSION: The patient lived with wife and gives him intermittent assist.  Patient states he \"tries not to ask her for help\"    Physical Therapy Goals  Initiated 11/17/2022  1. Patient will move from supine to sit and sit to supine  in bed with modified independence within 7 day(s). 2.  Patient will transfer from bed to chair and chair to bed with contact guard assist using the least restrictive device within 7 day(s). 3.  Patient will perform sit to stand with contact guard assist within 7 day(s). 4.  Patient will ambulate with contact guard assist for 150 feet with the least restrictive device within 7 day(s). Outcome: Progressing Towards Goal   PHYSICAL THERAPY EVALUATION  Patient: Alvino Mcneil (78 y.o. male)  Date: 11/17/2022  Primary Diagnosis: Unable to ambulate [R26.2]       Precautions:   Fall    ASSESSMENT  Based on the objective data described below, the patient presents with decreased functional mobility from baseline level of function. Patient currently limited by generalized weakness, B hand and feet numbness, impaired balance, gait instability and decreased activity tolerance. Currently needing Chandu to come to EOB and has good seated balance. Sit to stand with Chandu x 2 and able to amb approx 20 feet with RW and CGA-Chandu with WBOS, decreased foot clearance and decreased activity tolerance. Patient with generalized weakness during ambulation and lacks insight into deficits. Anticipate patient may need SNF rehab as he is a high fall risk and wife is unable to provide physical assistance.   Will continue to follow. Other factors to consider for discharge: at risk for falls, below baseline, wife unable to provide physical assitance     Patient will benefit from skilled therapy intervention to address the above noted impairments. PLAN :  Recommendations and Planned Interventions: bed mobility training, transfer training, gait training, therapeutic exercises, neuromuscular re-education, patient and family training/education, and therapeutic activities      Frequency/Duration: Patient will be followed by physical therapy:  4 times a week to address goals. Recommendation for discharge: (in order for the patient to meet his/her long term goals)  Therapy up to 5 days/week in SNF setting        IF patient discharges home will need the following DME: to be determined (TBD)         SUBJECTIVE:   Patient stated My can't help me off the floor when I fall.     OBJECTIVE DATA SUMMARY:   HISTORY:    Past Medical History:   Diagnosis Date    Arthritis     spine    CAD (coronary artery disease)     high cholesterol    Chronic kidney disease     dialysis    Diabetes ([facility]     ESRD (end stage renal disease) ([facility])     GERD (gastroesophageal reflux disease)     HX    Hypertension     Ill-defined condition     irritable bowel syndrome    Systolic CHF ([facility]     Unspecified sleep apnea     NO CPAP     Past Surgical History:   Procedure Laterality Date    COLONOSCOPY N/A 11/9/2016    COLONOSCOPY performed by David Suárez MD at [facility] ENDOSCOPY    HX ORTHOPAEDIC      CERVICAL FUSION    HX UROLOGICAL      TURP    HX VASCULAR ACCESS      L arm dialysis access       Personal factors and/or comorbidities impacting plan of care:     Home Situation  Home Environment: Private residence  # Steps to Enter: 1  Rails to Enter: No  One/Two Story Residence: One story  Living Alone: No  Support Systems: Spouse/Significant Other  Patient Expects to be Discharged to[de-identified] Skilled nursing facility  Current DME Used/Available at Home: Walker, rolling  Tub or Shower Type: Tub/Shower combination    EXAMINATION/PRESENTATION/DECISION MAKING:   Critical Behavior:  Neurologic State: Alert  Orientation Level: Appropriate for age  Cognition: Follows commands  Safety/Judgement: Decreased awareness of need for assistance, Fall prevention  Hearing: Auditory  Auditory Impairment: None    Range Of Motion:  AROM: Generally decreased, functional                       Strength:    Strength: Generally decreased, functional             Vision:   Acuity:  (not formally tested, pt reports that he needs a vision exam)  Corrective Lenses: Glasses  Functional Mobility:  Bed Mobility:  Rolling: Minimum assistance  Supine to Sit: Minimum assistance     Scooting: Supervision; Additional time  Transfers:  Sit to Stand: Assist x2;Minimum assistance  Stand to Sit: Contact guard assistance;Assist x2                       Balance:   Sitting: Intact  Standing: Impaired  Standing - Static: Constant support; Fair  Standing - Dynamic : Constant support; Fair  Ambulation/Gait Training:  Distance (ft): 20 Feet (ft)  Assistive Device: Gait belt;Walker, rolling  Ambulation - Level of Assistance: Minimal assistance;Assist x2     Gait Description (WDL): Exceptions to WDL  Gait Abnormalities: Decreased step clearance;Shuffling gait        Base of Support: Widened     Speed/Deanna: Slow;Pace decreased (<100 feet/min); Shuffled  Step Length: Left shortened;Right shortened       Pain Rating:  No c/o pain    Activity Tolerance:   Fair and requires rest breaks    After treatment patient left in no apparent distress:   Sitting in chair, Call bell within reach, and Bed / chair alarm activated    COMMUNICATION/EDUCATION:   The patients plan of care was discussed with: Physical therapist, Occupational therapist, and Registered nurse.      Fall prevention education was provided and the patient/caregiver indicated understanding., Patient/family have participated as able in goal setting and plan of care., and Patient/family agree to work toward stated goals and plan of care.     Thank you for this referral.  Andres Weaver, PT, DPT   Time Calculation: 24 mins

## 2022-11-17 NOTE — PROGRESS NOTES
Physical Therapy  PT evaluation complete and full note to follow. Recommend SNF rehab at [place]. If patient declines then will need HH PT and increased assist at home.   Madison Rascon, PT, DPT

## 2022-11-17 NOTE — PROGRESS NOTES
OccupationalTherapy  OT evaluation complete and full note to follow. Recommend SNF rehab at [place]. If patient declines then will need [place] OT and increased assist at home.   Cindy Gilmore, OTR/L

## 2022-11-17 NOTE — PROGRESS NOTES
Nephrology Progress Note  [facility] / [address], [place], [address]  Phone - [phone]  Fax - [phone]                 Patient: Belem Malone                   YOB: 1945        Date- 11/17/2022                      Admit Date: 11/16/2022  CC: Follow up for esrd          IMPRESSION & PLAN:   Esrd- hd mwf at [facility]  Anemia of ckd  Sec. Hyperpara  S/p fall  Weakness  H/o CHF  Dm 2      PLAN-  HD tomorrow and MWF while here  Continue current BP meds  Cont binders     Subjective: Interval History:   Seen and examined. Tolerated HD yesterday, 2 L UF. No cp, sob, n/v/d. Up in bed eating breakfast    Objective:   Vitals:    11/16/22 1747 11/16/22 1857 11/16/22 2349 11/17/22 0808   BP: (!) 131/90 (!) 149/74 116/71 (!) 145/68   Pulse: 65 67 64 (!) 59   Resp:  15 18 16   Temp:  98.4 °F (36.9 °C) 99.1 °F (37.3 °C) 97.5 °F (36.4 °C)   TempSrc:       SpO2:  100%  100%   Weight:       Height:          11/16 0701 - 11/17 0700  In: -   Out: 2000   Last 3 Recorded Weights in this Encounter    11/15/22 2238   Weight: 108.9 kg (240 lb)        Physical exam:    GEN: NAD  NECK- no mass  RESP: No wheezing, decreased BS b/l  CVS: S1,S2  RRR  NEURO: Normal speech, Non focal  EXT: + Edema   PSYCH: Normal Mood  Upper arm avf +    Chart reviewed. Pertinent Notes reviewed. Data Review :  Recent Labs     11/17/22  0239 11/15/22  2250    138   K 4.1 4.5    103   CO2 32 32   BUN 34* 48*   CREA 5.11* 6.56*   * 263*   CA 8.5 9.5   MG  --  3.6*       Recent Labs     11/17/22  0239 11/15/22  2250   WBC 7.0 6.2   HGB 10.5* 11.1*   HCT 34.3* 35.7*    221       No results for input(s): FE, TIBC, PSAT, FERR in the last 72 hours.    Lab Results   Component Value Date/Time    Hemoglobin A1c 5.6 12/21/2021 11:12 AM    Hemoglobin A1c 8.2 (H) 05/05/2021 03:29 AM    Hemoglobin A1c 10.5 (H) 11/16/2016 10:45 AM        Lab Results   Component Value Date/Time    Microalb/Creat ratio (ug/mg creat.) 897.2 (H) 04/11/2019 12:27 PM     Lab Results   Component Value Date/Time    NT pro-BNP 12,079 (H) 09/12/2022 06:02 AM    NT pro-BNP 5,709 (H) 03/15/2022 08:39 PM    NT pro-BNP 5,474 (H) 05/04/2021 06:09 AM    NT pro-BNP 2,497 (H) 04/17/2021 10:54 AM    NT pro-BNP 9,341 (H) 07/21/2020 05:33 AM     US Results (most recent):  Medication list  reviewed  Current Facility-Administered Medications   Medication Dose Route Frequency    sodium chloride (NS) flush 5-40 mL  5-40 mL IntraVENous Q8H    sodium chloride (NS) flush 5-40 mL  5-40 mL IntraVENous PRN    acetaminophen (TYLENOL) tablet 650 mg  650 mg Oral Q6H PRN    Or    acetaminophen (TYLENOL) suppository 650 mg  650 mg Rectal Q6H PRN    polyethylene glycol (MIRALAX) packet 17 g  17 g Oral DAILY PRN    ondansetron (ZOFRAN ODT) tablet 4 mg  4 mg Oral Q8H PRN    Or    ondansetron (ZOFRAN) injection 4 mg  4 mg IntraVENous Q6H PRN    heparin (porcine) injection 5,000 Units  5,000 Units SubCUTAneous Q8H    insulin NPH (NOVOLIN N, HUMULIN N) injection 15 Units  15 Units SubCUTAneous ACB&D    insulin lispro (HUMALOG) injection   SubCUTAneous AC&HS    glucose chewable tablet 16 g  4 Tablet Oral PRN    glucagon (GLUCAGEN) injection 1 mg  1 mg IntraMUSCular PRN    dextrose 10% infusion 0-250 mL  0-250 mL IntraVENous PRN    aspirin chewable tablet 81 mg  81 mg Oral DAILY    bumetanide (BUMEX) tablet 2 mg  2 mg Oral DAILY    carvediloL (COREG) tablet 12.5 mg  12.5 mg Oral BID WITH MEALS    sacubitriL-valsartan (ENTRESTO) 24-26 mg tablet 1 Tablet  1 Tablet Oral BID    finasteride (PROSCAR) tablet 5 mg  5 mg Oral DAILY    fluticasone propionate (FLONASE) 50 mcg/actuation nasal spray 2 Spray  2 Spray Both Nostrils DAILY    gabapentin (NEURONTIN) capsule 300 mg  300 mg Oral TID PRN    sevelamer carbonate (RENVELA) tab 800 mg  800 mg Oral BID    tamsulosin (FLOMAX) capsule 0.8 mg 0.8 mg Oral DAILY        Hong Donaldson MD  11/17/2022

## 2022-11-18 LAB
ALBUMIN SERPL-MCNC: 3.1 G/DL (ref 3.5–5)
ANION GAP SERPL CALC-SCNC: 7 MMOL/L (ref 5–15)
BUN SERPL-MCNC: 50 MG/DL (ref 6–20)
BUN/CREAT SERPL: 8 (ref 12–20)
CALCIUM SERPL-MCNC: 9.1 MG/DL (ref 8.5–10.1)
CHLORIDE SERPL-SCNC: 100 MMOL/L (ref 97–108)
CO2 SERPL-SCNC: 30 MMOL/L (ref 21–32)
CREAT SERPL-MCNC: 6.19 MG/DL (ref 0.7–1.3)
ERYTHROCYTE [DISTWIDTH] IN BLOOD BY AUTOMATED COUNT: 15.6 % (ref 11.5–14.5)
GLUCOSE BLD STRIP.AUTO-MCNC: 124 MG/DL (ref 65–117)
GLUCOSE BLD STRIP.AUTO-MCNC: 164 MG/DL (ref 65–117)
GLUCOSE BLD STRIP.AUTO-MCNC: 195 MG/DL (ref 65–117)
GLUCOSE SERPL-MCNC: 166 MG/DL (ref 65–100)
HCT VFR BLD AUTO: 33.5 % (ref 36.6–50.3)
HGB BLD-MCNC: 10.7 G/DL (ref 12.1–17)
MCH RBC QN AUTO: 29.9 PG (ref 26–34)
MCHC RBC AUTO-ENTMCNC: 31.9 G/DL (ref 30–36.5)
MCV RBC AUTO: 93.6 FL (ref 80–99)
NRBC # BLD: 0 K/UL (ref 0–0.01)
NRBC BLD-RTO: 0 PER 100 WBC
PHOSPHATE SERPL-MCNC: 3.8 MG/DL (ref 2.6–4.7)
PLATELET # BLD AUTO: 221 K/UL (ref 150–400)
PMV BLD AUTO: 11 FL (ref 8.9–12.9)
POTASSIUM SERPL-SCNC: 4.8 MMOL/L (ref 3.5–5.1)
RBC # BLD AUTO: 3.58 M/UL (ref 4.1–5.7)
SERVICE CMNT-IMP: ABNORMAL
SODIUM SERPL-SCNC: 137 MMOL/L (ref 136–145)
WBC # BLD AUTO: 5.3 K/UL (ref 4.1–11.1)

## 2022-11-18 PROCEDURE — 99233 SBSQ HOSP IP/OBS HIGH 50: CPT | Performed by: PSYCHIATRY & NEUROLOGY

## 2022-11-18 PROCEDURE — 74011250637 HC RX REV CODE- 250/637: Performed by: STUDENT IN AN ORGANIZED HEALTH CARE EDUCATION/TRAINING PROGRAM

## 2022-11-18 PROCEDURE — 82962 GLUCOSE BLOOD TEST: CPT

## 2022-11-18 PROCEDURE — 80069 RENAL FUNCTION PANEL: CPT

## 2022-11-18 PROCEDURE — 85027 COMPLETE CBC AUTOMATED: CPT

## 2022-11-18 PROCEDURE — 90935 HEMODIALYSIS ONE EVALUATION: CPT

## 2022-11-18 PROCEDURE — 74011250636 HC RX REV CODE- 250/636: Performed by: STUDENT IN AN ORGANIZED HEALTH CARE EDUCATION/TRAINING PROGRAM

## 2022-11-18 PROCEDURE — 74011636637 HC RX REV CODE- 636/637: Performed by: STUDENT IN AN ORGANIZED HEALTH CARE EDUCATION/TRAINING PROGRAM

## 2022-11-18 PROCEDURE — 36415 COLL VENOUS BLD VENIPUNCTURE: CPT

## 2022-11-18 PROCEDURE — 94760 N-INVAS EAR/PLS OXIMETRY 1: CPT

## 2022-11-18 PROCEDURE — 74011000250 HC RX REV CODE- 250: Performed by: STUDENT IN AN ORGANIZED HEALTH CARE EDUCATION/TRAINING PROGRAM

## 2022-11-18 PROCEDURE — 77010033678 HC OXYGEN DAILY

## 2022-11-18 PROCEDURE — 65270000046 HC RM TELEMETRY

## 2022-11-18 RX ADMIN — FLUTICASONE PROPIONATE 2 SPRAY: 50 SPRAY, METERED NASAL at 12:59

## 2022-11-18 RX ADMIN — Medication 2 UNITS: at 17:42

## 2022-11-18 RX ADMIN — SODIUM CHLORIDE, PRESERVATIVE FREE 10 ML: 5 INJECTION INTRAVENOUS at 14:10

## 2022-11-18 RX ADMIN — FINASTERIDE 5 MG: 5 TABLET, FILM COATED ORAL at 13:00

## 2022-11-18 RX ADMIN — HEPARIN SODIUM 5000 UNITS: 5000 INJECTION INTRAVENOUS; SUBCUTANEOUS at 14:09

## 2022-11-18 RX ADMIN — BUMETANIDE 2 MG: 1 TABLET ORAL at 12:59

## 2022-11-18 RX ADMIN — HEPARIN SODIUM 5000 UNITS: 5000 INJECTION INTRAVENOUS; SUBCUTANEOUS at 06:14

## 2022-11-18 RX ADMIN — ASPIRIN 81 MG: 81 TABLET, CHEWABLE ORAL at 12:59

## 2022-11-18 RX ADMIN — SODIUM CHLORIDE, PRESERVATIVE FREE 10 ML: 5 INJECTION INTRAVENOUS at 22:18

## 2022-11-18 RX ADMIN — Medication 20 UNITS: at 16:30

## 2022-11-18 RX ADMIN — HEPARIN SODIUM 5000 UNITS: 5000 INJECTION INTRAVENOUS; SUBCUTANEOUS at 22:09

## 2022-11-18 RX ADMIN — SACUBITRIL AND VALSARTAN 1 TABLET: 24; 26 TABLET, FILM COATED ORAL at 17:43

## 2022-11-18 RX ADMIN — SACUBITRIL AND VALSARTAN 1 TABLET: 24; 26 TABLET, FILM COATED ORAL at 12:59

## 2022-11-18 RX ADMIN — TAMSULOSIN HYDROCHLORIDE 0.8 MG: 0.4 CAPSULE ORAL at 12:59

## 2022-11-18 RX ADMIN — SODIUM CHLORIDE, PRESERVATIVE FREE 10 ML: 5 INJECTION INTRAVENOUS at 06:14

## 2022-11-18 RX ADMIN — SEVELAMER CARBONATE 800 MG: 800 TABLET, FILM COATED ORAL at 22:10

## 2022-11-18 NOTE — PROGRESS NOTES
End of Shift Note     Bedside shift change report given to Vic Araujo RN (oncoming nurse) by Balbir Ma RN (offgoing nurse).   Report included the following information SBAR, Kardex, ED Summary, and MAR     Shift worked:  Day   Shift summary and any significant changes:      Dialysis completed            Concerns for physician to address:  None    Zone phone for oncoming shift:   [phone]      Patient Information  Kendell Rahman  78 y.o.  11/16/2022 12:20 AM by Clinton Lara was admitted from Home     Problem List       Patient Active Problem List     Diagnosis Date Noted    UTI (lower urinary tract infection) 10/25/2013    Renal failure (ARF), acute on chronic ([address] 10/25/2013    Unable to ambulate 11/16/2022    Cervical post-laminectomy syndrome 06/09/2022    Degenerative cervical spinal stenosis 06/09/2022    Vitamin D deficiency 12/21/2021    Immune-mediated neuropathy ([address] 12/21/2021    Diabetic peripheral neuropathy associated with type 2 diabetes mellitus ([address] 12/21/2021    B12 deficiency 12/21/2021    Sensory ataxic gait 12/21/2021    Cervical spondylosis with myelopathy 12/21/2021    Type 2 diabetes mellitus with stage 4 chronic kidney disease, with long-term current use of insulin ([address] 12/21/2021    Convulsions ([address] 12/21/2021    Acute alteration in mental status 12/21/2021    Myoclonus 12/21/2021    Sepsis ([address] 08/18/2021    Acute hypoxemic respiratory failure ([address] 58/56/3663    Systolic heart failure ([address] 05/04/2021    Accelerated hypertension 07/21/2020    Elevated troponin 07/21/2020    ESRD (end stage renal disease) on dialysis ([address] 07/21/2020    Acute respiratory failure with hypoxia ([address] 07/21/2020    Acute on chronic systolic CHF (congestive heart failure) ([address] 07/21/2020    Pneumonia 05/26/2019    Acute on chronic renal failure ([address] 10/29/2016    Hypotension 10/29/2016    Chest pain 10/29/2016    Type II diabetes mellitus with nephropathy ([facility] 10/29/2016 Hyperlipidemia 10/29/2016    Nephrotic syndrome 08/20/2016    Renal anasarca 08/20/2016    Diarrhea 10/28/2013    SIRS (systemic inflammatory response syndrome) ([facility] 10/28/2013    Acute pancreatitis 01/18/2011    LFT'S ABNORMAL 01/18/2011    Acute renal failure (ARF) ([facility] 01/18/2011    CKD (chronic kidney disease) stage 3, GFR 30-59 ml/min ([facility]) 01/18/2011    CAD (coronary artery disease) 01/18/2011    Abdominal pain, acute, epigastric 01/18/2011    Nausea & vomiting 01/18/2011           Past Medical History:   Diagnosis Date    Arthritis       spine    CAD (coronary artery disease)       high cholesterol    Chronic kidney disease       dialysis    Diabetes ([facility]      ESRD (end stage renal disease) ([facility])      GERD (gastroesophageal reflux disease)       HX    Hypertension      Ill-defined condition       irritable bowel syndrome    Systolic CHF ([facility]      Unspecified sleep apnea       NO CPAP         Core Measures:  CVA: No Not applicable  CHF:No Not applicable  PNA:No Not applicable     Activity:  Activity Level: Up with Assistance  Number times ambulated in hallways past shift: 0  Number of times OOB to chair past shift: 0     Cardiac:   Cardiac Monitoring: No        Access:   Current line(s): PIV   Central Line? No Placement date   PICC LINE? No Placement date      Genitourinary:   Urinary status: voiding  Urinary Catheter? No Placement Date      Respiratory:   O2 Device: Nasal cannula  Chronic home O2 use?: YES  Incentive spirometer at bedside: N/A     GI:  Last Bowel Movement Date: 11/16/22  Current diet:  ADULT DIET Regular; Low Fat/Low Chol/High Fiber/RANI  Passing flatus: YES  Tolerating current diet: YES     Pain Management:   Patient states pain is manageable on current regimen: YES     Skin:  Kelby Score: 20  Interventions: turn team, speciality bed, float heels, increase time out of bed, foam dressing, and PT/OT consult    Patient Safety:  Fall Score:  Total Score: 4  Interventions: bed/chair alarm, assistive device (walker, cane, etc), gripper socks, pt to call before getting OOB, and stay with me (per policy)  High Fall Risk: Yes  @Rollbelt  @dexterity to release roll belt  Yes/No ( must document dexterity  here by stating Yes or No here, otherwise this is a restraint and must follow restraint documentation policy.)     DVT prophylaxis:  DVT prophylaxis Med- Yes  DVT prophylaxis SCD or CINDY- No      Wounds: (If Applicable)  Wounds- No  Location None      Active Consults:  IP CONSULT TO NEUROLOGY  IP CONSULT TO NEPHROLOGY     Length of Stay:  Expected LOS: 3d 12h  Actual LOS: 2  Discharge Plan: No Home         Elder Brooke, RN

## 2022-11-18 NOTE — ADT AUTH CERT NOTES
PA's recommendation by Jaguar Domínguez       Review Status Review Entered   In Primary 2022 1417       Created By   Jaguar Domínguez      Criteria Review   Name Belem Malone    1945   [facility]# [id number]  Insurance [employer]      Clinical summary 79 y/o gentleman with an extensive PMH including Hypertension, Type 2 DM, CAD, HFrEF, BPH and ESRD on HD admitted with worsening physical debility and recurrent falls, volume overload, acute cystitis, anemia and uncontrolled DM with complication of hyperglycemia necessitating management with falls precautions, neurovascular checks, monitoring for dysrhythmias and ischemia, supplemental oxygen, CVA evaluation, diuresis, input/output assessments, follow up kidney function, urine culture  with as needed anti-infective therapy, optimization of BP and blood sugar controls, PT/OT assessments, Nephrology consult with hemodialysis arrangements, and Neurology consult with recommendations. Vitals /74 HR 53 - 67/min. Tmax 99.1F O2sats 97 - 100% 2 liters NC  Labs and Imaging CXR: Mild pulmonary edema, BUN/Cr: 34/5.11, serum glucose: 263 mg/dl, H//H: 10.5/34.3, UA: Cloudy urine with leukocytes and WBCs. UM criteria applies YES  Comments This patient is at increased risk of adverse events and deterioration based on documented clinical data, comorbid conditions and current acute care course. INPATIENT is recommended. The final decision of the patient's hospitalization status depends on the Attending Physician's judgement.       This chart was reviewed at 1:41 PM 2022    Rhona Wang MD [address] [employer]   Cell: [phone]   Additional Notes   DATE: 2022 Continued Stay Review/Medical      Temp 97.5, HR 59, R 16, B/P 118/61 and O2 100% 2L/NC      Nephrology progress note:   PLAN-   HD tomorrow and MWF while here   Continue current BP meds   Cont binders      Imaging/Labs:   22 02:39   RBC: 3.59 (L) HGB: 10.5 (L)   HCT: 34.3 (L)   RDW: 15.7 (H)   Glucose: 164 (H)   BUN: 34 (H)   Creatinine: 5.11 (H)   BUN/Creatinine ratio: 7 (L)   eGFR: 11 (L)   Folate: 3.0 (L)      Medications:   ASA 81mg PO QD   Bumex 2mg PO QD   Coreg 12.5mg PO BID   Proscar 5mg PO QD   Heparin 5,000units SC TID   Humalog SSI SC QID   Entresto 1 tablet PO BID   Renvela 800mg PO BID      Orders: PT/OT noted

## 2022-11-18 NOTE — PROGRESS NOTES
End of Shift Note    Bedside shift change report given to NEHA Sethi (oncoming nurse) by Michelle Mora RN (offgoing nurse).   Report included the following information SBAR, Kardex, ED Summary, and MAR    Shift worked:  Nights    Shift summary and any significant changes:     MRI completed    No significant events over the night         Concerns for physician to address:  None    Zone phone for oncoming shift:   1318     Patient Information  Fausto Wiseman  78 y.o.  11/16/2022 12:20 AM by Viviane Babinski, Divine Colby was admitted from Home    Problem List  Patient Active Problem List    Diagnosis Date Noted    UTI (lower urinary tract infection) 10/25/2013    Renal failure (ARF), acute on chronic ([address] 10/25/2013    Unable to ambulate 11/16/2022    Cervical post-laminectomy syndrome 06/09/2022    Degenerative cervical spinal stenosis 06/09/2022    Vitamin D deficiency 12/21/2021    Immune-mediated neuropathy ([address] 12/21/2021    Diabetic peripheral neuropathy associated with type 2 diabetes mellitus ([address] 12/21/2021    B12 deficiency 12/21/2021    Sensory ataxic gait 12/21/2021    Cervical spondylosis with myelopathy 12/21/2021    Type 2 diabetes mellitus with stage 4 chronic kidney disease, with long-term current use of insulin ([address] 12/21/2021    Convulsions ([address] 12/21/2021    Acute alteration in mental status 12/21/2021    Myoclonus 12/21/2021    Sepsis ([address] 08/18/2021    Acute hypoxemic respiratory failure ([address] 98/94/4649    Systolic heart failure ([address] 05/04/2021    Accelerated hypertension 07/21/2020    Elevated troponin 07/21/2020    ESRD (end stage renal disease) on dialysis ([address] 07/21/2020    Acute respiratory failure with hypoxia ([address] 07/21/2020    Acute on chronic systolic CHF (congestive heart failure) ([address] 07/21/2020    Pneumonia 05/26/2019    Acute on chronic renal failure ([address] 10/29/2016    Hypotension 10/29/2016    Chest pain 10/29/2016    Type II diabetes mellitus with nephropathy ([facility] 10/29/2016    Hyperlipidemia 10/29/2016    Nephrotic syndrome 08/20/2016    Renal anasarca 08/20/2016    Diarrhea 10/28/2013    SIRS (systemic inflammatory response syndrome) ([facility] 10/28/2013    Acute pancreatitis 01/18/2011    LFT'S ABNORMAL 01/18/2011    Acute renal failure (ARF) ([facility] 01/18/2011    CKD (chronic kidney disease) stage 3, GFR 30-59 ml/min ([facility]) 01/18/2011    CAD (coronary artery disease) 01/18/2011    Abdominal pain, acute, epigastric 01/18/2011    Nausea & vomiting 01/18/2011     Past Medical History:   Diagnosis Date    Arthritis     spine    CAD (coronary artery disease)     high cholesterol    Chronic kidney disease     dialysis    Diabetes ([facility]     ESRD (end stage renal disease) ([facility])     GERD (gastroesophageal reflux disease)     HX    Hypertension     Ill-defined condition     irritable bowel syndrome    Systolic CHF ([facility]     Unspecified sleep apnea     NO CPAP       Core Measures:  CVA: No Not applicable  CHF:No Not applicable  PNA:No Not applicable    Activity:  Activity Level: Up with Assistance  Number times ambulated in hallways past shift: 0  Number of times OOB to chair past shift: 0    Cardiac:   Cardiac Monitoring: No          Access:   Current line(s): PIV   Central Line? No Placement date   PICC LINE? No Placement date     Genitourinary:   Urinary status: voiding  Urinary Catheter? No Placement Date     Respiratory:   O2 Device: Nasal cannula  Chronic home O2 use?: YES  Incentive spirometer at bedside: N/A       GI:  Last Bowel Movement Date: 11/16/22  Current diet:  ADULT DIET Regular; Low Fat/Low Chol/High Fiber/RANI  Passing flatus: YES  Tolerating current diet: YES       Pain Management:   Patient states pain is manageable on current regimen: YES    Skin:  Kelby Score: 20  Interventions: turn team, speciality bed, float heels, increase time out of bed, foam dressing, and PT/OT consult    Patient Safety:  Fall Score:  Total Score: 4  Interventions: bed/chair alarm, assistive device (walker, cane, etc), gripper socks, pt to call before getting OOB, and stay with me (per policy)  High Fall Risk: Yes  @Rollbelt  @dexterity to release roll belt  Yes/No ( must document dexterity  here by stating Yes or No here, otherwise this is a restraint and must follow restraint documentation policy.)    DVT prophylaxis:  DVT prophylaxis Med- Yes  DVT prophylaxis SCD or CINDY- No     Wounds: (If Applicable)  Wounds- No  Location None     Active Consults:  IP CONSULT TO NEUROLOGY  IP CONSULT TO NEPHROLOGY    Length of Stay:  Expected LOS: 3d 12h  Actual LOS: 2  Discharge Plan: No Home       Kajal Cruz, RN

## 2022-11-18 NOTE — PROGRESS NOTES
Problem: Falls - Risk of  Goal: *Absence of Falls  Description: Document Clearence Skates Fall Risk and appropriate interventions in the flowsheet.   Outcome: Progressing Towards Goal  Note: Fall Risk Interventions:  Mobility Interventions: Bed/chair exit alarm         Medication Interventions: Bed/chair exit alarm    Elimination Interventions: Call light in reach    History of Falls Interventions: Bed/chair exit alarm         Problem: Patient Education: Go to Patient Education Activity  Goal: Patient/Family Education  Outcome: Progressing Towards Goal     Problem: Patient Education: Go to Patient Education Activity  Goal: Patient/Family Education  Outcome: Progressing Towards Goal     Problem: Patient Education: Go to Patient Education Activity  Goal: Patient/Family Education  Outcome: Progressing Towards Goal Spoke with pt, advised that her imaging has been denied.  Will work on appeal.  She states her imaging appt at [place] is on 7/23, advised her she may need to cancel/reschedule this.  Will touch base with her next week.

## 2022-11-18 NOTE — PROGRESS NOTES
Patient ID:  Yolanda Garcia  [id number]  78 y.o.  1945      Date of Consultation:  2022    Subjective:       History of Present Illness:   Yolanda Garcia is a 78 y.o. male with a history of end-stage renal disease on dialysis, diabetes, hypertension and CAD who is currently admitted to the hospital with progressive lower extremity weakness. His neurological exam does reveal evidence of a length dependent neuropathy as well as hyperreflexia which may indicate the cervical spine lesion. Interval history  No acute events reported overnight. Patient reports that he is about the same as yesterday and he has no new complaints.     Past Medical History:   Diagnosis Date    Arthritis     spine    CAD (coronary artery disease)     high cholesterol    Chronic kidney disease     dialysis    Diabetes ([address]     ESRD (end stage renal disease) ([address]     GERD (gastroesophageal reflux disease)     HX    Hypertension     Ill-defined condition     irritable bowel syndrome    Systolic CHF ([address]     Unspecified sleep apnea     NO CPAP        Past Surgical History:   Procedure Laterality Date    COLONOSCOPY N/A 2016    COLONOSCOPY performed by Gil Moran MD at [facility] ENDOSCOPY    HX ORTHOPAEDIC      CERVICAL FUSION    HX UROLOGICAL      TURP    HX VASCULAR ACCESS      L arm dialysis access        Family History   Problem Relation Age of Onset    No Known Problems Mother     Cancer Father         \"bone\"    Cancer Sister         leukemia and breast    No Known Problems Sister     Diabetes Brother     Diabetes Brother     Emphysema Brother     No Known Problems Maternal Grandmother     No Known Problems Maternal Grandfather     No Known Problems Paternal Grandmother     No Known Problems Paternal Grandfather         Social History     Tobacco Use    Smoking status: Former     Types: Cigarettes     Quit date:      Years since quittin.8    Smokeless tobacco: Former    Tobacco comments:     cigars only Substance Use Topics    Alcohol use: No        Allergies   Allergen Reactions    Albumin, Human 25 % Anaphylaxis and Hives    Niacin Hives        Prior to Admission medications    Medication Sig Start Date End Date Taking? Authorizing Provider   hydrOXYzine HCL (ATARAX) 25 mg tablet TAKE 1 TABLET BY MOUTH EVERY EVENING AS NEEDED 11/3/22   Provider, Historical   Miralax 17 gram/dose powder USE 1 SCOOP AND MIX WITH WATER AND DRINK BY MOUTH ONCE DAILY 10/15/22   Provider, Historical   Entresto 24-26 mg tablet Take 1 Tablet by mouth two (2) times a day. 11/3/22   Provider, Historical   glipiZIDE (GLUCOTROL) 5 mg tablet Take 1 Tablet by mouth two (2) times a day. 11/15/22   Magaly Arriola MD   sevelamer carbonate (RENVELA) 800 mg tab tab Take 800 mg by mouth two (2) times a day. 6/7/22   Provider, Historical   gabapentin (NEURONTIN) 300 mg capsule Take 1 Capsule by mouth three (3) times daily as needed for Pain. Max Daily Amount: 900 mg. 6/9/22   Nisa Hobson MD   carvediloL (COREG) 12.5 mg tablet Take 1 Tab by mouth two (2) times daily (with meals). 5/6/21   Florentino Olson MD   bumetanide (BUMEX) 2 mg tablet TAKE 1 TABLET BY MOUTH EVERY DAY 3/19/21   Provider, Historical   finasteride (PROSCAR) 5 mg tablet TAKE 1 TABLET BY MOUTH DAILY 1/20/21   Provider, Historical   insulin NPH/insulin regular (NovoLIN 70/30 U-100 Insulin) 100 unit/mL (70-30) injection ReliOn Novolin 70/30: Inject 30 Units with Breakfast, 40 Units with Dinner  Patient taking differently: ReliOn Novolin 70/30: Inject 30 Units with Breakfast, 40 Units with Dinner (if needed) 3/23/21   Angelica Bonilla MD   aspirin 81 mg chewable tablet Take 81 mg by mouth daily. Provider, Historical   fluticasone propionate (FLONASE) 50 mcg/actuation nasal spray 2 Sprays by Both Nostrils route daily. Provider, Historical   tamsulosin (FLOMAX) 0.4 mg capsule Take 0.8 mg by mouth daily.     Other, MD Mignon       Review of Systems:    General, constitutional: negative  Eyes, vision: negative  Ears, nose, throat: negative  Cardiovascular, heart: negative  Respiratory: negative  Gastrointestinal: negative  Genitourinary: negative  Musculoskeletal: negative  Skin and integumentary: negative  Psychiatric: negative  Endocrine: negative  Neurological: negative, except for HPI  Hematologic/lymphatic: negative  Allergy/immunology: negative    Objective:     PHYSICAL EXAMINATION:   Visit Vitals  /75   Pulse (!) 57   Temp 98.4 °F (36.9 °C)   Resp 16   Ht 6' (1.829 m)   Wt 240 lb (108.9 kg)   SpO2 100%   BMI 32.55 kg/m²       Physical Exam:  General:  no acute distress  Neck: no carotid bruits  Lungs: clear to auscultation  Heart:  no murmurs, regular rate and rhythm   Lower extremity: no edema     Neurological exam:  Mental Status: Awake, alert, oriented to person, place and time  Attention and Concentration: able to state the days of the week backwards   Speech and Language: No dysarthria. Able to name, repeat and follow commands   Fund of knowledge was preserved     Cranial nerves: II-XII:  Pupils equal and reactive, visual fields intact by confrontation   Extraocular movements intact, no evidence of nystagmus or ptosis   Facial sensation intact   Facial movements symmetric   Hearing intact to soft rub bilaterally   Shoulder shrug symmetric and strong   Tongue protrusion full and midline without fasciculation or atrophy     Motor:   Normal tone and Bulk   Drift: No evidence of pronator drift      Strength testing:    deltoid triceps biceps Wrist ext. Wrist flex. intrinsics   Right 4+ 5 5 5 5 4+   Left 4+ 5 5 5 5 4+        Hip flex. Hip ext. Knee ext. Knee flex Dorsi flex Plantar flex   Right  4 4 4+ 4+ 5 5   Left  4 4 4+ 4+ 5 5         Sensory:  Sensation was intact to light touch. Patient did have decreased absent vibratory sensation at the toes bilaterally.   Vibratory sensation at the ankles was approximately 7 seconds and greater than 10 seconds at the knees. Reflexes:    Biceps Triceps  Brachiorad Patellar Achilles Plantar Hoffmans   Right  3 2 3 2 1 Down Pos   Left  3 2 2 2 1 Down Neg       Cerebellar testing: Finger-nose-finger was intact. Gait was deferred due to concerns over patient safety    Labs:     Lab Results   Component Value Date/Time    Hemoglobin A1c 5.6 12/21/2021 11:12 AM    Sodium 137 11/18/2022 08:00 AM    Potassium 4.8 11/18/2022 08:00 AM    Chloride 100 11/18/2022 08:00 AM    Glucose 166 (H) 11/18/2022 08:00 AM    BUN 50 (H) 11/18/2022 08:00 AM    Creatinine 6.19 (H) 11/18/2022 08:00 AM    Calcium 9.1 11/18/2022 08:00 AM    WBC 5.3 11/18/2022 08:00 AM    HCT 33.5 (L) 11/18/2022 08:00 AM    HGB 10.7 (L) 11/18/2022 08:00 AM    PLATELET 197 08/70/2168 08:00 AM       Imaging:    Results from Hospital Encounter encounter on 11/16/22    MRI LUMB SPINE WO CONT    Narrative  EXAM: MRI LUMB SPINE WO CONT  Clinical history: Lumbar canal stenosis  INDICATION: Lumbar stenosis. COMPARISON: None    TECHNIQUE: MR imaging of the lumbar spine was performed using the following  sequences: sagittal T1, T2, STIR;  axial T1, T2.    CONTRAST:  None. FINDINGS:    There is normal alignment of the lumbar spine. Vertebral body heights are  maintained. Marrow lesion at L5 is not fully characterize. Retrolisthesis of L5  on S1. The conus medullaris terminates at L1/L2. Signal and caliber of the distal  spinal cord are within normal limits. The paraspinal soft tissues are within normal limits. Lower thoracic spine: No herniation or stenosis. L1-L2: No herniation or stenosis. L2-L3: Disc desiccation. Schmorl's node. Mild facet arthropathy. The canal is  patent. Foramina are patent    L3-L4: Disc desiccation. Mild facet arthropathy. Minimal disc bulge. Minimal  canal stenosis. Foramina are patent    L4-L5: Disc desiccation. Facet arthropathy. Circumferential disc bulge. Mild  canal stenosis. Minimal right foraminal stenosis.  Effacement of nerve roots  extending to the right and left L5-S1 foramina. L5-S1: Mild facet arthropathy. Disc desiccation and loss of disc height. Moderate to large broad-based disc protrusion/osteophyte. Moderate canal and  severe bilateral foraminal stenosis. Effacement of nerve roots extending to the  right and left S1 foramina. Impression  Multilevel disc and facet degenerative change with trace retrolisthesis of L5 on  S1. Moderate canal and severe bilateral foraminal stenosis at L5-S1 with effacement  of nerve roots extending to the right and left S1 foramina. Heterogenous marrow signal intensity, small Schmorl's nodes are present, lesion  at L5 is not fully characterized, may represent a bone island. . Further  delineation with nuclear medicine bone scan is recommended. Results from [place] encounter on 11/16/22    CT SPINE CERV WO CONT    Narrative  INDICATION: fall weakness    COMPARISON: None. TECHNIQUE:   Noncontrast axial CT imaging of the cervical spine was performed. Coronal and sagittal reconstructions were obtained. CT dose reduction was achieved through use of a standardized protocol tailored  for this examination and automatic exposure control for dose modulation. FINDINGS:    There is normal alignment of the cervical spine. There is no acute fracture or  subluxation. The craniocervical junction is intact. There is no prevertebral  soft tissue swelling. Previous anterior cervical discectomy and fusion at C4-5. Extensive degenerative changes with large ventral osteophytes throughout the  cervical spine. Impression  No acute fracture. IMPRESSION/RECOMMENDATIONS:  Pj Gallardo is a 78 y.o. male with a history of end-stage renal disease on dialysis, diabetes, hypertension and CAD who is currently admitted to the hospital with progressive lower extremity weakness.   His neurological exam does reveal evidence of a length dependent neuropathy as well as hyperreflexia which may indicate the cervical spine lesion. MRI of his cervical and lumbar spine did show extensive degenerative disc disease and moderate stenosis in the cervical and lumbar region. This is likely contributing to his gait instability. Progressive lower extremity weakness associated with gait instability: Per patient has been occurring over the last several years. His neurological exam does reveal both a length dependent neuropathy as well as hyperreflexia which may indicate the spinal stenosis. MRI did show extensive degenerative disc disease with moderate spinal stenosis. It is likely his symptoms are multifactorial  -He will also benefit from an EMG as an outpatient to determine the degree of neuropathy.  -Patient was also noted to have a low B12 level, for this reason would recommend oral supplementation. This may help some of his symptoms of neuropathy  -Patient will benefit from discal therapy and Occupational Therapy. He also may benefit from short stay at inpatient rehab. Thank you very much for this consultation, will sign off. Please call with any additional questions. Please have patient follow-up in neurology clinic in 1 to 2 months.      Tania Ruiz MD    Active Problems:    Unable to ambulate (11/16/2022)

## 2022-11-18 NOTE — PROGRESS NOTES
Hospitalist Progress Note    NAME: Kendell Rahman   :  1945   MRN:  [id number]       Assessment / Plan:  Generalized weakness  Recurrent falls    CT head/CT spine negative for acute finding  Neurology following  B12 normal, folate low, supplement started  PT OT consult: recommend SNF, CM aware  MRI reviewed shows spinal stenosis, discussed with neurology, recommend physical therapy and EMG outpatient    Elevated troponin  Likely due to ESRD, slight fluid overload  Troponin 216--216  No chest pain. ESRD on HD  Nephrology following     Chronic systolic heart failure:  C/w entresto, coreg, bumex, aspirin  On 2 liters oxygen at baseline     DM:  NPH and  Sliding scale     Code Status: Full  Surrogate Decision Maker: His wife     DVT Prophylaxis: Heparin  GI Prophylaxis: not indicated     Baseline: From home, now unable to ambulate d/t his weakness    ANDRIY:   Barriers: Medically ready pending SNF     Subjective:     Chief Complaint / Reason for Physician Visit  Seen in his room  Sitting comfortably in his chair  Weakness has improved    Review of Systems:  Symptom Y/N Comments  Symptom Y/N Comments   Fever/Chills n   Chest Pain n    Poor Appetite n   Edema n    Cough n   Abdominal Pain n    Sputum    Joint Pain     SOB/BRITT    Pruritis/Rash     Nausea/vomit    Tolerating PT/OT     Diarrhea    Tolerating Diet     Constipation    Other       Could NOT obtain due to:      Objective:     VITALS:   Last 24hrs VS reviewed since prior progress note.  Most recent are:  Patient Vitals for the past 24 hrs:   Temp Pulse Resp BP SpO2   22 1235 98.6 °F (37 °C) (!) 59 16 (!) 123/50 94 %   22 1126 98.6 °F (37 °C) 60 18 125/62 --   22 1115 -- (!) 55 18 124/66 --   22 1100 -- (!) 57 18 118/65 --   22 1045 -- (!) 53 18 125/73 --   22 1030 -- (!) 49 18 128/69 --   22 1015 -- (!) 50 18 112/63 --   22 1000 -- (!) 52 18 111/65 --   22 0945 -- (!) 59 18 121/70 --   22 0930 -- (!) 49 18 124/65 --   11/18/22 0915 -- (!) 58 18 117/65 --   11/18/22 0900 -- 62 18 127/66 --   11/18/22 0845 -- 68 18 121/66 --   11/18/22 0830 -- 62 18 124/69 --   11/18/22 0815 -- 62 18 123/69 --   11/18/22 0800 -- (!) 57 18 134/71 --   11/18/22 0755 97.9 °F (36.6 °C) 60 18 131/71 --   11/17/22 2332 97.5 °F (36.4 °C) 62 18 132/64 95 %   11/17/22 1520 98.2 °F (36.8 °C) (!) 59 16 118/61 100 %       Intake/Output Summary (Last 24 hours) at 11/18/2022 1508  Last data filed at 11/18/2022 1126  Gross per 24 hour   Intake --   Output 2000 ml   Net -2000 ml        I had a face to face encounter and independently examined this patient on 11/18/2022, as outlined below:  PHYSICAL EXAM:  General: Awake, No acute distress  EENT:  EOMI. Anicteric sclerae. MMM  Resp:  CTA bilaterally, no wheezing or rales. No accessory muscle use  CV:  Regular  rhythm,  No edema  GI:  Soft, Non distended, Non tender. +Bowel sounds  Neurologic:  Power 4/5 in all ext  Psych:   Not anxious nor agitated  Skin:  No rashes. No jaundice    Reviewed most current lab test results and cultures  YES  Reviewed most current radiology test results   YES  Review and summation of old records today    NO  Reviewed patient's current orders and MAR    YES  PMH/ reviewed - no change compared to H&P  ________________________________________________________________________  Care Plan discussed with:    Comments   Patient y    Family      RN y    Care Manager     Consultant                        Multidiciplinary team rounds were held today with , nursing, pharmacist and clinical coordinator. Patient's plan of care was discussed; medications were reviewed and discharge planning was addressed.      ________________________________________________________________________  Total NON critical care TIME:  36   Minutes    Total CRITICAL CARE TIME Spent:   Minutes non procedure based      Comments   >50% of visit spent in counseling and coordination of care     ________________________________________________________________________  Fabian Araujo MD     Procedures: see electronic medical records for all procedures/Xrays and details which were not copied into this note but were reviewed prior to creation of Plan. LABS:  I reviewed today's most current labs and imaging studies.   Pertinent labs include:  Recent Labs     11/18/22  0800 11/17/22  0239 11/15/22  2250   WBC 5.3 7.0 6.2   HGB 10.7* 10.5* 11.1*   HCT 33.5* 34.3* 35.7*    222 221     Recent Labs     11/18/22  0800 11/17/22  0239 11/15/22  2250    139 138   K 4.8 4.1 4.5    100 103   CO2 30 32 32   * 164* 263*   BUN 50* 34* 48*   CREA 6.19* 5.11* 6.56*   CA 9.1 8.5 9.5   MG  --   --  3.6*   PHOS 3.8  --   --    ALB 3.1*  --  3.4*   TBILI  --   --  0.4   ALT  --   --  20       Signed: Fabian Araujo MD

## 2022-11-18 NOTE — PROCEDURES
Hemodialysis / [phone]    Vitals Pre Post Assessment Pre Post   BP BP: 131/71 (11/18/22 0755) 125/62 LOC A&Ox4 A&Ox4   HR Pulse (Heart Rate): 60 (11/18/22 0755) 60 Lungs clear clear   Resp Resp Rate: 18 (11/18/22 0755) 18 Cardiac RRR RRR   Temp Temp: 97.9 °F (36.6 °C) (11/18/22 0755) 98.6   Skin intact intact   Weight  N/a N/a Edema 1+ BLE 1+ BLE   Tele status None ordered None ordered Pain 0 0     Orders   Duration: Start: 5696 End: 1195 Total: 3.5 hours   Dialyzer: Dialyzer/Set Up Inspection: Xuan Beth (11/18/22 0755)   Alysa Lyons Bath: Dialysate K (mEq/L): 2 (11/18/22 0755)   Ca Bath: Dialysate CA (mEq/L): 2.5 (11/18/22 0755)   Na: Dialysate NA (mEq/L): 140 (11/18/22 0755)   Bicarb: Dialysate HCO3 (mEq/L): 40 (11/18/22 0755)   Target Fluid Removal: Goal/Amount of Fluid to Remove (mL): 2000 mL (11/18/22 0755)     Access   Type & Location: [facility] AV   Comments: Pre-assessment: skin CDI. No s/s of infection. + B/T. No issues with cannulation. Running well at . Post assessment: No issues with hemostasis, + B/T remains. Dressing CDI. Labs   HBsAg (Antigen) / date: Negative 11/16/22                                              HBsAb (Antibody) / date: Susceptible 11/16/22   Source: Epic   Obtained/Reviewed  Critical Results Called HGB   Date Value Ref Range Status   11/18/2022 10.7 (L) 12.1 - 17.0 g/dL Final     Potassium   Date Value Ref Range Status   11/17/2022 4.1 3.5 - 5.1 mmol/L Final     Calcium   Date Value Ref Range Status   11/17/2022 8.5 8.5 - 10.1 MG/DL Final     BUN   Date Value Ref Range Status   11/17/2022 34 (H) 6 - 20 MG/DL Final     Creatinine   Date Value Ref Range Status   11/17/2022 5.11 (H) 0.70 - 1.30 MG/DL Final        Meds Given   Name Dose Route   none                 Adequacy / Fluid    Total Liters Process: 78.9L   Net Fluid Removed: 2000ml      Comments   Time Out Done:   (Time) 1304   Admitting Diagnosis: Unable to ambulate   Consent obtained/signed: Informed Consent Verified:  Yes (chronic dialysis, no consent needed) (11/18/22 1849)   Machine / RO # Machine Number: [id number] (11/18/22 6911)   Primary Nurse Rpt Pre: Lisset Arriola RN   Primary Nurse Rpt Post: Hong Cruz RN   Pt Education: Procedure   Care Plan: Ongoing   Pts outpatient clinic: [facility] Summary   Comments:  SBAR received from Primary RN. Pt arrived to HD suite A&Ox4. Chronic consent applies. Pt agreeable to treatment. 6805: Pt cannulated with 98Q needles per policy & without issue. Labs drawn per order. VSS. Dialysis Tx initiated. **Patient tolerated treatment well without complaints or complications. All lines and connections remained intact and visible throughout entire treatment. **    1126: Tx ended. VSS. All possible blood returned to patient. Hemostasis achieved without issue after hand held pressure x 7 minutes. Bed locked and in the lowest position, call bell and belongings in reach. SBAR given to Primary, RN. Patient is stable at time of their departure. All Dialysis related medications have been reviewed.

## 2022-11-18 NOTE — PROGRESS NOTES
Nephrology Progress Note  [facility] / [address], [address]  [place], [address]  Phone - [phone]  Fax - [phone]                 Patient: Zachary Squires                   YOB: 1945        Date- 11/18/2022                      Admit Date: 11/16/2022  CC: Follow up for esrd          IMPRESSION & PLAN:   Esrd- hd mwf at [facility]  Anemia of ckd  Sec. Hyperpara  S/p fall  Weakness  H/o CHF  Dm 2      PLAN-  HD now   Cont MWF schedule here  Continue current BP meds  Cont binders  MICHELLE on hold for now   Subjective: Interval History:   Seen and examined on dialysis. BP holding stable. Resting, no complaints. No cp, sob, n/v/d  Objective:   Vitals:    11/18/22 0815 11/18/22 0830 11/18/22 0845 11/18/22 0900   BP: 123/69 124/69 121/66 127/66   Pulse: 62 62 68 62   Resp: 18 18 18 18   Temp:       TempSrc:       SpO2:       Weight:       Height:          No intake/output data recorded. Last 3 Recorded Weights in this Encounter    11/15/22 2238   Weight: 108.9 kg (240 lb)        Physical exam:    GEN: NAD  NECK- no mass  RESP: No wheezing, decreased BS b/l  CVS: S1,S2  RRR  NEURO: Normal speech, Non focal  EXT: + Edema   PSYCH: Normal Mood  Upper arm avf +    Chart reviewed. Pertinent Notes reviewed. Data Review :  Recent Labs     11/18/22  0800 11/17/22  0239 11/15/22  2250    139 138   K 4.8 4.1 4.5    100 103   CO2 30 32 32   BUN 50* 34* 48*   CREA 6.19* 5.11* 6.56*   * 164* 263*   CA 9.1 8.5 9.5   MG  --   --  3.6*   PHOS 3.8  --   --        Recent Labs     11/18/22  0800 11/17/22  0239 11/15/22  2250   WBC 5.3 7.0 6.2   HGB 10.7* 10.5* 11.1*   HCT 33.5* 34.3* 35.7*    222 221       No results for input(s): FE, TIBC, PSAT, FERR in the last 72 hours.    Lab Results   Component Value Date/Time    Hemoglobin A1c 5.6 12/21/2021 11:12 AM    Hemoglobin A1c 8.2 (H) 05/05/2021 03:29 AM    Hemoglobin A1c 10.5 (H) 11/16/2016 10:45 AM        Lab Results   Component Value Date/Time    Microalb/Creat ratio (ug/mg creat.) 897.2 (H) 04/11/2019 12:27 PM     Lab Results   Component Value Date/Time    NT pro-BNP 12,079 (H) 09/12/2022 06:02 AM    NT pro-BNP 5,709 (H) 03/15/2022 08:39 PM    NT pro-BNP 5,474 (H) 05/04/2021 06:09 AM    NT pro-BNP 2,497 (H) 04/17/2021 10:54 AM    NT pro-BNP 9,341 (H) 07/21/2020 05:33 AM     US Results (most recent):  Medication list  reviewed  Current Facility-Administered Medications   Medication Dose Route Frequency    insulin NPH (NOVOLIN N, HUMULIN N) injection 20 Units  20 Units SubCUTAneous ACB&D    sodium chloride (NS) flush 5-40 mL  5-40 mL IntraVENous Q8H    sodium chloride (NS) flush 5-40 mL  5-40 mL IntraVENous PRN    acetaminophen (TYLENOL) tablet 650 mg  650 mg Oral Q6H PRN    Or    acetaminophen (TYLENOL) suppository 650 mg  650 mg Rectal Q6H PRN    polyethylene glycol (MIRALAX) packet 17 g  17 g Oral DAILY PRN    ondansetron (ZOFRAN ODT) tablet 4 mg  4 mg Oral Q8H PRN    Or    ondansetron (ZOFRAN) injection 4 mg  4 mg IntraVENous Q6H PRN    heparin (porcine) injection 5,000 Units  5,000 Units SubCUTAneous Q8H    insulin lispro (HUMALOG) injection   SubCUTAneous AC&HS    glucose chewable tablet 16 g  4 Tablet Oral PRN    glucagon (GLUCAGEN) injection 1 mg  1 mg IntraMUSCular PRN    dextrose 10% infusion 0-250 mL  0-250 mL IntraVENous PRN    aspirin chewable tablet 81 mg  81 mg Oral DAILY    bumetanide (BUMEX) tablet 2 mg  2 mg Oral DAILY    carvediloL (COREG) tablet 12.5 mg  12.5 mg Oral BID WITH MEALS    sacubitriL-valsartan (ENTRESTO) 24-26 mg tablet 1 Tablet  1 Tablet Oral BID    finasteride (PROSCAR) tablet 5 mg  5 mg Oral DAILY    fluticasone propionate (FLONASE) 50 mcg/actuation nasal spray 2 Spray  2 Spray Both Nostrils DAILY    gabapentin (NEURONTIN) capsule 300 mg  300 mg Oral TID PRN    sevelamer carbonate (RENVELA) tab 800 mg  800 mg Oral BID tamsulosin (FLOMAX) capsule 0.8 mg  0.8 mg Oral DAILY        Meka Thomason MD  11/18/2022

## 2022-11-18 NOTE — PROGRESS NOTES

## 2022-11-18 NOTE — ADT AUTH CERT NOTES
Neurology [address] - Care Day 4 (11/18/2022) by Mirella Zheng       Review Status Review Entered   Completed 11/18/2022 1140       Created By   Mirella Zheng      Criteria Review      Care Day: 4 Care Date: 11/18/2022 Level of Care: Inpatient Floor    Guideline Day 1    Clinical Status    ( ) * Clinical Indications met    Interventions    (X) Inpatient interventions as needed    11/18/2022 11:40:59 EST by Mirella Zheng      Heparin 5,000units SC TID    * Milestone   Additional Notes   DATE: 11/18/2022 Continued Stay Review/Medical      Temp 97.9, HR 58, 59, 57, R 18 and B/P 121/66      Nephrology progress note:   PLAN-   HD now    Cont MWF schedule here   Continue current BP meds   Cont binders   MICHELLE on hold for now      Imaging/Labs:   11/18/22 08:00   RBC: 3.58 (L)   HGB: 10.7 (L)   HCT: 33.5 (L)   RDW: 15.6 (H)   Glucose: 166 (H)   BUN: 50 (H)   Creatinine: 6.19 (H)   BUN/Creatinine ratio: 8 (L)   eGFR: 9 (L)   Albumin: 3.1 (L)           Neurology [facility] - Care Day 3 (11/17/2022) by Mirella Zheng       Review Status Review Entered   Completed 11/17/2022 1655       Created By   Mirella Zheng      Criteria Review      Care Day: 3 Care Date: 11/17/2022 Level of Care: Inpatient Floor    Guideline Day 1    Clinical Status    ( ) * Clinical Indications met    Interventions    (X) Inpatient interventions as needed    11/17/2022 16:55:35 EST by Mirella Zheng      ASA 81mg PO QD  Bumex 2mg PO QD  Coreg 12.5mg PO BID  Proscar 5mg PO QD  Heparin 5,000units SC TID  Humalog SSI SC QID  Entresto 1 tablet PO BID  Renvela 800mg PO BID    * Milestone

## 2022-11-19 LAB
GLUCOSE BLD STRIP.AUTO-MCNC: 114 MG/DL (ref 65–117)
GLUCOSE BLD STRIP.AUTO-MCNC: 119 MG/DL (ref 65–117)
GLUCOSE BLD STRIP.AUTO-MCNC: 192 MG/DL (ref 65–117)
GLUCOSE BLD STRIP.AUTO-MCNC: 211 MG/DL (ref 65–117)
SERVICE CMNT-IMP: ABNORMAL
SERVICE CMNT-IMP: NORMAL

## 2022-11-19 PROCEDURE — 74011250636 HC RX REV CODE- 250/636: Performed by: STUDENT IN AN ORGANIZED HEALTH CARE EDUCATION/TRAINING PROGRAM

## 2022-11-19 PROCEDURE — 82962 GLUCOSE BLOOD TEST: CPT

## 2022-11-19 PROCEDURE — 74011250637 HC RX REV CODE- 250/637: Performed by: STUDENT IN AN ORGANIZED HEALTH CARE EDUCATION/TRAINING PROGRAM

## 2022-11-19 PROCEDURE — 74011636637 HC RX REV CODE- 636/637: Performed by: STUDENT IN AN ORGANIZED HEALTH CARE EDUCATION/TRAINING PROGRAM

## 2022-11-19 PROCEDURE — 65270000046 HC RM TELEMETRY

## 2022-11-19 PROCEDURE — 74011000250 HC RX REV CODE- 250: Performed by: STUDENT IN AN ORGANIZED HEALTH CARE EDUCATION/TRAINING PROGRAM

## 2022-11-19 RX ORDER — FOLIC ACID 1 MG/1
1 TABLET ORAL DAILY
Status: DISCONTINUED | OUTPATIENT
Start: 2022-11-19 | End: 2022-12-04 | Stop reason: HOSPADM

## 2022-11-19 RX ORDER — LANOLIN ALCOHOL/MO/W.PET/CERES
500 CREAM (GRAM) TOPICAL DAILY
Qty: 60 TABLET | Refills: 1 | Status: SHIPPED | OUTPATIENT
Start: 2022-11-20

## 2022-11-19 RX ORDER — FOLIC ACID 1 MG/1
1 TABLET ORAL DAILY
Qty: 60 TABLET | Refills: 1 | Status: SHIPPED | OUTPATIENT
Start: 2022-11-20

## 2022-11-19 RX ORDER — LANOLIN ALCOHOL/MO/W.PET/CERES
500 CREAM (GRAM) TOPICAL DAILY
Status: DISCONTINUED | OUTPATIENT
Start: 2022-11-19 | End: 2022-12-04 | Stop reason: HOSPADM

## 2022-11-19 RX ADMIN — SODIUM CHLORIDE, PRESERVATIVE FREE 10 ML: 5 INJECTION INTRAVENOUS at 06:26

## 2022-11-19 RX ADMIN — BUMETANIDE 2 MG: 1 TABLET ORAL at 08:56

## 2022-11-19 RX ADMIN — Medication 20 UNITS: at 16:30

## 2022-11-19 RX ADMIN — Medication 3 UNITS: at 12:15

## 2022-11-19 RX ADMIN — SEVELAMER CARBONATE 800 MG: 800 TABLET, FILM COATED ORAL at 21:36

## 2022-11-19 RX ADMIN — HEPARIN SODIUM 5000 UNITS: 5000 INJECTION INTRAVENOUS; SUBCUTANEOUS at 15:25

## 2022-11-19 RX ADMIN — SODIUM CHLORIDE, PRESERVATIVE FREE 10 ML: 5 INJECTION INTRAVENOUS at 15:28

## 2022-11-19 RX ADMIN — FINASTERIDE 5 MG: 5 TABLET, FILM COATED ORAL at 08:56

## 2022-11-19 RX ADMIN — ASPIRIN 81 MG: 81 TABLET, CHEWABLE ORAL at 08:55

## 2022-11-19 RX ADMIN — FLUTICASONE PROPIONATE 2 SPRAY: 50 SPRAY, METERED NASAL at 08:58

## 2022-11-19 RX ADMIN — HEPARIN SODIUM 5000 UNITS: 5000 INJECTION INTRAVENOUS; SUBCUTANEOUS at 21:36

## 2022-11-19 RX ADMIN — Medication 500 MCG: at 12:15

## 2022-11-19 RX ADMIN — SACUBITRIL AND VALSARTAN 1 TABLET: 24; 26 TABLET, FILM COATED ORAL at 08:56

## 2022-11-19 RX ADMIN — Medication 20 UNITS: at 07:30

## 2022-11-19 RX ADMIN — CARVEDILOL 12.5 MG: 12.5 TABLET, FILM COATED ORAL at 17:11

## 2022-11-19 RX ADMIN — SACUBITRIL AND VALSARTAN 1 TABLET: 24; 26 TABLET, FILM COATED ORAL at 17:10

## 2022-11-19 RX ADMIN — TAMSULOSIN HYDROCHLORIDE 0.8 MG: 0.4 CAPSULE ORAL at 08:55

## 2022-11-19 RX ADMIN — CARVEDILOL 12.5 MG: 12.5 TABLET, FILM COATED ORAL at 08:56

## 2022-11-19 RX ADMIN — SEVELAMER CARBONATE 800 MG: 800 TABLET, FILM COATED ORAL at 08:55

## 2022-11-19 RX ADMIN — HEPARIN SODIUM 5000 UNITS: 5000 INJECTION INTRAVENOUS; SUBCUTANEOUS at 06:25

## 2022-11-19 RX ADMIN — ACETAMINOPHEN 650 MG: 325 TABLET ORAL at 07:56

## 2022-11-19 RX ADMIN — FOLIC ACID 1 MG: 1 TABLET ORAL at 12:15

## 2022-11-19 NOTE — DISCHARGE INSTRUCTIONS
HOSPITALIST DISCHARGE INSTRUCTIONS    NAME: Alvino Mcneil   :  1945   MRN:  [id number]     Date/Time:  2022 2:03 PM    ADMIT DATE: 2022     DISCHARGE DATE: 2022     DISCHARGE DIAGNOSIS:  Spinal stenosis  Generalized weakness  Fall  ESRD on HD    MEDICATIONS:  As per medication reconciliation  list  It is important that you take the medication exactly as they are prescribed. Keep your medication in the bottles provided by the pharmacist and keep a list of the medication names, dosages, and times to be taken in your wallet. Do not take other medications without consulting your doctor. Pain Management: per above medications    What to do at Home    Recommended diet:  Cardiac Diet    Recommended activity: Activity as tolerated    If you have questions regarding the hospital related prescriptions or hospital related issues please call at [phone]. If you experience any of the following symptoms then please call your primary care physician or return to the emergency room if you cannot get hold of your doctor:  Fever, chills, nausea, vomiting, diarrhea, change in mentation, falling, bleeding, shortness of breath    Follow Up:  PMD in 1 week      Information obtained by :  I understand that if any problems occur once I am at home I am to contact my physician. I understand and acknowledge receipt of the instructions indicated above.                                                                                                                                            Physician's or R.N.'s Signature                                                                  Date/Time                                                                                                                                              Patient or Representative Signature                                                          Date/Time

## 2022-11-19 NOTE — PROGRESS NOTES
Hospitalist Progress Note    NAME: Zachary Squires   :  1945   MRN:  [id number]       Assessment / Plan:  Generalized weakness  Recurrent falls    CT head/CT spine negative for acute finding  Neurology following  B12 low normal, folate low, supplement started  PT OT consult: recommend SNF, CM aware  MRI reviewed shows spinal stenosis, discussed with neurology, recommend physical therapy and EMG outpatient    Elevated troponin  Likely due to ESRD, slight fluid overload  Troponin 216--216  No chest pain. ESRD on HD  Nephrology following     Chronic systolic heart failure:  C/w entresto, coreg, bumex, aspirin  On 2 liters oxygen at baseline     DM:  NPH and  Sliding scale     Code Status: Full  Surrogate Decision Maker: His wife     DVT Prophylaxis: Heparin  GI Prophylaxis: not indicated     Baseline: From home, now unable to ambulate d/t his weakness    ANDRIY:   Barriers: Medically ready pending SNF     Subjective:     Chief Complaint / Reason for Physician Visit  Seen in his room  Sitting comfortably in his chair  Weakness has improved  Discussed with wife at bedside    Review of Systems:  Symptom Y/N Comments  Symptom Y/N Comments   Fever/Chills n   Chest Pain n    Poor Appetite n   Edema n    Cough n   Abdominal Pain n    Sputum    Joint Pain     SOB/BRITT    Pruritis/Rash     Nausea/vomit    Tolerating PT/OT     Diarrhea    Tolerating Diet     Constipation    Other       Could NOT obtain due to:      Objective:     VITALS:   Last 24hrs VS reviewed since prior progress note.  Most recent are:  Patient Vitals for the past 24 hrs:   Temp Pulse Resp BP SpO2   22 0728 98.4 °F (36.9 °C) 64 14 (!) 140/76 96 %   22 2318 98.1 °F (36.7 °C) 65 18 128/76 96 %   22 1533 98.4 °F (36.9 °C) (!) 57 16 123/75 100 %     No intake or output data in the 24 hours ending 22 1404       I had a face to face encounter and independently examined this patient on 2022, as outlined below:  PHYSICAL EXAM:  General: Awake, No acute distress  EENT:  EOMI. Anicteric sclerae. MMM  Resp:  CTA bilaterally, no wheezing or rales. No accessory muscle use  CV:  Regular  rhythm,  No edema  GI:  Soft, Non distended, Non tender. +Bowel sounds  Neurologic:  Power 4/5 in all ext  Psych:   Not anxious nor agitated  Skin:  No rashes. No jaundice    Reviewed most current lab test results and cultures  YES  Reviewed most current radiology test results   YES  Review and summation of old records today    NO  Reviewed patient's current orders and MAR    YES  PMH/SH reviewed - no change compared to H&P  ________________________________________________________________________  Care Plan discussed with:    Comments   Patient y    Family      RN y    Care Manager     Consultant                        Multidiciplinary team rounds were held today with , nursing, pharmacist and clinical coordinator. Patient's plan of care was discussed; medications were reviewed and discharge planning was addressed. ________________________________________________________________________  Total NON critical care TIME:  27  Minutes    Total CRITICAL CARE TIME Spent:   Minutes non procedure based      Comments   >50% of visit spent in counseling and coordination of care     ________________________________________________________________________  Janae Corona MD     Procedures: see electronic medical records for all procedures/Xrays and details which were not copied into this note but were reviewed prior to creation of Plan. LABS:  I reviewed today's most current labs and imaging studies.   Pertinent labs include:  Recent Labs     11/18/22  0800 11/17/22  0239   WBC 5.3 7.0   HGB 10.7* 10.5*   HCT 33.5* 34.3*    222     Recent Labs     11/18/22  0800 11/17/22  0239    139   K 4.8 4.1    100   CO2 30 32   * 164*   BUN 50* 34*   CREA 6.19* 5.11*   CA 9.1 8.5   PHOS 3.8  --    ALB 3.1*  --        Signed: Iva Hines MD

## 2022-11-19 NOTE — PROGRESS NOTES
End of Shift Note     Bedside shift change report given to Joie Kaba RN (oncoming nurse) by Sushma Velasco RN (offgoing nurse). Report included the following information SBAR, Kardex, ED Summary, and MAR     Shift worked: 7a-7p   Shift summary and any significant changes:     Was complaining of excruciating pain to right post. neck,pain meds and muscle relaxant given.  Pt. got confused from pain med,MD made aware             Concerns for physician to address:  None    Zone phone for oncoming shift:   [phone]      Patient Information  Meghan Saint Joseph  78 y.o.  11/16/2022 12:20 AM by Aicha Tian Liya was admitted from Home     Problem List          Patient Active Problem List     Diagnosis Date Noted    UTI (lower urinary tract infection) 10/25/2013    Renal failure (ARF), acute on chronic ([address] 10/25/2013    Unable to ambulate 11/16/2022    Cervical post-laminectomy syndrome 06/09/2022    Degenerative cervical spinal stenosis 06/09/2022    Vitamin D deficiency 12/21/2021    Immune-mediated neuropathy ([address] 12/21/2021    Diabetic peripheral neuropathy associated with type 2 diabetes mellitus ([address] 12/21/2021    B12 deficiency 12/21/2021    Sensory ataxic gait 12/21/2021    Cervical spondylosis with myelopathy 12/21/2021    Type 2 diabetes mellitus with stage 4 chronic kidney disease, with long-term current use of insulin ([address] 12/21/2021    Convulsions ([address] 12/21/2021    Acute alteration in mental status 12/21/2021    Myoclonus 12/21/2021    Sepsis ([address] 08/18/2021    Acute hypoxemic respiratory failure ([address] 39/29/9055    Systolic heart failure ([address] 05/04/2021    Accelerated hypertension 07/21/2020    Elevated troponin 07/21/2020    ESRD (end stage renal disease) on dialysis ([address] 07/21/2020    Acute respiratory failure with hypoxia ([address] 07/21/2020    Acute on chronic systolic CHF (congestive heart failure) ([address] 07/21/2020    Pneumonia 05/26/2019    Acute on chronic renal failure ([facility] 10/29/2016 Hypotension 10/29/2016    Chest pain 10/29/2016    Type II diabetes mellitus with nephropathy ([facility] 10/29/2016    Hyperlipidemia 10/29/2016    Nephrotic syndrome 08/20/2016    Renal anasarca 08/20/2016    Diarrhea 10/28/2013    SIRS (systemic inflammatory response syndrome) ([facility] 10/28/2013    Acute pancreatitis 01/18/2011    LFT'S ABNORMAL 01/18/2011    Acute renal failure (ARF) ([facility] 01/18/2011    CKD (chronic kidney disease) stage 3, GFR 30-59 ml/min ([facility]) 01/18/2011    CAD (coronary artery disease) 01/18/2011    Abdominal pain, acute, epigastric 01/18/2011    Nausea & vomiting 01/18/2011              Past Medical History:   Diagnosis Date    Arthritis       spine    CAD (coronary artery disease)       high cholesterol    Chronic kidney disease       dialysis    Diabetes ([facility]      ESRD (end stage renal disease) ([facility]      GERD (gastroesophageal reflux disease)       HX    Hypertension      Ill-defined condition       irritable bowel syndrome    Systolic CHF ([facility]      Unspecified sleep apnea       NO CPAP         Core Measures:  CVA: No Not applicable  CHF:No Not applicable  PNA:No Not applicable     Activity:  Activity Level: Up with Assistance  Number times ambulated in hallways past shift: 0  Number of times OOB to chair past shift: 0     Cardiac:   Cardiac Monitoring: No        Access:   Current line(s): PIV   Central Line? No Placement date   PICC LINE? No Placement date      Genitourinary:   Urinary status: voiding  Urinary Catheter?  No Placement Date      Respiratory:   O2 Device: Nasal cannula  Chronic home O2 use?: YES  Incentive spirometer at bedside: N/A     GI:  Last Bowel Movement Date: 11/19//22  Current diet:  ADULT DIET Regular; Low Fat/Low Chol/High Fiber/RANI  Passing flatus: YES  Tolerating current diet: YES     Pain Management:   Patient states pain is manageable on current regimen: YES     Skin:  Kelby Score: 20  Interventions: turn team, speciality bed, float heels, increase time out of bed, foam dressing, and PT/OT consult    Patient Safety:  Fall Score:  Total Score: 4  Interventions: bed/chair alarm, assistive device (walker, cane, etc), gripper socks, pt to call before getting OOB, and stay with me (per policy)  High Fall Risk: Yes  @Rollbelt  @dexterity to release roll belt  Yes/No ( must document dexterity  here by stating Yes or No here, otherwise this is a restraint and must follow restraint documentation policy.)     DVT prophylaxis:  DVT prophylaxis Med- Yes  DVT prophylaxis SCD or CINDY- No      Wounds: (If Applicable)  Wounds- No  Location None      Active Consults:  IP CONSULT TO NEUROLOGY  IP CONSULT TO NEPHROLOGY     Length of Stay:  Expected LOS: 3d 12h  Actual LOS: 2  Discharge Plan: No Home         George Cruz RN

## 2022-11-19 NOTE — PROGRESS NOTES
End of Shift Note     Bedside shift change report given to [facility] (oncoming nurse) by Rigo Merritt LPN (offgoing nurse).   Report included the following information SBAR, Kardex, ED Summary, and MAR     Shift worked: 7p-7a   Shift summary and any significant changes:     None            Concerns for physician to address:  None    Zone phone for oncoming shift:   [phone]      Patient Information  Josefina Lot  78 y.o.  11/16/2022 12:20 AM by Nadeem Aguilar Darlene was admitted from Home     Problem List       Patient Active Problem List     Diagnosis Date Noted    UTI (lower urinary tract infection) 10/25/2013    Renal failure (ARF), acute on chronic ([address] 10/25/2013    Unable to ambulate 11/16/2022    Cervical post-laminectomy syndrome 06/09/2022    Degenerative cervical spinal stenosis 06/09/2022    Vitamin D deficiency 12/21/2021    Immune-mediated neuropathy ([address] 12/21/2021    Diabetic peripheral neuropathy associated with type 2 diabetes mellitus ([address] 12/21/2021    B12 deficiency 12/21/2021    Sensory ataxic gait 12/21/2021    Cervical spondylosis with myelopathy 12/21/2021    Type 2 diabetes mellitus with stage 4 chronic kidney disease, with long-term current use of insulin ([address] 12/21/2021    Convulsions ([address] 12/21/2021    Acute alteration in mental status 12/21/2021    Myoclonus 12/21/2021    Sepsis ([address] 08/18/2021    Acute hypoxemic respiratory failure ([address] 39/73/9830    Systolic heart failure ([address] 05/04/2021    Accelerated hypertension 07/21/2020    Elevated troponin 07/21/2020    ESRD (end stage renal disease) on dialysis ([address] 07/21/2020    Acute respiratory failure with hypoxia ([address] 07/21/2020    Acute on chronic systolic CHF (congestive heart failure) ([address] 07/21/2020    Pneumonia 05/26/2019    Acute on chronic renal failure ([address] 10/29/2016    Hypotension 10/29/2016    Chest pain 10/29/2016    Type II diabetes mellitus with nephropathy ([facility] 10/29/2016    Hyperlipidemia 10/29/2016 Nephrotic syndrome 08/20/2016    Renal anasarca 08/20/2016    Diarrhea 10/28/2013    SIRS (systemic inflammatory response syndrome) ([facility] 10/28/2013    Acute pancreatitis 01/18/2011    LFT'S ABNORMAL 01/18/2011    Acute renal failure (ARF) ([facility] 01/18/2011    CKD (chronic kidney disease) stage 3, GFR 30-59 ml/min ([facility]) 01/18/2011    CAD (coronary artery disease) 01/18/2011    Abdominal pain, acute, epigastric 01/18/2011    Nausea & vomiting 01/18/2011           Past Medical History:   Diagnosis Date    Arthritis       spine    CAD (coronary artery disease)       high cholesterol    Chronic kidney disease       dialysis    Diabetes ([facility]      ESRD (end stage renal disease) ([facility])      GERD (gastroesophageal reflux disease)       HX    Hypertension      Ill-defined condition       irritable bowel syndrome    Systolic CHF ([facility]      Unspecified sleep apnea       NO CPAP         Core Measures:  CVA: No Not applicable  CHF:No Not applicable  PNA:No Not applicable     Activity:  Activity Level: Up with Assistance  Number times ambulated in hallways past shift: 0  Number of times OOB to chair past shift: 0     Cardiac:   Cardiac Monitoring: No        Access:   Current line(s): PIV   Central Line? No Placement date   PICC LINE? No Placement date      Genitourinary:   Urinary status: voiding  Urinary Catheter? No Placement Date      Respiratory:   O2 Device: Nasal cannula  Chronic home O2 use?: YES  Incentive spirometer at bedside: N/A     GI:  Last Bowel Movement Date: 11/16/22  Current diet:  ADULT DIET Regular; Low Fat/Low Chol/High Fiber/RANI  Passing flatus: YES  Tolerating current diet: YES     Pain Management:   Patient states pain is manageable on current regimen: YES     Skin:  Kelby Score: 20  Interventions: turn team, speciality bed, float heels, increase time out of bed, foam dressing, and PT/OT consult    Patient Safety:  Fall Score:  Total Score: 4  Interventions: bed/chair alarm, assistive device (walker, cane, etc), gripper socks, pt to call before getting OOB, and stay with me (per policy)  High Fall Risk: Yes  @Rollbelt  @dexterity to release roll belt  Yes/No ( must document dexterity  here by stating Yes or No here, otherwise this is a restraint and must follow restraint documentation policy.)     DVT prophylaxis:  DVT prophylaxis Med- Yes  DVT prophylaxis SCD or CINDY- No      Wounds: (If Applicable)  Wounds- No  Location None      Active Consults:  IP CONSULT TO NEUROLOGY  IP CONSULT TO NEPHROLOGY     Length of Stay:  Expected LOS: 3d 12h  Actual LOS: 2  Discharge Plan: No Home         Lion Ugarte RN

## 2022-11-19 NOTE — PROGRESS NOTES
End of Shift Note    Bedside shift change report given to [facility]  (oncoming nurse) by Toña Celis RN (offgoing nurse).   Report included the following information SBAR, Kardex, Procedure Summary, and MAR    Shift worked:  7am-7pm   Shift summary and any significant changes:    Out IV access       Concerns for physician to address:  As above   Zone phone for [facility] shift:   [phone]     Patient Information  Josefina Lot  78 y.o.  11/16/2022 12:20 AM by Nadeem Aguilar was admitted from Home    Problem List  Patient Active Problem List    Diagnosis Date Noted    UTI (lower urinary tract infection) 10/25/2013    Renal failure (ARF), acute on chronic ([address] 10/25/2013    Unable to ambulate 11/16/2022    Cervical post-laminectomy syndrome 06/09/2022    Degenerative cervical spinal stenosis 06/09/2022    Vitamin D deficiency 12/21/2021    Immune-mediated neuropathy ([address] 12/21/2021    Diabetic peripheral neuropathy associated with type 2 diabetes mellitus ([address] 12/21/2021    B12 deficiency 12/21/2021    Sensory ataxic gait 12/21/2021    Cervical spondylosis with myelopathy 12/21/2021    Type 2 diabetes mellitus with stage 4 chronic kidney disease, with long-term current use of insulin ([address] 12/21/2021    Convulsions ([address] 12/21/2021    Acute alteration in mental status 12/21/2021    Myoclonus 12/21/2021    Sepsis ([address] 08/18/2021    Acute hypoxemic respiratory failure ([address] 37/14/1270    Systolic heart failure ([address] 05/04/2021    Accelerated hypertension 07/21/2020    Elevated troponin 07/21/2020    ESRD (end stage renal disease) on dialysis ([address] 07/21/2020    Acute respiratory failure with hypoxia ([address] 07/21/2020    Acute on chronic systolic CHF (congestive heart failure) ([address] 07/21/2020    Pneumonia 05/26/2019    Acute on chronic renal failure ([address] 10/29/2016    Hypotension 10/29/2016    Chest pain 10/29/2016    Type II diabetes mellitus with nephropathy ([facility] 10/29/2016 Hyperlipidemia 10/29/2016    Nephrotic syndrome 08/20/2016    Renal anasarca 08/20/2016    Diarrhea 10/28/2013    SIRS (systemic inflammatory response syndrome) ([facility] 10/28/2013    Acute pancreatitis 01/18/2011    LFT'S ABNORMAL 01/18/2011    [facility] ([facility]) [facility] 01/18/2011    CKD (chronic kidney disease) stage 3, GFR 30-59 ml/min ([facility]) 01/18/2011    CAD (coronary artery disease) 01/18/2011    Abdominal pain, acute, epigastric 01/18/2011    Nausea & vomiting 01/18/2011     Past Medical History:   Diagnosis Date    Arthritis     spine    CAD (coronary artery disease)     high cholesterol    Chronic kidney disease     dialysis    Diabetes ([facility]     ESRD (end stage renal disease) ([facility])     GERD (gastroesophageal reflux disease)     HX    Hypertension     Ill-defined condition     irritable bowel syndrome    Systolic CHF ([facility]     Unspecified sleep apnea     NO CPAP       Core Measures:  CVA: No No  CHF:No No  PNA:No No    Activity:  Activity Level: Up with Assistance  Number times ambulated in hallways past shift: 0  Number of times OOB to chair past shift: 0    Cardiac:   Cardiac Monitoring: Yes           Access:   Current line(s): PIV   Central Line? No   PICC LINE? No     Genitourinary:   Urinary status: voiding  Urinary Catheter? No     Respiratory:   O2 Device: None (Room air)  Chronic home O2 use?: NO  Incentive spirometer at bedside: NO       GI:  LAST BM 11/20/2022  Current diet:  ADULT DIET Regular; Low Fat/Low Chol/High Fiber/RANI  Passing flatus: YES  Tolerating current diet: YES       Pain Management:   Patient states pain is manageable on current regimen: YES    Skin:  Kelby Score: 20  Interventions: increase time out of bed    Patient Safety:  Fall Score:  Total Score: 4  Interventions: bed/chair alarm, assistive device (walker, cane, etc), gripper socks, pt to call before getting OOB, and stay with me (per policy)  High Fall Risk: Yes  @Rollbelt  @dexterity to release roll belt  Yes/No ( must document dexterity  here by stating Yes or No here, otherwise this is a restraint and must follow restraint documentation policy.)    DVT prophylaxis:  DVT prophylaxis Med- Yes  DVT prophylaxis SCD or CINDY- No     Wounds: (If Applicable)  Wounds- No  Location     Active Consults:  IP CONSULT TO NEUROLOGY  IP CONSULT TO NEPHROLOGY    Length of Stay:  Expected LOS: 4d 7h  Actual LOS: 3  Discharge Plan: No       Deon Hudson RN

## 2022-11-20 LAB
GLUCOSE BLD STRIP.AUTO-MCNC: 141 MG/DL (ref 65–117)
GLUCOSE BLD STRIP.AUTO-MCNC: 165 MG/DL (ref 65–117)
GLUCOSE BLD STRIP.AUTO-MCNC: 192 MG/DL (ref 65–117)
GLUCOSE BLD STRIP.AUTO-MCNC: 197 MG/DL (ref 65–117)
SERVICE CMNT-IMP: ABNORMAL

## 2022-11-20 PROCEDURE — 74011250637 HC RX REV CODE- 250/637: Performed by: STUDENT IN AN ORGANIZED HEALTH CARE EDUCATION/TRAINING PROGRAM

## 2022-11-20 PROCEDURE — 82962 GLUCOSE BLOOD TEST: CPT

## 2022-11-20 PROCEDURE — 74011250636 HC RX REV CODE- 250/636: Performed by: STUDENT IN AN ORGANIZED HEALTH CARE EDUCATION/TRAINING PROGRAM

## 2022-11-20 PROCEDURE — 74011000250 HC RX REV CODE- 250: Performed by: NURSE PRACTITIONER

## 2022-11-20 PROCEDURE — 74011636637 HC RX REV CODE- 636/637: Performed by: STUDENT IN AN ORGANIZED HEALTH CARE EDUCATION/TRAINING PROGRAM

## 2022-11-20 PROCEDURE — 65270000046 HC RM TELEMETRY

## 2022-11-20 RX ORDER — METHOCARBAMOL 750 MG/1
750 TABLET, FILM COATED ORAL 4 TIMES DAILY
Status: DISCONTINUED | OUTPATIENT
Start: 2022-11-20 | End: 2022-12-04 | Stop reason: HOSPADM

## 2022-11-20 RX ORDER — TRAMADOL HYDROCHLORIDE 50 MG/1
50 TABLET ORAL
Status: DISCONTINUED | OUTPATIENT
Start: 2022-11-20 | End: 2022-11-26

## 2022-11-20 RX ORDER — OXYCODONE HYDROCHLORIDE 5 MG/1
5 TABLET ORAL
Status: DISCONTINUED | OUTPATIENT
Start: 2022-11-20 | End: 2022-11-20

## 2022-11-20 RX ORDER — LIDOCAINE 4 G/100G
1 PATCH TOPICAL EVERY 24 HOURS
Status: DISCONTINUED | OUTPATIENT
Start: 2022-11-20 | End: 2022-12-04 | Stop reason: HOSPADM

## 2022-11-20 RX ADMIN — Medication 20 UNITS: at 08:49

## 2022-11-20 RX ADMIN — FOLIC ACID 1 MG: 1 TABLET ORAL at 08:50

## 2022-11-20 RX ADMIN — ACETAMINOPHEN 650 MG: 325 TABLET ORAL at 07:32

## 2022-11-20 RX ADMIN — CARVEDILOL 12.5 MG: 12.5 TABLET, FILM COATED ORAL at 08:50

## 2022-11-20 RX ADMIN — BUMETANIDE 2 MG: 1 TABLET ORAL at 08:50

## 2022-11-20 RX ADMIN — HEPARIN SODIUM 5000 UNITS: 5000 INJECTION INTRAVENOUS; SUBCUTANEOUS at 21:28

## 2022-11-20 RX ADMIN — FINASTERIDE 5 MG: 5 TABLET, FILM COATED ORAL at 08:50

## 2022-11-20 RX ADMIN — SACUBITRIL AND VALSARTAN 1 TABLET: 24; 26 TABLET, FILM COATED ORAL at 08:50

## 2022-11-20 RX ADMIN — SACUBITRIL AND VALSARTAN 1 TABLET: 24; 26 TABLET, FILM COATED ORAL at 17:53

## 2022-11-20 RX ADMIN — OXYCODONE 5 MG: 5 TABLET ORAL at 13:54

## 2022-11-20 RX ADMIN — FLUTICASONE PROPIONATE 2 SPRAY: 50 SPRAY, METERED NASAL at 08:54

## 2022-11-20 RX ADMIN — SEVELAMER CARBONATE 800 MG: 800 TABLET, FILM COATED ORAL at 21:28

## 2022-11-20 RX ADMIN — TAMSULOSIN HYDROCHLORIDE 0.8 MG: 0.4 CAPSULE ORAL at 08:50

## 2022-11-20 RX ADMIN — Medication 2 UNITS: at 08:49

## 2022-11-20 RX ADMIN — METHOCARBAMOL TABLETS 750 MG: 750 TABLET, COATED ORAL at 15:30

## 2022-11-20 RX ADMIN — Medication 500 MCG: at 08:50

## 2022-11-20 RX ADMIN — ASPIRIN 81 MG: 81 TABLET, CHEWABLE ORAL at 08:50

## 2022-11-20 RX ADMIN — SEVELAMER CARBONATE 800 MG: 800 TABLET, FILM COATED ORAL at 08:50

## 2022-11-20 RX ADMIN — Medication 2 UNITS: at 12:03

## 2022-11-20 RX ADMIN — CARVEDILOL 12.5 MG: 12.5 TABLET, FILM COATED ORAL at 17:53

## 2022-11-20 RX ADMIN — HEPARIN SODIUM 5000 UNITS: 5000 INJECTION INTRAVENOUS; SUBCUTANEOUS at 05:24

## 2022-11-20 RX ADMIN — METHOCARBAMOL TABLETS 750 MG: 750 TABLET, COATED ORAL at 21:28

## 2022-11-20 RX ADMIN — TRAMADOL HYDROCHLORIDE 50 MG: 50 TABLET ORAL at 12:03

## 2022-11-20 RX ADMIN — HEPARIN SODIUM 5000 UNITS: 5000 INJECTION INTRAVENOUS; SUBCUTANEOUS at 13:54

## 2022-11-20 NOTE — PROGRESS NOTES
Pt. Is still in excruciating pain to his right side of his neck. Notified Dr. Donaldo Caceres to see pt.

## 2022-11-20 NOTE — PROGRESS NOTES
Pt. starts to get confused after all pain med was given,Dr. Beatrice Aranda made aware. Pt. was assisted to bed and we will continue to monitor

## 2022-11-20 NOTE — PROGRESS NOTES
End of Shift Note    Bedside shift change report given to Estephanie Cantu R.N  (oncoming nurse) by Janes Carroen RN (offgoing nurse). Report included the following information SBAR, Kardex, Procedure Summary, and MAR    Shift worked:  7am-7pm   Shift summary and any significant changes:    Out IV access. No significant shift events. C.o neck muscle pain lidocaine patch applied. OOB to bathroom x1 frequently.         Concerns for physician to address:  As above   Zone phone for oncoming shift:   [phone]       Patient Information  Remy Handler  78 y.o.  11/16/2022 12:20 AM by Og Mejia Rhina was admitted from Home    Problem List  Patient Active Problem List    Diagnosis Date Noted    UTI (lower urinary tract infection) 10/25/2013    Renal failure (ARF), acute on chronic ([address] 10/25/2013    Unable to ambulate 11/16/2022    Cervical post-laminectomy syndrome 06/09/2022    Degenerative cervical spinal stenosis 06/09/2022    Vitamin D deficiency 12/21/2021    Immune-mediated neuropathy ([address] 12/21/2021    Diabetic peripheral neuropathy associated with type 2 diabetes mellitus ([address] 12/21/2021    B12 deficiency 12/21/2021    Sensory ataxic gait 12/21/2021    Cervical spondylosis with myelopathy 12/21/2021    Type 2 diabetes mellitus with stage 4 chronic kidney disease, with long-term current use of insulin ([address] 12/21/2021    Convulsions ([address] 12/21/2021    Acute alteration in mental status 12/21/2021    Myoclonus 12/21/2021    Sepsis ([address] 08/18/2021    Acute hypoxemic respiratory failure ([address] 06/06/4964    Systolic heart failure ([address] 05/04/2021    Accelerated hypertension 07/21/2020    Elevated troponin 07/21/2020    ESRD (end stage renal disease) on dialysis ([address] 07/21/2020    Acute respiratory failure with hypoxia ([address] 07/21/2020    Acute on chronic systolic CHF (congestive heart failure) ([address] 07/21/2020    Pneumonia 05/26/2019    Acute on chronic renal failure ([facility] 10/29/2016    Hypotension 10/29/2016 Chest pain 10/29/2016    Type II diabetes mellitus with nephropathy ([facility] 10/29/2016    Hyperlipidemia 10/29/2016    Nephrotic syndrome 08/20/2016    Renal anasarca 08/20/2016    Diarrhea 10/28/2013    SIRS (systemic inflammatory response syndrome) ([facility] 10/28/2013    Acute pancreatitis 01/18/2011    LFT'S ABNORMAL 01/18/2011    Acute renal failure (ARF) ([facility] 01/18/2011    CKD (chronic kidney disease) stage 3, GFR 30-59 ml/min ([facility]) 01/18/2011    CAD (coronary artery disease) 01/18/2011    Abdominal pain, acute, epigastric 01/18/2011    Nausea & vomiting 01/18/2011     Past Medical History:   Diagnosis Date    Arthritis     spine    CAD (coronary artery disease)     high cholesterol    Chronic kidney disease     dialysis    Diabetes ([facility]     ESRD (end stage renal disease) ([facility]     GERD (gastroesophageal reflux disease)     HX    Hypertension     Ill-defined condition     irritable bowel syndrome    Systolic CHF ([facility]     Unspecified sleep apnea     NO CPAP       Core Measures:  CVA: No No  CHF:No No  PNA:No No    Activity:  Activity Level: Up with Assistance  Number times ambulated in hallways past shift: 0  Number of times OOB to chair past shift: 0    Cardiac:   Cardiac Monitoring: Yes           Access:   Current line(s): PIV   Central Line? No   PICC LINE? No     Genitourinary:   Urinary status: voiding  Urinary Catheter? No     Respiratory:   O2 Device: None (Room air)  Chronic home O2 use?: NO  Incentive spirometer at bedside: NO       GI:  LAST BM 11/20/2022  Current diet:  ADULT DIET Regular; Low Fat/Low Chol/High Fiber/RANI  Passing flatus: YES  Tolerating current diet: YES       Pain Management:   Patient states pain is manageable on current regimen: YES    Skin:  Kelby Score: 20  Interventions: increase time out of bed    Patient Safety:  Fall Score:  Total Score: 4  Interventions: bed/chair alarm, assistive device (walker, cane, etc), gripper socks, pt to call before getting OOB, and stay with me (per policy)  High Fall Risk: Yes  @Rollbelt  @dexterity to release roll belt  Yes/No ( must document dexterity  here by stating Yes or No here, otherwise this is a restraint and must follow restraint documentation policy.)    DVT prophylaxis:  DVT prophylaxis Med- Yes  DVT prophylaxis SCD or CINDY- No     Wounds: (If Applicable)  Wounds- No  Location     Active Consults:  IP CONSULT TO NEUROLOGY  IP CONSULT TO NEPHROLOGY    Length of Stay:  Expected LOS: 4d 7h  Actual LOS: 4  Discharge Plan: No       Chava Allison RN

## 2022-11-20 NOTE — PROGRESS NOTES
Hospitalist Progress Note    NAME: Kendell Rahman   :  1945   MRN:  [id number]       Assessment / Plan:  Generalized weakness  Recurrent falls    CT head/CT spine negative for acute finding  Neurology evaluation appreciated  B12 low normal, folate low, supplement started  PT OT consult: recommend SNF, CM aware  MRI reviewed shows spinal stenosis, discussed with neurology, recommend physical therapy and EMG outpatient  Complains of neck pain, tramadol started. Elevated troponin  Likely due to ESRD, slight fluid overload  Troponin 216--216  No chest pain. ESRD on HD  Nephrology following     Chronic systolic heart failure:  C/w entresto, coreg, bumex, aspirin  On 2 liters oxygen at baseline     DM:  NPH and  Sliding scale     Code Status: Full  Surrogate Decision Maker: His wife     DVT Prophylaxis: Heparin  GI Prophylaxis: not indicated     Baseline: From home, now unable to ambulate d/t his weakness    ANDRIY:   Barriers: Medically ready pending SNF     Subjective:     Chief Complaint / Reason for Physician Visit  Seen in his room  Continues to improved, ambulating better    Review of Systems:  Symptom Y/N Comments  Symptom Y/N Comments   Fever/Chills n   Chest Pain n    Poor Appetite n   Edema n    Cough n   Abdominal Pain n    Sputum    Joint Pain     SOB/BRITT    Pruritis/Rash     Nausea/vomit    Tolerating PT/OT     Diarrhea    Tolerating Diet     Constipation    Other       Could NOT obtain due to:      Objective:     VITALS:   Last 24hrs VS reviewed since prior progress note.  Most recent are:  Patient Vitals for the past 24 hrs:   Temp Pulse Resp BP SpO2   22 0723 98.4 °F (36.9 °C) 76 18 138/76 96 %   22 2307 98.4 °F (36.9 °C) 69 18 (!) 140/87 98 %   22 1438 98.4 °F (36.9 °C) (!) 59 17 130/76 100 %     No intake or output data in the 24 hours ending 22 1250       I had a face to face encounter and independently examined this patient on 2022, as outlined below:  PHYSICAL EXAM:  General: Awake, No acute distress  EENT:  EOMI. Anicteric sclerae. MMM  Resp:  CTA bilaterally, no wheezing or rales. No accessory muscle use  CV:  Regular  rhythm,  No edema  GI:  Soft, Non distended, Non tender. +Bowel sounds  Neurologic:  Power 4/5 in all ext  Psych:   Not anxious nor agitated  Skin:  No rashes. No jaundice    Reviewed most current lab test results and cultures  YES  Reviewed most current radiology test results   YES  Review and summation of old records today    NO  Reviewed patient's current orders and MAR    YES  PMH/SH reviewed - no change compared to H&P  ________________________________________________________________________  Care Plan discussed with:    Comments   Patient y    Family      RN y    Care Manager     Consultant                        Multidiciplinary team rounds were held today with , nursing, pharmacist and clinical coordinator. Patient's plan of care was discussed; medications were reviewed and discharge planning was addressed. ________________________________________________________________________  Total NON critical care TIME:  27  Minutes    Total CRITICAL CARE TIME Spent:   Minutes non procedure based      Comments   >50% of visit spent in counseling and coordination of care     ________________________________________________________________________  Elmira Nj MD     Procedures: see electronic medical records for all procedures/Xrays and details which were not copied into this note but were reviewed prior to creation of Plan. LABS:  I reviewed today's most current labs and imaging studies.   Pertinent labs include:  Recent Labs     11/18/22  0800   WBC 5.3   HGB 10.7*   HCT 33.5*        Recent Labs     11/18/22  0800      K 4.8      CO2 30   *   BUN 50*   CREA 6.19*   CA 9.1   PHOS 3.8   ALB 3.1*       Signed: Elmira Nj MD

## 2022-11-20 NOTE — PROGRESS NOTES
Received notification from bedside RN about patient with regards to neck muscle pain, requesting Lidocaine patch  VS: /87, HR 69, RR 18, O2 sat 98% on RA    Intervention given: Lidocaine patch q 24 hours ordered

## 2022-11-20 NOTE — PROGRESS NOTES
Problem: Falls - Risk of  Goal: *Absence of Falls  Description: Document Reji Tinajero Fall Risk and appropriate interventions in the flowsheet.   Note: Fall Risk Interventions:  Mobility Interventions: Bed/chair exit alarm, Patient to call before getting OOB         Medication Interventions: Bed/chair exit alarm, Patient to call before getting OOB    Elimination Interventions: Patient to call for help with toileting needs, Stay With Me (per policy), Toileting schedule/hourly rounds    History of Falls Interventions: Bed/chair exit alarm, Evaluate medications/consider consulting pharmacy, Room close to nurse's station

## 2022-11-21 ENCOUNTER — APPOINTMENT (OUTPATIENT)
Dept: CT IMAGING | Age: 77
End: 2022-11-21
Attending: PHYSICIAN ASSISTANT
Payer: MEDICARE

## 2022-11-21 LAB
AMMONIA PLAS-SCNC: 35 UMOL/L
ANION GAP SERPL CALC-SCNC: 8 MMOL/L (ref 5–15)
ANION GAP SERPL CALC-SCNC: 9 MMOL/L (ref 5–15)
BUN SERPL-MCNC: 28 MG/DL (ref 6–20)
BUN SERPL-MCNC: 60 MG/DL (ref 6–20)
BUN/CREAT SERPL: 6 (ref 12–20)
BUN/CREAT SERPL: 7 (ref 12–20)
CALCIUM SERPL-MCNC: 9.5 MG/DL (ref 8.5–10.1)
CALCIUM SERPL-MCNC: 9.5 MG/DL (ref 8.5–10.1)
CHLORIDE SERPL-SCNC: 100 MMOL/L (ref 97–108)
CHLORIDE SERPL-SCNC: 95 MMOL/L (ref 97–108)
CO2 SERPL-SCNC: 23 MMOL/L (ref 21–32)
CO2 SERPL-SCNC: 30 MMOL/L (ref 21–32)
COVID-19 RAPID TEST, COVR: NOT DETECTED
CREAT SERPL-MCNC: 4.42 MG/DL (ref 0.7–1.3)
CREAT SERPL-MCNC: 8.01 MG/DL (ref 0.7–1.3)
CRP SERPL-MCNC: 13.2 MG/DL (ref 0–0.6)
ERYTHROCYTE [DISTWIDTH] IN BLOOD BY AUTOMATED COUNT: 15.5 % (ref 11.5–14.5)
GLUCOSE BLD STRIP.AUTO-MCNC: 108 MG/DL (ref 65–117)
GLUCOSE BLD STRIP.AUTO-MCNC: 132 MG/DL (ref 65–117)
GLUCOSE BLD STRIP.AUTO-MCNC: 170 MG/DL (ref 65–117)
GLUCOSE BLD STRIP.AUTO-MCNC: 180 MG/DL (ref 65–117)
GLUCOSE SERPL-MCNC: 116 MG/DL (ref 65–100)
GLUCOSE SERPL-MCNC: 181 MG/DL (ref 65–100)
HCT VFR BLD AUTO: 33.8 % (ref 36.6–50.3)
HGB BLD-MCNC: 10.9 G/DL (ref 12.1–17)
LACTATE SERPL-SCNC: 1.7 MMOL/L (ref 0.4–2)
MCH RBC QN AUTO: 30 PG (ref 26–34)
MCHC RBC AUTO-ENTMCNC: 32.2 G/DL (ref 30–36.5)
MCV RBC AUTO: 93.1 FL (ref 80–99)
NRBC # BLD: 0 K/UL (ref 0–0.01)
NRBC BLD-RTO: 0 PER 100 WBC
PLATELET # BLD AUTO: 206 K/UL (ref 150–400)
PMV BLD AUTO: 11.5 FL (ref 8.9–12.9)
POTASSIUM SERPL-SCNC: 4.1 MMOL/L (ref 3.5–5.1)
POTASSIUM SERPL-SCNC: 7.6 MMOL/L (ref 3.5–5.1)
PROCALCITONIN SERPL-MCNC: 8.44 NG/ML
RBC # BLD AUTO: 3.63 M/UL (ref 4.1–5.7)
SERVICE CMNT-IMP: ABNORMAL
SERVICE CMNT-IMP: NORMAL
SODIUM SERPL-SCNC: 132 MMOL/L (ref 136–145)
SODIUM SERPL-SCNC: 133 MMOL/L (ref 136–145)
SOURCE, COVRS: NORMAL
WBC # BLD AUTO: 9 K/UL (ref 4.1–11.1)

## 2022-11-21 PROCEDURE — 72125 CT NECK SPINE W/O DYE: CPT

## 2022-11-21 PROCEDURE — 83605 ASSAY OF LACTIC ACID: CPT

## 2022-11-21 PROCEDURE — 74011250637 HC RX REV CODE- 250/637: Performed by: NURSE PRACTITIONER

## 2022-11-21 PROCEDURE — 65270000046 HC RM TELEMETRY

## 2022-11-21 PROCEDURE — 80048 BASIC METABOLIC PNL TOTAL CA: CPT

## 2022-11-21 PROCEDURE — 74011250636 HC RX REV CODE- 250/636: Performed by: STUDENT IN AN ORGANIZED HEALTH CARE EDUCATION/TRAINING PROGRAM

## 2022-11-21 PROCEDURE — 74011000250 HC RX REV CODE- 250: Performed by: NURSE PRACTITIONER

## 2022-11-21 PROCEDURE — 82962 GLUCOSE BLOOD TEST: CPT

## 2022-11-21 PROCEDURE — 74011636637 HC RX REV CODE- 636/637: Performed by: STUDENT IN AN ORGANIZED HEALTH CARE EDUCATION/TRAINING PROGRAM

## 2022-11-21 PROCEDURE — 74011250637 HC RX REV CODE- 250/637: Performed by: STUDENT IN AN ORGANIZED HEALTH CARE EDUCATION/TRAINING PROGRAM

## 2022-11-21 PROCEDURE — 82140 ASSAY OF AMMONIA: CPT

## 2022-11-21 PROCEDURE — 87635 SARS-COV-2 COVID-19 AMP PRB: CPT

## 2022-11-21 PROCEDURE — 90935 HEMODIALYSIS ONE EVALUATION: CPT

## 2022-11-21 PROCEDURE — 36415 COLL VENOUS BLD VENIPUNCTURE: CPT

## 2022-11-21 PROCEDURE — 70450 CT HEAD/BRAIN W/O DYE: CPT

## 2022-11-21 PROCEDURE — 85027 COMPLETE CBC AUTOMATED: CPT

## 2022-11-21 PROCEDURE — 84145 PROCALCITONIN (PCT): CPT

## 2022-11-21 PROCEDURE — 86140 C-REACTIVE PROTEIN: CPT

## 2022-11-21 RX ORDER — HYDRALAZINE HYDROCHLORIDE 20 MG/ML
20 INJECTION INTRAMUSCULAR; INTRAVENOUS
Status: DISCONTINUED | OUTPATIENT
Start: 2022-11-21 | End: 2022-12-04 | Stop reason: HOSPADM

## 2022-11-21 RX ADMIN — ACETAMINOPHEN 650 MG: 325 TABLET ORAL at 22:19

## 2022-11-21 RX ADMIN — Medication 20 UNITS: at 17:19

## 2022-11-21 RX ADMIN — LACTULOSE 30 ML: 20 SOLUTION ORAL at 17:19

## 2022-11-21 RX ADMIN — LACTULOSE 30 ML: 20 SOLUTION ORAL at 12:47

## 2022-11-21 RX ADMIN — SACUBITRIL AND VALSARTAN 1 TABLET: 24; 26 TABLET, FILM COATED ORAL at 17:19

## 2022-11-21 RX ADMIN — TAMSULOSIN HYDROCHLORIDE 0.8 MG: 0.4 CAPSULE ORAL at 12:46

## 2022-11-21 RX ADMIN — HEPARIN SODIUM 5000 UNITS: 5000 INJECTION INTRAVENOUS; SUBCUTANEOUS at 06:24

## 2022-11-21 RX ADMIN — SEVELAMER CARBONATE 800 MG: 800 TABLET, FILM COATED ORAL at 22:19

## 2022-11-21 RX ADMIN — METHOCARBAMOL TABLETS 750 MG: 750 TABLET, COATED ORAL at 12:46

## 2022-11-21 RX ADMIN — HEPARIN SODIUM 5000 UNITS: 5000 INJECTION INTRAVENOUS; SUBCUTANEOUS at 22:19

## 2022-11-21 RX ADMIN — METHOCARBAMOL TABLETS 750 MG: 750 TABLET, COATED ORAL at 22:19

## 2022-11-21 RX ADMIN — CARVEDILOL 12.5 MG: 12.5 TABLET, FILM COATED ORAL at 17:19

## 2022-11-21 RX ADMIN — TRAMADOL HYDROCHLORIDE 50 MG: 50 TABLET ORAL at 06:29

## 2022-11-21 RX ADMIN — FLUTICASONE PROPIONATE 2 SPRAY: 50 SPRAY, METERED NASAL at 12:46

## 2022-11-21 RX ADMIN — FOLIC ACID 1 MG: 1 TABLET ORAL at 12:47

## 2022-11-21 RX ADMIN — CARVEDILOL 12.5 MG: 12.5 TABLET, FILM COATED ORAL at 12:47

## 2022-11-21 RX ADMIN — GABAPENTIN 300 MG: 300 CAPSULE ORAL at 17:19

## 2022-11-21 RX ADMIN — HEPARIN SODIUM 5000 UNITS: 5000 INJECTION INTRAVENOUS; SUBCUTANEOUS at 13:09

## 2022-11-21 RX ADMIN — ASPIRIN 81 MG: 81 TABLET, CHEWABLE ORAL at 12:46

## 2022-11-21 RX ADMIN — SEVELAMER CARBONATE 800 MG: 800 TABLET, FILM COATED ORAL at 12:46

## 2022-11-21 RX ADMIN — METHOCARBAMOL TABLETS 750 MG: 750 TABLET, COATED ORAL at 17:19

## 2022-11-21 RX ADMIN — Medication 500 MCG: at 12:47

## 2022-11-21 RX ADMIN — BUMETANIDE 2 MG: 1 TABLET ORAL at 12:46

## 2022-11-21 RX ADMIN — SACUBITRIL AND VALSARTAN 1 TABLET: 24; 26 TABLET, FILM COATED ORAL at 12:47

## 2022-11-21 RX ADMIN — FINASTERIDE 5 MG: 5 TABLET, FILM COATED ORAL at 12:46

## 2022-11-21 NOTE — PROGRESS NOTES
End of Shift Note     Bedside shift change report given to Willian Rodriguez (oncoming nurse) by Yarely Zaldivar RN (offgoing nurse). Report included the following information SBAR, Kardex, ED Summary, and MAR     Shift worked: 1a-7a   Shift summary and any significant changes:     Patient was confused more than I saw from previous night. Unable to follow simple directions. Informed NP received orders for labs, K 7.6 and ammonia 35             Concerns for physician to address:  Patient was confused more than I saw from previous night. Unable to follow simple directions.  Informed NP received orders for labs, K 7.6 and ammonia 35    Zone phone for oncoming shift:   [phone]      Patient Information  Levester Antis  78 y.o.  11/16/2022 12:20 AM by Lance Brothers was admitted from Home     Problem List          Patient Active Problem List     Diagnosis Date Noted    UTI (lower urinary tract infection) 10/25/2013    Renal failure (ARF), acute on chronic ([address] 10/25/2013    Unable to ambulate 11/16/2022    Cervical post-laminectomy syndrome 06/09/2022    Degenerative cervical spinal stenosis 06/09/2022    Vitamin D deficiency 12/21/2021    Immune-mediated neuropathy ([address] 12/21/2021    Diabetic peripheral neuropathy associated with type 2 diabetes mellitus ([address] 12/21/2021    B12 deficiency 12/21/2021    Sensory ataxic gait 12/21/2021    Cervical spondylosis with myelopathy 12/21/2021    Type 2 diabetes mellitus with stage 4 chronic kidney disease, with long-term current use of insulin ([address] 12/21/2021    Convulsions ([address] 12/21/2021    Acute alteration in mental status 12/21/2021    Myoclonus 12/21/2021    Sepsis ([address] 08/18/2021    Acute hypoxemic respiratory failure ([address] 93/01/3777    Systolic heart failure ([address] 05/04/2021    Accelerated hypertension 07/21/2020    Elevated troponin 07/21/2020    ESRD (end stage renal disease) on dialysis ([address] 07/21/2020    Acute respiratory failure with hypoxia ([address] 07/21/2020 Acute on chronic systolic CHF (congestive heart failure) ([facility] 07/21/2020    Pneumonia 05/26/2019    Acute on chronic renal failure ([facility] 10/29/2016    Hypotension 10/29/2016    Chest pain 10/29/2016    Type II diabetes mellitus with nephropathy ([facility] 10/29/2016    Hyperlipidemia 10/29/2016    Nephrotic syndrome 08/20/2016    Renal anasarca 08/20/2016    Diarrhea 10/28/2013    SIRS (systemic inflammatory response syndrome) ([facility] 10/28/2013    Acute pancreatitis 01/18/2011    LFT'S ABNORMAL 01/18/2011    Acute renal failure (ARF) ([facility] 01/18/2011    CKD (chronic kidney disease) stage 3, GFR 30-59 ml/min ([facility]) 01/18/2011    CAD (coronary artery disease) 01/18/2011    Abdominal pain, acute, epigastric 01/18/2011    Nausea & vomiting 01/18/2011              Past Medical History:   Diagnosis Date    Arthritis       spine    CAD (coronary artery disease)       high cholesterol    Chronic kidney disease       dialysis    Diabetes ([facility]      ESRD (end stage renal disease) ([facility]      GERD (gastroesophageal reflux disease)       HX    Hypertension      Ill-defined condition       irritable bowel syndrome    Systolic CHF ([facility]      Unspecified sleep apnea       NO CPAP         Core Measures:  CVA: No Not applicable  CHF:No Not applicable  PNA:No Not applicable     Activity:  Activity Level: Up with Assistance  Number times ambulated in hallways past shift: 0  Number of times OOB to chair past shift: 0     Cardiac:   Cardiac Monitoring: No        Access:   Current line(s): PIV   Central Line? No Placement date   PICC LINE? No Placement date      Genitourinary:   Urinary status: voiding  Urinary Catheter?  No Placement Date      Respiratory:   O2 Device: Nasal cannula  Chronic home O2 use?: YES  Incentive spirometer at bedside: N/A     GI:  Last Bowel Movement Date: 11/19//22  Current diet:  ADULT DIET Regular; Low Fat/Low Chol/High Fiber/RANI  Passing flatus: YES  Tolerating current diet: YES     Pain Management:   Patient states pain is manageable on current regimen: YES     Skin:  Kelby Score: 20  Interventions: turn team, speciality bed, float heels, increase time out of bed, foam dressing, and PT/OT consult    Patient Safety:  Fall Score:  Total Score: 4  Interventions: bed/chair alarm, assistive device (walker, cane, etc), gripper socks, pt to call before getting OOB, and stay with me (per policy)  High Fall Risk: Yes  @Rollbelt  @dexterity to release roll belt  Yes/No ( must document dexterity  here by stating Yes or No here, otherwise this is a restraint and must follow restraint documentation policy.)     DVT prophylaxis:  DVT prophylaxis Med- Yes  DVT prophylaxis SCD or CINDY- No      Wounds: (If Applicable)  Wounds- No  Location None      Active Consults:  IP CONSULT TO NEUROLOGY  IP CONSULT TO NEPHROLOGY     Length of Stay:  Expected LOS: 3d 12h  Actual LOS: 2  Discharge Plan: No Home         Nkechi Holden RN

## 2022-11-21 NOTE — PROGRESS NOTES
Problem: Falls - Risk of  Goal: *Absence of Falls  Description: Document Holly Litter Fall Risk and appropriate interventions in the flowsheet.   Note: Fall Risk Interventions:  Mobility Interventions: Bed/chair exit alarm, Patient to call before getting OOB         Medication Interventions: Bed/chair exit alarm    Elimination Interventions: Call light in reach, Bed/chair exit alarm, Patient to call for help with toileting needs, Stay With Me (per policy), Toileting schedule/hourly rounds    History of Falls Interventions: Bed/chair exit alarm, Evaluate medications/consider consulting pharmacy, Room close to nurse's station

## 2022-11-21 NOTE — PROGRESS NOTES
Physical Therapy Note    0911 - Chart reviewed. Attempted to work with patient for PT. Patient off unit at dialysis. Will follow as able and as appropriate. Update [phone] discussed patient with RN. Patient with altered mental status/confusion and plan for stat head CT. Also noted per chart that patient is planned for CT spine cervical priority STAT for pain. Will defer for now and follow-up as able and as appropriate.     [facility], PT, DPT

## 2022-11-21 NOTE — PROGRESS NOTES
Hospitalist Progress Note    Subjective:   Daily Progress Note: 11/21/2022 7:14 AM    Hospital Course: Patient is a 72-year-old black male with a history of end-stage disease on hemodialysis Monday, Wednesday,, type 2 diabetes, GERD, hypertension, chronic hypoxic respiratory failure requiring 2 L oxygen nasal cannula at home, systolic heart failure who presented to the emergency room on 11/16/2022 for worsening weakness and recurrent falls. He denied hitting his head or losing consciousness. CT of the head and spine were negative for acute findings. As patient was unable to walk he was admitted in the hospital for further evaluation. Nephrology consulted for hemodialysis management. Neurology consulted. Vitamin B12 low, folate low and began supplementation. MRI of the lumbar spine showed spinal stenosis with multilevel degenerative disc disease. Per neurology recommend physical and occupational therapy and EMG as an outpatient. PT and OT recommend SNF placement. Case management consulted for discharge planning. Overnight on 11/20/2022 patient became confused and altered. Sodium was 132, potassium 7.6, BUN 60, serum creatinine 8.01, glucose 181, ammonia level 35. He was started on lactulose. Subjective: Patient is alert to self and place only.   He does admit to pain due to confusion cannot localize it    Current Facility-Administered Medications   Medication Dose Route Frequency    lactulose (CHRONULAC) 10 gram/15 mL solution 30 mL  20 g Oral BID    lidocaine 4 % patch 1 Patch  1 Patch TransDERmal Q24H    traMADoL (ULTRAM) tablet 50 mg  50 mg Oral Q6H PRN    methocarbamoL (ROBAXIN) tablet 750 mg  750 mg Oral QID    cyanocobalamin (VITAMIN B12) tablet 500 mcg  500 mcg Oral DAILY    folic acid (FOLVITE) tablet 1 mg  1 mg Oral DAILY    insulin NPH (NOVOLIN N, HUMULIN N) injection 20 Units  20 Units SubCUTAneous ACB&D    sodium chloride (NS) flush 5-40 mL  5-40 mL IntraVENous Q8H    sodium chloride (NS) flush 5-40 mL  5-40 mL IntraVENous PRN    acetaminophen (TYLENOL) tablet 650 mg  650 mg Oral Q6H PRN    Or    acetaminophen (TYLENOL) suppository 650 mg  650 mg Rectal Q6H PRN    polyethylene glycol (MIRALAX) packet 17 g  17 g Oral DAILY PRN    ondansetron (ZOFRAN ODT) tablet 4 mg  4 mg Oral Q8H PRN    Or    ondansetron (ZOFRAN) injection 4 mg  4 mg IntraVENous Q6H PRN    heparin (porcine) injection 5,000 Units  5,000 Units SubCUTAneous Q8H    insulin lispro (HUMALOG) injection   SubCUTAneous AC&HS    glucose chewable tablet 16 g  4 Tablet Oral PRN    glucagon (GLUCAGEN) injection 1 mg  1 mg IntraMUSCular PRN    dextrose 10% infusion 0-250 mL  0-250 mL IntraVENous PRN    aspirin chewable tablet 81 mg  81 mg Oral DAILY    bumetanide (BUMEX) tablet 2 mg  2 mg Oral DAILY    carvediloL (COREG) tablet 12.5 mg  12.5 mg Oral BID WITH MEALS    sacubitriL-valsartan (ENTRESTO) 24-26 mg tablet 1 Tablet  1 Tablet Oral BID    finasteride (PROSCAR) tablet 5 mg  5 mg Oral DAILY    fluticasone propionate (FLONASE) 50 mcg/actuation nasal spray 2 Spray  2 Spray Both Nostrils DAILY    gabapentin (NEURONTIN) capsule 300 mg  300 mg Oral TID PRN    sevelamer carbonate (RENVELA) tab 800 mg  800 mg Oral BID    tamsulosin (FLOMAX) capsule 0.8 mg  0.8 mg Oral DAILY        Review of Systems  Constitutional: No fevers, No chills, No sweats, ++ fatigue, ++Weakness  Eyes: No redness  Ears, nose, mouth, throat, and face: No nasal congestion, No sore throat, No voice change  Respiratory: No Shortness of Breath, No cough, No wheezing  Cardiovascular: No chest pain, No palpitations, No extremity edema  Gastrointestinal: No nausea, No vomiting, No diarrhea, No abdominal pain  Genitourinary: No frequency, No dysuria, No hematuria  Integument/breast: No skin lesion(s)   Neurological: ++Confusion, No headaches, No dizziness      Objective:     Visit Vitals  BP (!) 150/80   Pulse 71   Temp 98.6 °F (37 °C)   Resp 24   Ht 6' (1.829 m)   Wt 105.1 kg (231 lb 9.6 oz)   SpO2 96%   BMI 31.41 kg/m²    O2 Flow Rate (L/min): 2 l/min O2 Device: Nasal cannula    Temp (24hrs), Av.8 °F (37.1 °C), Min:98.4 °F (36.9 °C), Max:99.5 °F (37.5 °C)      No intake/output data recorded. No intake/output data recorded. PHYSICAL EXAM:  Constitutional: No acute distress  Skin: Extremities and face reveal no rashes. HEENT: Sclerae anicteric. Extra-occular muscles are intact. No oral ulcers. The neck is supple and no masses. Cardiovascular: Regular rate and rhythm. Respiratory:  Clear breath sounds bilaterally with no wheezes, rales, or rhonchi. GI: Abdomen nondistended, soft, and nontender. Normal active bowel sounds. Musculoskeletal: No pitting edema of the lower legs. Able to move all ext  Neurological:  Patient is alert and oriented.  Cranial nerves II-XII grossly intact  Psychiatric: Mood appears appropriate       Data Review    Recent Results (from the past 24 hour(s))   GLUCOSE, POC    Collection Time: 22  8:20 AM   Result Value Ref Range    Glucose (POC) 141 (H) 65 - 117 mg/dL    Performed by Serafin Zavala (CON)    GLUCOSE, POC    Collection Time: 22 11:17 AM   Result Value Ref Range    Glucose (POC) 197 (H) 65 - 117 mg/dL    Performed by Serafin Zavala (CON)    GLUCOSE, POC    Collection Time: 22  4:38 PM   Result Value Ref Range    Glucose (POC) 165 (H) 65 - 117 mg/dL    Performed by Batsheva Espinosa    GLUCOSE, POC    Collection Time: 22  9:35 PM   Result Value Ref Range    Glucose (POC) 192 (H) 65 - 117 mg/dL    Performed by Caro Sanz PCT    CBC W/O DIFF    Collection Time: 22  4:54 AM   Result Value Ref Range    WBC 9.0 4.1 - 11.1 K/uL    RBC 3.63 (L) 4.10 - 5.70 M/uL    HGB 10.9 (L) 12.1 - 17.0 g/dL    HCT 33.8 (L) 36.6 - 50.3 %    MCV 93.1 80.0 - 99.0 FL    MCH 30.0 26.0 - 34.0 PG    MCHC 32.2 30.0 - 36.5 g/dL    RDW 15.5 (H) 11.5 - 14.5 %    PLATELET 522 822 - 092 K/uL    MPV 11.5 8.9 - 12.9 FL    NRBC 0.0 0  WBC ABSOLUTE NRBC 0.00 0.00 - 1.37 K/uL   METABOLIC PANEL, BASIC    Collection Time: 11/21/22  4:54 AM   Result Value Ref Range    Sodium 132 (L) 136 - 145 mmol/L    Potassium 7.6 (HH) 3.5 - 5.1 mmol/L    Chloride 100 97 - 108 mmol/L    CO2 23 21 - 32 mmol/L    Anion gap 9 5 - 15 mmol/L    Glucose 181 (H) 65 - 100 mg/dL    BUN 60 (H) 6 - 20 MG/DL    Creatinine 8.01 (H) 0.70 - 1.30 MG/DL    BUN/Creatinine ratio 7 (L) 12 - 20      eGFR 6 (L) >60 ml/min/1.73m2    Calcium 9.5 8.5 - 10.1 MG/DL   AMMONIA    Collection Time: 11/21/22  4:54 AM   Result Value Ref Range    Ammonia, plasma 35 (H) <32 UMOL/L       Radiology review: MRI of spine    Assessment:   1. Generalized weakness with recurrent falls secondary to spinal stenosis and multilevel disc and facet degeneration  2. Elevated troponin secondary to kidney disease. 3.  End-stage renal disease on hemodialysis Monday, Wednesday, Friday with critical severe hyper kalemia  4. Chronic systolic heart failure  5. Type 2 diabetes  6. Chronic hypoxic respiratory failure  7. Acute confusion multifactorial      Plan:   1.  CT of the head and spine reviewed with no acute findings. MRI of the lumbar spine showed spinal stenosis. Neurology consulted. They recommend physical therapy and EMG as an outpatient. Tramadol as needed. Lidocaine patch ordered. Vitamin B12 and folate low and supplemented. PT OT recommend SNF placement. Case management consulted  2. Troponins elevated on admission. Patient denies any chest pain. Chest likely secondary to renal disease  3. Patient's potassium is significantly elevated. He is scheduled for dialysis later today. Nephrology consulted. On Renvela  4. Patient on Entresto, Coreg, Bumex, aspirin. 5.  Glucose levels are stable. Patient on insulin sliding scale and NPH 20 units twice daily  6. Continue 2 L of oxygen nasal cannula. 7.  Patient is acutely confused.   Multifactorial.  Could be due to prolonged hospitalization, medications, hyperkalemia, elevated ammonia level. Patient is on lactulose 20 g twice daily tramadol as needed for pain and Tylenol. Patient will receive dialysis today to help lower lower the potassium. Repeat BMP after HD. IF no improvement in mental status after HD then consider repeat CT of head  8. CBC, BMP, ammonia level in the a.m. Dispo: 24 hours. Barriers include patient's mental status to improve. Will need placement. CODE STATUS full     DVT prophylaxis: Heparin  Ulcer prophylaxis: Not indicated    Care Plan discussed with: Patient/Family, Nurse, and     Total time spent with patient: 34 minutes. Repeat lab data unremarkable. CT of head no acute finding. Wonder if confusion is due to prolong hospitalization, acute delirium, and narcotic induced. Hold tramadol. If no improvement tomorrow will consider neurology consult. Wife updated at bedside.  All question answered

## 2022-11-21 NOTE — PROCEDURES
Hemodialysis / [phone]    Vitals Pre Post Assessment Pre Post   BP BP: 139/77 (11/21/22 0740) 165/91 LOC A&Ox3, delayed responses A&Ox3, delayed responses   HR Pulse (Heart Rate): 60 (11/21/22 0740) 77 Lungs clear clear   Resp Resp Rate: 18 (11/21/22 0740) 18 Cardiac RRR RRR   Temp Temp: 99.9 °F (37.7 °C) (11/21/22 0740) 98.8 Skin intact intact   Weight  N/a N/a Edema Trace BLE Trace BLE   Tele status None ordered None ordered Pain 0 0     Orders   Duration: Start: 0740 End: 1140 Total: 4 hous   Dialyzer: Dialyzer/Set Up Inspection: Raymond Talmaantes (11/21/22 0740)   K Bath: Dialysate K (mEq/L): 2 (11/21/22 0740)   Ca Bath: Dialysate CA (mEq/L): 2.5 (11/21/22 0740)   Na: Dialysate NA (mEq/L): 140 (11/21/22 0740)   Bicarb: Dialysate HCO3 (mEq/L): 40 (11/21/22 0740)   Target Fluid Removal: Goal/Amount of Fluid to Remove (mL): 2000 mL (11/21/22 0740)     Access   Type & Location: STEPHANIE AVG   Comments: STEPHANIE AVF: Pre-assessment: skin CDI. No s/s of infection. + B/T. No issues with cannulation. Running well at . Post assessment: No issues with hemostasis, + B/T remains. Dressing CDI.      Labs   HBsAg (Antigen) / date: Negative 11/16/22                                             HBsAb (Antibody) / date: Susceptible 11/16/22   Source: Epic   Obtained/Reviewed  Critical Results Called HGB   Date Value Ref Range Status   11/21/2022 10.9 (L) 12.1 - 17.0 g/dL Final     Potassium   Date Value Ref Range Status   11/21/2022 7.6 (HH) 3.5 - 5.1 mmol/L Final     Comment:     INVESTIGATED PER DELTA CHECK PROTOCOL  RESULTS VERIFIED, PHONED TO AND READ BACK BY  MASON SOLOMON AT 3667 TA        Calcium   Date Value Ref Range Status   11/21/2022 9.5 8.5 - 10.1 MG/DL Final     BUN   Date Value Ref Range Status   11/21/2022 60 (H) 6 - 20 MG/DL Final     Creatinine   Date Value Ref Range Status   11/21/2022 8.01 (H) 0.70 - 1.30 MG/DL Final        Meds Given   Name Dose Route   none                 Adequacy / Fluid    Total Liters Process: 87.7L   Net Fluid Removed: 2000ml      Comments   Time Out Done:   (Time) 0738   Admitting Diagnosis: Generalized weakness   Consent obtained/signed: Informed Consent Verified: Yes (chronic dialysis, no consent needed) (11/21/22 0740)   Machine / RO # Machine Number: [id number] (11/21/22 0740)   Primary Nurse Rpt Pre: Emelia Campos RN   Primary Nurse Rpt Post: Tessie Alvares RN   Pt Education: Procedure   Care Plan: Ongoing   Pts outpatient clinic: [facility]     Tx Summary   Comments: SBAR received from Primary RN. Pt arrived to HD suite A&Ox2-3 with delayed responses. Chronic consent applies. Pt agreeable to treatment. 0740: Pt cannulated with 77V needles per policy & without issue. VSS. Dialysis Tx initiated. 0815: Dr. Luanne Lawton notified of K 7.6. Pt switched to 1 K bath for two hours and will finish treatment with 2 K bath. Treatment extended to 4 hours per Dr. Luanne Lawton. 0945: Flushed with 200ml NS to prevent clotting. 1140: Tx ended. VSS. All possible blood returned to patient. Hemostasis achieved without issue after hand held pressure x 10 minutes. Bed locked and in the lowest position, call bell and belongings in reach. SBAR given to Primary, RN. Patient is stable at time of their departure. All Dialysis related medications have been reviewed.

## 2022-11-21 NOTE — PROGRESS NOTES
Received notification from bedside RN about patient with regards to: patient confusion which is new  VS: /80, HR 71, RR 24, O2 sat 96% on NC 2 L    Intervention given: CBC, BMP, Ammonia for this AM ordered

## 2022-11-21 NOTE — PROGRESS NOTES
Nephrology Progress Note  [facility] [address], [place], [address]  Phone - [phone]  Fax - [phone]                 Patient: Danica Archer                   YOB: 1945        Date- 11/21/2022                      Admit Date: 11/16/2022  CC: Follow up for ESRD         IMPRESSION & PLAN:   ESRD- hd mwf at [facility]  Hyperkalemia- k 7.6 on 11-21-22  Anemia of ckd  Sec. Hyperpara  S/p fall  Weakness  H/o CHF  Dm 2      PLAN-  SEEN ON HD today  1 k x2 hours and 2 k x 2 hours  Remove 2 kg fluid  Hold epogen     Subjective: Interval History:   Seen on hd  Bp high  K high --he denies eating high k food      Objective:   Vitals:    11/21/22 0900 11/21/22 0915 11/21/22 0930 11/21/22 0945   BP: (!) 149/90 (!) 153/95 (!) 155/90 (!) 157/88   Pulse: 69 72 71 70   Resp: 18 18 18 18   Temp:       TempSrc:       SpO2:       Weight:       Height:          No intake/output data recorded. Last 3 Recorded Weights in this Encounter    11/15/22 2238 11/19/22 2317 11/20/22 2202   Weight: 108.9 kg (240 lb) 108 kg (238 lb 1.6 oz) 105.1 kg (231 lb 9.6 oz)        Physical exam:    GEN:  NAD  NECK:  Supple, no thyromegaly  RESP: Clear  b/l, no  wheezing,   CVS: RRR,S1,S2   NEURO: non focal, normal speech  EXT: Edema +nt       Upper arm avf +    Chart reviewed. Pertinent Notes reviewed. Data Review :  Recent Labs     11/21/22  0454   *   K 7.6*      CO2 23   BUN 60*   CREA 8.01*   *   CA 9.5       Recent Labs     11/21/22  0454   WBC 9.0   HGB 10.9*   HCT 33.8*          No results for input(s): FE, TIBC, PSAT, FERR in the last 72 hours.    Lab Results   Component Value Date/Time    Hemoglobin A1c 5.6 12/21/2021 11:12 AM    Hemoglobin A1c 8.2 (H) 05/05/2021 03:29 AM    Hemoglobin A1c 10.5 (H) 11/16/2016 10:45 AM        Lab Results   Component Value Date/Time    Microalb/Creat ratio (ug/mg creat.) 897.2 (H) 04/11/2019 12:27 PM     Lab Results   Component Value Date/Time    NT pro-BNP 12,079 (H) 09/12/2022 06:02 AM    NT pro-BNP 5,709 (H) 03/15/2022 08:39 PM    NT pro-BNP 5,474 (H) 05/04/2021 06:09 AM    NT pro-BNP 2,497 (H) 04/17/2021 10:54 AM    NT pro-BNP 9,341 (H) 07/21/2020 05:33 AM     US Results (most recent):  Medication list  reviewed  Current Facility-Administered Medications   Medication Dose Route Frequency    lactulose (CHRONULAC) 10 gram/15 mL solution 30 mL  20 g Oral BID    lidocaine 4 % patch 1 Patch  1 Patch TransDERmal Q24H    traMADoL (ULTRAM) tablet 50 mg  50 mg Oral Q6H PRN    methocarbamoL (ROBAXIN) tablet 750 mg  750 mg Oral QID    cyanocobalamin (VITAMIN B12) tablet 500 mcg  500 mcg Oral DAILY    folic acid (FOLVITE) tablet 1 mg  1 mg Oral DAILY    insulin NPH (NOVOLIN N, HUMULIN N) injection 20 Units  20 Units SubCUTAneous ACB&D    sodium chloride (NS) flush 5-40 mL  5-40 mL IntraVENous Q8H    sodium chloride (NS) flush 5-40 mL  5-40 mL IntraVENous PRN    acetaminophen (TYLENOL) tablet 650 mg  650 mg Oral Q6H PRN    Or    acetaminophen (TYLENOL) suppository 650 mg  650 mg Rectal Q6H PRN    polyethylene glycol (MIRALAX) packet 17 g  17 g Oral DAILY PRN    ondansetron (ZOFRAN ODT) tablet 4 mg  4 mg Oral Q8H PRN    Or    ondansetron (ZOFRAN) injection 4 mg  4 mg IntraVENous Q6H PRN    heparin (porcine) injection 5,000 Units  5,000 Units SubCUTAneous Q8H    insulin lispro (HUMALOG) injection   SubCUTAneous AC&HS    glucose chewable tablet 16 g  4 Tablet Oral PRN    glucagon (GLUCAGEN) injection 1 mg  1 mg IntraMUSCular PRN    dextrose 10% infusion 0-250 mL  0-250 mL IntraVENous PRN    aspirin chewable tablet 81 mg  81 mg Oral DAILY    bumetanide (BUMEX) tablet 2 mg  2 mg Oral DAILY    carvediloL (COREG) tablet 12.5 mg  12.5 mg Oral BID WITH MEALS    sacubitriL-valsartan (ENTRESTO) 24-26 mg tablet 1 Tablet  1 Tablet Oral BID    finasteride (PROSCAR) tablet 5 mg  5 mg Oral DAILY    fluticasone propionate (FLONASE) 50 mcg/actuation nasal spray 2 Spray  2 Spray Both Nostrils DAILY    gabapentin (NEURONTIN) capsule 300 mg  300 mg Oral TID PRN    sevelamer carbonate (RENVELA) tab 800 mg  800 mg Oral BID    tamsulosin (FLOMAX) capsule 0.8 mg  0.8 mg Oral DAILY        Willam Maravilla MD  11/21/2022

## 2022-11-21 NOTE — PROGRESS NOTES
Problem: Falls - Risk of  Goal: *Absence of Falls  Description: Document Tiffany Chase Fall Risk and appropriate interventions in the flowsheet. Outcome: Progressing Towards Goal  Note: Fall Risk Interventions:  Mobility Interventions: Bed/chair exit alarm    Mentation Interventions: Bed/chair exit alarm    Medication Interventions: Bed/chair exit alarm    Elimination Interventions: Call light in reach, Bed/chair exit alarm    History of Falls Interventions: Bed/chair exit alarm, Evaluate medications/consider consulting pharmacy, Room close to nurse's station         Problem: Patient Education: Go to Patient Education Activity  Goal: Patient/Family Education  Outcome: Progressing Towards Goal     Problem: Patient Education: Go to Patient Education Activity  Goal: Patient/Family Education  Outcome: Progressing Towards Goal     Problem: Patient Education: Go to Patient Education Activity  Goal: Patient/Family Education  Outcome: Progressing Towards Goal     Problem: Pressure Injury - Risk of  Goal: *Prevention of pressure injury  Description: Document Kelby Scale and appropriate interventions in the flowsheet.   Outcome: Progressing Towards Goal  Note: Pressure Injury Interventions:  Sensory Interventions: Assess changes in LOC    Moisture Interventions: Absorbent underpads    Activity Interventions: Increase time out of bed    Mobility Interventions: Float heels    Nutrition Interventions: Document food/fluid/supplement intake    Friction and Shear Interventions: Minimize layers                Problem: Patient Education: Go to Patient Education Activity  Goal: Patient/Family Education  Outcome: Progressing Towards Goal

## 2022-11-21 NOTE — PROGRESS NOTES
Transition of Care Plan:     RUR: 18% - \"moderate risk\"  LOS: 5 days   Disposition: SNF - [address] [place] [address] (pending insurance auth) with resumed OP HD (Indiana Valadez, M/W/F at 6 AM)  Date FOC offered: 11/17/2022 (verbal)  Date [address] received: 11/17/2022  Date authorization started with reference number: Serenity Adam started 11/21/22 via Patti Salgado; no reference number available   Date authorization received and expires: TBD   Accepting facility: [address] [place] [address]   Follow up appointments: PCP & specialists as indicated   DME needed: Defer to SNF for DME needs  Transportation at Discharge: BLS transport needed  [address] or means to access home: N/A - pt likely transitioning to SNF at d/c  IM Medicare Letter: to be reviewed prior to d/c   Is patient a [place] and connected with the [place]? No             Caregiver Contact: Pt's wife Will Clack: [phone])  Discharge Caregiver contacted prior to discharge? To be contacted prior to d/c   Care Conference needed?: Not at this time    Initial note: Chart reviewed. CM checked on the status of the referrals sent for SNF placement 11/17/22; referral accepted by [address] [place] [address], referral pending updates with [facility]. CM faxed clinical information to Patti Salgado (f: [phone]) to initiate insurance auth for placement at [address] [place] [address]. Updates on status of auth to be reported out once available. Perfect serve message sent to MD reporting updates on disposition & requesting rapid COVID test per SNF placement protocol. CM will continue to follow & remain accessible for d/c planning.     Alisa Huerta, MSW  Care Manager, [facility]

## 2022-11-21 NOTE — PROGRESS NOTES
Occupational Therapy  Will defer at this time as pt in HD at this time. Will continue to follow. Addendum:    Nurse reports that pt has had a change in mental status and a stat Head CT is ordered. Will defer and continue to follow as appropriate.

## 2022-11-21 NOTE — PROGRESS NOTES
End of Shift Note     Bedside shift change report given to Melida Darby RN (oncoming nurse) by Lexa Karimi RN (offgoing nurse). Report included the following information SBAR, Kardex, ED Summary, and MAR     Shift worked: 7a-7p   Shift summary and any significant changes:     Patient was confused more per the wife. A&Ox2 person and place.    STAT head CT and spine CT completed  K improved to 4.1  Dialysis completed today      Concerns for physician to address:     Zone phone for oncoming shift:   [phone]      Patient Information  Josefina Lot  78 y.o.  11/16/2022 12:20 AM by Merritt Aguilarsimi Colon was admitted from Home     Problem List          Patient Active Problem List     Diagnosis Date Noted    UTI (lower urinary tract infection) 10/25/2013    Renal failure (ARF), acute on chronic ([address] 10/25/2013    Unable to ambulate 11/16/2022    Cervical post-laminectomy syndrome 06/09/2022    Degenerative cervical spinal stenosis 06/09/2022    Vitamin D deficiency 12/21/2021    Immune-mediated neuropathy ([address] 12/21/2021    Diabetic peripheral neuropathy associated with type 2 diabetes mellitus ([address] 12/21/2021    B12 deficiency 12/21/2021    Sensory ataxic gait 12/21/2021    Cervical spondylosis with myelopathy 12/21/2021    Type 2 diabetes mellitus with stage 4 chronic kidney disease, with long-term current use of insulin ([address] 12/21/2021    Convulsions ([address] 12/21/2021    Acute alteration in mental status 12/21/2021    Myoclonus 12/21/2021    Sepsis ([address] 08/18/2021    Acute hypoxemic respiratory failure ([address] 98/16/1750    Systolic heart failure ([address] 05/04/2021    Accelerated hypertension 07/21/2020    Elevated troponin 07/21/2020    ESRD (end stage renal disease) on dialysis ([address] 07/21/2020    Acute respiratory failure with hypoxia ([address] 07/21/2020    Acute on chronic systolic CHF (congestive heart failure) ([address] 07/21/2020    Pneumonia 05/26/2019    Acute on chronic renal failure ([address] 10/29/2016 Hypotension 10/29/2016    Chest pain 10/29/2016    Type II diabetes mellitus with nephropathy ([facility] 10/29/2016    Hyperlipidemia 10/29/2016    Nephrotic syndrome 08/20/2016    Renal anasarca 08/20/2016    Diarrhea 10/28/2013    SIRS (systemic inflammatory response syndrome) ([facility] 10/28/2013    Acute pancreatitis 01/18/2011    LFT'S ABNORMAL 01/18/2011    Acute renal failure (ARF) ([facility] 01/18/2011    CKD (chronic kidney disease) stage 3, GFR 30-59 ml/min ([facility]) 01/18/2011    CAD (coronary artery disease) 01/18/2011    Abdominal pain, acute, epigastric 01/18/2011    Nausea & vomiting 01/18/2011              Past Medical History:   Diagnosis Date    Arthritis       spine    CAD (coronary artery disease)       high cholesterol    Chronic kidney disease       dialysis    Diabetes ([facility]      ESRD (end stage renal disease) ([facility]      GERD (gastroesophageal reflux disease)       HX    Hypertension      Ill-defined condition       irritable bowel syndrome    Systolic CHF ([facility]      Unspecified sleep apnea       NO CPAP         Core Measures:  CVA: No Not applicable  CHF:No Not applicable  PNA:No Not applicable     Activity:  Activity Level: Up with Assistance  Number times ambulated in hallways past shift: 0  Number of times OOB to chair past shift: 0     Cardiac:   Cardiac Monitoring: No        Access:   Current line(s): PIV   Central Line? No Placement date   PICC LINE? No Placement date      Genitourinary:   Urinary status: voiding  Urinary Catheter?  No Placement Date      Respiratory:   O2 Device: Nasal cannula  Chronic home O2 use?: YES  Incentive spirometer at bedside: N/A     GI:  Last Bowel Movement Date: 11/19//22  Current diet:  ADULT DIET Regular; Low Fat/Low Chol/High Fiber/RANI  Passing flatus: YES  Tolerating current diet: YES     Pain Management:   Patient states pain is manageable on current regimen: YES     Skin:  Kelby Score: 20  Interventions: turn team, speciality bed, float heels, increase time out of bed, foam dressing, and PT/OT consult    Patient Safety:  Fall Score:  Total Score: 4  Interventions: bed/chair alarm, assistive device (walker, cane, etc), gripper socks, pt to call before getting OOB, and stay with me (per policy)  High Fall Risk: Yes  @Rollbelt  @dexterity to release roll belt  Yes/No ( must document dexterity  here by stating Yes or No here, otherwise this is a restraint and must follow restraint documentation policy.)     DVT prophylaxis:  DVT prophylaxis Med- Yes  DVT prophylaxis SCD or CINDY- No      Wounds: (If Applicable)  Wounds- No  Location None      Active Consults:  IP CONSULT TO NEUROLOGY  IP CONSULT TO NEPHROLOGY     Length of Stay:  Expected LOS: 3d 12h  Actual LOS: 2  Discharge Plan: No Home

## 2022-11-22 ENCOUNTER — APPOINTMENT (OUTPATIENT)
Dept: GENERAL RADIOLOGY | Age: 77
End: 2022-11-22
Attending: STUDENT IN AN ORGANIZED HEALTH CARE EDUCATION/TRAINING PROGRAM
Payer: MEDICARE

## 2022-11-22 ENCOUNTER — APPOINTMENT (OUTPATIENT)
Dept: GENERAL RADIOLOGY | Age: 77
End: 2022-11-22
Attending: PHYSICIAN ASSISTANT
Payer: MEDICARE

## 2022-11-22 LAB
AMMONIA PLAS-SCNC: 27 UMOL/L
ANION GAP SERPL CALC-SCNC: 8 MMOL/L (ref 5–15)
BASOPHILS # BLD: 0 K/UL (ref 0–0.1)
BASOPHILS NFR BLD: 0 % (ref 0–1)
BUN SERPL-MCNC: 45 MG/DL (ref 6–20)
BUN/CREAT SERPL: 7 (ref 12–20)
CALCIUM SERPL-MCNC: 8.9 MG/DL (ref 8.5–10.1)
CHLORIDE SERPL-SCNC: 96 MMOL/L (ref 97–108)
CO2 SERPL-SCNC: 30 MMOL/L (ref 21–32)
CREAT SERPL-MCNC: 6.21 MG/DL (ref 0.7–1.3)
DIFFERENTIAL METHOD BLD: ABNORMAL
EOSINOPHIL # BLD: 0 K/UL (ref 0–0.4)
EOSINOPHIL NFR BLD: 0 % (ref 0–7)
ERYTHROCYTE [DISTWIDTH] IN BLOOD BY AUTOMATED COUNT: 15.5 % (ref 11.5–14.5)
GLUCOSE BLD STRIP.AUTO-MCNC: 129 MG/DL (ref 65–117)
GLUCOSE BLD STRIP.AUTO-MCNC: 143 MG/DL (ref 65–117)
GLUCOSE BLD STRIP.AUTO-MCNC: 172 MG/DL (ref 65–117)
GLUCOSE BLD STRIP.AUTO-MCNC: 223 MG/DL (ref 65–117)
GLUCOSE SERPL-MCNC: 149 MG/DL (ref 65–100)
HCT VFR BLD AUTO: 32.4 % (ref 36.6–50.3)
HGB BLD-MCNC: 10.4 G/DL (ref 12.1–17)
IMM GRANULOCYTES # BLD AUTO: 0 K/UL (ref 0–0.04)
IMM GRANULOCYTES NFR BLD AUTO: 0 % (ref 0–0.5)
LYMPHOCYTES # BLD: 1 K/UL (ref 0.8–3.5)
LYMPHOCYTES NFR BLD: 11 % (ref 12–49)
MCH RBC QN AUTO: 29.8 PG (ref 26–34)
MCHC RBC AUTO-ENTMCNC: 32.1 G/DL (ref 30–36.5)
MCV RBC AUTO: 92.8 FL (ref 80–99)
MONOCYTES # BLD: 1 K/UL (ref 0–1)
MONOCYTES NFR BLD: 10 % (ref 5–13)
NEUTS SEG # BLD: 7.5 K/UL (ref 1.8–8)
NEUTS SEG NFR BLD: 79 % (ref 32–75)
NRBC # BLD: 0 K/UL (ref 0–0.01)
NRBC BLD-RTO: 0 PER 100 WBC
PLATELET # BLD AUTO: 179 K/UL (ref 150–400)
PMV BLD AUTO: 11.2 FL (ref 8.9–12.9)
POTASSIUM SERPL-SCNC: 5.2 MMOL/L (ref 3.5–5.1)
RBC # BLD AUTO: 3.49 M/UL (ref 4.1–5.7)
SERVICE CMNT-IMP: ABNORMAL
SODIUM SERPL-SCNC: 134 MMOL/L (ref 136–145)
WBC # BLD AUTO: 9.5 K/UL (ref 4.1–11.1)

## 2022-11-22 PROCEDURE — 74230 X-RAY XM SWLNG FUNCJ C+: CPT

## 2022-11-22 PROCEDURE — 82962 GLUCOSE BLOOD TEST: CPT

## 2022-11-22 PROCEDURE — 97530 THERAPEUTIC ACTIVITIES: CPT | Performed by: OCCUPATIONAL THERAPIST

## 2022-11-22 PROCEDURE — 74011250637 HC RX REV CODE- 250/637: Performed by: PHYSICIAN ASSISTANT

## 2022-11-22 PROCEDURE — 94760 N-INVAS EAR/PLS OXIMETRY 1: CPT

## 2022-11-22 PROCEDURE — 92611 MOTION FLUOROSCOPY/SWALLOW: CPT

## 2022-11-22 PROCEDURE — 80048 BASIC METABOLIC PNL TOTAL CA: CPT

## 2022-11-22 PROCEDURE — 74011250636 HC RX REV CODE- 250/636: Performed by: PHYSICIAN ASSISTANT

## 2022-11-22 PROCEDURE — 85025 COMPLETE CBC W/AUTO DIFF WBC: CPT

## 2022-11-22 PROCEDURE — 65270000046 HC RM TELEMETRY

## 2022-11-22 PROCEDURE — 97535 SELF CARE MNGMENT TRAINING: CPT | Performed by: OCCUPATIONAL THERAPIST

## 2022-11-22 PROCEDURE — 74011250637 HC RX REV CODE- 250/637: Performed by: STUDENT IN AN ORGANIZED HEALTH CARE EDUCATION/TRAINING PROGRAM

## 2022-11-22 PROCEDURE — 77010033678 HC OXYGEN DAILY

## 2022-11-22 PROCEDURE — 74011000250 HC RX REV CODE- 250: Performed by: PHYSICIAN ASSISTANT

## 2022-11-22 PROCEDURE — 74011250636 HC RX REV CODE- 250/636: Performed by: STUDENT IN AN ORGANIZED HEALTH CARE EDUCATION/TRAINING PROGRAM

## 2022-11-22 PROCEDURE — 82140 ASSAY OF AMMONIA: CPT

## 2022-11-22 PROCEDURE — 36415 COLL VENOUS BLD VENIPUNCTURE: CPT

## 2022-11-22 PROCEDURE — 74011250637 HC RX REV CODE- 250/637: Performed by: NURSE PRACTITIONER

## 2022-11-22 PROCEDURE — 71045 X-RAY EXAM CHEST 1 VIEW: CPT

## 2022-11-22 PROCEDURE — 74011636637 HC RX REV CODE- 636/637: Performed by: STUDENT IN AN ORGANIZED HEALTH CARE EDUCATION/TRAINING PROGRAM

## 2022-11-22 PROCEDURE — 74011000250 HC RX REV CODE- 250: Performed by: NURSE PRACTITIONER

## 2022-11-22 PROCEDURE — 97530 THERAPEUTIC ACTIVITIES: CPT

## 2022-11-22 PROCEDURE — 92610 EVALUATE SWALLOWING FUNCTION: CPT

## 2022-11-22 RX ORDER — DEXTROSE MONOHYDRATE AND SODIUM CHLORIDE 5; .45 G/100ML; G/100ML
75 INJECTION, SOLUTION INTRAVENOUS CONTINUOUS
Status: DISCONTINUED | OUTPATIENT
Start: 2022-11-22 | End: 2022-11-22

## 2022-11-22 RX ADMIN — LACTULOSE 30 ML: 20 SOLUTION ORAL at 18:01

## 2022-11-22 RX ADMIN — SACUBITRIL AND VALSARTAN 1 TABLET: 24; 26 TABLET, FILM COATED ORAL at 18:01

## 2022-11-22 RX ADMIN — Medication 1 SPRAY: at 14:45

## 2022-11-22 RX ADMIN — Medication 2 UNITS: at 18:01

## 2022-11-22 RX ADMIN — Medication 20 UNITS: at 18:01

## 2022-11-22 RX ADMIN — METHOCARBAMOL TABLETS 750 MG: 750 TABLET, COATED ORAL at 18:01

## 2022-11-22 RX ADMIN — CARVEDILOL 12.5 MG: 12.5 TABLET, FILM COATED ORAL at 18:01

## 2022-11-22 RX ADMIN — Medication 2 UNITS: at 21:20

## 2022-11-22 RX ADMIN — Medication 1 SPRAY: at 18:04

## 2022-11-22 RX ADMIN — FLUTICASONE PROPIONATE 2 SPRAY: 50 SPRAY, METERED NASAL at 10:40

## 2022-11-22 RX ADMIN — HEPARIN SODIUM 5000 UNITS: 5000 INJECTION INTRAVENOUS; SUBCUTANEOUS at 21:21

## 2022-11-22 RX ADMIN — DEXTROSE AND SODIUM CHLORIDE 75 ML/HR: 5; 450 INJECTION, SOLUTION INTRAVENOUS at 10:52

## 2022-11-22 RX ADMIN — Medication 1 SPRAY: at 12:18

## 2022-11-22 RX ADMIN — Medication 1 SPRAY: at 09:34

## 2022-11-22 RX ADMIN — HEPARIN SODIUM 5000 UNITS: 5000 INJECTION INTRAVENOUS; SUBCUTANEOUS at 05:50

## 2022-11-22 RX ADMIN — METHOCARBAMOL TABLETS 750 MG: 750 TABLET, COATED ORAL at 21:21

## 2022-11-22 RX ADMIN — HEPARIN SODIUM 5000 UNITS: 5000 INJECTION INTRAVENOUS; SUBCUTANEOUS at 14:44

## 2022-11-22 RX ADMIN — METHYLPREDNISOLONE SODIUM SUCCINATE 60 MG: 40 INJECTION, POWDER, FOR SOLUTION INTRAMUSCULAR; INTRAVENOUS at 10:48

## 2022-11-22 RX ADMIN — Medication 1 SPRAY: at 20:15

## 2022-11-22 RX ADMIN — Medication 1 SPRAY: at 21:20

## 2022-11-22 RX ADMIN — Medication 2 UNITS: at 12:19

## 2022-11-22 NOTE — PROGRESS NOTES
Nephrology Progress Note  [facility] [address]  Phone - [phone]  Fax - [phone]                 Patient: Julee Dominguez                   YOB: 1945        Date- 11/22/2022                      Admit Date: 11/16/2022  CC: Follow up for esrd         IMPRESSION & PLAN:   ESRD- hd mwf at [facility]  Hyperkalemia- k 7.6 on 11-21-22  Anemia of ckd  hyponatremia  Sec. Hyperpara  S/p fall  Weakness  H/o CHF  Dm 2      PLAN-  No hd today  Continue hd mwf  Can't give lokelma as he is NPO  Low k diet when diet is resumed  Stop ivf to avoid CHF  Continue coreg and entresto  Hold epogen     Subjective: Interval History:   Seen on hd  Bp high  K high --he denies eating high k food      Objective:   Vitals:    11/21/22 1716 11/21/22 2250 11/21/22 2344 11/22/22 0711   BP: 139/76 134/73  139/78   Pulse: 75 71  70   Resp:  18  20   Temp:  99 °F (37.2 °C) (!) 100.8 °F (38.2 °C) 98.8 °F (37.1 °C)   TempSrc:       SpO2:  98%  100%   Weight:       Height:          11/21 0701 - 11/22 0700  In: -   Out: 2150 [Urine:150]  Last 3 Recorded Weights in this Encounter    11/15/22 2238 11/19/22 2317 11/20/22 2202   Weight: 108.9 kg (240 lb) 108 kg (238 lb 1.6 oz) 105.1 kg (231 lb 9.6 oz)        Physical exam:    GEN:  NAD  NECK:  Supple, no thyromegaly  RESP: Clear  b/l, no  wheezing,   CVS: RRR,S1,S2   NEURO: non focal,   Upper arm avf +    Chart reviewed. Pertinent Notes reviewed. Data Review :  Recent Labs     11/22/22  0139 11/21/22  1253 11/21/22  0454   * 133* 132*   K 5.2* 4.1 7.6*   CL 96* 95* 100   CO2 30 30 23   BUN 45* 28* 60*   CREA 6.21* 4.42* 8.01*   * 116* 181*   CA 8.9 9.5 9.5       Recent Labs     11/22/22  0139 11/21/22  0454   WBC 9.5 9.0   HGB 10.4* 10.9*   HCT 32.4* 33.8*    206       No results for input(s): FE, TIBC, PSAT, FERR in the last 72 hours. Lab Results   Component Value Date/Time    Hemoglobin A1c 5.6 12/21/2021 11:12 AM    Hemoglobin A1c 8.2 (H) 05/05/2021 03:29 AM    Hemoglobin A1c 10.5 (H) 11/16/2016 10:45 AM        Lab Results   Component Value Date/Time    Microalb/Creat ratio (ug/mg creat.) 897.2 (H) 04/11/2019 12:27 PM     Lab Results   Component Value Date/Time    NT pro-BNP 12,079 (H) 09/12/2022 06:02 AM    NT pro-BNP 5,709 (H) 03/15/2022 08:39 PM    NT pro-BNP 5,474 (H) 05/04/2021 06:09 AM    NT pro-BNP 2,497 (H) 04/17/2021 10:54 AM    NT pro-BNP 9,341 (H) 07/21/2020 05:33 AM     US Results (most recent):  Medication list  reviewed  Current Facility-Administered Medications   Medication Dose Route Frequency    artificial saliva (MOUTH KOTE) 1 Spray  1 Spray Oral Q2H    dextrose 5 % - 0.45% NaCl infusion  75 mL/hr IntraVENous CONTINUOUS    lactulose (CHRONULAC) 10 gram/15 mL solution 30 mL  20 g Oral BID    hydrALAZINE (APRESOLINE) 20 mg/mL injection 20 mg  20 mg IntraVENous Q6H PRN    lidocaine 4 % patch 1 Patch  1 Patch TransDERmal Q24H    [Held by provider] traMADoL (ULTRAM) tablet 50 mg  50 mg Oral Q6H PRN    methocarbamoL (ROBAXIN) tablet 750 mg  750 mg Oral QID    cyanocobalamin (VITAMIN B12) tablet 500 mcg  500 mcg Oral DAILY    folic acid (FOLVITE) tablet 1 mg  1 mg Oral DAILY    insulin NPH (NOVOLIN N, HUMULIN N) injection 20 Units  20 Units SubCUTAneous ACB&D    sodium chloride (NS) flush 5-40 mL  5-40 mL IntraVENous PRN    acetaminophen (TYLENOL) tablet 650 mg  650 mg Oral Q6H PRN    Or    acetaminophen (TYLENOL) suppository 650 mg  650 mg Rectal Q6H PRN    polyethylene glycol (MIRALAX) packet 17 g  17 g Oral DAILY PRN    ondansetron (ZOFRAN ODT) tablet 4 mg  4 mg Oral Q8H PRN    Or    ondansetron (ZOFRAN) injection 4 mg  4 mg IntraVENous Q6H PRN    heparin (porcine) injection 5,000 Units  5,000 Units SubCUTAneous Q8H    insulin lispro (HUMALOG) injection   SubCUTAneous AC&HS    glucose chewable tablet 16 g  4 Tablet Oral PRN glucagon (GLUCAGEN) injection 1 mg  1 mg IntraMUSCular PRN    dextrose 10% infusion 0-250 mL  0-250 mL IntraVENous PRN    aspirin chewable tablet 81 mg  81 mg Oral DAILY    bumetanide (BUMEX) tablet 2 mg  2 mg Oral DAILY    carvediloL (COREG) tablet 12.5 mg  12.5 mg Oral BID WITH MEALS    sacubitriL-valsartan (ENTRESTO) 24-26 mg tablet 1 Tablet  1 Tablet Oral BID    finasteride (PROSCAR) tablet 5 mg  5 mg Oral DAILY    fluticasone propionate (FLONASE) 50 mcg/actuation nasal spray 2 Spray  2 Spray Both Nostrils DAILY    gabapentin (NEURONTIN) capsule 300 mg  300 mg Oral TID PRN    sevelamer carbonate (RENVELA) tab 800 mg  800 mg Oral BID    tamsulosin (FLOMAX) capsule 0.8 mg  0.8 mg Oral DAILY        Bryce Finn MD  11/22/2022

## 2022-11-22 NOTE — PROGRESS NOTES
Transition of Care Plan:     RUR: 18% - \"moderate risk\"  LOS: 6 days   Disposition: SNF - [address] [place] [address] (pending insurance auth) with resumed OP HD ([place] Valadez, M/W/F at 6 AM)  Date FOC offered: 11/17/2022 (verbal)  Date [facility] received: 11/17/2022  Date authorization started with reference number: Walter Perez started 11/21/22 via [facility]; no reference number available   Date authorization received and expires: TBD   Accepting facility: [address] [place] [address]   Follow up appointments: PCP & specialists as indicated   DME needed: Defer to SNF for DME needs  Transportation at Discharge: BLS transport needed  [address] or means to access home: N/A - pt likely transitioning to SNF at d/c  IM Medicare Letter: to be reviewed prior to d/c   Is patient a [place] and connected with the [facility]? No             Caregiver Contact: Pt's wife Indy Euler: [phone])  Discharge Caregiver contacted prior to discharge? To be contacted prior to d/c   Care Conference needed?: Not at this time  COVID test: Rapid COVID test completed 11/21/22; results negative     Update - 2:40 PM: Updated clinical information, including PT/OT/SLP notes from today faxed to [facility] for reference. Initial note: CM acknowledged d/c. Chart reviewed for updates. CM initiated insurance auth for SNF via [facility] 11/21/22; [place] pending as of currently. CM received call from [facility] requesting updated PT/OT notes to be faxed to them for further review (f: [phone]). CM will reiterate need to therapy team in IDR this morning. Delay in d/c entered into chart re: insurance auth for SNF. Updates to be provided once available.     Dayanara Pederson MSW  Care Manager, [facility]

## 2022-11-22 NOTE — PROGRESS NOTES
End of Shift Note     Bedside shift change report given to Vadim Chery (oncoming nurse) by Reji Lake RN (offgoing nurse).   Report included the following information SBAR, Kardex, ED Summary, and MAR     Shift worked: 7a-7p   Shift summary and any significant changes:     Patient more clear today  SLP consult completed   Modified barium swallow completed  Fluids discontinued      Concerns for physician to address:     Zone phone for oncoming shift:   [phone]      Patient Information  Jamar Mess  78 y.o.  11/16/2022 12:20 AM by Petey Ernst Yamileth was admitted from Home     Problem List          Patient Active Problem List     Diagnosis Date Noted    UTI (lower urinary tract infection) 10/25/2013    Renal failure (ARF), acute on chronic ([address] 10/25/2013    Unable to ambulate 11/16/2022    Cervical post-laminectomy syndrome 06/09/2022    Degenerative cervical spinal stenosis 06/09/2022    Vitamin D deficiency 12/21/2021    Immune-mediated neuropathy ([address] 12/21/2021    Diabetic peripheral neuropathy associated with type 2 diabetes mellitus ([address] 12/21/2021    B12 deficiency 12/21/2021    Sensory ataxic gait 12/21/2021    Cervical spondylosis with myelopathy 12/21/2021    Type 2 diabetes mellitus with stage 4 chronic kidney disease, with long-term current use of insulin ([address] 12/21/2021    Convulsions ([address] 12/21/2021    Acute alteration in mental status 12/21/2021    Myoclonus 12/21/2021    Sepsis ([address] 08/18/2021    Acute hypoxemic respiratory failure ([address] 43/34/3397    Systolic heart failure ([address] 05/04/2021    Accelerated hypertension 07/21/2020    Elevated troponin 07/21/2020    ESRD (end stage renal disease) on dialysis ([address] 07/21/2020    Acute respiratory failure with hypoxia ([address] 07/21/2020    Acute on chronic systolic CHF (congestive heart failure) ([address] 07/21/2020    Pneumonia 05/26/2019    Acute on chronic renal failure ([address] 10/29/2016    Hypotension 10/29/2016    Chest pain 10/29/2016 Type II diabetes mellitus with nephropathy ([facility] 10/29/2016    Hyperlipidemia 10/29/2016    Nephrotic syndrome 08/20/2016    Renal anasarca 08/20/2016    Diarrhea 10/28/2013    SIRS (systemic inflammatory response syndrome) ([facility] 10/28/2013    Acute pancreatitis 01/18/2011    LFT'S ABNORMAL 01/18/2011    Acute renal failure (ARF) ([facility] 01/18/2011    CKD (chronic kidney disease) stage 3, GFR 30-59 ml/min ([facility]) 01/18/2011    CAD (coronary artery disease) 01/18/2011    Abdominal pain, acute, epigastric 01/18/2011    Nausea & vomiting 01/18/2011              Past Medical History:   Diagnosis Date    Arthritis       spine    CAD (coronary artery disease)       high cholesterol    Chronic kidney disease       dialysis    Diabetes ([facility]      ESRD (end stage renal disease) ([facility])      GERD (gastroesophageal reflux disease)       HX    Hypertension      Ill-defined condition       irritable bowel syndrome    Systolic CHF ([facility]      Unspecified sleep apnea       NO CPAP         Core Measures:  CVA: No Not applicable  CHF:No Not applicable  PNA:No Not applicable     Activity:  Activity Level: Up with Assistance  Number times ambulated in hallways past shift: 0  Number of times OOB to chair past shift: 0     Cardiac:   Cardiac Monitoring: No        Access:   Current line(s): PIV   Central Line? No Placement date   PICC LINE? No Placement date      Genitourinary:   Urinary status: voiding  Urinary Catheter? No Placement Date      Respiratory:   O2 Device: Nasal cannula  Chronic home O2 use?: YES  Incentive spirometer at bedside: N/A     GI:  Last Bowel Movement Date: 11/19//22  Current diet:  ADULT DIET Minced and Moist  Passing flatus: YES  Tolerating current diet: YES     Pain Management:   Patient states pain is manageable on current regimen: YES     Skin:  Kelby Score: 20  Interventions: turn team, speciality bed, float heels, increase time out of bed, foam dressing, and PT/OT consult    Patient Safety:  Fall Score:  Total Score: 4  Interventions: bed/chair alarm, assistive device (walker, cane, etc), gripper socks, pt to call before getting OOB, and stay with me (per policy)  High Fall Risk: Yes  @Rollbelt  @dexterity to release roll belt  Yes/No ( must document dexterity  here by stating Yes or No here, otherwise this is a restraint and must follow restraint documentation policy.)     DVT prophylaxis:  DVT prophylaxis Med- Yes  DVT prophylaxis SCD or CINDY- No      Wounds: (If Applicable)  Wounds- No  Location None      Active Consults:  IP CONSULT TO NEUROLOGY  IP CONSULT TO NEPHROLOGY     Length of Stay:  Expected LOS: 3d 12h  Actual LOS: 2  Discharge Plan: yes Rehab

## 2022-11-22 NOTE — PROGRESS NOTES
Problem: Dysphagia (Adult)  Goal: *Acute Goals and Plan of Care (Insert Text)  Description: Speech Therapy Goals:  Initiated 11/22/22    1. Patient will participate in MBS to assess swallow safety and efficiency prior to PO diet initiation within seven days. Outcome: Progressing Towards Goal     SPEECH LANGUAGE PATHOLOGY BEDSIDE SWALLOW EVALUATION  Patient: Markus Montoya (51 y.o. male)  Date: 11/22/2022  Primary Diagnosis: Unable to ambulate [R26.2]       Precautions: Aspiration,  Fall    ASSESSMENT :  Based on the objective data described below, the patient presents with suspected oropharyngeal dysphagia of unclear etiology. Patient reported prior PO diet of \"soft\" foods for \"a couple months\" due to \"indigestion and constipation. \" PO trials limited to ice chips, thin liquids, and purees due to clinical presentation concerning for aspiration. Oral phase of swallow appeared functional for limited consistencies assessed. Pharyngeal phase of swallow significant for intermittent throat clear with secretion management and oral expectoration of phlegm prior to PO trials and consistent cough response to small boluses of thin liquids and purees. Recommend patient remain NPO except ice chips for pleasure/xerostomia relief pending completion of MBS to further assess swallow anatomy and physiology. SLP will follow closely. Patient will benefit from skilled intervention to address the above impairments.   Patients rehabilitation potential is considered to be Fair     PLAN :  Recommendations and Planned Interventions:  Continue NPO except ice chips for pleasure/xerostomia relief  Complete MBS to further assess swallow function  Nonoral medications  Oral care via standard toothbrush 2-3x/daily  For ice chips: Upright and one at a time with no supervision/set-up assist  SLP will follow for dysphagia management    Frequency/Duration: Patient will be followed by speech-language pathology 3 times a week to address goals.  Discharge Recommendations: To Be Determined     SUBJECTIVE:   Patient stated I am going to need an IV.    OBJECTIVE:     Past Medical History:   Diagnosis Date    Arthritis     spine    CAD (coronary artery disease)     high cholesterol    Chronic kidney disease     dialysis    Diabetes ([facility]     ESRD (end stage renal disease) ([facility])     GERD (gastroesophageal reflux disease)     HX    Hypertension     Ill-defined condition     irritable bowel syndrome    Systolic CHF ([facility]     Unspecified sleep apnea     NO CPAP     Past Surgical History:   Procedure Laterality Date    COLONOSCOPY N/A 11/9/2016    COLONOSCOPY performed by Jarrett Salvador MD at [facility] ENDOSCOPY    HX ORTHOPAEDIC      CERVICAL FUSION    HX UROLOGICAL      TURP    HX VASCULAR ACCESS      L arm dialysis access     Diet prior to admission: Soft with thin liquids   Current Diet: NPO     Cognitive and Communication Status:  Neurologic State: Alert, Slowed response time  Orientation Level: Oriented to time, Oriented to place, Oriented to person  Cognition: Decreased attention/concentration, Follows commands    Oral Assessment:  Oral Assessment  Labial: No impairment  Dentition: Natural  Oral Hygiene: Moist oral mucosa  Lingual: No impairment  Velum: No impairment  Mandible: No impairment    P.O. Trials:  Patient Position: Partially upright in bed (Unable to tolerate full upright position due to pain with re-positioning)  Vocal quality prior to P.O.: No impairment  Consistency Presented: Thin liquid; Ice chips;Puree  How Presented: SLP-fed/presented;Cup/sip;Spoon  Bolus Acceptance: No impairment  Bolus Formation/Control: No impairment  Propulsion: No impairment  Oral Residue: None  Initiation of Swallow: Present  Laryngeal Elevation: Present  Aspiration Signs/Symptoms: Strong cough response with all consistencies  Effective Modifications: None     NOMS:   The NOMS functional outcome measure was used to quantify this patient's level of swallowing impairment. Based on the NOMS, the patient was determined to be at level 2 for swallow function     NOMS Swallowing Levels:  Level 1 (CN): NPO  Level 2 (CM): NPO but takes consistency in therapy  Level 3 (CL): Takes less than 50% of nutrition p.o. and continues with nonoral feedings; and/or safe with mod cues; and/or max diet restriction  Level 4 (CK): Safe swallow but needs mod cues; and/or mod diet restriction; and/or still requires some nonoral feeding/supplements  Level 5 (CJ): Safe swallow with min diet restriction; and/or needs min cues  Level 6 (CI): Independent with p.o.; rare cues; usually self cues; may need to avoid some foods or needs extra time  Level 7 ([address] [place] [address]): Independent for all p.o.  MIRIAM. (2003). National Outcomes Measurement System (NOMS): Adult Speech-Language Pathology User's Guide. After treatment:   Patient left in no apparent distress in bed and Nursing notified    COMMUNICATION/EDUCATION:   Patient was educated regarding his deficit(s) of oropharyngeal dysphagia of unclear etiology. The patient's plan of care including recommendations, planned interventions, and recommended diet changes were discussed with: Registered nurse. Patient/family have participated as able in goal setting and plan of care. Patient/family agree to work toward stated goals and plan of care.     Thank you for this referral.  Bhakti Limon, SLP  Time Calculation: 25 mins

## 2022-11-22 NOTE — PROGRESS NOTES
Per wife, patient was having trouble with swallowing his food, to where he would spit it back out. When asked how he was doing with liquids, she said he was doing fine. So I told Dunia Sibley NP that I was going to make him NPO with meds. She was ok with that. I also put in a consult for speech to see him. However, while giving him his meds he did ok. But shortly after finishing he was clearing his throat several times. I asked his wife if that was new and she said yes.  So I made him completely NPO and informed Kenroy TORRES.

## 2022-11-22 NOTE — PROGRESS NOTES
Problem: Self Care Deficits Care Plan (Adult)  Goal: *Acute Goals and Plan of Care (Insert Text)  Description: FUNCTIONAL STATUS PRIOR TO ADMISSION: Pt reports that he lives with his wife. He reports having difficulty lately and is reluctant to ask his wife to assist.  Pt reports independence in adls and drives himself to HD appointments. He states he feels tired after HD, bt manages his drive home     HOME SUPPORT: The patient lived with wife. Occupational Therapy Goals  Initiated 11/17/2022  1. Patient will perform grooming in standing  with contact guard assist within 7 day(s). 2.  Patient will perform lower body dressing with supervision/set-up, using AE prn, within 7 day(s). 3.  Patient will perform bathing with supervision/set-up within 7 day(s). 4.  Patient will perform toilet transfers with minimal assistance/contact guard assist within 7 day(s). 5.  Patient will perform all aspects of toileting with minimal assistance/contact guard assist within 7 day(s). 6.  Patient will participate in upper extremity therapeutic exercise/activities with supervision/set-up for 5 minutes within 7 day(s). 7.  Patient will tolerate at least 8 minutes of standing adls within 7 days. Outcome: Not Met   OCCUPATIONAL THERAPY TREATMENT  Patient: Delmis Broussard (72 y.o. male)  Date: 11/22/2022  Diagnosis: Unable to ambulate [R26.2] <principal problem not specified>      Precautions: Fall  Chart, occupational therapy assessment, plan of care, and goals were reviewed. ASSESSMENT  Patient continues with skilled OT services and is slowly progressing towards goals. Pt reports that he is feeling much better than yesterday, however, he could not sleep last night due to the pain. Pt did not rate his pain, had been medicated and was wearing a pain patch on R lower neck area this date. He did report that his pain was better than last night. He has significant pain in his neck when turning his head.   Educated in log roll  technique to maintain spinal alignment while performing bed mobility to reduce pain. Pt had an instance of decreased attention while seated EOB, becoming  quiet and eyes rolling with decreased response to questions, and trunk sway--BP with minimal change and pt became alert again with attempts to increase alertness. After that episode, pt was agreeable to attempting standing and sidestepping to the [facility] in preparation for returning to bed. BP generally stable. Did not feel that pt was safe to leave up in the chair at the time and he was returned to bed. MD informed of the above. Current Level of Function Impacting Discharge (ADLs): up to max A for adls and assist X2 for functional mmobility. Other factors to consider for discharge: reports that his wife does not assist him with self care         PLAN :  Patient continues to benefit from skilled intervention to address the above impairments. Continue treatment per established plan of care to address goals. Recommend with staff: encourage upright at bed level. Recommend next OT session: POC    Recommendation for discharge: (in order for the patient to meet his/her long term goals)  rehab    This discharge recommendation:  Has been made in collaboration with the attending provider and/or case management    IF patient discharges home will need the following DME: tbd       SUBJECTIVE:   Patient stated I didn't sleep well .   (due to pain)    OBJECTIVE DATA SUMMARY:   Cognitive/Behavioral Status:  Neurologic State: Alert (periods of decreased attention while seated)  Orientation Level: Oriented to person;Oriented to place  Cognition: Decreased attention/concentration; Follows commands; Impaired decision making  Perception: Appears intact (decreased awareness due to impaired neck ROM)  Perseveration: No perseveration noted  Safety/Judgement: Decreased awareness of environment; Fall prevention    Functional Mobility and Transfers for ADLs:  Bed Mobility:  Rolling: Minimum assistance (educ in using log roll technique for pain management of neck pain  Simultaneous filing. User may not have seen previous data.)  Supine to Sit: Moderate assistance (Simultaneous filing. User may not have seen previous data.)  Sit to Supine: Moderate assistance;Maximum assistance (Simultaneous filing. User may not have seen previous data.)  Scooting: Moderate assistance;Minimum assistance (less assist needed while seated,  supine increased assistance  Simultaneous filing. User may not have seen previous data.)    Transfers:  Sit to Stand: Minimum assistance;Assist x2 (Simultaneous filing. User may not have seen previous data.)     Bed to Chair:  (unable this date due to periods of inattentiveness-? safety)    Balance:  Sitting: Intact (Simultaneous filing. User may not have seen previous data.)  Sitting - Static: Good (unsupported)  Sitting - Dynamic: Fair (occasional)  Standing: Impaired (Simultaneous filing. User may not have seen previous data.)  Standing - Static: Constant support; Fair (Simultaneous filing. User may not have seen previous data.)  Standing - Dynamic : Constant support; Fair (using rW to side s tep to [facility]  Simultaneous filing. User may not have seen previous data.)    ADL Intervention:  Feeding  Feeding Assistance:  (pt is currently NPO due to swallowing concerns for MBS today)                       Upper Body [address]:  Moderate assistance;Maximum assistance    Lower Body Dressing Assistance  Socks: Minimum assistance  Leg Crossed Method Used: Yes  Position Performed: Seated edge of bed  Cues: Tactile cues provided;Verbal cues provided;Physical assistance    Toileting  Toileting Assistance: Maximum assistance  Clothing Management: Maximum assistance  Cues: Tactile cues provided;Verbal cues provided;Physical assistance for pants up;Physical assistance for pants down (pt reports numbness in both hands which impair dressing and fine motor tasks)    Cognitive Retraining  Safety/Judgement: Decreased awareness of environment; Fall prevention    Therapeutic Exercises:   Encourage head/neck ROM    Pain:  Did not rate pain, but reports pain in neck    Activity Tolerance:   BPs  monitored--generally stable  Patient Vitals for the past 4 hrs:   Pulse BP SpO2   11/22/22 1131 71 130/78  supine post activity --   11/22/22 1123 74 118/81  standing --   11/22/22 1110 71 122/75  sitting 100 %   11/22/22 1103 72 136/74  supine --        After treatment patient left in no apparent distress:   Supine in bed, Call bell within reach, Bed / chair alarm activated, Side rails x 3, and MD informed    COMMUNICATION/COLLABORATION:   The patients plan of care was discussed with: Physical therapist, Registered nurse, and Physician.      Cindy Gilmore OTR/L  Time Calculation: 36 mins

## 2022-11-22 NOTE — PROGRESS NOTES
Problem: Falls - Risk of  Goal: *Absence of Falls  Description: Document Dom French Fall Risk and appropriate interventions in the flowsheet. Outcome: Progressing Towards Goal  Note: Fall Risk Interventions:  Mobility Interventions: Bed/chair exit alarm    Mentation Interventions: Bed/chair exit alarm    Medication Interventions: Bed/chair exit alarm    Elimination Interventions: Bed/chair exit alarm    History of Falls Interventions: Bed/chair exit alarm         Problem: Patient Education: Go to Patient Education Activity  Goal: Patient/Family Education  Outcome: Progressing Towards Goal     Problem: Patient Education: Go to Patient Education Activity  Goal: Patient/Family Education  Outcome: Progressing Towards Goal     Problem: Patient Education: Go to Patient Education Activity  Goal: Patient/Family Education  Outcome: Progressing Towards Goal     Problem: Pressure Injury - Risk of  Goal: *Prevention of pressure injury  Description: Document Kelby Scale and appropriate interventions in the flowsheet.   Outcome: Progressing Towards Goal  Note: Pressure Injury Interventions:  Sensory Interventions: Assess changes in LOC    Moisture Interventions: Absorbent underpads, Internal/External urinary devices    Activity Interventions: Increase time out of bed, PT/OT evaluation    Mobility Interventions: Float heels    Nutrition Interventions: Document food/fluid/supplement intake    Friction and Shear Interventions: Apply protective barrier, creams and emollients                Problem: Patient Education: Go to Patient Education Activity  Goal: Patient/Family Education  Outcome: Progressing Towards Goal     Problem: Patient Education: Go to Patient Education Activity  Goal: Patient/Family Education  Outcome: Progressing Towards Goal

## 2022-11-22 NOTE — PROGRESS NOTES
End of Shift Note     Bedside shift change report given to Anahi Jane RN (oncoming nurse) by Santa Soto (offgoing nurse). Report included the following information SBAR, Kardex, ED Summary, and MAR     Shift worked: 7p-7a   Shift summary and any significant changes:     Patient was confused more per the wife. A&Ox2 person and place. NPO, not swallowing well; speech consulted; pain in neck much worse; K 5.2, ammonia 27. Concerns for physician to address:  Patient was confused more per the wife. A&Ox2 person and place. NPO, not swallowing well; speech consulted; pain in neck much worse; K 5.2, ammonia 27.    Zone phone for oncoming shift:     [phone]      Patient Information  Samijohn Mendoza  78 y.o.  11/16/2022 12:20 AM by Yosi Escudero was admitted from Home     Problem List          Patient Active Problem List     Diagnosis Date Noted    UTI (lower urinary tract infection) 10/25/2013    Renal failure (ARF), acute on chronic ([address] 10/25/2013    Unable to ambulate 11/16/2022    Cervical post-laminectomy syndrome 06/09/2022    Degenerative cervical spinal stenosis 06/09/2022    Vitamin D deficiency 12/21/2021    Immune-mediated neuropathy ([address] 12/21/2021    Diabetic peripheral neuropathy associated with type 2 diabetes mellitus ([address] 12/21/2021    B12 deficiency 12/21/2021    Sensory ataxic gait 12/21/2021    Cervical spondylosis with myelopathy 12/21/2021    Type 2 diabetes mellitus with stage 4 chronic kidney disease, with long-term current use of insulin ([address] 12/21/2021    Convulsions ([address] 12/21/2021    Acute alteration in mental status 12/21/2021    Myoclonus 12/21/2021    Sepsis ([address] 08/18/2021    Acute hypoxemic respiratory failure ([address] 81/51/1999    Systolic heart failure ([address] 05/04/2021    Accelerated hypertension 07/21/2020    Elevated troponin 07/21/2020    ESRD (end stage renal disease) on dialysis ([address] 07/21/2020    Acute respiratory failure with hypoxia ([address] 07/21/2020    Acute on chronic systolic CHF (congestive heart failure) ([facility] 07/21/2020    Pneumonia 05/26/2019    Acute on chronic renal failure ([facility] 10/29/2016    Hypotension 10/29/2016    Chest pain 10/29/2016    Type II diabetes mellitus with nephropathy ([facility] 10/29/2016    Hyperlipidemia 10/29/2016    Nephrotic syndrome 08/20/2016    Renal anasarca 08/20/2016    Diarrhea 10/28/2013    SIRS (systemic inflammatory response syndrome) ([facility] 10/28/2013    Acute pancreatitis 01/18/2011    LFT'S ABNORMAL 01/18/2011    Acute renal failure (ARF) ([facility] 01/18/2011    CKD (chronic kidney disease) stage 3, GFR 30-59 ml/min ([facility]) 01/18/2011    CAD (coronary artery disease) 01/18/2011    Abdominal pain, acute, epigastric 01/18/2011    Nausea & vomiting 01/18/2011              Past Medical History:   Diagnosis Date    Arthritis       spine    CAD (coronary artery disease)       high cholesterol    Chronic kidney disease       dialysis    Diabetes ([facility]      ESRD (end stage renal disease) ([facility]      GERD (gastroesophageal reflux disease)       HX    Hypertension      Ill-defined condition       irritable bowel syndrome    Systolic CHF ([facility]      Unspecified sleep apnea       NO CPAP         Core Measures:  CVA: No Not applicable  CHF:No Not applicable  PNA:No Not applicable     Activity:  Activity Level: Up with Assistance  Number times ambulated in hallways past shift: 0  Number of times OOB to chair past shift: 0     Cardiac:   Cardiac Monitoring: No        Access:   Current line(s): PIV   Central Line? No Placement date   PICC LINE? No Placement date      Genitourinary:   Urinary status: voiding  Urinary Catheter?  No Placement Date      Respiratory:   O2 Device: Nasal cannula  Chronic home O2 use?: YES  Incentive spirometer at bedside: N/A     GI:  Last Bowel Movement Date: 11/19//22  Current diet:  ADULT DIET Regular; Low Fat/Low Chol/High Fiber/RANI  Passing flatus: YES  Tolerating current diet: YES     Pain Management:   Patient states pain is manageable on current regimen: YES     Skin:  Kelby Score: 20  Interventions: turn team, speciality bed, float heels, increase time out of bed, foam dressing, and PT/OT consult    Patient Safety:  Fall Score:  Total Score: 4  Interventions: bed/chair alarm, assistive device (walker, cane, etc), gripper socks, pt to call before getting OOB, and stay with me (per policy)  High Fall Risk: Yes  @Rollbelt  @dexterity to release roll belt  Yes/No ( must document dexterity  here by stating Yes or No here, otherwise this is a restraint and must follow restraint documentation policy.)     DVT prophylaxis:  DVT prophylaxis Med- Yes  DVT prophylaxis SCD or CINDY- No      Wounds: (If Applicable)  Wounds- No  Location None      Active Consults:  IP CONSULT TO NEUROLOGY  IP CONSULT TO NEPHROLOGY     Length of Stay:  Expected LOS: 3d 12h  Actual LOS: 2  Discharge Plan: No Home

## 2022-11-22 NOTE — PROGRESS NOTES
[id number] [facility] STUDY  Patient: Kiley Monet (89 y.o. male)  Date: 11/22/2022  Primary Diagnosis: Unable to ambulate [R26.2]       Precautions: Aspiration,  Fall    ASSESSMENT :  Based on the objective data described below, the patient presents with oropharyngeal dysphagia. Oral phase of swallow revealed mildly delayed bolus manipulation/formation and cohesion. Lingual movement appeared slowed/effortful. Pharyngeal phase of swallow revealed reduced hyolaryngeal movement and pharyngeal constriction resulting in both vallecular and pyriform sinus residue. Transient laryngeal penetration on thin liquid wash to clear solid residue occurred; no aspiration visualized following penetration event. No laryngeal penetration or aspiration of thin liquid sin isolation, pudding, or solids. Etiology of pharyngeal weakness seen on MBS remains unclear. Recommend PO diet initiation of minced/moist solids with thin liquids. Patient should use double swallow (swallow liquid/solids followed by dry swallow) to reduce pharyngeal residue. SLP will follow closely. Patient will benefit from skilled intervention to address the above impairments. Patients rehabilitation potential is considered to be fair. PLAN :  Recommendations and Planned Interventions:  Initiate PO diet: Minced/moist texture with thin liquids  Medications orally in purees as tolerated  Oral care via standard toothbrush 2-3x/daily  Aspiration precautions: Upright to 90 degrees for PO intake, double swallow, slow rate, small bites/sips, alternate liquids/solids, remain upright for 30 minutes following meals  Set-up feeding assistance and constant supervision for PO intake  SLP will follow for dysphagia management    Frequency/Duration: Patient will be followed by speech-language pathology 3 times a week to address goals. Discharge Recommendations:  To Be Determined     SUBJECTIVE:   Patient stated I am feeling so much better this afternoon.     OBJECTIVE:     Past Medical History:   Diagnosis Date    Arthritis     spine    CAD (coronary artery disease)     high cholesterol    Chronic kidney disease     dialysis    Diabetes ([facility] [address]     ESRD (end stage renal disease) (HCC)     GERD (gastroesophageal reflux disease)     HX    Hypertension     Ill-defined condition     irritable bowel syndrome    Syst[facility]     Unspecified sleep apnea     NO CPAP     Past Surgical History:   Procedure Laterality Date    COLONOSCOPY N/A 11/9/2016    COLONOSCOPY performed by Willy Luke MD at [facility] ENDOSCOPY    HX ORTHOPAEDIC      CERVICAL FUSION    HX UROLOGICAL      TURP    HX VASCULAR ACCESS      L arm dialysis access     Diet prior to admission: Soft with thin liquids  Current Diet: NPO     Radiologist: Dr. Wilson Port: Lateral;Fluoro  Patient Position: Upright in fluoro chair    PO Trials:   Consistency Presented: Thin liquid; Solid;Pudding   How Presented: SLP-fed/presented;Self-fed/presented;Cup/sip; Successive swallows;Straw;Spoon   Bolus Acceptance: No impairment   Bolus Formation/Control: Impaired: Mastication;Delayed   Propulsion: Tongue pumping   Oral Residue: Lingual   Initiation of Swallow: Triggered at pyriform sinus(es);Triggered at vallecula   Penetration: Flash/transient   Aspiration/Timing: No evidence of aspiration   Pharyngeal Clearance: Pyriform residue ;Vallecular residue;10-50%   Attempted Modifications: Alternate liquids/solids; Double swallow   Effective Modifications: Double swallow; Alternate liquids/solids   Cues for Modifications: Verbal, consistent     Decreased Tongue Base Retraction?: Yes  Laryngeal Elevation: Reduced excursion with laryngeal vestibule gap    Aspiration/Penetration Score:   1 (No penetration/aspiration - Contrast remains outside of vestibule/airway):  Thin liquids in isolation, Pudding, Solids  2 (Penetration/No residue-Contrast enters the airway penetrates, remains above the folds/cords, and is cleared): Thin liquid wash to clear pharyngeal residue of solids    Pharyngeal Symmetry: Not assessed  Pharyngeal-Esophageal Segment: No impairment  Pharyngeal Dysfunction: Decreased strength;Decreased tongue base retraction;Decreased pharyngeal wall constriction;Decreased elevation/closure      NOMS:   The NOMS functional outcome measure was used to quantify this patient's level of swallowing impairment. Based on the NOMS, the patient was determined to be at level 5 for swallow function     NOMS Swallowing Levels:  Level 1 (CN): NPO  Level 2 (CM): NPO but takes consistency in therapy  Level 3 (CL): Takes less than 50% of nutrition p.o. and continues with nonoral feedings; and/or safe with mod cues; and/or max diet restriction  Level 4 (CK): Safe swallow but needs mod cues; and/or mod diet restriction; and/or still requires some nonoral feeding/supplements  Level 5 (CJ): Safe swallow with min diet restriction; and/or needs min cues  Level 6 (CI): Independent with p.o.; rare cues; usually self cues; may need to avoid some foods or needs extra time  Level 7 ([address] [place] [address]): Independent for all p.o.  MIRIAM. (2003). National Outcomes Measurement System (NOMS): Adult Speech-Language Pathology User's Guide. COMMUNICATION/EDUCATION:   Patient was educated regarding his deficit(s) of oropharyngeal dysphagia. Patient demonstrated fair understanding as evidenced by cognitive deficits. The patients plan of care including findings from [facility], recommendations, planned interventions, and recommended diet changes were discussed with: Registered nurse. Patient/family have participated as able in goal setting and plan of care. Patient/family agree to work toward stated goals and plan of care.     Thank you for this referral.  Beatriz Ryder SLP  Time Calculation: 35 mins

## 2022-11-22 NOTE — PROGRESS NOTES
Hospitalist Progress Note    Subjective:   Daily Progress Note: 11/22/2022 7:14 AM    Hospital Course: Patient is a 79-year-old black male with a history of end-stage disease on hemodialysis Monday, Wednesday,, type 2 diabetes, GERD, hypertension, chronic hypoxic respiratory failure requiring 2 L oxygen nasal cannula at home, systolic heart failure who presented to the emergency room on 11/16/2022 for worsening weakness and recurrent falls. He denied hitting his head or losing consciousness. CT of the head and spine were negative for acute findings. As patient was unable to walk he was admitted in the hospital for further evaluation. Nephrology consulted for hemodialysis management. Neurology consulted. Vitamin B12 low, folate low and began supplementation. MRI of the lumbar spine showed spinal stenosis with multilevel degenerative disc disease. Per neurology recommend physical and occupational therapy and EMG as an outpatient. PT and OT recommend SNF placement. Case management consulted for discharge planning. Overnight on 11/20/2022 patient became confused and altered. Sodium was 132, potassium 7.6, BUN 60, serum creatinine 8.01, glucose 181, ammonia level 35. He was started on lactulose. Ammonia level trending down. Patient developed confusion  on day #5 hospital.  Calcitonin and lactic acid and WBC within normal limits. Doubt infectious process. CT of the head showed no acute intracranial abnormality. CT of the cervical spine showed no fracture but did show cervical degenerative disc disease. We will give a one-time dose of IV Solu-Medrol. Patient also had difficulty swallowing with dry mouth. Speech therapy consulted      Subjective: Patient is alert and oriented x4. He says that he has a sore throat however he says it is because it is dry and he cannot swallow properly.     Current Facility-Administered Medications   Medication Dose Route Frequency    artificial saliva (MOUTH KOTE) 1 Spray 1 Spray Oral Q2H    methylPREDNISolone (PF) (SOLU-MEDROL) injection 60 mg  60 mg IntraVENous ONCE    lactulose (CHRONULAC) 10 gram/15 mL solution 30 mL  20 g Oral BID    hydrALAZINE (APRESOLINE) 20 mg/mL injection 20 mg  20 mg IntraVENous Q6H PRN    lidocaine 4 % patch 1 Patch  1 Patch TransDERmal Q24H    [Held by provider] traMADoL (ULTRAM) tablet 50 mg  50 mg Oral Q6H PRN    methocarbamoL (ROBAXIN) tablet 750 mg  750 mg Oral QID    cyanocobalamin (VITAMIN B12) tablet 500 mcg  500 mcg Oral DAILY    folic acid (FOLVITE) tablet 1 mg  1 mg Oral DAILY    insulin NPH (NOVOLIN N, HUMULIN N) injection 20 Units  20 Units SubCUTAneous ACB&D    sodium chloride (NS) flush 5-40 mL  5-40 mL IntraVENous PRN    acetaminophen (TYLENOL) tablet 650 mg  650 mg Oral Q6H PRN    Or    acetaminophen (TYLENOL) suppository 650 mg  650 mg Rectal Q6H PRN    polyethylene glycol (MIRALAX) packet 17 g  17 g Oral DAILY PRN    ondansetron (ZOFRAN ODT) tablet 4 mg  4 mg Oral Q8H PRN    Or    ondansetron (ZOFRAN) injection 4 mg  4 mg IntraVENous Q6H PRN    heparin (porcine) injection 5,000 Units  5,000 Units SubCUTAneous Q8H    insulin lispro (HUMALOG) injection   SubCUTAneous AC&HS    glucose chewable tablet 16 g  4 Tablet Oral PRN    glucagon (GLUCAGEN) injection 1 mg  1 mg IntraMUSCular PRN    dextrose 10% infusion 0-250 mL  0-250 mL IntraVENous PRN    aspirin chewable tablet 81 mg  81 mg Oral DAILY    bumetanide (BUMEX) tablet 2 mg  2 mg Oral DAILY    carvediloL (COREG) tablet 12.5 mg  12.5 mg Oral BID WITH MEALS    sacubitriL-valsartan (ENTRESTO) 24-26 mg tablet 1 Tablet  1 Tablet Oral BID    finasteride (PROSCAR) tablet 5 mg  5 mg Oral DAILY    fluticasone propionate (FLONASE) 50 mcg/actuation nasal spray 2 Spray  2 Spray Both Nostrils DAILY    gabapentin (NEURONTIN) capsule 300 mg  300 mg Oral TID PRN    sevelamer carbonate (RENVELA) tab 800 mg  800 mg Oral BID    tamsulosin (FLOMAX) capsule 0.8 mg  0.8 mg Oral DAILY        Review of Systems  Constitutional: No fevers, No chills, No sweats, ++ fatigue, ++Weakness  Eyes: No redness  Ears, nose, mouth, throat, and face: No nasal congestion, No sore throat, No voice change  Respiratory: No Shortness of Breath, No cough, No wheezing  Cardiovascular: No chest pain, No palpitations, No extremity edema  Gastrointestinal: No nausea, No vomiting, No diarrhea, No abdominal pain  Genitourinary: No frequency, No dysuria, No hematuria  Integument/breast: No skin lesion(s)   Neurological: ++Confusion, No headaches, No dizziness      Objective:     Visit Vitals  /78   Pulse 70   Temp 98.8 °F (37.1 °C)   Resp 20   Ht 6' (1.829 m)   Wt 105.1 kg (231 lb 9.6 oz)   SpO2 100%   BMI 31.41 kg/m²    O2 Flow Rate (L/min): 2 l/min O2 Device: Nasal cannula    Temp (24hrs), Av.3 °F (37.4 °C), Min:98.2 °F (36.8 °C), Max:100.8 °F (38.2 °C)      No intake/output data recorded.  1901 -  0700  In: -   Out: 2150 [Urine:150]    PHYSICAL EXAM:  Constitutional: No acute distress  Skin: Extremities and face reveal no rashes. HEENT: Sclerae anicteric. Extra-occular muscles are intact. Oral mucosa dry do not see any plaques indicating yeast infection. Cardiovascular: Regular rate and rhythm. Respiratory:  Clear breath sounds bilaterally with no wheezes, rales, or rhonchi. GI: Abdomen nondistended, soft, and nontender. Normal active bowel sounds. Musculoskeletal: No pitting edema of the lower legs. Able to move all ext  Neurological:  Patient is alert and oriented.  Cranial nerves II-XII grossly intact  Psychiatric: Mood appears appropriate       Data Review    Recent Results (from the past 24 hour(s))   GLUCOSE, POC    Collection Time: 22 12:38 PM   Result Value Ref Range    Glucose (POC) 108 65 - 117 mg/dL    Performed by [employer], BASIC    Collection Time: 22 12:53 PM   Result Value Ref Range    Sodium 133 (L) 136 - 145 mmol/L    Potassium 4.1 3.5 - 5.1 mmol/L Chloride 95 (L) 97 - 108 mmol/L    CO2 30 21 - 32 mmol/L    Anion gap 8 5 - 15 mmol/L    Glucose 116 (H) 65 - 100 mg/dL    BUN 28 (H) 6 - 20 MG/DL    Creatinine 4.42 (H) 0.70 - 1.30 MG/DL    BUN/Creatinine ratio 6 (L) 12 - 20      eGFR 13 (L) >60 ml/min/1.73m2    Calcium 9.5 8.5 - 10.1 MG/DL   LACTIC ACID    Collection Time: 11/21/22 12:53 PM   Result Value Ref Range    Lactic acid 1.7 0.4 - 2.0 MMOL/L   PROCALCITONIN    Collection Time: 11/21/22 12:53 PM   Result Value Ref Range    Procalcitonin 8.44 ng/mL   C REACTIVE PROTEIN, QT    Collection Time: 11/21/22 12:53 PM   Result Value Ref Range    C-Reactive protein 13.20 (H) 0.00 - 0.60 mg/dL   COVID-19 RAPID TEST    Collection Time: 11/21/22  3:38 PM   Result Value Ref Range    Specimen source Nasopharyngeal      COVID-19 rapid test Not detected NOTD     GLUCOSE, POC    Collection Time: 11/21/22  4:04 PM   Result Value Ref Range    Glucose (POC) 132 (H) 65 - 117 mg/dL    Performed by Lacey Nickerson PCT    GLUCOSE, POC    Collection Time: 11/21/22  8:52 PM   Result Value Ref Range    Glucose (POC) 180 (H) 65 - 117 mg/dL    Performed by Gay Borja PCT    METABOLIC PANEL, BASIC    Collection Time: 11/22/22  1:39 AM   Result Value Ref Range    Sodium 134 (L) 136 - 145 mmol/L    Potassium 5.2 (H) 3.5 - 5.1 mmol/L    Chloride 96 (L) 97 - 108 mmol/L    CO2 30 21 - 32 mmol/L    Anion gap 8 5 - 15 mmol/L    Glucose 149 (H) 65 - 100 mg/dL    BUN 45 (H) 6 - 20 MG/DL    Creatinine 6.21 (H) 0.70 - 1.30 MG/DL    BUN/Creatinine ratio 7 (L) 12 - 20      eGFR 9 (L) >60 ml/min/1.73m2    Calcium 8.9 8.5 - 10.1 MG/DL   CBC WITH AUTOMATED DIFF    Collection Time: 11/22/22  1:39 AM   Result Value Ref Range    WBC 9.5 4.1 - 11.1 K/uL    RBC 3.49 (L) 4.10 - 5.70 M/uL    HGB 10.4 (L) 12.1 - 17.0 g/dL    HCT 32.4 (L) 36.6 - 50.3 %    MCV 92.8 80.0 - 99.0 FL    MCH 29.8 26.0 - 34.0 PG    MCHC 32.1 30.0 - 36.5 g/dL    RDW 15.5 (H) 11.5 - 14.5 %    PLATELET 853 545 - 944 K/uL    MPV 11.2 8.9 - 12.9 FL    NRBC 0.0 0  WBC    ABSOLUTE NRBC 0.00 0.00 - 0.01 K/uL    NEUTROPHILS 79 (H) 32 - 75 %    LYMPHOCYTES 11 (L) 12 - 49 %    MONOCYTES 10 5 - 13 %    EOSINOPHILS 0 0 - 7 %    BASOPHILS 0 0 - 1 %    IMMATURE GRANULOCYTES 0 0.0 - 0.5 %    ABS. NEUTROPHILS 7.5 1.8 - 8.0 K/UL    ABS. LYMPHOCYTES 1.0 0.8 - 3.5 K/UL    ABS. MONOCYTES 1.0 0.0 - 1.0 K/UL    ABS. EOSINOPHILS 0.0 0.0 - 0.4 K/UL    ABS. BASOPHILS 0.0 0.0 - 0.1 K/UL    ABS. IMM. GRANS. 0.0 0.00 - 0.04 K/UL    DF AUTOMATED     AMMONIA    Collection Time: 11/22/22  1:39 AM   Result Value Ref Range    Ammonia, plasma 27 <32 UMOL/L   GLUCOSE, POC    Collection Time: 11/22/22  7:56 AM   Result Value Ref Range    Glucose (POC) 129 (H) 65 - 117 mg/dL    Performed by Skye Jordan        Radiology review: MRI of spine    Assessment:   1. Generalized weakness with recurrent falls secondary to spinal stenosis and multilevel disc and facet degeneration  2. Elevated troponin secondary to kidney disease. 3.  End-stage renal disease on hemodialysis Monday, Wednesday, Friday with critical severe hyper kalemia  4. Chronic systolic heart failure  5. Type 2 diabetes  6. Chronic hypoxic respiratory failure  7. Acute confusion multifactorial  8. Dry mouth with dysphagia      Plan:   1.  CT of the head and spine reviewed with no acute findings. MRI of the lumbar spine showed spinal stenosis. Neurology consulted. They recommend physical therapy and EMG as an outpatient. Tramadol as needed. Lidocaine patch ordered. Vitamin B12 and folate low and supplemented. PT OT recommend SNF placement. Case management consulted. We will give a one-time dose of IV Solu-Medrol. 2.  Troponins elevated on admission. Patient denies any chest pain. Chest likely secondary to renal disease  3. Dialysis Monday, Wednesday, Friday nephrology consulted. On Renvela  4. Patient on Entresto, Coreg, Bumex, aspirin. 5.  Glucose levels are stable.   Patient on insulin sliding scale and NPH 20 units twice daily  6. Continue 2 L of oxygen nasal cannula. 7.  Patient's confusion has improved. Multifactorial.  CT of the head on 11/21/2022 did not show any acute abnormality. Ammonia level trending down. Continue lactulose. 8.  We will start artificial saliva. Speech therapy evaluation. We will check a chest x-ray. Rapid COVID-negative  9. CBC, BMP, ammonia level in the a.m. Dispo: 24 hours. Barriers include tolerating oral diet. Patient will need placement. CODE STATUS full     DVT prophylaxis: Heparin  Ulcer prophylaxis: Not indicated    Care Plan discussed with: Patient/Family, Nurse, and     Total time spent with patient: 36 minutes.

## 2022-11-22 NOTE — PROGRESS NOTES
Problem: Mobility Impaired (Adult and Pediatric)  Goal: *Acute Goals and Plan of Care (Insert Text)  Description: FUNCTIONAL STATUS PRIOR TO ADMISSION: Patient was modified independent using a rolling walker for functional mobility. States he drives himself to dialysis. Has been [place] some difficulty with walking\" but \"hasn't told anyone\". Admits to 2 recent falls. HOME SUPPORT PRIOR TO ADMISSION: The patient lived with wife and gives him intermittent assist.  Patient states he \"tries not to ask her for help\"    Physical Therapy Goals  Initiated 11/17/2022  1. Patient will move from supine to sit and sit to supine  in bed with modified independence within 7 day(s). 2.  Patient will transfer from bed to chair and chair to bed with contact guard assist using the least restrictive device within 7 day(s). 3.  Patient will perform sit to stand with contact guard assist within 7 day(s). 4.  Patient will ambulate with contact guard assist for 150 feet with the least restrictive device within 7 day(s). Outcome: Progressing Towards Goal     PHYSICAL THERAPY TREATMENT  Patient: Sami Mendoza (17 y.o. male)  Date: 11/22/2022  Diagnosis: Unable to ambulate [R26.2] <principal problem not specified>      Precautions: Fall  Chart, physical therapy assessment, plan of care and goals were reviewed. ASSESSMENT  Patient continues with skilled PT services and is overall progressing towards goals but demonstrating decline in function this date with increased assist required for bed mobility. Patient had acute change in cognitive status yesterday and today he reports he feels \"100% better than yesterday. \" However, patient appeared to wax and wane with his cognitive status and attention during session. Slow to respond at times. Per discussion with patient it does sound like things have been challenging at home for some time but he admits he does not like to ask for help.  Also, note he has been limited by neck pain and has decreased cervical ROM especially into L cervical rotation. Recommend SNF. Acute PT will continue to follow and progress as able. Current Level of Function Impacting Discharge (mobility/balance): moderate assist x 2 sit to supine    Other factors to consider for discharge: HD         PLAN :  Patient continues to benefit from skilled intervention to address the above impairments. Continue treatment per established plan of care. to address goals. Recommendation for discharge: (in order for the patient to meet his/her long term goals)  Therapy up to 5 days/week in SNF setting    This discharge recommendation:  Has been made in collaboration with the attending provider and/or case management    IF patient discharges home will need the following DME: to be determined (TBD)     SUBJECTIVE:   Patient stated I will try.     OBJECTIVE DATA SUMMARY:   Critical Behavior:  Neurologic State: Alert, Other (Comment) (Slowed response time)  Orientation Level: Oriented to time, Oriented to place, Oriented to person  Cognition: Decreased attention/concentration, Follows commands  Safety/Judgement: Decreased awareness of need for assistance, Fall prevention  Functional Mobility Training:  Bed Mobility:  Rolling: Minimum assistance; Additional time  Supine to Sit: Moderate assistance; Additional time  Sit to Supine: Moderate assistance;Assist x2  Scooting: Contact guard assistance; Additional time (forward at edge of bed)  Transfers:  Sit to Stand: Minimum assistance;Assist x2;Adaptive equipment  Stand to Sit: Minimum assistance;Assist x2;Adaptive equipment  Balance:  Sitting: Impaired  Sitting - Static: Good (unsupported)  Sitting - Dynamic: Fair (occasional)  Standing: Impaired  Standing - Static: Constant support; Fair  Standing - Dynamic : Constant support; Fair  Ambulation/Gait Training:  Distance (ft): 3 Feet (ft)  Assistive Device: Gait belt;Walker, rolling  Ambulation - Level of Assistance: Minimal assistance;Assist x2  Gait Abnormalities: Decreased step clearance;Shuffling gait  Base of Support: Widened  Speed/Deanna: Slow;Pace decreased (<100 feet/min)  Step Length: Left shortened;Right shortened    Therapeutic Exercise:  Neck - cervical AROM - unable to perform L cervical rotation past ~neutral  Worked on trunk rotation to L/R    Pain Rating:  Neck pain, primarily on R side (noted lidocaine patch)    Activity Tolerance:   Fair, on oxygen (2L)- 100%, reports he only normally wears when sleeping (?)    After treatment patient left in no apparent distress:   Supine in bed, Call bell within reach, Bed / chair alarm activated, and Side rails x 3    COMMUNICATION/COLLABORATION:   The patients plan of care was discussed with: Occupational therapist, Registered nurse, Physician, and Case management- discussed in IDR.      Ludwin Varela, PT   Time Calculation: 40 mins

## 2022-11-23 LAB
AMMONIA PLAS-SCNC: 17 UMOL/L
ANION GAP SERPL CALC-SCNC: 9 MMOL/L (ref 5–15)
BASOPHILS # BLD: 0 K/UL (ref 0–0.1)
BASOPHILS NFR BLD: 0 % (ref 0–1)
BUN SERPL-MCNC: 77 MG/DL (ref 6–20)
BUN/CREAT SERPL: 10 (ref 12–20)
CALCIUM SERPL-MCNC: 9 MG/DL (ref 8.5–10.1)
CHLORIDE SERPL-SCNC: 98 MMOL/L (ref 97–108)
CO2 SERPL-SCNC: 27 MMOL/L (ref 21–32)
CREAT SERPL-MCNC: 7.79 MG/DL (ref 0.7–1.3)
DIFFERENTIAL METHOD BLD: ABNORMAL
EOSINOPHIL # BLD: 0 K/UL (ref 0–0.4)
EOSINOPHIL NFR BLD: 0 % (ref 0–7)
ERYTHROCYTE [DISTWIDTH] IN BLOOD BY AUTOMATED COUNT: 15.2 % (ref 11.5–14.5)
GLUCOSE BLD STRIP.AUTO-MCNC: 123 MG/DL (ref 65–117)
GLUCOSE BLD STRIP.AUTO-MCNC: 163 MG/DL (ref 65–117)
GLUCOSE BLD STRIP.AUTO-MCNC: 183 MG/DL (ref 65–117)
GLUCOSE SERPL-MCNC: 186 MG/DL (ref 65–100)
HCT VFR BLD AUTO: 34.2 % (ref 36.6–50.3)
HGB BLD-MCNC: 10.9 G/DL (ref 12.1–17)
IMM GRANULOCYTES # BLD AUTO: 0 K/UL (ref 0–0.04)
IMM GRANULOCYTES NFR BLD AUTO: 0 % (ref 0–0.5)
LYMPHOCYTES # BLD: 0.5 K/UL (ref 0.8–3.5)
LYMPHOCYTES NFR BLD: 5 % (ref 12–49)
MCH RBC QN AUTO: 29.5 PG (ref 26–34)
MCHC RBC AUTO-ENTMCNC: 31.9 G/DL (ref 30–36.5)
MCV RBC AUTO: 92.4 FL (ref 80–99)
MONOCYTES # BLD: 0.3 K/UL (ref 0–1)
MONOCYTES NFR BLD: 3 % (ref 5–13)
NEUTS SEG # BLD: 9.4 K/UL (ref 1.8–8)
NEUTS SEG NFR BLD: 92 % (ref 32–75)
NRBC # BLD: 0 K/UL (ref 0–0.01)
NRBC BLD-RTO: 0 PER 100 WBC
PLATELET # BLD AUTO: 208 K/UL (ref 150–400)
PMV BLD AUTO: 11.1 FL (ref 8.9–12.9)
POTASSIUM SERPL-SCNC: 5 MMOL/L (ref 3.5–5.1)
RBC # BLD AUTO: 3.7 M/UL (ref 4.1–5.7)
RBC MORPH BLD: ABNORMAL
SERVICE CMNT-IMP: ABNORMAL
SODIUM SERPL-SCNC: 134 MMOL/L (ref 136–145)
WBC # BLD AUTO: 10.2 K/UL (ref 4.1–11.1)

## 2022-11-23 PROCEDURE — 74011250636 HC RX REV CODE- 250/636: Performed by: STUDENT IN AN ORGANIZED HEALTH CARE EDUCATION/TRAINING PROGRAM

## 2022-11-23 PROCEDURE — 92526 ORAL FUNCTION THERAPY: CPT

## 2022-11-23 PROCEDURE — 74011636637 HC RX REV CODE- 636/637: Performed by: STUDENT IN AN ORGANIZED HEALTH CARE EDUCATION/TRAINING PROGRAM

## 2022-11-23 PROCEDURE — 65270000046 HC RM TELEMETRY

## 2022-11-23 PROCEDURE — 90935 HEMODIALYSIS ONE EVALUATION: CPT

## 2022-11-23 PROCEDURE — 80048 BASIC METABOLIC PNL TOTAL CA: CPT

## 2022-11-23 PROCEDURE — 74011000250 HC RX REV CODE- 250: Performed by: NURSE PRACTITIONER

## 2022-11-23 PROCEDURE — 85025 COMPLETE CBC W/AUTO DIFF WBC: CPT

## 2022-11-23 PROCEDURE — 74011250637 HC RX REV CODE- 250/637: Performed by: NURSE PRACTITIONER

## 2022-11-23 PROCEDURE — 77010033678 HC OXYGEN DAILY

## 2022-11-23 PROCEDURE — 36415 COLL VENOUS BLD VENIPUNCTURE: CPT

## 2022-11-23 PROCEDURE — 82962 GLUCOSE BLOOD TEST: CPT

## 2022-11-23 PROCEDURE — 74011250637 HC RX REV CODE- 250/637: Performed by: STUDENT IN AN ORGANIZED HEALTH CARE EDUCATION/TRAINING PROGRAM

## 2022-11-23 PROCEDURE — 82140 ASSAY OF AMMONIA: CPT

## 2022-11-23 PROCEDURE — 94760 N-INVAS EAR/PLS OXIMETRY 1: CPT

## 2022-11-23 RX ADMIN — Medication 20 UNITS: at 17:13

## 2022-11-23 RX ADMIN — ASPIRIN 81 MG: 81 TABLET, CHEWABLE ORAL at 12:23

## 2022-11-23 RX ADMIN — METHOCARBAMOL TABLETS 750 MG: 750 TABLET, COATED ORAL at 17:13

## 2022-11-23 RX ADMIN — LACTULOSE 30 ML: 20 SOLUTION ORAL at 17:13

## 2022-11-23 RX ADMIN — METHOCARBAMOL TABLETS 750 MG: 750 TABLET, COATED ORAL at 21:36

## 2022-11-23 RX ADMIN — LACTULOSE 30 ML: 20 SOLUTION ORAL at 12:23

## 2022-11-23 RX ADMIN — ACETAMINOPHEN 650 MG: 325 TABLET ORAL at 12:23

## 2022-11-23 RX ADMIN — TAMSULOSIN HYDROCHLORIDE 0.8 MG: 0.4 CAPSULE ORAL at 12:23

## 2022-11-23 RX ADMIN — Medication 1 SPRAY: at 03:37

## 2022-11-23 RX ADMIN — HEPARIN SODIUM 5000 UNITS: 5000 INJECTION INTRAVENOUS; SUBCUTANEOUS at 15:15

## 2022-11-23 RX ADMIN — FOLIC ACID 1 MG: 1 TABLET ORAL at 12:23

## 2022-11-23 RX ADMIN — Medication 1 SPRAY: at 05:03

## 2022-11-23 RX ADMIN — FINASTERIDE 5 MG: 5 TABLET, FILM COATED ORAL at 12:23

## 2022-11-23 RX ADMIN — FLUTICASONE PROPIONATE 2 SPRAY: 50 SPRAY, METERED NASAL at 12:25

## 2022-11-23 RX ADMIN — Medication 1 SPRAY: at 12:00

## 2022-11-23 RX ADMIN — SACUBITRIL AND VALSARTAN 1 TABLET: 24; 26 TABLET, FILM COATED ORAL at 12:23

## 2022-11-23 RX ADMIN — HEPARIN SODIUM 5000 UNITS: 5000 INJECTION INTRAVENOUS; SUBCUTANEOUS at 21:36

## 2022-11-23 RX ADMIN — Medication 1 SPRAY: at 01:25

## 2022-11-23 RX ADMIN — HEPARIN SODIUM 5000 UNITS: 5000 INJECTION INTRAVENOUS; SUBCUTANEOUS at 22:37

## 2022-11-23 RX ADMIN — CARVEDILOL 12.5 MG: 12.5 TABLET, FILM COATED ORAL at 17:13

## 2022-11-23 RX ADMIN — Medication 2 UNITS: at 17:13

## 2022-11-23 RX ADMIN — METHOCARBAMOL TABLETS 750 MG: 750 TABLET, COATED ORAL at 12:23

## 2022-11-23 RX ADMIN — Medication 1 SPRAY: at 18:00

## 2022-11-23 RX ADMIN — Medication 500 MCG: at 12:23

## 2022-11-23 RX ADMIN — Medication 1 SPRAY: at 16:00

## 2022-11-23 RX ADMIN — SACUBITRIL AND VALSARTAN 1 TABLET: 24; 26 TABLET, FILM COATED ORAL at 17:12

## 2022-11-23 RX ADMIN — Medication 1 SPRAY: at 00:03

## 2022-11-23 RX ADMIN — Medication 1 SPRAY: at 20:00

## 2022-11-23 RX ADMIN — HEPARIN SODIUM 5000 UNITS: 5000 INJECTION INTRAVENOUS; SUBCUTANEOUS at 05:05

## 2022-11-23 RX ADMIN — GABAPENTIN 300 MG: 300 CAPSULE ORAL at 17:13

## 2022-11-23 RX ADMIN — BUMETANIDE 2 MG: 1 TABLET ORAL at 12:23

## 2022-11-23 NOTE — PROGRESS NOTES
Nephrology Progress Note  [facility] / [address] [place] [address], [address]  Phone - [phone]  Fax - [phone]                 Patient: Sami Mendoza                   YOB: 1945        Date- 11/23/2022                      Admit Date: 11/16/2022  CC: Follow up for ESRD       IMPRESSION & PLAN:   ESRD- hd mwf at [facility]  Hyperkalemia- k 7.6 on 11-21-22  Anemia of ckd  hyponatremia  Sec. Hyperpara  S/p fall  Weakness  H/o CHF  Dm 2      PLAN-  SEEN ON HD TODAY  Minimal fluid removal with hd   Continue hd mwf  Continue coreg and entresto  Hold epogen     Subjective: Interval History:   Seen on hd  Bp high  K high --he denies eating high k food      Objective:   Vitals:    11/23/22 0830 11/23/22 0845 11/23/22 0900 11/23/22 0915   BP: (!) 144/81 109/77 132/77 114/63   Pulse: 60 (!) 56 (!) 57 (!) 50   Resp: 18 18 18 18   Temp:       TempSrc:       SpO2:       Weight:       Height:          No intake/output data recorded. Last 3 Recorded Weights in this Encounter    11/15/22 2238 11/19/22 2317 11/20/22 2202   Weight: 108.9 kg (240 lb) 108 kg (238 lb 1.6 oz) 105.1 kg (231 lb 9.6 oz)        Physical exam:    GEN:  NAD  NECK:  Supple, no thyromegaly  RESP: Clear  b/l, no  wheezing,   CVS: RRR,S1,S2   NEURO: non focal, normal speech  EXT: Edema +nt     Upper arm avf +    Chart reviewed. Pertinent Notes reviewed. Data Review :  Recent Labs     11/23/22  0105 11/22/22 0139 11/21/22  1253   * 134* 133*   K 5.0 5.2* 4.1   CL 98 96* 95*   CO2 27 30 30   BUN 77* 45* 28*   CREA 7.79* 6.21* 4.42*   * 149* 116*   CA 9.0 8.9 9.5       Recent Labs     11/23/22 0105 11/22/22  0139 11/21/22  0454   WBC 10.2 9.5 9.0   HGB 10.9* 10.4* 10.9*   HCT 34.2* 32.4* 33.8*    179 206       No results for input(s): FE, TIBC, PSAT, FERR in the last 72 hours.    Lab Results   Component Value Date/Time Hemoglobin A1c 5.6 12/21/2021 11:12 AM    Hemoglobin A1c 8.2 (H) 05/05/2021 03:29 AM    Hemoglobin A1c 10.5 (H) 11/16/2016 10:45 AM        Lab Results   Component Value Date/Time    Microalb/Creat ratio (ug/mg creat.) 897.2 (H) 04/11/2019 12:27 PM     Lab Results   Component Value Date/Time    NT pro-BNP 12,079 (H) 09/12/2022 06:02 AM    NT pro-BNP 5,709 (H) 03/15/2022 08:39 PM    NT pro-BNP 5,474 (H) 05/04/2021 06:09 AM    NT pro-BNP 2,497 (H) 04/17/2021 10:54 AM    NT pro-BNP 9,341 (H) 07/21/2020 05:33 AM     US Results (most recent):  Medication list  reviewed  Current Facility-Administered Medications   Medication Dose Route Frequency    artificial saliva (MOUTH KOTE) 1 Spray  1 Spray Oral Q2H    lactulose (CHRONULAC) 10 gram/15 mL solution 30 mL  20 g Oral BID    hydrALAZINE (APRESOLINE) 20 mg/mL injection 20 mg  20 mg IntraVENous Q6H PRN    lidocaine 4 % patch 1 Patch  1 Patch TransDERmal Q24H    [Held by provider] traMADoL (ULTRAM) tablet 50 mg  50 mg Oral Q6H PRN    methocarbamoL (ROBAXIN) tablet 750 mg  750 mg Oral QID    cyanocobalamin (VITAMIN B12) tablet 500 mcg  500 mcg Oral DAILY    folic acid (FOLVITE) tablet 1 mg  1 mg Oral DAILY    insulin NPH (NOVOLIN N, HUMULIN N) injection 20 Units  20 Units SubCUTAneous ACB&D    sodium chloride (NS) flush 5-40 mL  5-40 mL IntraVENous PRN    acetaminophen (TYLENOL) tablet 650 mg  650 mg Oral Q6H PRN    Or    acetaminophen (TYLENOL) suppository 650 mg  650 mg Rectal Q6H PRN    polyethylene glycol (MIRALAX) packet 17 g  17 g Oral DAILY PRN    ondansetron (ZOFRAN ODT) tablet 4 mg  4 mg Oral Q8H PRN    Or    ondansetron (ZOFRAN) injection 4 mg  4 mg IntraVENous Q6H PRN    heparin (porcine) injection 5,000 Units  5,000 Units SubCUTAneous Q8H    insulin lispro (HUMALOG) injection   SubCUTAneous AC&HS    glucose chewable tablet 16 g  4 Tablet Oral PRN    glucagon (GLUCAGEN) injection 1 mg  1 mg IntraMUSCular PRN    dextrose 10% infusion 0-250 mL  0-250 mL IntraVENous PRN    aspirin chewable tablet 81 mg  81 mg Oral DAILY    bumetanide (BUMEX) tablet 2 mg  2 mg Oral DAILY    carvediloL (COREG) tablet 12.5 mg  12.5 mg Oral BID WITH MEALS    sacubitriL-valsartan (ENTRESTO) 24-26 mg tablet 1 Tablet  1 Tablet Oral BID    finasteride (PROSCAR) tablet 5 mg  5 mg Oral DAILY    fluticasone propionate (FLONASE) 50 mcg/actuation nasal spray 2 Spray  2 Spray Both Nostrils DAILY    gabapentin (NEURONTIN) capsule 300 mg  300 mg Oral TID PRN    [Held by provider] sevelamer carbonate (RENVELA) tab 800 mg  800 mg Oral BID    tamsulosin (FLOMAX) capsule 0.8 mg  0.8 mg Oral DAILY        Rhett Lieberman MD  11/23/2022

## 2022-11-23 NOTE — PROGRESS NOTES
Comprehensive Nutrition Assessment    Type and Reason for Visit: Initial, RD nutrition re-screen/LOS    Nutrition Recommendations/Plan:   Continue soft and bite sized diet per SLP  Added Nepro BID  Please document % meals and supplements consumed in flowsheet I/O's under intake      Malnutrition Assessment:  Malnutrition Status: Moderate malnutrition (11/23/22 1535)    Context:  Acute illness     Findings of the 6 clinical characteristics of malnutrition:   Energy Intake:  75% or less of est energy req for 7 or more days  Weight Loss:  Greater than 5% over 1 month     Body Fat Loss:  No significant body fat loss,     Muscle Mass Loss:  No significant muscle mass loss,    Fluid Accumulation:  No significant fluid accumulation,     Strength:  Not performed     Nutrition Assessment:     Chart reviewed for LOS. Pt medically noted for generalized weakness, ESRD on HD, CHF, DM2, acute confusion, dry mouth, dysphagia, s/p MBS. Pt reports a good appetite now, but PTA he had not been eating well for about 2 weeks d/t constipation. Pt reports losing 16lb over that 2 week period from not eating well. This is reflected in his weight hx and then some, if his current weight accurate (per bed scale). No significant muscle/fat wasting notable today and pt reports feeling strong. He did agree to add supplements to his diet since he is missing some meals d/t HD. He has had intermittently elevated potassium, so will add Nepro shakes. Continue to encourage intake of meals and supplements.      Wt Readings from Last 10 Encounters:   11/23/22 99 kg (218 lb 4.8 oz)   11/15/22 108.9 kg (240 lb)   09/12/22 111.4 kg (245 lb 9.5 oz)   06/09/22 111.4 kg (245 lb 9.6 oz)   03/15/22 113.4 kg (250 lb)   12/21/21 108.2 kg (238 lb 9.6 oz)   12/07/21 110.3 kg (243 lb 2.7 oz)   10/11/21 108.6 kg (239 lb 6.7 oz)   08/22/21 111 kg (244 lb 11.4 oz)   05/06/21 111.3 kg (245 lb 6.4 oz)   ]    Nutrition Related Findings:    Labs: K 5.0, Cr 7.79, BG [phone]. Meds: bumex, vit B12, folvite, humalog, NPH, lactulose.  11/23. Wound Type: None    Current Nutrition Intake & Therapies:  Average Meal Intake: 51-75%  Average Supplement Intake: None ordered  ADULT ORAL NUTRITION SUPPLEMENT Breakfast, Dinner; Low Calorie/High Protein  ADULT DIET Dysphagia - Soft & Bite Sized; Only vanilla or strawberry supplements please    Anthropometric Measures:  Height: 6' (182.9 cm)  Ideal Body Weight (IBW): 178 lbs (81 kg)     Current Body Wt:  99 kg (218 lb 4.1 oz), 122.6 % IBW. Bed scale  Current BMI (kg/m2): 29.6        Weight Adjustment: No adjustment                 BMI Category: Overweight (BMI 25.0-29. 9)    Estimated Daily Nutrient Needs:  Energy Requirements Based On: Formula  Weight Used for Energy Requirements: Current  Energy (kcal/day): 5720-4826 kcals (BMR x 1.2-1. 3AF)  Weight Used for Protein Requirements: Current  Protein (g/day): 80-99g (0.8-1.0g/kg)  Method Used for Fluid Requirements: 1 ml/kcal  Fluid (ml/day): 2100mL or per MD    Nutrition Diagnosis:   Inadequate protein-energy intake related to  (constipation PTA & dysphagia) as evidenced by poor intake prior to admission, swallowing study results, weight loss    Nutrition Interventions:   Food and/or Nutrient Delivery: Continue current diet, Start oral nutrition supplement  Nutrition Education/Counseling: No recommendations at this time  Coordination of Nutrition Care: Continue to monitor while inpatient       Goals:     Goals: PO intake 50% or greater, by next RD assessment       Nutrition Monitoring and Evaluation:   Behavioral-Environmental Outcomes: None identified  Food/Nutrient Intake Outcomes: Food and nutrient intake, Supplement intake  Physical Signs/Symptoms Outcomes: Biochemical data, GI status, Fluid status or edema, Weight, Nutrition focused physical findings, Constipation    Discharge Planning:    Continue current diet, Continue oral nutrition supplement    Jim Claudio RD  Contact: [phone]

## 2022-11-23 NOTE — PROGRESS NOTES
Transition of Care Plan:     RUR: 20% - \"high risk\"  LOS: 7 days   Disposition: SNF - [address] [place] [address] with resumed OP HD ([place], M/W/F at 6 AM)  Date FOC offered: 11/17/2022 (verbal)  Date FOC received: 11/17/2022  Date authorization started with reference number: Sarah Perez started 11/21/22 via [facility] Friday; no reference number available   Date authorization received and expires: Auth received 11/23/22; auth expires 11/28/22  Accepting facility: [address] [place] [address]   Follow up appointments: PCP & specialists as indicated   DME needed: Defer to SNF for DME needs  Transportation at Discharge: BLS transport needed  [address] or means to access home: N/A - pt transitioning to SNF at d/c  IM Medicare Letter: 2nd IM needed prior to d/c  Is patient a [place] and connected with the [employer] [address]? No             Caregiver Contact: Pt's wife Arpit Antonio: [phone])  Discharge Caregiver contacted prior to discharge? To be contacted prior to d/c   Care Conference needed?: Not at this time  COVID test: Rapid COVID test completed 11/21/22; results negative     Initial note: Chart reviewed for updates. CM received call from [facility] Friday reporting pt's insurance auth for SNF was approved for placement at [address] [place] [address]. Auth approved through 11/28/22 (next review date). [facility] reference #: [id number], auth ID #: [id number]. CM provided update/auth information via [address] communication to [address] [place] [address] for reference. CM requested for facility to confirm bed availability for admission 11/24/22; request pending. CM reported updates related to disposition to attending MD. MD agreeable to ANDRIY 11/24/22 to SNF (pending bed availability). CM will continue to follow & remain accessible for d/c planning.     Kelsey Strong, MSW  Care Manager, [facility]

## 2022-11-23 NOTE — PROGRESS NOTES
Problem: Dysphagia (Adult)  Goal: *Acute Goals and Plan of Care (Insert Text)  Description: Speech Therapy Goals:  Initiated 11/22/22    1. Patient will participate in MBS to assess swallow safety and efficiency prior to PO diet initiation within seven days. Outcome: Progressing Towards Goal     SPEECH LANGUAGE PATHOLOGY DYSPHAGIA TREATMENT  Patient: Pj Gallardo (32 y.o. male)  Date: 11/23/2022  Diagnosis: Unable to ambulate [R26.2] <principal problem not specified>      Precautions: Aspiration, Fall    ASSESSMENT:  Patient seen for dysphagia therapy. Patient alert, oriented, and attentive throughout session. Patient recalled this therapist from yesterday. MBS completed 11/22; see report for findings. Patient reported good tolerance of PO diet minced/moist with thin liquids following MBS. He has not eaten breakfast or lunch today due to being at Dialysis. PO trials completed this session with thin liquids and solids. Oral phase of swallow functional; mild delays with mastication did not impact efficiency. Pharyngeal phase of swallow did not reveal overt s/s of aspiration with either consistency. Vocal quality remained clear to phonation post-swallow. Patient independently implemented double swallow (swallow liquid/solid followed by dry swallow) to clear any pharyngeal residue, as visualized on MBS, in majority of opportunities. Recommend PO diet advancement to soft/bite-sized diet with thin liquids. Patient educated on importance of continuing to implement double swallow after each bite or sip to reduce vallecular and pyriform sinus residuals. SLP will follow.       PLAN:  Recommendations and Planned Interventions:  Advance PO diet: Soft/bite-sized with thin liquids  Medications orally as tolerated  Oral care via standard toothbrush 2-3x/daily  Aspiration precautions: Upright to 90 degrees for PO intake, double swallow after each bite/sip, slow rate, small bites/sips, alternate liquids/solids, remain upright for 30 minutes following meals  Independent for feeding; intermittent supervision for PO intake  SLP will follow for dysphagia management  Consider OP Neurology/Neuropsychology evaluation of cognition. Patient endorses decline in his memory over time which impacts his function. Patient continues to benefit from skilled intervention to address the above impairments. Continue treatment per established plan of care. Discharge Recommendations: To Be Determined     SUBJECTIVE:   Patient stated I am feeling much better than when I first came in.    OBJECTIVE:     Cognitive and Communication Status:  Neurologic State: Alert  Orientation Level: Oriented X4  Cognition: Appropriate for age attention/concentration, Follows commands, Endorsed short-term memory deficits  Perception: Appears intact  Perseveration: No perseveration noted  Safety/Judgement: Awareness of environment, Insight into deficits    Dysphagia Treatment:  Oral Assessment:   P.O. Trials:  Patient Position: Upright in chair  Vocal quality prior to P.O.: No impairment  Consistency Presented: Solid; Thin liquid  How Presented: Self-fed/presented;Cup/sip  Bolus Acceptance: No impairment  Bolus Formation/Control: No impairment  Propulsion: No impairment  Oral Residue: None  Initiation of Swallow: Present  Laryngeal Elevation: Present  Aspiration Signs/Symptoms: None  Effective Modifications: Double swallow;Occasional/infrequent verbal cues/reminders provided by therapist  Cues for Modifications: Minimal     After treatment:   Patient left in no apparent distress sitting up in chair, Call bell within reach, Nursing notified, and Bed / chair alarm activated    COMMUNICATION/EDUCATION:   Patient was educated regarding his deficit(s) of dysphagia as this relates to his diagnosis of deconditioning. He demonstrated good understanding as evidenced by recall from prior session.     The patient's plan of care including recommendations, planned interventions, and recommended diet changes were discussed with: Registered nurse.      Issac Tinajero SLP  Time Calculation: 35 mins

## 2022-11-23 NOTE — PROCEDURES
Hemodialysis / [phone]    Vitals Pre Post Assessment Pre Post   BP BP: 122/81 (11/23/22 0757) 112/70 LOC A&Ox3 A&Ox3   HR Pulse (Heart Rate): 62 (11/23/22 0757) 61 Lungs Clear clear   Resp Resp Rate: 18 (11/23/22 0757) 18 Cardiac RRR RRR   Temp Temp: 97.7 °F (36.5 °C) (11/23/22 0757) 98.4 Skin intact intact   Weight  N/a N/a Edema Trace BLE Trace BLE   Tele status None ordered None ordered Pain 0 0     Orders   Duration: Start: 7436 End: 1115 Total: 3.5 hours   Dialyzer: Dialyzer/Set Up Inspection: Xuan Beth (11/23/22 0757)   Alysa Lyons Bath: Dialysate K (mEq/L): 2 (11/23/22 0757)   Ca Bath: Dialysate CA (mEq/L): 2.5 (11/23/22 0757)   Na: Dialysate NA (mEq/L): 140 (11/23/22 0757)   Bicarb: Dialysate HCO3 (mEq/L): 40 (11/23/22 0757)   Target Fluid Removal: Goal/Amount of Fluid to Remove (mL): 500 mL (11/23/22 0757)     Access   Type & Location: STEPHANIE AVG   Comments:  Pre-assessment: skin CDI. No s/s of infection. + B/T. No issues with cannulation. Running well at . Post assessment: No issues with hemostasis, + B/T remains. Dressing CDI.       Labs   HBsAg (Antigen) / date: Negative 11/16/22                                              HBsAb (Antibody) / date: Susceptible 11/16/22   Source: Epic   Obtained/Reviewed  Critical Results Called HGB   Date Value Ref Range Status   11/23/2022 10.9 (L) 12.1 - 17.0 g/dL Final     Potassium   Date Value Ref Range Status   11/23/2022 5.0 3.5 - 5.1 mmol/L Final     Calcium   Date Value Ref Range Status   11/23/2022 9.0 8.5 - 10.1 MG/DL Final     BUN   Date Value Ref Range Status   11/23/2022 77 (H) 6 - 20 MG/DL Final     Comment:     INVESTIGATED PER DELTA CHECK PROTOCOL     Creatinine   Date Value Ref Range Status   11/23/2022 7.79 (H) 0.70 - 1.30 MG/DL Final        Meds Given   Name Dose Route   none                 Adequacy / Fluid    Total Liters Process: 78.1L   Net Fluid Removed: 500ml      Comments   Time Out Done:   (Time) 6700   Admitting Diagnosis: Impaired mobility Consent obtained/signed: Informed Consent Verified: Yes (chronic dialysis, no consent needed) (11/23/22 0757)   Machine / RO # Machine Number: [id number] (11/23/22 0757)   Primary Nurse Rpt Pre: Erica Nunez RN   Primary Nurse Rpt Post: Marianna Rock RN   Pt Education: Procedure   Care Plan: Ongoing   Pts outpatient clinic: [facility]     Tx Summary   Comments: SBAR received from Primary RN. Pt arrived to HD suite A&Ox3. Chronic consent applies. Pt agreeable to treatment. 5236: Each catheter limb disinfected per p&p, caps removed, hubs disinfected per p&p. Each lumen aspirated for blood return and flushed with Normal Saline per policy. VSS. Dialysis Tx initiated. 0945: 150 ml NS given to prevent clotting. 1015: 100 ml NS given to prevent clotting    1115: Pt clotting machine. Treatment ended. Dr. Carter Thakur notified. **Patient tolerated treatment well without complaints or complications. All lines and connections remained intact and visible throughout entire treatment. **    1115: Tx ended. VSS. Each dialysis catheter limb disinfected per p&p, all possible blood returned per p&p, and each dialysis hub disinfected per p&p. Each lumen flushed, post dialysis catheter Heparin dwell instilled per order, and caps applied. Bed locked and in the lowest position, call bell and belongings in reach. SBAR given to Primary, RN. Patient is stable at time of their departure. All Dialysis related medications have been reviewed.

## 2022-11-23 NOTE — PROGRESS NOTES
End of Shift Note     Bedside shift change report given to  NEHA Jimenez (oncoming nurse) by Jaziel Chung RN (offgoing nurse). Report included the following information SBAR, Kardex, ED Summary, and MAR     Shift worked: 7p-7a   Shift summary and any significant changes:     Patient more clear today  SLP consult completed   Modified barium swallow completed  Fluids discontinued. Pt slept throughout the night. No periods of confusion noted.        Concerns for physician to address:     Zone phone for oncoming shift:   [phone]      Patient Information  Meghan Holland  78 y.o.  11/16/2022 12:20 AM by Aicha Tian Liya was admitted from Home     Problem List          Patient Active Problem List     Diagnosis Date Noted    UTI (lower urinary tract infection) 10/25/2013    Renal failure (ARF), acute on chronic ([address] 10/25/2013    Unable to ambulate 11/16/2022    Cervical post-laminectomy syndrome 06/09/2022    Degenerative cervical spinal stenosis 06/09/2022    Vitamin D deficiency 12/21/2021    Immune-mediated neuropathy ([address] 12/21/2021    Diabetic peripheral neuropathy associated with type 2 diabetes mellitus ([address] 12/21/2021    B12 deficiency 12/21/2021    Sensory ataxic gait 12/21/2021    Cervical spondylosis with myelopathy 12/21/2021    Type 2 diabetes mellitus with stage 4 chronic kidney disease, with long-term current use of insulin ([address] 12/21/2021    Convulsions ([address] 12/21/2021    Acute alteration in mental status 12/21/2021    Myoclonus 12/21/2021    Sepsis ([address] 08/18/2021    Acute hypoxemic respiratory failure ([address] 51/91/5682    Systolic heart failure ([address] 05/04/2021    Accelerated hypertension 07/21/2020    Elevated troponin 07/21/2020    ESRD (end stage renal disease) on dialysis ([address] 07/21/2020    Acute respiratory failure with hypoxia ([address] 07/21/2020    Acute on chronic systolic CHF (congestive heart failure) ([address] 07/21/2020    Pneumonia 05/26/2019    Acute on chronic renal failure ([facility] 10/29/2016    Hypotension 10/29/2016    Chest pain 10/29/2016    Type II diabetes mellitus with nephropathy ([facility] 10/29/2016    Hyperlipidemia 10/29/2016    Nephrotic syndrome 08/20/2016    Renal anasarca 08/20/2016    Diarrhea 10/28/2013    SIRS (systemic inflammatory response syndrome) ([facility] 10/28/2013    Acute pancreatitis 01/18/2011    LFT'S ABNORMAL 01/18/2011    Acute renal failure (ARF) ([facility] 01/18/2011    CKD (chronic kidney disease) stage 3, GFR 30-59 ml/min ([facility]) 01/18/2011    CAD (coronary artery disease) 01/18/2011    Abdominal pain, acute, epigastric 01/18/2011    Nausea & vomiting 01/18/2011              Past Medical History:   Diagnosis Date    Arthritis       spine    CAD (coronary artery disease)       high cholesterol    Chronic kidney disease       dialysis    Diabetes ([facility]      ESRD (end stage renal disease) ([facility]      GERD (gastroesophageal reflux disease)       HX    Hypertension      Ill-defined condition       irritable bowel syndrome    Systolic CHF ([facility]      Unspecified sleep apnea       NO CPAP         Core Measures:  CVA: No Not applicable  CHF:No Not applicable  PNA:No Not applicable     Activity:  Activity Level: Up with Assistance  Number times ambulated in hallways past shift: 0  Number of times OOB to chair past shift: 0     Cardiac:   Cardiac Monitoring: No        Access:   Current line(s): PIV   Central Line? No Placement date   PICC LINE? No Placement date      Genitourinary:   Urinary status: voiding  Urinary Catheter?  No Placement Date      Respiratory:   O2 Device: Nasal cannula  Chronic home O2 use?: YES  Incentive spirometer at bedside: N/A     GI:  Last Bowel Movement Date: 11/19//22  Current diet:  ADULT DIET Minced and Moist  Passing flatus: YES  Tolerating current diet: YES     Pain Management:   Patient states pain is manageable on current regimen: YES     Skin:  Kelby Score: 20  Interventions: turn team, speciality bed, float heels, increase time out of bed, foam dressing, and PT/OT consult    Patient Safety:  Fall Score:  Total Score: 4  Interventions: bed/chair alarm, assistive device (walker, cane, etc), gripper socks, pt to call before getting OOB, and stay with me (per policy)  High Fall Risk: Yes  @Rollbelt  @dexterity to release roll belt  Yes/No ( must document dexterity  here by stating Yes or No here, otherwise this is a restraint and must follow restraint documentation policy.)     DVT prophylaxis:  DVT prophylaxis Med- Yes  DVT prophylaxis SCD or CINDY- No      Wounds: (If Applicable)  Wounds- No  Location None      Active Consults:  IP CONSULT TO NEUROLOGY  IP CONSULT TO NEPHROLOGY     Length of Stay:  Expected LOS: 3d 12h  Actual LOS: 2  Discharge Plan: yes Rehab

## 2022-11-24 LAB
GLUCOSE BLD STRIP.AUTO-MCNC: 128 MG/DL (ref 65–117)
GLUCOSE BLD STRIP.AUTO-MCNC: 150 MG/DL (ref 65–117)
GLUCOSE BLD STRIP.AUTO-MCNC: 153 MG/DL (ref 65–117)
GLUCOSE BLD STRIP.AUTO-MCNC: 167 MG/DL (ref 65–117)
SERVICE CMNT-IMP: ABNORMAL

## 2022-11-24 PROCEDURE — 94760 N-INVAS EAR/PLS OXIMETRY 1: CPT

## 2022-11-24 PROCEDURE — 74011250637 HC RX REV CODE- 250/637: Performed by: NURSE PRACTITIONER

## 2022-11-24 PROCEDURE — 74011250637 HC RX REV CODE- 250/637: Performed by: INTERNAL MEDICINE

## 2022-11-24 PROCEDURE — 74011250637 HC RX REV CODE- 250/637: Performed by: STUDENT IN AN ORGANIZED HEALTH CARE EDUCATION/TRAINING PROGRAM

## 2022-11-24 PROCEDURE — 74011250636 HC RX REV CODE- 250/636: Performed by: STUDENT IN AN ORGANIZED HEALTH CARE EDUCATION/TRAINING PROGRAM

## 2022-11-24 PROCEDURE — 82962 GLUCOSE BLOOD TEST: CPT

## 2022-11-24 PROCEDURE — 74011000250 HC RX REV CODE- 250: Performed by: NURSE PRACTITIONER

## 2022-11-24 PROCEDURE — 74011250637 HC RX REV CODE- 250/637: Performed by: HOSPITALIST

## 2022-11-24 PROCEDURE — 74011636637 HC RX REV CODE- 636/637: Performed by: STUDENT IN AN ORGANIZED HEALTH CARE EDUCATION/TRAINING PROGRAM

## 2022-11-24 PROCEDURE — 77010033678 HC OXYGEN DAILY

## 2022-11-24 PROCEDURE — 74011250636 HC RX REV CODE- 250/636: Performed by: HOSPITALIST

## 2022-11-24 PROCEDURE — 65270000046 HC RM TELEMETRY

## 2022-11-24 RX ORDER — KETOROLAC TROMETHAMINE 30 MG/ML
15 INJECTION, SOLUTION INTRAMUSCULAR; INTRAVENOUS
Status: COMPLETED | OUTPATIENT
Start: 2022-11-24 | End: 2022-11-24

## 2022-11-24 RX ORDER — ACETAMINOPHEN 325 MG/1
650 TABLET ORAL EVERY 6 HOURS
Status: DISCONTINUED | OUTPATIENT
Start: 2022-11-24 | End: 2022-12-04 | Stop reason: HOSPADM

## 2022-11-24 RX ORDER — ACETAMINOPHEN 500 MG
500 TABLET ORAL
Status: COMPLETED | OUTPATIENT
Start: 2022-11-24 | End: 2022-11-24

## 2022-11-24 RX ORDER — TRAMADOL HYDROCHLORIDE 50 MG/1
50 TABLET ORAL
Qty: 8 TABLET | Refills: 0 | Status: SHIPPED | OUTPATIENT
Start: 2022-11-24 | End: 2022-11-27

## 2022-11-24 RX ORDER — ACETAMINOPHEN 325 MG/1
650 TABLET ORAL EVERY 6 HOURS
Status: DISCONTINUED | OUTPATIENT
Start: 2022-11-24 | End: 2022-11-24 | Stop reason: SDUPTHER

## 2022-11-24 RX ORDER — LIDOCAINE 4 G/100G
1 PATCH TOPICAL EVERY 24 HOURS
Qty: 14 PATCH | Refills: 0 | Status: SHIPPED
Start: 2022-11-24 | End: 2022-12-08

## 2022-11-24 RX ORDER — GABAPENTIN 300 MG/1
300 CAPSULE ORAL
Qty: 90 CAPSULE | Refills: 5 | Status: SHIPPED | OUTPATIENT
Start: 2022-11-24

## 2022-11-24 RX ADMIN — ACETAMINOPHEN 650 MG: 325 TABLET ORAL at 17:29

## 2022-11-24 RX ADMIN — HEPARIN SODIUM 5000 UNITS: 5000 INJECTION INTRAVENOUS; SUBCUTANEOUS at 13:05

## 2022-11-24 RX ADMIN — METHOCARBAMOL TABLETS 750 MG: 750 TABLET, COATED ORAL at 17:28

## 2022-11-24 RX ADMIN — METHOCARBAMOL TABLETS 750 MG: 750 TABLET, COATED ORAL at 22:27

## 2022-11-24 RX ADMIN — Medication 20 UNITS: at 09:18

## 2022-11-24 RX ADMIN — SACUBITRIL AND VALSARTAN 1 TABLET: 24; 26 TABLET, FILM COATED ORAL at 08:13

## 2022-11-24 RX ADMIN — METHOCARBAMOL TABLETS 750 MG: 750 TABLET, COATED ORAL at 13:05

## 2022-11-24 RX ADMIN — Medication 1 SPRAY: at 01:04

## 2022-11-24 RX ADMIN — ACETAMINOPHEN 650 MG: 325 TABLET ORAL at 05:05

## 2022-11-24 RX ADMIN — FINASTERIDE 5 MG: 5 TABLET, FILM COATED ORAL at 08:14

## 2022-11-24 RX ADMIN — METHOCARBAMOL TABLETS 750 MG: 750 TABLET, COATED ORAL at 08:09

## 2022-11-24 RX ADMIN — LACTULOSE 30 ML: 20 SOLUTION ORAL at 17:28

## 2022-11-24 RX ADMIN — GABAPENTIN 300 MG: 300 CAPSULE ORAL at 17:29

## 2022-11-24 RX ADMIN — KETOROLAC TROMETHAMINE 15 MG: 30 INJECTION, SOLUTION INTRAMUSCULAR at 21:03

## 2022-11-24 RX ADMIN — Medication 1 SPRAY: at 04:51

## 2022-11-24 RX ADMIN — LACTULOSE 30 ML: 20 SOLUTION ORAL at 08:14

## 2022-11-24 RX ADMIN — ASPIRIN 81 MG: 81 TABLET, CHEWABLE ORAL at 08:14

## 2022-11-24 RX ADMIN — Medication 1 SPRAY: at 20:37

## 2022-11-24 RX ADMIN — SACUBITRIL AND VALSARTAN 1 TABLET: 24; 26 TABLET, FILM COATED ORAL at 17:29

## 2022-11-24 RX ADMIN — ACETAMINOPHEN 650 MG: 325 TABLET ORAL at 13:05

## 2022-11-24 RX ADMIN — GABAPENTIN 300 MG: 300 CAPSULE ORAL at 08:09

## 2022-11-24 RX ADMIN — Medication 1 SPRAY: at 17:33

## 2022-11-24 RX ADMIN — Medication 500 MCG: at 08:14

## 2022-11-24 RX ADMIN — ACETAMINOPHEN 500 MG: 500 TABLET ORAL at 21:03

## 2022-11-24 RX ADMIN — Medication 1 SPRAY: at 08:00

## 2022-11-24 RX ADMIN — Medication 20 UNITS: at 17:28

## 2022-11-24 RX ADMIN — FLUTICASONE PROPIONATE 2 SPRAY: 50 SPRAY, METERED NASAL at 08:15

## 2022-11-24 RX ADMIN — TAMSULOSIN HYDROCHLORIDE 0.8 MG: 0.4 CAPSULE ORAL at 08:09

## 2022-11-24 RX ADMIN — Medication 2 UNITS: at 09:18

## 2022-11-24 RX ADMIN — HEPARIN SODIUM 5000 UNITS: 5000 INJECTION INTRAVENOUS; SUBCUTANEOUS at 22:27

## 2022-11-24 RX ADMIN — POLYETHYLENE GLYCOL 3350 17 G: 17 POWDER, FOR SOLUTION ORAL at 08:14

## 2022-11-24 RX ADMIN — FOLIC ACID 1 MG: 1 TABLET ORAL at 08:14

## 2022-11-24 RX ADMIN — HEPARIN SODIUM 5000 UNITS: 5000 INJECTION INTRAVENOUS; SUBCUTANEOUS at 05:04

## 2022-11-24 RX ADMIN — CARVEDILOL 12.5 MG: 12.5 TABLET, FILM COATED ORAL at 08:14

## 2022-11-24 RX ADMIN — CARVEDILOL 12.5 MG: 12.5 TABLET, FILM COATED ORAL at 17:29

## 2022-11-24 RX ADMIN — BUMETANIDE 2 MG: 1 TABLET ORAL at 08:14

## 2022-11-24 RX ADMIN — Medication 1 SPRAY: at 22:26

## 2022-11-24 RX ADMIN — Medication 2 UNITS: at 13:05

## 2022-11-24 NOTE — PROGRESS NOTES
End of Shift Note     Bedside shift change report given to Yasmin Macedo (oncoming nurse) by uLz Londono (offgoing nurse).   Report included the following information SBAR, Kardex, and MAR     Shift worked: Nights   Shift summary and any significant changes:     none       Concerns for physician to address:  neck pain   Zone phone for oncoming shift:   [phone]      Patient Information  Link Lindsay  78 y.o.  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    Diabetic peripheral neuropathy associated with type 2 diabetes mellitus ([address] 12/21/2021    B12 deficiency 12/21/2021    Sensory ataxic gait 12/21/2021    Cervical spondylosis with myelopathy 12/21/2021    Type 2 diabetes mellitus with stage 4 chronic kidney disease, with long-term current use of insulin ([address] 12/21/2021    Convulsions ([address] 12/21/2021    Acute alteration in mental status 12/21/2021    Myoclonus 12/21/2021    Sepsis ([address] 08/18/2021    Acute hypoxemic respiratory failure ([address] 20/51/7678    Systolic heart failure ([address] 05/04/2021    Accelerated hypertension 07/21/2020    Elevated troponin 07/21/2020    ESRD (end stage renal disease) on dialysis ([address] 07/21/2020    Acute respiratory failure with hypoxia ([address] 07/21/2020    Acute on chronic systolic CHF (congestive heart failure) ([address] 07/21/2020    Pneumonia 05/26/2019    Acute on chronic renal failure ([address] 10/29/2016    Hypotension 10/29/2016    Chest pain 10/29/2016    Type II diabetes mellitus with nephropathy ([address] 10/29/2016    Hyperlipidemia 10/29/2016    Nephrotic syndrome 08/20/2016    Renal anasarca 08/20/2016    Diarrhea 10/28/2013    SIRS (systemic inflammatory response syndrome) ([facility] 10/28/2013    Acute pancreatitis 01/18/2011    LFT'S ABNORMAL 01/18/2011    Acute renal failure (ARF) ([facility] 01/18/2011    CKD (chronic kidney disease) stage 3, GFR 30-59 ml/min ([facility]) 01/18/2011    CAD (coronary artery disease) 01/18/2011    Abdominal pain, acute, epigastric 01/18/2011    Nausea & vomiting 01/18/2011              Past Medical History:   Diagnosis Date    Arthritis       spine    CAD (coronary artery disease)       high cholesterol    Chronic kidney disease       dialysis    Diabetes ([facility]      ESRD (end stage renal disease) ([facility])      GERD (gastroesophageal reflux disease)       HX    Hypertension      Ill-defined condition       irritable bowel syndrome    Systolic CHF ([facility]      Unspecified sleep apnea       NO CPAP         Core Measures:  CVA: No Not applicable  CHF:No Not applicable  PNA:No Not applicable     Activity:  Activity Level: Up with Assistance  Number times ambulated in hallways past shift: 0  Number of times OOB to chair past shift: 4     Cardiac:   Cardiac Monitoring: No        Access:   Current line(s):   Central Line? No Placement date   PICC LINE? No Placement date      Genitourinary:   Urinary status: voiding  Urinary Catheter? No Placement Date      Respiratory:   O2 Device: Nasal cannula  Chronic home O2 use?: YES  Incentive spirometer at bedside: N/A     GI:  Last Bowel Movement Date: 11/23/2022  Current diet:  ADULT DIET soft to chew  Passing flatus: YES  Tolerating current diet: YES     Pain Management:   Patient states pain is manageable on current regimen: YES     Skin:  Kelby Score: 20  Interventions: turn team, speciality bed, float heels, increase time out of bed, foam dressing, and PT/OT consult    Patient Safety:  Fall Score:  Total Score: 4  Interventions: bed/chair alarm, assistive device (walker, cane, etc), gripper socks, pt to call before getting OOB, and stay with me (per policy)  High Fall Risk: Yes  @Rollbelt  @dexterity to release roll belt  Yes/No ( must document dexterity  here by stating Yes or No here, otherwise this is a restraint and must follow restraint documentation policy.)     DVT prophylaxis:  DVT prophylaxis Med- Yes  DVT prophylaxis SCD or CINDY- No      Wounds: (If Applicable)  Wounds- No  Location None      Active Consults:  IP CONSULT TO NEUROLOGY  IP CONSULT TO NEPHROLOGY     Length of Stay:  Expected LOS: 3d 12h  Actual LOS: 2  Discharge Plan: yes Jaiden De La Cruz

## 2022-11-24 NOTE — PROGRESS NOTES
End of Shift Note     Bedside shift change report given to Tatiana Boggs (oncoming nurse) by PAZ Oseguera RN (offgoing nurse). Report included the following information SBAR, Kardex, and MAR     Shift worked: 7A-7P   Shift summary and any significant changes:     Pt AOX3. Dialysis today. Up in chair x1 assist with walker. Neck pain complaints. Medicated with prn meds.         Concerns for physician to address:  neck pain   Zone phone for oncoming shift:   [phone]      Patient Information  Birgit Dinero  78 y.o.  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    Diabetic peripheral neuropathy associated with type 2 diabetes mellitus ([address] 12/21/2021    B12 deficiency 12/21/2021    Sensory ataxic gait 12/21/2021    Cervical spondylosis with myelopathy 12/21/2021    Type 2 diabetes mellitus with stage 4 chronic kidney disease, with long-term current use of insulin ([address] 12/21/2021    Convulsions ([address] 12/21/2021    Acute alteration in mental status 12/21/2021    Myoclonus 12/21/2021    Sepsis ([address] 08/18/2021    Acute hypoxemic respiratory failure ([address] 37/34/6789    Systolic heart failure ([address] 05/04/2021    Accelerated hypertension 07/21/2020    Elevated troponin 07/21/2020    ESRD (end stage renal disease) on dialysis ([address] 07/21/2020    Acute respiratory failure with hypoxia ([address] 07/21/2020    Acute on chronic systolic CHF (congestive heart failure) ([address] 07/21/2020    Pneumonia 05/26/2019    Acute on chronic renal failure ([address] 10/29/2016    Hypotension 10/29/2016    Chest pain 10/29/2016    Type II diabetes mellitus with nephropathy ([facility] 10/29/2016    Hyperlipidemia 10/29/2016    Nephrotic syndrome 08/20/2016    Renal anasarca 08/20/2016    Diarrhea 10/28/2013    SIRS (systemic inflammatory response syndrome) ([facility] 10/28/2013    Acute pancreatitis 01/18/2011    LFT'S ABNORMAL 01/18/2011    Acute renal failure (ARF) ([facility] 01/18/2011    CKD (chronic kidney disease) stage 3, GFR 30-59 ml/min ([facility]) 01/18/2011    CAD (coronary artery disease) 01/18/2011    Abdominal pain, acute, epigastric 01/18/2011    Nausea & vomiting 01/18/2011              Past Medical History:   Diagnosis Date    Arthritis       spine    CAD (coronary artery disease)       high cholesterol    Chronic kidney disease       dialysis    Diabetes ([facility]      ESRD (end stage renal disease) ([facility])      GERD (gastroesophageal reflux disease)       HX    Hypertension      Ill-defined condition       irritable bowel syndrome    Systolic CHF ([facility]      Unspecified sleep apnea       NO CPAP         Core Measures:  CVA: No Not applicable  CHF:No Not applicable  PNA:No Not applicable     Activity:  Activity Level: Up with Assistance  Number times ambulated in hallways past shift: 0  Number of times OOB to chair past shift: 4     Cardiac:   Cardiac Monitoring: No        Access:   Current line(s):   Central Line? No Placement date   PICC LINE? No Placement date      Genitourinary:   Urinary status: voiding  Urinary Catheter?  No Placement Date      Respiratory:   O2 Device: Nasal cannula  Chronic home O2 use?: YES  Incentive spirometer at bedside: N/A     GI:  Last Bowel Movement Date: 11/23/2022  Current diet:  ADULT DIET soft to chew  Passing flatus: YES  Tolerating current diet: YES     Pain Management:   Patient states pain is manageable on current regimen: YES     Skin:  Kelby Score: 20  Interventions: turn team, speciality bed, float heels, increase time out of bed, foam dressing, and PT/OT consult    Patient Safety:  Fall Score: Total Score: 4  Interventions: bed/chair alarm, assistive device (walker, cane, etc), gripper socks, pt to call before getting OOB, and stay with me (per policy)  High Fall Risk: Yes  @Rollbelt  @dexterity to release roll belt  Yes/No ( must document dexterity  here by stating Yes or No here, otherwise this is a restraint and must follow restraint documentation policy.)     DVT prophylaxis:  DVT prophylaxis Med- Yes  DVT prophylaxis SCD or CINDY- No      Wounds: (If Applicable)  Wounds- No  Location None      Active Consults:  IP CONSULT TO NEUROLOGY  IP CONSULT TO NEPHROLOGY     Length of Stay:  Expected LOS: 3d 12h  Actual LOS: 2  Discharge Plan: yes Rehab    PAZ Parra RN

## 2022-11-24 NOTE — PROGRESS NOTES
Problem: Falls - Risk of  Goal: *Absence of Falls  Description: Document Nadine Don Fall Risk and appropriate interventions in the flowsheet. Outcome: Progressing Towards Goal  Note: Fall Risk Interventions:  Mobility Interventions: Bed/chair exit alarm, Patient to call before getting OOB    Mentation Interventions: Bed/chair exit alarm    Medication Interventions: Bed/chair exit alarm, Patient to call before getting OOB, Teach patient to arise slowly    Elimination Interventions: Bed/chair exit alarm, Call light in reach    History of Falls Interventions: Bed/chair exit alarm         Problem: Patient Education: Go to Patient Education Activity  Goal: Patient/Family Education  Outcome: Progressing Towards Goal     Problem: Patient Education: Go to Patient Education Activity  Goal: Patient/Family Education  Outcome: Progressing Towards Goal     Problem: Patient Education: Go to Patient Education Activity  Goal: Patient/Family Education  Outcome: Progressing Towards Goal     Problem: Pressure Injury - Risk of  Goal: *Prevention of pressure injury  Description: Document Kelby Scale and appropriate interventions in the flowsheet.   Outcome: Progressing Towards Goal  Note: Pressure Injury Interventions:  Sensory Interventions: Assess changes in LOC    Moisture Interventions: Absorbent underpads, Apply protective barrier, creams and emollients, Check for incontinence Q2 hours and as needed    Activity Interventions: Increase time out of bed    Mobility Interventions: HOB 30 degrees or less    Nutrition Interventions: Offer support with meals,snacks and hydration    Friction and Shear Interventions: HOB 30 degrees or less, Minimize layers                Problem: Patient Education: Go to Patient Education Activity  Goal: Patient/Family Education  Outcome: Progressing Towards Goal     Problem: Patient Education: Go to Patient Education Activity  Goal: Patient/Family Education  Outcome: Progressing Towards Goal     Problem: General Medical Care Plan  Goal: *Vital signs within specified parameters  Outcome: Progressing Towards Goal  Goal: *Labs within defined limits  Outcome: Progressing Towards Goal  Goal: *Absence of infection signs and symptoms  Outcome: Progressing Towards Goal  Goal: *Optimal pain control at patient's stated goal  Outcome: Progressing Towards Goal  Goal: *Skin integrity maintained  Outcome: Progressing Towards Goal  Goal: *Fluid volume balance  Outcome: Progressing Towards Goal  Goal: *Optimize nutritional status  Outcome: Progressing Towards Goal  Goal: *Anxiety reduced or absent  Outcome: Progressing Towards Goal  Goal: *Progressive mobility and function (eg: ADL's)  Outcome: Progressing Towards Goal     Problem: Patient Education: Go to Patient Education Activity  Goal: Patient/Family Education  Outcome: Progressing Towards Goal

## 2022-11-24 NOTE — PROGRESS NOTES
Problem: Falls - Risk of  Goal: *Absence of Falls  Description: Document Glo Pérez Fall Risk and appropriate interventions in the flowsheet. Outcome: Progressing Towards Goal  Note: Fall Risk Interventions:  Mobility Interventions: Bed/chair exit alarm, Patient to call before getting OOB    Mentation Interventions: Adequate sleep, hydration, pain control, Bed/chair exit alarm, Door open when patient unattended, Eyeglasses and hearing aids, Increase mobility, More frequent rounding    Medication Interventions: Bed/chair exit alarm, Patient to call before getting OOB, Teach patient to arise slowly    Elimination Interventions: Bed/chair exit alarm, Call light in reach, Stay With Me (per policy)    History of Falls Interventions: Bed/chair exit alarm, Consult care management for discharge planning, Door open when patient unattended, Evaluate medications/consider consulting pharmacy         Problem: Patient Education: Go to Patient Education Activity  Goal: Patient/Family Education  Outcome: Progressing Towards Goal     Problem: Pressure Injury - Risk of  Goal: *Prevention of pressure injury  Description: Document Kelby Scale and appropriate interventions in the flowsheet.   Outcome: Progressing Towards Goal  Note: Pressure Injury Interventions:  Sensory Interventions: Assess changes in LOC, Assess need for specialty bed, Avoid rigorous massage over bony prominences    Moisture Interventions: Absorbent underpads, Internal/External urinary devices, Minimize layers    Activity Interventions: Chair cushion, Increase time out of bed    Mobility Interventions: Chair cushion, HOB 30 degrees or less    Nutrition Interventions: Document food/fluid/supplement intake, Offer support with meals,snacks and hydration    Friction and Shear Interventions: Minimize layers

## 2022-11-24 NOTE — PROGRESS NOTES
Hospitalist Progress Note    Subjective:   Daily Progress Note: 11/23/2022 7:14 AM    Hospital Course: Patient is a 68-year-old black male with a history of end-stage disease on hemodialysis Monday, Wednesday,, type 2 diabetes, GERD, hypertension, chronic hypoxic respiratory failure requiring 2 L oxygen nasal cannula at home, systolic heart failure who presented to the emergency room on 11/16/2022 for worsening weakness and recurrent falls. He denied hitting his head or losing consciousness. CT of the head and spine were negative for acute findings. As patient was unable to walk he was admitted in the hospital for further evaluation. Nephrology consulted for hemodialysis management. Neurology consulted. Vitamin B12 low, folate low and began supplementation. MRI of the lumbar spine showed spinal stenosis with multilevel degenerative disc disease. Per neurology recommend physical and occupational therapy and EMG as an outpatient. PT and OT recommend SNF placement. Case management consulted for discharge planning. Overnight on 11/20/2022 patient became confused and altered. Sodium was 132, potassium 7.6, BUN 60, serum creatinine 8.01, glucose 181, ammonia level 35. He was started on lactulose. Ammonia level trending down. Patient developed confusion  on day #5 hospital.  Calcitonin and lactic acid and WBC within normal limits. Doubt infectious process. CT of the head showed no acute intracranial abnormality. CT of the cervical spine showed no fracture but did show cervical degenerative disc disease. We will give a one-time dose of IV Solu-Medrol. Patient also had difficulty swallowing with dry mouth. Speech therapy consulted      Subjective: Patient is alert and oriented x4. He says that he has a sore throat however he says it is because it is dry and he cannot swallow properly.          Review of Systems   No fevers, + sore throat, no SOB or cough, no CP, No nausea, No vomiting, No diarrhea, No abdominal pain      Objective:     Patient Vitals for the past 24 hrs:   Temp Pulse Resp BP SpO2   22 1530 98.4 °F (36.9 °C) 69 16 112/64 97 %   22 1148 97.5 °F (36.4 °C) (!) 56 16 124/60 100 %   22 1115 98.4 °F (36.9 °C) 61 18 112/70 --   22 1100 -- (!) 55 18 120/71 --   22 1045 -- (!) 51 18 120/63 --   22 1030 -- (!) 52 18 (!) 114/58 --   22 1015 -- 60 18 106/66 --   22 1000 -- (!) 59 18 117/66 --   22 0945 -- 61 18 111/65 --   22 0930 -- (!) 56 18 118/68 --   22 0915 -- (!) 50 18 114/63 --   22 0900 -- (!) 57 18 132/77 --   22 0845 -- (!) 56 18 109/77 --   22 0830 -- 60 18 (!) 144/81 --   22 0815 -- 64 18 135/83 --   22 0800 -- 61 18 129/80 --   22 0757 97.7 °F (36.5 °C) 62 18 122/81 --   22 2339 98.6 °F (37 °C) 68 20 116/87 98 %        O2 Flow Rate (L/min): 3 l/min O2 Device: Nasal cannula    Temp (24hrs), Av.1 °F (36.7 °C), Min:97.5 °F (36.4 °C), Max:98.6 °F (37 °C)      No intake/output data recorded.  0701 -  1900  In: -   Out: 500     PHYSICAL EXAM:  Constitutional: No acute distress  Skin: Extremities and face reveal no rashes. HEENT: Sclerae anicteric. Extra-occular muscles are intact. Oral mucosa dry do not see any plaques indicating yeast infection. Cardiovascular: Regular rate and rhythm. Respiratory:  Clear breath sounds bilaterally with no wheezes, rales, or rhonchi. GI: Abdomen nondistended, soft, and nontender. Normal active bowel sounds. Musculoskeletal: No pitting edema of the lower legs. Able to move all ext  Neurological:  Patient is alert and oriented.  Cranial nerves II-XII grossly intact  Psychiatric: Mood appears appropriate       Data Review    Recent Results (from the past 24 hour(s))   GLUCOSE, POC    Collection Time: 22  8:58 PM   Result Value Ref Range    Glucose (POC) 223 (H) 65 - 117 mg/dL    Performed by Frederic Vela LPN    METABOLIC PANEL, BASIC Collection Time: 11/23/22  1:05 AM   Result Value Ref Range    Sodium 134 (L) 136 - 145 mmol/L    Potassium 5.0 3.5 - 5.1 mmol/L    Chloride 98 97 - 108 mmol/L    CO2 27 21 - 32 mmol/L    Anion gap 9 5 - 15 mmol/L    Glucose 186 (H) 65 - 100 mg/dL    BUN 77 (H) 6 - 20 MG/DL    Creatinine 7.79 (H) 0.70 - 1.30 MG/DL    BUN/Creatinine ratio 10 (L) 12 - 20      eGFR 7 (L) >60 ml/min/1.73m2    Calcium 9.0 8.5 - 10.1 MG/DL   CBC WITH AUTOMATED DIFF    Collection Time: 11/23/22  1:05 AM   Result Value Ref Range    WBC 10.2 4.1 - 11.1 K/uL    RBC 3.70 (L) 4.10 - 5.70 M/uL    HGB 10.9 (L) 12.1 - 17.0 g/dL    HCT 34.2 (L) 36.6 - 50.3 %    MCV 92.4 80.0 - 99.0 FL    MCH 29.5 26.0 - 34.0 PG    MCHC 31.9 30.0 - 36.5 g/dL    RDW 15.2 (H) 11.5 - 14.5 %    PLATELET 132 722 - 966 K/uL    MPV 11.1 8.9 - 12.9 FL    NRBC 0.0 0  WBC    ABSOLUTE NRBC 0.00 0.00 - 0.01 K/uL    NEUTROPHILS 92 (H) 32 - 75 %    LYMPHOCYTES 5 (L) 12 - 49 %    MONOCYTES 3 (L) 5 - 13 %    EOSINOPHILS 0 0 - 7 %    BASOPHILS 0 0 - 1 %    IMMATURE GRANULOCYTES 0 0.0 - 0.5 %    ABS. NEUTROPHILS 9.4 (H) 1.8 - 8.0 K/UL    ABS. LYMPHOCYTES 0.5 (L) 0.8 - 3.5 K/UL    ABS. MONOCYTES 0.3 0.0 - 1.0 K/UL    ABS. EOSINOPHILS 0.0 0.0 - 0.4 K/UL    ABS. BASOPHILS 0.0 0.0 - 0.1 K/UL    ABS. IMM. GRANS. 0.0 0.00 - 0.04 K/UL    DF SMEAR SCANNED      RBC COMMENTS NORMOCYTIC, NORMOCHROMIC     AMMONIA    Collection Time: 11/23/22  1:05 AM   Result Value Ref Range    Ammonia, plasma 17 <32 UMOL/L   GLUCOSE, POC    Collection Time: 11/23/22 12:02 PM   Result Value Ref Range    Glucose (POC) 123 (H) 65 - 117 mg/dL    Performed by Silverio Flores PCT    GLUCOSE, POC    Collection Time: 11/23/22  4:45 PM   Result Value Ref Range    Glucose (POC) 183 (H) 65 - 117 mg/dL    Performed by Kirstin Montez PCT        Radiology review: MRI of spine    Assessment:   1. Generalized weakness with recurrent falls secondary to spinal stenosis and multilevel disc and facet degeneration  2. Elevated troponin secondary to kidney disease. 3.  End-stage renal disease on hemodialysis Monday, Wednesday, Friday with critical severe hyper kalemia  4. Chronic systolic heart failure  5. Type 2 diabetes  6. Chronic hypoxic respiratory failure  7. Acute confusion multifactorial  8. Dry mouth with dysphagia      Plan:   1.  CT of the head and spine reviewed with no acute findings. MRI of the lumbar spine showed spinal stenosis. Neurology consulted. They recommend physical therapy and EMG as an outpatient. Tramadol as needed. Lidocaine patch ordered. Vitamin B12 and folate low and supplemented. PT OT recommend SNF placement. Case management consulted. We will give a one-time dose of IV Solu-Medrol. 2.  Troponins elevated on admission. Patient denies any chest pain. Chest likely secondary to renal disease  3. Dialysis Monday, Wednesday, Friday nephrology consulted. On Renvela  4. Patient on Entresto, Coreg, Bumex, aspirin. 5.  Glucose levels are stable. Patient on insulin sliding scale and NPH 20 units twice daily  6. Continue 2 L of oxygen nasal cannula. 7.  Patient's confusion has improved. Multifactorial.  CT of the head on 11/21/2022 did not show any acute abnormality. Ammonia level trending down. Continue lactulose. 8.  We will start artificial saliva. Speech therapy evaluation. We will check a chest x-ray. Rapid COVID-negative  9. CBC, BMP, ammonia level in the a.m. Dispo: 24 hours. Barriers include tolerating oral diet. Patient will need placement. CODE STATUS full     DVT prophylaxis: Heparin  Ulcer prophylaxis: Not indicated    Care Plan discussed with: Patient/Family, Nurse, and     Total time spent with patient: 36 minutes.

## 2022-11-24 NOTE — PROGRESS NOTES
End of Shift Note     Bedside shift change report given to [facility] [employer], RN by PAZ Oseguera RN. Report included the following information SBAR, Kardex, and MAR     Shift worked: 7A-7P   Shift summary and any significant changes:     Pt c/o neck pain. Heat pad and scheduled pain  meds on board. Still c/o some constipation lactulose prn miralax given.       Concerns for physician to address: none   Zone phone for oncoming shift:   [phone]      Patient Information  Link Lindsay  78 y.o.  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    Diabetic peripheral neuropathy associated with type 2 diabetes mellitus ([address] 12/21/2021    B12 deficiency 12/21/2021    Sensory ataxic gait 12/21/2021    Cervical spondylosis with myelopathy 12/21/2021    Type 2 diabetes mellitus with stage 4 chronic kidney disease, with long-term current use of insulin ([address] 12/21/2021    Convulsions ([address] 12/21/2021    Acute alteration in mental status 12/21/2021    Myoclonus 12/21/2021    Sepsis ([address] 08/18/2021    Acute hypoxemic respiratory failure ([address] 72/17/4606    Systolic heart failure ([address] 05/04/2021    Accelerated hypertension 07/21/2020    Elevated troponin 07/21/2020    ESRD (end stage renal disease) on dialysis ([address] 07/21/2020    Acute respiratory failure with hypoxia ([address] 07/21/2020    Acute on chronic systolic CHF (congestive heart failure) ([address] 07/21/2020    Pneumonia 05/26/2019    Acute on chronic renal failure ([address] 10/29/2016    Hypotension 10/29/2016    Chest pain 10/29/2016    Type II diabetes mellitus with nephropathy ([facility] 10/29/2016    Hyperlipidemia 10/29/2016    Nephrotic syndrome 08/20/2016    Renal anasarca 08/20/2016    Diarrhea 10/28/2013    SIRS (systemic inflammatory response syndrome) ([facility] 10/28/2013    Acute pancreatitis 01/18/2011    LFT'S ABNORMAL 01/18/2011    Acute renal failure (ARF) ([facility] 01/18/2011    CKD (chronic kidney disease) stage 3, GFR 30-59 ml/min ([facility]) 01/18/2011    CAD (coronary artery disease) 01/18/2011    Abdominal pain, acute, epigastric 01/18/2011    Nausea & vomiting 01/18/2011              Past Medical History:   Diagnosis Date    Arthritis       spine    CAD (coronary artery disease)       high cholesterol    Chronic kidney disease       dialysis    Diabetes ([facility]      ESRD (end stage renal disease) ([facility])      GERD (gastroesophageal reflux disease)       HX    Hypertension      Ill-defined condition       irritable bowel syndrome    Systolic CHF ([facility]      Unspecified sleep apnea       NO CPAP         Core Measures:  CVA: No Not applicable  CHF:No Not applicable  PNA:No Not applicable     Activity:  Activity Level: Up with Assistance  Number times ambulated in hallways past shift: 0  Number of times OOB to chair past shift: 3     Cardiac:   Cardiac Monitoring: No        Access:   Current line(s): PIV  Central Line? No Placement date   PICC LINE? No Placement date      Genitourinary:   Urinary status: voiding  Urinary Catheter? No Placement Date      Respiratory:   O2 Device: Nasal cannula prn, room air   Chronic home O2 use?: YES  Incentive spirometer at bedside: N/A     GI:  Last Bowel Movement Date: 11/24/2022  Current diet:  ADULT DIET soft to chew  Passing flatus: YES  Tolerating current diet: YES     Pain Management:   Patient states pain is manageable on current regimen: YES     Skin:  Kelby Score: 20  Interventions: turn team, speciality bed, float heels, increase time out of bed, foam dressing, and PT/OT consult    Patient Safety:  Fall Score:  Total Score: 4  Interventions: bed/chair alarm, assistive device (walker, cane, etc), gripper socks, pt to call before getting OOB, and stay with me (per policy)  High Fall Risk: Yes  @Rollbelt  @dexterity to release roll belt  Yes/No ( must document dexterity  here by stating Yes or No here, otherwise this is a restraint and must follow restraint documentation policy.)     DVT prophylaxis:  DVT prophylaxis Med- Yes  DVT prophylaxis SCD or CINDY- No      Wounds: (If Applicable)  Wounds- No  Location None      Active Consults:  IP CONSULT TO NEUROLOGY  IP CONSULT TO NEPHROLOGY     Length of Stay:  Expected LOS: 3d 12h  Actual LOS: 2  Discharge Plan: yes Rehab when bed available    PAZ Machuca RN

## 2022-11-24 NOTE — PROGRESS NOTES
Hospitalist Progress Note    Subjective:   Daily Progress Note: 11/24/2022 7:14 AM    Hospital Course: Patient is a 68-year-old black male with a history of end-stage disease on hemodialysis Monday, Wednesday,, type 2 diabetes, GERD, hypertension, chronic hypoxic respiratory failure requiring 2 L oxygen nasal cannula at home, systolic heart failure who presented to the emergency room on 11/16/2022 for worsening weakness and recurrent falls. He denied hitting his head or losing consciousness. CT of the head and spine were negative for acute findings. As patient was unable to walk he was admitted in the hospital for further evaluation. Nephrology consulted for hemodialysis management. Neurology consulted. Vitamin B12 low, folate low and began supplementation. MRI of the lumbar spine showed spinal stenosis with multilevel degenerative disc disease. Per neurology recommend physical and occupational therapy and EMG as an outpatient. PT and OT recommend SNF placement. Case management consulted for discharge planning. Overnight on 11/20/2022 patient became confused and altered. Sodium was 132, potassium 7.6, BUN 60, serum creatinine 8.01, glucose 181, ammonia level 35. He was started on lactulose. Ammonia level trending down. Patient developed confusion  on day #5 hospital.  Calcitonin and lactic acid and WBC within normal limits. Doubt infectious process. CT of the head showed no acute intracranial abnormality. CT of the cervical spine showed no fracture but did show cervical degenerative disc disease. We will give a one-time dose of IV Solu-Medrol. Patient also had difficulty swallowing with dry mouth. Speech therapy consulted      Subjective: Patient is alert and oriented x4.     \"My neck is hurting today\"  Feels the hospital bed is making his neck worse  Some numbness in hands  Awaiting SNF bed    Review of Systems   No fevers, + sore throat, no SOB or cough, no CP, No nausea, No vomiting, No diarrhea, No abdominal pain      Objective:     Patient Vitals for the past 24 hrs:   Temp Pulse Resp BP SpO2   22 1143 97.3 °F (36.3 °C) 67 16 109/65 96 %   22 0819 -- 64 18 112/63 96 %   22 2300 98.2 °F (36.8 °C) 63 18 124/76 99 %   22 1943 97.9 °F (36.6 °C) (!) 59 18 125/70 95 %          O2 Flow Rate (L/min): 3 l/min O2 Device: None (Room air)    Temp (24hrs), Av.8 °F (36.6 °C), Min:97.3 °F (36.3 °C), Max:98.2 °F (36.8 °C)      No intake/output data recorded.  1901 -  0700  In: -   Out: 500     PHYSICAL EXAM:  Constitutional: No acute distress  Skin: Extremities and face reveal no rashes. HEENT: Sclerae anicteric. Extra-occular muscles are intact. Oral mucosa dry do not see any plaques indicating yeast infection. Cardiovascular: Regular rate and rhythm. Respiratory:  Clear breath sounds bilaterally with no wheezes, rales, or rhonchi. GI: Abdomen nondistended, soft, and nontender. Normal active bowel sounds. Musculoskeletal: No pitting edema of the lower legs. Able to move all ext  Neurological:  Patient is alert and oriented. Cranial nerves II-XII grossly intact  Psychiatric: Mood appears appropriate       Data Review    Recent Results (from the past 24 hour(s))   GLUCOSE, POC    Collection Time: 22  4:45 PM   Result Value Ref Range    Glucose (POC) 183 (H) 65 - 117 mg/dL    Performed by Tabatha Valencia PCT    GLUCOSE, POC    Collection Time: 22  9:14 PM   Result Value Ref Range    Glucose (POC) 163 (H) 65 - 117 mg/dL    Performed by Shyann Lara LPN    GLUCOSE, POC    Collection Time: 22  7:20 AM   Result Value Ref Range    Glucose (POC) 167 (H) 65 - 117 mg/dL    Performed by Anh Tovar PCT    GLUCOSE, POC    Collection Time: 22 12:03 PM   Result Value Ref Range    Glucose (POC) 150 (H) 65 - 117 mg/dL    Performed by Araceli Hayes (BONILLA RN)        Radiology review: MRI of spine    Assessment:   1.   Generalized weakness with recurrent falls secondary to spinal stenosis and multilevel disc and facet degeneration  2. Elevated troponin secondary to kidney disease. 3.  End-stage renal disease on hemodialysis Monday, Wednesday, Friday with critical severe hyper kalemia  4. Chronic systolic heart failure  5. Type 2 diabetes  6. Chronic hypoxic respiratory failure  7. Acute confusion multifactorial  8. Dry mouth with dysphagia      Plan:   1.  CT of the head and spine reviewed with no acute findings. MRI of the lumbar spine showed spinal stenosis. Neurology consulted. They recommend physical therapy and EMG as an outpatient. Tramadol as needed. Lidocaine patch ordered. Vitamin B12 and folate low and supplemented. PT OT recommend SNF placement. Case management consulted. We will give a one-time dose of IV Solu-Medrol. 2.  Troponins elevated on admission. Patient denies any chest pain. Chest likely secondary to renal disease  3. Dialysis Monday, Wednesday, Friday nephrology consulted. On Renvela  4. Patient on Entresto, Coreg, Bumex, aspirin. 5.  Glucose levels are stable. Patient on insulin sliding scale and NPH 20 units twice daily  6. Continue 2 L of oxygen nasal cannula. 7.  Patient's confusion has improved. Multifactorial.  CT of the head on 11/21/2022 did not show any acute abnormality. Ammonia level trending down. Continue lactulose. 8.  We will start artificial saliva. Speech therapy evaluation. We will check a chest x-ray. Rapid COVID-negative  9. CBC, BMP, ammonia level in the a.m. Dispo: 24 hours. Barriers include tolerating oral diet. Patient will need placement. CODE STATUS full     DVT prophylaxis: Heparin  Ulcer prophylaxis: Not indicated    Care Plan discussed with: Patient/Family, Nurse, and     Total time spent with patient: 36 minutes.

## 2022-11-25 LAB
ALBUMIN SERPL-MCNC: 3.2 G/DL (ref 3.5–5)
ANION GAP SERPL CALC-SCNC: 11 MMOL/L (ref 5–15)
BUN SERPL-MCNC: 96 MG/DL (ref 6–20)
BUN/CREAT SERPL: 12 (ref 12–20)
CALCIUM SERPL-MCNC: 9.6 MG/DL (ref 8.5–10.1)
CHLORIDE SERPL-SCNC: 94 MMOL/L (ref 97–108)
CO2 SERPL-SCNC: 28 MMOL/L (ref 21–32)
CREAT SERPL-MCNC: 7.93 MG/DL (ref 0.7–1.3)
ERYTHROCYTE [DISTWIDTH] IN BLOOD BY AUTOMATED COUNT: 15.3 % (ref 11.5–14.5)
GLUCOSE BLD STRIP.AUTO-MCNC: 111 MG/DL (ref 65–117)
GLUCOSE BLD STRIP.AUTO-MCNC: 149 MG/DL (ref 65–117)
GLUCOSE BLD STRIP.AUTO-MCNC: 152 MG/DL (ref 65–117)
GLUCOSE BLD STRIP.AUTO-MCNC: 204 MG/DL (ref 65–117)
GLUCOSE SERPL-MCNC: 175 MG/DL (ref 65–100)
HCT VFR BLD AUTO: 34.9 % (ref 36.6–50.3)
HGB BLD-MCNC: 11.5 G/DL (ref 12.1–17)
MCH RBC QN AUTO: 29.6 PG (ref 26–34)
MCHC RBC AUTO-ENTMCNC: 33 G/DL (ref 30–36.5)
MCV RBC AUTO: 89.7 FL (ref 80–99)
NRBC # BLD: 0 K/UL (ref 0–0.01)
NRBC BLD-RTO: 0 PER 100 WBC
PHOSPHATE SERPL-MCNC: 6.2 MG/DL (ref 2.6–4.7)
PLATELET # BLD AUTO: 251 K/UL (ref 150–400)
PMV BLD AUTO: 11.2 FL (ref 8.9–12.9)
POTASSIUM SERPL-SCNC: 3.8 MMOL/L (ref 3.5–5.1)
RBC # BLD AUTO: 3.89 M/UL (ref 4.1–5.7)
SERVICE CMNT-IMP: ABNORMAL
SERVICE CMNT-IMP: NORMAL
SODIUM SERPL-SCNC: 133 MMOL/L (ref 136–145)
WBC # BLD AUTO: 8.1 K/UL (ref 4.1–11.1)

## 2022-11-25 PROCEDURE — 74011250637 HC RX REV CODE- 250/637: Performed by: INTERNAL MEDICINE

## 2022-11-25 PROCEDURE — 74011250637 HC RX REV CODE- 250/637: Performed by: PHYSICIAN ASSISTANT

## 2022-11-25 PROCEDURE — 82962 GLUCOSE BLOOD TEST: CPT

## 2022-11-25 PROCEDURE — 74011000250 HC RX REV CODE- 250: Performed by: STUDENT IN AN ORGANIZED HEALTH CARE EDUCATION/TRAINING PROGRAM

## 2022-11-25 PROCEDURE — 90935 HEMODIALYSIS ONE EVALUATION: CPT

## 2022-11-25 PROCEDURE — 85027 COMPLETE CBC AUTOMATED: CPT

## 2022-11-25 PROCEDURE — 65270000046 HC RM TELEMETRY

## 2022-11-25 PROCEDURE — 94760 N-INVAS EAR/PLS OXIMETRY 1: CPT

## 2022-11-25 PROCEDURE — 80069 RENAL FUNCTION PANEL: CPT

## 2022-11-25 PROCEDURE — 74011250637 HC RX REV CODE- 250/637: Performed by: STUDENT IN AN ORGANIZED HEALTH CARE EDUCATION/TRAINING PROGRAM

## 2022-11-25 PROCEDURE — 36415 COLL VENOUS BLD VENIPUNCTURE: CPT

## 2022-11-25 PROCEDURE — 74011250636 HC RX REV CODE- 250/636: Performed by: HOSPITALIST

## 2022-11-25 PROCEDURE — 74011636637 HC RX REV CODE- 636/637: Performed by: STUDENT IN AN ORGANIZED HEALTH CARE EDUCATION/TRAINING PROGRAM

## 2022-11-25 PROCEDURE — 74011000250 HC RX REV CODE- 250: Performed by: NURSE PRACTITIONER

## 2022-11-25 PROCEDURE — 74011250636 HC RX REV CODE- 250/636: Performed by: STUDENT IN AN ORGANIZED HEALTH CARE EDUCATION/TRAINING PROGRAM

## 2022-11-25 RX ORDER — KETOROLAC TROMETHAMINE 30 MG/ML
15 INJECTION, SOLUTION INTRAMUSCULAR; INTRAVENOUS
Status: COMPLETED | OUTPATIENT
Start: 2022-11-25 | End: 2022-11-25

## 2022-11-25 RX ADMIN — Medication 1 SPRAY: at 05:57

## 2022-11-25 RX ADMIN — Medication 1 SPRAY: at 03:54

## 2022-11-25 RX ADMIN — METHOCARBAMOL TABLETS 750 MG: 750 TABLET, COATED ORAL at 22:20

## 2022-11-25 RX ADMIN — SODIUM CHLORIDE, PRESERVATIVE FREE 10 ML: 5 INJECTION INTRAVENOUS at 05:00

## 2022-11-25 RX ADMIN — ACETAMINOPHEN 650 MG: 325 TABLET ORAL at 12:15

## 2022-11-25 RX ADMIN — ASPIRIN 81 MG: 81 TABLET, CHEWABLE ORAL at 08:01

## 2022-11-25 RX ADMIN — Medication 2 UNITS: at 22:19

## 2022-11-25 RX ADMIN — ACETAMINOPHEN 650 MG: 325 TABLET ORAL at 23:42

## 2022-11-25 RX ADMIN — HEPARIN SODIUM 5000 UNITS: 5000 INJECTION INTRAVENOUS; SUBCUTANEOUS at 22:21

## 2022-11-25 RX ADMIN — CARVEDILOL 12.5 MG: 12.5 TABLET, FILM COATED ORAL at 17:13

## 2022-11-25 RX ADMIN — SACUBITRIL AND VALSARTAN 1 TABLET: 24; 26 TABLET, FILM COATED ORAL at 17:13

## 2022-11-25 RX ADMIN — METHOCARBAMOL TABLETS 750 MG: 750 TABLET, COATED ORAL at 08:01

## 2022-11-25 RX ADMIN — HEPARIN SODIUM 5000 UNITS: 5000 INJECTION INTRAVENOUS; SUBCUTANEOUS at 14:03

## 2022-11-25 RX ADMIN — Medication 2 UNITS: at 17:15

## 2022-11-25 RX ADMIN — SACUBITRIL AND VALSARTAN 1 TABLET: 24; 26 TABLET, FILM COATED ORAL at 08:00

## 2022-11-25 RX ADMIN — METHOCARBAMOL TABLETS 750 MG: 750 TABLET, COATED ORAL at 17:13

## 2022-11-25 RX ADMIN — Medication 20 UNITS: at 17:14

## 2022-11-25 RX ADMIN — FINASTERIDE 5 MG: 5 TABLET, FILM COATED ORAL at 08:01

## 2022-11-25 RX ADMIN — ACETAMINOPHEN 650 MG: 325 TABLET ORAL at 01:15

## 2022-11-25 RX ADMIN — SODIUM CHLORIDE, PRESERVATIVE FREE 10 ML: 5 INJECTION INTRAVENOUS at 13:00

## 2022-11-25 RX ADMIN — FOLIC ACID 1 MG: 1 TABLET ORAL at 08:01

## 2022-11-25 RX ADMIN — KETOROLAC TROMETHAMINE 15 MG: 30 INJECTION, SOLUTION INTRAMUSCULAR at 20:06

## 2022-11-25 RX ADMIN — Medication 500 MCG: at 08:01

## 2022-11-25 RX ADMIN — TAMSULOSIN HYDROCHLORIDE 0.8 MG: 0.4 CAPSULE ORAL at 08:01

## 2022-11-25 RX ADMIN — Medication 1 SPRAY: at 04:58

## 2022-11-25 RX ADMIN — Medication 1 SPRAY: at 08:02

## 2022-11-25 RX ADMIN — FLUTICASONE PROPIONATE 2 SPRAY: 50 SPRAY, METERED NASAL at 08:12

## 2022-11-25 RX ADMIN — METHOCARBAMOL TABLETS 750 MG: 750 TABLET, COATED ORAL at 12:15

## 2022-11-25 RX ADMIN — HEPARIN SODIUM 5000 UNITS: 5000 INJECTION INTRAVENOUS; SUBCUTANEOUS at 05:07

## 2022-11-25 RX ADMIN — Medication 1 SPRAY: at 01:15

## 2022-11-25 RX ADMIN — ACETAMINOPHEN 650 MG: 325 TABLET ORAL at 05:06

## 2022-11-25 NOTE — ROUTINE PROCESS
End of Shift Note     Bedside shift change report given to Salina Juarez, RN by Demond Glass, RN.   Report included the following information SBAR, Kardex, and MAR     Shift worked: 7p - 7a   Shift summary and any significant changes:     None   Concerns for physician to address: none   Zone phone for oncoming shift:   [phone]      Patient Information  Felton Harden  78 y.o.  11/16/2022 12:20 AM by Candida Juarez was admitted from Home     Problem List          Patient Active Problem List     Diagnosis Date Noted    UTI (lower urinary tract infection) 10/25/2013    Renal failure (ARF), acute on chronic ([address] 10/25/2013    Unable to ambulate 11/16/2022    Cervical post-laminectomy syndrome 06/09/2022    Degenerative cervical spinal stenosis 06/09/2022    Vitamin D deficiency 12/21/2021    Immune-mediated neuropathy ([address] 12/21/2021    Diabetic peripheral neuropathy associated with type 2 diabetes mellitus ([address] 12/21/2021    B12 deficiency 12/21/2021    Sensory ataxic gait 12/21/2021    Cervical spondylosis with myelopathy 12/21/2021    Type 2 diabetes mellitus with stage 4 chronic kidney disease, with long-term current use of insulin ([address] 12/21/2021    Convulsions ([address] 12/21/2021    Acute alteration in mental status 12/21/2021    Myoclonus 12/21/2021    Sepsis ([address] 08/18/2021    Acute hypoxemic respiratory failure ([address] 30/34/7597    Systolic heart failure ([address] 05/04/2021    Accelerated hypertension 07/21/2020    Elevated troponin 07/21/2020    ESRD (end stage renal disease) on dialysis ([address] 07/21/2020    Acute respiratory failure with hypoxia ([address] 07/21/2020    Acute on chronic systolic CHF (congestive heart failure) ([address] 07/21/2020    Pneumonia 05/26/2019    Acute on chronic renal failure ([address] 10/29/2016    Hypotension 10/29/2016    Chest pain 10/29/2016    Type II diabetes mellitus with nephropathy ([address] 10/29/2016    Hyperlipidemia 10/29/2016    Nephrotic syndrome 08/20/2016    Renal malika 08/20/2016    Diarrhea 10/28/2013    SIRS (systemic inflammatory response syndrome) ([facility] 10/28/2013    Acute pancreatitis 01/18/2011    LFT'S ABNORMAL 01/18/2011    Acute renal failure (ARF) ([facility] 01/18/2011    CKD (chronic kidney disease) stage 3, GFR 30-59 ml/min ([facility]) 01/18/2011    CAD (coronary artery disease) 01/18/2011    Abdominal pain, acute, epigastric 01/18/2011    Nausea & vomiting 01/18/2011              Past Medical History:   Diagnosis Date    Arthritis       spine    CAD (coronary artery disease)       high cholesterol    Chronic kidney disease       dialysis    Diabetes ([facility]      ESRD (end stage renal disease) ([facility])      GERD (gastroesophageal reflux disease)       HX    Hypertension      Ill-defined condition       irritable bowel syndrome    Systolic CHF ([facility]      Unspecified sleep apnea       NO CPAP         Core Measures:  CVA: No Not applicable  CHF:No Not applicable  PNA:No Not applicable     Activity:  Activity Level: Up with Assistance  Number times ambulated in hallways past shift: 0  Number of times OOB to chair past shift: 3     Cardiac:   Cardiac Monitoring: No        Access:   Current line(s): PIV  Central Line? No Placement date   PICC LINE? No Placement date      Genitourinary:   Urinary status: voiding  Urinary Catheter? No Placement Date      Respiratory:   O2 Device: Nasal cannula prn, room air   Chronic home O2 use?: YES  Incentive spirometer at bedside: N/A     GI:  Last Bowel Movement Date: 11/24/2022  Current diet:  ADULT DIET soft to chew  Passing flatus: YES  Tolerating current diet: YES     Pain Management:   Patient states pain is manageable on current regimen: YES     Skin:  Kelby Score: 20  Interventions: turn team, speciality bed, float heels, increase time out of bed, foam dressing, and PT/OT consult    Patient Safety:  Fall Score:  Total Score: 4  Interventions: bed/chair alarm, assistive device (walker, cane, etc), gripper socks, pt to call before getting OOB, and stay with me (per policy)  High Fall Risk: Yes  @Rollbelt  @dexterity to release roll belt  Yes/No ( must document dexterity  here by stating Yes or No here, otherwise this is a restraint and must follow restraint documentation policy.)     DVT prophylaxis:  DVT prophylaxis Med- Yes  DVT prophylaxis SCD or CINDY- No      Wounds: (If Applicable)  Wounds- No  Location None      Active Consults:  IP CONSULT TO NEUROLOGY  IP CONSULT TO NEPHROLOGY     Length of Stay:  Expected LOS: 3d 12h  Actual LOS: 2  Discharge Plan: yes Rehab when bed available    Edin Shaver RN

## 2022-11-25 NOTE — PROGRESS NOTES
Nephrology Progress Note  [facility] / [address], [place], [address]  Phone - [phone]  Fax - [phone]                 Patient: Alvino Mcneil                   YOB: 1945        Date- 11/25/2022                      Admit Date: 11/16/2022  CC: Follow up for ESRD       IMPRESSION & PLAN:   ESRD- hd mwf at [facility]  Hyperkalemia- k 7.6 on 11-21-22  Anemia of ckd  hyponatremia  Sec. Hyperpara  S/p fall  Weakness  H/o CHF  Dm 2      PLAN-  HD today, UF as able  Continue hd mwf  Continue coreg and entresto   Subjective: Interval History:   Seen on HD. No issues. BP stable. C/o pain, wants to cut treatment short. No cp, sob, n/v/d      Objective:   Vitals:    11/25/22 0930 11/25/22 0945 11/25/22 1000 11/25/22 1015   BP: (!) 105/47 (!) 112/50 (!) 98/53 (!) 115/55   Pulse: 69 (!) 48 (!) 48 (!) 50   Resp: 18 18 18 18   Temp:       TempSrc:       SpO2:       Weight:       Height:          No intake/output data recorded. Last 3 Recorded Weights in this Encounter    11/19/22 2317 11/20/22 2202 11/23/22 1148   Weight: 108 kg (238 lb 1.6 oz) 105.1 kg (231 lb 9.6 oz) 99 kg (218 lb 4.8 oz)        Physical exam:    GEN:  NAD  NECK:  Supple, no thyromegaly  RESP: Clear  b/l, no  wheezing,   CVS: RRR,S1,S2   NEURO: non focal, normal speech  EXT: Edema +nt     Upper arm avf +    Chart reviewed. Pertinent Notes reviewed. Data Review :  Recent Labs     11/25/22  0831 11/23/22  0105   * 134*   K 3.8 5.0   CL 94* 98   CO2 28 27   BUN 96* 77*   CREA 7.93* 7.79*   * 186*   CA 9.6 9.0   PHOS 6.2*  --        Recent Labs     11/25/22  0831 11/23/22  0105   WBC 8.1 10.2   HGB 11.5* 10.9*   HCT 34.9* 34.2*    208       No results for input(s): FE, TIBC, PSAT, FERR in the last 72 hours.    Lab Results   Component Value Date/Time    Hemoglobin A1c 5.6 12/21/2021 11:12 AM    Hemoglobin A1c 8.2 (H) 05/05/2021 03:29 AM    Hemoglobin A1c 10.5 (H) 11/16/2016 10:45 AM        Lab Results   Component Value Date/Time    Microalb/Creat ratio (ug/mg creat.) 897.2 (H) 04/11/2019 12:27 PM     Lab Results   Component Value Date/Time    NT pro-BNP 12,079 (H) 09/12/2022 06:02 AM    NT pro-BNP 5,709 (H) 03/15/2022 08:39 PM    NT pro-BNP 5,474 (H) 05/04/2021 06:09 AM    NT pro-BNP 2,497 (H) 04/17/2021 10:54 AM    NT pro-BNP 9,341 (H) 07/21/2020 05:33 AM     US Results (most recent):  Medication list  reviewed  Current Facility-Administered Medications   Medication Dose Route Frequency    acetaminophen (TYLENOL) tablet 650 mg  650 mg Oral Q6H    benzocaine-menthoL (CEPACOL) lozenge 1 Lozenge  1 Lozenge Mucous Membrane PRN    artificial saliva (MOUTH KOTE) 1 Spray  1 Spray Oral Q2H    lactulose (CHRONULAC) 10 gram/15 mL solution 30 mL  20 g Oral BID    hydrALAZINE (APRESOLINE) 20 mg/mL injection 20 mg  20 mg IntraVENous Q6H PRN    lidocaine 4 % patch 1 Patch  1 Patch TransDERmal Q24H    traMADoL (ULTRAM) tablet 50 mg  50 mg Oral Q6H PRN    methocarbamoL (ROBAXIN) tablet 750 mg  750 mg Oral QID    cyanocobalamin (VITAMIN B12) tablet 500 mcg  500 mcg Oral DAILY    folic acid (FOLVITE) tablet 1 mg  1 mg Oral DAILY    insulin NPH (NOVOLIN N, HUMULIN N) injection 20 Units  20 Units SubCUTAneous ACB&D    sodium chloride (NS) flush 5-40 mL  5-40 mL IntraVENous PRN    polyethylene glycol (MIRALAX) packet 17 g  17 g Oral DAILY PRN    ondansetron (ZOFRAN ODT) tablet 4 mg  4 mg Oral Q8H PRN    Or    ondansetron (ZOFRAN) injection 4 mg  4 mg IntraVENous Q6H PRN    heparin (porcine) injection 5,000 Units  5,000 Units SubCUTAneous Q8H    insulin lispro (HUMALOG) injection   SubCUTAneous AC&HS    glucose chewable tablet 16 g  4 Tablet Oral PRN    glucagon (GLUCAGEN) injection 1 mg  1 mg IntraMUSCular PRN    dextrose 10% infusion 0-250 mL  0-250 mL IntraVENous PRN    aspirin chewable tablet 81 mg  81 mg Oral DAILY    bumetanide (BUMEX) tablet 2 mg  2 mg Oral DAILY    carvediloL (COREG) tablet 12.5 mg  12.5 mg Oral BID WITH MEALS    sacubitriL-valsartan (ENTRESTO) 24-26 mg tablet 1 Tablet  1 Tablet Oral BID    finasteride (PROSCAR) tablet 5 mg  5 mg Oral DAILY    fluticasone propionate (FLONASE) 50 mcg/actuation nasal spray 2 Spray  2 Spray Both Nostrils DAILY    gabapentin (NEURONTIN) capsule 300 mg  300 mg Oral TID PRN    [Held by provider] sevelamer carbonate (RENVELA) tab 800 mg  800 mg Oral BID    tamsulosin (FLOMAX) capsule 0.8 mg  0.8 mg Oral DAILY        Lily Bennett MD  11/25/2022

## 2022-11-25 NOTE — PROGRESS NOTES
Problem: Falls - Risk of  Goal: *Absence of Falls  Description: Document Elizabeth Marking Fall Risk and appropriate interventions in the flowsheet.   Outcome: Progressing Towards Goal  Note: Fall Risk Interventions:  Mobility Interventions: Bed/chair exit alarm, Communicate number of staff needed for ambulation/transfer, OT consult for ADLs, Patient to call before getting OOB, Utilize walker, cane, or other assistive device    Mentation Interventions: Adequate sleep, hydration, pain control, Bed/chair exit alarm, Door open when patient unattended, Eyeglasses and hearing aids, Increase mobility, More frequent rounding    Medication Interventions: Bed/chair exit alarm, Assess postural VS orthostatic hypotension, Evaluate medications/consider consulting pharmacy, Patient to call before getting OOB, Teach patient to arise slowly    Elimination Interventions: Bed/chair exit alarm, Call light in reach, Patient to call for help with toileting needs, Stay With Me (per policy)    History of Falls Interventions: Bed/chair exit alarm, Door open when patient unattended, Evaluate medications/consider consulting pharmacy

## 2022-11-25 NOTE — PROGRESS NOTES
Speech Pathology:  Patient currently off floor dialysis. SLP treatment deferred. Thank you.     Jena Quick, SLP

## 2022-11-25 NOTE — PROCEDURES
Hemodialysis / [phone]    Vitals Pre Post Assessment Pre Post   BP BP: (!) 102/51 (11/25/22 0831)   126/61 LOC A&Ox3 A&O3   HR Pulse (Heart Rate): (!) 49 (11/25/22 0831)   57 Lungs clear clear   Resp Resp Rate: 18 (11/25/22 0831) 18 Cardiac RRR RRR   Temp Temp: 97.6 °F (36.4 °C) (11/25/22 0831) 97.9 Skin intact intact   Weight  N/a N/a Edema trace BLE Trace BLE   Tele status None ordered None ordered Pain 0 0     Orders   Duration: Start: 0831 End: 1101 Total: 2.5 hours   Dialyzer: Dialyzer/Set Up Inspection: Nicole Underwood (11/25/22 0831)   K Bath: Dialysate K (mEq/L): 2 (11/25/22 0831)   Ca Bath: Dialysate CA (mEq/L): 2.5 (11/25/22 0831)   Na: Dialysate NA (mEq/L): 140 (11/25/22 0831)   Bicarb: Dialysate HCO3 (mEq/L): 40 (11/25/22 0831)   Target Fluid Removal: Goal/Amount of Fluid to Remove (mL): 500 mL (11/25/22 0831)     Access   Type & Location: STEPHANIE AVG   Comments: Pre-assessment: skin CDI. No s/s of infection. + B/T. No issues with cannulation. BFR titrated through out treatment d/t high venous pressures. Dr. Radha Fernandez notified. Post assessment: No issues with hemostasis, + B/T remains. Dressing CDI.       Labs   HBsAg (Antigen) / date: Negative 11/16/22                                              HBsAb (Antibody) / date: Susceptible 11/16/22   Source: Epic   Obtained/Reviewed  Critical Results Called HGB   Date Value Ref Range Status   11/23/2022 10.9 (L) 12.1 - 17.0 g/dL Final     Potassium   Date Value Ref Range Status   11/23/2022 5.0 3.5 - 5.1 mmol/L Final     Calcium   Date Value Ref Range Status   11/23/2022 9.0 8.5 - 10.1 MG/DL Final     BUN   Date Value Ref Range Status   11/23/2022 77 (H) 6 - 20 MG/DL Final     Comment:     INVESTIGATED PER DELTA CHECK PROTOCOL     Creatinine   Date Value Ref Range Status   11/23/2022 7.79 (H) 0.70 - 1.30 MG/DL Final        Meds Given   Name Dose Route   None                  Adequacy / Fluid    Total Liters Process: 53.2L   Net Fluid Removed: 500ml      Comments Time Out Done:   (Time) 0830   Admitting Diagnosis: Impaired mobility   Consent obtained/signed: Informed Consent Verified: Yes (Chronic dialysis, no consent needed) (11/25/22 0831)   Machine / RO # Machine Number: [id number] (11/25/22 0831)   Primary Nurse Rpt Pre: Monty Angelucci, RN   Primary Nurse Rpt Post: Monty Angelucci, RN   Pt Education: Procedure   Care Plan: Ongoing   Pts outpatient clinic: [facility] Summary   Comments: SBAR received from Primary RN. Pt arrived to HD suite A&Ox3. Chronic consent applies. Pt agreeable to treatment. 0831: Pt cannulated with 21H needles per policy & without issue. Labs drawn per order. VSS. Dialysis Tx initiated. 1100: Pt complaining of headache and neck ache and wishes to end treatment early. Dr. Jessica Sandy notified and gave ok to terminate treatment. 1101: Tx ended. VSS. All possible blood returned to patient. Hemostasis achieved without issue after hand held pressure x 5 minutes. Bed locked and in the lowest position, call bell and belongings in reach. SBAR given to Primary, RN. Patient is stable at time of their  departure. **All lines and connections remained intact and visible throughout entire treatment. **    All Dialysis related medications have been reviewed.

## 2022-11-25 NOTE — PROGRESS NOTES
Transition of Care Plan:     RUR: 20% - \"high risk\"  LOS: 9 days   Disposition: SNF - [address] [place] [address] with resumed OP HD ([place], M/W/F at 6 AM)  Date FOC offered: 11/17/2022 (verbal)  Date FOC received: 11/17/2022  Date authorization started with reference number: Sarah Perez started 11/21/22 via [facility]; no reference number available   Date authorization received and expires: Auth received 11/23/22; auth expires 11/28/22  Accepting facility: [address] [place] [address]   Follow up appointments: PCP & specialists as indicated   DME needed: Defer to SNF for DME needs  Transportation at Discharge: BLS transport needed  [address] or means to access home: N/A - pt transitioning to SNF at d/c  IM Medicare Letter: 2nd IM needed prior to d/c  Is patient a  and connected with the [employer]? No             Caregiver Contact: Pt's wife Arpit Antonio: [phone])  Discharge Caregiver contacted prior to discharge? To be contacted prior to d/c   Care Conference needed?: Not at this time  COVID test: Rapid COVID test completed 11/21/22; results negative      Update - 1:56 PM: CM met with pt at bedside, introduced role, and provided updates on status of disposition re: SNF at [address] [place] [address]. Pt informed facility is anticipating bed availability for 11/28/22. CM confirmed pt is agreeable to proceeding with placement at [address] [place] [address]. CM inquired about method of transport to and from OP HD while admitted at [facility]. Pt reported his [employer] is going to pay for him to utilize wheelchair transport for services. CM provided pt with several transportation resources to explore this weekend: [employer], \"Your\" [address], [facility], and [employer]. CM provided the contact information for transportation resources detailed above. Pt reported no immediate questions/concerns related to the d/c plan.     Update - 12:01 PM: CM received call back from  [facility] admission director Cathy Sabillon reporting the facility will not have a male bed available to accept until Monday (11/28/22). Marval Siemens reiterated need for CM to confirm transportation to and from OP HD; CM to confirm. CM will meet with pt upon his return to the unit to provide updates on status of the disposition & discuss method of transport to and from OP HD while admitted at [facility]. Initial note: Chart reviewed for updates; pt currently off the unit for HD. CM contacted [address] [place] [address] ([phone]) to inquire if facility would have bed availability to admit pt today after HD. ADRIENNE was informed the admission director Cathy Sabillon was in a meeting but would return call as soon as possible. Updates to be provided once available.     Maryjo Hurd, MSW  Care Manager, [facility]

## 2022-11-25 NOTE — PROGRESS NOTES
End of Shift Note    Bedside shift change report given to Jesus Torres RN (oncoming nurse) by Yen Anderson RN (offgoing nurse).   Report included the following information SBAR, Kardex, ED Summary, and MAR    Shift worked:  DAYS    Shift summary and any significant changes:    -DIALYSIS COMPLETED (SOFT BPs) 300cc TAKEN OFF  -LABS DRAWN IN DIALYSIS     Concerns for physician to address:  NONE    Zone phone for oncoming shift:   [phone]     Patient Information  Firman Solid  78 y.o.  11/16/2022 12:20 AM by Dearl Rita Padilla Gayle was admitted from Home    Problem List  Patient Active Problem List    Diagnosis Date Noted    UTI (lower urinary tract infection) 10/25/2013    Renal failure (ARF), acute on chronic ([address] 10/25/2013    Unable to ambulate 11/16/2022    Cervical post-laminectomy syndrome 06/09/2022    Degenerative cervical spinal stenosis 06/09/2022    Vitamin D deficiency 12/21/2021    Immune-mediated neuropathy ([address] 12/21/2021    Diabetic peripheral neuropathy associated with type 2 diabetes mellitus ([address] 12/21/2021    B12 deficiency 12/21/2021    Sensory ataxic gait 12/21/2021    Cervical spondylosis with myelopathy 12/21/2021    Type 2 diabetes mellitus with stage 4 chronic kidney disease, with long-term current use of insulin ([address] 12/21/2021    Convulsions ([address] 12/21/2021    Acute alteration in mental status 12/21/2021    Myoclonus 12/21/2021    Sepsis ([address] 08/18/2021    Acute hypoxemic respiratory failure ([address] 51/20/8201    Systolic heart failure ([address] 05/04/2021    Accelerated hypertension 07/21/2020    Elevated troponin 07/21/2020    ESRD (end stage renal disease) on dialysis ([address] 07/21/2020    Acute respiratory failure with hypoxia ([address] 07/21/2020    Acute on chronic systolic CHF (congestive heart failure) ([address] 07/21/2020    Pneumonia 05/26/2019    Acute on chronic renal failure ([address] 10/29/2016    Hypotension 10/29/2016    Chest pain 10/29/2016    Type II diabetes mellitus with nephropathy ([facility] 10/29/2016    Hyperlipidemia 10/29/2016    Nephrotic syndrome 08/20/2016    Renal anasarca 08/20/2016    Diarrhea 10/28/2013    SIRS (systemic inflammatory response syndrome) ([facility] 10/28/2013    Acute pancreatitis 01/18/2011    LFT'S ABNORMAL 01/18/2011    Acute renal failure (ARF) ([facility] 01/18/2011    CKD (chronic kidney disease) stage 3, GFR 30-59 ml/min ([facility]) 01/18/2011    CAD (coronary artery disease) 01/18/2011    Abdominal pain, acute, epigastric 01/18/2011    Nausea & vomiting 01/18/2011     Past Medical History:   Diagnosis Date    Arthritis     spine    CAD (coronary artery disease)     high cholesterol    Chronic kidney disease     dialysis    Diabetes ([facility]     ESRD (end stage renal disease) ([facility])     GERD (gastroesophageal reflux disease)     HX    Hypertension     Ill-defined condition     irritable bowel syndrome    Systolic CHF ([facility]     Unspecified sleep apnea     NO CPAP       Core Measures:  CVA: No Not applicable  CHF:No Not applicable  PNA:No Not applicable    Activity:  Activity Level: Up with Assistance  Number times ambulated in hallways past shift: 0  Number of times OOB to chair past shift: 0    Cardiac:   Cardiac Monitoring: No          Access:   Current line(s): PIV   Central Line? No Placement date   PICC LINE? No Placement date     Genitourinary:   Urinary status: voiding  Urinary Catheter? No Placement Date     Respiratory:   O2 Device: None (Room air)  Chronic home O2 use?: YES  Incentive spirometer at bedside: N/A       GI:  Last Bowel Movement Date: 11/24/22  Current diet:  ADULT DIET Dysphagia - Soft & Bite Sized;  Only vanilla or strawberry supplements please  ADULT ORAL NUTRITION SUPPLEMENT Breakfast, Dinner; Renal Supplement  Passing flatus: YES  Tolerating current diet: YES       Pain Management:   Patient states pain is manageable on current regimen: YES    Skin:  Kelby Score: 19  Interventions: turn team, speciality bed, float heels, increase time out of bed, foam dressing, and PT/OT consult    Patient Safety:  Fall Score:  Total Score: 4  Interventions: bed/chair alarm, assistive device (walker, cane, etc), gripper socks, pt to call before getting OOB, and stay with me (per policy)  High Fall Risk: Yes  @Rollbelt  @dexterity to release roll belt  Yes/No ( must document dexterity  here by stating Yes or No here, otherwise this is a restraint and must follow restraint documentation policy.)    DVT prophylaxis:  DVT prophylaxis Med- Yes  DVT prophylaxis SCD or CINDY- No     Wounds: (If Applicable)  Wounds- No  Location None     Active Consults:  IP CONSULT TO NEUROLOGY  IP CONSULT TO NEPHROLOGY    Length of Stay:  Expected LOS: 4d 7h  Actual LOS: 9  Discharge Plan: No Home       Fabiola Cuevas RN

## 2022-11-26 LAB
GLUCOSE BLD STRIP.AUTO-MCNC: 125 MG/DL (ref 65–117)
GLUCOSE BLD STRIP.AUTO-MCNC: 137 MG/DL (ref 65–117)
GLUCOSE BLD STRIP.AUTO-MCNC: 154 MG/DL (ref 65–117)
GLUCOSE BLD STRIP.AUTO-MCNC: 94 MG/DL (ref 65–117)
SERVICE CMNT-IMP: ABNORMAL
SERVICE CMNT-IMP: NORMAL

## 2022-11-26 PROCEDURE — 74011000250 HC RX REV CODE- 250: Performed by: NURSE PRACTITIONER

## 2022-11-26 PROCEDURE — 82962 GLUCOSE BLOOD TEST: CPT

## 2022-11-26 PROCEDURE — 65270000046 HC RM TELEMETRY

## 2022-11-26 PROCEDURE — 74011636637 HC RX REV CODE- 636/637: Performed by: STUDENT IN AN ORGANIZED HEALTH CARE EDUCATION/TRAINING PROGRAM

## 2022-11-26 PROCEDURE — 74011250637 HC RX REV CODE- 250/637: Performed by: INTERNAL MEDICINE

## 2022-11-26 PROCEDURE — 94760 N-INVAS EAR/PLS OXIMETRY 1: CPT

## 2022-11-26 PROCEDURE — 74011250636 HC RX REV CODE- 250/636: Performed by: STUDENT IN AN ORGANIZED HEALTH CARE EDUCATION/TRAINING PROGRAM

## 2022-11-26 PROCEDURE — 74011250637 HC RX REV CODE- 250/637: Performed by: NURSE PRACTITIONER

## 2022-11-26 PROCEDURE — 74011250637 HC RX REV CODE- 250/637: Performed by: STUDENT IN AN ORGANIZED HEALTH CARE EDUCATION/TRAINING PROGRAM

## 2022-11-26 RX ORDER — OXYCODONE HYDROCHLORIDE 5 MG/1
2.5 TABLET ORAL ONCE
Status: COMPLETED | OUTPATIENT
Start: 2022-11-26 | End: 2022-11-26

## 2022-11-26 RX ADMIN — HEPARIN SODIUM 5000 UNITS: 5000 INJECTION INTRAVENOUS; SUBCUTANEOUS at 05:04

## 2022-11-26 RX ADMIN — LACTULOSE 30 ML: 20 SOLUTION ORAL at 08:40

## 2022-11-26 RX ADMIN — FOLIC ACID 1 MG: 1 TABLET ORAL at 08:40

## 2022-11-26 RX ADMIN — Medication 2 UNITS: at 12:20

## 2022-11-26 RX ADMIN — ACETAMINOPHEN 650 MG: 325 TABLET ORAL at 05:05

## 2022-11-26 RX ADMIN — ACETAMINOPHEN 650 MG: 325 TABLET ORAL at 11:51

## 2022-11-26 RX ADMIN — TAMSULOSIN HYDROCHLORIDE 0.8 MG: 0.4 CAPSULE ORAL at 08:39

## 2022-11-26 RX ADMIN — METHOCARBAMOL TABLETS 750 MG: 750 TABLET, COATED ORAL at 13:55

## 2022-11-26 RX ADMIN — Medication 20 UNITS: at 08:38

## 2022-11-26 RX ADMIN — METHOCARBAMOL TABLETS 750 MG: 750 TABLET, COATED ORAL at 21:34

## 2022-11-26 RX ADMIN — ACETAMINOPHEN 650 MG: 325 TABLET ORAL at 23:03

## 2022-11-26 RX ADMIN — CARVEDILOL 12.5 MG: 12.5 TABLET, FILM COATED ORAL at 17:05

## 2022-11-26 RX ADMIN — FINASTERIDE 5 MG: 5 TABLET, FILM COATED ORAL at 08:40

## 2022-11-26 RX ADMIN — CARVEDILOL 12.5 MG: 12.5 TABLET, FILM COATED ORAL at 08:40

## 2022-11-26 RX ADMIN — FLUTICASONE PROPIONATE 2 SPRAY: 50 SPRAY, METERED NASAL at 08:44

## 2022-11-26 RX ADMIN — HEPARIN SODIUM 5000 UNITS: 5000 INJECTION INTRAVENOUS; SUBCUTANEOUS at 21:33

## 2022-11-26 RX ADMIN — SACUBITRIL AND VALSARTAN 1 TABLET: 24; 26 TABLET, FILM COATED ORAL at 08:39

## 2022-11-26 RX ADMIN — OXYCODONE 2.5 MG: 5 TABLET ORAL at 21:35

## 2022-11-26 RX ADMIN — BUMETANIDE 2 MG: 1 TABLET ORAL at 08:40

## 2022-11-26 RX ADMIN — METHOCARBAMOL TABLETS 750 MG: 750 TABLET, COATED ORAL at 08:40

## 2022-11-26 RX ADMIN — SACUBITRIL AND VALSARTAN 1 TABLET: 24; 26 TABLET, FILM COATED ORAL at 17:10

## 2022-11-26 RX ADMIN — LACTULOSE 30 ML: 20 SOLUTION ORAL at 17:11

## 2022-11-26 RX ADMIN — ACETAMINOPHEN 650 MG: 325 TABLET ORAL at 17:10

## 2022-11-26 RX ADMIN — METHOCARBAMOL TABLETS 750 MG: 750 TABLET, COATED ORAL at 17:11

## 2022-11-26 RX ADMIN — Medication 20 UNITS: at 17:05

## 2022-11-26 RX ADMIN — HEPARIN SODIUM 5000 UNITS: 5000 INJECTION INTRAVENOUS; SUBCUTANEOUS at 13:55

## 2022-11-26 RX ADMIN — ASPIRIN 81 MG: 81 TABLET, CHEWABLE ORAL at 08:40

## 2022-11-26 RX ADMIN — Medication 500 MCG: at 08:40

## 2022-11-26 NOTE — PROGRESS NOTES
Problem: Falls - Risk of  Goal: *Absence of Falls  Description: Document Errol Javier Fall Risk and appropriate interventions in the flowsheet. Outcome: Progressing Towards Goal  Note: Fall Risk Interventions:  Mobility Interventions: Bed/chair exit alarm, Patient to call before getting OOB    Mentation Interventions: Bed/chair exit alarm, Door open when patient unattended    Medication Interventions: Bed/chair exit alarm, Patient to call before getting OOB, Teach patient to arise slowly    Elimination Interventions: Bed/chair exit alarm, Call light in reach, Patient to call for help with toileting needs, Stay With Me (per policy), Toilet paper/wipes in reach, Toileting schedule/hourly rounds, Urinal in reach    History of Falls Interventions: Bed/chair exit alarm, Evaluate medications/consider consulting pharmacy         Problem: Pressure Injury - Risk of  Goal: *Prevention of pressure injury  Description: Document Kelby Scale and appropriate interventions in the flowsheet.   Outcome: Progressing Towards Goal  Note: Pressure Injury Interventions:  Sensory Interventions: Minimize linen layers    Moisture Interventions: Absorbent underpads, Apply protective barrier, creams and emollients    Activity Interventions: Chair cushion, Increase time out of bed, Pressure redistribution bed/mattress(bed type)    Mobility Interventions: Chair cushion, HOB 30 degrees or less, Pressure redistribution bed/mattress (bed type)    Nutrition Interventions: Document food/fluid/supplement intake    Friction and Shear Interventions: Minimize layers                Problem: Patient Education: Go to Patient Education Activity  Goal: Patient/Family Education  Outcome: Progressing Towards Goal     Problem: General Medical Care Plan  Goal: *Vital signs within specified parameters  Outcome: Progressing Towards Goal  Goal: *Labs within defined limits  Outcome: Progressing Towards Goal  Goal: *Absence of infection signs and symptoms  Outcome: Progressing Towards Goal  Goal: *Optimal pain control at patient's stated goal  Outcome: Progressing Towards Goal  Goal: *Skin integrity maintained  Outcome: Progressing Towards Goal  Goal: *Fluid volume balance  Outcome: Progressing Towards Goal  Goal: *Optimize nutritional status  Outcome: Progressing Towards Goal  Goal: *Anxiety reduced or absent  Outcome: Progressing Towards Goal  Goal: *Progressive mobility and function (eg: ADL's)  Outcome: Progressing Towards Goal

## 2022-11-26 NOTE — ROUTINE PROCESS
End of Shift Note    Bedside shift change report given to Charolet Hamman, RN (oncoming nurse) by Carroll Dupont RN (offgoing nurse).   Report included the following information SBAR, Kardex, ED Summary, and MAR    Shift worked:  7p - 7a   Shift summary and any significant changes:    -None     Concerns for physician to address: None   Zone phone for oncoming shift:   [phone]     Patient Information  Irma Guzman  78 y.o.  11/16/2022 12:20 AM by Edward Abraham [facility] [place][note 2] was admitted from Home    Problem List  Patient Active Problem List    Diagnosis Date Noted    UTI (lower urinary tract infection) 10/25/2013    Renal failure (ARF), acute on chronic ([address] 10/25/2013    Unable to ambulate 11/16/2022    Cervical post-laminectomy syndrome 06/09/2022    Degenerative cervical spinal stenosis 06/09/2022    Vitamin D deficiency 12/21/2021    Immune-mediated neuropathy ([address] 12/21/2021    Diabetic peripheral neuropathy associated with type 2 diabetes mellitus ([address] 12/21/2021    B12 deficiency 12/21/2021    Sensory ataxic gait 12/21/2021    Cervical spondylosis with myelopathy 12/21/2021    Type 2 diabetes mellitus with stage 4 chronic kidney disease, with long-term current use of insulin ([address] 12/21/2021    Convulsions ([address] 12/21/2021    Acute alteration in mental status 12/21/2021    Myoclonus 12/21/2021    Sepsis ([address] 08/18/2021    Acute hypoxemic respiratory failure ([address] 28/58/9018    Systolic heart failure ([address] 05/04/2021    Accelerated hypertension 07/21/2020    Elevated troponin 07/21/2020    ESRD (end stage renal disease) on dialysis ([address] 07/21/2020    Acute respiratory failure with hypoxia ([address] 07/21/2020    Acute on chronic systolic CHF (congestive heart failure) ([address] 07/21/2020    Pneumonia 05/26/2019    Acute on chronic renal failure ([address] 10/29/2016    Hypotension 10/29/2016    Chest pain 10/29/2016    Type II diabetes mellitus with nephropathy [facility] 10/29/2016    Hyperlipidemia 10/29/2016    Nephrotic syndrome 08/20/2016    Renal anasarca 08/20/2016    Diarrhea 10/28/2013    SIRS (systemic inflammatory response syndrome) ([facility] 10/28/2013    Acute pancreatitis 01/18/2011    LFT'S ABNORMAL 01/18/2011    Acute renal failure (ARF) ([facility] [facility] 01/18/2011    CKD (chronic kidney disease) stage 3, GFR 30-59 ml/min ([facility]) 01/18/2011    CAD (coronary artery disease) 01/18/2011    Abdominal pain, acute, epigastric 01/18/2011    Nausea & vomiting 01/18/2011     Past Medical History:   Diagnosis Date    Arthritis     spine    CAD (coronary artery disease)     high cholesterol    Chronic kidney disease     dialysis    Diabetes ([address]     ESRD (end stage renal disease) ([facility])     GERD (gastroesophageal reflux disease)     HX    Hypertension     Ill-defined condition     irritable bowel syndrome    Systolic CHF ([address] [facility]     Unspecified sleep apnea     NO CPAP       Core Measures:  CVA: No Not applicable  CHF:No Not applicable  PNA:No Not applicable    Activity:  Activity Level: Up with Assistance  Number times ambulated in hallways past shift: 0  Number of times OOB to chair past shift: 0    Cardiac:   Cardiac Monitoring: No          Access:   Current line(s): PIV   Central Line? No Placement date   PICC LINE? No Placement date     Genitourinary:   Urinary status: voiding  Urinary Catheter? No Placement Date     Respiratory:   O2 Device: None (Room air)  Chronic home O2 use?: YES  Incentive spirometer at bedside: N/A       GI:  Last Bowel Movement Date: 11/24/22  Current diet:  ADULT DIET Dysphagia - Soft & Bite Sized; Only vanilla or strawberry supplements please  ADULT ORAL NUTRITION SUPPLEMENT Breakfast, Dinner; Renal Supplement  Passing flatus: YES  Tolerating current diet: YES       Pain Management:   Patient states pain is manageable on current regimen: YES    Skin:  Kelby Score: 19  Interventions: turn team, speciality bed, float heels, increase time out of bed, foam dressing, and PT/OT consult    Patient Safety:  Fall Score:  Total Score: 4  Interventions: bed/chair alarm, assistive device (walker, cane, etc), gripper socks, pt to call before getting OOB, and stay with me (per policy)  High Fall Risk: Yes  @Rollbelt  @dexterity to release roll belt  Yes/No ( must document dexterity  here by stating Yes or No here, otherwise this is a restraint and must follow restraint documentation policy.)    DVT prophylaxis:  DVT prophylaxis Med- Yes  DVT prophylaxis SCD or CINDY- No     Wounds: (If Applicable)  Wounds- No  Location None     Active Consults:  IP CONSULT TO NEUROLOGY  IP CONSULT TO NEPHROLOGY    Length of Stay:  Expected LOS: 4d 7h  Actual LOS: 10  Discharge Plan: No Home       Imelda Thomas RN

## 2022-11-26 NOTE — PROGRESS NOTES
Spiritual Care Assessment/Progress Note  [facility]      NAME: Fausto Wiseman      MRN: [id number]  AGE: 78 y.o.  SEX: male  [employer] Affiliation: Rastafarian   Language: English     11/26/2022     Total Time (in minutes): 12     Spiritual Assessment begun in [facility] through conversation with:         [x]Patient        [] Family    [] Friend(s)        Reason for Consult: Initial/Spiritual assessment, patient floor     Spiritual beliefs: (Please include comment if needed)     [x] Identifies with a sandhya tradition:         [] Supported by a sandhya community:            [] Claims no spiritual orientation:           [] Seeking spiritual identity:                [] Adheres to an individual form of spirituality:           [] Not able to assess:                           Identified resources for coping:      [x] Prayer                               [] Music                  [] Guided Imagery     [x] Family/friends                 [] Pet visits     [x] Devotional reading                         [] Unknown     [] Other:                                               Interventions offered during this visit: (See comments for more details)    Patient Interventions: Affirmation of emotions/emotional suffering, Initial/Spiritual assessment, patient floor, Initial visit, Prayer (assurance of), Normalization of emotional/spiritual concerns           Plan of Care:     [x] Support spiritual and/or cultural needs    [] Support AMD and/or advance care planning process      [] Support grieving process   [] Coordinate Rites and/or Rituals    [] Coordination with community clergy   [] No spiritual needs identified at this time   [] Detailed Plan of Care below (See Comments)  [] Make referral to Music Therapy  [] Make referral to Pet Therapy     [] Make referral to Addiction services  [] Make referral to [facility]  [] Make referral to Spiritual Care Partner  [] No future visits requested        [x] Contact Spiritual Care for further referrals     Comments:  reviewed the patient's chart prior to the visit. Mr. Emigdio Yen was sitting in his chair watching the football game. He stated his  and family has visited with him. He shared his love for sports.  provided a presence, encouragement and empathetic listening.  services are available 24 hours a day as requested. Rev. MEREDITH Pandya.  [facility]   Paging Service [phone])

## 2022-11-26 NOTE — PROGRESS NOTES
End of Shift Note     Bedside shift change report given to  MAURICE Boggs,(oncoming nurse) by Jayson Barrera RN (offgoing nurse). Report included the following information SBAR, Kardex, ED Summary, and MAR     Shift worked: 7a-7p   Shift summary and any significant changes:     Cervical collar placed on patient.       Concerns for physician to address: None   Zone phone for oncoming shift:   [phone]      Patient Information  Meghan Holland  78 y.o.  11/16/2022 12:20 AM by Aicha Tian Liya was admitted from Home     Problem List       Patient Active Problem List     Diagnosis Date Noted    UTI (lower urinary tract infection) 10/25/2013    Renal failure (ARF), acute on chronic ([address] 10/25/2013    Unable to ambulate 11/16/2022    Cervical post-laminectomy syndrome 06/09/2022    Degenerative cervical spinal stenosis 06/09/2022    Vitamin D deficiency 12/21/2021    Immune-mediated neuropathy ([address] 12/21/2021    Diabetic peripheral neuropathy associated with type 2 diabetes mellitus ([address] 12/21/2021    B12 deficiency 12/21/2021    Sensory ataxic gait 12/21/2021    Cervical spondylosis with myelopathy 12/21/2021    Type 2 diabetes mellitus with stage 4 chronic kidney disease, with long-term current use of insulin ([address] 12/21/2021    Convulsions ([address] 12/21/2021    Acute alteration in mental status 12/21/2021    Myoclonus 12/21/2021    Sepsis ([address] 08/18/2021    Acute hypoxemic respiratory failure ([address] 80/95/3096    Systolic heart failure ([address] 05/04/2021    Accelerated hypertension 07/21/2020    Elevated troponin 07/21/2020    ESRD (end stage renal disease) on dialysis ([address] 07/21/2020    Acute respiratory failure with hypoxia ([address] 07/21/2020    Acute on chronic systolic CHF (congestive heart failure) ([address] 07/21/2020    Pneumonia 05/26/2019    Acute on chronic renal failure ([address] 10/29/2016    Hypotension 10/29/2016    Chest pain 10/29/2016    Type II diabetes mellitus with nephropathy ([facility] 10/29/2016 Hyperlipidemia 10/29/2016    Nephrotic syndrome 08/20/2016    Renal anasarca 08/20/2016    Diarrhea 10/28/2013    SIRS (systemic inflammatory response syndrome) ([facility] 10/28/2013    Acute pancreatitis 01/18/2011    LFT'S ABNORMAL 01/18/2011    Acute renal failure (ARF) ([facility] 01/18/2011    CKD (chronic kidney disease) stage 3, GFR 30-59 ml/min ([facility]) 01/18/2011    CAD (coronary artery disease) 01/18/2011    Abdominal pain, acute, epigastric 01/18/2011    Nausea & vomiting 01/18/2011           Past Medical History:   Diagnosis Date    Arthritis       spine    CAD (coronary artery disease)       high cholesterol    Chronic kidney disease       dialysis    Diabetes ([facility]      ESRD (end stage renal disease) ([facility])      GERD (gastroesophageal reflux disease)       HX    Hypertension      Ill-defined condition       irritable bowel syndrome    Systolic CHF ([facility]      Unspecified sleep apnea       NO CPAP         Core Measures:  CVA: No Not applicable  CHF:No Not applicable  PNA:No Not applicable     Activity:  Activity Level: Up with Assistance  Number times ambulated in hallways past shift: 0  Number of times OOB to chair past shift: 0     Cardiac:   Cardiac Monitoring: No        Access:   Current line(s): PIV      Genitourinary:   Urinary status: voiding     Respiratory:   O2 Device: None (Room air)  Chronic home O2 use?: YES  Incentive spirometer at bedside: N/A     GI:  Last Bowel Movement Date: 11/24/22  Current diet:  ADULT DIET Dysphagia - Soft & Bite Sized; Only vanilla or strawberry supplements please  ADULT ORAL NUTRITION SUPPLEMENT Breakfast, Dinner; Renal Supplement  Passing flatus: YES  Tolerating current diet: YES     Pain Management:   Patient states pain is manageable on current regimen: YES     Skin:  Kelby Score: 19  Interventions: turn team, speciality bed, float heels, increase time out of bed, foam dressing, and PT/OT consult    Patient Safety:  Fall Score:  Total Score: 4  Interventions: bed/chair alarm, assistive device (walker, cane, etc), gripper socks, pt to call before getting OOB, and stay with me (per policy)  High Fall Risk: Yes     DVT prophylaxis:  DVT prophylaxis Med- Yes  DVT prophylaxis SCD or CINDY- No      Wounds: (If Applicable)  Wounds- No  Location None      Active Consults:  IP CONSULT TO NEUROLOGY  IP CONSULT TO NEPHROLOGY     Length of Stay:  Expected LOS: 4d 7h  Actual LOS: 10  Discharge Plan: Yes.  [id number] Taylor 11/28/22

## 2022-11-26 NOTE — PROGRESS NOTES
Hospitalist Progress Note    Subjective:   Daily Progress Note: 11/26/2022 7:14 AM    Hospital Course: Patient is a 46-year-old black male with a history of end-stage disease on hemodialysis Monday, Wednesday,, type 2 diabetes, GERD, hypertension, chronic hypoxic respiratory failure requiring 2 L oxygen nasal cannula at home, systolic heart failure who presented to the emergency room on 11/16/2022 for worsening weakness and recurrent falls. He denied hitting his head or losing consciousness. CT of the head and spine were negative for acute findings. As patient was unable to walk he was admitted in the hospital for further evaluation. Nephrology consulted for hemodialysis management. Neurology consulted. Vitamin B12 low, folate low and began supplementation. MRI of the lumbar spine showed spinal stenosis with multilevel degenerative disc disease. Per neurology recommend physical and occupational therapy and EMG as an outpatient. PT and OT recommend SNF placement. Case management consulted for discharge planning. Overnight on 11/20/2022 patient became confused and altered. Sodium was 132, potassium 7.6, BUN 60, serum creatinine 8.01, glucose 181, ammonia level 35. He was started on lactulose. Ammonia level trending down. Patient developed confusion  on day #5 hospital.  Calcitonin and lactic acid and WBC within normal limits. Doubt infectious process. CT of the head showed no acute intracranial abnormality. CT of the cervical spine showed no fracture but did show cervical degenerative disc disease. Subjective: Patient is alert and oriented x4.     \"My neck is still hurting\"  Toradol helped, but avoiding with ESRD  Feels the hospital bed is making his neck worse  Does not want to resume toradol  Awaiting SNF bed    Review of Systems   No fevers, + sore throat, no SOB or cough, no CP, No nausea, No vomiting, No diarrhea, No abdominal pain      Objective:     Patient Vitals for the past 24 hrs: Temp Pulse Resp BP SpO2   22 1617 -- 65 17 121/70 100 %   22 0813 97.7 °F (36.5 °C) 66 18 114/73 100 %   22 2336 97.3 °F (36.3 °C) 68 20 (!) 96/55 100 %          O2 Flow Rate (L/min): 3 l/min O2 Device: None (Room air)    Temp (24hrs), Av.5 °F (36.4 °C), Min:97.3 °F (36.3 °C), Max:97.7 °F (36.5 °C)      No intake/output data recorded.  1901 -  0700  In: -   Out: 300     PHYSICAL EXAM:  Constitutional: No acute distress  Skin: Extremities and face reveal no rashes. HEENT: Sclerae anicteric. Extra-occular muscles are intact. Oral mucosa dry do not see any plaques indicating yeast infection. Cardiovascular: Regular rate and rhythm. Respiratory:  Clear breath sounds bilaterally with no wheezes, rales, or rhonchi. GI: Abdomen nondistended, soft, and nontender. Normal active bowel sounds. Musculoskeletal: No pitting edema of the lower legs. Able to move all ext  Neurological:  Patient is alert and oriented. Cranial nerves II-XII grossly intact  Psychiatric: Mood appears appropriate       Data Review    Recent Results (from the past 24 hour(s))   GLUCOSE, POC    Collection Time: 22  8:57 PM   Result Value Ref Range    Glucose (POC) 204 (H) 65 - 117 mg/dL    Performed by Zakiya Mail, POC    Collection Time: 22  8:15 AM   Result Value Ref Range    Glucose (POC) 137 (H) 65 - 117 mg/dL    Performed by Danae Ada PCT    GLUCOSE, POC    Collection Time: 22 12:14 PM   Result Value Ref Range    Glucose (POC) 154 (H) 65 - 117 mg/dL    Performed by Mike Funez RN    GLUCOSE, POC    Collection Time: 22  4:18 PM   Result Value Ref Range    Glucose (POC) 94 65 - 117 mg/dL    Performed by Danae Ada PCT        Radiology review: MRI of spine    Assessment:   1. Severe neck pain due to DDD cervical spine  2. Recurrent falls secondary to spinal stenosis and multilevel disc and facet degeneration  3. Elevated troponin secondary to kidney disease.   4. End-stage renal disease on hemodialysis Monday, Wednesday, Friday with critical severe hyper kalemia  5. Chronic systolic heart failure  6. Type 2 diabetes  7. Chronic hypoxic respiratory failure  8. Acute confusion multifactorial  9. Dry mouth with dysphagia      Plan:   1.  CT of the head and spine reviewed with no acute findings. MRI of the lumbar spine showed spinal stenosis. Neurology consulted. They recommend physical therapy and EMG as an outpatient. Tramadol as needed. Lidocaine patch ordered. Vitamin B12 and folate low and supplemented. PT OT recommend SNF placement. Case management consulted. Schedule tylenol, try low dose oxycodone tonight, soft cervical collar  2. Troponins elevated on admission. Patient denies any chest pain. Chest likely secondary to renal disease  3. Dialysis Monday, Wednesday, Friday nephrology consulted. On Renvela  4. Patient on Entresto, Coreg, Bumex, aspirin. 5.  Glucose levels are stable. Patient on insulin sliding scale and NPH 20 units twice daily  6.  Discharge once bed founf    CODE STATUS full     DVT prophylaxis: Heparin  Ulcer prophylaxis: Not indicated    Care Plan discussed with: Patient/Family, Nurse, and     Miriam Keith MD

## 2022-11-26 NOTE — PROGRESS NOTES
Hospitalist Progress Note    Subjective:   Daily Progress Note: 11/26/2022 7:14 AM    Hospital Course: Patient is a 77-year-old black male with a history of end-stage disease on hemodialysis Monday, Wednesday,, type 2 diabetes, GERD, hypertension, chronic hypoxic respiratory failure requiring 2 L oxygen nasal cannula at home, systolic heart failure who presented to the emergency room on 11/16/2022 for worsening weakness and recurrent falls. He denied hitting his head or losing consciousness. CT of the head and spine were negative for acute findings. As patient was unable to walk he was admitted in the hospital for further evaluation. Nephrology consulted for hemodialysis management. Neurology consulted. Vitamin B12 low, folate low and began supplementation. MRI of the lumbar spine showed spinal stenosis with multilevel degenerative disc disease. Per neurology recommend physical and occupational therapy and EMG as an outpatient. PT and OT recommend SNF placement. Case management consulted for discharge planning. Overnight on 11/20/2022 patient became confused and altered. Sodium was 132, potassium 7.6, BUN 60, serum creatinine 8.01, glucose 181, ammonia level 35. He was started on lactulose. Ammonia level trending down. Patient developed confusion  on day #5 hospital.  Calcitonin and lactic acid and WBC within normal limits. Doubt infectious process. CT of the head showed no acute intracranial abnormality. CT of the cervical spine showed no fracture but did show cervical degenerative disc disease. We will give a one-time dose of IV Solu-Medrol. Patient also had difficulty swallowing with dry mouth. Speech therapy consulted      Subjective: Patient is alert and oriented x4.     \"My neck is hurting today\"  Feels the hospital bed is making his neck worse  Some numbness in hands  Awaiting SNF bed    Review of Systems   No fevers, + sore throat, no SOB or cough, no CP, No nausea, No vomiting, No diarrhea, No abdominal pain      Objective:     Patient Vitals for the past 24 hrs:   Temp Pulse Resp BP SpO2   22 2336 97.3 °F (36.3 °C) 68 20 (!) 96/55 100 %   22 1555 97.9 °F (36.6 °C) 69 17 115/64 97 %   22 1243 -- 66 -- 104/60 99 %   22 1101 97.9 °F (36.6 °C) (!) 57 18 126/61 --   22 1045 -- (!) 50 18 111/63 --   22 1030 -- (!) 57 18 (!) 101/56 --   22 1015 -- (!) 50 18 (!) 115/55 --   22 1000 -- (!) 48 18 (!) 98/53 --   22 0945 -- (!) 48 18 (!) 112/50 --   22 0930 -- 69 18 (!) 105/47 --   22 0915 -- (!) 48 18 (!) 95/47 --   22 0900 -- (!) 49 18 (!) 92/46 --   22 0845 -- (!) 53 18 (!) 105/50 --   22 0831 97.6 °F (36.4 °C) (!) 49 18 (!) 102/51 --   22 0810 97.4 °F (36.3 °C) 65 17 107/60 98 %          O2 Flow Rate (L/min): 3 l/min O2 Device: None (Room air)    Temp (24hrs), Av.6 °F (36.4 °C), Min:97.3 °F (36.3 °C), Max:97.9 °F (36.6 °C)      No intake/output data recorded.  0701 -  190  In: -   Out: 300     PHYSICAL EXAM:  Constitutional: No acute distress  Skin: Extremities and face reveal no rashes. HEENT: Sclerae anicteric. Extra-occular muscles are intact. Oral mucosa dry do not see any plaques indicating yeast infection. Cardiovascular: Regular rate and rhythm. Respiratory:  Clear breath sounds bilaterally with no wheezes, rales, or rhonchi. GI: Abdomen nondistended, soft, and nontender. Normal active bowel sounds. Musculoskeletal: No pitting edema of the lower legs. Able to move all ext  Neurological:  Patient is alert and oriented.  Cranial nerves II-XII grossly intact  Psychiatric: Mood appears appropriate       Data Review    Recent Results (from the past 24 hour(s))   GLUCOSE, POC    Collection Time: 22  8:07 AM   Result Value Ref Range    Glucose (POC) 149 (H) 65 - 117 mg/dL    Performed by Mo Durant    CBC W/O DIFF    Collection Time: 22  8:31 AM   Result Value Ref Range WBC 8.1 4.1 - 11.1 K/uL    RBC 3.89 (L) 4.10 - 5.70 M/uL    HGB 11.5 (L) 12.1 - 17.0 g/dL    HCT 34.9 (L) 36.6 - 50.3 %    MCV 89.7 80.0 - 99.0 FL    MCH 29.6 26.0 - 34.0 PG    MCHC 33.0 30.0 - 36.5 g/dL    RDW 15.3 (H) 11.5 - 14.5 %    PLATELET 779 122 - 633 K/uL    MPV 11.2 8.9 - 12.9 FL    NRBC 0.0 0  WBC    ABSOLUTE NRBC 0.00 0.00 - 0.01 K/uL   RENAL FUNCTION PANEL    Collection Time: 11/25/22  8:31 AM   Result Value Ref Range    Sodium 133 (L) 136 - 145 mmol/L    Potassium 3.8 3.5 - 5.1 mmol/L    Chloride 94 (L) 97 - 108 mmol/L    CO2 28 21 - 32 mmol/L    Anion gap 11 5 - 15 mmol/L    Glucose 175 (H) 65 - 100 mg/dL    BUN 96 (H) 6 - 20 MG/DL    Creatinine 7.93 (H) 0.70 - 1.30 MG/DL    BUN/Creatinine ratio 12 12 - 20      eGFR 7 (L) >60 ml/min/1.73m2    Calcium 9.6 8.5 - 10.1 MG/DL    Phosphorus 6.2 (H) 2.6 - 4.7 MG/DL    Albumin 3.2 (L) 3.5 - 5.0 g/dL   GLUCOSE, POC    Collection Time: 11/25/22 12:19 PM   Result Value Ref Range    Glucose (POC) 111 65 - 117 mg/dL    Performed by Marni Santiago RN    GLUCOSE, POC    Collection Time: 11/25/22  4:29 PM   Result Value Ref Range    Glucose (POC) 152 (H) 65 - 117 mg/dL    Performed by Nyasia Orellana    GLUCOSE, POC    Collection Time: 11/25/22  8:57 PM   Result Value Ref Range    Glucose (POC) 204 (H) 65 - 117 mg/dL    Performed by aPty Ospina        Radiology review: MRI of spine    Assessment:   1. Generalized weakness with recurrent falls secondary to spinal stenosis and multilevel disc and facet degeneration  2. Elevated troponin secondary to kidney disease. 3.  End-stage renal disease on hemodialysis Monday, Wednesday, Friday with critical severe hyper kalemia  4. Chronic systolic heart failure  5. Type 2 diabetes  6. Chronic hypoxic respiratory failure  7. Acute confusion multifactorial  8. Dry mouth with dysphagia      Plan:   1.  CT of the head and spine reviewed with no acute findings. MRI of the lumbar spine showed spinal stenosis.   Neurology consulted. They recommend physical therapy and EMG as an outpatient. Tramadol as needed. Lidocaine patch ordered. Vitamin B12 and folate low and supplemented. PT OT recommend SNF placement. Case management consulted. We will give a one-time dose of IV Solu-Medrol. 2.  Troponins elevated on admission. Patient denies any chest pain. Chest likely secondary to renal disease  3. Dialysis Monday, Wednesday, Friday nephrology consulted. On Renvela  4. Patient on Entresto, Coreg, Bumex, aspirin. 5.  Glucose levels are stable. Patient on insulin sliding scale and NPH 20 units twice daily  6. Continue 2 L of oxygen nasal cannula. 7.  Patient's confusion has improved. Multifactorial.  CT of the head on 11/21/2022 did not show any acute abnormality. Ammonia level trending down. Continue lactulose. 8.  We will start artificial saliva. Speech therapy evaluation. We will check a chest x-ray. Rapid COVID-negative  9. CBC, BMP, ammonia level in the a.m. Dispo: 24 hours. Barriers include tolerating oral diet. Patient will need placement. CODE STATUS full     DVT prophylaxis: Heparin  Ulcer prophylaxis: Not indicated    Care Plan discussed with: Patient/Family, Nurse, and     Total time spent with patient: 36 minutes.

## 2022-11-27 LAB
GLUCOSE BLD STRIP.AUTO-MCNC: 102 MG/DL (ref 65–117)
GLUCOSE BLD STRIP.AUTO-MCNC: 111 MG/DL (ref 65–117)
GLUCOSE BLD STRIP.AUTO-MCNC: 115 MG/DL (ref 65–117)
GLUCOSE BLD STRIP.AUTO-MCNC: 116 MG/DL (ref 65–117)
SERVICE CMNT-IMP: NORMAL

## 2022-11-27 PROCEDURE — 74011250636 HC RX REV CODE- 250/636: Performed by: STUDENT IN AN ORGANIZED HEALTH CARE EDUCATION/TRAINING PROGRAM

## 2022-11-27 PROCEDURE — 82962 GLUCOSE BLOOD TEST: CPT

## 2022-11-27 PROCEDURE — 74011250637 HC RX REV CODE- 250/637: Performed by: PHYSICIAN ASSISTANT

## 2022-11-27 PROCEDURE — 74011250637 HC RX REV CODE- 250/637: Performed by: NURSE PRACTITIONER

## 2022-11-27 PROCEDURE — 74011250637 HC RX REV CODE- 250/637: Performed by: STUDENT IN AN ORGANIZED HEALTH CARE EDUCATION/TRAINING PROGRAM

## 2022-11-27 PROCEDURE — 65270000046 HC RM TELEMETRY

## 2022-11-27 PROCEDURE — 74011636637 HC RX REV CODE- 636/637: Performed by: STUDENT IN AN ORGANIZED HEALTH CARE EDUCATION/TRAINING PROGRAM

## 2022-11-27 PROCEDURE — 94760 N-INVAS EAR/PLS OXIMETRY 1: CPT

## 2022-11-27 PROCEDURE — 74011000250 HC RX REV CODE- 250: Performed by: NURSE PRACTITIONER

## 2022-11-27 PROCEDURE — 74011250637 HC RX REV CODE- 250/637: Performed by: INTERNAL MEDICINE

## 2022-11-27 RX ORDER — TRAMADOL HYDROCHLORIDE 50 MG/1
50 TABLET ORAL
Status: DISCONTINUED | OUTPATIENT
Start: 2022-11-27 | End: 2022-12-01

## 2022-11-27 RX ADMIN — METHOCARBAMOL TABLETS 750 MG: 750 TABLET, COATED ORAL at 21:07

## 2022-11-27 RX ADMIN — ACETAMINOPHEN 650 MG: 325 TABLET ORAL at 17:00

## 2022-11-27 RX ADMIN — CARVEDILOL 12.5 MG: 12.5 TABLET, FILM COATED ORAL at 09:35

## 2022-11-27 RX ADMIN — FINASTERIDE 5 MG: 5 TABLET, FILM COATED ORAL at 09:35

## 2022-11-27 RX ADMIN — METHOCARBAMOL TABLETS 750 MG: 750 TABLET, COATED ORAL at 17:00

## 2022-11-27 RX ADMIN — HEPARIN SODIUM 5000 UNITS: 5000 INJECTION INTRAVENOUS; SUBCUTANEOUS at 21:06

## 2022-11-27 RX ADMIN — LACTULOSE 30 ML: 20 SOLUTION ORAL at 09:36

## 2022-11-27 RX ADMIN — ACETAMINOPHEN 650 MG: 325 TABLET ORAL at 11:47

## 2022-11-27 RX ADMIN — SACUBITRIL AND VALSARTAN 1 TABLET: 24; 26 TABLET, FILM COATED ORAL at 17:00

## 2022-11-27 RX ADMIN — Medication 20 UNITS: at 17:01

## 2022-11-27 RX ADMIN — HEPARIN SODIUM 5000 UNITS: 5000 INJECTION INTRAVENOUS; SUBCUTANEOUS at 17:00

## 2022-11-27 RX ADMIN — METHOCARBAMOL TABLETS 750 MG: 750 TABLET, COATED ORAL at 14:44

## 2022-11-27 RX ADMIN — TRAMADOL HYDROCHLORIDE 50 MG: 50 TABLET ORAL at 20:14

## 2022-11-27 RX ADMIN — LACTULOSE 30 ML: 20 SOLUTION ORAL at 17:01

## 2022-11-27 RX ADMIN — Medication 500 MCG: at 09:35

## 2022-11-27 RX ADMIN — HEPARIN SODIUM 5000 UNITS: 5000 INJECTION INTRAVENOUS; SUBCUTANEOUS at 05:15

## 2022-11-27 RX ADMIN — ACETAMINOPHEN 650 MG: 325 TABLET ORAL at 23:16

## 2022-11-27 RX ADMIN — METHOCARBAMOL TABLETS 750 MG: 750 TABLET, COATED ORAL at 09:35

## 2022-11-27 RX ADMIN — FOLIC ACID 1 MG: 1 TABLET ORAL at 09:35

## 2022-11-27 RX ADMIN — ACETAMINOPHEN 650 MG: 325 TABLET ORAL at 05:14

## 2022-11-27 RX ADMIN — CARVEDILOL 12.5 MG: 12.5 TABLET, FILM COATED ORAL at 17:00

## 2022-11-27 RX ADMIN — ASPIRIN 81 MG: 81 TABLET, CHEWABLE ORAL at 09:35

## 2022-11-27 RX ADMIN — BUMETANIDE 2 MG: 1 TABLET ORAL at 09:35

## 2022-11-27 RX ADMIN — Medication 20 UNITS: at 09:39

## 2022-11-27 RX ADMIN — SACUBITRIL AND VALSARTAN 1 TABLET: 24; 26 TABLET, FILM COATED ORAL at 09:35

## 2022-11-27 RX ADMIN — FLUTICASONE PROPIONATE 2 SPRAY: 50 SPRAY, METERED NASAL at 09:38

## 2022-11-27 RX ADMIN — TAMSULOSIN HYDROCHLORIDE 0.8 MG: 0.4 CAPSULE ORAL at 09:35

## 2022-11-27 NOTE — PROGRESS NOTES
Problem: Falls - Risk of  Goal: *Absence of Falls  Description: Document David Carroll Fall Risk and appropriate interventions in the flowsheet. Outcome: Progressing Towards Goal  Note: Fall Risk Interventions:  Mobility Interventions: Bed/chair exit alarm, Patient to call before getting OOB    Mentation Interventions: Bed/chair exit alarm    Medication Interventions: Bed/chair exit alarm, Patient to call before getting OOB, Teach patient to arise slowly    Elimination Interventions: Bed/chair exit alarm, Call light in reach, Urinal in reach    History of Falls Interventions: Bed/chair exit alarm         Problem: Patient Education: Go to Patient Education Activity  Goal: Patient/Family Education  Outcome: Progressing Towards Goal     Problem: Patient Education: Go to Patient Education Activity  Goal: Patient/Family Education  Outcome: Progressing Towards Goal     Problem: Patient Education: Go to Patient Education Activity  Goal: Patient/Family Education  Outcome: Progressing Towards Goal     Problem: Pressure Injury - Risk of  Goal: *Prevention of pressure injury  Description: Document Kelby Scale and appropriate interventions in the flowsheet.   Outcome: Progressing Towards Goal  Note: Pressure Injury Interventions:  Sensory Interventions: Assess changes in LOC    Moisture Interventions: Absorbent underpads, Internal/External urinary devices    Activity Interventions: Increase time out of bed, PT/OT evaluation    Mobility Interventions: HOB 30 degrees or less, PT/OT evaluation    Nutrition Interventions: Document food/fluid/supplement intake    Friction and Shear Interventions: HOB 30 degrees or less                Problem: Patient Education: Go to Patient Education Activity  Goal: Patient/Family Education  Outcome: Progressing Towards Goal     Problem: Patient Education: Go to Patient Education Activity  Goal: Patient/Family Education  Outcome: Progressing Towards Goal     Problem: General Medical Care Plan  Goal: *Vital signs within specified parameters  Outcome: Progressing Towards Goal  Goal: *Labs within defined limits  Outcome: Progressing Towards Goal  Goal: *Absence of infection signs and symptoms  Outcome: Progressing Towards Goal  Goal: *Optimal pain control at patient's stated goal  Outcome: Progressing Towards Goal  Goal: *Skin integrity maintained  Outcome: Progressing Towards Goal  Goal: *Fluid volume balance  Outcome: Progressing Towards Goal  Goal: *Optimize nutritional status  Outcome: Progressing Towards Goal  Goal: *Anxiety reduced or absent  Outcome: Progressing Towards Goal  Goal: *Progressive mobility and function (eg: ADL's)  Outcome: Progressing Towards Goal     Problem: Patient Education: Go to Patient Education Activity  Goal: Patient/Family Education  Outcome: Progressing Towards Goal

## 2022-11-27 NOTE — PROGRESS NOTES
End of Shift Note     Bedside shift change report given to NEHA Durbin,(oncoming nurse) by Luz Londono (offgoing nurse).   Report included the following information SBAR, Kardex, ED Summary, and MAR     Shift worked: Nights   Shift summary and any significant changes:     none      Concerns for physician to address: None   Zone phone for oncoming shift:   [phone]      Patient Information  Link Lindsay  78 y.o.  11/16/2022 12:20 AM by Abdulaziz Acuña Greg was admitted from Home     Problem List       Patient Active Problem List     Diagnosis Date Noted    UTI (lower urinary tract infection) 10/25/2013    Renal failure (ARF), acute on chronic ([address] 10/25/2013    Unable to ambulate 11/16/2022    Cervical post-laminectomy syndrome 06/09/2022    Degenerative cervical spinal stenosis 06/09/2022    Vitamin D deficiency 12/21/2021    Immune-mediated neuropathy ([address] 12/21/2021    Diabetic peripheral neuropathy associated with type 2 diabetes mellitus ([address] 12/21/2021    B12 deficiency 12/21/2021    Sensory ataxic gait 12/21/2021    Cervical spondylosis with myelopathy 12/21/2021    Type 2 diabetes mellitus with stage 4 chronic kidney disease, with long-term current use of insulin ([address] 12/21/2021    Convulsions ([address] 12/21/2021    Acute alteration in mental status 12/21/2021    Myoclonus 12/21/2021    Sepsis ([address] 08/18/2021    Acute hypoxemic respiratory failure ([address] 73/17/4171    Systolic heart failure ([address] 05/04/2021    Accelerated hypertension 07/21/2020    Elevated troponin 07/21/2020    ESRD (end stage renal disease) on dialysis ([address] 07/21/2020    Acute respiratory failure with hypoxia ([address] 07/21/2020    Acute on chronic systolic CHF (congestive heart failure) ([address] 07/21/2020    Pneumonia 05/26/2019    Acute on chronic renal failure ([address] 10/29/2016    Hypotension 10/29/2016    Chest pain 10/29/2016    Type II diabetes mellitus with nephropathy ([address] 10/29/2016    Hyperlipidemia 10/29/2016    Nephrotic syndrome 08/20/2016    Renal anasarca 08/20/2016    Diarrhea 10/28/2013    SIRS (systemic inflammatory response syndrome) ([facility] 10/28/2013    Acute pancreatitis 01/18/2011    LFT'S ABNORMAL 01/18/2011    Acute renal failure (ARF) ([facility] 01/18/2011    CKD (chronic kidney disease) stage 3, GFR 30-59 ml/min ([facility]) 01/18/2011    CAD (coronary artery disease) 01/18/2011    Abdominal pain, acute, epigastric 01/18/2011    Nausea & vomiting 01/18/2011           Past Medical History:   Diagnosis Date    Arthritis       spine    CAD (coronary artery disease)       high cholesterol    Chronic kidney disease       dialysis    Diabetes ([facility]      ESRD (end stage renal disease) ([facility])      GERD (gastroesophageal reflux disease)       HX    Hypertension      Ill-defined condition       irritable bowel syndrome    Systolic CHF ([facility]      Unspecified sleep apnea       NO CPAP         Core Measures:  CVA: No Not applicable  CHF:No Not applicable  PNA:No Not applicable     Activity:  Activity Level: Up with Assistance  Number times ambulated in hallways past shift: 0  Number of times OOB to chair past shift: 0     Cardiac:   Cardiac Monitoring: No        Access:   Current line(s): PIV      Genitourinary:   Urinary status: voiding     Respiratory:   O2 Device: None (Room air)  Chronic home O2 use?: YES  Incentive spirometer at bedside: N/A     GI:  Last Bowel Movement Date: 11/24/22  Current diet:  ADULT DIET Dysphagia - Soft & Bite Sized; Only vanilla or strawberry supplements please  ADULT ORAL NUTRITION SUPPLEMENT Breakfast, Dinner; Renal Supplement  Passing flatus: YES  Tolerating current diet: YES     Pain Management:   Patient states pain is manageable on current regimen: YES     Skin:  Kelby Score: 19  Interventions: turn team, speciality bed, float heels, increase time out of bed, foam dressing, and PT/OT consult    Patient Safety:  Fall Score:  Total Score: 4  Interventions: bed/chair alarm, assistive device (walker, cane, etc), gripper socks, pt to call before getting OOB, and stay with me (per policy)  High Fall Risk: Yes     DVT prophylaxis:  DVT prophylaxis Med- Yes  DVT prophylaxis SCD or CINDY- No      Wounds: (If Applicable)  Wounds- No  Location None      Active Consults:  IP CONSULT TO NEUROLOGY  IP CONSULT TO NEPHROLOGY     Length of Stay:  Expected LOS: 4d 7h  Actual LOS: 10  Discharge Plan: Yes.  [id number] Brie Taylor 11/28/22

## 2022-11-27 NOTE — PROGRESS NOTES
Problem: Falls - Risk of  Goal: *Absence of Falls  Description: Document Dennie Oak Fall Risk and appropriate interventions in the flowsheet. Outcome: Progressing Towards Goal  Note: Fall Risk Interventions:  Mobility Interventions: Bed/chair exit alarm, Patient to call before getting OOB    Mentation Interventions: Adequate sleep, hydration, pain control, Bed/chair exit alarm    Medication Interventions: Bed/chair exit alarm, Patient to call before getting OOB, Teach patient to arise slowly    Elimination Interventions: Bed/chair exit alarm, Call light in reach, Patient to call for help with toileting needs, Stay With Me (per policy), Toilet paper/wipes in reach, Toileting schedule/hourly rounds    History of Falls Interventions: Bed/chair exit alarm         Problem: Pressure Injury - Risk of  Goal: *Prevention of pressure injury  Description: Document Kelby Scale and appropriate interventions in the flowsheet.   Outcome: Progressing Towards Goal  Note: Pressure Injury Interventions:  Sensory Interventions: Assess changes in LOC    Moisture Interventions: Apply protective barrier, creams and emollients, Maintain skin hydration (lotion/cream)    Activity Interventions: Increase time out of bed, PT/OT evaluation    Mobility Interventions: HOB 30 degrees or less, PT/OT evaluation    Nutrition Interventions: Document food/fluid/supplement intake    Friction and Shear Interventions: HOB 30 degrees or less                Problem: Patient Education: Go to Patient Education Activity  Goal: Patient/Family Education  Outcome: Progressing Towards Goal     Problem: General Medical Care Plan  Goal: *Vital signs within specified parameters  Outcome: Progressing Towards Goal  Goal: *Labs within defined limits  Outcome: Progressing Towards Goal  Goal: *Absence of infection signs and symptoms  Outcome: Progressing Towards Goal  Goal: *Optimal pain control at patient's stated goal  Outcome: Progressing Towards Goal  Goal: *Skin integrity maintained  Outcome: Progressing Towards Goal  Goal: *Fluid volume balance  Outcome: Progressing Towards Goal  Goal: *Optimize nutritional status  Outcome: Progressing Towards Goal  Goal: *Anxiety reduced or absent  Outcome: Progressing Towards Goal

## 2022-11-27 NOTE — PROGRESS NOTES
End of Shift Note     Bedside shift change report given to Mary Graves LPN,(oncoming nurse) by Zana Moseley RN (offgoing nurse).   Report included the following information SBAR, Kardex, ED Summary, and MAR     Shift worked: 7a-7p   Shift summary and any significant changes:     No significant changes      Concerns for physician to address: None   Zone phone for oncoming shift:   [phone]      Patient Information  Sraita Cross  78 y.o.  11/16/2022 12:20 AM by Nila Chase Marisol Lake was admitted from Home     Problem List          Patient Active Problem List     Diagnosis Date Noted    UTI (lower urinary tract infection) 10/25/2013    Renal failure (ARF), acute on chronic ([address] 10/25/2013    Unable to ambulate 11/16/2022    Cervical post-laminectomy syndrome 06/09/2022    Degenerative cervical spinal stenosis 06/09/2022    Vitamin D deficiency 12/21/2021    Immune-mediated neuropathy ([address] 12/21/2021    Diabetic peripheral neuropathy associated with type 2 diabetes mellitus ([address] 12/21/2021    B12 deficiency 12/21/2021    Sensory ataxic gait 12/21/2021    Cervical spondylosis with myelopathy 12/21/2021    Type 2 diabetes mellitus with stage 4 chronic kidney disease, with long-term current use of insulin ([address] 12/21/2021    Convulsions ([address] 12/21/2021    Acute alteration in mental status 12/21/2021    Myoclonus 12/21/2021    Sepsis ([address] 08/18/2021    Acute hypoxemic respiratory failure ([address] 61/73/9727    Systolic heart failure ([address] 05/04/2021    Accelerated hypertension 07/21/2020    Elevated troponin 07/21/2020    ESRD (end stage renal disease) on dialysis ([address] 07/21/2020    Acute respiratory failure with hypoxia ([address] 07/21/2020    Acute on chronic systolic CHF (congestive heart failure) ([address] 07/21/2020    Pneumonia 05/26/2019    Acute on chronic renal failure ([address] 10/29/2016    Hypotension 10/29/2016    Chest pain 10/29/2016    Type II diabetes mellitus with nephropathy ([facility] 10/29/2016    Hyperlipidemia 10/29/2016    Nephrotic syndrome 08/20/2016    Renal anasarca 08/20/2016    Diarrhea 10/28/2013    SIRS (systemic inflammatory response syndrome) ([facility] 10/28/2013    Acute pancreatitis 01/18/2011    LFT'S ABNORMAL 01/18/2011    Acute renal failure (ARF) [facility] 01/18/2011    CKD (chronic kidney disease) stage 3, GFR 30-59 ml/min ([facility]) 01/18/2011    CAD (coronary artery disease) 01/18/2011    Abdominal pain, acute, epigastric 01/18/2011    Nausea & vomiting 01/18/2011              Past Medical History:   Diagnosis Date    Arthritis       spine    CAD (coronary artery disease)       high cholesterol    Chronic kidney disease       dialysis    Diabetes ([facility]      ESRD (end stage renal disease) ([facility])      GERD (gastroesophageal reflux disease)       HX    Hypertension      Ill-defined condition       irritable bowel syndrome    Systolic CHF ([facility]      Unspecified sleep apnea       NO CPAP         Core Measures:  CVA: No Not applicable  CHF:No Not applicable  PNA:No Not applicable     Activity:  Activity Level: Up with Assistance  Number times ambulated in hallways past shift: 0  Number of times OOB to chair past shift: 0     Cardiac:   Cardiac Monitoring: No        Access:   Current line(s): PIV      Genitourinary:   Urinary status: voiding     Respiratory:   O2 Device: None (Room air)  Chronic home O2 use?: YES  Incentive spirometer at bedside: N/A     GI:  Last Bowel Movement Date: 11/24/22  Current diet:  ADULT DIET Dysphagia - Soft & Bite Sized; Only vanilla or strawberry supplements please  ADULT ORAL NUTRITION SUPPLEMENT Breakfast, Dinner; Renal Supplement  Passing flatus: YES  Tolerating current diet: YES     Pain Management:   Patient states pain is manageable on current regimen: YES     Skin:  Kelby Score: 19  Interventions: turn team, speciality bed, float heels, increase time out of bed, foam dressing, and PT/OT consult    Patient Safety:  Fall Score:  Total Score: 4  Interventions: bed/chair alarm, assistive device (walker, cane, etc), gripper socks, pt to call before getting OOB, and stay with me (per policy)  High Fall Risk: Yes     DVT prophylaxis:  DVT prophylaxis Med- Yes  DVT prophylaxis SCD or CINDY- No      Wounds: (If Applicable)  Wounds- No  Location None      Active Consults:  IP CONSULT TO NEUROLOGY  IP CONSULT TO NEPHROLOGY     Length of Stay:  Expected LOS: 4d 7h  Actual LOS: 11  Discharge Plan: Yes.  7777 Brie Taylor 11/28/22

## 2022-11-28 ENCOUNTER — ANESTHESIA EVENT (OUTPATIENT)
Dept: SURGERY | Age: 77
End: 2022-11-28
Payer: MEDICARE

## 2022-11-28 LAB
ALBUMIN SERPL-MCNC: 3 G/DL (ref 3.5–5)
ANION GAP SERPL CALC-SCNC: 12 MMOL/L (ref 5–15)
BUN SERPL-MCNC: 101 MG/DL (ref 6–20)
BUN/CREAT SERPL: 11 (ref 12–20)
CALCIUM SERPL-MCNC: 9.4 MG/DL (ref 8.5–10.1)
CHLORIDE SERPL-SCNC: 92 MMOL/L (ref 97–108)
CO2 SERPL-SCNC: 26 MMOL/L (ref 21–32)
CREAT SERPL-MCNC: 8.79 MG/DL (ref 0.7–1.3)
ERYTHROCYTE [DISTWIDTH] IN BLOOD BY AUTOMATED COUNT: 15 % (ref 11.5–14.5)
GLUCOSE BLD STRIP.AUTO-MCNC: 107 MG/DL (ref 65–117)
GLUCOSE BLD STRIP.AUTO-MCNC: 121 MG/DL (ref 65–117)
GLUCOSE BLD STRIP.AUTO-MCNC: 146 MG/DL (ref 65–117)
GLUCOSE BLD STRIP.AUTO-MCNC: 96 MG/DL (ref 65–117)
GLUCOSE SERPL-MCNC: 96 MG/DL (ref 65–100)
HCT VFR BLD AUTO: 31.8 % (ref 36.6–50.3)
HGB BLD-MCNC: 10.7 G/DL (ref 12.1–17)
MCH RBC QN AUTO: 30 PG (ref 26–34)
MCHC RBC AUTO-ENTMCNC: 33.6 G/DL (ref 30–36.5)
MCV RBC AUTO: 89.1 FL (ref 80–99)
NRBC # BLD: 0 K/UL (ref 0–0.01)
NRBC BLD-RTO: 0 PER 100 WBC
PHOSPHATE SERPL-MCNC: 6.6 MG/DL (ref 2.6–4.7)
PLATELET # BLD AUTO: 298 K/UL (ref 150–400)
PMV BLD AUTO: 11.1 FL (ref 8.9–12.9)
POTASSIUM SERPL-SCNC: 4.5 MMOL/L (ref 3.5–5.1)
RBC # BLD AUTO: 3.57 M/UL (ref 4.1–5.7)
SERVICE CMNT-IMP: ABNORMAL
SERVICE CMNT-IMP: ABNORMAL
SERVICE CMNT-IMP: NORMAL
SERVICE CMNT-IMP: NORMAL
SODIUM SERPL-SCNC: 130 MMOL/L (ref 136–145)
WBC # BLD AUTO: 9.7 K/UL (ref 4.1–11.1)

## 2022-11-28 PROCEDURE — 97535 SELF CARE MNGMENT TRAINING: CPT | Performed by: OCCUPATIONAL THERAPIST

## 2022-11-28 PROCEDURE — 74011250637 HC RX REV CODE- 250/637: Performed by: NURSE PRACTITIONER

## 2022-11-28 PROCEDURE — 36415 COLL VENOUS BLD VENIPUNCTURE: CPT

## 2022-11-28 PROCEDURE — 74011250637 HC RX REV CODE- 250/637: Performed by: INTERNAL MEDICINE

## 2022-11-28 PROCEDURE — 85027 COMPLETE CBC AUTOMATED: CPT

## 2022-11-28 PROCEDURE — 74011250636 HC RX REV CODE- 250/636: Performed by: STUDENT IN AN ORGANIZED HEALTH CARE EDUCATION/TRAINING PROGRAM

## 2022-11-28 PROCEDURE — 74011250637 HC RX REV CODE- 250/637: Performed by: STUDENT IN AN ORGANIZED HEALTH CARE EDUCATION/TRAINING PROGRAM

## 2022-11-28 PROCEDURE — 80069 RENAL FUNCTION PANEL: CPT

## 2022-11-28 PROCEDURE — 97530 THERAPEUTIC ACTIVITIES: CPT

## 2022-11-28 PROCEDURE — 97116 GAIT TRAINING THERAPY: CPT

## 2022-11-28 PROCEDURE — 65270000046 HC RM TELEMETRY

## 2022-11-28 PROCEDURE — 74011000250 HC RX REV CODE- 250: Performed by: NURSE PRACTITIONER

## 2022-11-28 PROCEDURE — 90935 HEMODIALYSIS ONE EVALUATION: CPT

## 2022-11-28 PROCEDURE — 94760 N-INVAS EAR/PLS OXIMETRY 1: CPT

## 2022-11-28 PROCEDURE — 74011636637 HC RX REV CODE- 636/637: Performed by: STUDENT IN AN ORGANIZED HEALTH CARE EDUCATION/TRAINING PROGRAM

## 2022-11-28 PROCEDURE — 82962 GLUCOSE BLOOD TEST: CPT

## 2022-11-28 RX ORDER — OXYCODONE HYDROCHLORIDE 5 MG/1
5 TABLET ORAL
Status: DISCONTINUED | OUTPATIENT
Start: 2022-11-28 | End: 2022-12-01

## 2022-11-28 RX ADMIN — METHOCARBAMOL TABLETS 750 MG: 750 TABLET, COATED ORAL at 17:45

## 2022-11-28 RX ADMIN — FINASTERIDE 5 MG: 5 TABLET, FILM COATED ORAL at 08:00

## 2022-11-28 RX ADMIN — CARVEDILOL 12.5 MG: 12.5 TABLET, FILM COATED ORAL at 17:45

## 2022-11-28 RX ADMIN — CARVEDILOL 12.5 MG: 12.5 TABLET, FILM COATED ORAL at 11:19

## 2022-11-28 RX ADMIN — ACETAMINOPHEN 650 MG: 325 TABLET ORAL at 23:57

## 2022-11-28 RX ADMIN — HEPARIN SODIUM 5000 UNITS: 5000 INJECTION INTRAVENOUS; SUBCUTANEOUS at 13:37

## 2022-11-28 RX ADMIN — HEPARIN SODIUM 5000 UNITS: 5000 INJECTION INTRAVENOUS; SUBCUTANEOUS at 22:20

## 2022-11-28 RX ADMIN — LACTULOSE 30 ML: 20 SOLUTION ORAL at 18:01

## 2022-11-28 RX ADMIN — METHOCARBAMOL TABLETS 750 MG: 750 TABLET, COATED ORAL at 22:20

## 2022-11-28 RX ADMIN — METHOCARBAMOL TABLETS 750 MG: 750 TABLET, COATED ORAL at 08:00

## 2022-11-28 RX ADMIN — METHOCARBAMOL TABLETS 750 MG: 750 TABLET, COATED ORAL at 12:45

## 2022-11-28 RX ADMIN — SACUBITRIL AND VALSARTAN 1 TABLET: 24; 26 TABLET, FILM COATED ORAL at 11:19

## 2022-11-28 RX ADMIN — FOLIC ACID 1 MG: 1 TABLET ORAL at 08:00

## 2022-11-28 RX ADMIN — ACETAMINOPHEN 650 MG: 325 TABLET ORAL at 17:45

## 2022-11-28 RX ADMIN — SACUBITRIL AND VALSARTAN 1 TABLET: 24; 26 TABLET, FILM COATED ORAL at 17:45

## 2022-11-28 RX ADMIN — Medication 500 MCG: at 08:00

## 2022-11-28 RX ADMIN — HEPARIN SODIUM 5000 UNITS: 5000 INJECTION INTRAVENOUS; SUBCUTANEOUS at 05:37

## 2022-11-28 RX ADMIN — ACETAMINOPHEN 650 MG: 325 TABLET ORAL at 11:14

## 2022-11-28 RX ADMIN — TAMSULOSIN HYDROCHLORIDE 0.8 MG: 0.4 CAPSULE ORAL at 08:00

## 2022-11-28 RX ADMIN — BUMETANIDE 2 MG: 1 TABLET ORAL at 11:19

## 2022-11-28 RX ADMIN — Medication 20 UNITS: at 12:41

## 2022-11-28 RX ADMIN — ACETAMINOPHEN 650 MG: 325 TABLET ORAL at 05:37

## 2022-11-28 RX ADMIN — FLUTICASONE PROPIONATE 2 SPRAY: 50 SPRAY, METERED NASAL at 08:00

## 2022-11-28 RX ADMIN — GABAPENTIN 300 MG: 300 CAPSULE ORAL at 07:51

## 2022-11-28 RX ADMIN — ASPIRIN 81 MG: 81 TABLET, CHEWABLE ORAL at 08:00

## 2022-11-28 NOTE — PROGRESS NOTES
Nephrology Progress Note  [facility] / [address] [place], [place], [address]  Phone - [phone]  Fax - [phone]                 Patient: Kendell Rahman                   YOB: 1945        Date- 11/28/2022                      Admit Date: 11/16/2022  CC: Follow up for ESRD       IMPRESSION & PLAN:   ESRD-- hd mwf at [facility]  Hyperkalemia- k 7.6 on 11-21-22  Anemia of ckd  hyponatremia  Sec. Hyperpara  S/p fall  Weakness  H/o CHF  Dm 2      PLAN-  SEEN ON HD TODAY  Patient will need angiogram of his AVG  His bun has remained high  Plan to continue hd MWF schedule  Continue coreg and entresto  Hold epogen     Subjective: Interval History:   11/27/22--seen on hd-Bp stable-- labs reviewed- bun high- ?stenosis in access- k normal        Objective:   Vitals:    11/28/22 0930 11/28/22 0945 11/28/22 1000 11/28/22 1015   BP: 110/73 133/79 138/79 136/70   Pulse: 64 63 64 95   Resp: 18 18 18 18   Temp:       TempSrc:       SpO2:       Weight:       Height:          No intake/output data recorded. Last 3 Recorded Weights in this Encounter    11/19/22 2317 11/20/22 2202 11/23/22 1148   Weight: 108 kg (238 lb 1.6 oz) 105.1 kg (231 lb 9.6 oz) 99 kg (218 lb 4.8 oz)        Physical exam:    GEN:  NAD  NECK:  Supple, no thyromegaly  RESP: Clear  b/l, no  wheezing,   CVS: RRR,S1,S2   NEURO: non focal, normal speech  EXT:Upper arm avf +    Chart reviewed. Pertinent Notes reviewed. Data Review :  Recent Labs     11/28/22  0815   *   K 4.5   CL 92*   CO2 26   *   CREA 8.79*   GLU 96   CA 9.4   PHOS 6.6*       Recent Labs     11/28/22  0815   WBC 9.7   HGB 10.7*   HCT 31.8*          No results for input(s): FE, TIBC, PSAT, FERR in the last 72 hours.    Lab Results   Component Value Date/Time    Hemoglobin A1c 5.6 12/21/2021 11:12 AM    Hemoglobin A1c 8.2 (H) 05/05/2021 03:29 AM    Hemoglobin A1c 10. 5 (H) 11/16/2016 10:45 AM        Lab Results   Component Value Date/Time    Microalb/Creat ratio (ug/mg creat.) 897.2 (H) 04/11/2019 12:27 PM     Lab Results   Component Value Date/Time    NT pro-BNP 12,079 (H) 09/12/2022 06:02 AM    NT pro-BNP 5,709 (H) 03/15/2022 08:39 PM    NT pro-BNP 5,474 (H) 05/04/2021 06:09 AM    NT pro-BNP 2,497 (H) 04/17/2021 10:54 AM    NT pro-BNP 9,341 (H) 07/21/2020 05:33 AM     US Results (most recent):  Medication list  reviewed  Current Facility-Administered Medications   Medication Dose Route Frequency    traMADoL (ULTRAM) tablet 50 mg  50 mg Oral Q6H PRN    artificial saliva (MOUTH KOTE) 1 Spray  1 Spray Oral Q2H PRN    acetaminophen (TYLENOL) tablet 650 mg  650 mg Oral Q6H    benzocaine-menthoL (CEPACOL) lozenge 1 Lozenge  1 Lozenge Mucous Membrane PRN    lactulose (CHRONULAC) 10 gram/15 mL solution 30 mL  20 g Oral BID    hydrALAZINE (APRESOLINE) 20 mg/mL injection 20 mg  20 mg IntraVENous Q6H PRN    lidocaine 4 % patch 1 Patch  1 Patch TransDERmal Q24H    methocarbamoL (ROBAXIN) tablet 750 mg  750 mg Oral QID    cyanocobalamin (VITAMIN B12) tablet 500 mcg  500 mcg Oral DAILY    folic acid (FOLVITE) tablet 1 mg  1 mg Oral DAILY    insulin NPH (NOVOLIN N, HUMULIN N) injection 20 Units  20 Units SubCUTAneous ACB&D    sodium chloride (NS) flush 5-40 mL  5-40 mL IntraVENous PRN    polyethylene glycol (MIRALAX) packet 17 g  17 g Oral DAILY PRN    ondansetron (ZOFRAN ODT) tablet 4 mg  4 mg Oral Q8H PRN    Or    ondansetron (ZOFRAN) injection 4 mg  4 mg IntraVENous Q6H PRN    heparin (porcine) injection 5,000 Units  5,000 Units SubCUTAneous Q8H    insulin lispro (HUMALOG) injection   SubCUTAneous AC&HS    glucose chewable tablet 16 g  4 Tablet Oral PRN    glucagon (GLUCAGEN) injection 1 mg  1 mg IntraMUSCular PRN    dextrose 10% infusion 0-250 mL  0-250 mL IntraVENous PRN    aspirin chewable tablet 81 mg  81 mg Oral DAILY    bumetanide (BUMEX) tablet 2 mg  2 mg Oral DAILY carvediloL (COREG) tablet 12.5 mg  12.5 mg Oral BID WITH MEALS    sacubitriL-valsartan (ENTRESTO) 24-26 mg tablet 1 Tablet  1 Tablet Oral BID    finasteride (PROSCAR) tablet 5 mg  5 mg Oral DAILY    fluticasone propionate (FLONASE) 50 mcg/actuation nasal spray 2 Spray  2 Spray Both Nostrils DAILY    gabapentin (NEURONTIN) capsule 300 mg  300 mg Oral TID PRN    [Held by provider] sevelamer carbonate (RENVELA) tab 800 mg  800 mg Oral BID    tamsulosin (FLOMAX) capsule 0.8 mg  0.8 mg Oral DAILY        Carlos Florentino MD  11/28/2022

## 2022-11-28 NOTE — PROGRESS NOTES
Transition of Care Plan:     RUR: 18% - \"moderate risk\"  LOS: 12 days   Disposition: SNF - [address] [place] [address] with resumed OP HD (Indiana Valadez, M/W/F at 6 AM) pending insurance auth - original auth  22  Date [address] offered: 2022 (verbal)  Date [address] received: 2022  Date authorization started with reference number: Azebelaine Sarkar started 22 via Kera Abdul; new auth to be initiated once updated PT/OT notes become available   Date authorization received and expires: Azeb Latus received 22; auth  22  Accepting facility: [address] [place] [address]   Follow up appointments: PCP & specialists as indicated   DME needed: Defer to SNF for DME needs  Transportation at Discharge: BLS transport needed  [address] or means to access home: N/A - pt transitioning to SNF at d/c  IM Medicare Letter: 2nd IM needed prior to d/c  Is patient a  and connected with the [place]? No             Caregiver Contact: Pt's wife [facility]: [phone])  Discharge Caregiver contacted prior to discharge? To be contacted prior to d/c   Care Conference needed?: Not at this time  COVID test: Rapid COVID test completed 22; results negative     Initial note: Chart reviewed for updates. CM contacted attending MD to discuss ANDRIY. MD reported pt is not medically stable for d/c today; vascular surgery consulted for angiogram of HD access. MD informed pt's insurance auth for SNF expires today (22). CM requested for therapy team to work with pt today, as new notes will be required for insurance auth. Once updated therapy notes are available, CM will fax them to Kera Abdul (f: [phone]) with request for new/extended auth. CM will meet with pt at bedside to report update and inquire about method of transport to and from OP HD while admitted at [facility]. CM will continue to follow & remain accessible for d/c planning.     NEHAL Pena  Care Manager, [facility]

## 2022-11-28 NOTE — PROGRESS NOTES
Physical Therapy note    Chart reviewed. Attempted to see patient for PT treatment - Patient off unit - at dialysis. Will follow-up as able and as appropriate.     Elgin Hester, PT, DPT

## 2022-11-28 NOTE — PROCEDURES
Hemodialysis / [phone]    Vitals Pre Post Assessment Pre Post   BP BP: (!) 151/88 (11/28/22 0810)   148/93 LOC A&Ox3 A&Ox3   HR Pulse (Heart Rate): 66 (11/28/22 0810) 103 Lungs clear clear   Resp Resp Rate: 18 (11/28/22 0810) 18 Cardiac RRR RRR   Temp Temp: 97.6 °F (36.4 °C) (11/28/22 0810) 97.9 Skin intact intact   Weight  N/a N/a Edema 1+ BLE 1+ BLE   Tele status None ordered None ordered Pain 0 8, primary RN aware     Orders   Duration: Start: 3428 End: 1026 Total: 2.25 hours   Dialyzer: Dialyzer/Set Up Inspection: Joseph Mcnally (11/28/22 0810)   [employer] [place]: Dialysate K (mEq/L): 2 (11/28/22 0810)   [place]: Dialysate CA (mEq/L): 2.5 (11/28/22 0810)   Na: Dialysate NA (mEq/L): 140 (11/28/22 0810)   Bicarb: Dialysate HCO3 (mEq/L): 40 (11/28/22 0810)   Target Fluid Removal: Goal/Amount of Fluid to Remove (mL): 500 mL (11/28/22 0810)     Access   Type & Location: L AVG   Comments:  Pre-assessment: skin CDI. No s/s of infection. + B/T. No issues with cannulation. Running well at . High venous pressures with . Dr. Soledad Che made aware. Post assessment: No issues with hemostasis, + B/T remains. Dressing CDI.      Labs   HBsAg (Antigen) / date: Negative 11/16/22                                              HBsAb (Antibody) / date: Susceptible 11/16/22   Source: Epic   Obtained/Reviewed  Critical Results Called HGB   Date Value Ref Range Status   11/25/2022 11.5 (L) 12.1 - 17.0 g/dL Final     Potassium   Date Value Ref Range Status   11/25/2022 3.8 3.5 - 5.1 mmol/L Final     Comment:     INVESTIGATED PER DELTA CHECK PROTOCOL     Calcium   Date Value Ref Range Status   11/25/2022 9.6 8.5 - 10.1 MG/DL Final     BUN   Date Value Ref Range Status   11/25/2022 96 (H) 6 - 20 MG/DL Final     Creatinine   Date Value Ref Range Status   11/25/2022 7.93 (H) 0.70 - 1.30 MG/DL Final        Meds Given   Name Dose Route   none                 Adequacy / Fluid    Total Liters Process: 43.6L   Net Fluid Removed: 500ml Comments   Time Out Done:   (Time) 0808   Admitting Diagnosis: Impaired mobility   Consent obtained/signed: Informed Consent Verified: Yes (chronic dialysis, no consent needed) (11/28/22 0810)   Machine / RO # Machine Number: [id number] (11/28/22 9903)   Primary Nurse Rpt Pre: Jasmine Gonzales RN   Primary Nurse Rpt Post: Silver Abbott RN   Pt Education: Procedure   Care Plan: Ongoing   Pts outpatient clinic: [facility] Summary   Comments:  SBAR received from Primary RN. Pt arrived to HD suite A&Ox3. Chronic consent applies. Pt agreeable to treatment. 3454: Pt cannulated with 91B needles per policy & without issue. Labs drawn per order. VSS. Dialysis Tx initiated. 0910: BFR reduced to 300 d/t high venous pressures. Dr. Araceli Arellano made aware. 1015: Pt having pain. Primary RN made aware and will bring pain medication to HD suite. 1025: Pt insisting on coming off machine d/t pain. Educated pt on importance of completing HD treatment but but still refused to finish. Dr. Araceli Arellano made aware and OK'd patient to come off machine if he wishes but he will have to come back tomororw to finish treatment. **Patient tolerated treatment fair. All lines and connections remained intact and visible throughout entire treatment. **    1026: Tx ended. VSS. All possible blood returned to patient. Hemostasis achieved without issue after hand held pressure x 7 minutes. Bed locked and in the lowest position, call bell and belongings in reach. SBAR given to Primary, RN. Patient is stable at time of their  departure. All Dialysis related medications have been reviewed.

## 2022-11-28 NOTE — PROGRESS NOTES
Problem: Falls - Risk of  Goal: *Absence of Falls  Description: Document Erika Gore Fall Risk and appropriate interventions in the flowsheet.   Outcome: Progressing Towards Goal  Note: Fall Risk Interventions:  Mobility Interventions: Bed/chair exit alarm    Mentation Interventions: Bed/chair exit alarm    Medication Interventions: Assess postural VS orthostatic hypotension, Bed/chair exit alarm    Elimination Interventions: Bed/chair exit alarm    History of Falls Interventions: Bed/chair exit alarm

## 2022-11-28 NOTE — PROGRESS NOTES
End of Shift Note    Bedside shift change report given to 2323 Viktor Calderon, RN (oncoming nurse) by Christy Mcknight RN (offgoing nurse).   Report included the following information SBAR, Kardex, ED Summary, and MAR    Shift worked:  DAYS    Shift summary and any significant changes:    -DIALYSIS [id number] TAKEN OFF  -LABS DRAWN IN DIALYSIS  -CONSENT OBTAINED FOR FISTULAGRAM     Concerns for physician to address:  NONE    Zone phone for oncoming shift:   [phone]     Patient Information  Ciro Mauricio  78 y.o.  11/16/2022 12:20 AM by Juvenal Angeles Chuck was admitted from Home    Problem List  Patient Active Problem List    Diagnosis Date Noted    UTI (lower urinary tract infection) 10/25/2013    Renal failure (ARF), acute on chronic ([address] 10/25/2013    Unable to ambulate 11/16/2022    Cervical post-laminectomy syndrome 06/09/2022    Degenerative cervical spinal stenosis 06/09/2022    Vitamin D deficiency 12/21/2021    Immune-mediated neuropathy ([address] 12/21/2021    Diabetic peripheral neuropathy associated with type 2 diabetes mellitus ([address] 12/21/2021    B12 deficiency 12/21/2021    Sensory ataxic gait 12/21/2021    Cervical spondylosis with myelopathy 12/21/2021    Type 2 diabetes mellitus with stage 4 chronic kidney disease, with long-term current use of insulin ([address] 12/21/2021    Convulsions ([address] 12/21/2021    Acute alteration in mental status 12/21/2021    Myoclonus 12/21/2021    Sepsis ([address] 08/18/2021    Acute hypoxemic respiratory failure ([address] 99/93/8211    Systolic heart failure ([address] 05/04/2021    Accelerated hypertension 07/21/2020    Elevated troponin 07/21/2020    ESRD (end stage renal disease) on dialysis ([address] 07/21/2020    Acute respiratory failure with hypoxia ([address] 07/21/2020    Acute on chronic systolic CHF (congestive heart failure) ([address] 07/21/2020    Pneumonia 05/26/2019    Acute on chronic renal failure ([address] 10/29/2016    Hypotension 10/29/2016    Chest pain 10/29/2016    Type II diabetes mellitus with nephropathy ([facility] 10/29/2016    Hyperlipidemia 10/29/2016    Nephrotic syndrome 08/20/2016    Renal anasarca 08/20/2016    Diarrhea 10/28/2013    SIRS (systemic inflammatory response syndrome) ([facility] 10/28/2013    Acute pancreatitis 01/18/2011    LFT'S ABNORMAL 01/18/2011    Acute renal failure (ARF) ([facility] 01/18/2011    CKD (chronic kidney disease) stage 3, GFR 30-59 ml/min ([facility] 01/18/2011    CAD (coronary artery disease) 01/18/2011    Abdominal pain, acute, epigastric 01/18/2011    Nausea & vomiting 01/18/2011     Past Medical History:   Diagnosis Date    Arthritis     spine    CAD (coronary artery disease)     high cholesterol    Chronic kidney disease     dialysis    Diabetes ([facility]     ESRD (end stage renal disease) ([facility])     GERD (gastroesophageal reflux disease)     HX    Hypertension     Ill-defined condition     irritable bowel syndrome    Systolic CHF ([facility]     Unspecified sleep apnea     NO CPAP       Core Measures:  CVA: No Not applicable  CHF:No Not applicable  PNA:No Not applicable    Activity:  Activity Level: Up with Assistance  Number times ambulated in hallways past shift: 0  Number of times OOB to chair past shift: 0    Cardiac:   Cardiac Monitoring: No          Access:   Current line(s): PIV   Central Line? No Placement date   PICC LINE? No Placement date     Genitourinary:   Urinary status: voiding  Urinary Catheter? No Placement Date     Respiratory:   O2 Device: None (Room air)  Chronic home O2 use?: YES  Incentive spirometer at bedside: N/A       GI:  Last Bowel Movement Date: 11/24/22  Current diet:  ADULT DIET Dysphagia - Soft & Bite Sized;  Only vanilla or strawberry supplements please  ADULT ORAL NUTRITION SUPPLEMENT Breakfast, Dinner; Renal Supplement  Passing flatus: YES  Tolerating current diet: YES       Pain Management:   Patient states pain is manageable on current regimen: YES    Skin:  Kelby Score: 21  Interventions: turn team, speciality bed, float heels, increase time out of bed, foam dressing, and PT/OT consult    Patient Safety:  Fall Score:  Total Score: 4  Interventions: bed/chair alarm, assistive device (walker, cane, etc), gripper socks, pt to call before getting OOB, and stay with me (per policy)  High Fall Risk: Yes  @Rollbelt  @dexterity to release roll belt  Yes/No ( must document dexterity  here by stating Yes or No here, otherwise this is a restraint and must follow restraint documentation policy.)    DVT prophylaxis:  DVT prophylaxis Med- Yes  DVT prophylaxis SCD or CINDY- No     Wounds: (If Applicable)  Wounds- No  Location None     Active Consults:  IP CONSULT TO NEUROLOGY  IP CONSULT TO NEPHROLOGY  IP CONSULT TO VASCULAR SURGERY    Length of Stay:  Expected LOS: 4d 7h  Actual LOS: 12  Discharge Plan: No Home       Jesenia Benoit RN

## 2022-11-28 NOTE — PROGRESS NOTES
Problem: Mobility Impaired (Adult and Pediatric)  Goal: *Acute Goals and Plan of Care (Insert Text)  Description: FUNCTIONAL STATUS PRIOR TO ADMISSION: Patient was modified independent using a rolling walker for functional mobility. States he drives himself to dialysis. Has been [place] some difficulty with walking\" but \"hasn't told anyone\". Admits to 2 recent falls. HOME SUPPORT PRIOR TO ADMISSION: The patient lived with wife and gives him intermittent assist.  Patient states he \"tries not to ask her for help\"    Physical Therapy Goals  Reassessed 11/28/2022   1. Patient will move from supine to sit and sit to supine in bed with modified independence within 7 day(s). 2.  Patient will transfer from bed to chair and chair to bed with contact guard assist using the least restrictive device within 7 day(s). 3.  Patient will perform sit to stand with contact guard assist within 7 day(s). 4.  Patient will ambulate with contact guard assist for 60 feet with the least restrictive device within 7 day(s). Initiated 11/17/2022  1. Patient will move from supine to sit and sit to supine  in bed with modified independence within 7 day(s). 2.  Patient will transfer from bed to chair and chair to bed with contact guard assist using the least restrictive device within 7 day(s). 3.  Patient will perform sit to stand with contact guard assist within 7 day(s). 4.  Patient will ambulate with contact guard assist for 150 feet with the least restrictive device within 7 day(s). Outcome: Progressing Towards Goal     PHYSICAL THERAPY TREATMENT: WEEKLY REASSESSMENT  Patient: Yokasta Christiansen (14 y.o. male)  Date: 11/28/2022  Primary Diagnosis: Unable to ambulate [R26.2]  Procedure(s) (LRB):  LEFT ARM FISTULAGRAM WITH POSSIBLE INTERVENTION (Left)     Precautions:  Fall    ASSESSMENT  Patient continues with skilled PT services and is progressing towards goals.  Able to progress to gait this session but with short shuffling steps and forward flexed posture throughout. Arrived to patient setting off chair alarm and attempting to walk to bathroom without walker, holding onto bedside table. Noted that patient's call bell was in the basket on the wall and inaccessible to patient - alerted RN following session in regards to safety concern. Did assist patient to/from bathroom with rolling walker. Patient remains unsteady and appears to fatigue quickly with activity. To note, patient has reportedly been having increased difficulty at home. Recommend SNF and it is possible he may benefit from long term care pending progress and family's ability to assist. Does demonstrate impairments in cognition with impaired insight - anticipate some limitations in carryover of education. Acute PT will continue to follow and progress as able. Patient's progression toward goals since last assessment: progressing slowly towards goals, goals downgraded slightly    Current Level of Function Impacting Discharge (mobility/balance): minimal assist x 2 to ambulate with rolling walker 8 ft x 2    Other factors to consider for discharge: dialysis         PLAN :  Goals have been updated based on progression since last assessment. Patient continues to benefit from skilled intervention to address the above impairments. Recommendations and Planned Interventions: bed mobility training, transfer training, gait training, therapeutic exercises, neuromuscular re-education, patient and family training/education, and therapeutic activities      Frequency/Duration: Patient will be followed by physical therapy:  4 times a week to address goals.     Recommendation for discharge: (in order for the patient to meet his/her long term goals)  Therapy up to 5 days/week in SNF setting    This discharge recommendation:  Has been made in collaboration with the attending provider and/or case management    IF patient discharges home will need the following DME: to be determined (TBD) SUBJECTIVE:   Patient stated To be honest, I was sick of wetting myself.  [patient reported when asked why he had tried to walk to bathroom on own    OBJECTIVE DATA SUMMARY:   HISTORY:    Past Medical History:   Diagnosis Date    Arthritis     spine    CAD (coronary artery disease)     high cholesterol    Chronic kidney disease     dialysis    Diabetes ([facility]     ESRD (end stage renal disease) ([facility]     GERD (gastroesophageal reflux disease)     HX    Hypertension     Ill-defined condition     irritable bowel syndrome    Systolic CHF ([facility]     Unspecified sleep apnea     NO CPAP     Past Surgical History:   Procedure Laterality Date    COLONOSCOPY N/A 11/9/2016    COLONOSCOPY performed by Byron Ramos MD at [facility] ENDOSCOPY    HX ORTHOPAEDIC      CERVICAL FUSION    HX UROLOGICAL      TURP    HX VASCULAR ACCESS      L arm dialysis access     Personal factors and/or comorbidities impacting plan of care: ESRD on HD, CHF    Home Situation  Home Environment: Private residence  # Steps to Enter: 1  Rails to Enter: No  One/Two Story Residence: One story  Living Alone: No  Support Systems: Spouse/Significant Other  Patient Expects to be Discharged to[de-identified] Skilled nursing facility  Current DME Used/Available at Home: Cane, straight  Tub or Shower Type: Tub/Shower combination    EXAMINATION/PRESENTATION/DECISION MAKING:   Critical Behavior:  Neurologic State: Alert  Orientation Level: Oriented X4  Cognition: Appropriate decision making, Appropriate for age attention/concentration, Appropriate safety awareness  Safety/Judgement: Awareness of environment, Insight into deficits  Hearing: Auditory  Auditory Impairment: None  Functional Mobility:  Transfers:  Sit to Stand: Minimum assistance;Contact guard assistance; Adaptive equipment  Stand to Sit: Minimum assistance; Adaptive equipment  Balance:   Sitting: Impaired  Sitting - Static: Good (unsupported)  Sitting - Dynamic: Fair (occasional) (utilizing unilateral UE support while reaching down to perform lower body dressing)  Standing: Impaired  Standing - Static: Constant support; Fair  Standing - Dynamic : Constant support; Fair  Ambulation/Gait Training:  Distance (ft): 8 Feet (ft) (x 2)  Assistive Device: Gait belt;Walker, rolling  Ambulation - Level of Assistance: Minimal assistance;Assist x2  Gait Abnormalities: Decreased step clearance;Shuffling gait (needing verbal and tactile cues for upright posture and increased proximity to assistive device)  Base of Support: Widened  Speed/Deanna: Slow;Pace decreased (<100 feet/min)  Step Length: Left shortened;Right shortened    Therapeutic Exercises:   Sit to stands x 3 reps from chair with focus on upright posture and hip extension in standing  Postural re-education    Pain Rating:  Neck and shoulder pain    Activity Tolerance:   Fair    After treatment patient left in no apparent distress:   Sitting in chair, Call bell within reach, and Bed / chair alarm activated    COMMUNICATION/EDUCATION:   The patients plan of care was discussed with: Occupational therapist, Registered nurse, Case management, and Certified nursing assistant/patient care technician. Fall prevention education was provided and the patient/caregiver indicated understanding., Patient/family have participated as able in goal setting and plan of care. , and Patient/family agree to work toward stated goals and plan of care. Patient will require reinforcement of education provided.     Thank you for this referral.  Bernice Saldivar, PT   Time Calculation: 35 mins

## 2022-11-28 NOTE — PROGRESS NOTES
MIGDALIAALEX AV graft 2018  Had to stop HD early today due to high venous pressures  His only c/o is L shoulder pain  Access patent w/good thrill  No arm swelling  Will arrange angio for am Tuesday

## 2022-11-28 NOTE — PROGRESS NOTES
Hospitalist Progress Note    Subjective:   Daily Progress Note: 11/28/2022 7:14 AM    Hospital Course: Patient is a 74-year-old black male with a history of end-stage disease on hemodialysis Monday, Wednesday,, type 2 diabetes, GERD, hypertension, chronic hypoxic respiratory failure requiring 2 L oxygen nasal cannula at home, systolic heart failure who presented to the emergency room on 11/16/2022 for worsening weakness and recurrent falls. He denied hitting his head or losing consciousness. CT of the head and spine were negative for acute findings. As patient was unable to walk he was admitted in the hospital for further evaluation. Nephrology consulted for hemodialysis management. Neurology consulted. Vitamin B12 low, folate low and began supplementation. MRI of the lumbar spine showed spinal stenosis with multilevel degenerative disc disease. Per neurology recommend physical and occupational therapy and EMG as an outpatient. PT and OT recommend SNF placement. Case management consulted for discharge planning. Overnight on 11/20/2022 patient became confused and altered. Sodium was 132, potassium 7.6, BUN 60, serum creatinine 8.01, glucose 181, ammonia level 35. He was started on lactulose. Ammonia level trending down. Patient developed confusion  on day #5 hospital.  Calcitonin and lactic acid and WBC within normal limits. Doubt infectious process. CT of the head showed no acute intracranial abnormality. CT of the cervical spine showed no fracture but did show cervical degenerative disc disease. Subjective: Patient is alert and oriented x4.     \"My neck is still hurting\"  Toradol helped, but avoiding with ESRD  Feels the hospital bed is making his neck worse  Does not want to resume toradol  Awaiting SNF bed    Review of Systems   No fevers, + sore throat, no SOB or cough, no CP, No nausea, No vomiting, No diarrhea, No abdominal pain      Objective:     Patient Vitals for the past 24 hrs: Temp Pulse Resp BP SpO2   22 2339 98.1 °F (36.7 °C) (!) 58 17 120/70 100 %   22 1627 -- (!) 58 -- (!) 107/57 99 %   22 1155 97.5 °F (36.4 °C) 60 18 101/64 99 %   22 0827 97.7 °F (36.5 °C) 64 16 (!) 110/58 100 %          O2 Flow Rate (L/min): 3 l/min O2 Device: None (Room air)    Temp (24hrs), Av.8 °F (36.6 °C), Min:97.5 °F (36.4 °C), Max:98.1 °F (36.7 °C)      No intake/output data recorded. No intake/output data recorded. PHYSICAL EXAM:  Constitutional: No acute distress  Skin: Extremities and face reveal no rashes. HEENT: Sclerae anicteric. Extra-occular muscles are intact. Oral mucosa dry do not see any plaques indicating yeast infection. Cardiovascular: Regular rate and rhythm. Respiratory:  Clear breath sounds bilaterally with no wheezes, rales, or rhonchi. GI: Abdomen nondistended, soft, and nontender. Normal active bowel sounds. Musculoskeletal: No pitting edema of the lower legs. Able to move all ext  Neurological:  Patient is alert and oriented. Cranial nerves II-XII grossly intact  Psychiatric: Mood appears appropriate       Data Review    Recent Results (from the past 24 hour(s))   GLUCOSE, POC    Collection Time: 22  9:11 AM   Result Value Ref Range    Glucose (POC) 116 65 - 117 mg/dL    Performed by [facility] PCT    GLUCOSE, POC    Collection Time: 22 11:55 AM   Result Value Ref Range    Glucose (POC) 115 65 - 117 mg/dL    Performed by [facility] PCT    GLUCOSE, POC    Collection Time: 22  4:23 PM   Result Value Ref Range    Glucose (POC) 111 65 - 117 mg/dL    Performed by Thermon Elizabeth PCT    GLUCOSE, POC    Collection Time: 22 10:31 PM   Result Value Ref Range    Glucose (POC) 102 65 - 117 mg/dL    Performed by Jett Leung (BONILLA RN)        Radiology review: MRI of spine    Assessment:   1. Severe neck pain due to DDD cervical spine  2. Recurrent falls secondary to spinal stenosis and multilevel disc and facet degeneration  3. Elevated troponin secondary to kidney disease. 4.  End-stage renal disease on hemodialysis Monday, Wednesday, Friday with critical severe hyper kalemia  5. Chronic systolic heart failure  6. Type 2 diabetes  7. Chronic hypoxic respiratory failure  8. Acute confusion multifactorial  9. Dry mouth with dysphagia      Plan:   1.  CT of the head and spine reviewed with no acute findings. MRI of the lumbar spine showed spinal stenosis. Neurology consulted. They recommend physical therapy and EMG as an outpatient. Tramadol as needed. Lidocaine patch ordered. Vitamin B12 and folate low and supplemented. PT OT recommend SNF placement. Case management consulted. Schedule tylenol, try low dose oxycodone tonight, soft cervical collar  2. Troponins elevated on admission. Patient denies any chest pain. Chest likely secondary to renal disease  3. Dialysis Monday, Wednesday, Friday nephrology consulted. On Renvela  4. Patient on Entresto, Coreg, Bumex, aspirin. 5.  Glucose levels are stable. Patient on insulin sliding scale and NPH 20 units twice daily  6.  Discharge once bed founf    CODE STATUS full     DVT prophylaxis: Heparin  Ulcer prophylaxis: Not indicated    Care Plan discussed with: Patient/Family, Nurse, and     Darlene Miller MD

## 2022-11-28 NOTE — PROGRESS NOTES
Problem: Self Care Deficits Care Plan (Adult)  Goal: *Acute Goals and Plan of Care (Insert Text)  Description: FUNCTIONAL STATUS PRIOR TO ADMISSION: Pt reports that he lives with his wife. He reports having difficulty lately and is reluctant to ask his wife to assist.  Pt reports independence in adls and drives himself to HD appointments. He states he feels tired after HD, bt manages his drive home     HOME SUPPORT: The patient lived with wife. Occupational Therapy Goals  11/28/2022: All goals were reviewed and remain appropriate to continue for 7 days. Continue all goals  Initiated 11/17/2022  1. Patient will perform grooming in standing  with contact guard assist within 7 day(s). 2.  Patient will perform lower body dressing with supervision/set-up, using AE prn, within 7 day(s). 3.  Patient will perform bathing with supervision/set-up within 7 day(s). 4.  Patient will perform toilet transfers with minimal assistance/contact guard assist within 7 day(s). 5.  Patient will perform all aspects of toileting with minimal assistance/contact guard assist within 7 day(s). 6.  Patient will participate in upper extremity therapeutic exercise/activities with supervision/set-up for 5 minutes within 7 day(s). 7.  Patient will tolerate at least 8 minutes of standing adls within 7 days. Outcome: Progressing Towards Goal   OCCUPATIONAL THERAPY TREATMENT/WEEKLY RE-EVALUATION  Patient: Braxton Ren (26 y.o. male)  Date: 11/28/2022  Diagnosis: Unable to ambulate [R26.2] <principal problem not specified>  Procedure(s) (LRB):  LEFT ARM FISTULAGRAM WITH POSSIBLE INTERVENTION (Left)    Precautions: Fall  Chart, occupational therapy assessment, plan of care, and goals were reviewed. ASSESSMENT  Based on the objective data described below, pt is progressing towards goals. He was alert today, and was engaged in treatment session.   Pt requires assist X2 for mobility using the RW this date; he verbalized no BLE weakness, but needed frequent postural cues for hip extension and neck extension in standing. Pt declined the supportive neck brace in room, and reported strong neck pain overnight. Will benefit from AE training for lower body adls. Pt will benefit from rehab at discharge. Current Level of Function Impacting Discharge (ADLs): assist X2 for mobility and mod/max A for adls    Other factors to consider for discharge: pain         PLAN :  Goals have been updated based on progression since last assessment. Patient continues to benefit from skilled intervention to address the above impairments. Continue to follow patient 4 times a week to address goals. Recommend with staff: encourage upright. States he has urinary urgency     Recommend next OT session: POC/AE    Recommendation for discharge: (in order for the patient to meet his/her long term goals)  Therapy up to 5 days/week in SNF setting    This discharge recommendation:  Has not yet been discussed the attending provider and/or case management    Equipment recommendations for successful discharge (if) home: tbd in rehab       SUBJECTIVE:   Patient reported that he was feeling better this date--continues with neck pain    OBJECTIVE DATA SUMMARY:   Cognitive/Behavioral Status:  Neurologic State: Alert  Orientation Level: Oriented X4  Cognition: Follows commands  Perception: Appears intact  Perseveration: No perseveration noted  Safety/Judgement: Awareness of environment; Fall prevention;Home safety    Functional Mobility and Transfers for ADLs:  Bed Mobility:       Transfers:  Sit to Stand: Minimum assistance;Contact guard assistance; Adaptive equipment  Functional Transfers  Bathroom Mobility: Contact guard assistance  Toilet Transfer : Minimum assistance  Adaptive Equipment: Walker (comment);Grab bars       Balance:  Sitting: Impaired  Sitting - Static: Good (unsupported)  Sitting - Dynamic: Fair (occasional) (utilizing unilateral UE support while reaching down to perform lower body dressing)  Standing: Impaired  Standing - Static: Constant support; Fair  Standing - Dynamic : Constant support; Fair    ADL Intervention:       Grooming  Washing Hands: Set-up (hand wipes)         Lower Body Bathing  Perineal  : Set-up; Minimum assistance (min A for standing balance)  Position Performed: Standing;Seated on toilet  Cues: Verbal cues provided;Physical assistance pants up;Physical assistance pants down  Adaptive Equipment: Grab bar;Walker  Cues:  (set up for wipes - pt cleaning thighs seated on toilet)         Lower Body Dressing Assistance  Protective Undergarmet: Moderate assistance  Pants With Elastic Waist: Moderate assistance  Socks: Moderate assistance  Position Performed:  (seated on toilet)  Adaptive Equipment Used: Grab bar;Walker (would benefit from AE trials)    Toileting  Toileting Assistance: Moderate assistance  Bladder Hygiene: Set-up  Clothing Management: Moderate assistance  Cues: Tactile cues provided  Adaptive Equipment: Grab bars; Walker    Cognitive Retraining  Safety/Judgement: Awareness of environment; Fall prevention;Home safety    Pain:  Did not rate neck pain  Declined neck brace    Activity Tolerance:   Fair, requires rest breaks, and did not c/o dizziness nor lightheaded    After treatment patient left in no apparent distress:   Sitting in chair, Call bell within reach, Bed / chair alarm activated, and nurse informed.       COMMUNICATION/COLLABORATION:   The patients plan of care was discussed with: Physical Therapist and Registered Nurse    Jalen Carrillo OTR/L  Time Calculation: 33 mins

## 2022-11-28 NOTE — PROGRESS NOTES
Occupational Therapy  Pt is off the floor in dialysis. Will defer and continue to follow as appropriate.

## 2022-11-28 NOTE — ANESTHESIA PREPROCEDURE EVALUATION
Anesthetic History     PONV          Review of Systems / Medical History  Patient summary reviewed, nursing notes reviewed and pertinent labs reviewed    Pulmonary        Sleep apnea: No treatment  Shortness of breath      Comments: Former smoker - Quit 2009    Chronic hypoxic respiratory failure requiring 2 L oxygen nasal cannula at home   Neuro/Psych             Comments: Diabetic peripheral neuropathy   Sensory ataxic gait  Cervical spondylosis with myelopathy s/p ACDF  Cervical post-laminectomy syndrome  Lumbar spinal stenosis with multilevel degenerative disc disease Cardiovascular    Hypertension    Angina: at rest  CHF: orthopnea, dyspnea on exertion    CAD and hyperlipidemia    Exercise tolerance: <4 METS  Comments: ECG (11/15/22): Normal sinus rhythm   Left axis deviation   Right bundle branch block   Left ventricular hypertrophy with repolarization abnormality   15-NOV-2022)   When compared with ECG of 12-SEP-2022 06:00,   Serial changes of Anterior infarct present     TTE (7/22/20):  LV: Normal cavity size. Mild concentric hypertrophy. Mild-to-moderate systolic function. Estimated left ventricular ejection fraction is 40 - 45%. Left ventricular diastolic dysfunction. LA: Mildly dilated left atrium. MV: Mild mitral annular calcification. Trace mitral valve regurgitation is present.      GI/Hepatic/Renal     GERD    Renal disease: ESRD and dialysis      Comments: Hx Pancreatitis Endo/Other    Diabetes: poorly controlled, type 2, using insulin    Obesity, arthritis and anemia (Hgb = 10.7 on 11/28/22)     Other Findings            Physical Exam    Airway  Mallampati: II  TM Distance: 4 - 6 cm  Neck ROM: normal range of motion   Mouth opening: Normal     Cardiovascular  Regular rate and rhythm,  S1 and S2 normal,  no murmur, click, rub, or gallop             Dental    Dentition: Implants  Comments: Multiple missing teeth, lower front crowns   Pulmonary  Breath sounds clear to auscultation Abdominal  GI exam deferred       Other Findings            Anesthetic Plan    ASA: 4  Anesthesia type: MAC          Induction: Intravenous  Anesthetic plan and risks discussed with: Patient

## 2022-11-29 ENCOUNTER — ANESTHESIA (OUTPATIENT)
Dept: SURGERY | Age: 77
End: 2022-11-29
Payer: MEDICARE

## 2022-11-29 ENCOUNTER — APPOINTMENT (OUTPATIENT)
Dept: GENERAL RADIOLOGY | Age: 77
End: 2022-11-29
Attending: SURGERY
Payer: MEDICARE

## 2022-11-29 LAB
GLUCOSE BLD STRIP.AUTO-MCNC: 100 MG/DL (ref 65–117)
GLUCOSE BLD STRIP.AUTO-MCNC: 102 MG/DL (ref 65–117)
GLUCOSE BLD STRIP.AUTO-MCNC: 102 MG/DL (ref 65–117)
GLUCOSE BLD STRIP.AUTO-MCNC: 114 MG/DL (ref 65–117)
GLUCOSE BLD STRIP.AUTO-MCNC: 234 MG/DL (ref 65–117)
SERVICE CMNT-IMP: ABNORMAL
SERVICE CMNT-IMP: NORMAL

## 2022-11-29 PROCEDURE — C1725 CATH, TRANSLUMIN NON-LASER: HCPCS | Performed by: SURGERY

## 2022-11-29 PROCEDURE — 74011000636 HC RX REV CODE- 636: Performed by: SURGERY

## 2022-11-29 PROCEDURE — 73060 X-RAY EXAM OF HUMERUS: CPT

## 2022-11-29 PROCEDURE — B51W0ZZ FLUOROSCOPY OF DIALYSIS SHUNT/FISTULA USING HIGH OSMOLAR CONTRAST: ICD-10-PCS | Performed by: SURGERY

## 2022-11-29 PROCEDURE — 2709999900 HC NON-CHARGEABLE SUPPLY: Performed by: SURGERY

## 2022-11-29 PROCEDURE — 94760 N-INVAS EAR/PLS OXIMETRY 1: CPT

## 2022-11-29 PROCEDURE — 77030004561 HC CATH ANGI DX COBRA ANGI -B: Performed by: SURGERY

## 2022-11-29 PROCEDURE — 74011250636 HC RX REV CODE- 250/636: Performed by: NURSE ANESTHETIST, CERTIFIED REGISTERED

## 2022-11-29 PROCEDURE — 74011250636 HC RX REV CODE- 250/636: Performed by: STUDENT IN AN ORGANIZED HEALTH CARE EDUCATION/TRAINING PROGRAM

## 2022-11-29 PROCEDURE — 74011636637 HC RX REV CODE- 636/637: Performed by: SURGERY

## 2022-11-29 PROCEDURE — 74011250636 HC RX REV CODE- 250/636: Performed by: SURGERY

## 2022-11-29 PROCEDURE — C1894 INTRO/SHEATH, NON-LASER: HCPCS | Performed by: SURGERY

## 2022-11-29 PROCEDURE — 74011000250 HC RX REV CODE- 250: Performed by: NURSE ANESTHETIST, CERTIFIED REGISTERED

## 2022-11-29 PROCEDURE — 77030002916 HC SUT ETHLN J&J -A: Performed by: SURGERY

## 2022-11-29 PROCEDURE — C1769 GUIDE WIRE: HCPCS | Performed by: SURGERY

## 2022-11-29 PROCEDURE — 65270000046 HC RM TELEMETRY

## 2022-11-29 PROCEDURE — 77030013058 HC DEV INFL ANGIO BSC -B: Performed by: SURGERY

## 2022-11-29 PROCEDURE — 05WY3JZ REVISION OF SYNTHETIC SUBSTITUTE IN UPPER VEIN, PERCUTANEOUS APPROACH: ICD-10-PCS | Performed by: SURGERY

## 2022-11-29 PROCEDURE — 76210000016 HC OR PH I REC 1 TO 1.5 HR: Performed by: SURGERY

## 2022-11-29 PROCEDURE — C1892 INTRO/SHEATH,FIXED,PEEL-AWAY: HCPCS | Performed by: SURGERY

## 2022-11-29 PROCEDURE — 76010000160 HC OR TIME 0.5 TO 1 HR INTENSV-TIER 1: Performed by: SURGERY

## 2022-11-29 PROCEDURE — 74011000250 HC RX REV CODE- 250: Performed by: NURSE PRACTITIONER

## 2022-11-29 PROCEDURE — 74011250637 HC RX REV CODE- 250/637: Performed by: SURGERY

## 2022-11-29 PROCEDURE — 76060000032 HC ANESTHESIA 0.5 TO 1 HR: Performed by: SURGERY

## 2022-11-29 PROCEDURE — 74011250637 HC RX REV CODE- 250/637: Performed by: STUDENT IN AN ORGANIZED HEALTH CARE EDUCATION/TRAINING PROGRAM

## 2022-11-29 PROCEDURE — 74011000250 HC RX REV CODE- 250: Performed by: SURGERY

## 2022-11-29 PROCEDURE — 82962 GLUCOSE BLOOD TEST: CPT

## 2022-11-29 PROCEDURE — 76000 FLUOROSCOPY <1 HR PHYS/QHP: CPT

## 2022-11-29 PROCEDURE — 74011250637 HC RX REV CODE- 250/637: Performed by: INTERNAL MEDICINE

## 2022-11-29 PROCEDURE — 80047 BASIC METABLC PNL IONIZED CA: CPT

## 2022-11-29 RX ORDER — LIDOCAINE HYDROCHLORIDE 10 MG/ML
INJECTION INFILTRATION; PERINEURAL AS NEEDED
Status: DISCONTINUED | OUTPATIENT
Start: 2022-11-29 | End: 2022-11-29 | Stop reason: HOSPADM

## 2022-11-29 RX ORDER — FENTANYL CITRATE 50 UG/ML
25 INJECTION, SOLUTION INTRAMUSCULAR; INTRAVENOUS
Status: DISCONTINUED | OUTPATIENT
Start: 2022-11-29 | End: 2022-11-29

## 2022-11-29 RX ORDER — PROPOFOL 10 MG/ML
INJECTION, EMULSION INTRAVENOUS
Status: DISCONTINUED | OUTPATIENT
Start: 2022-11-29 | End: 2022-11-29 | Stop reason: HOSPADM

## 2022-11-29 RX ORDER — DEXAMETHASONE SODIUM PHOSPHATE 4 MG/ML
INJECTION, SOLUTION INTRA-ARTICULAR; INTRALESIONAL; INTRAMUSCULAR; INTRAVENOUS; SOFT TISSUE AS NEEDED
Status: DISCONTINUED | OUTPATIENT
Start: 2022-11-29 | End: 2022-11-29 | Stop reason: HOSPADM

## 2022-11-29 RX ORDER — SODIUM CHLORIDE 0.9 % (FLUSH) 0.9 %
5-40 SYRINGE (ML) INJECTION EVERY 8 HOURS
Status: DISCONTINUED | OUTPATIENT
Start: 2022-11-29 | End: 2022-12-04 | Stop reason: HOSPADM

## 2022-11-29 RX ORDER — SODIUM CHLORIDE 0.9 % (FLUSH) 0.9 %
5-40 SYRINGE (ML) INJECTION AS NEEDED
Status: DISCONTINUED | OUTPATIENT
Start: 2022-11-29 | End: 2022-11-29

## 2022-11-29 RX ORDER — SODIUM CHLORIDE 0.9 % (FLUSH) 0.9 %
5-40 SYRINGE (ML) INJECTION AS NEEDED
Status: DISCONTINUED | OUTPATIENT
Start: 2022-11-29 | End: 2022-12-04 | Stop reason: HOSPADM

## 2022-11-29 RX ORDER — FENTANYL CITRATE 50 UG/ML
INJECTION, SOLUTION INTRAMUSCULAR; INTRAVENOUS AS NEEDED
Status: DISCONTINUED | OUTPATIENT
Start: 2022-11-29 | End: 2022-11-29 | Stop reason: HOSPADM

## 2022-11-29 RX ORDER — EPHEDRINE SULFATE/0.9% NACL/PF 50 MG/5 ML
SYRINGE (ML) INTRAVENOUS AS NEEDED
Status: DISCONTINUED | OUTPATIENT
Start: 2022-11-29 | End: 2022-11-29 | Stop reason: HOSPADM

## 2022-11-29 RX ORDER — ONDANSETRON 2 MG/ML
4 INJECTION INTRAMUSCULAR; INTRAVENOUS AS NEEDED
Status: DISCONTINUED | OUTPATIENT
Start: 2022-11-29 | End: 2022-11-29

## 2022-11-29 RX ORDER — HYDROMORPHONE HYDROCHLORIDE 1 MG/ML
0.2 INJECTION, SOLUTION INTRAMUSCULAR; INTRAVENOUS; SUBCUTANEOUS
Status: DISCONTINUED | OUTPATIENT
Start: 2022-11-29 | End: 2022-11-29

## 2022-11-29 RX ORDER — SODIUM CHLORIDE 9 MG/ML
INJECTION, SOLUTION INTRAVENOUS
Status: DISCONTINUED | OUTPATIENT
Start: 2022-11-29 | End: 2022-11-29 | Stop reason: HOSPADM

## 2022-11-29 RX ORDER — SODIUM CHLORIDE 0.9 % (FLUSH) 0.9 %
5-40 SYRINGE (ML) INJECTION EVERY 8 HOURS
Status: DISCONTINUED | OUTPATIENT
Start: 2022-11-29 | End: 2022-11-29

## 2022-11-29 RX ORDER — ONDANSETRON 2 MG/ML
INJECTION INTRAMUSCULAR; INTRAVENOUS AS NEEDED
Status: DISCONTINUED | OUTPATIENT
Start: 2022-11-29 | End: 2022-11-29 | Stop reason: HOSPADM

## 2022-11-29 RX ADMIN — DEXAMETHASONE SODIUM PHOSPHATE 4 MG: 4 INJECTION, SOLUTION INTRAMUSCULAR; INTRAVENOUS at 16:54

## 2022-11-29 RX ADMIN — OXYCODONE 5 MG: 5 TABLET ORAL at 13:22

## 2022-11-29 RX ADMIN — FENTANYL CITRATE 25 MCG: 50 INJECTION, SOLUTION INTRAMUSCULAR; INTRAVENOUS at 16:51

## 2022-11-29 RX ADMIN — FENTANYL CITRATE 25 MCG: 50 INJECTION, SOLUTION INTRAMUSCULAR; INTRAVENOUS at 16:55

## 2022-11-29 RX ADMIN — SODIUM CHLORIDE, PRESERVATIVE FREE 10 ML: 5 INJECTION INTRAVENOUS at 22:13

## 2022-11-29 RX ADMIN — Medication 2 UNITS: at 22:12

## 2022-11-29 RX ADMIN — SODIUM CHLORIDE: 0.9 INJECTION, SOLUTION INTRAVENOUS at 16:36

## 2022-11-29 RX ADMIN — METHOCARBAMOL TABLETS 750 MG: 750 TABLET, COATED ORAL at 13:22

## 2022-11-29 RX ADMIN — HEPARIN SODIUM 5000 UNITS: 5000 INJECTION INTRAVENOUS; SUBCUTANEOUS at 05:14

## 2022-11-29 RX ADMIN — PROPOFOL 50 MCG/KG/MIN: 10 INJECTION, EMULSION INTRAVENOUS at 16:48

## 2022-11-29 RX ADMIN — SACUBITRIL AND VALSARTAN 1 TABLET: 24; 26 TABLET, FILM COATED ORAL at 10:59

## 2022-11-29 RX ADMIN — BUMETANIDE 2 MG: 1 TABLET ORAL at 10:59

## 2022-11-29 RX ADMIN — Medication 3 AMPULE: at 15:14

## 2022-11-29 RX ADMIN — FLUTICASONE PROPIONATE 2 SPRAY: 50 SPRAY, METERED NASAL at 11:03

## 2022-11-29 RX ADMIN — HEPARIN SODIUM 5000 UNITS: 5000 INJECTION INTRAVENOUS; SUBCUTANEOUS at 22:12

## 2022-11-29 RX ADMIN — TAMSULOSIN HYDROCHLORIDE 0.8 MG: 0.4 CAPSULE ORAL at 10:59

## 2022-11-29 RX ADMIN — FINASTERIDE 5 MG: 5 TABLET, FILM COATED ORAL at 10:59

## 2022-11-29 RX ADMIN — ASPIRIN 81 MG: 81 TABLET, CHEWABLE ORAL at 10:59

## 2022-11-29 RX ADMIN — ACETAMINOPHEN 650 MG: 325 TABLET ORAL at 05:13

## 2022-11-29 RX ADMIN — CARVEDILOL 12.5 MG: 12.5 TABLET, FILM COATED ORAL at 10:59

## 2022-11-29 RX ADMIN — ONDANSETRON HYDROCHLORIDE 4 MG: 2 INJECTION, SOLUTION INTRAMUSCULAR; INTRAVENOUS at 16:52

## 2022-11-29 RX ADMIN — Medication 15 MG: at 17:00

## 2022-11-29 RX ADMIN — METHOCARBAMOL TABLETS 750 MG: 750 TABLET, COATED ORAL at 10:59

## 2022-11-29 RX ADMIN — ACETAMINOPHEN 650 MG: 325 TABLET ORAL at 13:22

## 2022-11-29 NOTE — PROGRESS NOTES
End of Shift Note    Bedside shift change report given to Karlee Apple0 [facility] (oncoming nurse) by Rosa Paz RN (offgoing nurse). Report included the following information SBAR, Kardex, ED Summary, Intake/Output, MAR, Accordion, Recent Results, and Med Rec Status    Shift worked:  [id number]     Shift summary and any significant changes:     N/A     Concerns for physician to address:       Zone phone for oncoming shift:          Activity:  Activity Level: Up with Assistance  Number times ambulated in hallways past shift: 0  Number of times OOB to chair past shift: 0    Cardiac:   Cardiac Monitoring: No           Access:  Current line(s): PIV     Genitourinary:   Urinary status: Male sherice    Respiratory:   O2 Device: None (Room air)  Chronic home O2 use?: NO  Incentive spirometer at bedside: NO       GI:  Last Bowel Movement Date: 11/24/22  Current diet:  ADULT ORAL NUTRITION SUPPLEMENT Breakfast, Dinner; Renal Supplement  DIET NPO Sips of Water with Meds  Passing flatus: YES  Tolerating current diet: YES       Pain Management:   Patient states pain is manageable on current regimen: YES    Skin:  Kelby Score: 20  Interventions: increase time out of bed and PT/OT consult    Patient Safety:  Fall Score:  Total Score: 4  Interventions: bed/chair alarm, gripper socks, pt to call before getting OOB, and stay with me (per policy)  High Fall Risk: Yes    Length of Stay:  Expected LOS: 4d 7h  Actual LOS: 13      Rosa Paz RN

## 2022-11-29 NOTE — PERIOP NOTES
1718-Handoff Report from Operating Room to PACU    Report received from [address] [place] [address] and Michela Parikh CRNA regarding Felton Harden. Surgeon(s):  Linda Pham MD  And Procedure(s) (LRB):  LEFT ARM FISTULAGRAM WITH ANGIOPLASTY (Left)  confirmed   with allergies and dressings discussed. Anesthesia type, drugs, patient history, complications, estimated blood loss, vital signs, intake and output, and last pain medication, lines, reversal medications, and temperature were reviewed. 1816- TRANSFER - OUT REPORT:    Verbal report given to Dejan Owens RN(name) on Felton Backbone  being transferred to NSTU(unit) for routine post - op       Report consisted of patients Situation, Background, Assessment and   Recommendations(SBAR). Information from the following report(s) SBAR, Kardex, OR Summary, Procedure Summary, Intake/Output, MAR, and Recent Results was reviewed with the receiving nurse. Opportunity for questions and clarification was provided. Patient transported with:   O2 @ 4 liters  Registered Nurse  J Carlos Whitaker wife but no answer received, could not leave message due to mailbox being full.

## 2022-11-29 NOTE — BRIEF OP NOTE
Brief Postoperative Note    Patient: Franci Ford  YOB: 1945  MRN: [id number]    Date of Procedure: 11/29/2022     Pre-Op Diagnosis: ESRD    Post-Op Diagnosis: ESRD      Procedure(s): LEFT ARM FISTULAGRAM WITH ANGIOPLASTY (8x4)    Surgeon(s):  Ousmane Soto MD    Anesthesia: MAC     Estimated Blood Loss (mL): Minimal    Complications: None    Findings: High flow access; modest outflow stenosis treated; otherwise no explanation for issues on dialysis or shoulder pain    Electronically Signed by Brynn Daniels MD on 11/29/2022 at 5:12 PM

## 2022-11-29 NOTE — PROGRESS NOTES
Problem: Falls - Risk of  Goal: *Absence of Falls  Description: Document Fleming Fall Risk and appropriate interventions in the flowsheet. Outcome: Progressing Towards Goal  Note: Fall Risk Interventions:  Mobility Interventions: Communicate number of staff needed for ambulation/transfer    Mentation Interventions: Increase mobility, More frequent rounding    Medication Interventions: Patient to call before getting OOB    Elimination Interventions: Patient to call for help with toileting needs    History of Falls Interventions: Investigate reason for fall         Problem: Patient Education: Go to Patient Education Activity  Goal: Patient/Family Education  Outcome: Progressing Towards Goal     Problem: Patient Education: Go to Patient Education Activity  Goal: Patient/Family Education  Outcome: Progressing Towards Goal     Problem: Patient Education: Go to Patient Education Activity  Goal: Patient/Family Education  Outcome: Progressing Towards Goal     Problem: Pressure Injury - Risk of  Goal: *Prevention of pressure injury  Description: Document Kelby Scale and appropriate interventions in the flowsheet.   Outcome: Progressing Towards Goal  Note: Pressure Injury Interventions:  Sensory Interventions: Assess changes in LOC    Moisture Interventions: Internal/External urinary devices, Moisture barrier, Offer toileting Q_hr, Check for incontinence Q2 hours and as needed    Activity Interventions: Increase time out of bed    Mobility Interventions: Float heels    Nutrition Interventions: Document food/fluid/supplement intake (NPO)    Friction and Shear Interventions: Lift sheet, Minimize layers                Problem: Patient Education: Go to Patient Education Activity  Goal: Patient/Family Education  Outcome: Progressing Towards Goal     Problem: Patient Education: Go to Patient Education Activity  Goal: Patient/Family Education  Outcome: Progressing Towards Goal     Problem: General Medical Care Plan  Goal: *Vital signs within specified parameters  Outcome: Progressing Towards Goal  Goal: *Labs within defined limits  Outcome: Progressing Towards Goal  Goal: *Absence of infection signs and symptoms  Outcome: Progressing Towards Goal  Goal: *Optimal pain control at patient's stated goal  Outcome: Progressing Towards Goal  Goal: *Skin integrity maintained  Outcome: Progressing Towards Goal  Goal: *Fluid volume balance  Outcome: Progressing Towards Goal  Goal: *Optimize nutritional status  Outcome: Progressing Towards Goal  Goal: *Anxiety reduced or absent  Outcome: Progressing Towards Goal  Goal: *Progressive mobility and function (eg: ADL's)  Outcome: Progressing Towards Goal     Problem: Patient Education: Go to Patient Education Activity  Goal: Patient/Family Education  Outcome: Progressing Towards Goal

## 2022-11-29 NOTE — ANESTHESIA POSTPROCEDURE EVALUATION
Procedure(s):  LEFT ARM FISTULAGRAM WITH ANGIOPLASTY. MAC    Anesthesia Post Evaluation        Patient location during evaluation: PACU  Note status: Adequate. Level of consciousness: responsive to verbal stimuli and sleepy but conscious  Pain management: satisfactory to patient  Airway patency: patent  Anesthetic complications: no  Cardiovascular status: acceptable  Respiratory status: acceptable  Hydration status: acceptable  Comments: +Post-Anesthesia Evaluation and Assessment    Patient: Connor Patterson MRN: [id number]  SSN: [id number]   YOB: 1945  Age: 78 y.o. Sex: male      Cardiovascular Function/Vital Signs    BP (!) 114/53   Pulse (!) 54   Temp 36.8 °C (98.3 °F)   Resp 13   Ht 6' (1.829 m)   Wt 104.8 kg (231 lb)   SpO2 99%   BMI 31.33 kg/m²     Patient is status post Procedure(s):  LEFT ARM FISTULAGRAM WITH ANGIOPLASTY. Nausea/Vomiting: Controlled. Postoperative hydration reviewed and adequate. Pain:  Pain Scale 1: Visual (11/29/22 1830)  Pain Intensity 1: 0 (11/29/22 1830)   Managed. Neurological Status:   Neuro (WDL): Exceptions to WDL (11/29/22 1830)   At baseline. Mental Status and Level of Consciousness: Arousable. Pulmonary Status:   O2 Device: Nasal cannula (11/29/22 1830)   Adequate oxygenation and airway patent. Complications related to anesthesia: None    Post-anesthesia assessment completed. No concerns. Signed By: Kendall Hughes MD    11/29/2022  Post anesthesia nausea and vomiting:  controlled      INITIAL Post-op Vital signs:   Vitals Value Taken Time   /53 11/29/22 1830   Temp 36.8 °C (98.3 °F) 11/29/22 1720   Pulse 54 11/29/22 1834   Resp 8 11/29/22 1834   SpO2 95 % 11/29/22 1834   Vitals shown include unvalidated device data.

## 2022-11-29 NOTE — PROGRESS NOTES
Bedside shift change report given to Sandy Stevenson RN (oncoming nurse) by Victor M Alvarado RN (offgoing nurse). Report included the following information SBAR, Kardex, ED Summary, Procedure Summary, Intake/Output, MAR, Accordion, Recent Results, and Med Rec Status.

## 2022-11-29 NOTE — PROGRESS NOTES
Transition of Care Plan:     RUR: 19% - \"moderate risk\"  LOS: 13 days   Disposition: SNF - [address] [place] [address] with resumed OP HD (pending insurance auth)  OP HD schedule: [facility]/W/F, chair time: 6:00 AM   Date FOC offered: 2022 (verbal)  Date FOC received: 2022  Date authorization started with reference number: Nardahilary Santiago initiated 22 via Filippo Friday; Pablo Reed  22. New auth initiated via [facility] , pending  Date authorization received and expires: Narda Santiago approved 22; auth  22  Accepting facility: [address] [place] [address]   Follow up appointments: PCP & specialists as indicated   DME needed: Defer to SNF for DME needs  Transportation at Discharge: BLS transport needed  [address] or means to access home: N/A - pt transitioning to SNF at d/c  IM Medicare Letter: 2nd IM needed prior to d/c  Is patient a  and connected with the [place]? No             Caregiver Contact: Pt's wife Arpit Antonio: [phone])  Discharge Caregiver contacted prior to discharge? To be contacted prior to d/c   Care Conference needed?: Not at this time  COVID test: Rapid COVID test completed 22; results negative     Initial note: Chart reviewed for updates. Per Vascular Surgeon note from 22, pt is anticipated to have angiogram completed this morning. CM faxed clinical updates, including therapy notes from 22 to  in effort to re-initiate insurance auth for SNF placement at [address] [place] [address] (original auth  22). Updates on the status of auth to be reported out once available. Attending MD updated & agreeable to disposition. CM provided pt with several transportation resources to explore for transport to and from OP HD on 22 re: [facility], \"Your\" [facility] to [employer], and [employer].  CM will meet with pt after angiogram to report updates on the status of disposition and confirm method of transport to and from OP HD while admitted at [facility].      NEHAL Virk  Care Manager, [facility]

## 2022-11-29 NOTE — PROGRESS NOTES
Hospitalist Progress Note    Subjective:     Daily Progress Note: 11/28/2022 7:14 AM    Hospital Course: Patient is a 29-year-old black male with a history of end-stage disease on hemodialysis Monday, Wednesday,, type 2 diabetes, GERD, hypertension, chronic hypoxic respiratory failure requiring 2 L oxygen nasal cannula at home, systolic heart failure who presented to the emergency room on 11/16/2022 for worsening weakness and recurrent falls. He denied hitting his head or losing consciousness. CT of the head and spine were negative for acute findings. As patient was unable to walk he was admitted in the hospital for further evaluation. Nephrology consulted for hemodialysis management. Neurology consulted. Vitamin B12 low, folate low and began supplementation. MRI of the lumbar spine showed spinal stenosis with multilevel degenerative disc disease. Per neurology recommend physical and occupational therapy and EMG as an outpatient. PT and OT recommend SNF placement. Case management consulted for discharge planning. Overnight on 11/20/2022 patient became confused and altered. Sodium was 132, potassium 7.6, BUN 60, serum creatinine 8.01, glucose 181, ammonia level 35. He was started on lactulose. Ammonia level trending down. Calcitonin and lactic acid and WBC within normal limits. Doubt infectious process. CT of the head showed no acute intracranial abnormality. CT of the cervical spine showed no fracture but did show cervical degenerative disc disease. Subjective: Patient is alert and oriented x4.     \"I could not finish HD today\"  Not able to complete HS today, going for arteriogram in AM to check LUE HD access  Placed a soft collar on patient  Awaiting SNF bed once HD access functioning    Review of Systems   No fevers, + neck pain, no SOB or cough, no CP, No nausea, No vomiting, No diarrhea, No abdominal pain      Objective:     Patient Vitals for the past 24 hrs:   Temp Pulse Resp BP SpO2 22 2104 -- 64 -- 122/71 100 %   22 1554 97.7 °F (36.5 °C) 71 18 (!) 151/77 100 %   22 1026 97.9 °F (36.6 °C) (!) 103 18 (!) 148/93 --   22 1015 -- 95 18 136/70 --   22 1000 -- 64 18 138/79 --   22 0945 -- 63 18 133/79 --   22 0930 -- 64 18 110/73 --   22 0915 -- 64 18 138/77 --   22 0900 -- 60 18 (!) 143/76 --   22 0845 -- 61 18 (!) 144/77 --   22 0830 -- 64 18 (!) 141/81 --   22 0815 -- 76 18 (!) 149/87 --   22 0810 97.6 °F (36.4 °C) 66 18 (!) 151/88 --   22 0756 97.9 °F (36.6 °C) 69 19 (!) 145/91 98 %   22 2339 98.1 °F (36.7 °C) (!) 58 17 120/70 100 %          O2 Flow Rate (L/min): 3 l/min O2 Device: None (Room air)    Temp (24hrs), Av.8 °F (36.6 °C), Min:97.6 °F (36.4 °C), Max:98.1 °F (36.7 °C)      No intake/output data recorded.  0701 -  1900  In: -   Out: 125     PHYSICAL EXAM:  Constitutional: No acute distress  Skin: Extremities and face reveal no rashes. HEENT: Sclerae anicteric. Extra-occular muscles are intact. Oral mucosa dry do not see any plaques indicating yeast infection. Cardiovascular: Regular rate and rhythm. Respiratory:  Clear breath sounds bilaterally with no wheezes, rales, or rhonchi. GI: Abdomen nondistended, soft, and nontender. Normal active bowel sounds. Musculoskeletal: No pitting edema of the lower legs. Able to move all ext  Neurological:  Patient is alert and oriented.  Cranial nerves II-XII grossly intact  Psychiatric: Mood appears appropriate       Data Review    Recent Results (from the past 24 hour(s))   GLUCOSE, POC    Collection Time: 22 10:31 PM   Result Value Ref Range    Glucose (POC) 102 65 - 117 mg/dL    Performed by Kaylene Collet (TRV RN)    GLUCOSE, POC    Collection Time: 22  7:43 AM   Result Value Ref Range    Glucose (POC) 96 65 - 117 mg/dL    Performed by Hunter Gonzalez (PCT)    RENAL FUNCTION PANEL    Collection Time: 22  8:15 AM   Result Value Ref Range    Sodium 130 (L) 136 - 145 mmol/L    Potassium 4.5 3.5 - 5.1 mmol/L    Chloride 92 (L) 97 - 108 mmol/L    CO2 26 21 - 32 mmol/L    Anion gap 12 5 - 15 mmol/L    Glucose 96 65 - 100 mg/dL     (H) 6 - 20 MG/DL    Creatinine 8.79 (H) 0.70 - 1.30 MG/DL    BUN/Creatinine ratio 11 (L) 12 - 20      eGFR 6 (L) >60 ml/min/1.73m2    Calcium 9.4 8.5 - 10.1 MG/DL    Phosphorus 6.6 (H) 2.6 - 4.7 MG/DL    Albumin 3.0 (L) 3.5 - 5.0 g/dL   CBC W/O DIFF    Collection Time: 11/28/22  8:15 AM   Result Value Ref Range    WBC 9.7 4.1 - 11.1 K/uL    RBC 3.57 (L) 4.10 - 5.70 M/uL    HGB 10.7 (L) 12.1 - 17.0 g/dL    HCT 31.8 (L) 36.6 - 50.3 %    MCV 89.1 80.0 - 99.0 FL    MCH 30.0 26.0 - 34.0 PG    MCHC 33.6 30.0 - 36.5 g/dL    RDW 15.0 (H) 11.5 - 14.5 %    PLATELET 386 717 - 507 K/uL    MPV 11.1 8.9 - 12.9 FL    NRBC 0.0 0  WBC    ABSOLUTE NRBC 0.00 0.00 - 0.01 K/uL   GLUCOSE, POC    Collection Time: 11/28/22 12:38 PM   Result Value Ref Range    Glucose (POC) 121 (H) 65 - 117 mg/dL    Performed by Domenica Keane (PCT)    GLUCOSE, POC    Collection Time: 11/28/22  5:01 PM   Result Value Ref Range    Glucose (POC) 107 65 - 117 mg/dL    Performed by Gladis Cabot RN        Radiology review: MRI of spine    Assessment:   1. Severe neck pain due to DDD cervical spine  2. Recurrent falls secondary to spinal stenosis and multilevel disc and facet degeneration  3. Elevated troponin secondary to kidney disease. 4.  End-stage renal disease on hemodialysis Monday, Wednesday, Friday  5. Chronic systolic heart failure  6. Type 2 diabetes  7. Chronic hypoxic respiratory failure  8. Acute confusion multifactorial  9. LUE HD access malfunction not able to complete HD      Plan:   1.  CT of the head and spine reviewed with no acute findings. MRI of the lumbar spine showed spinal stenosis. Neurology consulted. They recommend physical therapy and EMG as an outpatient. Tramadol as needed. Lidocaine patch ordered. Vitamin B12 and folate low and supplemented. PT OT recommend SNF placement. Case management consulted. Schedule tylenol, try low dose oxycodone tonight, soft cervical collar  2. Troponins elevated on admission. No chest pain. Chest likely secondary to renal disease  3. Dialysis Monday, Wednesday, Friday nephrology consulted. On Renvela  4. Patient on Entresto, Coreg, Bumex, aspirin. 5.  Glucose levels are stable. Patient on insulin sliding scale and NPH 20 units twice daily  6. Vascular to check LUE HD access in AM  7.  Discharge once HD access functioning    CODE STATUS full     DVT prophylaxis: Heparin  Ulcer prophylaxis: Not indicated    Phill Cranker, MD

## 2022-11-29 NOTE — PERIOP NOTES
TRANSFER - IN REPORT:    Verbal report received from [facility], [address] on [place]  being received from 3117 for       Report consisted of patients Situation, Background, Assessment and   Recommendations(SBAR). Information from the following report(s) SBAR, Kardex, Intake/Output, MAR, and Recent Results was reviewed with the receiving nurse. Opportunity for questions and clarification was provided. Assessment completed upon patients arrival to unit and care assumed. 1515:  20g to right wrist area unable to flush. IV resited 20 g to right hand    1544:  Dr. Kermit Jose at bedside to see the patient & discuss anesthesia plan of care.

## 2022-11-29 NOTE — PROGRESS NOTES
Nephrology Progress Note  [facility] / [address], [place], [address]  Phone - [phone]  Fax - [phone]                 Patient: Felton Harden                   YOB: 1945        Date- 11/29/2022                      Admit Date: 11/16/2022  CC: Follow up for esrd    IMPRESSION & PLAN:   ESRD-- hd mwf at [facility]  Hyperkalemia- k 7.6 on 11-21-22  Anemia of ckd  hyponatremia  Sec. Hyperpara  S/p fall  Weakness  H/o CHF  Dm 2      PLAN-  NO HD TODAY  Patient doesn't want hd toay  Appreciate input from DR. Maribel Brody  Patient will need angiogram of his AVG-His bun has remained high  Plan to continue hd MWF schedule  Continue coreg and entresto  Hold epogen     Subjective: Interval History:   11-29-22- he had only 2 hour hd yesterday. He shortned his hd due to pain. He is scheduled for angiogram for his hd access. No sob  11/28/22--seen on hd-Bp stable-- labs reviewed- bun high- ?stenosis in access- k normal        Objective:   Vitals:    11/28/22 1554 11/28/22 2104 11/28/22 2356 11/29/22 0356   BP: (!) 151/77 122/71 137/74    Pulse: 71 64 67    Resp: 18 18 16    Temp: 97.7 °F (36.5 °C) 97.8 °F (36.6 °C) 99 °F (37.2 °C)    TempSrc:       SpO2: 100% 100% 96%    Weight:    104.8 kg (231 lb)   Height:    6' (1.829 m)      11/28 0701 - 11/29 0700  In: 100 [P.O.:100]  Out: 375 [Urine:250]  Last 3 Recorded Weights in this Encounter    11/20/22 2202 11/23/22 1148 11/29/22 0356   Weight: 105.1 kg (231 lb 9.6 oz) 99 kg (218 lb 4.8 oz) 104.8 kg (231 lb)        Physical exam:    GEN:  NAD  NECK:  Supple, no thyromegaly  RESP: Clear  b/l, no  wheezing,   CVS: RRR,S1,S2   NEURO: non focal, normal speech  EXT:Upper arm avg +    Chart reviewed. Pertinent Notes reviewed.      Data Review :  Recent Labs     11/28/22  0815   *   K 4.5   CL 92*   CO2 26   *   CREA 8.79*   GLU 96   CA 9.4   PHOS 6.6* Recent Labs     11/28/22  0815   WBC 9.7   HGB 10.7*   HCT 31.8*          No results for input(s): FE, TIBC, PSAT, FERR in the last 72 hours.    Lab Results   Component Value Date/Time    Hemoglobin A1c 5.6 12/21/2021 11:12 AM    Hemoglobin A1c 8.2 (H) 05/05/2021 03:29 AM    Hemoglobin A1c 10.5 (H) 11/16/2016 10:45 AM        Lab Results   Component Value Date/Time    Microalb/Creat ratio (ug/mg creat.) 897.2 (H) 04/11/2019 12:27 PM     Lab Results   Component Value Date/Time    NT pro-BNP 12,079 (H) 09/12/2022 06:02 AM    NT pro-BNP 5,709 (H) 03/15/2022 08:39 PM    NT pro-BNP 5,474 (H) 05/04/2021 06:09 AM    NT pro-BNP 2,497 (H) 04/17/2021 10:54 AM    NT pro-BNP 9,341 (H) 07/21/2020 05:33 AM     US Results (most recent):  Medication list  reviewed  Current Facility-Administered Medications   Medication Dose Route Frequency    oxyCODONE IR (ROXICODONE) tablet 5 mg  5 mg Oral Q8H PRN    traMADoL (ULTRAM) tablet 50 mg  50 mg Oral Q6H PRN    artificial saliva (MOUTH KOTE) 1 Spray  1 Spray Oral Q2H PRN    acetaminophen (TYLENOL) tablet 650 mg  650 mg Oral Q6H    benzocaine-menthoL (CEPACOL) lozenge 1 Lozenge  1 Lozenge Mucous Membrane PRN    lactulose (CHRONULAC) 10 gram/15 mL solution 30 mL  20 g Oral BID    hydrALAZINE (APRESOLINE) 20 mg/mL injection 20 mg  20 mg IntraVENous Q6H PRN    lidocaine 4 % patch 1 Patch  1 Patch TransDERmal Q24H    methocarbamoL (ROBAXIN) tablet 750 mg  750 mg Oral QID    cyanocobalamin (VITAMIN B12) tablet 500 mcg  500 mcg Oral DAILY    folic acid (FOLVITE) tablet 1 mg  1 mg Oral DAILY    insulin NPH (NOVOLIN N, HUMULIN N) injection 20 Units  20 Units SubCUTAneous ACB&D    sodium chloride (NS) flush 5-40 mL  5-40 mL IntraVENous PRN    polyethylene glycol (MIRALAX) packet 17 g  17 g Oral DAILY PRN    ondansetron (ZOFRAN ODT) tablet 4 mg  4 mg Oral Q8H PRN    Or    ondansetron (ZOFRAN) injection 4 mg  4 mg IntraVENous Q6H PRN    heparin (porcine) injection 5,000 Units  5,000 Units SubCUTAneous Q8H    insulin lispro (HUMALOG) injection   SubCUTAneous AC&HS    glucose chewable tablet 16 g  4 Tablet Oral PRN    glucagon (GLUCAGEN) injection 1 mg  1 mg IntraMUSCular PRN    dextrose 10% infusion 0-250 mL  0-250 mL IntraVENous PRN    aspirin chewable tablet 81 mg  81 mg Oral DAILY    bumetanide (BUMEX) tablet 2 mg  2 mg Oral DAILY    carvediloL (COREG) tablet 12.5 mg  12.5 mg Oral BID WITH MEALS    sacubitriL-valsartan (ENTRESTO) 24-26 mg tablet 1 Tablet  1 Tablet Oral BID    finasteride (PROSCAR) tablet 5 mg  5 mg Oral DAILY    fluticasone propionate (FLONASE) 50 mcg/actuation nasal spray 2 Spray  2 Spray Both Nostrils DAILY    gabapentin (NEURONTIN) capsule 300 mg  300 mg Oral TID PRN    [Held by provider] sevelamer carbonate (RENVELA) tab 800 mg  800 mg Oral BID    tamsulosin (FLOMAX) capsule 0.8 mg  0.8 mg Oral DAILY        Juan Guerra MD  11/29/2022

## 2022-11-30 LAB
ANION GAP BLD CALC-SCNC: 8 MMOL/L (ref 10–20)
ANION GAP SERPL CALC-SCNC: 6 MMOL/L (ref 5–15)
BASOPHILS # BLD: 0 K/UL (ref 0–0.1)
BASOPHILS NFR BLD: 0 % (ref 0–1)
BUN SERPL-MCNC: 96 MG/DL (ref 6–20)
BUN/CREAT SERPL: 11 (ref 12–20)
CA-I BLD-MCNC: 1.12 MMOL/L (ref 1.12–1.32)
CALCIUM SERPL-MCNC: 9.1 MG/DL (ref 8.5–10.1)
CHLORIDE BLD-SCNC: 98 MMOL/L (ref 98–107)
CHLORIDE SERPL-SCNC: 97 MMOL/L (ref 97–108)
CO2 BLD-SCNC: 26.9 MMOL/L (ref 21–32)
CO2 SERPL-SCNC: 27 MMOL/L (ref 21–32)
COVID-19 RAPID TEST, COVR: NOT DETECTED
CREAT BLD-MCNC: 7.32 MG/DL (ref 0.6–1.3)
CREAT SERPL-MCNC: 8.74 MG/DL (ref 0.7–1.3)
DIFFERENTIAL METHOD BLD: ABNORMAL
EOSINOPHIL # BLD: 0 K/UL (ref 0–0.4)
EOSINOPHIL NFR BLD: 0 % (ref 0–7)
ERYTHROCYTE [DISTWIDTH] IN BLOOD BY AUTOMATED COUNT: 14.9 % (ref 11.5–14.5)
GLUCOSE BLD STRIP.AUTO-MCNC: 136 MG/DL (ref 65–117)
GLUCOSE BLD STRIP.AUTO-MCNC: 157 MG/DL (ref 65–117)
GLUCOSE BLD STRIP.AUTO-MCNC: 159 MG/DL (ref 65–117)
GLUCOSE BLD STRIP.AUTO-MCNC: 265 MG/DL (ref 65–117)
GLUCOSE BLD STRIP.AUTO-MCNC: 96 MG/DL (ref 65–117)
GLUCOSE BLD-MCNC: 97 MG/DL (ref 65–100)
GLUCOSE SERPL-MCNC: 154 MG/DL (ref 65–100)
HCT VFR BLD AUTO: 33 % (ref 36.6–50.3)
HGB BLD-MCNC: 10.7 G/DL (ref 12.1–17)
IMM GRANULOCYTES # BLD AUTO: 0.1 K/UL (ref 0–0.04)
IMM GRANULOCYTES NFR BLD AUTO: 1 % (ref 0–0.5)
LYMPHOCYTES # BLD: 0.6 K/UL (ref 0.8–3.5)
LYMPHOCYTES NFR BLD: 8 % (ref 12–49)
MCH RBC QN AUTO: 29.9 PG (ref 26–34)
MCHC RBC AUTO-ENTMCNC: 32.4 G/DL (ref 30–36.5)
MCV RBC AUTO: 92.2 FL (ref 80–99)
MONOCYTES # BLD: 0.3 K/UL (ref 0–1)
MONOCYTES NFR BLD: 4 % (ref 5–13)
NEUTS SEG # BLD: 6 K/UL (ref 1.8–8)
NEUTS SEG NFR BLD: 87 % (ref 32–75)
NRBC # BLD: 0 K/UL (ref 0–0.01)
NRBC BLD-RTO: 0 PER 100 WBC
PLATELET # BLD AUTO: 306 K/UL (ref 150–400)
PMV BLD AUTO: 10.9 FL (ref 8.9–12.9)
POTASSIUM BLD-SCNC: 5.6 MMOL/L (ref 3.5–5.1)
POTASSIUM SERPL-SCNC: 6.1 MMOL/L (ref 3.5–5.1)
RBC # BLD AUTO: 3.58 M/UL (ref 4.1–5.7)
RBC MORPH BLD: ABNORMAL
SERVICE CMNT-IMP: ABNORMAL
SERVICE CMNT-IMP: NORMAL
SODIUM BLD-SCNC: 132 MMOL/L (ref 136–145)
SODIUM SERPL-SCNC: 130 MMOL/L (ref 136–145)
SOURCE, COVRS: NORMAL
WBC # BLD AUTO: 7 K/UL (ref 4.1–11.1)
WBC MORPH BLD: ABNORMAL

## 2022-11-30 PROCEDURE — 87635 SARS-COV-2 COVID-19 AMP PRB: CPT

## 2022-11-30 PROCEDURE — 36415 COLL VENOUS BLD VENIPUNCTURE: CPT

## 2022-11-30 PROCEDURE — 74011000250 HC RX REV CODE- 250: Performed by: NURSE PRACTITIONER

## 2022-11-30 PROCEDURE — 65270000046 HC RM TELEMETRY

## 2022-11-30 PROCEDURE — 80048 BASIC METABOLIC PNL TOTAL CA: CPT

## 2022-11-30 PROCEDURE — 74011250637 HC RX REV CODE- 250/637: Performed by: SURGERY

## 2022-11-30 PROCEDURE — 74011250636 HC RX REV CODE- 250/636: Performed by: NURSE PRACTITIONER

## 2022-11-30 PROCEDURE — 77010033678 HC OXYGEN DAILY

## 2022-11-30 PROCEDURE — 74011636637 HC RX REV CODE- 636/637: Performed by: SURGERY

## 2022-11-30 PROCEDURE — 94760 N-INVAS EAR/PLS OXIMETRY 1: CPT

## 2022-11-30 PROCEDURE — 85025 COMPLETE CBC W/AUTO DIFF WBC: CPT

## 2022-11-30 PROCEDURE — 74011250637 HC RX REV CODE- 250/637: Performed by: NURSE PRACTITIONER

## 2022-11-30 PROCEDURE — 74011636637 HC RX REV CODE- 636/637: Performed by: NURSE PRACTITIONER

## 2022-11-30 PROCEDURE — 82962 GLUCOSE BLOOD TEST: CPT

## 2022-11-30 PROCEDURE — 74011000250 HC RX REV CODE- 250: Performed by: SURGERY

## 2022-11-30 PROCEDURE — 90935 HEMODIALYSIS ONE EVALUATION: CPT

## 2022-11-30 PROCEDURE — 74011250636 HC RX REV CODE- 250/636: Performed by: SURGERY

## 2022-11-30 RX ORDER — DEXTROSE MONOHYDRATE 100 MG/ML
250 INJECTION, SOLUTION INTRAVENOUS ONCE
Status: COMPLETED | OUTPATIENT
Start: 2022-11-30 | End: 2022-11-30

## 2022-11-30 RX ORDER — CALCIUM GLUCONATE 20 MG/ML
1 INJECTION, SOLUTION INTRAVENOUS ONCE
Status: COMPLETED | OUTPATIENT
Start: 2022-11-30 | End: 2022-11-30

## 2022-11-30 RX ADMIN — METHOCARBAMOL TABLETS 750 MG: 750 TABLET, COATED ORAL at 22:13

## 2022-11-30 RX ADMIN — ACETAMINOPHEN 650 MG: 325 TABLET ORAL at 05:26

## 2022-11-30 RX ADMIN — SODIUM CHLORIDE, PRESERVATIVE FREE 10 ML: 5 INJECTION INTRAVENOUS at 05:32

## 2022-11-30 RX ADMIN — METHOCARBAMOL TABLETS 750 MG: 750 TABLET, COATED ORAL at 17:10

## 2022-11-30 RX ADMIN — ACETAMINOPHEN 650 MG: 325 TABLET ORAL at 17:09

## 2022-11-30 RX ADMIN — ACETAMINOPHEN 650 MG: 325 TABLET ORAL at 23:44

## 2022-11-30 RX ADMIN — Medication 20 UNITS: at 17:10

## 2022-11-30 RX ADMIN — OXYCODONE 5 MG: 5 TABLET ORAL at 20:18

## 2022-11-30 RX ADMIN — Medication 2 UNITS: at 17:16

## 2022-11-30 RX ADMIN — SACUBITRIL AND VALSARTAN 1 TABLET: 24; 26 TABLET, FILM COATED ORAL at 17:09

## 2022-11-30 RX ADMIN — SODIUM CHLORIDE, PRESERVATIVE FREE 10 ML: 5 INJECTION INTRAVENOUS at 22:14

## 2022-11-30 RX ADMIN — DEXTROSE MONOHYDRATE 250 ML: 100 INJECTION, SOLUTION INTRAVENOUS at 06:07

## 2022-11-30 RX ADMIN — Medication 5 UNITS: at 06:00

## 2022-11-30 RX ADMIN — SODIUM ZIRCONIUM CYCLOSILICATE 10 G: 10 POWDER, FOR SUSPENSION ORAL at 06:11

## 2022-11-30 RX ADMIN — CARVEDILOL 12.5 MG: 12.5 TABLET, FILM COATED ORAL at 17:10

## 2022-11-30 RX ADMIN — HEPARIN SODIUM 5000 UNITS: 5000 INJECTION INTRAVENOUS; SUBCUTANEOUS at 15:12

## 2022-11-30 RX ADMIN — SODIUM CHLORIDE, PRESERVATIVE FREE 10 ML: 5 INJECTION INTRAVENOUS at 15:14

## 2022-11-30 RX ADMIN — LACTULOSE 30 ML: 20 SOLUTION ORAL at 17:09

## 2022-11-30 RX ADMIN — CALCIUM GLUCONATE 1000 MG: 20 INJECTION, SOLUTION INTRAVENOUS at 06:07

## 2022-11-30 RX ADMIN — HEPARIN SODIUM 5000 UNITS: 5000 INJECTION INTRAVENOUS; SUBCUTANEOUS at 05:26

## 2022-11-30 RX ADMIN — HEPARIN SODIUM 5000 UNITS: 5000 INJECTION INTRAVENOUS; SUBCUTANEOUS at 22:13

## 2022-11-30 NOTE — PROGRESS NOTES
Transition of Care Plan:     RUR: 20% - \"high risk\"  LOS: 14 days   Disposition: SNF - [address] [place] [address] with resumed OP HD (pending bed availability)  OP HD schedule: [place] Valadez, M/W/F, chair time: 6:00 AM   Date FOC offered: 2022 (verbal)  Date FOC received: 2022  Date authorization started with reference number: Marcus Womack initiated 22 via Ana Maria Gaytan; Debbi Curiel  22. New auth initiated via Ana Maria Gaytan 22, approved 22, expires 22  Date authorization received and expires: Marcus Womack approved 22; auth  22. New auth approved 22; auth expires 22  Accepting facility: [address] [place] [address]   Follow up appointments: PCP & specialists as indicated   DME needed: Defer to SNF for DME needs  Transportation at Discharge: BLS transport needed  [address] or means to access home: N/A - pt transitioning to SNF at d/c  IM Medicare Letter: 2nd IM needed prior to d/c  Is patient a [place] and connected with the [place]? No             Caregiver Contact: Pt's wife Turner Aw: [phone])  Discharge Caregiver contacted prior to discharge? To be contacted prior to d/c   Care Conference needed?: Not at this time  COVID test: Rapid COVID test completed 22; results negative     Initial note: Chart reviewed. ADRIENNE received voicemail from Ana Maria Gaytan reporting pt's insurance Debbi Curiel was approved for SNF placement at [address] [place] [address]. Auth approved through 22 (next review date). [facility] reference #: [id number] auth ID #: [id number]. CM met with pt at bedside, reported update regarding approved insurance auth for SNF, and inquired about method of transportation to and from OP HD. Pt reported he wasn't able to contact the transportation resources CM provided 22. CM expressed concern regarding pt's input, reiterating that he's uable to be admitted to SNF until method of transport to and from OP HD is confirmed.  CM encouraged pt to request assistance from his wife/family, as his insurance auth expires 12/1/22. CM reiterated that transportation will have to be arranged/paid for privately, as Medicare does not provide coverage for services. Pt requested for CM to contact his wife Mary Covington) to request her assistance reviewing transportation resources; CM to complete request. Pt reiterated his Bahai will pay for services, he just \"hasn't gotten around to arranging anything. \" CM will contact [address] [place] [address] admission director Nigel Mcnally: [phone]) to inquire about bed availability for admission. Updates to be provided once available.     Marcela Santos, MSW  Care Manager, [facility]

## 2022-11-30 NOTE — PROGRESS NOTES
Occupational Therapy  Medical record reviewed. Will defer OT treatment at this time as pt is in HD. Will continue to follow.

## 2022-11-30 NOTE — PROGRESS NOTES
Speech Pathology Note    Chart reviewed. Patient currently off unit for HD. Will defer SLP visit at this time and continue to follow acutely. Thank you.     Taniya Shanks M.S., CCC-SLP

## 2022-11-30 NOTE — PROGRESS NOTES
Pt rested comfortably, VSS, drowsy post-operatively at start of shift but more alert this AM, PIV x1, labs drawn overnight, weaned down to chronic 2L O2 NC    0500 K 6.1 this AM, notified nephrology on call Selvin Peterson NP and received orders for lokelma and calcium gluconate/d10/insulin cocktail, cardiac monitoring added at this time as well. Patient to have HD today, first shift per nephrology    Cardiac rhythm normal sinus rhythm    0630 TRANSFER - OUT REPORT:    Verbal report given to Romelia Sommers RN (name) on Alvino Solid  being transferred to Dialysis Suite (unit) for ordered procedure       Report consisted of patients Situation, Background, Assessment and   Recommendations(SBAR). Information from the following report(s) SBAR, Kardex, ED Summary, Procedure Summary, Intake/Output, MAR, and Recent Results was reviewed with the receiving nurse. Lines:   Peripheral IV 11/29/22 Posterior;Right Hand (Active)   Site Assessment Clean, dry, & intact 11/30/22 0359   Phlebitis Assessment 0 11/30/22 0359   Infiltration Assessment 0 11/30/22 0359   Dressing Status Clean, dry, & intact 11/30/22 0359   Dressing Type Tape;Transparent 11/30/22 0359   Hub Color/Line Status Pink;Flushed 11/30/22 0359        Opportunity for questions and clarification was provided. Patient transported with:   Telemetry monitor  O2 @ 2L  Transport tech    0700 Bedside shift change report given to NEHA Page (oncoming nurse) by [address], RN (offgoing nurse). Report included the following information SBAR, Kardex, ED Summary, Intake/Output, MAR, and Recent Results.

## 2022-11-30 NOTE — PROGRESS NOTES
Nephrology Progress Note  [place]       [facility] / [address] [facility], [address]  Phone - [phone]  Fax - [phone]                 Patient: Yolanda Garcia                   YOB: 1945        Date- 11/30/2022                      Admit Date: 11/16/2022  CC: Follow up for end-stage renal disease  IMPRESSION & PLAN:   End-stage renal disease-- hd mwf at [facility]  Hyperkalemia- k 7.6 on 11-21-22  Anemia of ckd  hyponatremia  Sec. Hyperpara  S/p fall  Weakness  H/o CHF  Dm 2  S/pLEFT ARM FISTULAGRAM WITH ANGIOPLASTY       PLAN-  Seen on dialysis today  Will do 4-hour dialysis today  Appreciate input from DR. Maricruz Rios  continue hd MWF schedule  Continue coreg and entresto  Hold epogen     Subjective: Interval History:   11/30/2022--seen on dialysis today--he feels much better--hyperkalemia K 6.1    11-29-22- he had only 2 hour hd yesterday. He shortned his hd due to pain. He is scheduled for angiogram for his hd access. No sob  11/28/22--seen on hd-Bp stable-- labs reviewed- bun high- ?stenosis in access- k normal        Objective:   Vitals:    11/30/22 0830 11/30/22 0845 11/30/22 0900 11/30/22 0915   BP: 130/77 130/74 125/69 137/73   Pulse: (!) 53 (!) 50 (!) 47 (!) 50   Resp: 18 18 18 18   Temp:       TempSrc:       SpO2:       Weight:       Height:          11/29 0701 - 11/30 0700  In: 100 [I.V.:100]  Out: 520 [Urine:500]  Last 3 Recorded Weights in this Encounter    11/23/22 1148 11/29/22 0356 11/30/22 0359   Weight: 99 kg (218 lb 4.8 oz) 104.8 kg (231 lb) 106.1 kg (234 lb)        Physical exam:    GEN: NAD   NECK:  Supple, no thyromegaly  RESP: Clear b/l, no  wheezing,   CVS: RRR,S1,S2   NEURO: non focal, normal speech   EXT:Upper arm avg +    Chart reviewed. Pertinent Notes reviewed.      Data Review :  Recent Labs     11/30/22  0351 11/28/22  0815   * 130*   K 6.1* 4.5   CL 97 92*   CO2 27 26   BUN 96* 101*   CREA 8.74* 8.79*   * 96   CA 9.1 9.4   PHOS  --  6.6*       Recent Labs     11/30/22  0351 11/28/22  0815   WBC 7.0 9.7   HGB 10.7* 10.7*   HCT 33.0* 31.8*    298       No results for input(s): FE, TIBC, PSAT, FERR in the last 72 hours.    Lab Results   Component Value Date/Time    Hemoglobin A1c 5.6 12/21/2021 11:12 AM    Hemoglobin A1c 8.2 (H) 05/05/2021 03:29 AM    Hemoglobin A1c 10.5 (H) 11/16/2016 10:45 AM        Lab Results   Component Value Date/Time    Microalb/Creat ratio (ug/mg creat.) 897.2 (H) 04/11/2019 12:27 PM     Lab Results   Component Value Date/Time    NT pro-BNP 12,079 (H) 09/12/2022 06:02 AM    NT pro-BNP 5,709 (H) 03/15/2022 08:39 PM    NT pro-BNP 5,474 (H) 05/04/2021 06:09 AM    NT pro-BNP 2,497 (H) 04/17/2021 10:54 AM    NT pro-BNP 9,341 (H) 07/21/2020 05:33 AM     US Results (most recent):  Medication list  reviewed  Current Facility-Administered Medications   Medication Dose Route Frequency    sodium chloride (NS) flush 5-40 mL  5-40 mL IntraVENous Q8H    sodium chloride (NS) flush 5-40 mL  5-40 mL IntraVENous PRN    oxyCODONE IR (ROXICODONE) tablet 5 mg  5 mg Oral Q8H PRN    traMADoL (ULTRAM) tablet 50 mg  50 mg Oral Q6H PRN    artificial saliva (MOUTH KOTE) 1 Spray  1 Spray Oral Q2H PRN    acetaminophen (TYLENOL) tablet 650 mg  650 mg Oral Q6H    benzocaine-menthoL (CEPACOL) lozenge 1 Lozenge  1 Lozenge Mucous Membrane PRN    lactulose (CHRONULAC) 10 gram/15 mL solution 30 mL  20 g Oral BID    hydrALAZINE (APRESOLINE) 20 mg/mL injection 20 mg  20 mg IntraVENous Q6H PRN    lidocaine 4 % patch 1 Patch  1 Patch TransDERmal Q24H    methocarbamoL (ROBAXIN) tablet 750 mg  750 mg Oral QID    cyanocobalamin (VITAMIN B12) tablet 500 mcg  500 mcg Oral DAILY    folic acid (FOLVITE) tablet 1 mg  1 mg Oral DAILY    insulin NPH (NOVOLIN N, HUMULIN N) injection 20 Units  20 Units SubCUTAneous ACB&D    sodium chloride (NS) flush 5-40 mL  5-40 mL IntraVENous PRN    polyethylene glycol (MIRALAX) packet 17 g  17 g Oral DAILY PRN    ondansetron (ZOFRAN ODT) tablet 4 mg  4 mg Oral Q8H PRN    Or    ondansetron (ZOFRAN) injection 4 mg  4 mg IntraVENous Q6H PRN    heparin (porcine) injection 5,000 Units  5,000 Units SubCUTAneous Q8H    insulin lispro (HUMALOG) injection   SubCUTAneous AC&HS    glucose chewable tablet 16 g  4 Tablet Oral PRN    glucagon (GLUCAGEN) injection 1 mg  1 mg IntraMUSCular PRN    dextrose 10% infusion 0-250 mL  0-250 mL IntraVENous PRN    aspirin chewable tablet 81 mg  81 mg Oral DAILY    bumetanide (BUMEX) tablet 2 mg  2 mg Oral DAILY    carvediloL (COREG) tablet 12.5 mg  12.5 mg Oral BID WITH MEALS    sacubitriL-valsartan (ENTRESTO) 24-26 mg tablet 1 Tablet  1 Tablet Oral BID    finasteride (PROSCAR) tablet 5 mg  5 mg Oral DAILY    fluticasone propionate (FLONASE) 50 mcg/actuation nasal spray 2 Spray  2 Spray Both Nostrils DAILY    gabapentin (NEURONTIN) capsule 300 mg  300 mg Oral TID PRN    [Held by provider] sevelamer carbonate (RENVELA) tab 800 mg  800 mg Oral BID    tamsulosin (FLOMAX) capsule 0.8 mg  0.8 mg Oral DAILY        Fransisco Fuentes MD  11/30/2022

## 2022-11-30 NOTE — PROGRESS NOTES
Hospitalist Progress Note    Subjective:     Daily Progress Note: 11/29/2022 7:14 AM    Hospital Course: Patient is a 44-year-old black male with a history of end-stage disease on hemodialysis Monday, Wednesday,, type 2 diabetes, GERD, hypertension, chronic hypoxic respiratory failure requiring 2 L oxygen nasal cannula at home, systolic heart failure who presented to the emergency room on 11/16/2022 for worsening weakness and recurrent falls. He denied hitting his head or losing consciousness. CT of the head and spine were negative for acute findings. As patient was unable to walk he was admitted in the hospital for further evaluation. Nephrology consulted for hemodialysis management. Neurology consulted. Vitamin B12 low, folate low and began supplementation. MRI of the lumbar spine showed spinal stenosis with multilevel degenerative disc disease. Per neurology recommend physical and occupational therapy and EMG as an outpatient. PT and OT recommend SNF placement. Case management consulted for discharge planning. Overnight on 11/20/2022 patient became confused and altered. Sodium was 132, potassium 7.6, BUN 60, serum creatinine 8.01, glucose 181, ammonia level 35. He was started on lactulose. Ammonia level trending down. Calcitonin and lactic acid and WBC within normal limits. Doubt infectious process. CT of the head showed no acute intracranial abnormality. CT of the cervical spine showed no fracture but did show cervical degenerative disc disease.         Subjective:  Sleeping post-op for LUE fistulogram   Mod stenosis angioplasty, not clear why   Still with some neck pain  Awaiting SNF bed once HD access functioning    Review of Systems   No fevers, + neck pain, no SOB or cough, no CP, No nausea, No vomiting, No diarrhea, No abdominal pain      Objective:     Patient Vitals for the past 24 hrs:   Temp Pulse Resp BP SpO2   11/29/22 1847 97.9 °F (36.6 °C) 62 18 135/84 100 %   11/29/22 1830 97.8 °F (36.6 °C) (!) 54 13 (!) 114/53 99 %   22 1815 -- 71 14 (!) 114/54 93 %   22 1800 -- (!) 56 14 (!) 116/53 99 %   22 1745 -- (!) 56 15 105/64 100 %   22 1735 -- (!) 59 15 120/60 100 %   22 1730 -- (!) 57 14 (!) 130/58 100 %   22 1725 -- (!) 55 13 119/60 100 %   22 1720 98.3 °F (36.8 °C) 61 14 126/61 100 %   22 1718 98.3 °F (36.8 °C) -- -- 114/63 100 %   22 1540 -- 69 16 137/68 98 %   22 1511 98.1 °F (36.7 °C) 62 16 (!) 144/2 98 %   22 0941 98.1 °F (36.7 °C) 64 16 (!) 149/74 95 %   22 2356 99 °F (37.2 °C) 67 16 137/74 96 %          O2 Flow Rate (L/min): 4 l/min O2 Device: Nasal cannula    Temp (24hrs), Av.2 °F (36.8 °C), Min:97.8 °F (36.6 °C), Max:99 °F (37.2 °C)      No intake/output data recorded. 701 - 1900  In: 200 [P.O.:100; I.V.:100]  Out: 395 [Urine:250]    PHYSICAL EXAM:  Constitutional: No acute distress  Skin: Extremities and face reveal no rashes. HEENT: Sclerae anicteric. Extra-occular muscles are intact. Oral mucosa dry do not see any plaques indicating yeast infection. Cardiovascular: Regular rate and rhythm. Respiratory:  Clear breath sounds bilaterally with no wheezes, rales, or rhonchi. GI: Abdomen nondistended, soft, and nontender. Normal active bowel sounds. Musculoskeletal: No pitting edema of the lower legs. Able to move all ext  Neurological:  Patient is alert and oriented.  Cranial nerves II-XII grossly intact  Psychiatric: Mood appears appropriate       Data Review    Recent Results (from the past 24 hour(s))   GLUCOSE, POC    Collection Time: 22  9:59 PM   Result Value Ref Range    Glucose (POC) 146 (H) 65 - 117 mg/dL    Performed by Edilia Sage (BONILLA RN)    GLUCOSE, POC    Collection Time: 22  9:33 AM   Result Value Ref Range    Glucose (POC) 114 65 - 117 mg/dL    Performed by Daniel Almeida [facility]    GLUCOSE, POC    Collection Time: 22 11:14 AM   Result Value Ref Range    Glucose (POC) 102 65 - 117 mg/dL    Performed by Ro PEARCE    GLUCOSE, POC    Collection Time: 11/29/22  3:07 PM   Result Value Ref Range    Glucose (POC) 102 65 - 117 mg/dL    Performed by Kimberly Epps St, POC    Collection Time: 11/29/22  6:12 PM   Result Value Ref Range    Glucose (POC) 100 65 - 117 mg/dL    Performed by Liset Flores        Radiology review: MRI of spine    Assessment:   1. Severe neck pain due to DDD cervical spine  2. Recurrent falls secondary to spinal stenosis and multilevel disc and facet degeneration  3. Elevated troponin secondary to kidney disease. 4.  End-stage renal disease on hemodialysis Monday, Wednesday, Friday  5. Chronic systolic heart failure  6. Type 2 diabetes  7. Chronic hypoxic respiratory failure  8. Acute confusion multifactorial  9. LUE HD access malfunction not able to complete HD  = fistulogram 11/29      Plan:   1.  CT of the head and spine reviewed with no acute findings. MRI of the lumbar spine showed spinal stenosis. Neurology consulted. They recommend physical therapy and EMG as an outpatient. Tramadol as needed. Lidocaine patch ordered. Vitamin B12 and folate low and supplemented. PT OT recommend SNF placement. Case management consulted. Schedule tylenol, try low dose oxycodone tonight, soft cervical collar  2. Troponins elevated on admission. No chest pain. Chest likely secondary to renal disease  3. Dialysis Monday, Wednesday, Friday nephrology consulted. On Renvela  4. Patient on Entresto, Coreg, Bumex, aspirin. 5.  Glucose levels are stable. Patient on insulin sliding scale and NPH 20 units twice daily  6. Vascular to check LUE HD access in AM  7.  Discharge once HD access functioning    CODE STATUS full     DVT prophylaxis: Heparin  Ulcer prophylaxis: Not indicated    Chelo Selby MD

## 2022-11-30 NOTE — PROGRESS NOTES
Problem: Falls - Risk of  Goal: *Absence of Falls  Description: Document Holly Litter Fall Risk and appropriate interventions in the flowsheet. Outcome: Progressing Towards Goal  Note: Fall Risk Interventions:  Mobility Interventions: Bed/chair exit alarm, Communicate number of staff needed for ambulation/transfer, Patient to call before getting OOB, PT Consult for mobility concerns    Mentation Interventions: Adequate sleep, hydration, pain control, Bed/chair exit alarm, Door open when patient unattended, Increase mobility, More frequent rounding, Reorient patient    Medication Interventions: Bed/chair exit alarm, Patient to call before getting OOB, Teach patient to arise slowly    Elimination Interventions: Bed/chair exit alarm, Call light in reach, Patient to call for help with toileting needs    History of Falls Interventions: Bed/chair exit alarm, Door open when patient unattended, Room close to nurse's station         Problem: Pressure Injury - Risk of  Goal: *Prevention of pressure injury  Description: Document Kelby Scale and appropriate interventions in the flowsheet.   Outcome: Progressing Towards Goal  Note: Pressure Injury Interventions:  Sensory Interventions: Assess changes in LOC, Assess need for specialty bed, Discuss PT/OT consult with provider, Float heels, Keep linens dry and wrinkle-free, Maintain/enhance activity level, Minimize linen layers, Pad between skin to skin    Moisture Interventions: Absorbent underpads, Apply protective barrier, creams and emollients, Internal/External urinary devices, Offer toileting Q_hr    Activity Interventions: Assess need for specialty bed, Increase time out of bed, PT/OT evaluation    Mobility Interventions: Assess need for specialty bed, Float heels, HOB 30 degrees or less, PT/OT evaluation    Nutrition Interventions: Discuss nutritional consult with provider, Offer support with meals,snacks and hydration, Document food/fluid/supplement intake    Friction and Shear Interventions: Lift sheet, Minimize layers                Problem: General Medical Care Plan  Goal: *Vital signs within specified parameters  Outcome: Progressing Towards Goal  Goal: *Labs within defined limits  Outcome: Progressing Towards Goal  Goal: *Absence of infection signs and symptoms  Outcome: Progressing Towards Goal  Goal: *Optimal pain control at patient's stated goal  Outcome: Progressing Towards Goal  Goal: *Skin integrity maintained  Outcome: Progressing Towards Goal  Goal: *Fluid volume balance  Outcome: Progressing Towards Goal  Goal: *Optimize nutritional status  Outcome: Progressing Towards Goal  Goal: *Anxiety reduced or absent  Outcome: Progressing Towards Goal  Goal: *Progressive mobility and function (eg: ADL's)  Outcome: Progressing Towards Goal

## 2022-11-30 NOTE — PROGRESS NOTES
Overnight Nephrology Cross Cover  6p-6a    Notified of patient am potassium-- 6.1  Now on cardiac monitor  Ordered temp meds- calcium, insulin/dextrose and lokelma  For HD today, spoke with Jose David Rao 9502 nurse, patient to run first [facility] [address] [facility]

## 2022-11-30 NOTE — PROGRESS NOTES
Hospitalist Progress Note    Subjective:     Daily Progress Note: 11/30/2022 3:39 pm    Hospital Course: Patient is a 46-year-old black male with a history of end-stage disease on hemodialysis Monday, Wednesday,, type 2 diabetes, GERD, hypertension, chronic hypoxic respiratory failure requiring 2 L oxygen nasal cannula at home, systolic heart failure who presented to the emergency room on 11/16/2022 for worsening weakness and recurrent falls. He denied hitting his head or losing consciousness. CT of the head and spine were negative for acute findings. As patient was unable to walk he was admitted in the hospital for further evaluation. Nephrology consulted for hemodialysis management. Neurology consulted. Vitamin B12 low, folate low and began supplementation. MRI of the lumbar spine showed spinal stenosis with multilevel degenerative disc disease. Per neurology recommend physical and occupational therapy and EMG as an outpatient. PT and OT recommend SNF placement. Case management consulted for discharge planning. Overnight on 11/20/2022 patient became confused and altered. Sodium was 132, potassium 7.6, BUN 60, serum creatinine 8.01, glucose 181, ammonia level 35. He was started on lactulose. Ammonia level trending down. Calcitonin and lactic acid and WBC within normal limits. Doubt infectious process. CT of the head showed no acute intracranial abnormality. CT of the cervical spine showed no fracture but did show cervical degenerative disc disease.         Subjective:  S/p LUE fistulogram yesterday   Mod stenosis angioplasty, not clear why   Still with some neck pain  Awaiting SNF bed - auth obtained for [facility]-- awaiting bed now    Review of Systems   No fevers, + neck pain, no SOB or cough, no CP, No nausea, No vomiting, No diarrhea, No abdominal pain      Objective:     Patient Vitals for the past 24 hrs:   Temp Pulse Resp BP SpO2   11/30/22 1442 97.9 °F (36.6 °C) 72 16 111/66 98 % 22 1145 -- (!) 49 18 135/72 --   22 1130 -- 73 18 (!) 149/102 --   22 1115 -- (!) 53 18 127/65 --   22 1100 -- (!) 51 18 138/71 --   22 1045 -- (!) 51 18 135/70 --   22 1030 -- (!) 47 18 136/71 --   22 1015 -- (!) 52 18 134/66 --   22 1000 -- (!) 46 18 134/68 --   22 0945 -- (!) 53 18 130/74 --   22 0930 -- (!) 57 18 135/79 --   22 0915 -- (!) 50 18 137/73 --   22 0900 -- (!) 47 18 125/69 --   22 0845 -- (!) 50 18 130/74 --   22 0830 -- (!) 53 18 130/77 --   22 0815 -- (!) 56 18 134/76 --   22 0800 -- (!) 52 18 128/74 --   22 0745 -- (!) 55 18 134/76 --   22 0743 97.8 °F (36.6 °C) (!) 53 18 131/71 --   22 0409 -- -- -- -- 100 %   22 0009 97.7 °F (36.5 °C) 81 17 104/60 97 %   22 1847 97.9 °F (36.6 °C) 62 18 135/84 100 %   22 1830 97.8 °F (36.6 °C) (!) 54 13 (!) 114/53 99 %   22 181 -- 71 14 (!) 114/54 93 %   22 1800 -- (!) 56 14 (!) 116/53 99 %   22 1745 -- (!) 56 15 105/64 100 %   22 1735 -- (!) 59 15 120/60 100 %   22 1730 -- (!) 57 14 (!) 130/58 100 %   22 1725 -- (!) 55 13 119/60 100 %   22 1720 98.3 °F (36.8 °C) 61 14 126/61 100 %   22 1718 98.3 °F (36.8 °C) -- -- 114/63 100 %   22 1540 -- 69 16 137/68 98 %          O2 Flow Rate (L/min): 2 l/min O2 Device: Nasal cannula    Temp (24hrs), Av °F (36.7 °C), Min:97.7 °F (36.5 °C), Max:98.3 °F (36.8 °C)      No intake/output data recorded.  1901 -  0700  In: 200 [P.O.:100; I.V.:100]  Out: 770 [Urine:750]    PHYSICAL EXAM:  Constitutional: No acute distress  Skin: Extremities and face reveal no rashes. HEENT: Sclerae anicteric. Extra-occular muscles are intact. Oral mucosa dry do not see any plaques indicating yeast infection. Cardiovascular: Regular rate and rhythm. Respiratory:  Clear breath sounds bilaterally with no wheezes, rales, or rhonchi.    GI: Abdomen nondistended, soft, and nontender. Normal active bowel sounds. Musculoskeletal: No pitting edema of the lower legs. Able to move all ext  Neurological:  Patient is alert and oriented. Cranial nerves II-XII grossly intact  Psychiatric: Mood appears appropriate       Data Review    Recent Results (from the past 24 hour(s))   POC CHEM8    Collection Time: 11/29/22  4:31 PM   Result Value Ref Range    Calcium, ionized (POC) 1.12 1.12 - 1.32 mmol/L    Sodium (POC) 132 (L) 136 - 145 mmol/L    Potassium (POC) 5.6 (H) 3.5 - 5.1 mmol/L    Chloride (POC) 98 98 - 107 mmol/L    CO2 (POC) 26.9 21 - 32 mmol/L    Anion gap (POC) 8 (L) 10 - 20 mmol/L    Glucose (POC) 97 65 - 100 mg/dL    Creatinine (POC) 7.32 (H) 0.6 - 1.3 mg/dL    eGFR (POC) 7 (L) >60 ml/min/1.73m2    Comment Comment Not Indicated. GLUCOSE, POC    Collection Time: 11/29/22  6:12 PM   Result Value Ref Range    Glucose (POC) 100 65 - 117 mg/dL    Performed by Elicia Patel    GLUCOSE, POC    Collection Time: 11/29/22  9:31 PM   Result Value Ref Range    Glucose (POC) 234 (H) 65 - 117 mg/dL    Performed by Damaris Hope PCT    CBC WITH AUTOMATED DIFF    Collection Time: 11/30/22  3:51 AM   Result Value Ref Range    WBC 7.0 4.1 - 11.1 K/uL    RBC 3.58 (L) 4.10 - 5.70 M/uL    HGB 10.7 (L) 12.1 - 17.0 g/dL    HCT 33.0 (L) 36.6 - 50.3 %    MCV 92.2 80.0 - 99.0 FL    MCH 29.9 26.0 - 34.0 PG    MCHC 32.4 30.0 - 36.5 g/dL    RDW 14.9 (H) 11.5 - 14.5 %    PLATELET 213 672 - 795 K/uL    MPV 10.9 8.9 - 12.9 FL    NRBC 0.0 0  WBC    ABSOLUTE NRBC 0.00 0.00 - 0.01 K/uL    NEUTROPHILS 87 (H) 32 - 75 %    LYMPHOCYTES 8 (L) 12 - 49 %    MONOCYTES 4 (L) 5 - 13 %    EOSINOPHILS 0 0 - 7 %    BASOPHILS 0 0 - 1 %    IMMATURE GRANULOCYTES 1 (H) 0.0 - 0.5 %    ABS. NEUTROPHILS 6.0 1.8 - 8.0 K/UL    ABS. LYMPHOCYTES 0.6 (L) 0.8 - 3.5 K/UL    ABS. MONOCYTES 0.3 0.0 - 1.0 K/UL    ABS. EOSINOPHILS 0.0 0.0 - 0.4 K/UL    ABS. BASOPHILS 0.0 0.0 - 0.1 K/UL    ABS. IMM. GRANS. 0.1 (H) 0.00 - 0.04 K/UL    DF SMEAR SCANNED      RBC COMMENTS NORMOCYTIC, NORMOCHROMIC      WBC COMMENTS Increased Platelets     METABOLIC PANEL, BASIC    Collection Time: 11/30/22  3:51 AM   Result Value Ref Range    Sodium 130 (L) 136 - 145 mmol/L    Potassium 6.1 (H) 3.5 - 5.1 mmol/L    Chloride 97 97 - 108 mmol/L    CO2 27 21 - 32 mmol/L    Anion gap 6 5 - 15 mmol/L    Glucose 154 (H) 65 - 100 mg/dL    BUN 96 (H) 6 - 20 MG/DL    Creatinine 8.74 (H) 0.70 - 1.30 MG/DL    BUN/Creatinine ratio 11 (L) 12 - 20      eGFR 6 (L) >60 ml/min/1.73m2    Calcium 9.1 8.5 - 10.1 MG/DL   GLUCOSE, POC    Collection Time: 11/30/22  6:10 AM   Result Value Ref Range    Glucose (POC) 159 (H) 65 - 117 mg/dL    Performed by Christobal Black    GLUCOSE, POC    Collection Time: 11/30/22  6:37 AM   Result Value Ref Range    Glucose (POC) 265 (H) 65 - 117 mg/dL    Performed by Christobal Black    GLUCOSE, POC    Collection Time: 11/30/22  1:27 PM   Result Value Ref Range    Glucose (POC) 96 65 - 117 mg/dL    Performed by [facility]    COVID-19 RAPID TEST    Collection Time: 11/30/22  1:29 PM   Result Value Ref Range    Specimen source Nasopharyngeal      COVID-19 rapid test Not detected NOTD         Radiology review: MRI of spine    Assessment:   1. Severe neck pain due to DDD cervical spine  2. Recurrent falls secondary to spinal stenosis and multilevel disc and facet degeneration  3. Elevated troponin secondary to kidney disease. 4.  End-stage renal disease on hemodialysis Monday, Wednesday, Friday  5. Chronic systolic heart failure  6. Type 2 diabetes  7. Chronic hypoxic respiratory failure  8. Acute confusion multifactorial  9. LUE HD access malfunction not able to complete HD s/p fistulogram 11/29-- Cleared by Vascular to use it now      Plan:   1.  CT of the head and spine reviewed with no acute findings. MRI of the lumbar spine showed spinal stenosis. Neurology consulted.   They recommend physical therapy and EMG as an outpatient. Tramadol as needed. Lidocaine patch ordered. Vitamin B12 and folate low and supplemented. PT OT recommend SNF placement. Case management consulted. Schedule tylenol, try low dose oxycodone tonight, soft cervical collar  2. Troponins elevated on admission. No chest pain. Chest likely secondary to renal disease  3. Dialysis Monday, Wednesday, Friday nephrology consulted. On Renvela  4. Patient on Entresto, Coreg, Bumex, aspirin. 5.  Glucose levels are stable. Patient on insulin sliding scale and NPH 20 units twice daily  6. Cleared for DC once AVF works well today  7.  [facility]    CODE STATUS full     DVT prophylaxis: Heparin  Ulcer prophylaxis: Not indicated    Rustam Napier MD    Total time: 26 mins

## 2022-11-30 NOTE — PROGRESS NOTES
Physical Therapy note    Chart reviewed. Patient off unit at dialysis. Will defer for now.     Latha Meadows, PT, DPT

## 2022-11-30 NOTE — PROCEDURES
Hemodialysis / [phone]    Vitals Pre Post Assessment Pre Post   BP BP: 131/71 (11/30/22 0743) 138/67 LOC A&Ox4 A&Ox4   HR Pulse (Heart Rate): (!) 53 (11/30/22 0743)   55 Lungs Clear, diminished bases Clear, diminished bases   Resp Resp Rate: 18 (11/30/22 0743) 18 Cardiac RRR, Bradycardic RRR, Bradycardic   Temp Temp: 97.8 °F (36.6 °C) (11/30/22 0743) 98.4 Skin intact intact   Weight  N/a N/a Edema 1+ BLE 1+ BLE   Tele status Remote tele Remote tele Pain 0 0     Orders   Duration: Start: 2481 End: 5798 Total: 4 hours   Dialyzer: Dialyzer/Set Up Inspection: Deejay Lazcano (11/30/22 0743)   Nupur Killian Bath: Dialysate K (mEq/L): 2 (11/30/22 0743)   Ca Bath: Dialysate CA (mEq/L): 2.5 (11/30/22 0743)   Na: Dialysate NA (mEq/L): 140 (11/30/22 0743)   Bicarb: Dialysate HCO3 (mEq/L): 40 (11/30/22 0743)   Target Fluid Removal: Goal/Amount of Fluid to Remove (mL): 500 mL (11/30/22 0743)     Access   Type & Location: L AVG   Comments:  Pre-assessment: skin CDI. No s/s of infection. + B/T. No issues with cannulation. Running well at . Post assessment: No issues with hemostasis, + B/T remains. Dressing CDI.      Labs   HBsAg (Antigen) / date: Negative 11/16/22                                              HBsAb (Antibody) / date: Susceptible 11/16/22   Source: Epic   Obtained/Reviewed  Critical Results Called HGB   Date Value Ref Range Status   11/30/2022 10.7 (L) 12.1 - 17.0 g/dL Final     Potassium   Date Value Ref Range Status   11/30/2022 6.1 (H) 3.5 - 5.1 mmol/L Final     Comment:     INVESTIGATED PER DELTA CHECK PROTOCOL  NO VISIBLE HEMOLYSIS       Calcium   Date Value Ref Range Status   11/30/2022 9.1 8.5 - 10.1 MG/DL Final     BUN   Date Value Ref Range Status   11/30/2022 96 (H) 6 - 20 MG/DL Final     Creatinine   Date Value Ref Range Status   11/30/2022 8.74 (H) 0.70 - 1.30 MG/DL Final        Meds Given   Name Dose Route   none                 Adequacy / Fluid    Total Liters Process: 89.9L   Net Fluid Removed: 500ml Comments   Time Out Done:   (Time) 0762   Admitting Diagnosis: Impaired mobility   Consent obtained/signed: Informed Consent Verified: Yes (chronic dialysis, no consent needed.) (11/30/22 1845)   Machine / RO # Machine Number: [id number] (11/30/22 0697)   Primary Nurse Rpt Pre: Yesi Maya RN   Primary Nurse Rpt Post: Lisy Mcpherson RN   Pt Education: Procedure   Care Plan: Ongoing   Pts outpatient clinic: [facility]     Tx Summary   Comments:   SBAR received from Primary RN. Pt arrived to HD suite A&Ox4. Chronic consent applies. Pt agreeable to treatment. 5151: Pt cannulated with 62G needles per policy & without issue. VSS. Dialysis Tx initiated. 1015: Dr. Angelita Murcia at bedside. Orders received to extend today's treatment by 30 minutes. 1100: 200 ml NS given to prevent clotting. **Patient tolerated treatment well without complaints or complications. All lines and connections remained intact and visible throughout entire treatment. **    1147: Tx ended. VSS. All possible blood returned to patient. Hemostasis achieved without issue after hand held pressure x 7 minutes. Bed locked and in the lowest position, call bell and belongings in reach. SBAR given to Primary, RN. Patient is stable at time of their departure. All Dialysis related medications have been reviewed.

## 2022-12-01 LAB
GLUCOSE BLD STRIP.AUTO-MCNC: 103 MG/DL (ref 65–117)
GLUCOSE BLD STRIP.AUTO-MCNC: 109 MG/DL (ref 65–117)
GLUCOSE BLD STRIP.AUTO-MCNC: 129 MG/DL (ref 65–117)
GLUCOSE BLD STRIP.AUTO-MCNC: 97 MG/DL (ref 65–117)
SERVICE CMNT-IMP: ABNORMAL
SERVICE CMNT-IMP: NORMAL

## 2022-12-01 PROCEDURE — 74011000250 HC RX REV CODE- 250: Performed by: SURGERY

## 2022-12-01 PROCEDURE — 77010033678 HC OXYGEN DAILY

## 2022-12-01 PROCEDURE — 74011636637 HC RX REV CODE- 636/637: Performed by: SURGERY

## 2022-12-01 PROCEDURE — 74011250637 HC RX REV CODE- 250/637: Performed by: SURGERY

## 2022-12-01 PROCEDURE — 74011250636 HC RX REV CODE- 250/636: Performed by: SURGERY

## 2022-12-01 PROCEDURE — 82962 GLUCOSE BLOOD TEST: CPT

## 2022-12-01 PROCEDURE — 92526 ORAL FUNCTION THERAPY: CPT

## 2022-12-01 PROCEDURE — 74011250637 HC RX REV CODE- 250/637: Performed by: INTERNAL MEDICINE

## 2022-12-01 PROCEDURE — 65270000046 HC RM TELEMETRY

## 2022-12-01 PROCEDURE — 94760 N-INVAS EAR/PLS OXIMETRY 1: CPT

## 2022-12-01 RX ORDER — AMOXICILLIN 250 MG
1 CAPSULE ORAL DAILY
Status: DISCONTINUED | OUTPATIENT
Start: 2022-12-01 | End: 2022-12-04 | Stop reason: HOSPADM

## 2022-12-01 RX ORDER — OXYCODONE HYDROCHLORIDE 5 MG/1
5 TABLET ORAL
Status: DISCONTINUED | OUTPATIENT
Start: 2022-12-01 | End: 2022-12-04 | Stop reason: HOSPADM

## 2022-12-01 RX ADMIN — CARVEDILOL 12.5 MG: 12.5 TABLET, FILM COATED ORAL at 17:48

## 2022-12-01 RX ADMIN — FINASTERIDE 5 MG: 5 TABLET, FILM COATED ORAL at 10:29

## 2022-12-01 RX ADMIN — CARVEDILOL 12.5 MG: 12.5 TABLET, FILM COATED ORAL at 10:29

## 2022-12-01 RX ADMIN — SODIUM CHLORIDE, PRESERVATIVE FREE 10 ML: 5 INJECTION INTRAVENOUS at 17:55

## 2022-12-01 RX ADMIN — OXYCODONE 5 MG: 5 TABLET ORAL at 10:29

## 2022-12-01 RX ADMIN — Medication 20 UNITS: at 17:49

## 2022-12-01 RX ADMIN — METHOCARBAMOL TABLETS 750 MG: 750 TABLET, COATED ORAL at 22:00

## 2022-12-01 RX ADMIN — ACETAMINOPHEN 650 MG: 325 TABLET ORAL at 05:10

## 2022-12-01 RX ADMIN — METHOCARBAMOL TABLETS 750 MG: 750 TABLET, COATED ORAL at 13:01

## 2022-12-01 RX ADMIN — HEPARIN SODIUM 5000 UNITS: 5000 INJECTION INTRAVENOUS; SUBCUTANEOUS at 13:02

## 2022-12-01 RX ADMIN — FOLIC ACID 1 MG: 1 TABLET ORAL at 10:29

## 2022-12-01 RX ADMIN — LACTULOSE 30 ML: 20 SOLUTION ORAL at 10:31

## 2022-12-01 RX ADMIN — HEPARIN SODIUM 5000 UNITS: 5000 INJECTION INTRAVENOUS; SUBCUTANEOUS at 23:11

## 2022-12-01 RX ADMIN — ACETAMINOPHEN 650 MG: 325 TABLET ORAL at 17:48

## 2022-12-01 RX ADMIN — LACTULOSE 30 ML: 20 SOLUTION ORAL at 17:48

## 2022-12-01 RX ADMIN — SACUBITRIL AND VALSARTAN 1 TABLET: 24; 26 TABLET, FILM COATED ORAL at 17:48

## 2022-12-01 RX ADMIN — SODIUM CHLORIDE, PRESERVATIVE FREE 10 ML: 5 INJECTION INTRAVENOUS at 22:00

## 2022-12-01 RX ADMIN — TAMSULOSIN HYDROCHLORIDE 0.8 MG: 0.4 CAPSULE ORAL at 10:29

## 2022-12-01 RX ADMIN — SACUBITRIL AND VALSARTAN 1 TABLET: 24; 26 TABLET, FILM COATED ORAL at 10:30

## 2022-12-01 RX ADMIN — METHOCARBAMOL TABLETS 750 MG: 750 TABLET, COATED ORAL at 17:48

## 2022-12-01 RX ADMIN — SENNOSIDES AND DOCUSATE SODIUM 1 TABLET: 50; 8.6 TABLET ORAL at 13:02

## 2022-12-01 RX ADMIN — METHOCARBAMOL TABLETS 750 MG: 750 TABLET, COATED ORAL at 10:30

## 2022-12-01 RX ADMIN — ACETAMINOPHEN 650 MG: 325 TABLET ORAL at 23:30

## 2022-12-01 RX ADMIN — Medication 500 MCG: at 10:29

## 2022-12-01 RX ADMIN — ACETAMINOPHEN 650 MG: 325 TABLET ORAL at 13:02

## 2022-12-01 RX ADMIN — Medication 20 UNITS: at 07:30

## 2022-12-01 RX ADMIN — SODIUM CHLORIDE, PRESERVATIVE FREE 10 ML: 5 INJECTION INTRAVENOUS at 05:11

## 2022-12-01 RX ADMIN — BUMETANIDE 2 MG: 1 TABLET ORAL at 10:30

## 2022-12-01 RX ADMIN — HEPARIN SODIUM 5000 UNITS: 5000 INJECTION INTRAVENOUS; SUBCUTANEOUS at 05:10

## 2022-12-01 RX ADMIN — ASPIRIN 81 MG: 81 TABLET, CHEWABLE ORAL at 10:30

## 2022-12-01 NOTE — PROGRESS NOTES
Transition of Care Plan:     RUR: 20% - \"high risk\"  LOS: 15 days   Disposition: SNF - [facility] with resumed OP HD (pending bed availability - anticipated for 12/3/22)  OP HD schedule: [facility] M/W/F, chair time: 6:00 AM   Date FOC offered: 2022 (verbal)  Date FOC received: 2022  Date authorization started with reference number: Melia Altman initiated 22 via Oanh Anand; Lolly Comber  22. New auth initiated via Oanh Anand 22, approved 22, expires 22  Date authorization received and expires: Melia Altman approved 22; auth  22. New auth approved 22; auth expires 22  Accepting facility: [facility]   Follow up appointments: PCP & specialists as indicated   DME needed: Defer to SNF for DME needs  Transportation at Discharge: BLS transport needed  Hermes Dick or means to access home: N/A - pt transitioning to SNF at d/c  IM Medicare Letter: 2nd IM needed prior to d/c  Is patient a  and connected with the [place]? No             Caregiver Contact: Pt's wife Adam Dougherty: [phone])  Discharge Caregiver contacted prior to discharge? To be contacted prior to d/c   Care Conference needed?: Not at this time  COVID test: Rapid COVID test completed 22; results negative     Update - 12:32 PM: CM contacted [facility] to request insurance auth for SNF to be extended; CM was informed pt will be able to admit to [facility] under current approved Donata Comber until 22. Update - 11:30 AM: CM confirmed with [facility] admission director that the facility will not have a bed available to accept pt until 12/3/22. CM will contact Oanh Anand ([phone]) to request auth extension. CM met with pt, step-daughter Michael Tolentino), and friend at bedside, reported update regarding SNF bed availability, and followed up on transportation for OP HD.  Step-daughter reported pt's wife was involved in an accident on her way to the hospital and will not be coming to meet with . Step-daughter reported she lives locally and will provide transportation to and from OP HD while pt is admitted at [facility]. Initial note: CM acknowledged d/c. Chart reviewed for updates. [facility] (pending bed availability at the facility). Pt's insurance Imani Prey will  today (22) via [employer]. ADRIENNE contacted [address] [place] [address] admission director Horacio Mac: [phone]) to inquire about bed availability for admission today. Layton Laughlin reported he will contact CM back with confirmation on bed availability for admission today; in the event bed is not available today, Layton Laughlin confirmed facility will have male bed for admission 12/3/22. ADRIENNE contacted pt's wife Frandy Houston: [phone]) to provide updates related to disposition and confirm method of transport to and from OP HD while admitted at [facility]. Wife reported she's been in contact with [employer] & plans to utilize agency for services. Wife reported she's coming to visit with the pt this morning between 11:30 AM - 12:00 PM and will provide details for scheduled trip with [employer] upon her arrival. Wife agreeable to the d/c plan; no immediate questions/concerns identified.     Lawanda Willingham, MSW  Care Manager, [address]

## 2022-12-01 NOTE — PROGRESS NOTES
Problem: Dysphagia (Adult)  Goal: *Acute Goals and Plan of Care (Insert Text)  Description: Speech Therapy Goals:  Initiated 11/22/22  1. Patient will participate in MBS to assess swallow safety and efficiency prior to PO diet initiation within seven days. Goal met 12/1. Outcome: Resolved/Met     SPEECH LANGUAGE PATHOLOGY DYSPHAGIA TREATMENT: WEEKLY REASSESSMENT with Discharge  Patient: Ciro Mauricio (10 y.o. male)  Date: 12/1/2022  Diagnosis: Unable to ambulate [R26.2] <principal problem not specified>  Procedure(s) (LRB):  LEFT ARM FISTULAGRAM WITH ANGIOPLASTY (Left) 2 Days Post-Op  Precautions: Aspiration, Fall    ASSESSMENT:  Patient seen for dysphagia follow-up session. Patient independently recalled need for double swallow and to alternate liquids with solids. Patient has been tolerating current PO diet (upgraded to regular texture since last SLP visit) without significant difficulty. He stated occasional sensation of food remaining in throat which he is able to orally expectorate. This presentation in consistent with MBS findings. No overt oropharyngeal dysphagia symptoms at bedside with PO trials. Education provided to patient on need to continue swallow strategies at discharge and with current PO diet. SLP will sign-off. Patient's progression toward goals since last assessment: Patient has met inpatient swallowing goals. Further skilled SLP services not indicated at this level of care. Consider Neurology/Neuropsychology as OP for cognitive deficits (e.g., impaired memory) which patient has identified and states impact his function. PLAN:  Goals have been met since last assessment. SLP signing off.   Recommendations and Planned Interventions:  Continue regular texture diet with thin liquids + double swallow to clear pharyngeal residue as seen on MBS  Recommend additional aspiration precautions including upright positioning, small bolus size, slow rate of intake  Consider OP Neurology/Neuropsychology for evaluation of cognitive changes  Discharge Recommendations: Patient pending transfer to SNF     SUBJECTIVE:   Patient stated I enjoyed the roast beef last night. \"    OBJECTIVE:   Cognitive and Communication Status:  Neurologic State: Alert  Orientation Level: Oriented X4  Cognition: Follows commands, Memory loss  Perception: Appears intact  Perseveration: No perseveration noted  Safety/Judgement: Awareness of environment    Dysphagia Treatment:  Oral Assessment:  P.O. Trials:  Patient Position: Upright in chair  Vocal quality prior to P.O.: No impairment  Consistency Presented: Solid; Thin liquid  How Presented: Self-fed/presented;Straw  Bolus Acceptance: No impairment  Bolus Formation/Control: No impairment  Propulsion: No impairment  Oral Residue: None  Initiation of Swallow: No impairment  Laryngeal Elevation: Present  Aspiration Signs/Symptoms: None  Effective Modifications: Double swallow  Cues for Modifications: None    After treatment patient left in no apparent distress:   Patient left in no apparent distress sitting up in chair, Call bell within reach, and Safety precautions posted at beside. COMMUNICATION/EDUCATION:   The patients plan of care including recommendations, planned interventions, and recommended diet changes were discussed with:  Patient .      CRISTOBAL Brown  Time Calculation: 10 mins

## 2022-12-01 NOTE — PROGRESS NOTES
Chavo Euceda         NAME:Amado Contreras  TTQ:[id number]   :1945       S/p hd yesterday  Next hd Friday    Okay to d/c renal stand point    Unable to ambulate [R26.2]     Juan Guerra MD  [facility]  [facility]  [address], [place], [address] [facility]  Phone - [phone]         Fax - [phone] [facility] Office  [address] [address]  Phone - [phone]        Fax - [phone]

## 2022-12-01 NOTE — PROGRESS NOTES
Nephrology Progress Note  [facility] / [address], [place], [address]  Phone - [phone]  Fax - [phone]                 Patient: Ciro Mauricio                   YOB: 1945        Date- 12/1/2022                      Admit Date: 11/16/2022  CC: Follow up for ESRD  IMPRESSION & PLAN:   esrd-- hd mwf at [facility]  Hyperkalemia- k 7.6 on 11-21-22  Anemia of ckd  hyponatremia  Sec. Hyperpara  S/p fall  Weakness  H/o CHF  Dm 2  S/pLEFT ARM FISTULAGRAM WITH ANGIOPLASTY       PLAN-  No hd today  Na and k should be better with hd yesterday  continue hd MWF schedule  Continue coreg and entresto  OKAY TO D/C RENAL STAND POINT     Subjective: Interval History:   12-1-22--- s/p hd yesterday-- bp stable-- no sob    11/30/2022--seen on dialysis today--he feels much better--hyperkalemia K 6.1    11-29-22- he had only 2 hour hd yesterday. He shortned his hd due to pain. He is scheduled for angiogram for his hd access. No sob  11/28/22--seen on hd-Bp stable-- labs reviewed- bun high- ?stenosis in access- k normal        Objective:   Vitals:    11/30/22 1940 11/30/22 2233 12/01/22 0257 12/01/22 0740   BP: (!) 148/89 126/67 (!) 152/79 131/67   Pulse: 70 (!) 58 (!) 58 (!) 54   Resp: 20 20 20 16   Temp: 97.9 °F (36.6 °C) 98.2 °F (36.8 °C) 98.1 °F (36.7 °C) 97.9 °F (36.6 °C)   TempSrc:       SpO2: 98% 96% 96% 100%   Weight:       Height:          No intake/output data recorded. Last 3 Recorded Weights in this Encounter    11/23/22 1148 11/29/22 0356 11/30/22 0359   Weight: 99 kg (218 lb 4.8 oz) 104.8 kg (231 lb) 106.1 kg (234 lb)        Physical exam:    GEN: nad   NECK:  Supple, no thyromegaly  RESP: clear b/l, no  wheezing,   CVS: RRR,S1,S2   NEURO: non focal, normal speech   EXT:Upper arm avg +    Chart reviewed. Pertinent Notes reviewed.      Data Review :  Recent Labs     11/30/22  0351   *   K 6.1* CL 97   CO2 27   BUN 96*   CREA 8.74*   *   CA 9.1       Recent Labs     11/30/22  0351   WBC 7.0   HGB 10.7*   HCT 33.0*          No results for input(s): FE, TIBC, PSAT, FERR in the last 72 hours.    Lab Results   Component Value Date/Time    Hemoglobin A1c 5.6 12/21/2021 11:12 AM    Hemoglobin A1c 8.2 (H) 05/05/2021 03:29 AM    Hemoglobin A1c 10.5 (H) 11/16/2016 10:45 AM        Lab Results   Component Value Date/Time    Microalb/Creat ratio (ug/mg creat.) 897.2 (H) 04/11/2019 12:27 PM     Lab Results   Component Value Date/Time    NT pro-BNP 12,079 (H) 09/12/2022 06:02 AM    NT pro-BNP 5,709 (H) 03/15/2022 08:39 PM    NT pro-BNP 5,474 (H) 05/04/2021 06:09 AM    NT pro-BNP 2,497 (H) 04/17/2021 10:54 AM    NT pro-BNP 9,341 (H) 07/21/2020 05:33 AM     US Results (most recent):  Medication list  reviewed  Current Facility-Administered Medications   Medication Dose Route Frequency    sodium chloride (NS) flush 5-40 mL  5-40 mL IntraVENous Q8H    sodium chloride (NS) flush 5-40 mL  5-40 mL IntraVENous PRN    oxyCODONE IR (ROXICODONE) tablet 5 mg  5 mg Oral Q8H PRN    traMADoL (ULTRAM) tablet 50 mg  50 mg Oral Q6H PRN    artificial saliva (MOUTH KOTE) 1 Spray  1 Spray Oral Q2H PRN    acetaminophen (TYLENOL) tablet 650 mg  650 mg Oral Q6H    benzocaine-menthoL (CEPACOL) lozenge 1 Lozenge  1 Lozenge Mucous Membrane PRN    lactulose (CHRONULAC) 10 gram/15 mL solution 30 mL  20 g Oral BID    hydrALAZINE (APRESOLINE) 20 mg/mL injection 20 mg  20 mg IntraVENous Q6H PRN    lidocaine 4 % patch 1 Patch  1 Patch TransDERmal Q24H    methocarbamoL (ROBAXIN) tablet 750 mg  750 mg Oral QID    cyanocobalamin (VITAMIN B12) tablet 500 mcg  500 mcg Oral DAILY    folic acid (FOLVITE) tablet 1 mg  1 mg Oral DAILY    insulin NPH (NOVOLIN N, HUMULIN N) injection 20 Units  20 Units SubCUTAneous ACB&D    sodium chloride (NS) flush 5-40 mL  5-40 mL IntraVENous PRN    polyethylene glycol (MIRALAX) packet 17 g  17 g Oral DAILY PRN ondansetron (ZOFRAN ODT) tablet 4 mg  4 mg Oral Q8H PRN    Or    ondansetron (ZOFRAN) injection 4 mg  4 mg IntraVENous Q6H PRN    heparin (porcine) injection 5,000 Units  5,000 Units SubCUTAneous Q8H    insulin lispro (HUMALOG) injection   SubCUTAneous AC&HS    glucose chewable tablet 16 g  4 Tablet Oral PRN    glucagon (GLUCAGEN) injection 1 mg  1 mg IntraMUSCular PRN    dextrose 10% infusion 0-250 mL  0-250 mL IntraVENous PRN    aspirin chewable tablet 81 mg  81 mg Oral DAILY    bumetanide (BUMEX) tablet 2 mg  2 mg Oral DAILY    carvediloL (COREG) tablet 12.5 mg  12.5 mg Oral BID WITH MEALS    sacubitriL-valsartan (ENTRESTO) 24-26 mg tablet 1 Tablet  1 Tablet Oral BID    finasteride (PROSCAR) tablet 5 mg  5 mg Oral DAILY    fluticasone propionate (FLONASE) 50 mcg/actuation nasal spray 2 Spray  2 Spray Both Nostrils DAILY    gabapentin (NEURONTIN) capsule 300 mg  300 mg Oral TID PRN    [Held by provider] sevelamer carbonate (RENVELA) tab 800 mg  800 mg Oral BID    tamsulosin (FLOMAX) capsule 0.8 mg  0.8 mg Oral DAILY        Kelle Larsen MD  12/1/2022

## 2022-12-01 NOTE — PROGRESS NOTES
Problem: Falls - Risk of  Goal: *Absence of Falls  Description: Document Nadine Don Fall Risk and appropriate interventions in the flowsheet. Outcome: Progressing Towards Goal  Note: Fall Risk Interventions:  Mobility Interventions: Patient to call before getting OOB    Mentation Interventions: Bed/chair exit alarm    Medication Interventions: Bed/chair exit alarm    Elimination Interventions: Bed/chair exit alarm, Call light in reach    History of Falls Interventions: Bed/chair exit alarm, Door open when patient unattended, Room close to nurse's station         Problem: Patient Education: Go to Patient Education Activity  Goal: Patient/Family Education  Outcome: Progressing Towards Goal     Problem: Patient Education: Go to Patient Education Activity  Goal: Patient/Family Education  Outcome: Progressing Towards Goal     Problem: Patient Education: Go to Patient Education Activity  Goal: Patient/Family Education  Outcome: Progressing Towards Goal     Problem: Pressure Injury - Risk of  Goal: *Prevention of pressure injury  Description: Document Kelby Scale and appropriate interventions in the flowsheet.   Outcome: Progressing Towards Goal  Note: Pressure Injury Interventions:  Sensory Interventions: Assess changes in LOC    Moisture Interventions: Minimize layers    Activity Interventions: PT/OT evaluation, Increase time out of bed    Mobility Interventions: Float heels, HOB 30 degrees or less    Nutrition Interventions: Document food/fluid/supplement intake    Friction and Shear Interventions: Apply protective barrier, creams and emollients                Problem: Patient Education: Go to Patient Education Activity  Goal: Patient/Family Education  Outcome: Progressing Towards Goal     Problem: Patient Education: Go to Patient Education Activity  Goal: Patient/Family Education  Outcome: Progressing Towards Goal     Problem: General Medical Care Plan  Goal: *Vital signs within specified parameters  Outcome: Progressing Towards Goal  Goal: *Labs within defined limits  Outcome: Progressing Towards Goal  Goal: *Absence of infection signs and symptoms  Outcome: Progressing Towards Goal  Goal: *Optimal pain control at patient's stated goal  Outcome: Progressing Towards Goal  Goal: *Skin integrity maintained  Outcome: Progressing Towards Goal  Goal: *Fluid volume balance  Outcome: Progressing Towards Goal  Goal: *Optimize nutritional status  Outcome: Progressing Towards Goal  Goal: *Anxiety reduced or absent  Outcome: Progressing Towards Goal  Goal: *Progressive mobility and function (eg: ADL's)  Outcome: Progressing Towards Goal     Problem: Patient Education: Go to Patient Education Activity  Goal: Patient/Family Education  Outcome: Progressing Towards Goal

## 2022-12-01 NOTE — PROGRESS NOTES
Hospitalist Progress Note    Subjective:     Daily Progress Note: 12/1/2022 3:39 pm    Hospital Course: Patient is a 72-year-old black male with a history of end-stage disease on hemodialysis Monday, Wednesday,, type 2 diabetes, GERD, hypertension, chronic hypoxic respiratory failure requiring 2 L oxygen nasal cannula at home, systolic heart failure who presented to the emergency room on 11/16/2022 for worsening weakness and recurrent falls. He denied hitting his head or losing consciousness. CT of the head and spine were negative for acute findings. As patient was unable to walk he was admitted in the hospital for further evaluation. Nephrology consulted for hemodialysis management. Neurology consulted. Vitamin B12 low, folate low and began supplementation. MRI of the lumbar spine showed spinal stenosis with multilevel degenerative disc disease. Per neurology recommend physical and occupational therapy and EMG as an outpatient. PT and OT recommend SNF placement. Case management consulted for discharge planning. Overnight on 11/20/2022 patient became confused and altered. Sodium was 132, potassium 7.6, BUN 60, serum creatinine 8.01, glucose 181, ammonia level 35. He was started on lactulose. Ammonia level trending down. Calcitonin and lactic acid and WBC within normal limits. Doubt infectious process. CT of the head showed no acute intracranial abnormality. CT of the cervical spine showed no fracture but did show cervical degenerative disc disease.         Subjective:  No acute issues   C/o constipation and neck pain   Review of Systems   No fevers, + neck pain, no SOB or cough, no CP, No nausea, No vomiting, No diarrhea, No abdominal pain      Objective:     Patient Vitals for the past 24 hrs:   Temp Pulse Resp BP SpO2   12/01/22 0740 97.9 °F (36.6 °C) (!) 54 16 131/67 100 %   12/01/22 0257 98.1 °F (36.7 °C) (!) 58 20 (!) 152/79 96 %   11/30/22 2233 98.2 °F (36.8 °C) (!) 58 20 126/67 96 % 22 97.9 °F (36.6 °C) 70 20 (!) 148/89 98 %   22 1800 -- 69 -- 134/70 --   22 1610 -- -- -- (!) 175/86 --          O2 Flow Rate (L/min): 2 l/min O2 Device: Nasal cannula    Temp (24hrs), Av °F (36.7 °C), Min:97.9 °F (36.6 °C), Max:98.2 °F (36.8 °C)      No intake/output data recorded.  190 -  0700  In: -   Out: 500 [Urine:500]    PHYSICAL EXAM:  Constitutional: No acute distress  Skin: Extremities and face reveal no rashes. HEENT: Sclerae anicteric. Extra-occular muscles are intact. Oral mucosa dry do not see any plaques indicating yeast infection. Cardiovascular: Regular rate and rhythm. Respiratory:  Clear breath sounds bilaterally with no wheezes, rales, or rhonchi. GI: Abdomen nondistended, soft, and nontender. Normal active bowel sounds. Musculoskeletal: No pitting edema of the lower legs. Able to move all ext  Neurological:  Patient is alert and oriented. Cranial nerves II-XII grossly intact  Psychiatric: Mood appears appropriate       Data Review    Recent Results (from the past 24 hour(s))   GLUCOSE, POC    Collection Time: 22  4:05 PM   Result Value Ref Range    Glucose (POC) 157 (H) 65 - 117 mg/dL    Performed by Sujata Brody PCT    GLUCOSE, POC    Collection Time: 22  9:05 PM   Result Value Ref Range    Glucose (POC) 136 (H) 65 - 117 mg/dL    Performed by Rafael Tolbert PCT    GLUCOSE, POC    Collection Time: 22  7:43 AM   Result Value Ref Range    Glucose (POC) 109 65 - 117 mg/dL    Performed by [employer] PCT    GLUCOSE, POC    Collection Time: 22 12:31 PM   Result Value Ref Range    Glucose (POC) 103 65 - 117 mg/dL    Performed by [employer] PCT        Radiology review: MRI of spine    Assessment:   1. Severe neck pain due to DDD cervical spine  2. Recurrent falls secondary to spinal stenosis and multilevel disc and facet degeneration  3. Elevated troponin secondary to kidney disease.   4.  End-stage renal disease on hemodialysis Monday, Wednesday, Friday  5. Chronic systolic heart failure  6. Type 2 diabetes  7. Chronic hypoxic respiratory failure  8. Acute confusion multifactorial  9. LUE HD access malfunction not able to complete HD s/p fistulogram 11/29-- Cleared by Vascular to use it now      Plan:   1.  CT of the head and spine reviewed with no acute findings. MRI of the lumbar spine showed spinal stenosis. Neurology consulted. They recommend physical therapy and EMG as an outpatient. Tramadol as needed. Lidocaine patch ordered. Vitamin B12 and folate low and supplemented. PT OT recommend SNF placement. Case management consulted. Schedule tylenol, try low dose oxycodone tonight, soft cervical collar  2. Troponins elevated on admission. No chest pain. Chest likely secondary to renal disease  3. Dialysis Monday, Wednesday, Friday nephrology consulted. On Renvela  4. Patient on Entresto, Coreg, Bumex, aspirin. 5.  Glucose levels are stable. Patient on insulin sliding scale and NPH 20 units twice daily  6. Cleared for DC once AVF works well today  7.  [facility]    CODE STATUS full     DVT prophylaxis: Heparin  Ulcer prophylaxis: Not indicated    Hayden Robles MD    Total time: 28

## 2022-12-01 NOTE — PROGRESS NOTES
End of Shift Note     Bedside shift change report given to [facility] RN,(oncoming nurse) by NEHA Samuels(offgoing nurse).   Report included the following information SBAR, Kardex, ED Summary, and MAR     Shift worked: Nights   Shift summary and any significant changes:     NO ACUTE CHANGES      Concerns for physician to address: None   [employer] phone for oncoming shift:   [phone]      Patient Information  Lucrecia Madera  78 y.o.  11/16/2022 12:20 AM by Vickie Richarda was admitted from Home     Problem List       Patient Active Problem List     Diagnosis Date Noted    UTI (lower urinary tract infection) 10/25/2013    Renal failure (ARF), acute on chronic ([address] 10/25/2013    Unable to ambulate 11/16/2022    Cervical post-laminectomy syndrome 06/09/2022    Degenerative cervical spinal stenosis 06/09/2022    Vitamin D deficiency 12/21/2021    Immune-mediated neuropathy ([address] 12/21/2021    Diabetic peripheral neuropathy associated with type 2 diabetes mellitus ([address] 12/21/2021    B12 deficiency 12/21/2021    Sensory ataxic gait 12/21/2021    Cervical spondylosis with myelopathy 12/21/2021    Type 2 diabetes mellitus with stage 4 chronic kidney disease, with long-term current use of insulin ([address] 12/21/2021    Convulsions ([address] 12/21/2021    Acute alteration in mental status 12/21/2021    Myoclonus 12/21/2021    Sepsis ([address] 08/18/2021    Acute hypoxemic respiratory failure ([address] 91/86/2157    Systolic heart failure ([address] 05/04/2021    Accelerated hypertension 07/21/2020    Elevated troponin 07/21/2020    ESRD (end stage renal disease) on dialysis ([address] 07/21/2020    Acute respiratory failure with hypoxia ([address] 07/21/2020    Acute on chronic systolic CHF (congestive heart failure) ([address] 07/21/2020    Pneumonia 05/26/2019    Acute on chronic renal failure ([address] 10/29/2016    Hypotension 10/29/2016    Chest pain 10/29/2016    Type II diabetes mellitus with nephropathy [facility] 10/29/2016    Hyperlipidemia 10/29/2016 Nephrotic syndrome 08/20/2016    Renal anasarca 08/20/2016    Diarrhea 10/28/2013    SIRS (systemic inflammatory response syndrome) ([facility] 10/28/2013    Acute pancreatitis 01/18/2011    LFT'S ABNORMAL 01/18/2011    Acute renal failure (ARF) ([facility] 01/18/2011    CKD (chronic kidney disease) stage 3, GFR 30-59 ml/min ([facility]) 01/18/2011    CAD (coronary artery disease) 01/18/2011    Abdominal pain, acute, epigastric 01/18/2011    Nausea & vomiting 01/18/2011           Past Medical History:   Diagnosis Date    Arthritis       spine    CAD (coronary artery disease)       high cholesterol    Chronic kidney disease       dialysis    Diabetes ([facility]      ESRD (end stage renal disease) ([facility])      GERD (gastroesophageal reflux disease)       HX    Hypertension      Ill-defined condition       irritable bowel syndrome    Systolic CHF ([facility]      Unspecified sleep apnea       NO CPAP         Core Measures:  CVA: No Not applicable  CHF:No Not applicable  PNA:No Not applicable     Activity:  Activity Level: Up with Assistance  Number times ambulated in hallways past shift: 0  Number of times OOB to chair past shift: 0     Cardiac:   Cardiac Monitoring: No        Access:   Current line(s): PIV      Genitourinary:   Urinary status: voiding     Respiratory:   O2 Device: None (Room air)  Chronic home O2 use?: YES  Incentive spirometer at bedside: N/A     GI:  Last Bowel Movement Date: 11/24/22  Current diet:  ADULT DIET Dysphagia - Soft & Bite Sized; Only vanilla or strawberry supplements please  ADULT ORAL NUTRITION SUPPLEMENT Breakfast, Dinner; Renal Supplement  Passing flatus: YES  Tolerating current diet: YES     Pain Management:   Patient states pain is manageable on current regimen: YES     Skin:  Kelby Score: 19  Interventions: turn team, speciality bed, float heels, increase time out of bed, foam dressing, and PT/OT consult    Patient Safety:  Fall Score:  Total Score: 4  Interventions: bed/chair alarm, assistive device (walker, cane, etc), gripper socks, pt to call before getting OOB, and stay with me (per policy)  High Fall Risk: Yes     DVT prophylaxis:  DVT prophylaxis Med- Yes  DVT prophylaxis SCD or CINDY- No      Wounds: (If Applicable)  Wounds- No  Location None      Active Consults:  IP CONSULT TO NEUROLOGY  IP CONSULT TO NEPHROLOGY     Length of Stay:  Expected LOS: 4d 7h  Actual LOS: 10  Discharge Plan: Yes.  [id number] 11/28/22

## 2022-12-02 ENCOUNTER — APPOINTMENT (OUTPATIENT)
Dept: CT IMAGING | Age: 77
End: 2022-12-02
Attending: INTERNAL MEDICINE
Payer: MEDICARE

## 2022-12-02 LAB
ANION GAP SERPL CALC-SCNC: 9 MMOL/L (ref 5–15)
BUN SERPL-MCNC: 52 MG/DL (ref 6–20)
BUN/CREAT SERPL: 9 (ref 12–20)
CALCIUM SERPL-MCNC: 9.1 MG/DL (ref 8.5–10.1)
CHLORIDE SERPL-SCNC: 98 MMOL/L (ref 97–108)
CO2 SERPL-SCNC: 29 MMOL/L (ref 21–32)
CREAT SERPL-MCNC: 5.75 MG/DL (ref 0.7–1.3)
GLUCOSE BLD STRIP.AUTO-MCNC: 110 MG/DL (ref 65–117)
GLUCOSE BLD STRIP.AUTO-MCNC: 62 MG/DL (ref 65–117)
GLUCOSE BLD STRIP.AUTO-MCNC: 66 MG/DL (ref 65–117)
GLUCOSE BLD STRIP.AUTO-MCNC: 84 MG/DL (ref 65–117)
GLUCOSE BLD STRIP.AUTO-MCNC: 91 MG/DL (ref 65–117)
GLUCOSE BLD STRIP.AUTO-MCNC: 93 MG/DL (ref 65–117)
GLUCOSE SERPL-MCNC: 117 MG/DL (ref 65–100)
POTASSIUM SERPL-SCNC: 3.3 MMOL/L (ref 3.5–5.1)
SERVICE CMNT-IMP: ABNORMAL
SERVICE CMNT-IMP: NORMAL
SODIUM SERPL-SCNC: 136 MMOL/L (ref 136–145)

## 2022-12-02 PROCEDURE — 74011000250 HC RX REV CODE- 250: Performed by: SURGERY

## 2022-12-02 PROCEDURE — 74011250637 HC RX REV CODE- 250/637: Performed by: SURGERY

## 2022-12-02 PROCEDURE — 80048 BASIC METABOLIC PNL TOTAL CA: CPT

## 2022-12-02 PROCEDURE — 77010033678 HC OXYGEN DAILY

## 2022-12-02 PROCEDURE — 94760 N-INVAS EAR/PLS OXIMETRY 1: CPT

## 2022-12-02 PROCEDURE — 74011250637 HC RX REV CODE- 250/637: Performed by: NURSE PRACTITIONER

## 2022-12-02 PROCEDURE — 74011636637 HC RX REV CODE- 636/637: Performed by: SURGERY

## 2022-12-02 PROCEDURE — 70450 CT HEAD/BRAIN W/O DYE: CPT

## 2022-12-02 PROCEDURE — 65270000046 HC RM TELEMETRY

## 2022-12-02 PROCEDURE — 36415 COLL VENOUS BLD VENIPUNCTURE: CPT

## 2022-12-02 PROCEDURE — 74011250636 HC RX REV CODE- 250/636: Performed by: SURGERY

## 2022-12-02 PROCEDURE — 90935 HEMODIALYSIS ONE EVALUATION: CPT

## 2022-12-02 PROCEDURE — 74011250637 HC RX REV CODE- 250/637: Performed by: INTERNAL MEDICINE

## 2022-12-02 PROCEDURE — 82962 GLUCOSE BLOOD TEST: CPT

## 2022-12-02 RX ORDER — LOPERAMIDE HYDROCHLORIDE 2 MG/1
2 CAPSULE ORAL
Status: DISCONTINUED | OUTPATIENT
Start: 2022-12-02 | End: 2022-12-04 | Stop reason: HOSPADM

## 2022-12-02 RX ADMIN — SODIUM CHLORIDE, PRESERVATIVE FREE 10 ML: 5 INJECTION INTRAVENOUS at 21:22

## 2022-12-02 RX ADMIN — SACUBITRIL AND VALSARTAN 1 TABLET: 24; 26 TABLET, FILM COATED ORAL at 17:49

## 2022-12-02 RX ADMIN — TAMSULOSIN HYDROCHLORIDE 0.8 MG: 0.4 CAPSULE ORAL at 08:42

## 2022-12-02 RX ADMIN — ACETAMINOPHEN 650 MG: 325 TABLET ORAL at 17:49

## 2022-12-02 RX ADMIN — SENNOSIDES AND DOCUSATE SODIUM 1 TABLET: 50; 8.6 TABLET ORAL at 08:42

## 2022-12-02 RX ADMIN — SODIUM CHLORIDE, PRESERVATIVE FREE 10 ML: 5 INJECTION INTRAVENOUS at 05:23

## 2022-12-02 RX ADMIN — METHOCARBAMOL TABLETS 750 MG: 750 TABLET, COATED ORAL at 21:22

## 2022-12-02 RX ADMIN — LACTULOSE 30 ML: 20 SOLUTION ORAL at 08:42

## 2022-12-02 RX ADMIN — ACETAMINOPHEN 650 MG: 325 TABLET ORAL at 13:06

## 2022-12-02 RX ADMIN — FOLIC ACID 1 MG: 1 TABLET ORAL at 08:42

## 2022-12-02 RX ADMIN — METHOCARBAMOL TABLETS 750 MG: 750 TABLET, COATED ORAL at 17:49

## 2022-12-02 RX ADMIN — Medication 500 MCG: at 08:42

## 2022-12-02 RX ADMIN — FINASTERIDE 5 MG: 5 TABLET, FILM COATED ORAL at 08:43

## 2022-12-02 RX ADMIN — HEPARIN SODIUM 5000 UNITS: 5000 INJECTION INTRAVENOUS; SUBCUTANEOUS at 13:07

## 2022-12-02 RX ADMIN — METHOCARBAMOL TABLETS 750 MG: 750 TABLET, COATED ORAL at 13:06

## 2022-12-02 RX ADMIN — METHOCARBAMOL TABLETS 750 MG: 750 TABLET, COATED ORAL at 08:42

## 2022-12-02 RX ADMIN — OXYCODONE 5 MG: 5 TABLET ORAL at 21:22

## 2022-12-02 RX ADMIN — LOPERAMIDE HYDROCHLORIDE 2 MG: 2 CAPSULE ORAL at 00:45

## 2022-12-02 RX ADMIN — ASPIRIN 81 MG: 81 TABLET, CHEWABLE ORAL at 08:42

## 2022-12-02 RX ADMIN — OXYCODONE 5 MG: 5 TABLET ORAL at 15:29

## 2022-12-02 RX ADMIN — ACETAMINOPHEN 650 MG: 325 TABLET ORAL at 05:22

## 2022-12-02 RX ADMIN — HEPARIN SODIUM 5000 UNITS: 5000 INJECTION INTRAVENOUS; SUBCUTANEOUS at 05:21

## 2022-12-02 RX ADMIN — Medication 20 UNITS: at 08:48

## 2022-12-02 RX ADMIN — HEPARIN SODIUM 5000 UNITS: 5000 INJECTION INTRAVENOUS; SUBCUTANEOUS at 21:22

## 2022-12-02 RX ADMIN — CARVEDILOL 12.5 MG: 12.5 TABLET, FILM COATED ORAL at 17:49

## 2022-12-02 RX ADMIN — Medication 16 G: at 14:36

## 2022-12-02 RX ADMIN — ACETAMINOPHEN: 500 TABLET ORAL at 02:12

## 2022-12-02 RX ADMIN — SODIUM CHLORIDE, PRESERVATIVE FREE 10 ML: 5 INJECTION INTRAVENOUS at 14:00

## 2022-12-02 NOTE — PROGRESS NOTES
Received notification from bedside RN about patient with regards to: requesting medication to help with sleep    Intervention given: Tylenol PM PO x 1 dose ordered

## 2022-12-02 NOTE — PROGRESS NOTES
End of Shift Note     Bedside shift change report given to Wilma SOLOMON,(oncoming nurse) by NEHA Samuels(offgoing nurse). Report included the following information SBAR, Kardex, ED Summary, and MAR     Shift worked: Nights   Shift summary and any significant changes:     Awaiting d/c. Patient been restless all night stating he wants to leave. Patient packed his bags and got dressed,pulled his IV out.     Patient also refused his dialysis   Concerns for physician to address: None   [facility] phone for oncoming shift:   [phone]      Patient Information  Shiva Edouard  78 y.o.  11/16/2022 12:20 AM by Heather Webbnatashasamina Glenroy was admitted from Home     Problem List       Patient Active Problem List     Diagnosis Date Noted    UTI (lower urinary tract infection) 10/25/2013    Renal failure (ARF), acute on chronic ([address] 10/25/2013    Unable to ambulate 11/16/2022    Cervical post-laminectomy syndrome 06/09/2022    Degenerative cervical spinal stenosis 06/09/2022    Vitamin D deficiency 12/21/2021    Immune-mediated neuropathy ([address] 12/21/2021    Diabetic peripheral neuropathy associated with type 2 diabetes mellitus ([address] 12/21/2021    B12 deficiency 12/21/2021    Sensory ataxic gait 12/21/2021    Cervical spondylosis with myelopathy 12/21/2021    Type 2 diabetes mellitus with stage 4 chronic kidney disease, with long-term current use of insulin ([address] 12/21/2021    Convulsions ([address] 12/21/2021    Acute alteration in mental status 12/21/2021    Myoclonus 12/21/2021    Sepsis ([address] 08/18/2021    Acute hypoxemic respiratory failure ([address] 35/64/0758    Systolic heart failure ([address] 05/04/2021    Accelerated hypertension 07/21/2020    Elevated troponin 07/21/2020    ESRD (end stage renal disease) on dialysis ([address] 07/21/2020    Acute respiratory failure with hypoxia ([address] 07/21/2020    Acute on chronic systolic CHF (congestive heart failure) ([address] 07/21/2020    Pneumonia 05/26/2019    Acute on chronic renal failure ([facility] 10/29/2016 Hypotension 10/29/2016    Chest pain 10/29/2016    Type II diabetes mellitus with nephropathy ([facility] 10/29/2016    Hyperlipidemia 10/29/2016    Nephrotic syndrome 08/20/2016    Renal anasarca 08/20/2016    Diarrhea 10/28/2013    SIRS (systemic inflammatory response syndrome) ([facility] 10/28/2013    Acute pancreatitis 01/18/2011    LFT'S ABNORMAL 01/18/2011    Acute renal failure (ARF) ([facility] 01/18/2011    CKD (chronic kidney disease) stage 3, GFR 30-59 ml/min ([facility]) 01/18/2011    CAD (coronary artery disease) 01/18/2011    Abdominal pain, acute, epigastric 01/18/2011    Nausea & vomiting 01/18/2011           Past Medical History:   Diagnosis Date    Arthritis       spine    CAD (coronary artery disease)       high cholesterol    Chronic kidney disease       dialysis    Diabetes ([facility]      ESRD (end stage renal disease) ([facility]      GERD (gastroesophageal reflux disease)       HX    Hypertension      Ill-defined condition       irritable bowel syndrome    Systolic CHF ([facility]      Unspecified sleep apnea       NO CPAP         Core Measures:  CVA: No Not applicable  CHF:No Not applicable  PNA:No Not applicable     Activity:  Activity Level: Up with Assistance  Number times ambulated in hallways past shift: 0  Number of times OOB to chair past shift: 0     Cardiac:   Cardiac Monitoring: No        Access:   Current line(s): PIV      Genitourinary:   Urinary status: voiding     Respiratory:   O2 Device: None (Room air)  Chronic home O2 use?: YES  Incentive spirometer at bedside: N/A     GI:  Last Bowel Movement Date: 11/24/22  Current diet:  ADULT DIET Dysphagia - Soft & Bite Sized;  Only vanilla or strawberry supplements please  ADULT ORAL NUTRITION SUPPLEMENT Breakfast, Dinner; Renal Supplement  Passing flatus: YES  Tolerating current diet: YES     Pain Management:   Patient states pain is manageable on current regimen: YES     Skin:  Kelby Score: 19  Interventions: turn team, speciality bed, float heels, increase time out of bed, foam dressing, and PT/OT consult    Patient Safety:  Fall Score: Total Score: 4  Interventions: bed/chair alarm, assistive device (walker, cane, etc), gripper socks, pt to call before getting OOB, and stay with me (per policy)  High Fall Risk: Yes     DVT prophylaxis:  DVT prophylaxis Med- Yes  DVT prophylaxis SCD or CINDY- No      Wounds: (If Applicable)  Wounds- No  Location None      Active Consults:  IP CONSULT TO NEUROLOGY  IP CONSULT TO NEPHROLOGY     Length of Stay:  Expected LOS: 4d 7h  Actual LOS: 10  Discharge Plan: Yes.  [id number] 11/28/22

## 2022-12-02 NOTE — PROGRESS NOTES
Problem: Falls - Risk of  Goal: *Absence of Falls  Description: Document Sheila Poole Fall Risk and appropriate interventions in the flowsheet.   Outcome: Progressing Towards Goal  Note: Fall Risk Interventions:  Mobility Interventions: Bed/chair exit alarm, Patient to call before getting OOB    Mentation Interventions: Bed/chair exit alarm    Medication Interventions: Bed/chair exit alarm    Elimination Interventions: Call light in reach, Bed/chair exit alarm    History of Falls Interventions: Bed/chair exit alarm, Door open when patient unattended, Room close to nurse's station         Problem: Patient Education: Go to Patient Education Activity  Goal: Patient/Family Education  Outcome: Progressing Towards Goal     Problem: Patient Education: Go to Patient Education Activity  Goal: Patient/Family Education  Outcome: Progressing Towards Goal     Problem: Patient Education: Go to Patient Education Activity  Goal: Patient/Family Education  Outcome: Progressing Towards Goal     Problem: Patient Education: Go to Patient Education Activity  Goal: Patient/Family Education  Outcome: Progressing Towards Goal     Problem: Patient Education: Go to Patient Education Activity  Goal: Patient/Family Education  Outcome: Progressing Towards Goal     Problem: Patient Education: Go to Patient Education Activity  Goal: Patient/Family Education  Outcome: Progressing Towards Goal     Problem: General Medical Care Plan  Goal: *Vital signs within specified parameters  Outcome: Progressing Towards Goal  Goal: *Labs within defined limits  Outcome: Progressing Towards Goal  Goal: *Absence of infection signs and symptoms  Outcome: Progressing Towards Goal  Goal: *Optimal pain control at patient's stated goal  Outcome: Progressing Towards Goal  Goal: *Skin integrity maintained  Outcome: Progressing Towards Goal  Goal: *Fluid volume balance  Outcome: Progressing Towards Goal  Goal: *Optimize nutritional status  Outcome: Progressing Towards Goal  Goal: *Anxiety reduced or absent  Outcome: Progressing Towards Goal  Goal: *Progressive mobility and function (eg: ADL's)  Outcome: Progressing Towards Goal     Problem: Patient Education: Go to Patient Education Activity  Goal: Patient/Family Education  Outcome: Progressing Towards Goal

## 2022-12-02 NOTE — PROGRESS NOTES
Physical Therapy Note    Medical record reviewed. Pt has recently returned from dialysis and nurse checking vitals as pt reports not feeling well; he is less alert as well. Will defer and continue to follow as appropriate.     Rhett Rodriguez, PT, DPT

## 2022-12-02 NOTE — PROGRESS NOTES
Occupational Therapy  Medical record reviewed. Pt is leaving the floor for HD. Will defer and continue to follow as appropriate.

## 2022-12-02 NOTE — PROGRESS NOTES
Nephrology Progress Note  [facility] / [address] , [facility], [address]  Phone - [phone]  Fax - [phone]                 Patient: Terrell Andrews                   YOB: 1945        Date- 12/2/2022                      Admit Date: 11/16/2022  CC: Follow up for ESRD  IMPRESSION & PLAN:   ESRD- hd mwf at [facility]  Hyperkalemia- k 7.6 on 11-21-22  Anemia of ckd  hyponatremia  Sec. Hyperpara  S/p fall  Weakness  H/o CHF  Dm 2  S/p LEFT ARM FISTULAGRAM WITH ANGIOPLASTY       PLAN-  Seen on hd today  Remove 2 kg with hd  continue hd MWF schedule  Continue coreg and entresto  OKAY TO D/C RENAL STAND POINT     Subjective: Interval History:   12-2-22-- seen on hd today- k and na improved. 12-1-22--- s/p hd yesterday-- bp stable-- no sob  11/30/2022--seen on dialysis today--he feels much better--hyperkalemia K 6.1  11-29-22- he had only 2 hour hd yesterday. He shortned his hd due to pain. He is scheduled for angiogram for his hd access. No sob  11/28/22--seen on hd-Bp stable-- labs reviewed- bun high- ?stenosis in access- k normal        Objective:   Vitals:    12/02/22 1030 12/02/22 1045 12/02/22 1100 12/02/22 1115   BP: 126/76 (!) 150/75 (!) 165/85 (!) 157/85   Pulse: 70 69 70 64   Resp: 18 18 18 18   Temp:       TempSrc:       SpO2:       Weight:       Height:          No intake/output data recorded. Last 3 Recorded Weights in this Encounter    11/29/22 0356 11/30/22 0359 12/02/22 0840   Weight: 104.8 kg (231 lb) 106.1 kg (234 lb) 95.3 kg (210 lb)        Physical exam:    GEN: NAD  NECK:  Supple, no thyromegaly  RESP: clear b/l, no  wheezing,   CVS: RRR,S1,S2   NEURO: non focal, normal speech   EXT:Upper arm avg +    Chart reviewed. Pertinent Notes reviewed.      Data Review :  Recent Labs     12/02/22  0940 11/30/22  0351    130*   K 3.3* 6.1*   CL 98 97   CO2 29 27   BUN 52* 96*   CREA 5.75* 8.74*   * 154*   CA 9.1 9.1       Recent Labs     11/30/22  0351   WBC 7.0   HGB 10.7*   HCT 33.0*          No results for input(s): FE, TIBC, PSAT, FERR in the last 72 hours.    Lab Results   Component Value Date/Time    Hemoglobin A1c 5.6 12/21/2021 11:12 AM    Hemoglobin A1c 8.2 (H) 05/05/2021 03:29 AM    Hemoglobin A1c 10.5 (H) 11/16/2016 10:45 AM        Lab Results   Component Value Date/Time    Microalb/Creat ratio (ug/mg creat.) 897.2 (H) 04/11/2019 12:27 PM     Lab Results   Component Value Date/Time    NT pro-BNP 12,079 (H) 09/12/2022 06:02 AM    NT pro-BNP 5,709 (H) 03/15/2022 08:39 PM    NT pro-BNP 5,474 (H) 05/04/2021 06:09 AM    NT pro-BNP 2,497 (H) 04/17/2021 10:54 AM    NT pro-BNP 9,341 (H) 07/21/2020 05:33 AM     US Results (most recent):  Medication list  reviewed  Current Facility-Administered Medications   Medication Dose Route Frequency    loperamide (IMODIUM) capsule 2 mg  2 mg Oral Q4H PRN    oxyCODONE IR (ROXICODONE) tablet 5 mg  5 mg Oral Q6H PRN    senna-docusate (PERICOLACE) 8.6-50 mg per tablet 1 Tablet  1 Tablet Oral DAILY    sodium chloride (NS) flush 5-40 mL  5-40 mL IntraVENous Q8H    sodium chloride (NS) flush 5-40 mL  5-40 mL IntraVENous PRN    artificial saliva (MOUTH KOTE) 1 Spray  1 Spray Oral Q2H PRN    acetaminophen (TYLENOL) tablet 650 mg  650 mg Oral Q6H    benzocaine-menthoL (CEPACOL) lozenge 1 Lozenge  1 Lozenge Mucous Membrane PRN    lactulose (CHRONULAC) 10 gram/15 mL solution 30 mL  20 g Oral BID    hydrALAZINE (APRESOLINE) 20 mg/mL injection 20 mg  20 mg IntraVENous Q6H PRN    lidocaine 4 % patch 1 Patch  1 Patch TransDERmal Q24H    methocarbamoL (ROBAXIN) tablet 750 mg  750 mg Oral QID    cyanocobalamin (VITAMIN B12) tablet 500 mcg  500 mcg Oral DAILY    folic acid (FOLVITE) tablet 1 mg  1 mg Oral DAILY    insulin NPH (NOVOLIN N, HUMULIN N) injection 20 Units  20 Units SubCUTAneous ACB&D    sodium chloride (NS) flush 5-40 mL  5-40 mL IntraVENous PRN polyethylene glycol (MIRALAX) packet 17 g  17 g Oral DAILY PRN    ondansetron (ZOFRAN ODT) tablet 4 mg  4 mg Oral Q8H PRN    Or    ondansetron (ZOFRAN) injection 4 mg  4 mg IntraVENous Q6H PRN    heparin (porcine) injection 5,000 Units  5,000 Units SubCUTAneous Q8H    insulin lispro (HUMALOG) injection   SubCUTAneous AC&HS    glucose chewable tablet 16 g  4 Tablet Oral PRN    glucagon (GLUCAGEN) injection 1 mg  1 mg IntraMUSCular PRN    dextrose 10% infusion 0-250 mL  0-250 mL IntraVENous PRN    aspirin chewable tablet 81 mg  81 mg Oral DAILY    bumetanide (BUMEX) tablet 2 mg  2 mg Oral DAILY    carvediloL (COREG) tablet 12.5 mg  12.5 mg Oral BID WITH MEALS    sacubitriL-valsartan (ENTRESTO) 24-26 mg tablet 1 Tablet  1 Tablet Oral BID    finasteride (PROSCAR) tablet 5 mg  5 mg Oral DAILY    fluticasone propionate (FLONASE) 50 mcg/actuation nasal spray 2 Spray  2 Spray Both Nostrils DAILY    gabapentin (NEURONTIN) capsule 300 mg  300 mg Oral TID PRN    [Held by provider] sevelamer carbonate (RENVELA) tab 800 mg  800 mg Oral BID    tamsulosin (FLOMAX) capsule 0.8 mg  0.8 mg Oral DAILY        Justyn Pires MD  12/2/2022

## 2022-12-02 NOTE — OP NOTES
[address]  OPERATIVE REPORT    Name:  Jaja Raman  MR#:  [id number]  :  1945  ACCOUNT #:  [de-identified]  DATE OF SERVICE:  2022    PREOPERATIVE DIAGNOSIS:  End-stage renal disease. POSTOPERATIVE DIAGNOSIS:  End-stage renal disease. PROCEDURE PERFORMED:  1. Left arm fistulogram with intraoperative C-arm fluoroscopy. 2.  Angioplasty of the venous outflow anastomosis (8 x 4). SURGEON:  Cooper Poole MD    ANESTHESIA:  Local plus IV sedation. COMPLICATIONS:  None. SPECIMENS REMOVED:  None. IESTIMATED BLOOD LOSS:  Minimal.    INDICATIONS:  The patient is a 79-year-old who was reported to have high pressures related to his left upper arm graft on dialysis. He was referred for imaging and intervention. PROCEDURE:  After obtaining informed consent, the patient was placed supine on the operating table. After adequate induction of mild IV sedation, the left arm was prepped and draped in sterile fashion. 1% local lidocaine was used and the graft accessed antegrade and a 7-Russian sheath placed, aspirated and flushed. Contrast was used to visualize the access from its inflow at the brachial artery to the right atrium. He has a straight graft in the left upper arm to axillary venous outflow. There was a 60% stenosis at the outflow, and no central venous stenosis or other issues noted. The outflow lesion was crossed with a 0.035 wire and 8 x 4 angioplasty balloon used to treat the lesion with notable improvement and approximately 10% residual stenosis. Instrumentation was removed and the access site controlled with pursestring suture. The patient tolerated the procedure well. There were no complications.       Omega Dominguez MD      [id number]  D:  2022 10:33  T:  2022 19:33  JOB #:  [id number]  CC:  Cooper Poole MD

## 2022-12-02 NOTE — PROGRESS NOTES
Occupational Therapy  Medical record reviewed. Pt has recently returned from dialysis and nurse checking vitals as pt reports not feeling well; he is less alert as well. Will defer and continue to follow as appropriate.

## 2022-12-02 NOTE — PROCEDURES
Hemodialysis / [phone]    Vitals Pre Post Assessment Pre Post   /87 167/83 LOC A&O x4 A&O x3   HR 79 80 Lungs No SOB No SOB   Resp 18 18 Cardiac regular regular   Temp 98.1 98.1 Skin Dry, warm Dry, warm   Weight    Edema none none   Tele status N/A N/A Pain 0 0     Orders   Duration: Start: 8969 End: 1255 Total: 3.5 hrs   Dialyzer: Dialyzer/Set Up Inspection: Deejay Lazcano (12/02/22 0925)   Nupur Killian Bath: Dialysate K (mEq/L): 2 (12/02/22 0925)   Ca Bath: Dialysate CA (mEq/L): 2.5 (12/02/22 0925)   Na: Dialysate NA (mEq/L): 138 (12/02/22 0925)   Bicarb: Dialysate HCO3 (mEq/L): 35 (12/02/22 0925)   Target Fluid Removal: Goal/Amount of Fluid to Remove (mL): 500 mL (12/02/22 0925)     Access   Type & Location: STEPHANIE AVF: +B&T, no S&S of infection, site cleaned per P&P, cannulated with 15G1\" needles x2   Comments:                                        Labs   HBsAg (Antigen) / date:  11/16/22 negative                                             HBsAb (Antibody) / date: 11/16/22 susceptible   Source:    Obtained/Reviewed  Critical Results Called HGB   Date Value Ref Range Status   11/30/2022 10.7 (L) 12.1 - 17.0 g/dL Final     Potassium   Date Value Ref Range Status   11/30/2022 6.1 (H) 3.5 - 5.1 mmol/L Final     Comment:     INVESTIGATED PER DELTA CHECK PROTOCOL  NO VISIBLE HEMOLYSIS       Calcium   Date Value Ref Range Status   11/30/2022 9.1 8.5 - 10.1 MG/DL Final     BUN   Date Value Ref Range Status   11/30/2022 96 (H) 6 - 20 MG/DL Final     Creatinine   Date Value Ref Range Status   11/30/2022 8.74 (H) 0.70 - 1.30 MG/DL Final        Meds Given   Name Dose Route                    Adequacy / Fluid    Total Liters Process: 71 L   Net Fluid Removed: 1500 cc      Comments   Time Out Done:   (Time) 0920   Admitting Diagnosis:    Consent obtained/signed: Informed Consent Verified: Yes (chronic dialysis, no consent needed.) (11/30/22 2639)   Machine / RO # Machine Number: [id number] (12/02/22 7456)   Primary Nurse Rpt Pre: Yosef Schwartz, RN   Primary Nurse Rpt Post: Donald Grewal RN   Pt Education: Access care, procedure   Care Plan: Continue HD tx as per MD order   Pts outpatient clinic:      Tx Summary   Comments:        1 Dr Nico Garzon rounded on a pt, UF increased to 2000cc    65 Pt became confused not knowing we are doing dialysis, pt reoriented to current situation, however knows his name, , current place [facility]), Dr Hui Carrasco notified, order to stop UF  Rest of the tx went well, blood rinsed back, cannulas removed x2, hemostasis achieved, dressing applied.

## 2022-12-02 NOTE — PROGRESS NOTES
Transition of Care Plan:     RUR: 19% - \"moderate risk\"  LOS: 16 days   Disposition: SNF - [address] [place] [address] with resumed OP HD (pending bed availability - anticipated for 12/3/22)  OP HD schedule: [place] Valadez, M/W/F, chair time: 6:00 AM   Date FOC offered: 2022 (verbal)  Date FOC received: 2022  Date authorization started with reference number: Shaniqua Cotto initiated 22 via Bertha Askew; Theresa Valerio  22. New auth initiated via Bertha Askew 22, approved 22, expires 22  Date authorization received and expires: Shaniqua Cotto approved 22; auth  22. New auth approved 22; auth expires 22  Accepting facility: [address] [place] [address]   Follow up appointments: PCP & specialists as indicated   DME needed: Defer to SNF for DME needs  Transportation at Discharge: BLS transport needed  [address] or means to access home: N/A - pt transitioning to SNF at d/c  IM Medicare Letter: 2nd IM needed prior to d/c  Is patient a [place] and connected with the [place]? No             Caregiver Contact: Pt's wife Laly Gallorine: [phone])  Discharge Caregiver contacted prior to discharge? To be contacted prior to d/c   Care Conference needed?: No, ANDRIY anticipated for 12/3/22  COVID test: Rapid COVID test completed 22; results negative     Initial note: Chart reviewed, IDR completed. MD informed of anticipated bed availability at [place] [address] 12/3/22. CM contacted [address] [place] [address] admission director Praveen Starch: [phone]) to confirm bed availability for 12/3/22; Meaghan Meth confirmed & reported he would be present to assist in facilitating admission. Meaghan Meth informed pt's step-daughter Rocio Hall) confirmed she will be providing transportation to and from OP HD while at [facility]. Hand-off to be provided to weekend CM covering unit.     Nurys Mcfarlane MSW  Care Manager, [facility]

## 2022-12-03 VITALS
HEIGHT: 72 IN | TEMPERATURE: 98.8 F | BODY MASS INDEX: 28.44 KG/M2 | RESPIRATION RATE: 18 BRPM | HEART RATE: 83 BPM | SYSTOLIC BLOOD PRESSURE: 140 MMHG | OXYGEN SATURATION: 97 % | WEIGHT: 210 LBS | DIASTOLIC BLOOD PRESSURE: 84 MMHG

## 2022-12-03 LAB
GLUCOSE BLD STRIP.AUTO-MCNC: 103 MG/DL (ref 65–117)
GLUCOSE BLD STRIP.AUTO-MCNC: 135 MG/DL (ref 65–117)
GLUCOSE BLD STRIP.AUTO-MCNC: 141 MG/DL (ref 65–117)
SERVICE CMNT-IMP: ABNORMAL
SERVICE CMNT-IMP: ABNORMAL
SERVICE CMNT-IMP: NORMAL

## 2022-12-03 PROCEDURE — 74011250637 HC RX REV CODE- 250/637: Performed by: SURGERY

## 2022-12-03 PROCEDURE — 82962 GLUCOSE BLOOD TEST: CPT

## 2022-12-03 PROCEDURE — 74011000250 HC RX REV CODE- 250: Performed by: SURGERY

## 2022-12-03 PROCEDURE — 74011250636 HC RX REV CODE- 250/636: Performed by: SURGERY

## 2022-12-03 PROCEDURE — 94760 N-INVAS EAR/PLS OXIMETRY 1: CPT

## 2022-12-03 PROCEDURE — 77010033678 HC OXYGEN DAILY

## 2022-12-03 PROCEDURE — 74011636637 HC RX REV CODE- 636/637: Performed by: SURGERY

## 2022-12-03 RX ADMIN — FOLIC ACID 1 MG: 1 TABLET ORAL at 09:19

## 2022-12-03 RX ADMIN — ACETAMINOPHEN 650 MG: 325 TABLET ORAL at 13:45

## 2022-12-03 RX ADMIN — LACTULOSE 30 ML: 20 SOLUTION ORAL at 09:22

## 2022-12-03 RX ADMIN — SACUBITRIL AND VALSARTAN 1 TABLET: 24; 26 TABLET, FILM COATED ORAL at 17:28

## 2022-12-03 RX ADMIN — ACETAMINOPHEN 650 MG: 325 TABLET ORAL at 00:29

## 2022-12-03 RX ADMIN — ACETAMINOPHEN 650 MG: 325 TABLET ORAL at 06:08

## 2022-12-03 RX ADMIN — FINASTERIDE 5 MG: 5 TABLET, FILM COATED ORAL at 09:19

## 2022-12-03 RX ADMIN — Medication 20 UNITS: at 17:30

## 2022-12-03 RX ADMIN — METHOCARBAMOL TABLETS 750 MG: 750 TABLET, COATED ORAL at 13:44

## 2022-12-03 RX ADMIN — ASPIRIN 81 MG: 81 TABLET, CHEWABLE ORAL at 09:15

## 2022-12-03 RX ADMIN — TAMSULOSIN HYDROCHLORIDE 0.8 MG: 0.4 CAPSULE ORAL at 09:21

## 2022-12-03 RX ADMIN — HEPARIN SODIUM 5000 UNITS: 5000 INJECTION INTRAVENOUS; SUBCUTANEOUS at 06:08

## 2022-12-03 RX ADMIN — SODIUM CHLORIDE, PRESERVATIVE FREE 10 ML: 5 INJECTION INTRAVENOUS at 06:36

## 2022-12-03 RX ADMIN — METHOCARBAMOL TABLETS 750 MG: 750 TABLET, COATED ORAL at 09:20

## 2022-12-03 RX ADMIN — BUMETANIDE 2 MG: 1 TABLET ORAL at 09:17

## 2022-12-03 RX ADMIN — ACETAMINOPHEN 650 MG: 325 TABLET ORAL at 17:27

## 2022-12-03 RX ADMIN — Medication 500 MCG: at 09:18

## 2022-12-03 RX ADMIN — METHOCARBAMOL TABLETS 750 MG: 750 TABLET, COATED ORAL at 17:28

## 2022-12-03 RX ADMIN — CARVEDILOL 12.5 MG: 12.5 TABLET, FILM COATED ORAL at 17:28

## 2022-12-03 RX ADMIN — Medication 2 UNITS: at 17:28

## 2022-12-03 RX ADMIN — SACUBITRIL AND VALSARTAN 1 TABLET: 24; 26 TABLET, FILM COATED ORAL at 09:21

## 2022-12-03 RX ADMIN — CARVEDILOL 12.5 MG: 12.5 TABLET, FILM COATED ORAL at 09:18

## 2022-12-03 NOTE — PROGRESS NOTES
Transition of Care Plan:    RUR: 19%-\"Moderate\"  Disposition: D/C to [address] [place] [address] with resumed OP HD  OP HD schedule: [place] Valadez, M/W/F, chair time: 6:00 AM  Date FOC offered: 11/17/22  Date [address] received: 11/17/22  Date authorization started with reference number: Sarah Perez approved 11/30/22  Date authorization received and expires: Auth approved 11/30/22 and expires 12/4/22  Accepting facility: [address] [place] [address], patient has been assigned to room 414-A  Follow up appointments: PCP & Specialists as indicated  DME needed: Defer to SNF for DME needs  Transportation at Discharge: [facility] transport arranged for 4 PM  Keys or means to access home: N/A      IM Medicare Letter: Verbal explanation provided to the patient's wife  Is patient a  and connected with the [place]? N/A               If yes, was [employer] transfer form completed and VA notified? Caregiver Contact: Juan Mir, Wife, Phone: [phone]  Discharge Caregiver contacted prior to discharge? Yes, CM spoke with the patient's wife via phone  Care Conference needed?: No    CM spoke with [address] [place] [address] liaison, Meka Luna (phone: [phone]), and they are able to accept the patient today, 12/3/22. The patient has been assigned to room 414-A. The phone number for report is [phone]. CM has contacted [facility] and arranged for transport at 4 PM. CM placed [facility] paperwork on the patient's chart.      Jose David Santos 169, [address]

## 2022-12-03 NOTE — PROGRESS NOTES
Bedside shift report given to oncoming nurse Luis Miguel Mancini RN by off going nurse Farheen Cantu RN patient alert and responsive patient stable during shift change no distress noted

## 2022-12-03 NOTE — PROGRESS NOTES
End of Shift Note     Bedside shift change report given to Dimitri Diaz RN,(oncoming nurse) by NEHA Raza(offgoing nurse). Report included the following information SBAR, Kardex, ED Summary, and MAR     Shift worked: Nights    Shift summary and any significant changes:     No signficant events over the night, possible D/C today.       Concerns for physician to address: None   Zone phone for oncoming shift:   [phone]      Patient Information  Levester Antis  78 y.o.  11/16/2022 12:20 AM by Lance Brothers was admitted from Home     Problem List       Patient Active Problem List     Diagnosis Date Noted    UTI (lower urinary tract infection) 10/25/2013    Renal failure (ARF), acute on chronic ([address] 10/25/2013    Unable to ambulate 11/16/2022    Cervical post-laminectomy syndrome 06/09/2022    Degenerative cervical spinal stenosis 06/09/2022    Vitamin D deficiency 12/21/2021    Immune-mediated neuropathy ([address] 12/21/2021    Diabetic peripheral neuropathy associated with type 2 diabetes mellitus ([address] 12/21/2021    B12 deficiency 12/21/2021    Sensory ataxic gait 12/21/2021    Cervical spondylosis with myelopathy 12/21/2021    Type 2 diabetes mellitus with stage 4 chronic kidney disease, with long-term current use of insulin ([address] 12/21/2021    Convulsions ([address] 12/21/2021    Acute alteration in mental status 12/21/2021    Myoclonus 12/21/2021    Sepsis ([address] 08/18/2021    Acute hypoxemic respiratory failure ([address] 51/73/8577    Systolic heart failure ([address] 05/04/2021    Accelerated hypertension 07/21/2020    Elevated troponin 07/21/2020    ESRD (end stage renal disease) on dialysis ([address] 07/21/2020    Acute respiratory failure with hypoxia ([address] 07/21/2020    Acute on chronic systolic CHF (congestive heart failure) ([address] 07/21/2020    Pneumonia 05/26/2019    Acute on chronic renal failure ([address] 10/29/2016    Hypotension 10/29/2016    Chest pain 10/29/2016    Type II diabetes mellitus with nephropathy ([facility] 10/29/2016    Hyperlipidemia 10/29/2016    Nephrotic syndrome 08/20/2016    Renal anasarca 08/20/2016    Diarrhea 10/28/2013    SIRS (systemic inflammatory response syndrome) ([facility] 10/28/2013    Acute pancreatitis 01/18/2011    LFT'S ABNORMAL 01/18/2011    Acute renal failure (ARF) ([facility] 01/18/2011    CKD (chronic kidney disease) stage 3, GFR 30-59 ml/min ([facility]) 01/18/2011    CAD (coronary artery disease) 01/18/2011    Abdominal pain, acute, epigastric 01/18/2011    Nausea & vomiting 01/18/2011           Past Medical History:   Diagnosis Date    Arthritis       spine    CAD (coronary artery disease)       high cholesterol    Chronic kidney disease       dialysis    Diabetes ([facility]      ESRD (end stage renal disease) ([facility])      GERD (gastroesophageal reflux disease)       HX    Hypertension      Ill-defined condition       irritable bowel syndrome    Systolic CHF ([facility]      Unspecified sleep apnea       NO CPAP         Core Measures:  CVA: No Not applicable  CHF:No Not applicable  PNA:No Not applicable     Activity:  Activity Level: Up with Assistance  Number times ambulated in hallways past shift: 0  Number of times OOB to chair past shift: 0     Cardiac:   Cardiac Monitoring: No        Access:   Current line(s): PIV      Genitourinary:   Urinary status: voiding     Respiratory:   O2 Device: None (Room air)  Chronic home O2 use?: YES  Incentive spirometer at bedside: N/A     GI:  Last Bowel Movement Date: 11/24/22  Current diet:  ADULT DIET Dysphagia - Soft & Bite Sized; Only vanilla or strawberry supplements please  ADULT ORAL NUTRITION SUPPLEMENT Breakfast, Dinner; Renal Supplement  Passing flatus: YES  Tolerating current diet: YES     Pain Management:   Patient states pain is manageable on current regimen: YES     Skin:  Kelby Score: 19  Interventions: turn team, speciality bed, float heels, increase time out of bed, foam dressing, and PT/OT consult    Patient Safety:  Fall Score:  Total Score: 4  Interventions: bed/chair alarm, assistive device (walker, cane, etc), gripper socks, pt to call before getting OOB, and stay with me (per policy)  High Fall Risk: Yes     DVT prophylaxis:  DVT prophylaxis Med- Yes  DVT prophylaxis SCD or CINDY- No      Wounds: (If Applicable)  Wounds- No  Location None      Active Consults:  IP CONSULT TO NEUROLOGY  IP CONSULT TO NEPHROLOGY     Length of Stay:  Expected LOS: 4d 7h  Actual LOS: 10  Discharge Plan: Yes.  [id number] 11/28/22

## 2022-12-03 NOTE — PROGRESS NOTES
Hospitalist Progress Note    Subjective:     Daily Progress Note: 12/3/2022 3:39 pm    Hospital Course: Patient is a 79-year-old black male with a history of end-stage disease on hemodialysis Monday, Wednesday,, type 2 diabetes, GERD, hypertension, chronic hypoxic respiratory failure requiring 2 L oxygen nasal cannula at home, systolic heart failure who presented to the emergency room on 11/16/2022 for worsening weakness and recurrent falls. He denied hitting his head or losing consciousness. CT of the head and spine were negative for acute findings. As patient was unable to walk he was admitted in the hospital for further evaluation. Nephrology consulted for hemodialysis management. Neurology consulted. Vitamin B12 low, folate low and began supplementation. MRI of the lumbar spine showed spinal stenosis with multilevel degenerative disc disease. Per neurology recommend physical and occupational therapy and EMG as an outpatient. PT and OT recommend SNF placement. Case management consulted for discharge planning. Overnight on 11/20/2022 patient became confused and altered. Sodium was 132, potassium 7.6, BUN 60, serum creatinine 8.01, glucose 181, ammonia level 35. He was started on lactulose. Ammonia level trending down. Calcitonin and lactic acid and WBC within normal limits. Doubt infectious process. CT of the head showed no acute intracranial abnormality. CT of the cervical spine showed no fracture but did show cervical degenerative disc disease.         Subjective:  Pt was confused after coming back from HD   C/o neck pain     Review of Systems   No fevers, + neck pain, no SOB or cough, no CP, No nausea, No vomiting, No diarrhea, No abdominal pain      Objective:     Patient Vitals for the past 24 hrs:   Temp Pulse Resp BP SpO2   12/03/22 0724 99.5 °F (37.5 °C) 70 18 (!) 152/72 94 %   12/02/22 2357 99.5 °F (37.5 °C) 76 18 (!) 166/86 97 %   12/02/22 1408 98.4 °F (36.9 °C) 66 20 (!) 144/72 -- 22 1300 98.1 °F (36.7 °C) 80 18 (!) 167/83 --   22 1255 -- 71 18 (!) 151/74 --   22 1245 -- 69 18 (!) 143/73 --   22 1230 -- 65 18 (!) 146/77 --   22 1215 -- 88 18 (!) 146/79 --   22 1200 -- (!) 104 18 (!) 154/98 --   22 1145 -- 70 18 (!) 143/74 --   22 1130 -- 67 18 (!) 156/80 --   22 1115 -- 64 18 (!) 157/85 --          O2 Flow Rate (L/min): 2 l/min O2 Device: None (Room air) (patient refuse o2)    Temp (24hrs), Av.9 °F (37.2 °C), Min:98.1 °F (36.7 °C), Max:99.5 °F (37.5 °C)      No intake/output data recorded.  1901 -  0700  In: 1500 [P.O.:1500]  Out: 1950 [Urine:450]    PHYSICAL EXAM:  Constitutional: No acute distress  Skin: Extremities and face reveal no rashes. HEENT: Sclerae anicteric. Extra-occular muscles are intact. Oral mucosa dry do not see any plaques indicating yeast infection. Cardiovascular: Regular rate and rhythm. Respiratory:  Clear breath sounds bilaterally with no wheezes, rales, or rhonchi. GI: Abdomen nondistended, soft, and nontender. Normal active bowel sounds. Musculoskeletal: No pitting edema of the lower legs. Able to move all ext  Neurological:  Patient is alert and oriented.  Cranial nerves II-XII grossly intact  Psychiatric: Mood appears appropriate       Data Review    Recent Results (from the past 24 hour(s))   GLUCOSE, POC    Collection Time: 22  2:22 PM   Result Value Ref Range    Glucose (POC) 62 (L) 65 - 117 mg/dL    Performed by Gideon Teixeira    GLUCOSE, POC    Collection Time: 22  2:29 PM   Result Value Ref Range    Glucose (POC) 66 65 - 117 mg/dL    Performed by Gideon Teixeira    GLUCOSE, POC    Collection Time: 22  2:42 PM   Result Value Ref Range    Glucose (POC) 93 65 - 117 mg/dL    Performed by Gideon Teixeira    GLUCOSE, POC    Collection Time: 22  9:44 PM   Result Value Ref Range    Glucose (POC) 110 65 - 117 mg/dL    Performed by [address] [place] [address], [facility] Collection Time: 12/03/22  7:26 AM   Result Value Ref Range    Glucose (POC) 103 65 - 117 mg/dL    Performed by Eusebio Dallas PCT        Radiology review: MRI of spine    Assessment:   1. REcurrent confusion 12/02  Hypoglycemia   Repeat CT head showed no acute issues   Ammonia and VBG ordered but patient refused labs   Pt got less agitated after his pain was treated   Bs was low 66   Severe neck pain due to DDD cervical spine  2. Recurrent falls secondary to spinal stenosis and multilevel disc and facet degeneration  3. Elevated troponin secondary to kidney disease. 4.  End-stage renal disease on hemodialysis Monday, Wednesday, Friday  5. Chronic systolic heart failure  6. Type 2 diabetes  7. Chronic hypoxic respiratory failure  8. Acute confusion multifactorial  9. LUE HD access malfunction not able to complete HD s/p fistulogram 11/29-- Cleared by Vascular to use it now      Plan:   1.  CT of the head and spine reviewed with no acute findings. MRI of the lumbar spine showed spinal stenosis. Neurology consulted. They recommend physical therapy and EMG as an outpatient. Tramadol as needed. Lidocaine patch ordered. Vitamin B12 and folate low and supplemented. PT OT recommend SNF placement. Case management consulted. Schedule tylenol, try low dose oxycodone tonight, soft cervical collar  2. Troponins elevated on admission. No chest pain. Chest likely secondary to renal disease  3. Dialysis Monday, Wednesday, Friday nephrology consulted. On Renvela  4. Patient on Entresto, Coreg, Bumex, aspirin. 5.  Glucose levels are stable.   Patient on insulin sliding scale and NPH 20 units twice daily  Pt mental status waxes and wanes during hospital course   Family wants him to go to rehab   Unable to reach patent wife , voicemail full     CODE STATUS full     DVT prophylaxis: Heparin  Ulcer prophylaxis: Not indicated    Rich Coulter MD    Total time: 28

## 2022-12-03 NOTE — DISCHARGE SUMMARY
Hospitalist Discharge Summary     Patient ID:  Megan Delgadillo  [id number]  59 y.o.  1945 11/16/2022    PCP on record: Sarah Jimenes MD    Admit date: 11/16/2022  Discharge date and time: 12/3/2022    DISCHARGE DIAGNOSIS:  1. REcurrent confusion 12/02   Severe neck pain due to DDD cervical spine  2. Recurrent falls secondary to spinal stenosis and multilevel disc and facet degeneration  3. Elevated troponin secondary to kidney disease. 4.  End-stage renal disease on hemodialysis Monday, Wednesday, Friday  5. Chronic systolic heart failure  6. Type 2 diabetes  7. Chronic hypoxic respiratory failure  8. Acute confusion multifactorial  9. LUE HD access malfunction not able to complete HD s/p fistulogram 11/29--       CONSULTATIONS:  IP CONSULT TO NEUROLOGY  IP CONSULT TO NEPHROLOGY  IP CONSULT TO VASCULAR SURGERY    Excerpted HPI from H&P of Moody Ingram DO:  CHIEF COMPLAINT: Generalized weakness, falls     HISTORY OF PRESENT ILLNESS:     Erick Llanes is a 78 y.o. -American male with past medical history of ESRD on HD, diabetes, GERD, hypertension, systolic heart failure presented with worsening weakness and recurrent fall. He mentions having this generalized weakness for a while but it got worse yesterday and he had 2 falls. He denies any loss of consciousness, did not hit his head. He mentions having some shortness of breath on exertion and he is on 2 L oxygen at home. He denies any chest pain, belly pain, change in bladder bowel habits, lower extremity edema or tenderness. In the ED cannot ambulate due to his weakness. We were asked to admit for work up and evaluation of the above problems. ______________________________________________________________________  DISCHARGE SUMMARY/HOSPITAL COURSE:  for full details see H&P, daily progress notes, labs, consult notes.    Hospital Course: Patient is a 82-year-old black male with a history of end-stage disease on hemodialysis Monday, Wednesday,, type 2 diabetes, GERD, hypertension, chronic hypoxic respiratory failure requiring 2 L oxygen nasal cannula at home, systolic heart failure who presented to the emergency room on 11/16/2022 for worsening weakness and recurrent falls. He denied hitting his head or losing consciousness. CT of the head and spine were negative for acute findings. As patient was unable to walk he was admitted in the hospital for further evaluation. Nephrology consulted for hemodialysis management. Neurology consulted. Vitamin B12 low, folate low and began supplementation. MRI of the lumbar spine showed spinal stenosis with multilevel degenerative disc disease. Per neurology recommend physical and occupational therapy and EMG as an outpatient. PT and OT recommend SNF placement. Case management consulted for discharge planning. Overnight on 11/20/2022 patient became confused and altered. Sodium was 132, potassium 7.6, BUN 60, serum creatinine 8.01, glucose 181, ammonia level 35. He was started on lactulose. Ammonia level trending down. Calcitonin and lactic acid and WBC within normal limits. Doubt infectious process. CT of the head showed no acute intracranial abnormality. CT of the cervical spine showed no fracture but did show cervical degenerative disc disease. REcurrent confusion on 12/02 after HD , was found to be hypoglycemic and was c/o neck pain . 1. REcurrent confusion 12/02  Hypoglycemia   Repeat CT head showed no acute issues   Ammonia and VBG ordered but patient refused labs   Pt got less agitated after his pain was treated   Bs was low 66   Severe neck pain due to DDD cervical spine  2. Recurrent falls secondary to spinal stenosis and multilevel disc and facet degeneration  3. Elevated troponin secondary to kidney disease. 4.  End-stage renal disease on hemodialysis Monday, Wednesday, Friday  5. Chronic systolic heart failure  6. Type 2 diabetes  7.   Chronic hypoxic respiratory failure  8. Acute confusion multifactorial  9. LUE HD access malfunction not able to complete HD s/p fistulogram 11/29--           _______________________________________________________________________  Patient seen and examined by me on discharge day. Pertinent Findings:  Gen:    Not in distress  Chest: Clear lungs  CVS:   Regular rhythm. No edema  Abd:  Soft, not distended, not tender  Neuro:  Alert, ox3  _______________________________________________________________________  DISCHARGE MEDICATIONS:   Current Discharge Medication List        START taking these medications    Details   benzocaine-menthoL (CEPACOL) 15-3.6 mg lozg lozenge 1 Lozenge by Mucous Membrane route every two (2) hours as needed for PRN Reason (Other) (sore throat). Qty: 30 Lozenge, Refills: 5  Start date: 11/24/2022      lactulose (CHRONULAC) 10 gram/15 mL solution Take 30 mL by mouth daily as needed for PRN Reason (Other) (constipation). Qty: 90 mL, Refills: 1  Start date: 11/24/2022      lidocaine 4 % patch 1 Patch by TransDERmal route every twenty-four (24) hours for 14 days. Qty: 14 Patch, Refills: 0  Start date: 11/24/2022, End date: 12/8/2022      cyanocobalamin (VITAMIN B12) 500 mcg tablet Take 1 Tablet by mouth daily. Qty: 60 Tablet, Refills: 1  Start date: 10/65/1169      folic acid (FOLVITE) 1 mg tablet Take 1 Tablet by mouth daily. Qty: 60 Tablet, Refills: 1  Start date: 11/20/2022           CONTINUE these medications which have CHANGED    Details   gabapentin (NEURONTIN) 300 mg capsule Take 1 Capsule by mouth three (3) times daily as needed for Pain. Max Daily Amount: 900 mg. Qty: 90 Capsule, Refills: 5  Start date: 11/24/2022    Associated Diagnoses: Cervical spondylosis with myelopathy; Degenerative cervical spinal stenosis; Cervical post-laminectomy syndrome; Diabetic peripheral neuropathy associated with type 2 diabetes mellitus ([facility]  Type 2 diabetes mellitus with stage 4 chronic kidney disease, with long-term current use of insulin ([facility] [address] Immune-mediated neuropathy (HCC)      insulin NPH/insulin regular (NovoLIN 70/30 U-100 Insulin) 100 unit/mL (70-30) injection ReliOn Novolin 70/30: Inject 20 Units with Breakfast, 20 Units with Dinner  Qty: 7 Each, Refills: 3  Start date: 11/24/2022    Comments: Please consider 90 day supplies to promote better adherence           CONTINUE these medications which have NOT CHANGED    Details   hydrOXYzine HCL (ATARAX) 25 mg tablet TAKE 1 TABLET BY MOUTH EVERY EVENING AS NEEDED      Miralax 17 gram/dose powder USE 1 SCOOP AND MIX WITH WATER AND DRINK BY MOUTH ONCE DAILY      Entresto 24-26 mg tablet Take 1 Tablet by mouth two (2) times a day. glipiZIDE (GLUCOTROL) 5 mg tablet Take 1 Tablet by mouth two (2) times a day. Qty: 180 Tablet, Refills: 1      sevelamer carbonate (RENVELA) 800 mg tab tab Take 800 mg by mouth two (2) times a day. carvediloL (COREG) 12.5 mg tablet Take 1 Tab by mouth two (2) times daily (with meals). Qty: 60 Tab, Refills: 0      bumetanide (BUMEX) 2 mg tablet TAKE 1 TABLET BY MOUTH EVERY DAY      finasteride (PROSCAR) 5 mg tablet TAKE 1 TABLET BY MOUTH DAILY      aspirin 81 mg chewable tablet Take 81 mg by mouth daily. fluticasone propionate (FLONASE) 50 mcg/actuation nasal spray 2 Sprays by Both Nostrils route daily. tamsulosin (FLOMAX) 0.4 mg capsule Take 0.8 mg by mouth daily. STOP taking these medications       traMADoL (ULTRAM) 50 mg tablet Comments:   Reason for Stopping:                 Patient Follow Up Instructions:    Activity: Activity as tolerated  Diet: Renal Diet  Wound Care: None needed    Follow-up Information       Follow up With Specialties Details Why Contact Leola Joya MD Neurology Schedule an appointment as soon as possible for a visit in 1 month(s)  [address] [facility] [address]      Meghna Valiente MD Internal Medicine Physician   [address] [facility] [address]  [address] [place] [address]   [address]  [address] [facility]  [phone]          ________________________________________________________________    Risk of deterioration: High    Condition at Discharge:  Stable  __________________________________________________________________    Disposition  IP Rehab    ____________________________________________________________________    Code Status: Full Code  ___________________________________________________________________      Total time in minutes spent coordinating this discharge (includes going over instructions, follow-up, prescriptions, and preparing report for sign off to her PCP) :  >30 minutes    Signed:  Stephen Toro MD

## 2022-12-04 NOTE — PROGRESS NOTES
[facility] here to transport pt to [facility]. .Report called by day RN. Vitals taken and stable. Report given to [facility]. Personal belongings taken with family.

## 2022-12-04 NOTE — PROGRESS NOTES
End of Shift Note     Bedside shift change report given to Eliazar Thompson RN,(oncoming nurse) by NEHA Steward(offgoing nurse). Report included the following information SBAR, Kardex, ED Summary, and MAR     Shift worked: 3p-7p   Shift summary and any significant changes:      For discharge today      Concerns for physician to address: None   Zone phone for oncoming shift:   [phone]      Patient Information  Franci Ford  78 y.o.  11/16/2022 12:20 AM by Ra Payan was admitted from Home     Problem List          Patient Active Problem List     Diagnosis Date Noted    UTI (lower urinary tract infection) 10/25/2013    Renal failure (ARF), acute on chronic ([address] 10/25/2013    Unable to ambulate 11/16/2022    Cervical post-laminectomy syndrome 06/09/2022    Degenerative cervical spinal stenosis 06/09/2022    Vitamin D deficiency 12/21/2021    Immune-mediated neuropathy ([address] 12/21/2021    Diabetic peripheral neuropathy associated with type 2 diabetes mellitus ([address] 12/21/2021    B12 deficiency 12/21/2021    Sensory ataxic gait 12/21/2021    Cervical spondylosis with myelopathy 12/21/2021    Type 2 diabetes mellitus with stage 4 chronic kidney disease, with long-term current use of insulin ([address] 12/21/2021    Convulsions ([address] 12/21/2021    Acute alteration in mental status 12/21/2021    Myoclonus 12/21/2021    Sepsis ([address] 08/18/2021    Acute hypoxemic respiratory failure ([address] 26/37/3405    Systolic heart failure ([address] 05/04/2021    Accelerated hypertension 07/21/2020    Elevated troponin 07/21/2020    ESRD (end stage renal disease) on dialysis ([address] 07/21/2020    Acute respiratory failure with hypoxia ([address] 07/21/2020    Acute on chronic systolic CHF (congestive heart failure) ([address] 07/21/2020    Pneumonia 05/26/2019    Acute on chronic renal failure ([address] 10/29/2016    Hypotension 10/29/2016    Chest pain 10/29/2016    Type II diabetes mellitus with nephropathy ([facility] [address] 10/29/2016    Hyperlipidemia 10/29/2016 Nephrotic syndrome 08/20/2016    Renal anasarca 08/20/2016    Diarrhea 10/28/2013    SIRS (systemic inflammatory response syndrome) ([facility] 10/28/2013    Acute pancreatitis 01/18/2011    LFT'S ABNORMAL 01/18/2011    Acute renal failure (ARF) ([facility] 01/18/2011    CKD (chronic kidney disease) stage 3, GFR 30-59 ml/min ([facility]) 01/18/2011    CAD (coronary artery disease) 01/18/2011    Abdominal pain, acute, epigastric 01/18/2011    Nausea & vomiting 01/18/2011              Past Medical History:   Diagnosis Date    Arthritis       spine    CAD (coronary artery disease)       high cholesterol    Chronic kidney disease       dialysis    Diabetes ([facility]      ESRD (end stage renal disease) ([facility])      GERD (gastroesophageal reflux disease)       HX    Hypertension      Ill-defined condition       irritable bowel syndrome    Systolic CHF ([facility]      Unspecified sleep apnea       NO CPAP         Core Measures:  CVA: No Not applicable  CHF:No Not applicable  PNA:No Not applicable     Activity:  Activity Level: Up with Assistance  Number times ambulated in hallways past shift: 0  Number of times OOB to chair past shift: 0     Cardiac:   Cardiac Monitoring: No        Access:   Current line(s):no     Genitourinary:   Urinary status: voiding     Respiratory:   O2 Device: None (Room air)  Chronic home O2 use?: YES  Incentive spirometer at bedside: N/A     GI:  Last Bowel Movement Date: 12/03/2022  Current diet:  ADULT DIET Dysphagia - Soft & Bite Sized; Only vanilla or strawberry supplements please  ADULT ORAL NUTRITION SUPPLEMENT Breakfast, Dinner; Renal Supplement  Passing flatus: YES  Tolerating current diet: YES     Pain Management:   Patient states pain is manageable on current regimen: YES     Skin:  Kelby Score: 19  Interventions: turn team, speciality bed, float heels, increase time out of bed, foam dressing, and PT/OT consult    Patient Safety:  Fall Score:  Total Score: 4  Interventions: bed/chair alarm, assistive device (walker, cane, etc), gripper socks, pt to call before getting OOB, and stay with me (per policy)  High Fall Risk: Yes     DVT prophylaxis:  DVT prophylaxis Med- Yes  DVT prophylaxis SCD or CINDY- No      Wounds: (If Applicable)  Wounds- No  Location None      Active Consults:  IP CONSULT TO NEUROLOGY  IP CONSULT TO NEPHROLOGY     Length of Stay:  Expected LOS: 4d 7h  Actual LOS: 10  Discharge Plan: Yes.  [id number] 11/28/22

## 2023-03-01 DIAGNOSIS — Z79.4 TYPE 2 DIABETES MELLITUS WITH CHRONIC KIDNEY DISEASE ON CHRONIC DIALYSIS, WITH LONG-TERM CURRENT USE OF INSULIN (HCC): ICD-10-CM

## 2023-03-01 DIAGNOSIS — I10 ESSENTIAL HYPERTENSION WITH GOAL BLOOD PRESSURE LESS THAN 130/80: ICD-10-CM

## 2023-03-01 DIAGNOSIS — Z99.2 TYPE 2 DIABETES MELLITUS WITH CHRONIC KIDNEY DISEASE ON CHRONIC DIALYSIS, WITH LONG-TERM CURRENT USE OF INSULIN (HCC): ICD-10-CM

## 2023-03-01 DIAGNOSIS — E11.22 TYPE 2 DIABETES MELLITUS WITH CHRONIC KIDNEY DISEASE ON CHRONIC DIALYSIS, WITH LONG-TERM CURRENT USE OF INSULIN (HCC): ICD-10-CM

## 2023-03-01 DIAGNOSIS — N18.6 TYPE 2 DIABETES MELLITUS WITH CHRONIC KIDNEY DISEASE ON CHRONIC DIALYSIS, WITH LONG-TERM CURRENT USE OF INSULIN (HCC): ICD-10-CM

## 2023-03-09 ENCOUNTER — OFFICE VISIT (OUTPATIENT)
Dept: NEUROLOGY | Age: 78
End: 2023-03-09
Payer: MEDICARE

## 2023-03-09 VITALS
OXYGEN SATURATION: 95 % | BODY MASS INDEX: 32.1 KG/M2 | SYSTOLIC BLOOD PRESSURE: 138 MMHG | WEIGHT: 237 LBS | HEART RATE: 68 BPM | TEMPERATURE: 97.6 F | RESPIRATION RATE: 19 BRPM | HEIGHT: 72 IN | DIASTOLIC BLOOD PRESSURE: 89 MMHG

## 2023-03-09 DIAGNOSIS — D89.89 IMMUNE-MEDIATED NEUROPATHY (HCC): ICD-10-CM

## 2023-03-09 DIAGNOSIS — E11.42 DIABETIC PERIPHERAL NEUROPATHY ASSOCIATED WITH TYPE 2 DIABETES MELLITUS (HCC): Primary | ICD-10-CM

## 2023-03-09 DIAGNOSIS — R26.0 SENSORY ATAXIC GAIT: ICD-10-CM

## 2023-03-09 DIAGNOSIS — M96.1 CERVICAL POST-LAMINECTOMY SYNDROME: ICD-10-CM

## 2023-03-09 DIAGNOSIS — M48.02 DEGENERATIVE CERVICAL SPINAL STENOSIS: ICD-10-CM

## 2023-03-09 DIAGNOSIS — G63 IMMUNE-MEDIATED NEUROPATHY (HCC): ICD-10-CM

## 2023-03-09 PROCEDURE — 1123F ACP DISCUSS/DSCN MKR DOCD: CPT | Performed by: PSYCHIATRY & NEUROLOGY

## 2023-03-09 PROCEDURE — G8510 SCR DEP NEG, NO PLAN REQD: HCPCS | Performed by: PSYCHIATRY & NEUROLOGY

## 2023-03-09 PROCEDURE — G8427 DOCREV CUR MEDS BY ELIG CLIN: HCPCS | Performed by: PSYCHIATRY & NEUROLOGY

## 2023-03-09 PROCEDURE — 99214 OFFICE O/P EST MOD 30 MIN: CPT | Performed by: PSYCHIATRY & NEUROLOGY

## 2023-03-09 PROCEDURE — 3046F HEMOGLOBIN A1C LEVEL >9.0%: CPT | Performed by: PSYCHIATRY & NEUROLOGY

## 2023-03-09 PROCEDURE — 3078F DIAST BP <80 MM HG: CPT | Performed by: PSYCHIATRY & NEUROLOGY

## 2023-03-09 PROCEDURE — G8536 NO DOC ELDER MAL SCRN: HCPCS | Performed by: PSYCHIATRY & NEUROLOGY

## 2023-03-09 PROCEDURE — 3017F COLORECTAL CA SCREEN DOC REV: CPT | Performed by: PSYCHIATRY & NEUROLOGY

## 2023-03-09 PROCEDURE — G8417 CALC BMI ABV UP PARAM F/U: HCPCS | Performed by: PSYCHIATRY & NEUROLOGY

## 2023-03-09 PROCEDURE — 1101F PT FALLS ASSESS-DOCD LE1/YR: CPT | Performed by: PSYCHIATRY & NEUROLOGY

## 2023-03-09 PROCEDURE — 2022F DILAT RTA XM EVC RTNOPTHY: CPT | Performed by: PSYCHIATRY & NEUROLOGY

## 2023-03-09 PROCEDURE — 3075F SYST BP GE 130 - 139MM HG: CPT | Performed by: PSYCHIATRY & NEUROLOGY

## 2023-03-09 RX ORDER — PRIMIDONE 50 MG/1
25 TABLET ORAL
Qty: 90 TABLET | Refills: 3 | Status: SHIPPED | OUTPATIENT
Start: 2023-03-09

## 2023-03-09 RX ORDER — HYDRALAZINE HYDROCHLORIDE 25 MG/1
TABLET, FILM COATED ORAL
COMMUNITY
Start: 2023-01-19 | End: 2023-03-09 | Stop reason: ALTCHOICE

## 2023-03-09 RX ORDER — ISOSORBIDE MONONITRATE 30 MG/1
30 TABLET, EXTENDED RELEASE ORAL DAILY
COMMUNITY
Start: 2023-01-18 | End: 2024-01-18

## 2023-03-09 RX ORDER — ATORVASTATIN CALCIUM 40 MG/1
40 TABLET, FILM COATED ORAL DAILY
COMMUNITY
Start: 2023-03-01

## 2023-03-09 NOTE — LETTER
3/9/2023    Patient: Amish Azul   YOB: 1945   Date of Visit: 3/9/2023     Lashell Alves, [address] [facility] [address]  Via Fax: [phone]    Dear Lashell Alves MD,      Thank you for referring Mr. Ian Mauricio to [address] [place] [address] for evaluation. My notes for this consultation are attached. Consult  REFERRED BY:  Braydon Manciach, MD    CHIEF COMPLAINT: Twitching and abnormal movements and progressive loss of balance and weakness and inability to hold things. Subjective:     Amish Azul is a 78 y.o. right-handed -American male we are seeing on urgent work in basis at the request of Dr. Lexis Rosado on urgent work in basis for new problem of having progressive weakness, in his arms and legs, inability to hold things, and had MRI scans done of the cervical spine that showed moderate severe to severe recurrent spinal cord compression in the cervical spine, and similarly in the lumbar spine, but apparently neurosurgery was not consulted to see the patient more orthopedic surgery. On his cervical MRI he had moderate severe to severe recurrent cord compression at C6-7 and at C3-4 and C5-6 secondary to central disc herniations and arthritis, and had postop changes at C4-5 and probably C5-6 levels with myelomalacia and significant previous cord compression, and lumbar spine MRI showed moderate severe to severe stenosis at L4-5 also, and he was thought to be somewhat hypotensive in addition and was given physical therapy and seem to get better and went home but still having problems. His muscle twitching is recurrent and he is having more pain from his diabetic neuropathy in his feet, and wonders what he can do so we will put him on some gabapentin that may help the neck pain also so hopefully he will do better there.   He is again advised that the single best treatment for his neuropathy is good control of his blood sugars and he is trying to do that. Metabolic causes for other causes of his neuropathy have been ruled out at his last visit. He also says he has loss of balance and difficulty walking, and has to use a cane for ambulation now almost all the time when before that he had to use it only intermittently before August and when he got sick. He had a normal CPK but is a diabetic since 2008 and been on insulin for the last 3 years with somewhat poorly controlled diabetes. He said it may be doing better recently his hemoglobin A1c seems to be coming down better. He is on dialysis. He has had previous cervical fusion for myelopathy in the past.  None of those records are available at this time. He said congestive heart failure and sleep apnea but does not use CPAP. He has not had any recent head trauma or neck trauma or back trauma, or fever or meningismus since August, but does complain of some mild low back pain and had an MRI scan ordered of his back but no MRI scan is on the chart. His bowel and bladder function is stable. He has had 1 or 2 falls because of his unsteady gait and is very concerned that he might be high risk for injury due to his progressive symptoms are becoming more disabling. He has never had a seizure or loss of consciousness with this myoclonic type jerks. He does not think he has had any cognitive issues or other cranial nerve problems. A lot of his twitching seems to be more asterixis positive related to his renal failure.     Past Medical History:   Diagnosis Date    Arthritis     spine    CAD (coronary artery disease)     high cholesterol    Chronic kidney disease     dialysis    Diabetes ([facility]     ESRD (end stage renal disease) (HCC)     GERD (gastroesophageal reflux disease)     HX    Hypertension     Ill-defined condition     irritable bowel syndrome    Systolic CHF ([address]     Unspecified sleep apnea     NO CPAP      Past Surgical History:   Procedure Laterality Date    COLONOSCOPY N/A 2016    COLONOSCOPY [facility] Sebas Sommers MD [facility] ENDOSCOPY    HX ORTHOPAEDIC      CERVICAL FUSION    HX UROLOGICAL      TURP    HX VASCULAR ACCESS      L arm dialysis access     Family History   Problem Relation Age of Onset    No Known Problems Mother     Cancer Father         \"bone\"   [de-identified] Cancer Sister         leukemia and breast    No Known Problems Sister     Diabetes Brother     Diabetes Brother     Emphysema Brother     No Known Problems Maternal Grandmother     No Known Problems Maternal Grandfather     No Known Problems Paternal Grandmother     No Known Problems Paternal Grandfather       Social History     Tobacco Use    Smoking status: Former     Packs/day: 1.00     Years: 10.00     Pack years: 10.00     Types: Cigarettes     Quit date:      Years since quittin.1    Smokeless tobacco: Former    Tobacco comments:     cigars only   Substance Use Topics    Alcohol use: No         Current Outpatient Medications:     atorvastatin (LIPITOR) 40 mg tablet, Take 40 mg by mouth daily. , Disp: , Rfl:     isosorbide mononitrate ER (IMDUR) 30 mg tablet, Take 30 mg by mouth daily. , Disp: , Rfl:     primidone (Mysoline) 50 mg tablet, Take 0.5 Tablets by mouth nightly., Disp: 90 Tablet, Rfl: 3    gabapentin (NEURONTIN) 300 mg capsule, TAKE 1 CAPSULE BY MOUTH THREE TIMES DAILY AS NEEDED FOR PAIN. MAX DAILY AMOUNT: 900 MG, Disp: 90 Capsule, Rfl: 1    lactulose (CHRONULAC) 10 gram/15 mL solution, Take 30 mL by mouth daily as needed for PRN Reason (Other) (constipation). , Disp: 90 mL, Rfl: 1    insulin NPH/insulin regular (NovoLIN 70/30 U-100 Insulin) 100 unit/mL (70-30) injection, ReliOn Novolin 70/30: Inject 20 Units with Breakfast, 20 Units with Dinner, Disp: 7 Each, Rfl: 3    cyanocobalamin (VITAMIN B12) 500 mcg tablet, Take 1 Tablet by mouth daily. , Disp: 60 Tablet, Rfl: 1    folic acid (FOLVITE) 1 mg tablet, Take 1 Tablet by mouth daily. , Disp: 60 Tablet, Rfl: 1   hydrOXYzine HCL (ATARAX) 25 mg tablet, TAKE 1 TABLET BY MOUTH EVERY EVENING AS NEEDED, Disp: , Rfl:     Miralax 17 gram/dose powder, USE 1 SCOOP AND MIX WITH WATER AND DRINK BY MOUTH ONCE DAILY, Disp: , Rfl:     sevelamer carbonate (RENVELA) 800 mg tab tab, Take 800 mg by mouth two (2) times daily (with meals). , Disp: , Rfl:     carvediloL (COREG) 12.5 mg tablet, Take 1 Tab by mouth two (2) times daily (with meals). (Patient taking differently: Take 25 mg by mouth two (2) times daily (with meals). ), Disp: 60 Tab, Rfl: 0    bumetanide (BUMEX) 2 mg tablet, TAKE 1 TABLET BY MOUTH EVERY DAY, Disp: , Rfl:     finasteride (PROSCAR) 5 mg tablet, TAKE 1 TABLET BY MOUTH DAILY, Disp: , Rfl:     aspirin 81 mg chewable tablet, Take 81 mg by mouth daily. , Disp: , Rfl:     fluticasone propionate (FLONASE) 50 mcg/actuation nasal spray, 2 Sprays by Both Nostrils route daily. , Disp: , Rfl:     tamsulosin (FLOMAX) 0.4 mg capsule, Take 0.8 mg by mouth daily. , Disp: , Rfl:         Allergies   Allergen Reactions    Albumin, Human 25 % Anaphylaxis and Hives    Niacin Hives      MRI Results (most recent):  Results from Hospital Encounter encounter on 11/16/22    MRI LUMB SPINE WO CONT    Narrative  EXAM: MRI LUMB SPINE WO CONT  Clinical history: Lumbar canal stenosis  INDICATION: Lumbar stenosis. COMPARISON: None    TECHNIQUE: MR imaging of the lumbar spine was performed using the following  sequences: sagittal T1, T2, STIR;  axial T1, T2.    CONTRAST:  None. FINDINGS:    There is normal alignment of the lumbar spine. Vertebral body heights are  maintained. Marrow lesion at L5 is not fully characterize. Retrolisthesis of L5  on S1. The conus medullaris terminates at L1/L2. Signal and caliber of the distal  spinal cord are within normal limits. The paraspinal soft tissues are within normal limits. Lower thoracic spine: No herniation or stenosis. L1-L2: No herniation or stenosis. L2-L3: Disc desiccation. Schmorl's node. Mild facet arthropathy. The canal is  patent. Foramina are patent    L3-L4: Disc desiccation. Mild facet arthropathy. Minimal disc bulge. Minimal  canal stenosis. Foramina are patent    L4-L5: Disc desiccation. Facet arthropathy. Circumferential disc bulge. Mild  canal stenosis. Minimal right foraminal stenosis. Effacement of nerve roots  extending to the right and left L5-S1 foramina. L5-S1: Mild facet arthropathy. Disc desiccation and loss of disc height. Moderate to large broad-based disc protrusion/osteophyte. Moderate canal and  severe bilateral foraminal stenosis. Effacement of nerve roots extending to the  right and left S1 foramina. Impression  Multilevel disc and facet degenerative change with trace retrolisthesis of L5 on  S1. Moderate canal and severe bilateral foraminal stenosis at L5-S1 with effacement  of nerve roots extending to the right and left S1 foramina. Heterogenous marrow signal intensity, small Schmorl's nodes are present, lesion  at L5 is not fully characterized, may represent a bone island. . Further  delineation with nuclear medicine bone scan is recommended. Results from [place] encounter on 11/16/22    MRI LUMB SPINE WO CONT    Narrative  EXAM: MRI LUMB SPINE WO CONT  Clinical history: Lumbar canal stenosis  INDICATION: Lumbar stenosis. COMPARISON: None    TECHNIQUE: MR imaging of the lumbar spine was performed using the following  sequences: sagittal T1, T2, STIR;  axial T1, T2.    CONTRAST:  None. FINDINGS:    There is normal alignment of the lumbar spine. Vertebral body heights are  maintained. Marrow lesion at L5 is not fully characterize. Retrolisthesis of L5  on S1. The conus medullaris terminates at L1/L2. Signal and caliber of the distal  spinal cord are within normal limits. The paraspinal soft tissues are within normal limits. Lower thoracic spine: No herniation or stenosis.     L1-L2: No herniation or stenosis. L2-L3: Disc desiccation. Schmorl's node. Mild facet arthropathy. The canal is  patent. Foramina are patent    L3-L4: Disc desiccation. Mild facet arthropathy. Minimal disc bulge. Minimal  canal stenosis. Foramina are patent    L4-L5: Disc desiccation. Facet arthropathy. Circumferential disc bulge. Mild  canal stenosis. Minimal right foraminal stenosis. Effacement of nerve roots  extending to the right and left L5-S1 foramina. L5-S1: Mild facet arthropathy. Disc desiccation and loss of disc height. Moderate to large broad-based disc protrusion/osteophyte. Moderate canal and  severe bilateral foraminal stenosis. Effacement of nerve roots extending to the  right and left S1 foramina. Impression  Multilevel disc and facet degenerative change with trace retrolisthesis of L5 on  S1. Moderate canal and severe bilateral foraminal stenosis at L5-S1 with effacement  of nerve roots extending to the right and left S1 foramina. Heterogenous marrow signal intensity, small Schmorl's nodes are present, lesion  at L5 is not fully characterized, may represent a bone island. . Further  delineation with nuclear medicine bone scan is recommended. Review of Systems:  A comprehensive review of systems was negative except for: Constitutional: positive for fatigue and malaise  Ears, nose, mouth, throat, and face: positive for hearing loss  Musculoskeletal: positive for myalgias, arthralgias, stiff joints, neck pain, back pain and muscle weakness  Neurological: positive for paresthesia, coordination problems, gait problems, tremor and weakness  Behvioral/Psych: positive for anxiety   Vitals:    03/09/23 1043 03/09/23 1055 03/09/23 1135   BP: (!) 160/76 (!) 156/80 138/89   Pulse: 68     Resp: 19     Temp: 97.6 °F (36.4 °C)     SpO2: 95%     Weight: 237 lb (107.5 kg)     Height: 6' (1.829 m)       Objective:     I      NEUROLOGICAL EXAM:    Appearance:   The patient is well developed, well nourished, provides a fair history and is in no acute distress. Mental Status: Oriented to time, place and person, and the president, cognitive function is slow but probably normal and speech is fluent and no aphasia or dysarthria. Mood and affect appropriate but mildly depressed. Cranial Nerves:   Intact visual fields. Fundi are benign, disc are flat, no lesions seen on funduscopy. ANGELITO, EOM's full, no nystagmus, no ptosis. Facial sensation is normal. Corneal reflexes are not tested. Facial movement is symmetric. Hearing is abnormal bilaterally. Palate is midline with normal sternocleidomastoid and trapezius muscles are normal. Tongue is midline. Neck without meningismus or bruits  Temporal arteries are not tender or enlarged  TMJ areas are not tender on palpation   Motor:  4/5 strength in upper and lower proximal and distal muscles. Normal bulk and slight increased tone in the legs but normal in the arms. No fasciculations. Rapid alternating movement is symmetric and slow bilaterally   Reflexes:   Deep tendon reflexes 3+/4 and symmetrical in both legs, and 2+/4 in the arms. No babinski or clonus present   Sensory:   Abnormal to touch, pinprick and vibration and temperature in both feet to midcalf level. DSS is intact   Gait:  Abnormal gait for patient's age with wide-based and unsteady and ataxic and almost spondylitic gait. Tremor:   No tremor noted. Cerebellar: Moderately abnormal cerebellar signs present on Romberg and tandem testing and finger-nose-finger exam.   Neurovascular:  Normal heart sounds and regular rhythm, peripheral pulses decreased, and no carotid bruits. Assessment:       ICD-10-CM ICD-9-CM    1. Diabetic peripheral neuropathy associated with type 2 diabetes mellitus (HCC)  E11.42 250.60 MAGNESIUM     357.2 ANTINEURONAL CELL AB      GM1 IGG AUTOANTIBODY      IMMUNOELECTROPHORESIS (IMMUNOFIX.)      CK      NEURO EEG 24 HR      EMG LIMITED      primidone (Mysoline) 50 mg tablet      2. Immune-mediated neuropathy (HCC)  D89.89 279.8 MAGNESIUM    G63 357.4 ANTINEURONAL CELL AB      GM1 IGG AUTOANTIBODY      IMMUNOELECTROPHORESIS (IMMUNOFIX.)      CK      NEURO EEG 24 HR      EMG LIMITED      primidone (Mysoline) 50 mg tablet      3. Degenerative cervical spinal stenosis  M48.02 723.0 MAGNESIUM      ANTINEURONAL CELL AB      GM1 IGG AUTOANTIBODY      IMMUNOELECTROPHORESIS (IMMUNOFIX.)      CK      NEURO EEG 24 HR      EMG LIMITED      primidone (Mysoline) 50 mg tablet      4. Cervical post-laminectomy syndrome  M96.1 722.81 MAGNESIUM      ANTINEURONAL CELL AB      GM1 IGG AUTOANTIBODY      IMMUNOELECTROPHORESIS (IMMUNOFIX.)      CK      NEURO EEG 24 HR      EMG LIMITED      primidone (Mysoline) 50 mg tablet      5. Sensory ataxic gait  R26.0 781.2 MAGNESIUM      ANTINEURONAL CELL AB      GM1 IGG AUTOANTIBODY      IMMUNOELECTROPHORESIS (IMMUNOFIX.)      CK      NEURO EEG 24 HR      EMG LIMITED      primidone (Mysoline) 50 mg tablet        Active Problems:    * No active hospital problems. *      Plan:     Patient with new problem of severe spinal stenosis at multiple levels in his spine above and below his surgery, but the patient said previously that he does not really want to see a neurosurgeon to consider decompression and would rather try physical therapy and continue his current treatment as he is better on the muscle relaxants and no longer having that much muscle spasm and is trying to exercise some on his own. His MRI scan did show the severe stenosis in the cervical and lumbar spine as mentioned above and severe myelomalacia of the cord which explains a lot of his residual neurologic deficit from his first surgery. He seems be having more myoclonus, and orthostasis, return to make sure that he stays hydrated, they have already told him to drink Gatorade after dialysis. He is worse on the days of dialysis and better like today the day after dialysis.   He may be having control with dialysis disequilibrium. He is having more pain from his diabetic neuropathy, and for that we will give him some gabapentin to see if we can help him, because he said it seemed to help in the past and that may help some of his myelopathic pain and cervical pain in addition. Supposedly he is going to get physical therapy after his hospital discharge. Patient with complicated history of progressive gait disorder and unsteadiness and frequent falls, making him a high risk for further neurologic injury and dysfunction. To make it more complicated he also has diffuse hyperreflexia, which could indicate a recurrent cervical myelopathy, or just be residual of his previous problem requiring surgery but we have no records to compare to this. He also obviously has a diabetic neuropathy and diabetic sensory ataxia complicating his gait problems. His myoclonic jerks and muscle twitching probably got better because he is try to stay more hydrated eating better, and taking care of his fluid balance better and does not have any asterixis or myoclonus at this time. He is also taking his multivitamins and vitamin D on a regular basis in addition. Complete metabolic panel sent off to rule out causes of memory loss, ataxia, neuropathy, or other neuromuscular disease for causes of his tremors and gait instability and these are relatively unremarkable except for the hemoglobin A1c. He was supposed to have an EEG and an EMG but he never returned for those tests after his last visit. .  Complicated case with multiple neurologic problems placing the patient at high risk for neurologic injury and dysfunction if treatable causes are found. He could be a high risk for injury and paralysis if he does have a myelopathy that is progressive and pretty would be life-threatening. Patient advised that the best treatment for his diabetic neuropathy is good control of his diabetes.   He is to take a multivitamin and vitamin D on a daily basis also.  Follow-up after the above-mentioned testing and treatment, and he will call us if any problems. We will check my chart for results of this test.  38 minutes spent with the patient today going over all these problems, discussing his diagnosis prognosis treatment options, and because he does not want surgery we will try the conservative management but he will call us immediately there is any progression of disease at all. He may need a neurosurgery referral then. Signed By: Amita Donald MD     March 9, 2023       CC: Pam Burnett MD  FAX: [phone]      If you have questions, please do not hesitate to call me. I look forward to following your patient along with you.       Sincerely,    Amita Donald MD

## 2023-03-10 NOTE — PROGRESS NOTES
Consult  REFERRED BY:  Xavier Matthews MD    CHIEF COMPLAINT: Twitching and abnormal movements and progressive loss of balance and weakness and inability to hold things. Subjective:     Memo  is a 78 y.o. right-handed -American male we are seeing on urgent work in basis at the request of Dr. Reynold Gaston on urgent work in basis for new problem of having progressive weakness, in his arms and legs, inability to hold things, and had MRI scans done of the cervical spine that showed moderate severe to severe recurrent spinal cord compression in the cervical spine, and similarly in the lumbar spine, but apparently neurosurgery was not consulted to see the patient more orthopedic surgery. On his cervical MRI he had moderate severe to severe recurrent cord compression at C6-7 and at C3-4 and C5-6 secondary to central disc herniations and arthritis, and had postop changes at C4-5 and probably C5-6 levels with myelomalacia and significant previous cord compression, and lumbar spine MRI showed moderate severe to severe stenosis at L4-5 also, and he was thought to be somewhat hypotensive in addition and was given physical therapy and seem to get better and went home but still having problems. His muscle twitching is recurrent and he is having more pain from his diabetic neuropathy in his feet, and wonders what he can do so we will put him on some gabapentin that may help the neck pain also so hopefully he will do better there. He is again advised that the single best treatment for his neuropathy is good control of his blood sugars and he is trying to do that. Metabolic causes for other causes of his neuropathy have been ruled out at his last visit. He also says he has loss of balance and difficulty walking, and has to use a cane for ambulation now almost all the time when before that he had to use it only intermittently before August and when he got sick.   He had a normal CPK but is a diabetic since 2008 and been on insulin for the last 3 years with somewhat poorly controlled diabetes. He said it may be doing better recently his hemoglobin A1c seems to be coming down better. He is on dialysis. He has had previous cervical fusion for myelopathy in the past.  None of those records are available at this time. He said congestive heart failure and sleep apnea but does not use CPAP. He has not had any recent head trauma or neck trauma or back trauma, or fever or meningismus since August, but does complain of some mild low back pain and had an MRI scan ordered of his back but no MRI scan is on the chart. His bowel and bladder function is stable. He has had 1 or 2 falls because of his unsteady gait and is very concerned that he might be high risk for injury due to his progressive symptoms are becoming more disabling. He has never had a seizure or loss of consciousness with this myoclonic type jerks. He does not think he has had any cognitive issues or other cranial nerve problems. A lot of his twitching seems to be more asterixis positive related to his renal failure.     Past Medical History:   Diagnosis Date    Arthritis     spine    CAD (coronary artery disease)     high cholesterol    Chronic kidney disease     dialysis    Diabetes ([address]     ESRD (end stage renal disease) ([facility])     GERD (gastroesophageal reflux disease)     HX    Hypertension     Ill-defined condition     irritable bowel syndrome    Systolic CHF ([address]     Unspecified sleep apnea     NO CPAP      Past Surgical History:   Procedure Laterality Date    COLONOSCOPY N/A 11/9/2016    COLONOSCOPY performed by Munira Acosta MD at [facility] ENDOSCOPY    HX ORTHOPAEDIC      CERVICAL FUSION    HX UROLOGICAL      TURP    HX VASCULAR ACCESS      L arm dialysis access     Family History   Problem Relation Age of Onset    No Known Problems Mother     Cancer Father         \"bone\"    Cancer Sister         leukemia and breast    No Known Problems Sister     Diabetes Brother     Diabetes Brother     Emphysema Brother     No Known Problems Maternal Grandmother     No Known Problems Maternal Grandfather     No Known Problems Paternal Grandmother     No Known Problems Paternal Grandfather       Social History     Tobacco Use    Smoking status: Former     Packs/day: 1.00     Years: 10.00     Pack years: 10.00     Types: Cigarettes     Quit date:      Years since quittin.1    Smokeless tobacco: Former    Tobacco comments:     cigars only   Substance Use Topics    Alcohol use: No         Current Outpatient Medications:     atorvastatin (LIPITOR) 40 mg tablet, Take 40 mg by mouth daily. , Disp: , Rfl:     isosorbide mononitrate ER (IMDUR) 30 mg tablet, Take 30 mg by mouth daily. , Disp: , Rfl:     primidone (Mysoline) 50 mg tablet, Take 0.5 Tablets by mouth nightly., Disp: 90 Tablet, Rfl: 3    gabapentin (NEURONTIN) 300 mg capsule, TAKE 1 CAPSULE BY MOUTH THREE TIMES DAILY AS NEEDED FOR PAIN. MAX DAILY AMOUNT: 900 MG, Disp: 90 Capsule, Rfl: 1    lactulose (CHRONULAC) 10 gram/15 mL solution, Take 30 mL by mouth daily as needed for PRN Reason (Other) (constipation). , Disp: 90 mL, Rfl: 1    insulin NPH/insulin regular (NovoLIN 70/30 U-100 Insulin) 100 unit/mL (70-30) injection, ReliOn Novolin 70/30: Inject 20 Units with Breakfast, 20 Units with Dinner, Disp: 7 Each, Rfl: 3    cyanocobalamin (VITAMIN B12) 500 mcg tablet, Take 1 Tablet by mouth daily. , Disp: 60 Tablet, Rfl: 1    folic acid (FOLVITE) 1 mg tablet, Take 1 Tablet by mouth daily. , Disp: 60 Tablet, Rfl: 1    hydrOXYzine HCL (ATARAX) 25 mg tablet, TAKE 1 TABLET BY MOUTH EVERY EVENING AS NEEDED, Disp: , Rfl:     Miralax 17 gram/dose powder, USE 1 SCOOP AND MIX WITH WATER AND DRINK BY MOUTH ONCE DAILY, Disp: , Rfl:     sevelamer carbonate (RENVELA) 800 mg tab tab, Take 800 mg by mouth two (2) times daily (with meals). , Disp: , Rfl:     carvediloL (COREG) 12.5 mg tablet, Take 1 Tab by mouth two (2) times daily (with meals). (Patient taking differently: Take 25 mg by mouth two (2) times daily (with meals). ), Disp: 60 Tab, Rfl: 0    bumetanide (BUMEX) 2 mg tablet, TAKE 1 TABLET BY MOUTH EVERY DAY, Disp: , Rfl:     finasteride (PROSCAR) 5 mg tablet, TAKE 1 TABLET BY MOUTH DAILY, Disp: , Rfl:     aspirin 81 mg chewable tablet, Take 81 mg by mouth daily. , Disp: , Rfl:     fluticasone propionate (FLONASE) 50 mcg/actuation nasal spray, 2 Sprays by Both Nostrils route daily. , Disp: , Rfl:     tamsulosin (FLOMAX) 0.4 mg capsule, Take 0.8 mg by mouth daily. , Disp: , Rfl:         Allergies   Allergen Reactions    Albumin, Human 25 % Anaphylaxis and Hives    Niacin Hives      MRI Results (most recent):  Results from Hospital Encounter encounter on 11/16/22    MRI LUMB SPINE WO CONT    Narrative  EXAM: MRI LUMB SPINE WO CONT  Clinical history: Lumbar canal stenosis  INDICATION: Lumbar stenosis. COMPARISON: None    TECHNIQUE: MR imaging of the lumbar spine was performed using the following  sequences: sagittal T1, T2, STIR;  axial T1, T2.    CONTRAST:  None. FINDINGS:    There is normal alignment of the lumbar spine. Vertebral body heights are  maintained. Marrow lesion at L5 is not fully characterize. Retrolisthesis of L5  on S1. The conus medullaris terminates at L1/L2. Signal and caliber of the distal  spinal cord are within normal limits. The paraspinal soft tissues are within normal limits. Lower thoracic spine: No herniation or stenosis. L1-L2: No herniation or stenosis. L2-L3: Disc desiccation. Schmorl's node. Mild facet arthropathy. The canal is  patent. Foramina are patent    L3-L4: Disc desiccation. Mild facet arthropathy. Minimal disc bulge. Minimal  canal stenosis. Foramina are patent    L4-L5: Disc desiccation. Facet arthropathy. Circumferential disc bulge. Mild  canal stenosis. Minimal right foraminal stenosis. Effacement of nerve roots  extending to the right and left L5-S1 foramina.     L5-S1: Mild facet arthropathy. Disc desiccation and loss of disc height. Moderate to large broad-based disc protrusion/osteophyte. Moderate canal and  severe bilateral foraminal stenosis. Effacement of nerve roots extending to the  right and left S1 foramina. Impression  Multilevel disc and facet degenerative change with trace retrolisthesis of L5 on  S1. Moderate canal and severe bilateral foraminal stenosis at L5-S1 with effacement  of nerve roots extending to the right and left S1 foramina. Heterogenous marrow signal intensity, small Schmorl's nodes are present, lesion  at L5 is not fully characterized, may represent a bone island. . Further  delineation with nuclear medicine bone scan is recommended. Results from [place] encounter on 11/16/22    MRI LUMB SPINE WO CONT    Narrative  EXAM: MRI LUMB SPINE WO CONT  Clinical history: Lumbar canal stenosis  INDICATION: Lumbar stenosis. COMPARISON: None    TECHNIQUE: MR imaging of the lumbar spine was performed using the following  sequences: sagittal T1, T2, STIR;  axial T1, T2.    CONTRAST:  None. FINDINGS:    There is normal alignment of the lumbar spine. Vertebral body heights are  maintained. Marrow lesion at L5 is not fully characterize. Retrolisthesis of L5  on S1. The conus medullaris terminates at L1/L2. Signal and caliber of the distal  spinal cord are within normal limits. The paraspinal soft tissues are within normal limits. Lower thoracic spine: No herniation or stenosis. L1-L2: No herniation or stenosis. L2-L3: Disc desiccation. Schmorl's node. Mild facet arthropathy. The canal is  patent. Foramina are patent    L3-L4: Disc desiccation. Mild facet arthropathy. Minimal disc bulge. Minimal  canal stenosis. Foramina are patent    L4-L5: Disc desiccation. Facet arthropathy. Circumferential disc bulge. Mild  canal stenosis. Minimal right foraminal stenosis.  Effacement of nerve roots  extending to the right and left L5-S1 foramina. L5-S1: Mild facet arthropathy. Disc desiccation and loss of disc height. Moderate to large broad-based disc protrusion/osteophyte. Moderate canal and  severe bilateral foraminal stenosis. Effacement of nerve roots extending to the  right and left S1 foramina. Impression  Multilevel disc and facet degenerative change with trace retrolisthesis of L5 on  S1. Moderate canal and severe bilateral foraminal stenosis at L5-S1 with effacement  of nerve roots extending to the right and left S1 foramina. Heterogenous marrow signal intensity, small Schmorl's nodes are present, lesion  at L5 is not fully characterized, may represent a bone island. . Further  delineation with nuclear medicine bone scan is recommended. Review of Systems:  A comprehensive review of systems was negative except for: Constitutional: positive for fatigue and malaise  Ears, nose, mouth, throat, and face: positive for hearing loss  Musculoskeletal: positive for myalgias, arthralgias, stiff joints, neck pain, back pain and muscle weakness  Neurological: positive for paresthesia, coordination problems, gait problems, tremor and weakness  Behvioral/Psych: positive for anxiety   Vitals:    03/09/23 1043 03/09/23 1055 03/09/23 1135   BP: (!) 160/76 (!) 156/80 138/89   Pulse: 68     Resp: 19     Temp: 97.6 °F (36.4 °C)     SpO2: 95%     Weight: 237 lb (107.5 kg)     Height: 6' (1.829 m)       Objective:     I      NEUROLOGICAL EXAM:    Appearance: The patient is well developed, well nourished, provides a fair history and is in no acute distress. Mental Status: Oriented to time, place and person, and the president, cognitive function is slow but probably normal and speech is fluent and no aphasia or dysarthria. Mood and affect appropriate but mildly depressed. Cranial Nerves:   Intact visual fields. Fundi are benign, disc are flat, no lesions seen on funduscopy. ANGELITO, EOM's full, no nystagmus, no ptosis.  Facial sensation is normal. Corneal reflexes are not tested. Facial movement is symmetric. Hearing is abnormal bilaterally. Palate is midline with normal sternocleidomastoid and trapezius muscles are normal. Tongue is midline. Neck without meningismus or bruits  Temporal arteries are not tender or enlarged  TMJ areas are not tender on palpation   Motor:  4/5 strength in upper and lower proximal and distal muscles. Normal bulk and slight increased tone in the legs but normal in the arms. No fasciculations. Rapid alternating movement is symmetric and slow bilaterally   Reflexes:   Deep tendon reflexes 3+/4 and symmetrical in both legs, and 2+/4 in the arms. No babinski or clonus present   Sensory:   Abnormal to touch, pinprick and vibration and temperature in both feet to midcalf level. DSS is intact   Gait:  Abnormal gait for patient's age with wide-based and unsteady and ataxic and almost spondylitic gait. Tremor:   No tremor noted. Cerebellar: Moderately abnormal cerebellar signs present on Romberg and tandem testing and finger-nose-finger exam.   Neurovascular:  Normal heart sounds and regular rhythm, peripheral pulses decreased, and no carotid bruits. Assessment:       ICD-10-CM ICD-9-CM    1. Diabetic peripheral neuropathy associated with type 2 diabetes mellitus ([facility])  E11.42 250.60 MAGNESIUM     357.2 ANTINEURONAL CELL AB      GM1 IGG AUTOANTIBODY      IMMUNOELECTROPHORESIS (IMMUNOFIX.)      CK      NEURO EEG 24 HR      EMG LIMITED      primidone (Mysoline) 50 mg tablet      2. Immune-mediated neuropathy ([facility])  D89.89 279.8 MAGNESIUM    G63 357.4 ANTINEURONAL CELL AB      GM1 IGG AUTOANTIBODY      IMMUNOELECTROPHORESIS (IMMUNOFIX.)      CK      NEURO EEG 24 HR      EMG LIMITED      primidone (Mysoline) 50 mg tablet      3.  Degenerative cervical spinal stenosis  M48.02 723.0 MAGNESIUM      ANTINEURONAL CELL AB      GM1 IGG AUTOANTIBODY      IMMUNOELECTROPHORESIS (IMMUNOFIX.)      CK      NEURO EEG 24 HR EMG LIMITED      primidone (Mysoline) 50 mg tablet      4. Cervical post-laminectomy syndrome  M96.1 722.81 MAGNESIUM      ANTINEURONAL CELL AB      GM1 IGG AUTOANTIBODY      IMMUNOELECTROPHORESIS (IMMUNOFIX.)      CK      NEURO EEG 24 HR      EMG LIMITED      primidone (Mysoline) 50 mg tablet      5. Sensory ataxic gait  R26.0 781.2 MAGNESIUM      ANTINEURONAL CELL AB      GM1 IGG AUTOANTIBODY      IMMUNOELECTROPHORESIS (IMMUNOFIX.)      CK      NEURO EEG 24 HR      EMG LIMITED      primidone (Mysoline) 50 mg tablet        Active Problems:    * No active hospital problems. *      Plan:     Patient with new problem of severe spinal stenosis at multiple levels in his spine above and below his surgery, but the patient said previously that he does not really want to see a neurosurgeon to consider decompression and would rather try physical therapy and continue his current treatment as he is better on the muscle relaxants and no longer having that much muscle spasm and is trying to exercise some on his own. His MRI scan did show the severe stenosis in the cervical and lumbar spine as mentioned above and severe myelomalacia of the cord which explains a lot of his residual neurologic deficit from his first surgery. He seems be having more myoclonus, and orthostasis, return to make sure that he stays hydrated, they have already told him to drink Gatorade after dialysis. He is worse on the days of dialysis and better like today the day after dialysis. He may be having control with dialysis disequilibrium. He is having more pain from his diabetic neuropathy, and for that we will give him some gabapentin to see if we can help him, because he said it seemed to help in the past and that may help some of his myelopathic pain and cervical pain in addition. Supposedly he is going to get physical therapy after his hospital discharge.     Patient with complicated history of progressive gait disorder and unsteadiness and frequent falls, making him a high risk for further neurologic injury and dysfunction. To make it more complicated he also has diffuse hyperreflexia, which could indicate a recurrent cervical myelopathy, or just be residual of his previous problem requiring surgery but we have no records to compare to this. He also obviously has a diabetic neuropathy and diabetic sensory ataxia complicating his gait problems. His myoclonic jerks and muscle twitching probably got better because he is try to stay more hydrated eating better, and taking care of his fluid balance better and does not have any asterixis or myoclonus at this time. He is also taking his multivitamins and vitamin D on a regular basis in addition. Complete metabolic panel sent off to rule out causes of memory loss, ataxia, neuropathy, or other neuromuscular disease for causes of his tremors and gait instability and these are relatively unremarkable except for the hemoglobin A1c. He was supposed to have an EEG and an EMG but he never returned for those tests after his last visit. .  Complicated case with multiple neurologic problems placing the patient at high risk for neurologic injury and dysfunction if treatable causes are found. He could be a high risk for injury and paralysis if he does have a myelopathy that is progressive and pretty would be life-threatening. Patient advised that the best treatment for his diabetic neuropathy is good control of his diabetes. He is to take a multivitamin and vitamin D on a daily basis also. Follow-up after the above-mentioned testing and treatment, and he will call us if any problems. We will check my chart for results of this test.  38 minutes spent with the patient today going over all these problems, discussing his diagnosis prognosis treatment options, and because he does not want surgery we will try the conservative management but he will call us immediately there is any progression of disease at all.   He may need a neurosurgery referral then.     Signed By: Benitez Sinclair MD     March 9, 2023       CC: Bahman Shin MD  FAX: [phone]

## 2023-03-14 ENCOUNTER — TELEPHONE (OUTPATIENT)
Dept: NEUROLOGY | Age: 78
End: 2023-03-14

## 2023-03-14 NOTE — TELEPHONE ENCOUNTER
Eliana mcfarland/ Home Health called stating that Pt started taking the new medication (Primidone) on Friday night. Saturday morning, he started feeling really bad.  Very weak, muscle aching all over, having hard time walking, increase in shaking, and having a hard feeing himself and drinking (due to the shakiness) They are not sure if the med is causing this, but they wanted to discuss this with Dr Gurwinder Retana.       Pt's wife, Mary Varela # [phone]

## 2023-03-15 NOTE — TELEPHONE ENCOUNTER
I called the wife, no answer, I called the cell phone of the patient, no answer, so I left a message on his phone because her phone the voicemail was full, and told him to stop the medication, if that is making him worse and just do not take anymore, and make sure that all of clears up to see if this is the cause of his symptoms getting worse, and then we could decide what to do then and he can call us if there is any question.

## 2023-04-29 DIAGNOSIS — E11.42 DIABETIC POLYNEUROPATHY ASSOCIATED WITH TYPE 2 DIABETES MELLITUS (HCC): Primary | ICD-10-CM

## 2023-08-31 ENCOUNTER — PROCEDURE VISIT (OUTPATIENT)
Age: 78
End: 2023-08-31
Payer: MEDICARE

## 2023-08-31 DIAGNOSIS — E11.42 DIABETIC PERIPHERAL NEUROPATHY ASSOCIATED WITH TYPE 2 DIABETES MELLITUS (HCC): Primary | ICD-10-CM

## 2023-08-31 DIAGNOSIS — G62.9 NEUROPATHY: ICD-10-CM

## 2023-08-31 DIAGNOSIS — M54.16 LUMBAR RADICULOPATHY: ICD-10-CM

## 2023-08-31 PROCEDURE — 95886 MUSC TEST DONE W/N TEST COMP: CPT | Performed by: PSYCHIATRY & NEUROLOGY

## 2023-08-31 PROCEDURE — 95913 NRV CNDJ TEST 13/> STUDIES: CPT | Performed by: PSYCHIATRY & NEUROLOGY

## 2023-11-07 ENCOUNTER — OFFICE VISIT (OUTPATIENT)
Age: 78
End: 2023-11-07
Payer: MEDICARE

## 2023-11-07 ENCOUNTER — CLINICAL DOCUMENTATION (OUTPATIENT)
Age: 78
End: 2023-11-07

## 2023-11-07 VITALS
DIASTOLIC BLOOD PRESSURE: 64 MMHG | RESPIRATION RATE: 18 BRPM | OXYGEN SATURATION: 94 % | BODY MASS INDEX: 34.27 KG/M2 | WEIGHT: 253 LBS | HEART RATE: 68 BPM | HEIGHT: 72 IN | SYSTOLIC BLOOD PRESSURE: 128 MMHG

## 2023-11-07 DIAGNOSIS — R41.82 ACUTE ALTERATION IN MENTAL STATUS: ICD-10-CM

## 2023-11-07 DIAGNOSIS — N18.4 TYPE 2 DIABETES MELLITUS WITH STAGE 4 CHRONIC KIDNEY DISEASE, WITH LONG-TERM CURRENT USE OF INSULIN (HCC): ICD-10-CM

## 2023-11-07 DIAGNOSIS — Z79.4 TYPE 2 DIABETES MELLITUS WITH STAGE 4 CHRONIC KIDNEY DISEASE, WITH LONG-TERM CURRENT USE OF INSULIN (HCC): ICD-10-CM

## 2023-11-07 DIAGNOSIS — E11.42 DIABETIC PERIPHERAL NEUROPATHY ASSOCIATED WITH TYPE 2 DIABETES MELLITUS (HCC): ICD-10-CM

## 2023-11-07 DIAGNOSIS — M47.12 CERVICAL SPONDYLOSIS WITH MYELOPATHY: ICD-10-CM

## 2023-11-07 DIAGNOSIS — R26.0 SENSORY ATAXIC GAIT: ICD-10-CM

## 2023-11-07 DIAGNOSIS — M96.1 CERVICAL POST-LAMINECTOMY SYNDROME: ICD-10-CM

## 2023-11-07 DIAGNOSIS — E53.8 B12 DEFICIENCY: ICD-10-CM

## 2023-11-07 DIAGNOSIS — R27.8 ASTERIXIS: ICD-10-CM

## 2023-11-07 DIAGNOSIS — M48.061 DEGENERATIVE LUMBAR SPINAL STENOSIS: ICD-10-CM

## 2023-11-07 DIAGNOSIS — R56.9 CONVULSIONS, UNSPECIFIED CONVULSION TYPE (HCC): ICD-10-CM

## 2023-11-07 DIAGNOSIS — G25.3 MYOCLONUS: ICD-10-CM

## 2023-11-07 DIAGNOSIS — M48.02 DEGENERATIVE CERVICAL SPINAL STENOSIS: ICD-10-CM

## 2023-11-07 DIAGNOSIS — E11.22 TYPE 2 DIABETES MELLITUS WITH STAGE 4 CHRONIC KIDNEY DISEASE, WITH LONG-TERM CURRENT USE OF INSULIN (HCC): ICD-10-CM

## 2023-11-07 DIAGNOSIS — E11.42 DIABETIC PERIPHERAL NEUROPATHY ASSOCIATED WITH TYPE 2 DIABETES MELLITUS (HCC): Primary | ICD-10-CM

## 2023-11-07 LAB
EST. AVERAGE GLUCOSE BLD GHB EST-MCNC: 154 MG/DL
FOLATE SERPL-MCNC: 6.1 NG/ML (ref 5–21)
HBA1C MFR BLD: 7 % (ref 4–5.6)
VIT B12 SERPL-MCNC: 474 PG/ML (ref 193–986)

## 2023-11-07 PROCEDURE — 3074F SYST BP LT 130 MM HG: CPT | Performed by: PSYCHIATRY & NEUROLOGY

## 2023-11-07 PROCEDURE — G8427 DOCREV CUR MEDS BY ELIG CLIN: HCPCS | Performed by: PSYCHIATRY & NEUROLOGY

## 2023-11-07 PROCEDURE — G8484 FLU IMMUNIZE NO ADMIN: HCPCS | Performed by: PSYCHIATRY & NEUROLOGY

## 2023-11-07 PROCEDURE — 1036F TOBACCO NON-USER: CPT | Performed by: PSYCHIATRY & NEUROLOGY

## 2023-11-07 PROCEDURE — G8417 CALC BMI ABV UP PARAM F/U: HCPCS | Performed by: PSYCHIATRY & NEUROLOGY

## 2023-11-07 PROCEDURE — 99214 OFFICE O/P EST MOD 30 MIN: CPT | Performed by: PSYCHIATRY & NEUROLOGY

## 2023-11-07 PROCEDURE — 3078F DIAST BP <80 MM HG: CPT | Performed by: PSYCHIATRY & NEUROLOGY

## 2023-11-07 PROCEDURE — 1123F ACP DISCUSS/DSCN MKR DOCD: CPT | Performed by: PSYCHIATRY & NEUROLOGY

## 2023-11-07 ASSESSMENT — PATIENT HEALTH QUESTIONNAIRE - PHQ9
SUM OF ALL RESPONSES TO PHQ9 QUESTIONS 1 & 2: 0
SUM OF ALL RESPONSES TO PHQ QUESTIONS 1-9: 0
1. LITTLE INTEREST OR PLEASURE IN DOING THINGS: 0
SUM OF ALL RESPONSES TO PHQ QUESTIONS 1-9: 0
2. FEELING DOWN, DEPRESSED OR HOPELESS: 0
SUM OF ALL RESPONSES TO PHQ QUESTIONS 1-9: 0
SUM OF ALL RESPONSES TO PHQ QUESTIONS 1-9: 0

## 2023-11-07 NOTE — PROGRESS NOTES
Consult  REFERRED BY:  Natasha Bang MD    CHIEF COMPLAINT:       Subjective:     Davina Gutierrez is a 70 y. o.right-handed -American male we are seeing on urgent work in basis at the request of Dr. Veronica Pyle on urgent work in basis for new problem of having progressive weakness, in his arms and legs, inability to hold things, and had MRI scans done of the cervical spine that showed moderate severe to severe recurrent spinal cord compression in the cervical spine, and similarly in the lumbar spine, but apparently neurosurgery was not consulted to see the patient more orthopedic surgery. On his cervical MRI he had moderate severe to severe recurrent cord compression at C6-7 and at C3-4 and C5-6 secondary to central disc herniations and arthritis, and had postop changes at C4-5 and probably C5-6 levels with myelomalacia and significant previous cord compression, and lumbar spine MRI showed moderate severe to severe stenosis at L4-5 also, and he was thought to be somewhat hypotensive in addition and was given physical therapy and seem to get better and went home but still having problems. He had previous neck surgery about 30 years ago by an orthopedic surgeon. He refused neurosurgical referral at last visit. His muscle twitching is recurrent and he is having more pain from his diabetic neuropathy in his feet, and wonders what he can do so we will put him on some gabapentin 300 mg 3 times a day that helps his diabetic neuropathy pain and may help the neck pain also so hopefully he will do better there. He is again advised that the single best treatment for his neuropathy is good control of his blood sugars and he is trying to do that. Metabolic causes for other causes of his neuropathy have been ruled out at his last visit.   He had an EMG study done in May 2023 by Dr. Larry Mac that showed a moderate diabetic peripheral neuropathy, and lumbar radiculopathy, chronic nature, but no acute denervation, and no evidence

## 2023-11-07 NOTE — PROGRESS NOTES
1. Have you been to the ER, urgent care clinic since your last visit? Hospitalized since your last visit? No.    2. Have you seen or consulted any other health care providers outside of the [address] [place] [address] Avenue since your last visit? Include any pap smears or colon screening.    No.        Chief Complaint   Patient presents with    Neurologic Problem     Neuropathy    Tremors     In both hands

## 2023-11-28 ENCOUNTER — HOSPITAL ENCOUNTER (OUTPATIENT)
Age: 78
Discharge: HOME | End: 2023-12-01
Payer: MEDICARE

## 2023-11-28 DIAGNOSIS — M96.1 CERVICAL POST-LAMINECTOMY SYNDROME: ICD-10-CM

## 2023-11-28 DIAGNOSIS — M48.02 DEGENERATIVE CERVICAL SPINAL STENOSIS: ICD-10-CM

## 2023-11-28 DIAGNOSIS — M48.061 DEGENERATIVE LUMBAR SPINAL STENOSIS: ICD-10-CM

## 2023-11-28 DIAGNOSIS — M47.12 CERVICAL SPONDYLOSIS WITH MYELOPATHY: ICD-10-CM

## 2023-11-28 PROCEDURE — 72148 MRI LUMBAR SPINE W/O DYE: CPT

## 2023-11-28 PROCEDURE — 72141 MRI NECK SPINE W/O DYE: CPT

## 2023-11-30 DIAGNOSIS — M96.1 CERVICAL POST-LAMINECTOMY SYNDROME: Primary | ICD-10-CM

## 2023-11-30 DIAGNOSIS — M47.12 CERVICAL SPONDYLOSIS WITH MYELOPATHY: ICD-10-CM

## 2023-11-30 NOTE — PROGRESS NOTES
Patient's MRI scan was read as possible discitis, and he has a referral already into Dr. Chicho Coy, but he does not remember it, so we sent another referral today, he will pick it up in the morning.

## 2024-02-02 ENCOUNTER — TELEPHONE (OUTPATIENT)
Age: 79
End: 2024-02-02

## 2024-02-02 NOTE — TELEPHONE ENCOUNTER
Patient states that he misplaced his order for physical therapy and would like another copy mailed to his home address on file. Contact # [phone]

## 2024-02-05 NOTE — TELEPHONE ENCOUNTER
Put referral in mail to pt with note advising this is a general referral he can take to any PT office his insurance says he can go to. Advised to call if needed.

## 2024-05-14 ENCOUNTER — OFFICE VISIT (OUTPATIENT)
Age: 79
End: 2024-05-14
Payer: MEDICARE

## 2024-05-14 VITALS
OXYGEN SATURATION: 98 % | TEMPERATURE: 97.9 F | BODY MASS INDEX: 33.35 KG/M2 | DIASTOLIC BLOOD PRESSURE: 62 MMHG | HEIGHT: 72 IN | HEART RATE: 72 BPM | SYSTOLIC BLOOD PRESSURE: 118 MMHG | WEIGHT: 246.2 LBS | RESPIRATION RATE: 17 BRPM

## 2024-05-14 DIAGNOSIS — M48.02 DEGENERATIVE CERVICAL SPINAL STENOSIS: ICD-10-CM

## 2024-05-14 DIAGNOSIS — Z99.2 ESRD (END STAGE RENAL DISEASE) ON DIALYSIS (HCC): ICD-10-CM

## 2024-05-14 DIAGNOSIS — G63 IMMUNE-MEDIATED NEUROPATHY (HCC): ICD-10-CM

## 2024-05-14 DIAGNOSIS — M47.12 CERVICAL SPONDYLOSIS WITH MYELOPATHY: ICD-10-CM

## 2024-05-14 DIAGNOSIS — E11.42 DIABETIC PERIPHERAL NEUROPATHY ASSOCIATED WITH TYPE 2 DIABETES MELLITUS (HCC): Primary | ICD-10-CM

## 2024-05-14 DIAGNOSIS — D89.89 IMMUNE-MEDIATED NEUROPATHY (HCC): ICD-10-CM

## 2024-05-14 DIAGNOSIS — R26.0 SENSORY ATAXIC GAIT: ICD-10-CM

## 2024-05-14 DIAGNOSIS — M96.1 CERVICAL POST-LAMINECTOMY SYNDROME: ICD-10-CM

## 2024-05-14 DIAGNOSIS — N18.6 ESRD (END STAGE RENAL DISEASE) ON DIALYSIS (HCC): ICD-10-CM

## 2024-05-14 DIAGNOSIS — M48.061 DEGENERATIVE LUMBAR SPINAL STENOSIS: ICD-10-CM

## 2024-05-14 PROCEDURE — 1036F TOBACCO NON-USER: CPT | Performed by: PSYCHIATRY & NEUROLOGY

## 2024-05-14 PROCEDURE — G8427 DOCREV CUR MEDS BY ELIG CLIN: HCPCS | Performed by: PSYCHIATRY & NEUROLOGY

## 2024-05-14 PROCEDURE — 1123F ACP DISCUSS/DSCN MKR DOCD: CPT | Performed by: PSYCHIATRY & NEUROLOGY

## 2024-05-14 PROCEDURE — 3078F DIAST BP <80 MM HG: CPT | Performed by: PSYCHIATRY & NEUROLOGY

## 2024-05-14 PROCEDURE — 99214 OFFICE O/P EST MOD 30 MIN: CPT | Performed by: PSYCHIATRY & NEUROLOGY

## 2024-05-14 PROCEDURE — G8417 CALC BMI ABV UP PARAM F/U: HCPCS | Performed by: PSYCHIATRY & NEUROLOGY

## 2024-05-14 PROCEDURE — 3074F SYST BP LT 130 MM HG: CPT | Performed by: PSYCHIATRY & NEUROLOGY

## 2024-05-14 RX ORDER — ESCITALOPRAM OXALATE 10 MG/1
1 TABLET ORAL
COMMUNITY
Start: 2024-02-20

## 2024-05-14 RX ORDER — CINACALCET HYDROCHLORIDE 30 MG/1
1 TABLET, COATED ORAL
COMMUNITY
Start: 2023-11-17

## 2024-05-14 ASSESSMENT — PATIENT HEALTH QUESTIONNAIRE - PHQ9
SUM OF ALL RESPONSES TO PHQ QUESTIONS 1-9: 0
1. LITTLE INTEREST OR PLEASURE IN DOING THINGS: NOT AT ALL
SUM OF ALL RESPONSES TO PHQ QUESTIONS 1-9: 0
SUM OF ALL RESPONSES TO PHQ QUESTIONS 1-9: 0
SUM OF ALL RESPONSES TO PHQ9 QUESTIONS 1 & 2: 0
2. FEELING DOWN, DEPRESSED OR HOPELESS: NOT AT ALL
SUM OF ALL RESPONSES TO PHQ QUESTIONS 1-9: 0

## 2024-05-14 NOTE — PROGRESS NOTES
relatively unremarkable except for the hemoglobin A1c.  Complicated case with multiple neurologic problems placing the patient at high risk for neurologic injury and dysfunction if treatable causes are found.  He could be a high risk for injury and paralysis if he does have a myelopathy that is progressive and pretty would be life-threatening.  Patient advised that the best treatment for his diabetic neuropathy is good control of his diabetes.  He is to take a multivitamin and vitamin D on a daily basis also.  Follow-up after the above-mentioned testing and treatment, and he will call us if any problems.  We will check my chart for results of this test.  36 minutes spent with the patient today going over all these problems, discussing his diagnosis prognosis treatment options, and because he does not want surgery we will try the conservative management but he will call us immediately there is any progression of disease at all.  He may need a neurosurgery referral then.    Signed By: Howie Santa MD     May 14, 2024       CC: Ritchie Tejada MD  FAX: [phone]

## 2024-06-16 ENCOUNTER — HOSPITAL ENCOUNTER (INPATIENT)
Facility: HOSPITAL | Age: 79
LOS: 3 days | Discharge: HOME | End: 2024-06-20
Attending: EMERGENCY MEDICINE | Admitting: STUDENT IN AN ORGANIZED HEALTH CARE EDUCATION/TRAINING PROGRAM
Payer: MEDICARE

## 2024-06-16 ENCOUNTER — APPOINTMENT (OUTPATIENT)
Facility: HOSPITAL | Age: 79
End: 2024-06-16
Payer: MEDICARE

## 2024-06-16 DIAGNOSIS — R40.0 SOMNOLENCE: ICD-10-CM

## 2024-06-16 DIAGNOSIS — E87.5 HYPERKALEMIA, DIMINISHED RENAL EXCRETION: Primary | ICD-10-CM

## 2024-06-16 DIAGNOSIS — R06.02 SHORTNESS OF BREATH: ICD-10-CM

## 2024-06-16 LAB
ALBUMIN SERPL-MCNC: 3.7 G/DL (ref 3.5–5)
ALBUMIN/GLOB SERPL: 0.8 (ref 1.1–2.2)
ALP SERPL-CCNC: 134 U/L (ref 45–117)
ALT SERPL-CCNC: 18 U/L (ref 12–78)
ANION GAP SERPL CALC-SCNC: 7 MMOL/L (ref 5–15)
AST SERPL-CCNC: 6 U/L (ref 15–37)
BASE EXCESS BLD CALC-SCNC: 1.9 MMOL/L
BASOPHILS # BLD: 0 K/UL (ref 0–0.1)
BASOPHILS NFR BLD: 0 % (ref 0–1)
BILIRUB SERPL-MCNC: 0.7 MG/DL (ref 0.2–1)
BUN SERPL-MCNC: 67 MG/DL (ref 6–20)
BUN/CREAT SERPL: 7 (ref 12–20)
CALCIUM SERPL-MCNC: 9.6 MG/DL (ref 8.5–10.1)
CHLORIDE SERPL-SCNC: 100 MMOL/L (ref 97–108)
CO2 SERPL-SCNC: 25 MMOL/L (ref 21–32)
CREAT SERPL-MCNC: 10 MG/DL (ref 0.7–1.3)
DIFFERENTIAL METHOD BLD: ABNORMAL
EOSINOPHIL # BLD: 0 K/UL (ref 0–0.4)
EOSINOPHIL NFR BLD: 0 % (ref 0–7)
ERYTHROCYTE [DISTWIDTH] IN BLOOD BY AUTOMATED COUNT: 16.1 % (ref 11.5–14.5)
GLOBULIN SER CALC-MCNC: 4.6 G/DL (ref 2–4)
GLUCOSE SERPL-MCNC: 178 MG/DL (ref 65–100)
HCO3 BLD-SCNC: 26.3 MMOL/L (ref 22–26)
HCT VFR BLD AUTO: 28.4 % (ref 36.6–50.3)
HGB BLD-MCNC: 9.3 G/DL (ref 12.1–17)
IMM GRANULOCYTES # BLD AUTO: 0.1 K/UL (ref 0–0.04)
IMM GRANULOCYTES NFR BLD AUTO: 1 % (ref 0–0.5)
LACTATE BLD-SCNC: 1.67 MMOL/L (ref 0.4–2)
LYMPHOCYTES # BLD: 0.9 K/UL (ref 0.8–3.5)
LYMPHOCYTES NFR BLD: 7 % (ref 12–49)
MCH RBC QN AUTO: 32.2 PG (ref 26–34)
MCHC RBC AUTO-ENTMCNC: 32.7 G/DL (ref 30–36.5)
MCV RBC AUTO: 98.3 FL (ref 80–99)
MONOCYTES # BLD: 1 K/UL (ref 0–1)
MONOCYTES NFR BLD: 8 % (ref 5–13)
NEUTS SEG # BLD: 10.2 K/UL (ref 1.8–8)
NEUTS SEG NFR BLD: 84 % (ref 32–75)
NRBC # BLD: 0 K/UL (ref 0–0.01)
NRBC BLD-RTO: 0 PER 100 WBC
NT PRO BNP: 9748 PG/ML
PCO2 BLD: 39.5 MMHG (ref 35–45)
PH BLD: 7.43 (ref 7.35–7.45)
PLATELET # BLD AUTO: 151 K/UL (ref 150–400)
PMV BLD AUTO: 11.9 FL (ref 8.9–12.9)
PO2 BLD: 50 MMHG (ref 80–100)
POTASSIUM SERPL-SCNC: 7.3 MMOL/L (ref 3.5–5.1)
PROT SERPL-MCNC: 8.3 G/DL (ref 6.4–8.2)
RBC # BLD AUTO: 2.89 M/UL (ref 4.1–5.7)
RBC MORPH BLD: ABNORMAL
SAO2 % BLD: 86 % (ref 92–97)
SERVICE CMNT-IMP: ABNORMAL
SODIUM SERPL-SCNC: 132 MMOL/L (ref 136–145)
SPECIMEN TYPE: ABNORMAL
TROPONIN I SERPL HS-MCNC: 223 NG/L (ref 0–76)
WBC # BLD AUTO: 12.2 K/UL (ref 4.1–11.1)

## 2024-06-16 PROCEDURE — 6370000000 HC RX 637 (ALT 250 FOR IP): Performed by: EMERGENCY MEDICINE

## 2024-06-16 PROCEDURE — 70450 CT HEAD/BRAIN W/O DYE: CPT

## 2024-06-16 PROCEDURE — 87340 HEPATITIS B SURFACE AG IA: CPT

## 2024-06-16 PROCEDURE — 71045 X-RAY EXAM CHEST 1 VIEW: CPT

## 2024-06-16 PROCEDURE — 82330 ASSAY OF CALCIUM: CPT

## 2024-06-16 PROCEDURE — 2500000003 HC RX 250 WO HCPCS: Performed by: EMERGENCY MEDICINE

## 2024-06-16 PROCEDURE — 93005 ELECTROCARDIOGRAM TRACING: CPT | Performed by: EMERGENCY MEDICINE

## 2024-06-16 PROCEDURE — 6360000002 HC RX W HCPCS: Performed by: EMERGENCY MEDICINE

## 2024-06-16 PROCEDURE — 84295 ASSAY OF SERUM SODIUM: CPT

## 2024-06-16 PROCEDURE — 86706 HEP B SURFACE ANTIBODY: CPT

## 2024-06-16 PROCEDURE — 82803 BLOOD GASES ANY COMBINATION: CPT

## 2024-06-16 PROCEDURE — 83605 ASSAY OF LACTIC ACID: CPT

## 2024-06-16 PROCEDURE — 85025 COMPLETE CBC W/AUTO DIFF WBC: CPT

## 2024-06-16 PROCEDURE — 84484 ASSAY OF TROPONIN QUANT: CPT

## 2024-06-16 PROCEDURE — 84132 ASSAY OF SERUM POTASSIUM: CPT

## 2024-06-16 PROCEDURE — 36415 COLL VENOUS BLD VENIPUNCTURE: CPT

## 2024-06-16 PROCEDURE — 2700000000 HC OXYGEN THERAPY PER DAY

## 2024-06-16 PROCEDURE — 96375 TX/PRO/DX INJ NEW DRUG ADDON: CPT

## 2024-06-16 PROCEDURE — 80053 COMPREHEN METABOLIC PANEL: CPT

## 2024-06-16 PROCEDURE — 99285 EMERGENCY DEPT VISIT HI MDM: CPT

## 2024-06-16 PROCEDURE — 96365 THER/PROPH/DIAG IV INF INIT: CPT

## 2024-06-16 PROCEDURE — 82947 ASSAY GLUCOSE BLOOD QUANT: CPT

## 2024-06-16 PROCEDURE — 83880 ASSAY OF NATRIURETIC PEPTIDE: CPT

## 2024-06-16 RX ORDER — BUMETANIDE 0.25 MG/ML
2 INJECTION INTRAMUSCULAR; INTRAVENOUS ONCE
Status: COMPLETED | OUTPATIENT
Start: 2024-06-16 | End: 2024-06-16

## 2024-06-16 RX ORDER — CALCIUM GLUCONATE 20 MG/ML
1000 INJECTION, SOLUTION INTRAVENOUS
Status: COMPLETED | OUTPATIENT
Start: 2024-06-16 | End: 2024-06-16

## 2024-06-16 RX ORDER — DEXTROSE MONOHYDRATE 100 MG/ML
INJECTION, SOLUTION INTRAVENOUS CONTINUOUS
Status: ACTIVE | OUTPATIENT
Start: 2024-06-16 | End: 2024-06-16

## 2024-06-16 RX ADMIN — CALCIUM GLUCONATE 1000 MG: 20 INJECTION, SOLUTION INTRAVENOUS at 21:02

## 2024-06-16 RX ADMIN — SODIUM BICARBONATE 50 MEQ: 84 INJECTION, SOLUTION INTRAVENOUS at 20:55

## 2024-06-16 RX ADMIN — BUMETANIDE 2 MG: 0.25 INJECTION INTRAMUSCULAR; INTRAVENOUS at 20:26

## 2024-06-16 RX ADMIN — INSULIN HUMAN 10 UNITS: 100 INJECTION, SOLUTION PARENTERAL at 20:52

## 2024-06-16 ASSESSMENT — PAIN SCALES - GENERAL: PAINLEVEL_OUTOF10: 0

## 2024-06-16 ASSESSMENT — PAIN - FUNCTIONAL ASSESSMENT: PAIN_FUNCTIONAL_ASSESSMENT: NONE - DENIES PAIN

## 2024-06-17 PROBLEM — E87.5 HYPERKALEMIA: Status: ACTIVE | Noted: 2024-06-17

## 2024-06-17 LAB
ALBUMIN SERPL-MCNC: 3.6 G/DL (ref 3.5–5)
ALBUMIN/GLOB SERPL: 0.8 (ref 1.1–2.2)
ALP SERPL-CCNC: 138 U/L (ref 45–117)
ALT SERPL-CCNC: 18 U/L (ref 12–78)
ANION GAP BLD CALC-SCNC: ABNORMAL (ref 10–20)
ANION GAP SERPL CALC-SCNC: 7 MMOL/L (ref 5–15)
ANION GAP SERPL CALC-SCNC: 7 MMOL/L (ref 5–15)
APPEARANCE UR: CLEAR
AST SERPL-CCNC: 10 U/L (ref 15–37)
BACTERIA URNS QL MICRO: ABNORMAL /HPF
BASE EXCESS BLD CALC-SCNC: 2 MMOL/L
BASOPHILS # BLD: 0.1 K/UL (ref 0–0.1)
BASOPHILS NFR BLD: 0 % (ref 0–1)
BILIRUB SERPL-MCNC: 1 MG/DL (ref 0.2–1)
BILIRUB UR QL: NEGATIVE
BUN SERPL-MCNC: 44 MG/DL (ref 6–20)
BUN SERPL-MCNC: 44 MG/DL (ref 6–20)
BUN/CREAT SERPL: 6 (ref 12–20)
BUN/CREAT SERPL: 7 (ref 12–20)
CA-I BLD-MCNC: 1.11 MMOL/L (ref 1.12–1.32)
CALCIUM SERPL-MCNC: 9.5 MG/DL (ref 8.5–10.1)
CALCIUM SERPL-MCNC: 9.8 MG/DL (ref 8.5–10.1)
CHLORIDE BLD-SCNC: 101 MMOL/L (ref 100–108)
CHLORIDE SERPL-SCNC: 97 MMOL/L (ref 97–108)
CHLORIDE SERPL-SCNC: 98 MMOL/L (ref 97–108)
CO2 BLD-SCNC: 26 MMOL/L (ref 19–24)
CO2 SERPL-SCNC: 28 MMOL/L (ref 21–32)
CO2 SERPL-SCNC: 29 MMOL/L (ref 21–32)
COLOR UR: ABNORMAL
COMMENT:: NORMAL
CREAT SERPL-MCNC: 6.61 MG/DL (ref 0.7–1.3)
CREAT SERPL-MCNC: 7.12 MG/DL (ref 0.7–1.3)
CREAT UR-MCNC: 11.1 MG/DL (ref 0.6–1.3)
DIFFERENTIAL METHOD BLD: ABNORMAL
EOSINOPHIL # BLD: 0 K/UL (ref 0–0.4)
EOSINOPHIL NFR BLD: 0 % (ref 0–7)
EPITH CASTS URNS QL MICRO: ABNORMAL /LPF
ERYTHROCYTE [DISTWIDTH] IN BLOOD BY AUTOMATED COUNT: 16.4 % (ref 11.5–14.5)
GLOBULIN SER CALC-MCNC: 4.5 G/DL (ref 2–4)
GLUCOSE BLD STRIP.AUTO-MCNC: 130 MG/DL (ref 65–117)
GLUCOSE BLD STRIP.AUTO-MCNC: 162 MG/DL (ref 65–117)
GLUCOSE BLD STRIP.AUTO-MCNC: 184 MG/DL (ref 74–99)
GLUCOSE BLD STRIP.AUTO-MCNC: 190 MG/DL (ref 65–117)
GLUCOSE BLD STRIP.AUTO-MCNC: 214 MG/DL (ref 65–117)
GLUCOSE SERPL-MCNC: 140 MG/DL (ref 65–100)
GLUCOSE SERPL-MCNC: 159 MG/DL (ref 65–100)
GLUCOSE UR STRIP.AUTO-MCNC: NEGATIVE MG/DL
HBV SURFACE AB SER QL: NONREACTIVE
HBV SURFACE AB SER-ACNC: 9.07 MIU/ML
HBV SURFACE AG SER QL: <0.1 INDEX
HBV SURFACE AG SER QL: NEGATIVE
HCO3 BLDA-SCNC: 26 MMOL/L
HCT VFR BLD AUTO: 28.4 % (ref 36.6–50.3)
HGB BLD-MCNC: 9.3 G/DL (ref 12.1–17)
HGB UR QL STRIP: ABNORMAL
HYALINE CASTS URNS QL MICRO: ABNORMAL /LPF (ref 0–2)
IMM GRANULOCYTES # BLD AUTO: 0.1 K/UL (ref 0–0.04)
IMM GRANULOCYTES NFR BLD AUTO: 1 % (ref 0–0.5)
KETONES UR QL STRIP.AUTO: NEGATIVE MG/DL
LACTATE BLD-SCNC: 1.91 MMOL/L (ref 0.4–2)
LEUKOCYTE ESTERASE UR QL STRIP.AUTO: ABNORMAL
LYMPHOCYTES # BLD: 0.9 K/UL (ref 0.8–3.5)
LYMPHOCYTES NFR BLD: 6 % (ref 12–49)
MCH RBC QN AUTO: 32.5 PG (ref 26–34)
MCHC RBC AUTO-ENTMCNC: 32.7 G/DL (ref 30–36.5)
MCV RBC AUTO: 99.3 FL (ref 80–99)
MONOCYTES # BLD: 1.4 K/UL (ref 0–1)
MONOCYTES NFR BLD: 10 % (ref 5–13)
NEUTS SEG # BLD: 11.8 K/UL (ref 1.8–8)
NEUTS SEG NFR BLD: 83 % (ref 32–75)
NITRITE UR QL STRIP.AUTO: NEGATIVE
NRBC # BLD: 0 K/UL (ref 0–0.01)
NRBC BLD-RTO: 0 PER 100 WBC
PCO2 BLDV: 39.5 MMHG (ref 41–51)
PH BLDV: 7.43 (ref 7.32–7.42)
PH UR STRIP: >8.5 [PH] (ref 5–8)
PLATELET # BLD AUTO: 137 K/UL (ref 150–400)
PMV BLD AUTO: 11.7 FL (ref 8.9–12.9)
PO2 BLDV: 56 MMHG (ref 25–40)
POTASSIUM BLD-SCNC: 9.3 MMOL/L (ref 3.5–5.5)
POTASSIUM SERPL-SCNC: 4.7 MMOL/L (ref 3.5–5.1)
POTASSIUM SERPL-SCNC: 4.7 MMOL/L (ref 3.5–5.1)
PROT SERPL-MCNC: 8.1 G/DL (ref 6.4–8.2)
PROT UR STRIP-MCNC: 100 MG/DL
RBC # BLD AUTO: 2.86 M/UL (ref 4.1–5.7)
RBC #/AREA URNS HPF: ABNORMAL /HPF (ref 0–5)
SAO2 % BLD: 89 %
SERVICE CMNT-IMP: ABNORMAL
SODIUM BLD-SCNC: 123 MMOL/L (ref 136–145)
SODIUM SERPL-SCNC: 133 MMOL/L (ref 136–145)
SODIUM SERPL-SCNC: 133 MMOL/L (ref 136–145)
SP GR UR REFRACTOMETRY: 1.01
SPECIMEN HOLD: NORMAL
URINE CULTURE IF INDICATED: ABNORMAL
UROBILINOGEN UR QL STRIP.AUTO: 0.2 EU/DL (ref 0.2–1)
WBC # BLD AUTO: 14.2 K/UL (ref 4.1–11.1)
WBC URNS QL MICRO: ABNORMAL /HPF (ref 0–4)

## 2024-06-17 PROCEDURE — 6360000002 HC RX W HCPCS: Performed by: STUDENT IN AN ORGANIZED HEALTH CARE EDUCATION/TRAINING PROGRAM

## 2024-06-17 PROCEDURE — 2580000003 HC RX 258: Performed by: STUDENT IN AN ORGANIZED HEALTH CARE EDUCATION/TRAINING PROGRAM

## 2024-06-17 PROCEDURE — 6360000002 HC RX W HCPCS: Performed by: NURSE PRACTITIONER

## 2024-06-17 PROCEDURE — 2060000000 HC ICU INTERMEDIATE R&B

## 2024-06-17 PROCEDURE — 6370000000 HC RX 637 (ALT 250 FOR IP): Performed by: STUDENT IN AN ORGANIZED HEALTH CARE EDUCATION/TRAINING PROGRAM

## 2024-06-17 PROCEDURE — 87086 URINE CULTURE/COLONY COUNT: CPT

## 2024-06-17 PROCEDURE — 87186 SC STD MICRODIL/AGAR DIL: CPT

## 2024-06-17 PROCEDURE — 2700000000 HC OXYGEN THERAPY PER DAY

## 2024-06-17 PROCEDURE — 5A1D70Z PERFORMANCE OF URINARY FILTRATION, INTERMITTENT, LESS THAN 6 HOURS PER DAY: ICD-10-PCS | Performed by: STUDENT IN AN ORGANIZED HEALTH CARE EDUCATION/TRAINING PROGRAM

## 2024-06-17 PROCEDURE — 82962 GLUCOSE BLOOD TEST: CPT

## 2024-06-17 PROCEDURE — 87077 CULTURE AEROBIC IDENTIFY: CPT

## 2024-06-17 PROCEDURE — 81001 URINALYSIS AUTO W/SCOPE: CPT

## 2024-06-17 PROCEDURE — 90935 HEMODIALYSIS ONE EVALUATION: CPT

## 2024-06-17 PROCEDURE — 80053 COMPREHEN METABOLIC PANEL: CPT

## 2024-06-17 PROCEDURE — 85025 COMPLETE CBC W/AUTO DIFF WBC: CPT

## 2024-06-17 RX ORDER — GABAPENTIN 300 MG/1
300 CAPSULE ORAL 3 TIMES DAILY
Status: DISCONTINUED | OUTPATIENT
Start: 2024-06-17 | End: 2024-06-17

## 2024-06-17 RX ORDER — INSULIN GLARGINE 100 [IU]/ML
22 INJECTION, SOLUTION SUBCUTANEOUS NIGHTLY
Status: DISCONTINUED | OUTPATIENT
Start: 2024-06-17 | End: 2024-06-20 | Stop reason: HOSPADM

## 2024-06-17 RX ORDER — BUMETANIDE 1 MG/1
2 TABLET ORAL 2 TIMES DAILY
Status: DISCONTINUED | OUTPATIENT
Start: 2024-06-18 | End: 2024-06-19

## 2024-06-17 RX ORDER — ESCITALOPRAM OXALATE 10 MG/1
10 TABLET ORAL DAILY
Status: DISCONTINUED | OUTPATIENT
Start: 2024-06-17 | End: 2024-06-20 | Stop reason: HOSPADM

## 2024-06-17 RX ORDER — ACETAMINOPHEN 650 MG/1
650 SUPPOSITORY RECTAL EVERY 6 HOURS PRN
Status: DISCONTINUED | OUTPATIENT
Start: 2024-06-17 | End: 2024-06-20 | Stop reason: HOSPADM

## 2024-06-17 RX ORDER — SODIUM CHLORIDE 0.9 % (FLUSH) 0.9 %
5-40 SYRINGE (ML) INJECTION EVERY 12 HOURS SCHEDULED
Status: DISCONTINUED | OUTPATIENT
Start: 2024-06-17 | End: 2024-06-20 | Stop reason: HOSPADM

## 2024-06-17 RX ORDER — BUMETANIDE 1 MG/1
2 TABLET ORAL DAILY
Status: DISCONTINUED | OUTPATIENT
Start: 2024-06-17 | End: 2024-06-17

## 2024-06-17 RX ORDER — INSULIN LISPRO 100 [IU]/ML
0-4 INJECTION, SOLUTION INTRAVENOUS; SUBCUTANEOUS NIGHTLY
Status: DISCONTINUED | OUTPATIENT
Start: 2024-06-17 | End: 2024-06-20 | Stop reason: HOSPADM

## 2024-06-17 RX ORDER — ACETAMINOPHEN 325 MG/1
650 TABLET ORAL EVERY 6 HOURS PRN
Status: DISCONTINUED | OUTPATIENT
Start: 2024-06-17 | End: 2024-06-20 | Stop reason: HOSPADM

## 2024-06-17 RX ORDER — HEPARIN SODIUM 5000 [USP'U]/ML
5000 INJECTION, SOLUTION INTRAVENOUS; SUBCUTANEOUS EVERY 8 HOURS SCHEDULED
Status: DISCONTINUED | OUTPATIENT
Start: 2024-06-17 | End: 2024-06-20 | Stop reason: HOSPADM

## 2024-06-17 RX ORDER — LOSARTAN POTASSIUM 25 MG/1
25 TABLET ORAL DAILY
Status: DISCONTINUED | OUTPATIENT
Start: 2024-06-17 | End: 2024-06-20 | Stop reason: HOSPADM

## 2024-06-17 RX ORDER — SODIUM CHLORIDE 0.9 % (FLUSH) 0.9 %
5-40 SYRINGE (ML) INJECTION PRN
Status: DISCONTINUED | OUTPATIENT
Start: 2024-06-17 | End: 2024-06-20 | Stop reason: HOSPADM

## 2024-06-17 RX ORDER — FINASTERIDE 5 MG/1
5 TABLET, FILM COATED ORAL DAILY
Status: DISCONTINUED | OUTPATIENT
Start: 2024-06-17 | End: 2024-06-20 | Stop reason: HOSPADM

## 2024-06-17 RX ORDER — CINACALCET 30 MG/1
30 TABLET, FILM COATED ORAL DAILY
Status: DISCONTINUED | OUTPATIENT
Start: 2024-06-17 | End: 2024-06-20 | Stop reason: HOSPADM

## 2024-06-17 RX ORDER — SODIUM CHLORIDE 9 MG/ML
INJECTION, SOLUTION INTRAVENOUS PRN
Status: DISCONTINUED | OUTPATIENT
Start: 2024-06-17 | End: 2024-06-20 | Stop reason: HOSPADM

## 2024-06-17 RX ORDER — DEXTROSE MONOHYDRATE 100 MG/ML
INJECTION, SOLUTION INTRAVENOUS CONTINUOUS PRN
Status: DISCONTINUED | OUTPATIENT
Start: 2024-06-17 | End: 2024-06-20 | Stop reason: HOSPADM

## 2024-06-17 RX ORDER — GABAPENTIN 300 MG/1
300 CAPSULE ORAL NIGHTLY
Status: DISCONTINUED | OUTPATIENT
Start: 2024-06-18 | End: 2024-06-20 | Stop reason: HOSPADM

## 2024-06-17 RX ORDER — TAMSULOSIN HYDROCHLORIDE 0.4 MG/1
0.8 CAPSULE ORAL DAILY
Status: DISCONTINUED | OUTPATIENT
Start: 2024-06-17 | End: 2024-06-17

## 2024-06-17 RX ORDER — CARVEDILOL 12.5 MG/1
12.5 TABLET ORAL 2 TIMES DAILY WITH MEALS
Status: DISCONTINUED | OUTPATIENT
Start: 2024-06-17 | End: 2024-06-17

## 2024-06-17 RX ORDER — ATORVASTATIN CALCIUM 40 MG/1
40 TABLET, FILM COATED ORAL DAILY
Status: DISCONTINUED | OUTPATIENT
Start: 2024-06-17 | End: 2024-06-20 | Stop reason: HOSPADM

## 2024-06-17 RX ORDER — INSULIN LISPRO 100 [IU]/ML
0-4 INJECTION, SOLUTION INTRAVENOUS; SUBCUTANEOUS
Status: DISCONTINUED | OUTPATIENT
Start: 2024-06-17 | End: 2024-06-20 | Stop reason: HOSPADM

## 2024-06-17 RX ORDER — INSULIN LISPRO 100 [IU]/ML
4 INJECTION, SOLUTION INTRAVENOUS; SUBCUTANEOUS
Status: DISCONTINUED | OUTPATIENT
Start: 2024-06-17 | End: 2024-06-20 | Stop reason: HOSPADM

## 2024-06-17 RX ORDER — TAMSULOSIN HYDROCHLORIDE 0.4 MG/1
0.4 CAPSULE ORAL DAILY
Status: DISCONTINUED | OUTPATIENT
Start: 2024-06-18 | End: 2024-06-20 | Stop reason: HOSPADM

## 2024-06-17 RX ORDER — GLUCAGON 1 MG/ML
1 KIT INJECTION PRN
Status: DISCONTINUED | OUTPATIENT
Start: 2024-06-17 | End: 2024-06-20 | Stop reason: HOSPADM

## 2024-06-17 RX ORDER — CARVEDILOL 12.5 MG/1
12.5 TABLET ORAL DAILY
Status: DISCONTINUED | OUTPATIENT
Start: 2024-06-18 | End: 2024-06-19

## 2024-06-17 RX ADMIN — ATORVASTATIN CALCIUM 40 MG: 40 TABLET, FILM COATED ORAL at 10:37

## 2024-06-17 RX ADMIN — HEPARIN SODIUM 5000 UNITS: 5000 INJECTION INTRAVENOUS; SUBCUTANEOUS at 20:35

## 2024-06-17 RX ADMIN — CARVEDILOL 12.5 MG: 12.5 TABLET, FILM COATED ORAL at 10:52

## 2024-06-17 RX ADMIN — INSULIN LISPRO 4 UNITS: 100 INJECTION, SOLUTION INTRAVENOUS; SUBCUTANEOUS at 18:50

## 2024-06-17 RX ADMIN — SODIUM CHLORIDE 1000 MG: 900 INJECTION INTRAVENOUS at 04:58

## 2024-06-17 RX ADMIN — LOSARTAN POTASSIUM 25 MG: 25 TABLET, FILM COATED ORAL at 20:30

## 2024-06-17 RX ADMIN — INSULIN GLARGINE 22 UNITS: 100 INJECTION, SOLUTION SUBCUTANEOUS at 20:35

## 2024-06-17 RX ADMIN — INSULIN LISPRO 1 UNITS: 100 INJECTION, SOLUTION INTRAVENOUS; SUBCUTANEOUS at 15:22

## 2024-06-17 RX ADMIN — HEPARIN SODIUM 5000 UNITS: 5000 INJECTION INTRAVENOUS; SUBCUTANEOUS at 15:20

## 2024-06-17 RX ADMIN — ACETAMINOPHEN 650 MG: 325 TABLET ORAL at 10:52

## 2024-06-17 RX ADMIN — INSULIN LISPRO 4 UNITS: 100 INJECTION, SOLUTION INTRAVENOUS; SUBCUTANEOUS at 15:21

## 2024-06-17 RX ADMIN — EPOETIN ALFA-EPBX 10000 UNITS: 10000 INJECTION, SOLUTION INTRAVENOUS; SUBCUTANEOUS at 20:31

## 2024-06-17 RX ADMIN — TAMSULOSIN HYDROCHLORIDE 0.8 MG: 0.4 CAPSULE ORAL at 10:35

## 2024-06-17 RX ADMIN — SODIUM CHLORIDE, PRESERVATIVE FREE 10 ML: 5 INJECTION INTRAVENOUS at 10:41

## 2024-06-17 RX ADMIN — ESCITALOPRAM OXALATE 10 MG: 10 TABLET ORAL at 10:38

## 2024-06-17 RX ADMIN — FINASTERIDE 5 MG: 5 TABLET, FILM COATED ORAL at 10:41

## 2024-06-17 RX ADMIN — BUMETANIDE 2 MG: 1 TABLET ORAL at 10:36

## 2024-06-17 RX ADMIN — CINACALCET 30 MG: 30 TABLET, FILM COATED ORAL at 10:33

## 2024-06-17 RX ADMIN — GABAPENTIN 300 MG: 300 CAPSULE ORAL at 15:21

## 2024-06-17 RX ADMIN — GABAPENTIN 300 MG: 300 CAPSULE ORAL at 10:38

## 2024-06-17 RX ADMIN — INSULIN LISPRO 4 UNITS: 100 INJECTION, SOLUTION INTRAVENOUS; SUBCUTANEOUS at 10:37

## 2024-06-17 RX ADMIN — SODIUM CHLORIDE, PRESERVATIVE FREE 10 ML: 5 INJECTION INTRAVENOUS at 20:37

## 2024-06-17 RX ADMIN — HEPARIN SODIUM 5000 UNITS: 5000 INJECTION INTRAVENOUS; SUBCUTANEOUS at 06:21

## 2024-06-17 ASSESSMENT — PAIN DESCRIPTION - LOCATION: LOCATION: HEAD

## 2024-06-17 ASSESSMENT — PAIN DESCRIPTION - DESCRIPTORS: DESCRIPTORS: ACHING

## 2024-06-17 ASSESSMENT — PAIN SCALES - GENERAL
PAINLEVEL_OUTOF10: 0
PAINLEVEL_OUTOF10: 3
PAINLEVEL_OUTOF10: 0
PAINLEVEL_OUTOF10: 0

## 2024-06-17 NOTE — ED NOTES
Rocephin administration times modified because patient is currently receiving hemodialysis. Medication will be filtered out of body if given at original admin time of 0300.

## 2024-06-17 NOTE — PROGRESS NOTES
Hospitalist Progress Note    NAME:   Kenny Mace   : 1945   MRN: [id number]     Date/Time: 2024 12:09 PM  Patient PCP: Ritchie Tejada MD    Estimated discharge date: 2024  Barriers: Needs dialysis, final culture    Assessment / Plan:  Fever, fatigue, confusion-likely encephalopathic due to metabolic versus toxic changes  Patient admitted to medical unit with telemetry  Met SIRS criteria on arrival  Rule out UTI  Lactic acid on arrival 1.67  CT head negative for acute intracranial pathology  Follow-up blood cultures  Follow-up urine cultures  Chest x-ray with pulmonary vascular congestion no pneumonia  Continue patient on ceftriaxone for now  May need to broaden antibiotics coverage if patient persistently febrile and with leukocytosis  ID eval if needed  Mentation has improved a lot in the morning.    Severe hyperkalemia  ESRD on HD   Potassium is improved to 4.7 today  Nephrology contacted, Ioana contacted - plan for emergent HD   Has LUE AVG in place   Continue Cinacalcet     DM  Continue insulin glargine 22 units and Humalog 40 units 3 times a day  Sliding scale for add-on  Diabetic diet  Hypoglycemia treatment orders in place     BPH  Flomax ,Finasteride to be continued     HTN  Hyperlipidemia  Coreg, Bumex  Continue atorvastatin      Medical Decision Making:   I personally reviewed labs: CBC BMP  I personally reviewed imaging: CT head negative for acute intracranial process  I personally reviewed EKG: Normal sinus rhythm with heart rate of 71 and left axis deviation and right bundle branch block done on 2020  Toxic drug monitoring: Renal function while on Bumex.  Insulin and blood sugar  Discussed case with: Patient    Code Status: Full  DVT Prophylaxis: Heparin  GI Prophylaxis:none     Subjective:     Chief Complaint / Reason for Physician Visit  Patient currently being treated for fever and metabolic encephalopathy.  He also had hyperkalemia on arrival.      Labs and chart  reviewed patient examined overnight events noted.  Patient appeared to be comfortable no apparent distress.      Objective:     VITALS:   Last 24hrs VS reviewed since prior progress note. Most recent are:  Patient Vitals for the past 24 hrs:   BP Temp Temp src Pulse Resp SpO2 Height Weight   06/17/24 1000 127/61 -- -- 74 26 97 % -- --   06/17/24 0800 123/63 99 °F (37.2 °C) Oral 73 25 98 % -- --   06/17/24 0533 (!) 118/59 99.4 °F (37.4 °C) Oral 77 27 98 % 1.829 m (6') 115.9 kg (255 lb 9.6 oz)   06/17/24 0430 129/67 -- -- 66 26 100 % -- --   06/17/24 0415 113/63 -- -- 80 -- -- -- --   06/17/24 0400 126/63 -- -- 65 -- -- -- --   06/17/24 0345 139/81 -- -- 73 -- -- -- --   06/17/24 0330 119/68 99 °F (37.2 °C) Oral 68 25 99 % -- --   06/17/24 0315 (!) 142/110 -- -- 78 -- -- -- --   06/17/24 0300 131/64 -- -- 65 -- -- -- --   06/17/24 0245 132/66 -- -- 71 -- -- -- --   06/17/24 0230 136/62 -- -- 71 -- -- -- --   06/17/24 0215 (!) 140/67 -- -- 69 -- -- -- --   06/17/24 0200 (!) 141/71 -- -- 68 -- -- -- --   06/17/24 0145 (!) 151/75 -- -- 71 -- -- -- --   06/17/24 0130 (!) 146/64 -- -- 72 -- -- -- --   06/17/24 0104 (!) 146/64 (!) 100.6 °F (38.1 °C) -- 72 (!) 31 100 % -- --   06/16/24 2200 (!) 147/72 100.1 °F (37.8 °C) Oral 72 25 97 % -- --   06/16/24 2145 (!) 163/76 -- -- 73 (!) 32 98 % -- --   06/16/24 2130 137/63 -- -- 66 25 97 % -- --   06/16/24 2115 (!) 144/64 -- -- 70 29 96 % -- --   06/16/24 2105 -- 100.3 °F (37.9 °C) Oral 69 29 97 % -- --   06/16/24 2045 (!) 146/67 -- -- 72 29 99 % -- --   06/16/24 2026 138/70 -- -- -- -- -- -- --   06/16/24 2015 138/70 -- -- 67 29 98 % -- --   06/16/24 1934 -- -- -- 69 (!) 31 100 % -- --   06/16/24 1931 (!) 144/76 -- -- 67 25 97 % -- --   06/16/24 1856 (!) 146/74 100.1 °F (37.8 °C) Oral 71 (!) 32 96 % -- 111.6 kg (246 lb)         Intake/Output Summary (Last 24 hours) at 6/17/2024 1209  Last data filed at 6/17/2024 0430  Gross per 24 hour   Intake 543.56 ml   Output 2500 ml   Net

## 2024-06-17 NOTE — ED NOTES
This RN phoned the IP unit where the patient has been assigned a bed; I spoke with Sangita. Update provided that the patient is currently receiving dialysis and will not be done until approximately 0430. I will call with SBAR closer to that time.

## 2024-06-17 NOTE — ED NOTES
This RN answered a phone call from the patient's daughter, So. Update provided. All questions answered at this time.     Pt repositioned by this RN and Michael.

## 2024-06-17 NOTE — PROGRESS NOTES
End of Shift Note    Bedside shift change report given to    (oncoming nurse) by Rae Han RN (offgoing nurse).  Report included the following information SBAR and Kardex    Shift worked:       Shift summary and any significant changes:    Unable to complete admission database as pt is too lethargic to remember his home medications.   Concerns for physician to address:    Zone phone for oncoming shift:       Activity:     Number times ambulated in hallways past shift: 0  Number of times OOB to chair past shift: 0    Cardiac:   Cardiac Monitoring: Yes           Access:  Current line(s): PIV     Genitourinary:   Urinary status: voiding    Respiratory:      Chronic home O2 use?: YES  Incentive spirometer at bedside: N/A       GI:     Current diet:  ADULT DIET; Regular; Low Potassium (Less than 3000 mg/day); Low Phosphorus (Less than 1000 mg)  Passing flatus: YES  Tolerating current diet: YES       Pain Management:   Patient states pain is manageable on current regimen: YES    Skin:     Interventions: float heels    Patient Safety:  Fall Score:    Interventions: bed/chair alarm       Length of Stay:  Expected LOS: 4  Actual LOS: 0      Rae Han RN

## 2024-06-17 NOTE — ED PROVIDER NOTES
EMERGENCY DEPARTMENT HISTORY AND PHYSICAL EXAM    Date: 6/16/2024  Patient Name: Kenny Mace  Patient Age and Sex: 78 y.o. male  MRN:  [id number]  CSN:  [id number]    History of Present Illness     Chief Complaint   Patient presents with    Shortness of Breath     Patient BIBEMS complaining of shortness of breath that has been worsening. Per EMS, patient is on 2L NC baseline and is on that currently. Patient does have hx kidney failure, ascites, CHF, and COPD.        History Provided By: Patient, Patient's Wife, and Patient's Daughter    Ability to gather history was limited by:     HPI: Kenny Mace, 78 y.o. male   With history of diabetes, ESRD on hemodialysis Monday/Wednesday/Friday, CHF, brought to the emergency department by his wife and daughter for concerns for lethargy and excessive drowsiness for the past week, frequently sleeping throughout most of the day, also seems to be breathing heavily.  Patient uses oxygen 2 L nasal cannula at baseline.  He has not missed any recent dialysis sessions.  He is due for dialysis early tomorrow morning      Tobacco Use      Smoking status: Former        Packs/day: 0.00        Types: Cigarettes        Quit date: 1/1/2009        Years since quitting: 15.4      Smokeless tobacco: Former     Past History   The patient's medical, surgical, and social history were reviewed by me today.    Current Medications:  No current facility-administered medications on file prior to encounter.     Current Outpatient Medications on File Prior to Encounter   Medication Sig Dispense Refill    escitalopram (LEXAPRO) 10 MG tablet Take 1 tablet by mouth      SENSIPAR 30 MG tablet Take 1 tablet by mouth      atorvastatin (LIPITOR) 40 MG tablet Take 1 tablet by mouth daily      bumetanide (BUMEX) 2 MG tablet Take 1 tablet by mouth daily      carvedilol (COREG) 12.5 MG tablet Take 1 tablet by mouth 2 times daily (with meals)      finasteride (PROSCAR) 5 MG tablet Take 1 tablet by mouth daily

## 2024-06-17 NOTE — CONSULTS
[address], [facility] [place] [address]  Phone: [phone]   [phone]    [email or web address]     Nephrology Progress Note    Patient Name : Kenny Mace      : 1945     MRN : [id number]  Date of Admission : 2024  Date of Servive : 24    CC:  Follow up for ESRD       Assessment and Plan   ESRD MWF - PHYLLIS pt  Hyperkalemia  Anemia of CKD  Secondary HPT      Plan:  HD again Wednesday per routine  ISIS MWF  Low k diet     Interval History:  Seen and examined.  Dialyzed overnight.  K normalized.  Feeling better overall, no cp or osb.    Review of Systems: Pertinent items are noted in HPI.    Current Medications:   Current Facility-Administered Medications   Medication Dose Route Frequency    atorvastatin (LIPITOR) tablet 40 mg  40 mg Oral Daily    bumetanide (BUMEX) tablet 2 mg  2 mg Oral Daily    carvedilol (COREG) tablet 12.5 mg  12.5 mg Oral BID WC    escitalopram (LEXAPRO) tablet 10 mg  10 mg Oral Daily    finasteride (PROSCAR) tablet 5 mg  5 mg Oral Daily    gabapentin (NEURONTIN) capsule 300 mg  300 mg Oral TID    cinacalcet (SENSIPAR) tablet 30 mg  30 mg Oral Daily    tamsulosin (FLOMAX) capsule 0.8 mg  0.8 mg Oral Daily    sodium chloride flush 0.9 % injection 5-40 mL  5-40 mL IntraVENous 2 times per day    sodium chloride flush 0.9 % injection 5-40 mL  5-40 mL IntraVENous PRN    0.9 % sodium chloride infusion   IntraVENous PRN    heparin (porcine) injection 5,000 Units  5,000 Units SubCUTAneous 3 times per day    acetaminophen (TYLENOL) tablet 650 mg  650 mg Oral Q6H PRN    Or    acetaminophen (TYLENOL) suppository 650 mg  650 mg Rectal Q6H PRN    glucose chewable tablet 16 g  4 tablet Oral PRN    dextrose bolus 10% 125 mL  125 mL IntraVENous PRN    Or    dextrose bolus 10% 250 mL  250 mL IntraVENous PRN    glucagon injection 1 mg  1 mg SubCUTAneous PRN    dextrose 10 % infusion   IntraVENous Continuous PRN    insulin lispro

## 2024-06-17 NOTE — PROGRESS NOTES
NEPHROLOGY CONSULT NOTE     Patient: Kenny Mace MRN: [id number]  PCP: Ritchie Tejada MD   :     1945  Age:   78 y.o.  Sex:  male      Referring physician: Jaxson Parnell MD  Reason for consultation:   Admission Date: 2024  6:49 PM  LOS: 0 days        ASSESSMENT and PLAN :   Severe hyperkalemia  - 7.3  - given K shifting medications    ESRD on HD MWF, patient of Dr Novak of [facility]  - last HD Friday    Shortness of breath  AOCD  CKD-MBD    PLAN  Ordered STAT HD. Nargis notified  Resume HD MWF schedule  ISIS with HD           Subjective:   HPI: Kenny Mace is a 78 y.o.  male with ESRD on HD MWF who came in the ER with complaints of SOB and weakness. In ER, it was found that patient had potassium of 7.3. He was given K temporizing medications.     Past Medical Hx:   Past Medical History:   Diagnosis Date    Arthritis     spine    CAD (coronary artery disease)     high cholesterol    Chronic kidney disease     dialysis    Diabetes (HCC)     ESRD (end stage renal disease) (HCC)     GERD (gastroesophageal reflux disease)     HX    Hypertension     Ill-defined condition     irritable bowel syndrome    Systolic CHF (HCC)     Unspecified sleep apnea     NO CPAP        Past Surgical Hx:     Past Surgical History:   Procedure Laterality Date    COLONOSCOPY N/A 2016    COLONOSCOPY performed by Sae Larios MD at [facility] ENDOSCOPY    ORTHOPEDIC SURGERY      CERVICAL FUSION    UROLOGICAL SURGERY      TURP    VASCULAR SURGERY      L arm dialysis access       Medications:  Prior to Admission medications    Medication Sig Start Date End Date Taking? Authorizing Provider   escitalopram (LEXAPRO) 10 MG tablet Take 1 tablet by mouth 24   Marleny Ramirez MD   SENSIPAR 30 MG tablet Take 1 tablet by mouth 23   Marleny Ramirez MD   atorvastatin (LIPITOR) 40 MG tablet Take 1 tablet by mouth daily 3/1/23   Automatic Reconciliation, Ar   bumetanide (BUMEX)  2 MG tablet Take 1 tablet by mouth daily 3/19/21   Automatic Reconciliation, Ar   carvedilol (COREG) 12.5 MG tablet Take 1 tablet by mouth 2 times daily (with meals) 5/6/21   Automatic Reconciliation, Ar   finasteride (PROSCAR) 5 MG tablet Take 1 tablet by mouth daily 1/20/21   Automatic Reconciliation, Ar   fluticasone (FLONASE) 50 MCG/ACT nasal spray 2 sprays by Nasal route daily    Automatic Reconciliation, Ar   gabapentin (NEURONTIN) 300 MG capsule TAKE 1 CAPSULE BY MOUTH THREE TIMES DAILY AS NEEDED FOR PAIN. MAX DAILY AMOUNT: 900 MG 2/20/23   Automatic Reconciliation, Ar   insulin 70-30 (HUMULIN;NOVOLIN) (70-30) 100 UNIT per ML injection vial ReliOn Novolin 70/30: Inject 20 Units with Breakfast, 20 Units with Dinner 11/24/22   Automatic Reconciliation, Ar   polyethylene glycol (MIRALAX) 17 GM/SCOOP powder USE 1 SCOOP AND MIX WITH WATER AND DRINK BY MOUTH ONCE DAILY 10/15/22   Automatic Reconciliation, Ar   tamsulosin (FLOMAX) 0.4 MG capsule Take 2 capsules by mouth daily    Automatic Reconciliation, Ar       Allergies   Allergen Reactions    Albumin (Human) Anaphylaxis and Hives    Albumin Human Anaphylaxis and Hives    Niacin Hives       Social Hx:  reports that he quit smoking about 15 years ago. His smoking use included cigarettes. He quit smokeless tobacco use about 29 years ago.  His smokeless tobacco use included chew. He reports that he does not drink alcohol and does not use drugs.     Family History   Problem Relation Age of Onset    Diabetes Brother     Diabetes Brother     No Known Problems Paternal Grandfather     No Known Problems Mother     Cancer Father         \"bone\"    Cancer Sister         leukemia and breast    No Known Problems Sister     No Known Problems Paternal Grandmother     No Known Problems Maternal Grandfather     No Known Problems Maternal Grandmother     Emphysema Brother       Objective:    Vitals:    Vitals:    06/16/24 2105 06/16/24 2115 06/16/24 2130 06/16/24 2145   BP:  (!)

## 2024-06-17 NOTE — FLOWSHEET NOTE
Primary RN SBAR: Jori Quinn, HAI    Patient Education provided: Treatment process explained  Incapacitated Nurse burton. provided: Patient  Preferred Education method and Primary language: Verbal, English  Hepatitis B Surface Ag   Date/Time Value Ref Range Status   11/16/2022 01:00 AM <0.10  Negative   Index Final     HEP B SURF AB   Date/Time Value Ref Range Status   11/16/2022 01:00 AM 4.25 mIU/mL Final     Hep B S Ab   Date/Time Value Ref Range Status   11/16/2022 01:00 AM NONREACTIVE NR   Final     Comment:     (NOTE)  The [employer] Centaur Anti-HBs2 assay is traceable to the World Health   Organization (WHO) Hepatitis B Immunoglobulin 1st International   Reference Preparation (1977). Samples with a calculated value of 10   mIU/mL or greater are considered reactive (protective) in accordance   with the CDC guidelines. The accepted criteria for immunity to HBV is   anti-HBs activity greater than or equal to 10 mIU/mL, as defined by   the WHO International Reference Preparation.  Assay performance has not been established in pregnant women,   patients who are immunosuppressed or immunocompromised, nor have   performance characteristics been established in conjunction with   other 's assays for specific HBV serologic markers. This   assay does not differentiate between vaccine induced immune response   and a response due to infection with HBV. Passively acquired anti-HBs   may be identified following patient transfusion, receipt of   immunoglobulin products, etc.        HEPATITIS B RESULTS PENDING AT THIS TIME     06/17/24 0104   Vital Signs   BP (!) 146/64   Temp (!) 100.6 °F (38.1 °C)   Pulse 72   Respirations (!) 31   SpO2 100 %   Pain Assessment   Pain Assessment None - Denies Pain   Treatment   Time On 0130   Time Off 0430   Treatment Goal 2L   Observations & Evaluations   Level of Consciousness 0   Oriented X 4   Heart Rhythm Regular   Respiratory Quality/Effort Dyspnea at rest;Labored   O2 Device  (mmHg) -140 mmHg   Venous Pressure (mmHg) 200   TMP 60      Comments Treatment started   Access Visible Yes   Ultrafiltration Rate (ml/hr) 860 ml/hr   Ultrafiltration Removed (ml) 0 ml        06/17/24 0430   Vital Signs   /67   Pulse 66   Respirations 26   SpO2 100 %   Pain Assessment   Pain Assessment None - Denies Pain   Pain Level 0   Patient's Stated Pain Goal 0 - No pain   Post-Hemodialysis Assessment   Post-Treatment Procedures Blood returned;Access bleeding time < 10 minutes   Machine Disinfection Process Acid/Vinegar Clean;Bleach;Machine Absence of Bleach Machine;Exterior Machine Disinfection   Rinseback Volume (ml) 250 ml   Blood Volume Processed (Liters) 56 L   Dialyzer Clearance Lightly streaked   Duration of Treatment (minutes) 180 minutes   Heparin Amount Administered During Treatment (mL) 0 mL   Hemodialysis Intake (ml) 500 ml   Hemodialysis Output (ml) 2500 ml   NET Removed (ml) 2000   Tolerated Treatment Good   Interventions Taken   (none)   Patient Response to Treatment Treatment tolerated well   Bilateral Breath Sounds Diminished   Edema None   Physician Notified No   Time Off 0430   Patient Disposition Remain in ICU/ED   Observations & Evaluations   Level of Consciousness 0   Oriented X 4   Heart Rhythm Regular   Respiratory Quality/Effort Dyspnea at rest;Labored   O2 Device Nasal cannula   Skin Condition/Temp Warm;Dry   Appetite Fair   Abdomen Inspection Rounded   Bowel Sounds (All Quadrants) Present      06/17/24 0430   Hemodialysis Fistula/Graft Superior Arm   No placement date or time found.   Present on Admission/Arrival: Yes  Orientation: Superior  Access Location: Arm   Site Assessment Clean, dry & intact   Thrill Present   Bruit Present   Status Deaccessed   Venous Needle Size 15 G   Arterial Needle Size 15 G   Accessed By: Jake   Access Attempts  1   Date of Last Dressing Change 06/17/24   Access Interventions Chlorhexidine;Aseptic Technique   Dressing Intervention New

## 2024-06-17 NOTE — ED NOTES
Comprehensive verbal and bedside ED summary and SBAR given to receiving IP RN (Corinne). All questions answered. Pt transported to [address] by this RN and ED Tech (Idalmis). Care transitioned at the bedside. Pt's status is stable as it had been.    This RN phoned the IP unit and spoke with Haritha with following message: new orders for blood cultures placed by Eber. These were drawn by this RN at patient's time of arrival to ED, and they are on hold in the lab. The IP unit does not need to draw them.

## 2024-06-17 NOTE — H&P
Hospitalist Admission Note    NAME:Kenny Mace   : 1945   MRN: [id number]     Date/Time: 2024 5:48 AM    Patient PCP: Ritchie Tejada MD    *Please be aware this note is formulated with assistance from voice-recognition dictation software. Please excuse any errors that may be present*    ______________________________________________________________________  Given the patient's current clinical presentation, I have a high level of concern for decompensation if discharged from the emergency department.  Complex decision making was performed, which includes reviewing the patient's available past medical records, laboratory results, and x-ray films.       My assessment of this patient's clinical condition and my plan of care is as follows.    Problem List:  Patient Active Problem List   Diagnosis    Nausea & vomiting    Sensory ataxic gait    Acute alteration in mental status    ESRD (end stage renal disease) on dialysis ([facility])    Acute pancreatitis    CAD (coronary artery disease)    Sepsis ([facility])    Diabetic peripheral neuropathy associated with type 2 diabetes mellitus ([facility])    Abdominal pain, acute, epigastric    Acute on chronic systolic CHF (congestive heart failure) ([facility])    Elevated troponin    Pneumonia    Chest pain    Type II diabetes mellitus with nephropathy ([facility])    Renal failure (ARF), acute on chronic ([facility])    Renal anasarca    CKD (chronic kidney disease) stage 3, GFR 30-59 ml/min ([facility])    Accelerated hypertension    Convulsions ([facility])    Type 2 diabetes mellitus with stage 4 chronic kidney disease, with long-term current use of insulin ([facility])    Vitamin D deficiency    Hyperlipidemia    Systolic heart failure (HCC)    Acute hypoxemic respiratory failure ([facility])    SIRS (systemic inflammatory response syndrome) ([facility])    Myoclonus    Cervical spondylosis with myelopathy    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 Found to have severe hyperkalemia to 7.3   - Temporizing medications given in the ED   - Nephrology contacted, Ioana contacted - plan for emergent HD   - Has LUE AVG in place   - Still makes some urine. UA returned with moderate LE, 10-20 WBC, 1+ bacteria - will start Ceftriaxone given concern for UTI  - Send blood cultures - discussed with nursing, these were collected prior to abx  - CT head ordered, pending     Update:   - CT head negative  - Repeat K following HD returned - 4.7 on repeat    DM  -Continue insulin 70-30 20u BID   -SSI     BPH  -Flomax  -Finasteride     HTN  -Coreg        Medical Decision Making:   I personally reviewed labs: CBC, CMP  I personally reviewed imaging: CXR, CT head  I personally reviewed EKG: N/A  Toxic drug monitoring: N/A  Discussed case with: ED provider. After discussion I am in agreement that acuity of patient's medical condition necessitates hospital stay.      Code Status: FULL CODE  DVT Prophylaxis: Heparin  Consults: Nephrology     Subjective:   CHIEF COMPLAINT:  Confusion, shakiness    HISTORY OF PRESENT ILLNESS:     Kenny Mace is a 78 y.o.  male with PMHx significant for listed PMH below who presents with the above chief complaint.   We were asked to admit for work up and evaluation of the above problems.     Patient brought to the emergency department by his wife and daughter due to lethargy, drowsiness, frequent sleeping.  They also felt like he was breathing heavier than usual.  They do note that he has a history of COPD however does not appear he was on any

## 2024-06-18 LAB
ALBUMIN SERPL-MCNC: 3.3 G/DL (ref 3.5–5)
ALBUMIN/GLOB SERPL: 0.8 (ref 1.1–2.2)
ALP SERPL-CCNC: 125 U/L (ref 45–117)
ALT SERPL-CCNC: 18 U/L (ref 12–78)
ANION GAP SERPL CALC-SCNC: 7 MMOL/L (ref 5–15)
AST SERPL-CCNC: 12 U/L (ref 15–37)
BASOPHILS # BLD: 0 K/UL (ref 0–0.1)
BASOPHILS NFR BLD: 0 % (ref 0–1)
BILIRUB SERPL-MCNC: 1 MG/DL (ref 0.2–1)
BUN SERPL-MCNC: 63 MG/DL (ref 6–20)
BUN/CREAT SERPL: 7 (ref 12–20)
CALCIUM SERPL-MCNC: 8.7 MG/DL (ref 8.5–10.1)
CHLORIDE SERPL-SCNC: 95 MMOL/L (ref 97–108)
CO2 SERPL-SCNC: 27 MMOL/L (ref 21–32)
CREAT SERPL-MCNC: 9.62 MG/DL (ref 0.7–1.3)
DIFFERENTIAL METHOD BLD: ABNORMAL
EOSINOPHIL # BLD: 0.1 K/UL (ref 0–0.4)
EOSINOPHIL NFR BLD: 1 % (ref 0–7)
ERYTHROCYTE [DISTWIDTH] IN BLOOD BY AUTOMATED COUNT: 16.4 % (ref 11.5–14.5)
GLOBULIN SER CALC-MCNC: 4.4 G/DL (ref 2–4)
GLUCOSE BLD STRIP.AUTO-MCNC: 122 MG/DL (ref 65–117)
GLUCOSE BLD STRIP.AUTO-MCNC: 158 MG/DL (ref 65–117)
GLUCOSE BLD STRIP.AUTO-MCNC: 179 MG/DL (ref 65–117)
GLUCOSE BLD STRIP.AUTO-MCNC: 197 MG/DL (ref 65–117)
GLUCOSE SERPL-MCNC: 148 MG/DL (ref 65–100)
HCT VFR BLD AUTO: 26 % (ref 36.6–50.3)
HGB BLD-MCNC: 8.5 G/DL (ref 12.1–17)
IMM GRANULOCYTES # BLD AUTO: 0.1 K/UL (ref 0–0.04)
IMM GRANULOCYTES NFR BLD AUTO: 1 % (ref 0–0.5)
LYMPHOCYTES # BLD: 0.8 K/UL (ref 0.8–3.5)
LYMPHOCYTES NFR BLD: 7 % (ref 12–49)
MCH RBC QN AUTO: 32.7 PG (ref 26–34)
MCHC RBC AUTO-ENTMCNC: 32.7 G/DL (ref 30–36.5)
MCV RBC AUTO: 100 FL (ref 80–99)
MONOCYTES # BLD: 0.9 K/UL (ref 0–1)
MONOCYTES NFR BLD: 7 % (ref 5–13)
NEUTS SEG # BLD: 9.8 K/UL (ref 1.8–8)
NEUTS SEG NFR BLD: 84 % (ref 32–75)
NRBC # BLD: 0 K/UL (ref 0–0.01)
NRBC BLD-RTO: 0 PER 100 WBC
NT PRO BNP: ABNORMAL PG/ML
PLATELET # BLD AUTO: 121 K/UL (ref 150–400)
PMV BLD AUTO: 11.5 FL (ref 8.9–12.9)
POTASSIUM SERPL-SCNC: 4.9 MMOL/L (ref 3.5–5.1)
PROT SERPL-MCNC: 7.7 G/DL (ref 6.4–8.2)
RBC # BLD AUTO: 2.6 M/UL (ref 4.1–5.7)
SERVICE CMNT-IMP: ABNORMAL
SODIUM SERPL-SCNC: 129 MMOL/L (ref 136–145)
WBC # BLD AUTO: 11.7 K/UL (ref 4.1–11.1)

## 2024-06-18 PROCEDURE — 85025 COMPLETE CBC W/AUTO DIFF WBC: CPT

## 2024-06-18 PROCEDURE — 6360000002 HC RX W HCPCS: Performed by: STUDENT IN AN ORGANIZED HEALTH CARE EDUCATION/TRAINING PROGRAM

## 2024-06-18 PROCEDURE — 36415 COLL VENOUS BLD VENIPUNCTURE: CPT

## 2024-06-18 PROCEDURE — 97166 OT EVAL MOD COMPLEX 45 MIN: CPT

## 2024-06-18 PROCEDURE — 82962 GLUCOSE BLOOD TEST: CPT

## 2024-06-18 PROCEDURE — 87040 BLOOD CULTURE FOR BACTERIA: CPT

## 2024-06-18 PROCEDURE — 80053 COMPREHEN METABOLIC PANEL: CPT

## 2024-06-18 PROCEDURE — 97530 THERAPEUTIC ACTIVITIES: CPT

## 2024-06-18 PROCEDURE — 6370000000 HC RX 637 (ALT 250 FOR IP): Performed by: STUDENT IN AN ORGANIZED HEALTH CARE EDUCATION/TRAINING PROGRAM

## 2024-06-18 PROCEDURE — 83880 ASSAY OF NATRIURETIC PEPTIDE: CPT

## 2024-06-18 PROCEDURE — 2580000003 HC RX 258: Performed by: STUDENT IN AN ORGANIZED HEALTH CARE EDUCATION/TRAINING PROGRAM

## 2024-06-18 PROCEDURE — 97535 SELF CARE MNGMENT TRAINING: CPT

## 2024-06-18 PROCEDURE — 2060000000 HC ICU INTERMEDIATE R&B

## 2024-06-18 RX ADMIN — ESCITALOPRAM OXALATE 10 MG: 10 TABLET ORAL at 11:00

## 2024-06-18 RX ADMIN — HEPARIN SODIUM 5000 UNITS: 5000 INJECTION INTRAVENOUS; SUBCUTANEOUS at 13:05

## 2024-06-18 RX ADMIN — INSULIN LISPRO 4 UNITS: 100 INJECTION, SOLUTION INTRAVENOUS; SUBCUTANEOUS at 18:42

## 2024-06-18 RX ADMIN — TAMSULOSIN HYDROCHLORIDE 0.4 MG: 0.4 CAPSULE ORAL at 11:00

## 2024-06-18 RX ADMIN — ACETAMINOPHEN 650 MG: 325 TABLET ORAL at 11:09

## 2024-06-18 RX ADMIN — INSULIN LISPRO 4 UNITS: 100 INJECTION, SOLUTION INTRAVENOUS; SUBCUTANEOUS at 11:00

## 2024-06-18 RX ADMIN — HEPARIN SODIUM 5000 UNITS: 5000 INJECTION INTRAVENOUS; SUBCUTANEOUS at 05:33

## 2024-06-18 RX ADMIN — SODIUM CHLORIDE, PRESERVATIVE FREE 10 ML: 5 INJECTION INTRAVENOUS at 11:02

## 2024-06-18 RX ADMIN — FINASTERIDE 5 MG: 5 TABLET, FILM COATED ORAL at 11:00

## 2024-06-18 RX ADMIN — CARVEDILOL 12.5 MG: 12.5 TABLET, FILM COATED ORAL at 11:00

## 2024-06-18 RX ADMIN — SODIUM CHLORIDE, PRESERVATIVE FREE 10 ML: 5 INJECTION INTRAVENOUS at 21:16

## 2024-06-18 RX ADMIN — CINACALCET 30 MG: 30 TABLET, FILM COATED ORAL at 11:00

## 2024-06-18 RX ADMIN — ATORVASTATIN CALCIUM 40 MG: 40 TABLET, FILM COATED ORAL at 11:00

## 2024-06-18 RX ADMIN — INSULIN LISPRO 4 UNITS: 100 INJECTION, SOLUTION INTRAVENOUS; SUBCUTANEOUS at 13:06

## 2024-06-18 RX ADMIN — GABAPENTIN 300 MG: 300 CAPSULE ORAL at 21:15

## 2024-06-18 RX ADMIN — LOSARTAN POTASSIUM 25 MG: 25 TABLET, FILM COATED ORAL at 11:00

## 2024-06-18 RX ADMIN — SODIUM CHLORIDE 1000 MG: 900 INJECTION INTRAVENOUS at 05:32

## 2024-06-18 RX ADMIN — HEPARIN SODIUM 5000 UNITS: 5000 INJECTION INTRAVENOUS; SUBCUTANEOUS at 21:15

## 2024-06-18 RX ADMIN — BUMETANIDE 2 MG: 1 TABLET ORAL at 11:00

## 2024-06-18 RX ADMIN — BUMETANIDE 2 MG: 1 TABLET ORAL at 18:43

## 2024-06-18 RX ADMIN — ACETAMINOPHEN 650 MG: 325 TABLET ORAL at 01:09

## 2024-06-18 RX ADMIN — INSULIN GLARGINE 22 UNITS: 100 INJECTION, SOLUTION SUBCUTANEOUS at 21:15

## 2024-06-18 ASSESSMENT — PAIN SCALES - GENERAL
PAINLEVEL_OUTOF10: 0

## 2024-06-18 NOTE — PROGRESS NOTES
Nephrology Progress Note  [facility] / [place] Office  [address], [address]  [place] [address]  Phone - [phone]  Fax - [phone]                 Patient: Kenny Mace                     YOB: 1945        Date- 6/18/2024                                     Admit Date: 6/16/2024   CC: Follow up for esrd          IMPRESSION & PLAN:   [place] [facility]  Hyperkalemia  Anemia of CKD  Secondary HPT      PLAN-  No hd today  Continue hd mwf schedule  Continue coreg. losartan  Conitnue sensipar  Continue sensipar  Epogen for anemia     Subjective:  Interval History:   -  bp stable  No sob    Objective:   Vitals:    06/17/24 2030 06/17/24 2259 06/18/24 0253 06/18/24 0714   BP: (!) 117/97 (!) 145/114 117/76 (!) 154/82   Pulse:  (!) 101 (!) 101 88   Resp:  23 23 24   Temp:  100.2 °F (37.9 °C) 99.2 °F (37.3 °C) 99.3 °F (37.4 °C)   TempSrc:  Oral Oral Oral   SpO2:   98% 100%   Weight:       Height:          I/O last 3 completed shifts:  In: 1023.6 [P.O.:480; IV Piggyback:43.6]  Out: 2500   No intake/output data recorded.      Physical exam:    GEN: NAD  NECK- no mass  RESP: No wheezing, decreased BS b/l  CVS: S1,S2  RRR  NEURO: Normal speech, Non focal  EXT: No Edema   PSYCH: Normal Mood    Chart reviewed.         Pertinent Notes reviewed.     Data Review :  Lab Results   Component Value Date/Time     06/18/2024 02:03 AM    K 4.9 06/18/2024 02:03 AM    CL 95 06/18/2024 02:03 AM    CO2 27 06/18/2024 02:03 AM    BUN 63 06/18/2024 02:03 AM    CREATININE 9.62 06/18/2024 02:03 AM    GLUCOSE 148 06/18/2024 02:03 AM    CALCIUM 8.7 06/18/2024 02:03 AM       Lab Results   Component Value Date    WBC 11.7 (H) 06/18/2024    HGB 8.5 (L) 06/18/2024    HCT 26.0 (L) 06/18/2024    .0 (H) 06/18/2024     (L) 06/18/2024      Recent Labs     06/17/24  0444 06/17/24  0550 06/18/24  0203   * 133* 129*   K 4.7 4.7 4.9

## 2024-06-18 NOTE — PLAN OF CARE
Problem: Occupational Therapy - Adult  Goal: By Discharge: Performs self-care activities at highest level of function for planned discharge setting.  See evaluation for individualized goals.  Description: FUNCTIONAL STATUS PRIOR TO ADMISSION:  Patient was mod I to supervision for ADLs and functional mobility using rolling walker vs rollator.   Receives Help From: Family,  ,  ,  ,  ,  ,  ,  ,  ,  , Active : No     HOME SUPPORT: Patient lived with wife.    Occupational Therapy Goals:  Initiated 6/18/2024  1.  Patient will perform grooming in standing with Supervision within 7 day(s).  2.  Patient will perform upper body dressing with Supervision within 7 day(s).  3.  Patient will perform lower body dressing with Minimal Assist within 7 day(s).  4.  Patient will perform toilet transfers with Contact Guard Assist  within 7 day(s).  5.  Patient will perform all aspects of toileting with Minimal Assist within 7 day(s).  6.  Patient will participate in upper extremity therapeutic exercise/activities with Supervision for 5 minutes within 7 day(s).    Outcome: Progressing   OCCUPATIONAL THERAPY EVALUATION    Patient: Kenny Mace (78 y.o. male)  Date: 6/18/2024  Primary Diagnosis: Hyperkalemia [E87.5]  Hyperkalemia, diminished renal excretion [E87.5]  Somnolence [R40.0]         Precautions:                    ASSESSMENT :  The patient is limited by decreased functional mobility, independence in ADLs, high-level IADLs, ROM, strength, sensation, body mechanics, activity tolerance, endurance, safety awareness, cognition, coordination, balance, posture, fine-motor control, hypotension .    Based on the impairments listed above patient is functioning below his baseline for ADLs and functional mobility. He is now completing ADLs with set-up to total assist and functional mobility with min to mod assist x2 using rolling walker. Patient received sitting in recliner chair on 3L O2 with family and RN present in room, cleared      Score: 15/24     Interpretation of Tool:  Represents clinically-significant functional categories (i.e. Activities of daily living).    Cutoff score 39.4 (19) correlates to a good likelihood of discharging home versus a facility  Nancy Stapleton, Brit Obrien, Kory Cheung, Violet Bradley, Wade Grace, Armando Stapleton, AM-PAC “6-Clicks” Functional Assessment Scores Predict Acute Care Hospital Discharge Destination, Physical Therapy, Volume 94, Issue 9, 2014, Pages 6288-9990, [email or web address]    Pain Ratin/10   Pain Intervention(s):   pain is at a level acceptable to the patient    Activity Tolerance:   Fair , requires frequent rest breaks, observed shortness of breath on exertion, and desaturates with activity and requires oxygen    After treatment:   Patient left in no apparent distress in bed, Call bell within reach, Bed/ chair alarm activated, Caregiver / family present, and Side rails x3    COMMUNICATION/EDUCATION:   The patient's plan of care was discussed with: physical therapist and registered nurse    Patient Education  Education Given To: Patient;Family  Education Provided: Role of Therapy;Plan of Care  Education Method: Verbal  Barriers to Learning: Cognition;Hearing  Education Outcome: Verbalized understanding;Continued education needed    Thank you for this referral.  Lulu Dick OT  Minutes: 33    Occupational Therapy Evaluation Charge Determination   History Examination Decision-Making   MEDIUM Complexity : Expanded review of history including physical, cognitive and psychocial history  MEDIUM Complexity: 3-5 Performance deficits relating to physical, cognitive, or psychosocial skills that result in activity limitations and/or participation restrictions MEDIUM Complexity: Patient may present with comorbidities that affect occupational performance. Minimal to moderate modifications of tasks or assist (eg. physical or verbal) with assist is

## 2024-06-18 NOTE — PROGRESS NOTES
Hospitalist Progress Note    NAME:   Kenny Mace   : 1945   MRN: [id number]     Date/Time: 2024 1:07 PM  Patient PCP: Ritchie Tejdaa MD    Estimated discharge date: 2024  Barriers: Needs dialysis, final culture    Assessment / Plan:  Fever, fatigue, confusion-likely encephalopathic due to metabolic versus toxic changes  Patient admitted to medical unit with telemetry  Met SIRS criteria on arrival  UTI with gram-negative rods  Patient has baseline dementia  Lactic acid on arrival 1.67  CT head negative for acute intracranial pathology  Follow-up urine cultures-not showing gram-negative rods  Chest x-ray with pulmonary vascular congestion no pneumonia  Continue patient on ceftriaxone for now  Mentation has improved a lot in the morning.  Blood cultures ordered today will follow-was not collected during the admission    Severe hyperkalemia  ESRD on HD   Mild hyponatremia likely hypovolemic in the background of ESRD on dialysis  Chronic anemia  Potassium has improved   Nephrology contacted, Ioana contacted - plan for emergent HD   Has LUE AVG in place   Continue Cinacalcet     DM  Continue insulin glargine 22 units and Humalog 40 units 3 times a day  Sliding scale for add-on  Diabetic diet  Hypoglycemia treatment orders in place     BPH  Flomax ,Finasteride to be continued     HTN  Hyperlipidemia  Coreg, Bumex, losartan  Continue atorvastatin    Generalised weakness and sob  H/o chf in past   Will order echo and bnp       Medical Decision Making:   I personally reviewed labs: CBC BMP  I personally reviewed imaging: None for today  I personally reviewed EKG: NONE   Toxic drug monitoring: Renal function while on Bumex.  Insulin and blood sugar  Discussed case with: Patient    Code Status: Full  DVT Prophylaxis: Heparin  GI Prophylaxis:none     Subjective:     Chief Complaint / Reason for Physician Visit  Patient currently being treated for fever and metabolic encephalopathy.  He also had

## 2024-06-18 NOTE — PLAN OF CARE
Problem: Discharge Planning  Goal: Discharge to home or other facility with appropriate resources  Outcome: Progressing  Flowsheets (Taken 6/17/2024 1908)  Discharge to home or other facility with appropriate resources:   Identify barriers to discharge with patient and caregiver   Arrange for needed discharge resources and transportation as appropriate   Identify discharge learning needs (meds, wound care, etc)   Arrange for interpreters to assist at discharge as needed   Refer to discharge planning if patient needs post-hospital services based on physician order or complex needs related to functional status, cognitive ability or social support system     Problem: Safety - Adult  Goal: Free from fall injury  Outcome: Progressing     Problem: Skin/Tissue Integrity  Goal: Absence of new skin breakdown  Description: 1.  Monitor for areas of redness and/or skin breakdown  2.  Assess vascular access sites hourly  3.  Every 4-6 hours minimum:  Change oxygen saturation probe site  4.  Every 4-6 hours:  If on nasal continuous positive airway pressure, respiratory therapy assess nares and determine need for appliance change or resting period.  Outcome: Progressing

## 2024-06-18 NOTE — CARE COORDINATION
Care Management Initial Assessment       RUR: 21  Readmission? No  1st IM letter given? Yes - 6/17/24  1st  letter given: No    Insurance is [employer].     Plan Home with Wife.   Look for therapy rec.  Wife is requesting a WC to help when they have to go out to HD. Will look for therapy rec to help support need for WC.   HD: [facility]: M/W/F at 8 AM  Wife transports Pt to and from HD.   [facility]  [address] [place] [address]  Phone: [phone]      Fax: [phone]  IM letter needed prior to DC  Appt prior to DC  Wife to transport Pt home.     Pt lives with wife in one story home with one step.   Pt has mild dementia.  RN reports that cognition flucuates.  Pt answered all questions correctly and information was verified by wife over the phone.   Wife helps with cues and set up for ADLs at home.   DME: RW  No HH or rehab experience.   Pharmacy: [facility] in [place].     Advance Care Planning     General Advance Care Planning (ACP) Conversation    Date of Conversation: 6/18/2024  Conducted with: Patient with Decision Making Capacity  Other persons present: Spouse Teresita Mace    Healthcare Decision Maker:   Primary Decision Maker: Teresita Mace - Spouse - [phone]  Click here to complete Healthcare Decision Makers including selection of the Healthcare Decision Maker Relationship (ie \"Primary\").       Content/Action Overview:  DECLINED ACP Conversation - will revisit periodically  Reviewed DNR/DNI and patient elects Full Code (Attempt Resuscitation)        Length of Voluntary ACP Conversation in minutes:  <16 minutes (Non-Billable)    Amber Mathur CM  [id number]       06/18/24 1013   Service Assessment   Patient Orientation Alert and Oriented;Other (see comment)  (Pt has a mild dementia.  There was a delay but he answered all questions correct as answers were verified by Wife via TPC.)   Cognition Dementia / Early Alzheimer's   History Provided  By Patient;Significant Other   Primary Caregiver Spouse  (Spouse: Teresita Mace: [phone])   Support Systems Spouse/Significant Other;Children;Family Members;Friends/Neighbors   Patient's Healthcare Decision Maker is: Patient Declined (Legal Next of Kin Remains as Decision Maker)  (NOK: Wife: Teresita Mace: [phone])   PCP Verified by CM Yes  (Ritchie Tejada)   Last Visit to PCP Within last 3 months   Prior Functional Level Assistance with the following:;Housework;Shopping;Cooking;Mobility;Bathing;Dressing;Toileting   Current Functional Level Assistance with the following:;Bathing;Dressing;Toileting;Cooking;Housework;Shopping;Mobility   Can patient return to prior living arrangement Yes   Ability to make needs known: Good   Family able to assist with home care needs: Yes   Social/Functional History   Lives With Spouse   Type of Home House   Home Layout One level   Home Access Stairs to enter with rails  (1)   Entrance Stairs - Number of Steps 1   Home Equipment Walker - Rolling   Receives Help From Family   Active  No   Patient's  Info Wife: Teresita Mace   Mode of Transportation Car   Discharge Planning   Type of Residence House   Living Arrangements Spouse/Significant Other   Current Services Prior To Admission Durable Medical Equipment   Current DME Prior to Arrival Walker   Potential Assistance Needed Durable Medical Equipment   Potential DME Needed Wheelchair   DME Ordered? Wheelchair  (Pt wife is requesting a WC.  Therapy to be ordered.)   Potential Assistance Purchasing Medications No   Type of Home Care Services None   Patient expects to be discharged to: House        [place] Website --- [email or web address] website --- [email or web address]

## 2024-06-19 LAB
ALBUMIN SERPL-MCNC: 3.2 G/DL (ref 3.5–5)
ALBUMIN/GLOB SERPL: 0.7 (ref 1.1–2.2)
ALP SERPL-CCNC: 122 U/L (ref 45–117)
ALT SERPL-CCNC: 32 U/L (ref 12–78)
ANION GAP SERPL CALC-SCNC: 12 MMOL/L (ref 5–15)
AST SERPL-CCNC: 22 U/L (ref 15–37)
BACTERIA SPEC CULT: ABNORMAL
BASOPHILS # BLD: 0 K/UL (ref 0–0.1)
BASOPHILS NFR BLD: 0 % (ref 0–1)
BILIRUB SERPL-MCNC: 0.7 MG/DL (ref 0.2–1)
BUN SERPL-MCNC: 87 MG/DL (ref 6–20)
BUN/CREAT SERPL: 7 (ref 12–20)
CALCIUM SERPL-MCNC: 8.6 MG/DL (ref 8.5–10.1)
CC UR VC: ABNORMAL
CHLORIDE SERPL-SCNC: 93 MMOL/L (ref 97–108)
CO2 SERPL-SCNC: 23 MMOL/L (ref 21–32)
CREAT SERPL-MCNC: 12.2 MG/DL (ref 0.7–1.3)
DIFFERENTIAL METHOD BLD: ABNORMAL
EKG ATRIAL RATE: 71 BPM
EKG DIAGNOSIS: NORMAL
EKG P-R INTERVAL: 202 MS
EKG Q-T INTERVAL: 410 MS
EKG QRS DURATION: 152 MS
EKG QTC CALCULATION (BAZETT): 445 MS
EKG R AXIS: -59 DEGREES
EKG T AXIS: 85 DEGREES
EKG VENTRICULAR RATE: 71 BPM
EOSINOPHIL # BLD: 0.1 K/UL (ref 0–0.4)
EOSINOPHIL NFR BLD: 1 % (ref 0–7)
ERYTHROCYTE [DISTWIDTH] IN BLOOD BY AUTOMATED COUNT: 15.9 % (ref 11.5–14.5)
GLOBULIN SER CALC-MCNC: 4.5 G/DL (ref 2–4)
GLUCOSE BLD STRIP.AUTO-MCNC: 136 MG/DL (ref 65–117)
GLUCOSE BLD STRIP.AUTO-MCNC: 145 MG/DL (ref 65–117)
GLUCOSE BLD STRIP.AUTO-MCNC: 159 MG/DL (ref 65–117)
GLUCOSE BLD STRIP.AUTO-MCNC: 202 MG/DL (ref 65–117)
GLUCOSE SERPL-MCNC: 148 MG/DL (ref 65–100)
HCT VFR BLD AUTO: 24.9 % (ref 36.6–50.3)
HGB BLD-MCNC: 8.3 G/DL (ref 12.1–17)
IMM GRANULOCYTES # BLD AUTO: 0 K/UL (ref 0–0.04)
IMM GRANULOCYTES NFR BLD AUTO: 1 % (ref 0–0.5)
LYMPHOCYTES # BLD: 0.7 K/UL (ref 0.8–3.5)
LYMPHOCYTES NFR BLD: 10 % (ref 12–49)
MCH RBC QN AUTO: 32.5 PG (ref 26–34)
MCHC RBC AUTO-ENTMCNC: 33.3 G/DL (ref 30–36.5)
MCV RBC AUTO: 97.6 FL (ref 80–99)
MONOCYTES # BLD: 0.8 K/UL (ref 0–1)
MONOCYTES NFR BLD: 12 % (ref 5–13)
NEUTS SEG # BLD: 5.2 K/UL (ref 1.8–8)
NEUTS SEG NFR BLD: 76 % (ref 32–75)
NRBC # BLD: 0 K/UL (ref 0–0.01)
NRBC BLD-RTO: 0 PER 100 WBC
PLATELET # BLD AUTO: 121 K/UL (ref 150–400)
PMV BLD AUTO: 12.2 FL (ref 8.9–12.9)
POTASSIUM SERPL-SCNC: 5.1 MMOL/L (ref 3.5–5.1)
PROT SERPL-MCNC: 7.7 G/DL (ref 6.4–8.2)
RBC # BLD AUTO: 2.55 M/UL (ref 4.1–5.7)
SERVICE CMNT-IMP: ABNORMAL
SODIUM SERPL-SCNC: 128 MMOL/L (ref 136–145)
WBC # BLD AUTO: 6.8 K/UL (ref 4.1–11.1)

## 2024-06-19 PROCEDURE — 2580000003 HC RX 258: Performed by: STUDENT IN AN ORGANIZED HEALTH CARE EDUCATION/TRAINING PROGRAM

## 2024-06-19 PROCEDURE — 82962 GLUCOSE BLOOD TEST: CPT

## 2024-06-19 PROCEDURE — 6360000002 HC RX W HCPCS: Performed by: STUDENT IN AN ORGANIZED HEALTH CARE EDUCATION/TRAINING PROGRAM

## 2024-06-19 PROCEDURE — 36415 COLL VENOUS BLD VENIPUNCTURE: CPT

## 2024-06-19 PROCEDURE — 2700000000 HC OXYGEN THERAPY PER DAY

## 2024-06-19 PROCEDURE — 97530 THERAPEUTIC ACTIVITIES: CPT

## 2024-06-19 PROCEDURE — 85025 COMPLETE CBC W/AUTO DIFF WBC: CPT

## 2024-06-19 PROCEDURE — 90935 HEMODIALYSIS ONE EVALUATION: CPT

## 2024-06-19 PROCEDURE — 80053 COMPREHEN METABOLIC PANEL: CPT

## 2024-06-19 PROCEDURE — 97162 PT EVAL MOD COMPLEX 30 MIN: CPT

## 2024-06-19 PROCEDURE — 6370000000 HC RX 637 (ALT 250 FOR IP): Performed by: STUDENT IN AN ORGANIZED HEALTH CARE EDUCATION/TRAINING PROGRAM

## 2024-06-19 PROCEDURE — 2060000000 HC ICU INTERMEDIATE R&B

## 2024-06-19 PROCEDURE — 6360000002 HC RX W HCPCS: Performed by: INTERNAL MEDICINE

## 2024-06-19 RX ORDER — CARVEDILOL 3.12 MG/1
3.12 TABLET ORAL DAILY
Status: DISCONTINUED | OUTPATIENT
Start: 2024-06-20 | End: 2024-06-20 | Stop reason: HOSPADM

## 2024-06-19 RX ORDER — BUMETANIDE 1 MG/1
2 TABLET ORAL 2 TIMES DAILY
Status: DISCONTINUED | OUTPATIENT
Start: 2024-06-20 | End: 2024-06-20 | Stop reason: HOSPADM

## 2024-06-19 RX ADMIN — EPOETIN ALFA-EPBX 10000 UNITS: 10000 INJECTION, SOLUTION INTRAVENOUS; SUBCUTANEOUS at 20:47

## 2024-06-19 RX ADMIN — INSULIN LISPRO 4 UNITS: 100 INJECTION, SOLUTION INTRAVENOUS; SUBCUTANEOUS at 08:52

## 2024-06-19 RX ADMIN — INSULIN LISPRO 4 UNITS: 100 INJECTION, SOLUTION INTRAVENOUS; SUBCUTANEOUS at 18:49

## 2024-06-19 RX ADMIN — CINACALCET 30 MG: 30 TABLET, FILM COATED ORAL at 15:10

## 2024-06-19 RX ADMIN — HEPARIN SODIUM 5000 UNITS: 5000 INJECTION INTRAVENOUS; SUBCUTANEOUS at 21:53

## 2024-06-19 RX ADMIN — GABAPENTIN 300 MG: 300 CAPSULE ORAL at 20:47

## 2024-06-19 RX ADMIN — SODIUM CHLORIDE 1000 MG: 900 INJECTION INTRAVENOUS at 05:03

## 2024-06-19 RX ADMIN — INSULIN LISPRO 4 UNITS: 100 INJECTION, SOLUTION INTRAVENOUS; SUBCUTANEOUS at 15:21

## 2024-06-19 RX ADMIN — FINASTERIDE 5 MG: 5 TABLET, FILM COATED ORAL at 15:09

## 2024-06-19 RX ADMIN — INSULIN GLARGINE 22 UNITS: 100 INJECTION, SOLUTION SUBCUTANEOUS at 20:47

## 2024-06-19 RX ADMIN — SODIUM CHLORIDE, PRESERVATIVE FREE 10 ML: 5 INJECTION INTRAVENOUS at 15:11

## 2024-06-19 RX ADMIN — SODIUM CHLORIDE 35 ML: 9 INJECTION, SOLUTION INTRAVENOUS at 05:02

## 2024-06-19 RX ADMIN — TAMSULOSIN HYDROCHLORIDE 0.4 MG: 0.4 CAPSULE ORAL at 15:09

## 2024-06-19 RX ADMIN — INSULIN LISPRO 1 UNITS: 100 INJECTION, SOLUTION INTRAVENOUS; SUBCUTANEOUS at 18:49

## 2024-06-19 RX ADMIN — ATORVASTATIN CALCIUM 40 MG: 40 TABLET, FILM COATED ORAL at 15:09

## 2024-06-19 RX ADMIN — ESCITALOPRAM OXALATE 10 MG: 10 TABLET ORAL at 15:10

## 2024-06-19 RX ADMIN — IRON SUCROSE 200 MG: 20 INJECTION, SOLUTION INTRAVENOUS at 15:10

## 2024-06-19 RX ADMIN — HEPARIN SODIUM 5000 UNITS: 5000 INJECTION INTRAVENOUS; SUBCUTANEOUS at 05:54

## 2024-06-19 RX ADMIN — SODIUM CHLORIDE, PRESERVATIVE FREE 10 ML: 5 INJECTION INTRAVENOUS at 20:47

## 2024-06-19 RX ADMIN — HEPARIN SODIUM 5000 UNITS: 5000 INJECTION INTRAVENOUS; SUBCUTANEOUS at 15:11

## 2024-06-19 ASSESSMENT — PAIN SCALES - GENERAL
PAINLEVEL_OUTOF10: 0

## 2024-06-19 NOTE — PLAN OF CARE
Problem: Physical Therapy - Adult  Goal: By Discharge: Performs mobility at highest level of function for planned discharge setting.  See evaluation for individualized goals.  Description: FUNCTIONAL STATUS PRIOR TO ADMISSION: Patient was supervision to modified independent using a rolling walker and rollator for functional mobility. and The patient  required stand-by assistance for basic and instrumental ADLs.    HOME SUPPORT PRIOR TO ADMISSION: The patient lived with spouse and required stand-by assistance for IADL's. 1 story home with 1 step and no rails to enter.    Physical Therapy Goals  Initiated 6/19/2024  1.  Patient will move from supine to sit and sit to supine, scoot up and down, and roll side to side in bed with modified independence within 7 day(s).    2.  Patient will perform sit to stand with modified independence within 7 day(s).  3.  Patient will transfer from bed to chair and chair to bed with standby assistance using the least restrictive device within 7 day(s).  4.  Patient will ambulate with standby assistance for 75 feet with the least restrictive device within 7 day(s).   5.  Patient will perform 5 sit to stands in < 30 seconds with standby assistance within 7 day(s).    Outcome: Progressing   PHYSICAL THERAPY EVALUATION    Patient: Kenny Mace (78 y.o. male)  Date: 6/19/2024  Primary Diagnosis: Hyperkalemia [E87.5]  Hyperkalemia, diminished renal excretion [E87.5]  Somnolence [R40.0]       Precautions: Restrictions/Precautions: Fall Risk, Up as Tolerated, Bed Alarm  Required Braces or Orthoses?: No Required Braces or Orthoses?: No                    ASSESSMENT :   DEFICITS/IMPAIRMENTS:   The patient is limited by decreased functional mobility, independence in ADLs, high-level IADLs, ROM, strength, sensation, body mechanics, activity tolerance, endurance, safety awareness, cognition, attention/concentration, coordination, balance following admission on 6/16/24.    Based on the impairments

## 2024-06-19 NOTE — PROGRESS NOTES
Nephrology Progress Note  [facility] / [place] Office  [address], [address]  [place] [address]  Phone - [phone]  Fax - [phone]                 Patient: Kenny Mace                     YOB: 1945        Date- 6/19/2024                                     Admit Date: 6/16/2024   CC: Follow up for ESRD         IMPRESSION & PLAN:   [place] [facility]  Hyperkalemia  Anemia of CKD  Secondary HPT      PLAN-  SEEN ON HD Today  Continue hd mwf schedule  Hold  coreg. losartan  Continue sensipar  Epogen for anemia     Subjective:  Interval History:   - bp low  Seen on hd  No sob    Objective:   Vitals:    06/19/24 1023 06/19/24 1038 06/19/24 1053 06/19/24 1108   BP: (!) 106/50 (!) 90/44 (!) 97/50 (!) 97/54   Pulse: 76 64 66 69   Resp:       Temp:       TempSrc:       SpO2:       Weight:       Height:          I/O last 3 completed shifts:  In: 486 [P.O.:476; I.V.:10]  Out: 400 [Urine:400]  No intake/output data recorded.      Physical exam:    GEN:  NAD  NECK:  Supple, no thyromegaly  RESP:  no  wheezing, decreased bs b/l  CVS- s1,s2,rrr  NEURO: non focal, normal speech  EXT: Edema +nt         Chart reviewed.         Pertinent Notes reviewed.     Data Review :  Lab Results   Component Value Date/Time     06/19/2024 09:25 AM    K 5.1 06/19/2024 09:25 AM    CL 93 06/19/2024 09:25 AM    CO2 23 06/19/2024 09:25 AM    BUN 87 06/19/2024 09:25 AM    CREATININE 12.20 06/19/2024 09:25 AM    GLUCOSE 148 06/19/2024 09:25 AM    CALCIUM 8.6 06/19/2024 09:25 AM       Lab Results   Component Value Date    WBC 6.8 06/19/2024    HGB 8.3 (L) 06/19/2024    HCT 24.9 (L) 06/19/2024    MCV 97.6 06/19/2024     (L) 06/19/2024      Recent Labs     06/17/24  0550 06/18/24  0203 06/19/24  0925   * 129* 128*   K 4.7 4.9 5.1   CL 98 95* 93*   CO2 28 27 23   GLUCOSE 159* 148* 148*   BUN 44* 63* 87*   CREATININE 7.12* 9.62* 12.20*

## 2024-06-19 NOTE — CARE COORDINATION
Transition of Care Plan:    RUR: 20%   Prior Level of Functioning: assistance with ADLs/IADLs  Disposition:  home with spouse & HD at [facility] M/W/F at 8 AM   If SNF or IPR: Date FOC offered:   Date FOC received:   Accepting facility:   Date authorization started with reference number:   Date authorization received and expires:   Follow up appointments: PCP, Specialists  DME needed: Spouse would like a w/c.   Transportation at discharge: wife  IM/IMM Medicare/ letter given:   Is patient a  and connected with [facility]?    If yes, was  transfer form completed and [facility] notified?   Caregiver Contact: Teresita Mace (Spouse)  [phone] (Mobile)   Discharge Caregiver contacted prior to discharge?   Care Conference needed?   Barriers to discharge:     CM attended IDRs and informed therapy that pt's spouse is requesting a w/c for d/c.  Pt's MARILEE is 6/21/24.  CM will continue to follow.     Lynn Miranda LMSW  Supervisee in Social Work  Care Management, [facility]  [phone]

## 2024-06-19 NOTE — PLAN OF CARE
Problem: Discharge Planning  Goal: Discharge to home or other facility with appropriate resources  Outcome: Progressing  Flowsheets (Taken 6/18/2024 1939)  Discharge to home or other facility with appropriate resources:   Identify barriers to discharge with patient and caregiver   Arrange for needed discharge resources and transportation as appropriate   Identify discharge learning needs (meds, wound care, etc)   Arrange for interpreters to assist at discharge as needed   Refer to discharge planning if patient needs post-hospital services based on physician order or complex needs related to functional status, cognitive ability or social support system     Problem: Safety - Adult  Goal: Free from fall injury  Outcome: Progressing     Problem: Skin/Tissue Integrity  Goal: Absence of new skin breakdown  Description: 1.  Monitor for areas of redness and/or skin breakdown  2.  Assess vascular access sites hourly  3.  Every 4-6 hours minimum:  Change oxygen saturation probe site  4.  Every 4-6 hours:  If on nasal continuous positive airway pressure, respiratory therapy assess nares and determine need for appliance change or resting period.  Outcome: Progressing     Problem: Chronic Conditions and Co-morbidities  Goal: Patient's chronic conditions and co-morbidity symptoms are monitored and maintained or improved  Outcome: Progressing  Flowsheets (Taken 6/18/2024 1939)  Care Plan - Patient's Chronic Conditions and Co-Morbidity Symptoms are Monitored and Maintained or Improved:   Monitor and assess patient's chronic conditions and comorbid symptoms for stability, deterioration, or improvement   Collaborate with multidisciplinary team to address chronic and comorbid conditions and prevent exacerbation or deterioration   Update acute care plan with appropriate goals if chronic or comorbid symptoms are exacerbated and prevent overall improvement and discharge     Problem: Occupational Therapy - Adult  Goal: By Discharge:

## 2024-06-19 NOTE — FLOWSHEET NOTE
06/19/24 0901   Vital Signs   /65   Temp 97.8 °F (36.6 °C)   Pulse 77   Respirations 16   Pain Assessment   Pain Assessment None - Denies Pain   Treatment   Time On 0908   Time Off 1238   Treatment Goal 3kg in 3.5hrs   Observations & Evaluations   Level of Consciousness 0   Oriented X 4   Heart Rhythm Regular   Respiratory Quality/Effort Unlabored   O2 Device Nasal cannula   Bilateral Breath Sounds Clear;Diminished   Skin Condition/Temp Dry;Warm   Abdomen Inspection Distended;Rounded   Edema None   Technical Checks   Dialysis Machine No. 03   RO Machine Number [id number]   Dialyzer Lot No. [id number]   Tubing Lot Number [id number]   All Connections Secure Yes   NS Bag Yes   Saline Line Double Clamped Yes   Dialyzer Revaclear 300   Prime Volume (mL) 200 mL   ICEBOAT I;C;E;B;O;A;T   RO Machine Log Sheet Completed Yes   Machine Alarm Self Test Completed;Passed   Air Foam Detector Tested;Proper Function;pH Reading   Extracorporeal Circuit Tested for Integrity Yes   Machine Conductivity 14   Manual Ph 7.2   Bleach Test (Neg) Yes   Bath Temperature 98.6 °F (37 °C)   Dialysis Bath   K+ (Potassium) 2   Ca+ (Calcium) 2.5   Na+ (Sodium) 138   HCO3 (Bicarb) 38   Treatment Initiation   Dialyze Hours 3.5   Treatment  Initiation Universal Precautions maintained;Lines secured to patient;Connections secured;Prime given;Venous Parameters set;Arterial Parameters set;Air foam detector engaged;[place] Device;Saline line double clamped;Hemo-Safe Applied;Dialyzer;Revaclear Dialyzer;REV-300   Hemodialysis Fistula/Graft Superior Arm   No placement date or time found.   Present on Admission/Arrival: Yes  Orientation: Superior  Access Location: Arm   Site Assessment Clean, dry & intact   Thrill Present   Bruit Present   Status Accessed   Venous Needle Size 15 G   Arterial Needle Size 15 G   Accessed By: FARRUKH Quinonez   Access Attempts  1   Access Interventions Chlorhexidine;Aseptic Technique;Needles taped to patient     Primary RN SBAR:

## 2024-06-19 NOTE — PROGRESS NOTES
PT order acknowledged and chart reviewed. Patient is currently off the floor in HD. Will follow up later this afternoon for PT evaluation.    Jose Eduardo Pedersen, PT

## 2024-06-20 ENCOUNTER — APPOINTMENT (OUTPATIENT)
Facility: HOSPITAL | Age: 79
End: 2024-06-20
Attending: STUDENT IN AN ORGANIZED HEALTH CARE EDUCATION/TRAINING PROGRAM
Payer: MEDICARE

## 2024-06-20 VITALS
HEIGHT: 72 IN | WEIGHT: 255 LBS | BODY MASS INDEX: 34.54 KG/M2 | HEART RATE: 66 BPM | RESPIRATION RATE: 18 BRPM | OXYGEN SATURATION: 94 % | DIASTOLIC BLOOD PRESSURE: 77 MMHG | SYSTOLIC BLOOD PRESSURE: 126 MMHG | TEMPERATURE: 98.1 F

## 2024-06-20 LAB
ANION GAP SERPL CALC-SCNC: 9 MMOL/L (ref 5–15)
BUN SERPL-MCNC: 54 MG/DL (ref 6–20)
BUN/CREAT SERPL: 7 (ref 12–20)
CALCIUM SERPL-MCNC: 8.4 MG/DL (ref 8.5–10.1)
CHLORIDE SERPL-SCNC: 94 MMOL/L (ref 97–108)
CO2 SERPL-SCNC: 27 MMOL/L (ref 21–32)
CREAT SERPL-MCNC: 7.97 MG/DL (ref 0.7–1.3)
ERYTHROCYTE [DISTWIDTH] IN BLOOD BY AUTOMATED COUNT: 16 % (ref 11.5–14.5)
GLUCOSE BLD STRIP.AUTO-MCNC: 104 MG/DL (ref 65–117)
GLUCOSE BLD STRIP.AUTO-MCNC: 130 MG/DL (ref 65–117)
GLUCOSE BLD STRIP.AUTO-MCNC: 138 MG/DL (ref 65–117)
GLUCOSE SERPL-MCNC: 112 MG/DL (ref 65–100)
HCT VFR BLD AUTO: 23.6 % (ref 36.6–50.3)
HGB BLD-MCNC: 7.7 G/DL (ref 12.1–17)
MCH RBC QN AUTO: 31.7 PG (ref 26–34)
MCHC RBC AUTO-ENTMCNC: 32.6 G/DL (ref 30–36.5)
MCV RBC AUTO: 97.1 FL (ref 80–99)
NRBC # BLD: 0 K/UL (ref 0–0.01)
NRBC BLD-RTO: 0 PER 100 WBC
PLATELET # BLD AUTO: 137 K/UL (ref 150–400)
PMV BLD AUTO: 11.7 FL (ref 8.9–12.9)
POTASSIUM SERPL-SCNC: 4.4 MMOL/L (ref 3.5–5.1)
RBC # BLD AUTO: 2.43 M/UL (ref 4.1–5.7)
SERVICE CMNT-IMP: ABNORMAL
SERVICE CMNT-IMP: ABNORMAL
SERVICE CMNT-IMP: NORMAL
SODIUM SERPL-SCNC: 130 MMOL/L (ref 136–145)
WBC # BLD AUTO: 5.5 K/UL (ref 4.1–11.1)

## 2024-06-20 PROCEDURE — 6370000000 HC RX 637 (ALT 250 FOR IP): Performed by: INTERNAL MEDICINE

## 2024-06-20 PROCEDURE — 2580000003 HC RX 258: Performed by: STUDENT IN AN ORGANIZED HEALTH CARE EDUCATION/TRAINING PROGRAM

## 2024-06-20 PROCEDURE — 6370000000 HC RX 637 (ALT 250 FOR IP): Performed by: STUDENT IN AN ORGANIZED HEALTH CARE EDUCATION/TRAINING PROGRAM

## 2024-06-20 PROCEDURE — 6360000002 HC RX W HCPCS: Performed by: STUDENT IN AN ORGANIZED HEALTH CARE EDUCATION/TRAINING PROGRAM

## 2024-06-20 PROCEDURE — 93306 TTE W/DOPPLER COMPLETE: CPT

## 2024-06-20 PROCEDURE — 97116 GAIT TRAINING THERAPY: CPT

## 2024-06-20 PROCEDURE — 97530 THERAPEUTIC ACTIVITIES: CPT

## 2024-06-20 PROCEDURE — 97535 SELF CARE MNGMENT TRAINING: CPT | Performed by: OCCUPATIONAL THERAPIST

## 2024-06-20 PROCEDURE — 80048 BASIC METABOLIC PNL TOTAL CA: CPT

## 2024-06-20 PROCEDURE — 36415 COLL VENOUS BLD VENIPUNCTURE: CPT

## 2024-06-20 PROCEDURE — 85027 COMPLETE CBC AUTOMATED: CPT

## 2024-06-20 PROCEDURE — 82962 GLUCOSE BLOOD TEST: CPT

## 2024-06-20 RX ORDER — LOSARTAN POTASSIUM 25 MG/1
25 TABLET ORAL DAILY
Qty: 30 TABLET | Refills: 3 | Status: SHIPPED | OUTPATIENT
Start: 2024-06-21

## 2024-06-20 RX ORDER — CARVEDILOL 3.12 MG/1
3.12 TABLET ORAL DAILY
Qty: 60 TABLET | Refills: 3 | Status: SHIPPED | OUTPATIENT
Start: 2024-06-21

## 2024-06-20 RX ORDER — CEFUROXIME AXETIL 250 MG/1
250 TABLET ORAL 2 TIMES DAILY
Qty: 6 TABLET | Refills: 0 | Status: SHIPPED | OUTPATIENT
Start: 2024-06-20 | End: 2024-06-23

## 2024-06-20 RX ADMIN — LOSARTAN POTASSIUM 25 MG: 25 TABLET, FILM COATED ORAL at 09:28

## 2024-06-20 RX ADMIN — SODIUM CHLORIDE: 9 INJECTION, SOLUTION INTRAVENOUS at 05:20

## 2024-06-20 RX ADMIN — ESCITALOPRAM OXALATE 10 MG: 10 TABLET ORAL at 09:29

## 2024-06-20 RX ADMIN — BUMETANIDE 2 MG: 1 TABLET ORAL at 09:28

## 2024-06-20 RX ADMIN — INSULIN LISPRO 4 UNITS: 100 INJECTION, SOLUTION INTRAVENOUS; SUBCUTANEOUS at 09:28

## 2024-06-20 RX ADMIN — HEPARIN SODIUM 5000 UNITS: 5000 INJECTION INTRAVENOUS; SUBCUTANEOUS at 05:30

## 2024-06-20 RX ADMIN — CINACALCET 30 MG: 30 TABLET, FILM COATED ORAL at 09:28

## 2024-06-20 RX ADMIN — INSULIN LISPRO 4 UNITS: 100 INJECTION, SOLUTION INTRAVENOUS; SUBCUTANEOUS at 17:52

## 2024-06-20 RX ADMIN — CARVEDILOL 3.12 MG: 3.12 TABLET, FILM COATED ORAL at 09:27

## 2024-06-20 RX ADMIN — SODIUM CHLORIDE, PRESERVATIVE FREE 10 ML: 5 INJECTION INTRAVENOUS at 09:28

## 2024-06-20 RX ADMIN — SODIUM CHLORIDE 1000 MG: 900 INJECTION INTRAVENOUS at 05:20

## 2024-06-20 RX ADMIN — INSULIN LISPRO 4 UNITS: 100 INJECTION, SOLUTION INTRAVENOUS; SUBCUTANEOUS at 12:38

## 2024-06-20 RX ADMIN — TAMSULOSIN HYDROCHLORIDE 0.4 MG: 0.4 CAPSULE ORAL at 09:27

## 2024-06-20 RX ADMIN — FINASTERIDE 5 MG: 5 TABLET, FILM COATED ORAL at 09:26

## 2024-06-20 RX ADMIN — ATORVASTATIN CALCIUM 40 MG: 40 TABLET, FILM COATED ORAL at 09:27

## 2024-06-20 RX ADMIN — HEPARIN SODIUM 5000 UNITS: 5000 INJECTION INTRAVENOUS; SUBCUTANEOUS at 13:45

## 2024-06-20 ASSESSMENT — PAIN SCALES - GENERAL
PAINLEVEL_OUTOF10: 0

## 2024-06-20 NOTE — CARE COORDINATION
Transition of Care Plan:    RUR: 20%   Prior Level of Functioning: needs some assistance from wife for IADLs and dressing/toilet ing at home  Disposition: Home with wife and home health SN/PT/OT - [facility]  can accept  Patient and wife declined SNF  If SNF or IPR: Date FOC offered:   Date FOC received:   Accepting facility:   Date authorization started with reference number:   Date authorization received and expires:   Follow up appointments: PCP/Specialist  DME needed: ordered transport chair from [facility]  Transportation at discharge: wife  IM/IMM Medicare/ letter given: 6/20/2024  Is patient a  and connected with [facility]?    If yes, was  transfer form completed and VA notified?   Caregiver Contact: Teresita Mace wife [phone]  Discharge Caregiver contacted prior to discharge? At bedside  Care Conference needed?   Barriers to discharge: medical clearance    CM spoke with therapy and still reccomending SNF - spoke with patient and also wife at bedside- both are declining SNF rehab even with explanation of concern for weakness during transport to and from OP HD - is agreeable to  SN/PT/OT - would like [facility]  - referrals send to this agency and a few others in case [facility] unable to  case.  A transport w/c has been ordered from [facility] via [employer] - awaiting response- wife aware that if company is not able to approve or deliver to the home then they may need to follow-up with PCP office.  CM sent perfect serve to attending to inquire if patient is medically ready for discharge today as patient is anxious to go home and wife is at bedside.  Bedside RN made aware. RORO DILLARD      [facility]  can accept patient. Per [facility] liaison - orders sent via [employer].  Will add info to AVS. DARREN SOSA, RORO      [facility]  made aware of discharge to home today.  [facility] informed of discharge to home today and request for delivery for lightweight w/c for  home and to call wife.     CM attempted to call [facility] but they are closed and unable to leave a  -  faxed updated clinicals to main fax # [phone].  Bedside nurse to let wife know that if dialysis center does not receive medicals to call this CM in am at [phone].  Wife to transport to home today. DARREN SOSA, MSW  [phone]     06/20/24 5290   Discharge Planning   Patient expects to be discharged to: House   Services At/After Discharge   Transition of Care Consult (CM Consult) Home Health   Internal Home Health No   Reason Outside Agency Chosen Out of service area   Services At/After Discharge PT;OT;Nursing services   Mode of Transport at Discharge Other (see comment)   Confirm Follow Up Transport Family   Condition of Participation: Discharge Planning   The Plan for Transition of Care is related to the following treatment goals: Home with home health, DME and continued OP HD   The Patient and/or Patient Representative was provided with a Choice of Provider? Patient   The Patient and/Or Patient Representative agree with the Discharge Plan? Yes   Freedom of Choice list was provided with basic dialogue that supports the patient's individualized plan of care/goals, treatment preferences, and shares the quality data associated with the providers?  Yes

## 2024-06-20 NOTE — PLAN OF CARE
Problem: Discharge Planning  Goal: Discharge to home or other facility with appropriate resources  6/20/2024 0822 by Mariaelena Hammond RN  Outcome: Progressing  6/20/2024 0121 by Tala Curry RN  Outcome: Progressing  Flowsheets (Taken 6/19/2024 2045)  Discharge to home or other facility with appropriate resources: Identify barriers to discharge with patient and caregiver     Problem: Safety - Adult  Goal: Free from fall injury  6/20/2024 0822 by Mariaelena Hammond RN  Outcome: Progressing  6/20/2024 0121 by Tala Curry RN  Outcome: Progressing     Problem: Skin/Tissue Integrity  Goal: Absence of new skin breakdown  Description: 1.  Monitor for areas of redness and/or skin breakdown  2.  Assess vascular access sites hourly  3.  Every 4-6 hours minimum:  Change oxygen saturation probe site  4.  Every 4-6 hours:  If on nasal continuous positive airway pressure, respiratory therapy assess nares and determine need for appliance change or resting period.  6/20/2024 0822 by Mariaelena Hammond RN  Outcome: Progressing  6/20/2024 0121 by Tala Curry RN  Outcome: Progressing     Problem: Chronic Conditions and Co-morbidities  Goal: Patient's chronic conditions and co-morbidity symptoms are monitored and maintained or improved  6/20/2024 0822 by Mariaelena Hammond RN  Outcome: Progressing  6/20/2024 0121 by Tala Curry RN  Outcome: Progressing  Flowsheets (Taken 6/19/2024 2045)  Care Plan - Patient's Chronic Conditions and Co-Morbidity Symptoms are Monitored and Maintained or Improved: Monitor and assess patient's chronic conditions and comorbid symptoms for stability, deterioration, or improvement

## 2024-06-20 NOTE — DISCHARGE INSTRUCTIONS
HOSPITALIST DISCHARGE INSTRUCTIONS    It is important that you take the medication exactly as they are prescribed.   Keep your medication in the bottles provided by the pharmacist and keep a list of the medication names, dosages, and times to be taken in your wallet.   Do not take other medications without consulting your doctor.     What to do at Home  Recommended diet:   ADULT DIET; Regular; 5 carb choices (75 gm/meal); Low Potassium (Less than 3000 mg/day); Low Phosphorus (Less than 1000 mg)    Recommended activity:   activity as tolerated    If you have questions regarding the hospital related prescriptions or hospital related issues please call [facility] office at . You can always direct your questions to your primary care doctor if you are unable to reach your hospital physician; your PCP works as an extension of your hospital doctor just like your hospital doctor is an extension of your PCP for your time at the hospital ([facility])    If you experience any of the following symptoms then please call your primary care physician or return to the emergency room if you cannot get hold of your doctor:    Fever, chills, nausea, vomiting, or persistent diarrhea  Worsening weakness or new problems with your speech or balance  Dark stools or visible blood in your stools  New Leg swelling or shortness of breath as these could be signs of a clot    Additional Instructions:    Please follow up with your PCP in one week. Please follow up with your PCP regarding the results of the ECHO.   Bring these papers with you to your follow up appointments. The papers will help your doctors be sure to continue the care plan from the hospital.        Information obtained by :  I understand that if any problems occur once I am at home I am to contact my physician.    I understand and acknowledge receipt of the instructions indicated above.

## 2024-06-20 NOTE — PROGRESS NOTES
Physician Progress Note      PATIENT:               MARY KUMAR  CSN #:                  [id number]  :                       1945  ADMIT DATE:       2024 6:49 PM  DISCH DATE:  RESPONDING  PROVIDER #:        Maria Dolores Escamilla MD          QUERY TEXT:    Good morning  Patient admitted with hyperkalemia.  Noted documentation of acute respiratory failure in H&P on .    In order to support the diagnosis of acute respiratory failure, please include   additional clinical indicators in your documentation.  Or please document if   the diagnosis of acute respiratory failure has been ruled out after further   study.    The medical record reflects the following:  Risk Factors: age, SOB, confusion  Clinical Indicators: Patient presented with worsening SOB, lethargy,   drowsiness, increased confusion, and low grade fevers. On 2L NC at baseline.   Also with concerns for lethargy and excessive drowsiness for the past week and   heavy breathing. RR-32 on initial exam. Patient only required 2L O2  ABG: pH-7.43/ pCO2-39.5/pO2-50/ sat-86   ED note \"No respiratory distress.\"  Treatment: supplemental O2, CXR    Acute Respiratory Failure Clinical Indicators per [employer]-DRG Training Guide and   Quick Reference Guide:  pO2 < 60 mmHg or SpO2 (pulse oximetry) < 91% breathing room air  pCO2 > 50 and pH < 7.35  P/F ratio (pO2 / FIO2) < 300  pO2 decrease or pCO2 increase by 10 mmHg from baseline (if known)  Supplemental oxygen of 40% or more  Presence of respiratory distress, tachypnea, dyspnea, shortness of breath,   wheezing  Unable to speak in complete sentences  Use of accessory muscles to breathe  Extreme anxiety and feeling of impending doom  Tripod position  Confusion/altered mental status/obtunded  Options provided:  -- Acute Respiratory Failure as evidenced by, Please document evidence.  -- Acute Respiratory Failure ruled out after study  -- Acute Respiratory Failure ruled out after study and Chronic  Respiratory   Failure confirmed  -- Other - I will add my own diagnosis  -- Disagree - Not applicable / Not valid  -- Disagree - Clinically unable to determine / Unknown  -- Refer to Clinical Documentation Reviewer    PROVIDER RESPONSE TEXT:    Acute Respiratory Failure has been ruled out after study.    Query created by: Renee Browning on 6/20/2024 8:55 AM      QUERY TEXT:    Patient admitted with hyperkalemia.  Noted documentation of Sepsis as an active hospital problem in H&P on 06/16.    ?Please document in progress notes the clinical indicators to support this   diagnosis on current admission or document if this diagnosis Sepsis (is PMH   only or has been ruled out after study).?Please update active hospital   problems appropriately to reflect response.      The medical record reflects the following:  Risk Factors: age, acute illness, UTI, encephalopathy, ESRD on HD  Clinical Indicators: Patient presented with worsening SOB, lethargy,   drowsiness, increased confusion, and low grade fevers. On 2L NC at baseline.   Also with concerns for lethargy and excessive drowsiness for the past week and   heavy breathing. Initial VS:100.1, 71, 32, 146/74, 96%  06/16: WBC-12.2, UA-moderate LE, 10-20 WBC, 1+ bacteria, 06/16 LA-1.67>>>1.91  06/19 PN \"Met SIRS criteria on arrival\"  Treatment: daily labs, IV Rocephin    Thank you  Renee Browning RN CDI  [phone]  Options provided:  -- *** is currently being treated/evaluated  -- ***is currently being treated/evaluated as evidenced by, Please document   clinical support.  -- ***has been ruled out after study  -- *** is PMH only  -- Other - I will add my own diagnosis  -- Disagree - Not applicable / Not valid  -- Disagree - Clinically unable to determine / Unknown  -- Refer to Clinical Documentation Reviewer    PROVIDER RESPONSE TEXT:    *** is currently being treated/evaluated.    Query created by: Renee Browning on 6/20/2024 9:04

## 2024-06-20 NOTE — PLAN OF CARE
Problem: Discharge Planning  Goal: Discharge to home or other facility with appropriate resources  6/20/2024 1239 by Rama Trevino RN  Outcome: Progressing  Flowsheets (Taken 6/20/2024 1115)  Discharge to home or other facility with appropriate resources:   Identify barriers to discharge with patient and caregiver   Arrange for needed discharge resources and transportation as appropriate   Identify discharge learning needs (meds, wound care, etc)   Refer to discharge planning if patient needs post-hospital services based on physician order or complex needs related to functional status, cognitive ability or social support system  6/20/2024 0822 by Mariaelena Hammond RN  Outcome: Progressing  6/20/2024 0121 by Tala Curry RN  Outcome: Progressing  Flowsheets (Taken 6/19/2024 2045)  Discharge to home or other facility with appropriate resources: Identify barriers to discharge with patient and caregiver     Problem: Safety - Adult  Goal: Free from fall injury  6/20/2024 1239 by Rama Trevino RN  Outcome: Progressing  6/20/2024 0822 by Mariaelena Hammond RN  Outcome: Progressing  6/20/2024 0121 by Tala Curry RN  Outcome: Progressing     Problem: Skin/Tissue Integrity  Goal: Absence of new skin breakdown  Description: 1.  Monitor for areas of redness and/or skin breakdown  2.  Assess vascular access sites hourly  3.  Every 4-6 hours minimum:  Change oxygen saturation probe site  4.  Every 4-6 hours:  If on nasal continuous positive airway pressure, respiratory therapy assess nares and determine need for appliance change or resting period.  6/20/2024 1239 by Rama Trevino RN  Outcome: Progressing  6/20/2024 0822 by Mariaelena Hammond RN  Outcome: Progressing  6/20/2024 0121 by Tala Curry RN  Outcome: Progressing     Problem: Chronic Conditions and Co-morbidities  Goal: Patient's chronic conditions and co-morbidity symptoms are monitored and maintained or improved  6/20/2024 1239 by Rama Trevino

## 2024-06-20 NOTE — PROGRESS NOTES
Hospitalist Progress Note    NAME:   Kneny Mace   : 1945   MRN: [id number]     Date/Time: 2024 10:57 PM  Patient PCP: Ritchie Tejada MD    Estimated discharge date: 2024  Barriers: Needs dialysis, final culture    Assessment / Plan:  Fever, fatigue, confusion-likely encephalopathic due to metabolic versus toxic changes  Patient admitted to medical unit with telemetry  Met SIRS criteria on arrival  UTI with gram-negative rods  Patient has baseline dementia  Lactic acid on arrival 1.67  CT head negative for acute intracranial pathology  Follow-up urine cultures-not showing gram-negative rods  Chest x-ray with pulmonary vascular congestion no pneumonia  Continue patient on ceftriaxone for now  Mentation has improved a lot in the morning.  Blood cultures ordered today will follow-was not collected during the admission    Severe hyperkalemia  ESRD on HD   Mild hyponatremia likely hypovolemic in the background of ESRD on dialysis  Chronic anemia  Potassium has improved   Nephrology contacted, Ioana contacted - plan for emergent HD   Has LUE AVG in place   Continue Cinacalcet     DM  Continue insulin glargine 22 units and Humalog 40 units 3 times a day  Sliding scale for add-on  Diabetic diet  Hypoglycemia treatment orders in place     BPH  Flomax ,Finasteride to be continued     HTN  Hyperlipidemia  Coreg, Bumex, losartan  Continue atorvastatin    Generalised weakness and sob  H/o chf in past   Will order echo and bnp       Medical Decision Making:   I personally reviewed labs: CBC BMP  I personally reviewed imaging: None for today  I personally reviewed EKG: NONE   Toxic drug monitoring: Renal function while on Bumex.  Insulin and blood sugar  Discussed case with: Patient    Code Status: Full  DVT Prophylaxis: Heparin  GI Prophylaxis:none     Subjective:     Chief Complaint / Reason for Physician Visit  Patient currently being treated for fever and metabolic encephalopathy.  He also had

## 2024-06-20 NOTE — PROGRESS NOTES
Pt AOX4. Pt ambulated in the room with a walker independently with steady gait, in the presence of Dr. Colon and pt's wife. Pe pt's wife, pt is at baseline ambulation. Pt denies SOB and distress. Order to discharge pt home. Discharge instructions given, pt and wife verbalized understanding. IV removed, catheter intact. No redness or swelling noted. All belongings sent home with pt. Pt accompanied home by wife.

## 2024-06-20 NOTE — PROGRESS NOTES
Attempted to schedule hospital follow up PCP appointment. Office  will contact the patient with appointment information per office protocol. [facility] placed [facility] information AVS for patient resource. Pending patient discharge. Maryam Veras, Care Management Assistant

## 2024-06-20 NOTE — PLAN OF CARE
Problem: Discharge Planning  Goal: Discharge to home or other facility with appropriate resources  Outcome: Progressing  Flowsheets (Taken 6/19/2024 2045)  Discharge to home or other facility with appropriate resources: Identify barriers to discharge with patient and caregiver     Problem: Safety - Adult  Goal: Free from fall injury  Outcome: Progressing     Problem: Skin/Tissue Integrity  Goal: Absence of new skin breakdown  Description: 1.  Monitor for areas of redness and/or skin breakdown  2.  Assess vascular access sites hourly  3.  Every 4-6 hours minimum:  Change oxygen saturation probe site  4.  Every 4-6 hours:  If on nasal continuous positive airway pressure, respiratory therapy assess nares and determine need for appliance change or resting period.  Outcome: Progressing     Problem: Chronic Conditions and Co-morbidities  Goal: Patient's chronic conditions and co-morbidity symptoms are monitored and maintained or improved  Outcome: Progressing  Flowsheets (Taken 6/19/2024 2045)  Care Plan - Patient's Chronic Conditions and Co-Morbidity Symptoms are Monitored and Maintained or Improved: Monitor and assess patient's chronic conditions and comorbid symptoms for stability, deterioration, or improvement     Problem: Physical Therapy - Adult  Goal: By Discharge: Performs mobility at highest level of function for planned discharge setting.  See evaluation for individualized goals.  Description: FUNCTIONAL STATUS PRIOR TO ADMISSION: Patient was supervision to modified independent using a rolling walker and rollator for functional mobility. and The patient  required stand-by assistance for basic and instrumental ADLs.    HOME SUPPORT PRIOR TO ADMISSION: The patient lived with spouse and required stand-by assistance for IADL's. 1 story home with 1 step and no rails to enter.    Physical Therapy Goals  Initiated 6/19/2024  1.  Patient will move from supine to sit and sit to supine, scoot up and down, and roll side

## 2024-06-20 NOTE — PLAN OF CARE
Problem: Occupational Therapy - Adult  Goal: By Discharge: Performs self-care activities at highest level of function for planned discharge setting.  See evaluation for individualized goals.  Description: FUNCTIONAL STATUS PRIOR TO ADMISSION:  Patient was mod I to supervision for ADLs and functional mobility using rolling walker vs rollator.   Receives Help From: Family,  ,  ,  ,  ,  ,  ,  ,  ,  , Active : No     HOME SUPPORT: Patient lived with wife.    Occupational Therapy Goals:  Initiated 6/18/2024  1.  Patient will perform grooming in standing with Supervision within 7 day(s).  2.  Patient will perform upper body dressing with Supervision within 7 day(s).  3.  Patient will perform lower body dressing with Minimal Assist within 7 day(s).  4.  Patient will perform toilet transfers with Contact Guard Assist  within 7 day(s).  5.  Patient will perform all aspects of toileting with Minimal Assist within 7 day(s).  6.  Patient will participate in upper extremity therapeutic exercise/activities with Supervision for 5 minutes within 7 day(s).    Outcome: Progressing   OCCUPATIONAL THERAPY TREATMENT  Patient: Kenny Mace (78 y.o. male)  Date: 6/20/2024  Primary Diagnosis: Hyperkalemia [E87.5]  Hyperkalemia, diminished renal excretion [E87.5]  Somnolence [R40.0]       Precautions: Fall Risk, Up as Tolerated, Bed Alarm                Chart, occupational therapy assessment, plan of care, and goals were reviewed.    ASSESSMENT  Patient continues to benefit from skilled OT services and is progressing towards goals. Pt reported feeling better as compared to yesterday.  He was orthostatic with supine to sit but was asymptomatic and BP improved with activity.  No report of dizziness.  Flexed upper trunk at times with standing tasks without report of back pain.  Min assist overall for sit to stand and posterior loss of balance at times.  Min assist for LB dressing.  Recommend short term SNF for rehab as a bridge to  Chair: Minimum assistance;Contact-guard assistance;Additional time (with RW)  Toilet Transfer: Minimum assistance;Additional time (standard toilet with heavy reliance on right grab bar and pushing off toilet seat with left hand)           Balance:     Balance  Sitting: Intact  Standing: Impaired  Standing - Static: Constant support;Fair  Standing - Dynamic: Constant support;Fair      ADL Intervention:    LE Dressing: Minimal assistance  LE Dressing Skilled Clinical Factors: obtain depends from ankles in standing and for dynamic standing balance, flexed upper trunk and posterior lean, able to don socks seated crossed leg with supervision    Toileting: Minimal assistance (managing gown in standing)      Pain Ratin/10     Activity Tolerance:   Good and Fair   Please refer to the flowsheet for vital signs taken during this treatment.    After treatment:   Patient left in no apparent distress sitting up in chair, Call bell within reach, and Bed/ chair alarm activated    COMMUNICATION/EDUCATION:   The patient's plan of care was discussed with: physical therapist, registered nurse, and patient    Patient Education  Education Given To: Patient  Education Provided: ADL Adaptive Strategies;Mobility Training;Fall Prevention Strategies  Education Method: Verbal  Barriers to Learning: None  Education Outcome: Verbalized understanding    Thank you for this referral.  Cici Stapleton OTR/L  Minutes: 23

## 2024-06-20 NOTE — DISCHARGE SUMMARY
Discharge Summary    Name: Kenny Mace  [id number]  YOB: 1945 (Age: 78 y.o.)   Date of Admission: 6/16/2024  Date of Discharge: 6/20/2024  Attending Physician: Maria Dolores Colon MD      Discharge Diagnosis:   Fever, fatigue, confusion-likely encephalopathic due to metabolic versus toxic changes  Met SIRS criteria on arrival with UTI --> sepsis on admission  Patient has baseline dementia  Severe hyperkalemia  ESRD on HD   Mild hyponatremia likely hypovolemic in the background of ESRD on dialysis  Chronic anemia  DM  BPH  HTN  Hyperlipidemia  Generalised weakness and sob  H/o chf in past     Consultations:  IP CONSULT TO NEPHROLOGY  IP CONSULT HOME HEALTH      Brief Admission History/Reason for Admission Per Ray Velázquez MD:   Kenny Mace is a 78 y.o.  male with PMHx significant for listed PMH below who presents with the above chief complaint.   We were asked to admit for work up and evaluation of the above problems.      Patient brought to the emergency department by his wife and daughter due to lethargy, drowsiness, frequent sleeping.  They also felt like he was breathing heavier than usual.  They do note that he has a history of COPD however does not appear he was on any medications for this and they do not feel like he has been wheezy.  States that he is on dialysis Monday Wednesday Friday send last session was Friday and states that he received a full session without any complications.      Brief Hospital Course by Main Problems:   Fever, fatigue, confusion-likely encephalopathic due to metabolic versus toxic changes  Met SIRS criteria on arrival with UTI --> sepsis on admission  Patient has baseline dementia  T max 100.9. U Cx grew E Coli. B Cx neg (not done early on admission)  Lactic acid on arrival 1.67  CT head negative for acute intracranial pathology  Chest x-ray with pulmonary vascular congestion no pneumonia  Started on on ceftriaxone --> switch to

## 2024-06-20 NOTE — PROGRESS NOTES
Nephrology Progress Note  [facility] / [place] [facility]  [address], [address]  [place] [address]  Phone - [phone]  Fax - [phone]                 Patient: Kenny Mace                     YOB: 1945        Date- 6/20/2024                                     Admit Date: 6/16/2024   CC: Follow up for esrd        IMPRESSION & PLAN:   ESRD- [facility] unit--mwf  Hyperkalemia  Anemia of CKD  Secondary HPT      PLAN-  NO HD TODAY  Continue hd mwf schedule  Hold  losartan  Continue sensipar  Epogen for anemia     Subjective:  Interval History:   - bp improved  No sob    Objective:   Vitals:    06/20/24 0319 06/20/24 0800 06/20/24 0901 06/20/24 0927   BP: 116/66 (!) 140/83 (!) 140/83 123/78   Pulse: 65 69  74   Resp: 18 16     Temp: 97.5 °F (36.4 °C) 97.9 °F (36.6 °C)     TempSrc: Axillary Oral     SpO2: 91% 96%     Weight:   115.7 kg (255 lb)    Height:   1.829 m (6' 0.01\")       I/O last 3 completed shifts:  In: 750 [P.O.:240; I.V.:10]  Out: 1745 [Urine:300]  No intake/output data recorded.      Physical exam:    GEN:  NAD  NECK:  Supple, no thyromegaly  RESP:  no  wheezing  NEURO: non focal, normal speech          Chart reviewed.         Pertinent Notes reviewed.     Data Review :  Lab Results   Component Value Date/Time     06/20/2024 12:22 AM    K 4.4 06/20/2024 12:22 AM    CL 94 06/20/2024 12:22 AM    CO2 27 06/20/2024 12:22 AM    BUN 54 06/20/2024 12:22 AM    CREATININE 7.97 06/20/2024 12:22 AM    GLUCOSE 112 06/20/2024 12:22 AM    CALCIUM 8.4 06/20/2024 12:22 AM       Lab Results   Component Value Date    WBC 5.5 06/20/2024    HGB 7.7 (L) 06/20/2024    HCT 23.6 (L) 06/20/2024    MCV 97.1 06/20/2024     (L) 06/20/2024      Recent Labs     06/18/24  0203 06/19/24  0925 06/20/24  0022   * 128* 130*   K 4.9 5.1 4.4   CL 95* 93* 94*   CO2 27 23 27   GLUCOSE 148* 148* 112*   BUN 63* 87* 54*   CREATININE 9.62*

## 2024-06-20 NOTE — PLAN OF CARE
Problem: Physical Therapy - Adult  Goal: By Discharge: Performs mobility at highest level of function for planned discharge setting.  See evaluation for individualized goals.  Description: FUNCTIONAL STATUS PRIOR TO ADMISSION: Patient was supervision to modified independent using a rolling walker and rollator for functional mobility. and The patient  required stand-by assistance for basic and instrumental ADLs.    HOME SUPPORT PRIOR TO ADMISSION: The patient lived with spouse and required stand-by assistance for IADL's. 1 story home with 1 step and no rails to enter.    Physical Therapy Goals  Initiated 6/19/2024  1.  Patient will move from supine to sit and sit to supine, scoot up and down, and roll side to side in bed with modified independence within 7 day(s).    2.  Patient will perform sit to stand with modified independence within 7 day(s).  3.  Patient will transfer from bed to chair and chair to bed with standby assistance using the least restrictive device within 7 day(s).  4.  Patient will ambulate with standby assistance for 75 feet with the least restrictive device within 7 day(s).   5.  Patient will perform 5 sit to stands in < 30 seconds with standby assistance within 7 day(s).    Outcome: Progressing   PHYSICAL THERAPY TREATMENT    Patient: Kenny Mace (78 y.o. male)  Date: 6/20/2024  Diagnosis: Hyperkalemia [E87.5]  Hyperkalemia, diminished renal excretion [E87.5]  Somnolence [R40.0] Hyperkalemia      Precautions: Fall Risk, Up as Tolerated, Bed Alarm                      ASSESSMENT:  Patient continues to benefit from skilled PT services and is progressing towards goals. Improved performance compared to yesterday's session, but still needs reminders for safety precautions and assistance to prevent losses of  balance during sit to stand to sit and standing activities. Gait tolerance improved to 60 feet with min a using RW. Gait speed and step lengths decreased with intermittent path deviations with

## 2024-06-21 LAB
ECHO AO ROOT DIAM: 3.6 CM
ECHO AO ROOT INDEX: 1.53 CM/M2
ECHO AV AREA PEAK VELOCITY: 2.5 CM2
ECHO AV AREA/BSA PEAK VELOCITY: 1.1 CM2/M2
ECHO AV PEAK GRADIENT: 15 MMHG
ECHO AV PEAK VELOCITY: 1.9 M/S
ECHO AV VELOCITY RATIO: 0.68
ECHO BSA: 2.42 M2
ECHO EST RA PRESSURE: 15 MMHG
ECHO LA DIAMETER INDEX: 1.99 CM/M2
ECHO LA DIAMETER: 4.7 CM
ECHO LA TO AORTIC ROOT RATIO: 1.31
ECHO LA VOL A-L A2C: 93 ML (ref 18–58)
ECHO LA VOL A-L A4C: 148 ML (ref 18–58)
ECHO LA VOL MOD A2C: 88 ML (ref 18–58)
ECHO LA VOL MOD A4C: 137 ML (ref 18–58)
ECHO LA VOLUME AREA LENGTH: 121 ML
ECHO LA VOLUME INDEX A-L A2C: 39 ML/M2 (ref 16–34)
ECHO LA VOLUME INDEX A-L A4C: 63 ML/M2 (ref 16–34)
ECHO LA VOLUME INDEX AREA LENGTH: 51 ML/M2 (ref 16–34)
ECHO LA VOLUME INDEX MOD A2C: 37 ML/M2 (ref 16–34)
ECHO LA VOLUME INDEX MOD A4C: 58 ML/M2 (ref 16–34)
ECHO LV E' LATERAL VELOCITY: 4 CM/S
ECHO LV E' SEPTAL VELOCITY: 3 CM/S
ECHO LV FRACTIONAL SHORTENING: 14 % (ref 28–44)
ECHO LV INTERNAL DIMENSION DIASTOLE INDEX: 2.67 CM/M2
ECHO LV INTERNAL DIMENSION DIASTOLIC: 6.3 CM (ref 4.2–5.9)
ECHO LV INTERNAL DIMENSION SYSTOLIC INDEX: 2.29 CM/M2
ECHO LV INTERNAL DIMENSION SYSTOLIC: 5.4 CM
ECHO LV IVSD: 1.4 CM (ref 0.6–1)
ECHO LV MASS 2D: 340.4 G (ref 88–224)
ECHO LV MASS INDEX 2D: 144.3 G/M2 (ref 49–115)
ECHO LV POSTERIOR WALL DIASTOLIC: 1 CM (ref 0.6–1)
ECHO LV RELATIVE WALL THICKNESS RATIO: 0.32
ECHO LVOT AREA: 3.5 CM2
ECHO LVOT DIAM: 2.1 CM
ECHO LVOT PEAK GRADIENT: 7 MMHG
ECHO LVOT PEAK VELOCITY: 1.3 M/S
ECHO MV A VELOCITY: 0.92 M/S
ECHO MV E DECELERATION TIME (DT): 148.6 MS
ECHO MV E VELOCITY: 1.17 M/S
ECHO MV E/A RATIO: 1.27
ECHO MV E/E' LATERAL: 29.25
ECHO MV E/E' RATIO (AVERAGED): 34.13
ECHO MV E/E' SEPTAL: 39
ECHO RA AREA 4C: 24 CM2
ECHO RA VOLUME: 84 ML
ECHO RA VOLUME: 86 ML
ECHO RIGHT VENTRICULAR SYSTOLIC PRESSURE (RVSP): 35 MMHG
ECHO RV TAPSE: 2.6 CM (ref 1.7–?)
ECHO TV REGURGITANT MAX VELOCITY: 2.25 M/S
ECHO TV REGURGITANT PEAK GRADIENT: 20 MMHG

## 2024-11-18 ENCOUNTER — HOSPITAL ENCOUNTER (INPATIENT)
Facility: HOSPITAL | Age: 79
LOS: 4 days | Discharge: HOME | End: 2024-11-22
Admitting: INTERNAL MEDICINE
Payer: MEDICARE

## 2024-11-18 ENCOUNTER — APPOINTMENT (OUTPATIENT)
Facility: HOSPITAL | Age: 79
End: 2024-11-18
Payer: MEDICARE

## 2024-11-18 DIAGNOSIS — E87.5 HYPERKALEMIA: ICD-10-CM

## 2024-11-18 DIAGNOSIS — R53.1 GENERAL WEAKNESS: Primary | ICD-10-CM

## 2024-11-18 LAB
ALBUMIN SERPL-MCNC: 3.6 G/DL (ref 3.5–5)
ALBUMIN SERPL-MCNC: 3.9 G/DL (ref 3.5–5)
ALBUMIN/GLOB SERPL: 0.7 (ref 1.1–2.2)
ALBUMIN/GLOB SERPL: 0.7 (ref 1.1–2.2)
ALP SERPL-CCNC: 131 U/L (ref 45–117)
ALP SERPL-CCNC: 142 U/L (ref 45–117)
ALT SERPL-CCNC: 13 U/L (ref 12–78)
ALT SERPL-CCNC: 16 U/L (ref 12–78)
ANION GAP SERPL CALC-SCNC: 10 MMOL/L (ref 2–12)
ANION GAP SERPL CALC-SCNC: 10 MMOL/L (ref 2–12)
AST SERPL-CCNC: 8 U/L (ref 15–37)
AST SERPL-CCNC: 8 U/L (ref 15–37)
BASOPHILS # BLD: 0.1 K/UL (ref 0–0.1)
BASOPHILS NFR BLD: 1 % (ref 0–1)
BILIRUB SERPL-MCNC: 0.5 MG/DL (ref 0.2–1)
BILIRUB SERPL-MCNC: 0.6 MG/DL (ref 0.2–1)
BUN SERPL-MCNC: 90 MG/DL (ref 6–20)
BUN SERPL-MCNC: 90 MG/DL (ref 6–20)
BUN/CREAT SERPL: 8 (ref 12–20)
BUN/CREAT SERPL: 8 (ref 12–20)
CALCIUM SERPL-MCNC: 8.6 MG/DL (ref 8.5–10.1)
CALCIUM SERPL-MCNC: 8.9 MG/DL (ref 8.5–10.1)
CHLORIDE SERPL-SCNC: 93 MMOL/L (ref 97–108)
CHLORIDE SERPL-SCNC: 95 MMOL/L (ref 97–108)
CO2 SERPL-SCNC: 24 MMOL/L (ref 21–32)
CO2 SERPL-SCNC: 27 MMOL/L (ref 21–32)
CREAT SERPL-MCNC: 11.9 MG/DL (ref 0.7–1.3)
CREAT SERPL-MCNC: 12 MG/DL (ref 0.7–1.3)
DIFFERENTIAL METHOD BLD: ABNORMAL
EOSINOPHIL # BLD: 0.1 K/UL (ref 0–0.4)
EOSINOPHIL NFR BLD: 1 % (ref 0–7)
ERYTHROCYTE [DISTWIDTH] IN BLOOD BY AUTOMATED COUNT: 19 % (ref 11.5–14.5)
GLOBULIN SER CALC-MCNC: 5 G/DL (ref 2–4)
GLOBULIN SER CALC-MCNC: 5.5 G/DL (ref 2–4)
GLUCOSE BLD STRIP.AUTO-MCNC: 200 MG/DL (ref 65–117)
GLUCOSE BLD STRIP.AUTO-MCNC: 211 MG/DL (ref 65–117)
GLUCOSE BLD STRIP.AUTO-MCNC: 266 MG/DL (ref 65–117)
GLUCOSE SERPL-MCNC: 177 MG/DL (ref 65–100)
GLUCOSE SERPL-MCNC: 219 MG/DL (ref 65–100)
HBV SURFACE AB SER QL: REACTIVE
HBV SURFACE AB SER-ACNC: 10.52 MIU/ML
HBV SURFACE AG SER QL: <0.1 INDEX
HBV SURFACE AG SER QL: NEGATIVE
HCT VFR BLD AUTO: 35.7 % (ref 36.6–50.3)
HGB BLD-MCNC: 11.6 G/DL (ref 12.1–17)
IMM GRANULOCYTES # BLD AUTO: 0.1 K/UL (ref 0–0.04)
IMM GRANULOCYTES NFR BLD AUTO: 1 % (ref 0–0.5)
INR PPP: 1.1 (ref 0.9–1.1)
LYMPHOCYTES # BLD: 0.9 K/UL (ref 0.8–3.5)
LYMPHOCYTES NFR BLD: 8 % (ref 12–49)
MCH RBC QN AUTO: 31 PG (ref 26–34)
MCHC RBC AUTO-ENTMCNC: 32.5 G/DL (ref 30–36.5)
MCV RBC AUTO: 95.5 FL (ref 80–99)
MONOCYTES # BLD: 0.7 K/UL (ref 0–1)
MONOCYTES NFR BLD: 6 % (ref 5–13)
NEUTS SEG # BLD: 9.5 K/UL (ref 1.8–8)
NEUTS SEG NFR BLD: 83 % (ref 32–75)
NRBC # BLD: 0 K/UL (ref 0–0.01)
NRBC BLD-RTO: 0 PER 100 WBC
PLATELET # BLD AUTO: 210 K/UL (ref 150–400)
PMV BLD AUTO: 10.9 FL (ref 8.9–12.9)
POTASSIUM SERPL-SCNC: 8.1 MMOL/L (ref 3.5–5.1)
PROT SERPL-MCNC: 8.6 G/DL (ref 6.4–8.2)
PROT SERPL-MCNC: 9.4 G/DL (ref 6.4–8.2)
PROTHROMBIN TIME: 11.4 SEC (ref 9–11.1)
RBC # BLD AUTO: 3.74 M/UL (ref 4.1–5.7)
SERVICE CMNT-IMP: ABNORMAL
SODIUM SERPL-SCNC: 129 MMOL/L (ref 136–145)
SODIUM SERPL-SCNC: 130 MMOL/L (ref 136–145)
TROPONIN I SERPL HS-MCNC: 240 NG/L (ref 0–76)
TROPONIN I SERPL HS-MCNC: 252 NG/L (ref 0–76)
WBC # BLD AUTO: 11.4 K/UL (ref 4.1–11.1)

## 2024-11-18 PROCEDURE — 6360000004 HC RX CONTRAST MEDICATION

## 2024-11-18 PROCEDURE — 80053 COMPREHEN METABOLIC PANEL: CPT

## 2024-11-18 PROCEDURE — 85025 COMPLETE CBC W/AUTO DIFF WBC: CPT

## 2024-11-18 PROCEDURE — 86706 HEP B SURFACE ANTIBODY: CPT

## 2024-11-18 PROCEDURE — 2060000000 HC ICU INTERMEDIATE R&B

## 2024-11-18 PROCEDURE — 6360000002 HC RX W HCPCS: Performed by: INTERNAL MEDICINE

## 2024-11-18 PROCEDURE — 6370000000 HC RX 637 (ALT 250 FOR IP)

## 2024-11-18 PROCEDURE — 36415 COLL VENOUS BLD VENIPUNCTURE: CPT

## 2024-11-18 PROCEDURE — 70498 CT ANGIOGRAPHY NECK: CPT

## 2024-11-18 PROCEDURE — 85610 PROTHROMBIN TIME: CPT

## 2024-11-18 PROCEDURE — 96374 THER/PROPH/DIAG INJ IV PUSH: CPT

## 2024-11-18 PROCEDURE — 2700000000 HC OXYGEN THERAPY PER DAY

## 2024-11-18 PROCEDURE — 2580000003 HC RX 258: Performed by: INTERNAL MEDICINE

## 2024-11-18 PROCEDURE — 90935 HEMODIALYSIS ONE EVALUATION: CPT

## 2024-11-18 PROCEDURE — 6360000002 HC RX W HCPCS

## 2024-11-18 PROCEDURE — 5A1D70Z PERFORMANCE OF URINARY FILTRATION, INTERMITTENT, LESS THAN 6 HOURS PER DAY: ICD-10-PCS | Performed by: INTERNAL MEDICINE

## 2024-11-18 PROCEDURE — 93005 ELECTROCARDIOGRAM TRACING: CPT

## 2024-11-18 PROCEDURE — 0042T CT BRAIN PERFUSION: CPT

## 2024-11-18 PROCEDURE — 6370000000 HC RX 637 (ALT 250 FOR IP): Performed by: INTERNAL MEDICINE

## 2024-11-18 PROCEDURE — 70450 CT HEAD/BRAIN W/O DYE: CPT

## 2024-11-18 PROCEDURE — 84132 ASSAY OF SERUM POTASSIUM: CPT

## 2024-11-18 PROCEDURE — 84484 ASSAY OF TROPONIN QUANT: CPT

## 2024-11-18 PROCEDURE — 99285 EMERGENCY DEPT VISIT HI MDM: CPT

## 2024-11-18 PROCEDURE — 87340 HEPATITIS B SURFACE AG IA: CPT

## 2024-11-18 PROCEDURE — 4A03X5D MEASUREMENT OF ARTERIAL FLOW, INTRACRANIAL, EXTERNAL APPROACH: ICD-10-PCS | Performed by: INTERNAL MEDICINE

## 2024-11-18 PROCEDURE — 94640 AIRWAY INHALATION TREATMENT: CPT

## 2024-11-18 PROCEDURE — 82962 GLUCOSE BLOOD TEST: CPT

## 2024-11-18 PROCEDURE — 96375 TX/PRO/DX INJ NEW DRUG ADDON: CPT

## 2024-11-18 RX ORDER — ONDANSETRON 4 MG/1
4 TABLET, ORALLY DISINTEGRATING ORAL
COMMUNITY

## 2024-11-18 RX ORDER — BUMETANIDE 1 MG/1
2 TABLET ORAL 2 TIMES DAILY
Status: DISCONTINUED | OUTPATIENT
Start: 2024-11-18 | End: 2024-11-22 | Stop reason: HOSPADM

## 2024-11-18 RX ORDER — SODIUM CHLORIDE 0.9 % (FLUSH) 0.9 %
5-40 SYRINGE (ML) INJECTION PRN
Status: DISCONTINUED | OUTPATIENT
Start: 2024-11-18 | End: 2024-11-22 | Stop reason: HOSPADM

## 2024-11-18 RX ORDER — FINASTERIDE 5 MG/1
5 TABLET, FILM COATED ORAL DAILY
Status: DISCONTINUED | OUTPATIENT
Start: 2024-11-19 | End: 2024-11-22 | Stop reason: HOSPADM

## 2024-11-18 RX ORDER — POLYETHYLENE GLYCOL 3350 17 G/17G
17 POWDER, FOR SOLUTION ORAL DAILY PRN
Status: DISCONTINUED | OUTPATIENT
Start: 2024-11-18 | End: 2024-11-22 | Stop reason: HOSPADM

## 2024-11-18 RX ORDER — ESCITALOPRAM OXALATE 10 MG/1
10 TABLET ORAL DAILY
Status: DISCONTINUED | OUTPATIENT
Start: 2024-11-18 | End: 2024-11-22 | Stop reason: HOSPADM

## 2024-11-18 RX ORDER — IPRATROPIUM BROMIDE AND ALBUTEROL SULFATE 2.5; .5 MG/3ML; MG/3ML
1 SOLUTION RESPIRATORY (INHALATION) EVERY 4 HOURS PRN
Status: DISCONTINUED | OUTPATIENT
Start: 2024-11-18 | End: 2024-11-22 | Stop reason: HOSPADM

## 2024-11-18 RX ORDER — ACETAMINOPHEN 325 MG/1
650 TABLET ORAL EVERY 4 HOURS PRN
Status: DISCONTINUED | OUTPATIENT
Start: 2024-11-18 | End: 2024-11-19

## 2024-11-18 RX ORDER — CALCIUM GLUCONATE 20 MG/ML
1000 INJECTION, SOLUTION INTRAVENOUS ONCE
Status: COMPLETED | OUTPATIENT
Start: 2024-11-18 | End: 2024-11-18

## 2024-11-18 RX ORDER — ONDANSETRON 2 MG/ML
4 INJECTION INTRAMUSCULAR; INTRAVENOUS EVERY 4 HOURS PRN
Status: DISCONTINUED | OUTPATIENT
Start: 2024-11-18 | End: 2024-11-18

## 2024-11-18 RX ORDER — ACETAMINOPHEN 325 MG/1
650 TABLET ORAL EVERY 6 HOURS PRN
Status: DISCONTINUED | OUTPATIENT
Start: 2024-11-18 | End: 2024-11-22 | Stop reason: HOSPADM

## 2024-11-18 RX ORDER — INSULIN LISPRO 100 [IU]/ML
0-4 INJECTION, SOLUTION INTRAVENOUS; SUBCUTANEOUS
Status: DISCONTINUED | OUTPATIENT
Start: 2024-11-18 | End: 2024-11-22 | Stop reason: HOSPADM

## 2024-11-18 RX ORDER — ATORVASTATIN CALCIUM 40 MG/1
40 TABLET, FILM COATED ORAL DAILY
Status: DISCONTINUED | OUTPATIENT
Start: 2024-11-18 | End: 2024-11-22 | Stop reason: HOSPADM

## 2024-11-18 RX ORDER — CALCITRIOL 0.25 UG/1
0.75 CAPSULE, LIQUID FILLED ORAL
COMMUNITY

## 2024-11-18 RX ORDER — GLUCAGON 1 MG/ML
1 KIT INJECTION PRN
Status: DISCONTINUED | OUTPATIENT
Start: 2024-11-18 | End: 2024-11-22 | Stop reason: HOSPADM

## 2024-11-18 RX ORDER — SODIUM CHLORIDE 9 MG/ML
INJECTION, SOLUTION INTRAVENOUS PRN
Status: DISCONTINUED | OUTPATIENT
Start: 2024-11-18 | End: 2024-11-22 | Stop reason: HOSPADM

## 2024-11-18 RX ORDER — ALBUTEROL SULFATE 0.83 MG/ML
2.5 SOLUTION RESPIRATORY (INHALATION) EVERY 6 HOURS PRN
Status: DISCONTINUED | OUTPATIENT
Start: 2024-11-18 | End: 2024-11-22 | Stop reason: HOSPADM

## 2024-11-18 RX ORDER — CARVEDILOL 3.12 MG/1
3.12 TABLET ORAL DAILY
Status: DISCONTINUED | OUTPATIENT
Start: 2024-11-18 | End: 2024-11-22 | Stop reason: HOSPADM

## 2024-11-18 RX ORDER — ONDANSETRON 4 MG/1
4 TABLET, ORALLY DISINTEGRATING ORAL EVERY 8 HOURS PRN
Status: DISCONTINUED | OUTPATIENT
Start: 2024-11-18 | End: 2024-11-22 | Stop reason: HOSPADM

## 2024-11-18 RX ORDER — GABAPENTIN 300 MG/1
300 CAPSULE ORAL NIGHTLY
Status: DISCONTINUED | OUTPATIENT
Start: 2024-11-18 | End: 2024-11-22 | Stop reason: HOSPADM

## 2024-11-18 RX ORDER — IOPAMIDOL 755 MG/ML
100 INJECTION, SOLUTION INTRAVASCULAR
Status: COMPLETED | OUTPATIENT
Start: 2024-11-18 | End: 2024-11-18

## 2024-11-18 RX ORDER — ACETAMINOPHEN 650 MG/1
650 SUPPOSITORY RECTAL EVERY 6 HOURS PRN
Status: DISCONTINUED | OUTPATIENT
Start: 2024-11-18 | End: 2024-11-22 | Stop reason: HOSPADM

## 2024-11-18 RX ORDER — SODIUM CHLORIDE 0.9 % (FLUSH) 0.9 %
5-40 SYRINGE (ML) INJECTION EVERY 12 HOURS SCHEDULED
Status: DISCONTINUED | OUTPATIENT
Start: 2024-11-18 | End: 2024-11-22 | Stop reason: HOSPADM

## 2024-11-18 RX ORDER — ALBUTEROL SULFATE 0.83 MG/ML
15 SOLUTION RESPIRATORY (INHALATION) ONCE
Status: COMPLETED | OUTPATIENT
Start: 2024-11-18 | End: 2024-11-18

## 2024-11-18 RX ORDER — GUAIFENESIN 600 MG/1
600 TABLET, EXTENDED RELEASE ORAL 2 TIMES DAILY
Status: DISCONTINUED | OUTPATIENT
Start: 2024-11-18 | End: 2024-11-22 | Stop reason: HOSPADM

## 2024-11-18 RX ORDER — BENZONATATE 100 MG/1
100 CAPSULE ORAL 3 TIMES DAILY PRN
Status: DISCONTINUED | OUTPATIENT
Start: 2024-11-18 | End: 2024-11-22 | Stop reason: HOSPADM

## 2024-11-18 RX ORDER — ONDANSETRON 2 MG/ML
4 INJECTION INTRAMUSCULAR; INTRAVENOUS EVERY 6 HOURS PRN
Status: DISCONTINUED | OUTPATIENT
Start: 2024-11-18 | End: 2024-11-22 | Stop reason: HOSPADM

## 2024-11-18 RX ORDER — CALCITRIOL 0.25 UG/1
0.75 CAPSULE, LIQUID FILLED ORAL
Status: DISCONTINUED | OUTPATIENT
Start: 2024-11-20 | End: 2024-11-22 | Stop reason: HOSPADM

## 2024-11-18 RX ORDER — MAGNESIUM HYDROXIDE/ALUMINUM HYDROXICE/SIMETHICONE 120; 1200; 1200 MG/30ML; MG/30ML; MG/30ML
30 SUSPENSION ORAL EVERY 6 HOURS PRN
Status: DISCONTINUED | OUTPATIENT
Start: 2024-11-18 | End: 2024-11-22 | Stop reason: HOSPADM

## 2024-11-18 RX ORDER — HEPARIN SODIUM 5000 [USP'U]/ML
5000 INJECTION, SOLUTION INTRAVENOUS; SUBCUTANEOUS EVERY 8 HOURS SCHEDULED
Status: DISCONTINUED | OUTPATIENT
Start: 2024-11-18 | End: 2024-11-22 | Stop reason: HOSPADM

## 2024-11-18 RX ORDER — TAMSULOSIN HYDROCHLORIDE 0.4 MG/1
0.8 CAPSULE ORAL DAILY
Status: DISCONTINUED | OUTPATIENT
Start: 2024-11-18 | End: 2024-11-22 | Stop reason: HOSPADM

## 2024-11-18 RX ORDER — DEXTROSE MONOHYDRATE 100 MG/ML
INJECTION, SOLUTION INTRAVENOUS CONTINUOUS PRN
Status: DISCONTINUED | OUTPATIENT
Start: 2024-11-18 | End: 2024-11-22 | Stop reason: HOSPADM

## 2024-11-18 RX ADMIN — BUMETANIDE 2 MG: 1 TABLET ORAL at 21:30

## 2024-11-18 RX ADMIN — INSULIN LISPRO 1 UNITS: 100 INJECTION, SOLUTION INTRAVENOUS; SUBCUTANEOUS at 21:31

## 2024-11-18 RX ADMIN — GUAIFENESIN 600 MG: 600 TABLET, EXTENDED RELEASE ORAL at 21:30

## 2024-11-18 RX ADMIN — GABAPENTIN 300 MG: 300 CAPSULE ORAL at 21:30

## 2024-11-18 RX ADMIN — Medication 3 MG: at 21:30

## 2024-11-18 RX ADMIN — SODIUM ZIRCONIUM CYCLOSILICATE 10 G: 10 POWDER, FOR SUSPENSION ORAL at 14:48

## 2024-11-18 RX ADMIN — ALBUTEROL SULFATE 15 MG/HR: 2.5 SOLUTION RESPIRATORY (INHALATION) at 13:22

## 2024-11-18 RX ADMIN — CALCIUM GLUCONATE 1000 MG: 20 INJECTION, SOLUTION INTRAVENOUS at 13:17

## 2024-11-18 RX ADMIN — CALCIUM GLUCONATE 1000 MG: 20 INJECTION, SOLUTION INTRAVENOUS at 13:34

## 2024-11-18 RX ADMIN — IOPAMIDOL 100 ML: 755 INJECTION, SOLUTION INTRAVENOUS at 11:28

## 2024-11-18 RX ADMIN — SODIUM CHLORIDE, PRESERVATIVE FREE 10 ML: 5 INJECTION INTRAVENOUS at 21:34

## 2024-11-18 RX ADMIN — INSULIN HUMAN 5 UNITS: 100 INJECTION, SOLUTION PARENTERAL at 13:23

## 2024-11-18 RX ADMIN — HEPARIN SODIUM 5000 UNITS: 5000 INJECTION INTRAVENOUS; SUBCUTANEOUS at 21:30

## 2024-11-18 ASSESSMENT — ENCOUNTER SYMPTOMS
SHORTNESS OF BREATH: 0
NAUSEA: 0
VOMITING: 0
ABDOMINAL PAIN: 0

## 2024-11-18 ASSESSMENT — PAIN - FUNCTIONAL ASSESSMENT: PAIN_FUNCTIONAL_ASSESSMENT: NONE - DENIES PAIN

## 2024-11-18 NOTE — CONSULTS
[facility]  [address] [place] [address]  [phone]  [email or web address]       CARDIOLOGY CONSULTATION       Date of  Admission: 11/18/2024 10:44 AM     Admission type:Emergency   Primary Care Physician:Ritchie Tejada MD     Attending Provider: Socrates Fields MD  Cardiology Provider: [facility] 2023  CC/REASON FOR CONSULT: Abnormal electrocardiogram and elevated troponin     Subjective:   79-year-old patient is being managed in the hospital for abdominal bloating    The patient was seen and examined at the bedside.  He was sent from dialysis because of symptoms of abdominal bloating, nausea and vomiting for which a dialysis session was cut down.  The patient reports constipation/inability to have a good bowel movement, takes MiraLAX and Mylanta at home.  Reports some shortness of breath otherwise, denies any chest or upper abdominal symptoms.      Patient Active Problem List    Diagnosis Date Noted    Renal failure (ARF), acute on chronic (HCC) 10/25/2013    Hyperkalemia 06/17/2024    Degenerative lumbar spinal stenosis 11/07/2023    Asterixis 11/07/2023    Unable to ambulate 11/16/2022    Cervical post-laminectomy syndrome 06/09/2022    Degenerative cervical spinal stenosis 06/09/2022    Sensory ataxic gait 12/21/2021    Acute alteration in mental status 12/21/2021    Diabetic peripheral neuropathy associated with type 2 diabetes mellitus (HCC) 12/21/2021    Convulsions ([facility]) 12/21/2021    Type 2 diabetes mellitus with stage 4 chronic kidney disease, with long-term current use of insulin ([facility]) 12/21/2021    Vitamin D deficiency 12/21/2021    Myoclonus 12/21/2021    Cervical spondylosis with myelopathy 12/21/2021    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    Image quality is good.    Signed by: Myrtle Crandall MD on 6/21/2024  8:09 AM    Mild pulmonary edema reported on CT scan       Assessment:   79-year-old patient is being managed for pulmonary edema and marked hyperkalemia    Recommend optimization of fluid status and hyperkalemia with emergency hemodialysis.  Troponin elevation likely represents type II myocardial infarction.  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  He would need a nonurgent coronary CT angiogram for further evaluation.  This can be done prior to discharge or as an outpatient with his primary cardiologist at [facility].    Cardiomyopathy: Likely nonischemic, continue low-dose carvedilol. Losartan can be restarted once potassium is improved.     Patent reed ovale  End-stage renal disease on hemodialysis  Hypertension  Diabetes  Obesity      Thank you for allowing us to participate in the care of this patient.  We will follow.      Myrtle Crandall MD  CC:Ritchie Tejada MD

## 2024-11-18 NOTE — PROGRESS NOTES
Please complete screening and sign electronically or fax to [phone].     Telemetry order must be changed to allow the patient to leave the unit without telemetry.     Telemetry box cannot come to MRI with the patient.     Please call MRI at [phone] when this has been done.     If this patient needs monitored while off the unit, please call MRI at [phone] to coordinate a time for an RN to accompany the patient to MRI.

## 2024-11-18 NOTE — ED NOTES
Patient BIBEMS with a chief complaint of weakness starting at 0740 this morning. Per patient, he is normally able to stand and walk but after arriving at the dialysis center, he was having bilateral upper and lower extremity weakness with difficulty walking. Per EMS, patient received his dialysis but the symptoms never resolved so he was brought in. Per EMS, patient is a diabetic, prior hx of strokes, and receives MWF dialysis in his fistula on L arm.

## 2024-11-18 NOTE — H&P
Hospitalist Admission Note    NAME:   Kenny Mace   : 1945   MRN: [id number]     Date/Time: 2024 3:04 PM    Patient PCP: Ritchie Tejada MD    ______________________________________________________________________  Given the patient's current clinical presentation, I have a high level of concern for decompensation if discharged from the emergency department.  Complex decision making was performed, which includes reviewing the patient's available past medical records, laboratory results, and x-ray films.       My assessment of this patient's clinical condition and my plan of care is as follows.    Assessment / Plan:  Volume overload  ESRD on HD  Hyperkalemia  Monitor patient in the stepdown unit.  The patient is getting urgent hemodialysis and appreciate input from nephrology.  And patient is status post 2 g of IV calcium gluconate, albuterol nebs, 5 units of IV regular insulin, 10 g of oral Lokelma as well.  Will follow-up with repeat CMP.  Will also get a chest x-ray in the morning for interval change.  Will hold lisinopril for now.  Continue with oral Bumex.    Acute hypoxic respiratory failure  Secondary to fluid overload.  Will try to wean off oxygen as tolerated.  Nebs treatment as needed.    Elevated troponin  CAD  CHF with reduced EF of 30%  The patient is known to have a chronically elevated troponin.  Will repeat troponin in the morning.  Cardiology has also been consulted.  Continue with Coreg, Lipitor and Bumex.    Generalized weakness  Initially code stroke was called for the patient.  CT head, CT angiogram brain and neck negative for any acute process.  Continue to monitor in telemetry with neurochecks every 4 hours.  PT OT eval.    Diabetes mellitus  start on fingerstick glucose and sliding scale insulin.  Will check for A1c.    Neuropathy  Continue with gabapentin.    BPH  Continue with finasteride and Flomax.      Medical Decision Making:   I personally reviewed labs: CBC, BMP,  CONTRAST    Result Date: 11/18/2024  EXAM:  CTA CODE NEURO HEAD AND NECK W CONT, CT BRAIN PERFUSION INDICATION:   Stroke COMPARISON:  None. CONTRAST: 100 mL of Isovue-370 TECHNIQUE: Unenhanced  images were obtained to localize the volume for acquisition. Multislice helical axial CT angiography was performed from the aortic arch to the top of the head during uneventful rapid bolus intravenous contrast administration. Coronal and sagittal reformations and 3D post processing was performed. CT dose reduction was achieved through use of a standardized protocol tailored for this examination and automatic exposure control for dose modulation. CT brain perfusion was performed with generation of hemodynamic maps of multiple parameters, including cerebral blood flow, cerebral blood volume, and MTT (mean transit time). CT dose reduction was achieved through use of a standardized protocol tailored for this examination and automatic exposure control for dose modulation. This study was analyzed by the [employer]. algorithm. FINDINGS: DELAYED CONTRAST-ENHANCED HEAD CT: The ventricles and sulci are proportional. There is no midline shift or mass effect. Subcortical and periventricular white matter hypodensities likely represent chronic microangiopathic changes. No large territory ischemia, intraparenchymal hemorrhage, or extra-axial collections. Gray-white differentiation is maintained. The basal cisterns are clear. The orbits, paranasal sinuses, and mastoid air cells are unremarkable. CTA NECK: Great vessels: Patent arch and great vessel origins without stenosis. Right subclavian artery: Patent Left subclavian artery: Patent Right common carotid artery: Patent Left common carotid artery: Patent Cervical right internal carotid artery: Patent with no significant stenosis by NASCET criteria. Cervical left internal carotid artery: Patent with no significant stenosis by NASCET criteria. Right vertebral artery: Patent Left vertebral

## 2024-11-18 NOTE — PROGRESS NOTES
Pharmacy Medication History Review    Medication history obtained by Brigitte Feliciano while patient was in room ER13/13 and was completed based on information available during current patient encounter. Medication history was completed before home meds were reconciled by provider.    Pharmacist Admission Medication Reconciliation Recommendations:   none         PTA medication list was corrected to the following:   Prior to Admission Medications   Prescriptions Last Dose Informant   atorvastatin (LIPITOR) 40 MG tablet 11/17/2024 Spouse/Significant Other   Sig: Take 1 tablet by mouth daily   bumetanide (BUMEX) 2 MG tablet 11/17/2024 Spouse/Significant Other   Sig: Take 1 tablet by mouth 2 times daily   calcitRIOL (ROCALTROL) 0.25 MCG capsule 11/18/2024 Other   Sig: Take 3 capsules by mouth Every Monday, Wednesday, and Friday   carvedilol (COREG) 3.125 MG tablet 11/17/2024 Spouse/Significant Other   Sig: Take 1 tablet by mouth daily   escitalopram (LEXAPRO) 10 MG tablet 11/17/2024 Spouse/Significant Other   Sig: Take 1 tablet by mouth   finasteride (PROSCAR) 5 MG tablet 11/17/2024 Self, Spouse/Significant Other   Sig: Take 1 tablet by mouth daily   gabapentin (NEURONTIN) 300 MG capsule 11/17/2024 Spouse/Significant Other   Sig: Take 1 capsule by mouth at bedtime.   insulin 70-30 (HUMULIN;NOVOLIN) (70-30) 100 UNIT per ML injection vial 11/17/2024 Spouse/Significant Other, Self   Sig: Inject into the skin Use sliding scale   losartan (COZAAR) 25 MG tablet 11/17/2024 Spouse/Significant Other   Sig: Take 1 tablet by mouth daily   ondansetron (ZOFRAN-ODT) 4 MG disintegrating tablet 11/18/2024 Other   Sig: Take 1 tablet by mouth Every Monday, Wednesday, and Friday   polyethylene glycol (MIRALAX) 17 GM/SCOOP powder Unknown Self, Spouse/Significant Other   Sig: Take 17 g by mouth as needed   tamsulosin (FLOMAX) 0.4 MG capsule 11/17/2024 Spouse/Significant Other   Sig: Take 2 capsules by mouth daily      Facility-Administered

## 2024-11-18 NOTE — PROGRESS NOTES
Nephrology Progress Note  [facility] / [place] Office  [address]  [place] [address]  Phone - [phone]  Fax - [phone]                 Patient: Kenny Mace                     YOB: 1945        Date- 11/18/2024                                     Admit Date: 11/18/2024   CC: Follow up for ESRD, critical hyperkalemia          IMPRESSION & PLAN:   ESRD- [facility] unit--mwf  Critical hyperkalemia  Volume overload  Hyponatremia  Anemia of CKD  Secondary HPT  Elevated troponins      PLAN-  Plan for urgent hemodialysis now  [facility] has been notified  Will use a 2K/2.5 calcium bath with 3.5 L ultrafiltration  Spoke to family at bedside     Subjective:  Interval History:   -Seen and examined today urgently in the ER.  Patient follows up with [facility] goes on dialysis Monday Wednesday Friday schedule, has been feeling sick for last couple of days , went to dialysis but could not undergo treatment because of feeling sick so was sent to the ED for further evaluation.  Initial workup shows potassium of 8.  Renal consult requested for evaluation for urgent hemodialysis    Objective:   Vitals:    11/18/24 1139 11/18/24 1145 11/18/24 1245 11/18/24 1323   BP:  (!) 149/90 138/78    Pulse:  79 70    Resp:  22 21    Temp:       TempSrc:       SpO2: 95% 96% 96% 99%   Weight:       Height:          No intake/output data recorded.  No intake/output data recorded.      Physical exam:    GEN: Mild spotty distress  NECK- no mass  RESP: Bilateral rales  CVS: Irregular  NEURO: Normal speech, Non focal  EXT: No Edema   PSYCH: Normal Mood    Chart reviewed.         Pertinent Notes reviewed.     Data Review :  Lab Results   Component Value Date/Time     11/18/2024 11:19 AM    K 8.1 11/18/2024 11:19 AM    CL 93 11/18/2024 11:19 AM    CO2 27 11/18/2024 11:19 AM    BUN 90 11/18/2024 11:19 AM    CREATININE 11.90 11/18/2024  mouth daily    SENSIPAR 30 MG tablet Take 1 tablet by mouth (Patient not taking: Reported on 6/17/2024)    gabapentin (NEURONTIN) 300 MG capsule Take 1 capsule by mouth at bedtime.    insulin 70-30 (HUMULIN;NOVOLIN) (70-30) 100 UNIT per ML injection vial ReliOn Novolin 70/30: Inject 20 Units with Breakfast, 20 Units with Dinner    polyethylene glycol (MIRALAX) 17 GM/SCOOP powder USE 1 SCOOP AND MIX WITH WATER AND DRINK BY MOUTH ONCE DAILY    tamsulosin (FLOMAX) 0.4 MG capsule Take 2 capsules by mouth daily        Hardeep Bhatia MD  11/18/2024

## 2024-11-18 NOTE — ED PROVIDER NOTES
EMERGENCY DEPARTMENT HISTORY AND PHYSICAL EXAM      Date: 11/18/2024  Patient Name: Kenny Mace    History of Presenting Illness     No chief complaint on file.      History Provided By: Patient    HPI: Kenny Mace, 79 y.o. male with a past medical history significant for medical problems as stated below presents to the ED with cc of weakness.  Patient presents as a stroke alert. Patient stated he started feeling weak around 740 this morning.  States that he was having upper and lower extremity weakness with difficulty walking when he tried to go to dialysis.  Was able to walk into dialysis however then needed a wheelchair due to weakness.  Was able to receive his dialysis however, the weakness persisted.  Patient states that he is having some low back pain however, denies any falls, fevers or chills.  Patient denies any numbness.    There are no associated symptoms.  No other exacerbating or ameliorating factors.    PCP: Ritchie Tejada MD    No current facility-administered medications on file prior to encounter.     Current Outpatient Medications on File Prior to Encounter   Medication Sig Dispense Refill    carvedilol (COREG) 3.125 MG tablet Take 1 tablet by mouth daily 60 tablet 3    losartan (COZAAR) 25 MG tablet Take 1 tablet by mouth daily 30 tablet 3    escitalopram (LEXAPRO) 10 MG tablet Take 1 tablet by mouth      SENSIPAR 30 MG tablet Take 1 tablet by mouth (Patient not taking: Reported on 6/17/2024)      atorvastatin (LIPITOR) 40 MG tablet Take 1 tablet by mouth daily      bumetanide (BUMEX) 2 MG tablet Take 1 tablet by mouth 2 times daily      finasteride (PROSCAR) 5 MG tablet Take 1 tablet by mouth daily      fluticasone (FLONASE) 50 MCG/ACT nasal spray 2 sprays by Nasal route every 8 hours as needed for Allergies      gabapentin (NEURONTIN) 300 MG capsule Take 1 capsule by mouth at bedtime.      insulin 70-30 (HUMULIN;NOVOLIN) (70-30) 100 UNIT per ML injection vial ReliOn Novolin 70/30: Inject  Systems   Constitutional:  Negative for chills and fever.   Respiratory:  Negative for shortness of breath.    Cardiovascular:  Negative for chest pain.   Gastrointestinal:  Negative for abdominal pain, nausea and vomiting.   Neurological:  Positive for weakness. Negative for dizziness, light-headedness, numbness and headaches.       Physical Exam   Physical Exam  Vitals and nursing note reviewed.   Constitutional:       Appearance: Normal appearance. He is normal weight.   HENT:      Head: Normocephalic and atraumatic.      Mouth/Throat:      Mouth: Mucous membranes are moist.      Pharynx: Oropharynx is clear.   Eyes:      Extraocular Movements: Extraocular movements intact.      Conjunctiva/sclera: Conjunctivae normal.      Pupils: Pupils are equal, round, and reactive to light.   Cardiovascular:      Rate and Rhythm: Normal rate and regular rhythm.   Abdominal:      General: Abdomen is flat.      Palpations: Abdomen is soft.      Tenderness: There is no abdominal tenderness.   Neurological:      Mental Status: He is alert and oriented to person, place, and time.      Sensory: No sensory deficit.      Motor: Weakness (L sided facial droop, LUE) present.   Psychiatric:         Mood and Affect: Mood normal.         Behavior: Behavior normal.         Diagnostic Study Results     Labs -     Recent Results (from the past 24 hour(s))   POCT Glucose    Collection Time: 11/18/24 10:44 AM   Result Value Ref Range    POC Glucose 266 (H) 65 - 117 mg/dL    Performed by: Fito Isidro RN        Radiologic Studies -   CT HEAD WO CONTRAST   Final Result      No acute intracranial abnormality.      The ED was notified.         Electronically signed by Edil Slaughter      CTA HEAD NECK W CONTRAST    (Results Pending)   CT BRAIN PERFUSION    (Results Pending)   MRI BRAIN WO CONTRAST    (Results Pending)   MRI THORACIC SPINE W WO CONTRAST    (Results Pending)   MRI LUMBAR SPINE W WO CONTRAST    (Results Pending)   MRI CERVICAL

## 2024-11-18 NOTE — FLOWSHEET NOTE
Primary RN SBAR: HAI Godinez  Incapacitated Nurse [facility]. provided: Yes  Patient Education provided: Procedural  Preferred Education method and Primary language: English/ Verbal  Hospital General Consent Verified: Yes  Hospital associated wait time; reason: 90 mins, patient moved upstairs to his own room from ER  Hepatitis B Surface Ag   Date/Time Value Ref Range Status   06/16/2024 11:17 PM <0.10 Index Final     Hep B S Ag Interp   Date/Time Value Ref Range Status   06/16/2024 11:17 PM Negative NEG   Final     HEP B SURF AB   Date/Time Value Ref Range Status   11/16/2022 01:00 AM 4.25 mIU/mL Final     Hep B S Ab   Date/Time Value Ref Range Status   06/16/2024 11:17 PM 9.07 mIU/mL Final     Hep B S Ab Interp   Date/Time Value Ref Range Status   06/16/2024 11:17 PM NONREACTIVE NR   Final     Comment:     (NOTE)  The ADVIA Centaur Anti-HBs2 assay is traceable to the World Health   Organization (WHO) Hepatitis B Immunoglobulin 1st International   Reference Preparation (1977). Samples with a calculated value of 10   mIU/mL or greater are considered reactive (protective) in accordance   with the CDC guidelines. The accepted criteria for immunity to HBV is   anti-HBs activity greater than or equal to 10 mIU/mL, as defined by   the WHO International Reference Preparation.  Assay performance has not been established in pregnant women,   patients who are immunosuppressed or immunocompromised, nor have   performance characteristics been established in conjunction with   other 's assays for specific HBV serologic markers. This   assay does not differentiate between vaccine induced immune response   and a response due to infection with HBV. Passively acquired anti-HBs   may be identified following patient transfusion, receipt of   immunoglobulin products, etc.       Each access site disinfected for 60 seconds per site with alcohol swabs per P&P. Cannulated with 15G needles x2 and secured with paper tape.   Patient's  (ml/min) 400 ml/min   Arterial Pressure (mmHg) -190 mmHg   Venous Pressure (mmHg) 190   TMP 90      Comments Treatment initiated   Access Visible Yes   Ultrafiltration Rate (ml/hr) 1000 ml/hr   Ultrafiltration Removed (ml) 0 ml        11/18/24 1930   Treatment   Time On 1600   Time Off 1930   Treatment Goal UF Goal reached   Observations & Evaluations   Level of Consciousness 0   Oriented X 4   Heart Rhythm Irregular  (multiform PVC's)   Respiratory Quality/Effort Unlabored   O2 Device Nasal cannula   Skin Color Other (comment)  (appropriate for ethnicity)   RLE Edema Trace   LLE Edema Trace   Vital Signs   BP (!) 132/90   Temp 97.6 °F (36.4 °C)   Pulse 90   Respirations 24   Pain Assessment   Pain Assessment None - Denies Pain   During Hemodialysis Assessment   Blood Flow Rate (ml/min) 400 ml/min   Arterial Pressure (mmHg) -250 mmHg   Venous Pressure (mmHg) 220         Comments Treatment ended and terminated   Access Visible Yes   Ultrafiltration Rate (ml/hr) 1000 ml/hr   Ultrafiltration Removed (ml) 3500 ml   Hemodialysis Fistula/Graft Superior Arm   No placement date or time found.   Present on Admission/Arrival: Yes  Orientation: Superior  Access Location: Arm   Site Assessment Clean, dry & intact   Thrill Present   Bruit Present   Status Deaccessed   Post-Hemodialysis Assessment   Post-Treatment Procedures Blood returned;Access bleeding time < 10 minutes   Machine Disinfection Process Acid/Vinegar Clean;Bleach;Exterior Machine Disinfection   Rinseback Volume (ml) 300 ml   Blood Volume Processed (Liters) 62.9 L   Dialyzer Clearance Clear   Duration of Treatment (minutes) 210 minutes   Hemodialysis Intake (ml) 500 ml   Hemodialysis Output (ml) 3500 ml   NET Removed (ml) 3000   Tolerated Treatment Good   Patient Response to Treatment Treatment tolerated well   Patient Disposition Other (Comment)  (remain in bed)     Primary RN SBAR: Cynthia Mcnulty RN  Comment:  HD completed and tolerated well.

## 2024-11-19 ENCOUNTER — APPOINTMENT (OUTPATIENT)
Facility: HOSPITAL | Age: 79
End: 2024-11-19
Payer: MEDICARE

## 2024-11-19 LAB
ALBUMIN SERPL-MCNC: 3.6 G/DL (ref 3.5–5)
ALBUMIN/GLOB SERPL: 0.8 (ref 1.1–2.2)
ALP SERPL-CCNC: 130 U/L (ref 45–117)
ALT SERPL-CCNC: 16 U/L (ref 12–78)
ANION GAP SERPL CALC-SCNC: 9 MMOL/L (ref 2–12)
AST SERPL-CCNC: 10 U/L (ref 15–37)
BILIRUB SERPL-MCNC: 0.7 MG/DL (ref 0.2–1)
BUN SERPL-MCNC: 59 MG/DL (ref 6–20)
BUN/CREAT SERPL: 6 (ref 12–20)
CALCIUM SERPL-MCNC: 8.4 MG/DL (ref 8.5–10.1)
CHLORIDE SERPL-SCNC: 94 MMOL/L (ref 97–108)
CO2 SERPL-SCNC: 28 MMOL/L (ref 21–32)
CREAT SERPL-MCNC: 9.33 MG/DL (ref 0.7–1.3)
EKG ATRIAL RATE: 67 BPM
EKG ATRIAL RATE: 71 BPM
EKG DIAGNOSIS: NORMAL
EKG DIAGNOSIS: NORMAL
EKG P AXIS: 65 DEGREES
EKG P AXIS: 80 DEGREES
EKG P-R INTERVAL: 220 MS
EKG P-R INTERVAL: 224 MS
EKG Q-T INTERVAL: 522 MS
EKG Q-T INTERVAL: 538 MS
EKG QRS DURATION: 212 MS
EKG QRS DURATION: 232 MS
EKG QTC CALCULATION (BAZETT): 567 MS
EKG QTC CALCULATION (BAZETT): 568 MS
EKG R AXIS: 135 DEGREES
EKG R AXIS: 139 DEGREES
EKG T AXIS: -57 DEGREES
EKG T AXIS: -57 DEGREES
EKG VENTRICULAR RATE: 67 BPM
EKG VENTRICULAR RATE: 71 BPM
ERYTHROCYTE [DISTWIDTH] IN BLOOD BY AUTOMATED COUNT: 19.3 % (ref 11.5–14.5)
EST. AVERAGE GLUCOSE BLD GHB EST-MCNC: 131 MG/DL
GLOBULIN SER CALC-MCNC: 4.8 G/DL (ref 2–4)
GLUCOSE BLD STRIP.AUTO-MCNC: 158 MG/DL (ref 65–117)
GLUCOSE BLD STRIP.AUTO-MCNC: 159 MG/DL (ref 65–117)
GLUCOSE BLD STRIP.AUTO-MCNC: 164 MG/DL (ref 65–117)
GLUCOSE BLD STRIP.AUTO-MCNC: 168 MG/DL (ref 65–117)
GLUCOSE SERPL-MCNC: 154 MG/DL (ref 65–100)
HBA1C MFR BLD: 6.2 % (ref 4–5.6)
HCT VFR BLD AUTO: 33.6 % (ref 36.6–50.3)
HGB BLD-MCNC: 10.5 G/DL (ref 12.1–17)
MAGNESIUM SERPL-MCNC: 2.5 MG/DL (ref 1.6–2.4)
MCH RBC QN AUTO: 31.3 PG (ref 26–34)
MCHC RBC AUTO-ENTMCNC: 31.3 G/DL (ref 30–36.5)
MCV RBC AUTO: 100 FL (ref 80–99)
NRBC # BLD: 0 K/UL (ref 0–0.01)
NRBC BLD-RTO: 0 PER 100 WBC
PHOSPHATE SERPL-MCNC: 5.9 MG/DL (ref 2.6–4.7)
PLATELET # BLD AUTO: 175 K/UL (ref 150–400)
PMV BLD AUTO: 11.1 FL (ref 8.9–12.9)
POTASSIUM SERPL-SCNC: 5 MMOL/L (ref 3.5–5.1)
PROT SERPL-MCNC: 8.4 G/DL (ref 6.4–8.2)
RBC # BLD AUTO: 3.36 M/UL (ref 4.1–5.7)
SERVICE CMNT-IMP: ABNORMAL
SODIUM SERPL-SCNC: 131 MMOL/L (ref 136–145)
WBC # BLD AUTO: 7.4 K/UL (ref 4.1–11.1)

## 2024-11-19 PROCEDURE — 97535 SELF CARE MNGMENT TRAINING: CPT

## 2024-11-19 PROCEDURE — 97116 GAIT TRAINING THERAPY: CPT

## 2024-11-19 PROCEDURE — 97530 THERAPEUTIC ACTIVITIES: CPT

## 2024-11-19 PROCEDURE — 85027 COMPLETE CBC AUTOMATED: CPT

## 2024-11-19 PROCEDURE — 6360000002 HC RX W HCPCS: Performed by: INTERNAL MEDICINE

## 2024-11-19 PROCEDURE — 2580000003 HC RX 258: Performed by: INTERNAL MEDICINE

## 2024-11-19 PROCEDURE — 82962 GLUCOSE BLOOD TEST: CPT

## 2024-11-19 PROCEDURE — 97162 PT EVAL MOD COMPLEX 30 MIN: CPT

## 2024-11-19 PROCEDURE — 97165 OT EVAL LOW COMPLEX 30 MIN: CPT

## 2024-11-19 PROCEDURE — 80053 COMPREHEN METABOLIC PANEL: CPT

## 2024-11-19 PROCEDURE — 71045 X-RAY EXAM CHEST 1 VIEW: CPT

## 2024-11-19 PROCEDURE — 83036 HEMOGLOBIN GLYCOSYLATED A1C: CPT

## 2024-11-19 PROCEDURE — 2060000000 HC ICU INTERMEDIATE R&B

## 2024-11-19 PROCEDURE — 2700000000 HC OXYGEN THERAPY PER DAY

## 2024-11-19 PROCEDURE — 84100 ASSAY OF PHOSPHORUS: CPT

## 2024-11-19 PROCEDURE — 83735 ASSAY OF MAGNESIUM: CPT

## 2024-11-19 PROCEDURE — 6370000000 HC RX 637 (ALT 250 FOR IP): Performed by: INTERNAL MEDICINE

## 2024-11-19 PROCEDURE — 36415 COLL VENOUS BLD VENIPUNCTURE: CPT

## 2024-11-19 PROCEDURE — 76937 US GUIDE VASCULAR ACCESS: CPT

## 2024-11-19 PROCEDURE — 90935 HEMODIALYSIS ONE EVALUATION: CPT

## 2024-11-19 RX ORDER — ATENOLOL 25 MG/1
25 TABLET ORAL ONCE
Status: COMPLETED | OUTPATIENT
Start: 2024-11-19 | End: 2024-11-20

## 2024-11-19 RX ADMIN — FINASTERIDE 5 MG: 5 TABLET, FILM COATED ORAL at 07:59

## 2024-11-19 RX ADMIN — HEPARIN SODIUM 5000 UNITS: 5000 INJECTION INTRAVENOUS; SUBCUTANEOUS at 21:15

## 2024-11-19 RX ADMIN — ACETAMINOPHEN 325MG 650 MG: 325 TABLET ORAL at 08:00

## 2024-11-19 RX ADMIN — TAMSULOSIN HYDROCHLORIDE 0.8 MG: 0.4 CAPSULE ORAL at 07:58

## 2024-11-19 RX ADMIN — SODIUM CHLORIDE, PRESERVATIVE FREE 10 ML: 5 INJECTION INTRAVENOUS at 08:00

## 2024-11-19 RX ADMIN — GABAPENTIN 300 MG: 300 CAPSULE ORAL at 21:15

## 2024-11-19 RX ADMIN — BUMETANIDE 2 MG: 1 TABLET ORAL at 21:15

## 2024-11-19 RX ADMIN — SODIUM CHLORIDE, PRESERVATIVE FREE 10 ML: 5 INJECTION INTRAVENOUS at 21:18

## 2024-11-19 RX ADMIN — CARVEDILOL 3.12 MG: 3.12 TABLET, FILM COATED ORAL at 07:59

## 2024-11-19 RX ADMIN — BUMETANIDE 2 MG: 1 TABLET ORAL at 07:58

## 2024-11-19 RX ADMIN — GUAIFENESIN 600 MG: 600 TABLET, EXTENDED RELEASE ORAL at 21:15

## 2024-11-19 RX ADMIN — ESCITALOPRAM OXALATE 10 MG: 10 TABLET ORAL at 07:58

## 2024-11-19 RX ADMIN — ATORVASTATIN CALCIUM 40 MG: 40 TABLET, FILM COATED ORAL at 07:59

## 2024-11-19 RX ADMIN — GUAIFENESIN 600 MG: 600 TABLET, EXTENDED RELEASE ORAL at 07:58

## 2024-11-19 RX ADMIN — HEPARIN SODIUM 5000 UNITS: 5000 INJECTION INTRAVENOUS; SUBCUTANEOUS at 06:27

## 2024-11-19 RX ADMIN — Medication 3 MG: at 21:15

## 2024-11-19 ASSESSMENT — PAIN SCALES - GENERAL: PAINLEVEL_OUTOF10: 0

## 2024-11-19 ASSESSMENT — PAIN DESCRIPTION - LOCATION: LOCATION: GENERALIZED

## 2024-11-19 NOTE — PROGRESS NOTES
Hospitalist Progress Note    NAME:   Kenny Mace   : 1945   MRN: [id number]     Date/Time: 2024 8:39 AM  Patient PCP: Ritchie Tejada MD    Estimated discharge date:   Barriers: Clinical improvement, PT OT eval      Assessment / Plan:  Volume overload  ESRD on HD  Hyperkalemia  Monitor patient in the stepdown unit.  The patient is getting urgent hemodialysis and appreciate input from nephrology.  And patient is status post 2 g of IV calcium gluconate, albuterol nebs, 5 units of IV regular insulin, 10 g of oral Lokelma as well.  Will follow-up with repeat CMP.  Will also get a chest x-ray in the morning for interval change.  Will hold lisinopril for now.  Continue with oral Bumex.  : Potassium came down nicely to 5.0.  I spoke with nephrology Dr. Bhatia, patient will get embryologist today and also tomorrow.  Patient is stable from nephrology standpoint to be discharged home.     Acute hypoxic respiratory failure  Secondary to fluid overload.  Will try to wean off oxygen as tolerated.  Nebs treatment as needed.  : Patient still on 2 L of oxygen.     Elevated troponin  CAD  CHF with reduced EF of 30%  The patient is known to have a chronically elevated troponin.  Will repeat troponin in the morning.  Cardiology has also been consulted.  Continue with Coreg, Lipitor and Bumex.  : Troponin trended down to 240 which is kind of his baseline.  I spoke with cardiology Dr. 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  Hopefully we can discharge patient home after dialysis tomorrow.     Generalized weakness  Initially code stroke was called for the patient.  CT head, CT angiogram brain and neck negative for any acute process.  Continue to monitor in telemetry with neurochecks every 4 hours.  PT OT lorraine.     Diabetes  11.6* 10.5*   HCT 35.7* 33.6*    175     Recent Labs     11/18/24  1119 11/18/24  1501 11/19/24  0450   * 129* 131*   K 8.1* 8.1*  8.1* 5.0   CL 93* 95* 94*   CO2 27 24 28   GLUCOSE 219* 177* 154*   BUN 90* 90* 59*   CREATININE 11.90* 12.00* 9.33*   CALCIUM 8.6 8.9 8.4*   MG  --   --  2.5*   PHOS  --   --  5.9*   BILITOT 0.6 0.5 0.7   AST 8* 8* 10*   ALT 16 13 16   INR 1.1  --   --        Signed: Lenore Jeffery MD

## 2024-11-19 NOTE — PLAN OF CARE
(Taken 11/19/2024 0320)  Achieves stable or improved neurological status: Assess for and report changes in neurological status  11/18/2024 1708 by Gretchen Lindsey RN  Outcome: Progressing     Problem: Respiratory - Adult  Goal: Achieves optimal ventilation and oxygenation  11/19/2024 0320 by Cynthia Mcnulty RN  Outcome: Progressing  Flowsheets (Taken 11/19/2024 0320)  Achieves optimal ventilation and oxygenation:   Assess for changes in respiratory status   Assess for changes in mentation and behavior  11/18/2024 1708 by Gretchen Lindsey RN  Outcome: Progressing     Problem: Cardiovascular - Adult  Goal: Maintains optimal cardiac output and hemodynamic stability  11/19/2024 0320 by Cynthia Mcnulty RN  Outcome: Progressing  Flowsheets (Taken 11/19/2024 0320)  Maintains optimal cardiac output and hemodynamic stability: Monitor blood pressure and heart rate  11/18/2024 1708 by Gretchen Lindsey RN  Outcome: Progressing  Goal: Absence of cardiac dysrhythmias or at baseline  11/19/2024 0320 by Cynthia Mcnulty RN  Outcome: Progressing  Flowsheets (Taken 11/19/2024 0320)  Absence of cardiac dysrhythmias or at baseline: Monitor cardiac rate and rhythm  11/18/2024 1708 by Gretchen Lindsey RN  Outcome: Progressing     Problem: Skin/Tissue Integrity - Adult  Goal: Skin integrity remains intact  11/19/2024 0320 by Cynthia Mcnulty RN  Outcome: Progressing  Flowsheets (Taken 11/19/2024 0320)  Skin Integrity Remains Intact:   Monitor for areas of redness and/or skin breakdown   Assess vascular access sites hourly  11/18/2024 1708 by Gretchen Lindsey RN  Outcome: Progressing

## 2024-11-19 NOTE — PROGRESS NOTES
OT note:  Chart reviewed. Patient recently left unit for HD treatment. Will defer and follow-up as able/appropriate for therapy eval.

## 2024-11-19 NOTE — PROGRESS NOTES
Physical Therapy    Chart reviewed. Patient recently left unit for HD treatment. Will defer and follow-up as able/appropriate for therapy eval.    Elisabeth To, PT, DPT

## 2024-11-19 NOTE — PROGRESS NOTES
This [facility] man nephrology Progress Note  [facility] [place] [facility]  [address], [address]  [place] [address]  Phone - [phone]  Fax - [phone]                 Patient: Kenny Mace                     YOB: 1945        Date- 11/19/2024                                     Admit Date: 11/18/2024   CC: Follow up for ESRD, critical hyperkalemia          IMPRESSION & PLAN:   ESRD- [facility] unit--mwf  Critical hyperkalemia  Volume overload  Hyponatremia  Anemia of CKD  Secondary HPT  Elevated troponins      PLAN-  Plan for hemodialysis again today  Next HD will be tomorrow  Can be discharged today from renal standpoint, if there are no other pending issues from medicine team     Subjective:  Interval History:   -Seen and examined today on hemodialysis.  Objective:   Vitals:    11/19/24 0955 11/19/24 1010 11/19/24 1025 11/19/24 1040   BP: 109/64 (!) 106/57 101/62 (!) 97/58   Pulse: 93 77 76    Resp:       Temp:       TempSrc:       SpO2:       Weight:       Height:          I/O last 3 completed shifts:  In: 860 [P.O.:360]  Out: 3600 [Urine:100]  I/O this shift:  In: 10 [I.V.:10]  Out: -       Physical exam:    GEN: No apparent distress  NECK- no mass  RESP: Bilateral rales  CVS: Irregular  NEURO: Normal speech, Non focal  EXT: No Edema   PSYCH: Normal Mood    Chart reviewed.         Pertinent Notes reviewed.     Data Review :  Lab Results   Component Value Date/Time     11/19/2024 04:50 AM    K 5.0 11/19/2024 04:50 AM    CL 94 11/19/2024 04:50 AM    CO2 28 11/19/2024 04:50 AM    BUN 59 11/19/2024 04:50 AM    CREATININE 9.33 11/19/2024 04:50 AM    GLUCOSE 154 11/19/2024 04:50 AM    CALCIUM 8.4 11/19/2024 04:50 AM       Lab Results   Component Value Date    WBC 7.4 11/19/2024    HGB 10.5 (L) 11/19/2024    HCT 33.6 (L) 11/19/2024    .0 (H) 11/19/2024     11/19/2024      Recent Labs     11/18/24  1119

## 2024-11-19 NOTE — PROGRESS NOTES
1415: Spoke with primary RN Gretchen regarding patient order for cardiac CT.  RN informed IR will do test tomorrow.  IR RN to call and coordinate time for atenolol to be administered to patient.

## 2024-11-19 NOTE — FLOWSHEET NOTE
Pre HD   11/19/24 0930   Vital Signs   BP (!) 110/40   Temp 97.9 °F (36.6 °C)   Respirations 18   SpO2 92 %   Pain Assessment   Pain Assessment None - Denies Pain   Treatment   Time On 0940   Treatment Goal 2kg in 2.5hrs   Observations & Evaluations   Level of Consciousness 0   Oriented X 4   Heart Rhythm Regular   Respiratory Quality/Effort Unlabored   O2 Device Nasal cannula   Bilateral Breath Sounds Clear   Skin Condition/Temp Dry;Warm   Abdomen Inspection Obese;Rounded   RLE Edema Trace   LLE Edema Trace   Technical Checks   Dialysis Machine No. 07   RO Machine Number [id number]   Dialyzer Lot No. [id number]   Tubing Lot Number [id number]   All Connections Secure Yes   NS Bag Yes   Saline Line Double Clamped Yes   Dialyzer Nipro ELISIO   Prime Volume (mL) 200 mL   ICEBOAT I;C;E;B;O;A;T   RO Machine Log Sheet Completed Yes   Machine Alarm Self Test Completed;Passed   Air Foam Detector Tested;Proper Function;pH Reading   Extracorporeal Circuit Tested for Integrity Yes   Machine Conductivity 13.8   Manual Conductivity 14   Manual Ph 7.4   Bleach Test (Neg) Yes   Bath Temperature 98.6 °F (37 °C)   Dialysis Bath   K+ (Potassium) 2   Ca+ (Calcium) 2.5   Na+ (Sodium) 138   HCO3 (Bicarb) 38   Treatment Initiation   Dialyze Hours 2.5   Treatment  Initiation Universal Precautions maintained;Lines secured to patient;Connections secured;Prime given;Venous Parameters set;Arterial Parameters set;Air foam detector engaged;[place] Device;Dialysate;Saline line double clamped;Hemo-Safe Applied;Dialyzer   Hemodialysis Fistula/Graft Superior Arm   No placement date or time found.   Present on Admission/Arrival: Yes  Orientation: Superior  Access Location: Arm   Site Assessment Clean, dry & intact   Thrill Present   Bruit Present   Status Accessed   Venous Needle Size 15 G   Arterial Needle Size 15 G   Accessed By: Devi   Access Attempts  1   Access Interventions Chlorhexidine;Needles taped to patient     Primary RN SBAR: Gretchen

## 2024-11-19 NOTE — CARE COORDINATION
Care Management Initial Assessment       RUR: 20  Readmission? No  1st IM letter given? Yes - 24  1st  letter given: No    Plan Home with Wife, Teresita in one story home with one MIREILLE.   DME: Has [employer] Oxygen 2 LPM at night and during his HD.  Does not need it around home per pt.   HD: [facility] [place]: M/W/F from 8 AM to 12 PM.  Wife transports him to and from HD. Pt does not drive at this time.   No HH or rehab.   I W ADLs.   CM asked if Pt would be open to having Home Health or rehab after released from the hospital and Pt declined both.   Pharmacy: [facility] in [place]  Wife will transport him home.     CM will continue to monitor discharge plan.     Amber Mathur CM  [phone]       [phone]   Service Assessment   Patient Orientation Alert and Oriented   Cognition Alert   History Provided By Patient;Significant Other   Primary Caregiver Spouse   Accompanied By/Relationship Wife: Teresita Mace:   Support Systems Spouse/Significant Other   Patient's Healthcare Decision Maker is: Legal Next of Kin  (--Wife: Teresita Mace: [phone])   PCP Verified by CM Yes  (Ritchie Tejada:)   Last Visit to PCP Within last 6 months   Prior Functional Level Independent in ADLs/IADLs   Current Functional Level Independent in ADLs/IADLs   Can patient return to prior living arrangement Yes   Ability to make needs known: Good   Family able to assist with home care needs: Yes   Would you like for me to discuss the discharge plan with any other family members/significant others, and if so, who? Yes  (Wife)   Social/Functional History   Lives With Spouse   Type of Home House   Home Layout One level   Home Access Stairs to enter with rails   Entrance Stairs - Number of Steps 1   Home Equipment Oxygen  ([employer]: 2 Lpm via [place] at night and while getting HD)   Receives Help From Family   Active  No   Patient's  Info Wife: Teresita Mace   Mode of Transportation Car   Occupation Retired   Discharge Planning

## 2024-11-19 NOTE — PROGRESS NOTES
[facility]  [address], [place] [id number] [phone]  [email or web address]  CARDIOLOGY PROGRESS NOTE    11/19/2024 3:29 PM    Admit Date: 11/18/2024    Admit Diagnosis:   Hyperkalemia [E87.5]  General weakness [R53.1]    Subjective:     Kenny Mace was seen and examined at the bedside.  Feels much better today.  Status post hemodialysis.  Patient significant other is at the bedside.  Dr. Jeffery with primary medicine team is at the bedside.    /71   Pulse 70   Temp 97.9 °F (36.6 °C)   Resp 20   Ht 1.829 m (6')   Wt 110.9 kg (244 lb 7.8 oz)   SpO2 92%   BMI 33.16 kg/m²     Current Facility-Administered Medications   Medication Dose Route Frequency    atenolol (TENORMIN) tablet 25 mg  25 mg Oral Once    atorvastatin (LIPITOR) tablet 40 mg  40 mg Oral Daily    [START ON 11/20/2024] calcitRIOL (ROCALTROL) capsule 0.75 mcg  0.75 mcg Oral Once per day on Monday Wednesday Friday    bumetanide (BUMEX) tablet 2 mg  2 mg Oral BID    carvedilol (COREG) tablet 3.125 mg  3.125 mg Oral Daily    escitalopram (LEXAPRO) tablet 10 mg  10 mg Oral Daily    finasteride (PROSCAR) tablet 5 mg  5 mg Oral Daily    gabapentin (NEURONTIN) capsule 300 mg  300 mg Oral Nightly    tamsulosin (FLOMAX) capsule 0.8 mg  0.8 mg Oral Daily    sodium chloride flush 0.9 % injection 5-40 mL  5-40 mL IntraVENous 2 times per day    sodium chloride flush 0.9 % injection 5-40 mL  5-40 mL IntraVENous PRN    0.9 % sodium chloride infusion   IntraVENous PRN    heparin (porcine) injection 5,000 Units  5,000 Units SubCUTAneous 3 times per day    ondansetron (ZOFRAN-ODT) disintegrating tablet 4 mg  4 mg Oral Q8H PRN    Or    ondansetron (ZOFRAN) injection 4 mg  4 mg IntraVENous Q6H PRN    polyethylene glycol (GLYCOLAX) packet 17 g  17 g Oral Daily PRN    acetaminophen (TYLENOL) tablet 650 mg  650 mg Oral Q6H PRN    Or    acetaminophen (TYLENOL) suppository 650 mg  650 mg Rectal Q6H PRN

## 2024-11-19 NOTE — PLAN OF CARE
do.  2. The main aim is to establish degree of independence from any help, physical or verbal, however minor and for whatever reason.  3. The need for supervision renders the patient not independent.  4. A patient's performance should be established using the best available evidence. Asking the patient, friends/relatives and nurses are the usual sources, but direct observation and common sense are also important. However direct testing is not needed.  5. Usually the patient's performance over the preceding 24-48 hours is important, but occasionally longer periods will be relevant.  6. Middle categories imply that the patient supplies over 50 per cent of the effort.  7. Use of aids to be independent is allowed.    Score Interpretation (from Edgardo 1997)    Independent   60-79 Minimally independent   40-59 Partially dependent   20-39 Very dependent   <20 Totally dependent     -Rosanna Gee., Barthel, D.W. (1965). Functional evaluation: the Barthel Index. Md State Med J (142.  -MANJIT Reynoso, MICA Hernandez (1997). The Barthel activities of daily living index: self-reporting versus actual performance in the old (> or = 75 years). Journal of American Geriatric Society 45(7), 832-836.   -CHARISSE Arciniega.F, ROSHAN Chiang., Gabriel, J.A., Chowdhury, A.J. (1999). Measuring the change in disability after inpatient rehabilitation; comparison of the responsiveness of the Barthel Index and Functional [place] Measure. Journal of Neurology, Neurosurgery, and Psychiatry, 66(4), 480-484.  -MICHELLE QuilesJ.A, FERMIN Roberts, & Luz Elena, M.A. (2004) Assessment of post-stroke quality of life in cost-effectiveness studies: The usefulness of the Barthel Index and the EuroQoL-5D. Quality of Life Research, 13, 427-43                                                                                                                                                                                                                                  Pain Ratin/10   Pain Intervention(s):   rest    Activity Tolerance:   Fair     After treatment:   Patient left in no apparent distress sitting up in chair, Call bell within reach, Bed/ chair alarm activated, and Caregiver / family present    COMMUNICATION/EDUCATION:   The patient's plan of care was discussed with: physical therapist and registered nurse    Patient Education  Education Given To: Patient;Family  Education Provided: Role of Therapy;Plan of Care;Precautions;ADL Adaptive Strategies;Transfer Training;Fall Prevention Strategies  Education Method: Verbal  Barriers to Learning: None  Education Outcome: Continued education needed    Thank you for this referral.  Sherley Araon OT  Minutes: 31    Occupational Therapy Evaluation Charge Determination   History Examination Decision-Making   MEDIUM Complexity : Expanded review of history including physical, cognitive and psychocial history  MEDIUM Complexity: 3-5 Performance deficits relating to physical, cognitive, or psychosocial skills that result in activity limitations and/or participation restrictions LOW Complexity: No comorbidities that affect functional and  no verbal  or physical assist needed to complete eval tasks   Based on the above components, the patient evaluation is determined to be of the following complexity level: Low

## 2024-11-19 NOTE — PLAN OF CARE
functional    Tone & Sensation:   Tone: Normal  Sensation: Impaired (bilateral toes numb)    Coordination:  Coordination: Generally decreased, functional    Range Of Motion:  AROM: Generally decreased, functional       Functional Mobility:  Bed Mobility:     Bed Mobility Training  Bed Mobility Training: No  Transfers:     Transfer Training  Transfer Training: Yes  Interventions: Safety awareness training;Verbal cues;Tactile cues  Sit to Stand: Stand-by assistance  Stand to Sit: Stand-by assistance  Toilet Transfer: Stand-by assistance  Balance:               Balance  Sitting: Intact  Standing: Impaired  Standing - Static: Good;Constant support  Standing - Dynamic: Constant support;Good;Fair  Ambulation/Gait Training:                       Gait  Gait Training: Yes  Overall Level of Assistance: Stand-by assistance  Distance (ft): 15 Feet (+8)  Assistive Device: Gait belt;Walker, rolling  Interventions: Verbal cues;Safety awareness training;Tactile cues  Base of Support: Widened  Speed/Rhoda: Pace decreased (< 100 feet/min)  Step Length: Left shortened;Right shortened  Gait Abnormalities: Decreased step clearance                                                                                                                                                                                                                                                        [facility] AM-PAC®      Basic Mobility Inpatient Short Form (6-Clicks) Version 2    How much help is needed turning from your back to your side while in a flat bed without using bedrails?: None  How much help is needed moving from lying on your back to sitting on the side of a flat bed without using bedrails?: None  How much help is needed moving to and from a bed to a chair?: None  How much help is needed standing up from a chair using your arms?: None  How much help is needed walking in hospital room?: A Little  How much help is needed climbing 3-5 steps  with a railing?: A Little    AM-PAC Inpatient Mobility Raw Score : 22  AM-PAC Inpatient T-Scale Score : 53.28     Cutoff score <=171,2,3 had higher odds of discharging home with home health or need of SNF/IPR.    1. Nancy Stapleton, Brit Obrien, Kory Cheung, Violet Bradley, Wade Grace, Armando Stapleton.  Validity of the AM-PAC “6-Clicks” Inpatient Daily Activity and Basic Mobility Short Forms. Physical Therapy Mar 2014, 94 (1) 379-391; DOI: 10.2522/ptj.33106971  2. Oskar DAS, Rafael J, Lit J, Saul J. Association of AM-PAC \"6-Clicks\" Basic Mobility and Daily Activity Scores With Discharge Destination. Phys Ther. 2021 4;101(4):wrnt282. doi: 10.1093/ptj/ggqv484. PMID: 22067212.  3. Drea RODRÍGUEZ, Ga D, Marcelina S, Michelle K, Vanessa S. Activity Measure for Post-Acute Care \"6-Clicks\" Basic Mobility Scores Predict Discharge Destination After Acute Care Hospitalization in Select Patient Groups: A Retrospective, Observational Study. Arch Rehabil Res Clin Transl. 2022 16;4(3):189962. doi: 10.1016/j.arrct..325795. PMID: 22415279; PMCID: [id number].  4. Daya MAYS, Lora S, Sinan W, Diandra P. AM-PAC Short Forms Manual 4.0. Revised 2020.                                                                                                                                                                                                                               Pain Ratin/10 low back pain, did not interfere with activity   Activity Tolerance:   Good, Fair , and requires rest breaks, tolerated activity fairly on 2LPM - brief desat to 88% following activity and quick recovery to >90% with seated rest  After treatment:   Patient left in no apparent distress sitting up in chair, Call bell within reach, and Bed/ chair alarm activated    COMMUNICATION/EDUCATION:   The patient's plan of care was discussed with: occupational therapist and registered nurse    Patient Education  Education Given To:

## 2024-11-20 ENCOUNTER — APPOINTMENT (OUTPATIENT)
Facility: HOSPITAL | Age: 79
End: 2024-11-20
Payer: MEDICARE

## 2024-11-20 ENCOUNTER — APPOINTMENT (OUTPATIENT)
Facility: HOSPITAL | Age: 79
End: 2024-11-20
Attending: STUDENT IN AN ORGANIZED HEALTH CARE EDUCATION/TRAINING PROGRAM
Payer: MEDICARE

## 2024-11-20 LAB
ANION GAP SERPL CALC-SCNC: 7 MMOL/L (ref 2–12)
BUN SERPL-MCNC: 55 MG/DL (ref 6–20)
BUN/CREAT SERPL: 7 (ref 12–20)
CALCIUM SERPL-MCNC: 8.9 MG/DL (ref 8.5–10.1)
CHLORIDE SERPL-SCNC: 97 MMOL/L (ref 97–108)
CO2 SERPL-SCNC: 28 MMOL/L (ref 21–32)
CREAT SERPL-MCNC: 8.38 MG/DL (ref 0.7–1.3)
ERYTHROCYTE [DISTWIDTH] IN BLOOD BY AUTOMATED COUNT: 18.9 % (ref 11.5–14.5)
GLUCOSE BLD STRIP.AUTO-MCNC: 103 MG/DL (ref 65–117)
GLUCOSE BLD STRIP.AUTO-MCNC: 127 MG/DL (ref 65–117)
GLUCOSE BLD STRIP.AUTO-MCNC: 172 MG/DL (ref 65–117)
GLUCOSE SERPL-MCNC: 104 MG/DL (ref 65–100)
HCT VFR BLD AUTO: 34.2 % (ref 36.6–50.3)
HGB BLD-MCNC: 10.5 G/DL (ref 12.1–17)
MAGNESIUM SERPL-MCNC: 2.4 MG/DL (ref 1.6–2.4)
MCH RBC QN AUTO: 30.8 PG (ref 26–34)
MCHC RBC AUTO-ENTMCNC: 30.7 G/DL (ref 30–36.5)
MCV RBC AUTO: 100.3 FL (ref 80–99)
NRBC # BLD: 0 K/UL (ref 0–0.01)
NRBC BLD-RTO: 0 PER 100 WBC
PHOSPHATE SERPL-MCNC: 5.8 MG/DL (ref 2.6–4.7)
PLATELET # BLD AUTO: 161 K/UL (ref 150–400)
PMV BLD AUTO: 10.6 FL (ref 8.9–12.9)
POTASSIUM SERPL-SCNC: 5.6 MMOL/L (ref 3.5–5.1)
RBC # BLD AUTO: 3.41 M/UL (ref 4.1–5.7)
SERVICE CMNT-IMP: ABNORMAL
SERVICE CMNT-IMP: ABNORMAL
SERVICE CMNT-IMP: NORMAL
SODIUM SERPL-SCNC: 132 MMOL/L (ref 136–145)
WBC # BLD AUTO: 6.2 K/UL (ref 4.1–11.1)

## 2024-11-20 PROCEDURE — 84100 ASSAY OF PHOSPHORUS: CPT

## 2024-11-20 PROCEDURE — 6360000002 HC RX W HCPCS: Performed by: INTERNAL MEDICINE

## 2024-11-20 PROCEDURE — 6370000000 HC RX 637 (ALT 250 FOR IP): Performed by: STUDENT IN AN ORGANIZED HEALTH CARE EDUCATION/TRAINING PROGRAM

## 2024-11-20 PROCEDURE — 72148 MRI LUMBAR SPINE W/O DYE: CPT

## 2024-11-20 PROCEDURE — 36415 COLL VENOUS BLD VENIPUNCTURE: CPT

## 2024-11-20 PROCEDURE — 2580000003 HC RX 258: Performed by: INTERNAL MEDICINE

## 2024-11-20 PROCEDURE — 6370000000 HC RX 637 (ALT 250 FOR IP): Performed by: INTERNAL MEDICINE

## 2024-11-20 PROCEDURE — 83735 ASSAY OF MAGNESIUM: CPT

## 2024-11-20 PROCEDURE — 90935 HEMODIALYSIS ONE EVALUATION: CPT

## 2024-11-20 PROCEDURE — 2060000000 HC ICU INTERMEDIATE R&B

## 2024-11-20 PROCEDURE — 85027 COMPLETE CBC AUTOMATED: CPT

## 2024-11-20 PROCEDURE — 6360000004 HC RX CONTRAST MEDICATION

## 2024-11-20 PROCEDURE — 70553 MRI BRAIN STEM W/O & W/DYE: CPT

## 2024-11-20 PROCEDURE — 82962 GLUCOSE BLOOD TEST: CPT

## 2024-11-20 PROCEDURE — 72157 MRI CHEST SPINE W/O & W/DYE: CPT

## 2024-11-20 PROCEDURE — A9579 GAD-BASE MR CONTRAST NOS,1ML: HCPCS

## 2024-11-20 PROCEDURE — 72156 MRI NECK SPINE W/O & W/DYE: CPT

## 2024-11-20 PROCEDURE — 80048 BASIC METABOLIC PNL TOTAL CA: CPT

## 2024-11-20 RX ORDER — ASPIRIN 81 MG/1
81 TABLET, CHEWABLE ORAL DAILY
Status: DISCONTINUED | OUTPATIENT
Start: 2024-11-21 | End: 2024-11-22 | Stop reason: HOSPADM

## 2024-11-20 RX ORDER — LORAZEPAM 2 MG/ML
1 INJECTION INTRAMUSCULAR ONCE
Status: COMPLETED | OUTPATIENT
Start: 2024-11-20 | End: 2024-11-20

## 2024-11-20 RX ORDER — IOPAMIDOL 755 MG/ML
100 INJECTION, SOLUTION INTRAVASCULAR
Status: COMPLETED | OUTPATIENT
Start: 2024-11-20 | End: 2024-11-21

## 2024-11-20 RX ORDER — ALBUMIN (HUMAN) 12.5 G/50ML
SOLUTION INTRAVENOUS
Status: DISPENSED
Start: 2024-11-20 | End: 2024-11-21

## 2024-11-20 RX ADMIN — BUMETANIDE 2 MG: 1 TABLET ORAL at 21:30

## 2024-11-20 RX ADMIN — GUAIFENESIN 600 MG: 600 TABLET, EXTENDED RELEASE ORAL at 08:28

## 2024-11-20 RX ADMIN — LORAZEPAM 1 MG: 2 INJECTION INTRAMUSCULAR; INTRAVENOUS at 09:24

## 2024-11-20 RX ADMIN — BUMETANIDE 2 MG: 1 TABLET ORAL at 08:29

## 2024-11-20 RX ADMIN — FINASTERIDE 5 MG: 5 TABLET, FILM COATED ORAL at 08:29

## 2024-11-20 RX ADMIN — GABAPENTIN 300 MG: 300 CAPSULE ORAL at 21:30

## 2024-11-20 RX ADMIN — GUAIFENESIN 600 MG: 600 TABLET, EXTENDED RELEASE ORAL at 21:30

## 2024-11-20 RX ADMIN — TAMSULOSIN HYDROCHLORIDE 0.8 MG: 0.4 CAPSULE ORAL at 08:28

## 2024-11-20 RX ADMIN — ATORVASTATIN CALCIUM 40 MG: 40 TABLET, FILM COATED ORAL at 08:28

## 2024-11-20 RX ADMIN — ATENOLOL 25 MG: 25 TABLET ORAL at 08:16

## 2024-11-20 RX ADMIN — SODIUM CHLORIDE, PRESERVATIVE FREE 10 ML: 5 INJECTION INTRAVENOUS at 21:35

## 2024-11-20 RX ADMIN — ESCITALOPRAM OXALATE 10 MG: 10 TABLET ORAL at 08:28

## 2024-11-20 RX ADMIN — GADOTERIDOL 20 ML: 279.3 INJECTION, SOLUTION INTRAVENOUS at 10:48

## 2024-11-20 RX ADMIN — CALCITRIOL CAPSULES 0.25 MCG 0.75 MCG: 0.25 CAPSULE ORAL at 21:33

## 2024-11-20 RX ADMIN — CARVEDILOL 3.12 MG: 3.12 TABLET, FILM COATED ORAL at 08:29

## 2024-11-20 RX ADMIN — HEPARIN SODIUM 5000 UNITS: 5000 INJECTION INTRAVENOUS; SUBCUTANEOUS at 21:29

## 2024-11-20 RX ADMIN — HEPARIN SODIUM 5000 UNITS: 5000 INJECTION INTRAVENOUS; SUBCUTANEOUS at 05:46

## 2024-11-20 RX ADMIN — Medication 3 MG: at 21:36

## 2024-11-20 RX ADMIN — SODIUM CHLORIDE, PRESERVATIVE FREE 10 ML: 5 INJECTION INTRAVENOUS at 08:30

## 2024-11-20 RX ADMIN — ACETAMINOPHEN 325MG 650 MG: 325 TABLET ORAL at 08:29

## 2024-11-20 ASSESSMENT — PAIN SCALES - GENERAL
PAINLEVEL_OUTOF10: 0
PAINLEVEL_OUTOF10: 0
PAINLEVEL_OUTOF10: 2

## 2024-11-20 ASSESSMENT — PAIN DESCRIPTION - LOCATION: LOCATION: GENERALIZED

## 2024-11-20 NOTE — FLOWSHEET NOTE
Primary RN SBAR: Gretchen Lindsey, RN  Incapacitated Nurse [facility]. provided: yes  Patient Education provided: need for full HD treatment is independent from fluid goal; Dietary restrictions; patient will require further teaching  Preferred Education method and Primary language: verbal/English  Hospital General Consent Verified: yes  Hospital associated wait time; reason: 1hr 26min to suite (Primary RN, patient, and family refused initial transport for lunch);   Hepatitis B Surface Ag   Date/Time Value Ref Range Status   11/18/2024 03:01 PM <0.10 Index Final     Hep B S Ag Interp   Date/Time Value Ref Range Status   11/18/2024 03:01 PM Negative NEG   Final     HEP B SURF AB   Date/Time Value Ref Range Status   11/16/2022 01:00 AM 4.25 mIU/mL Final     Hep B S Ab   Date/Time Value Ref Range Status   11/18/2024 03:01 PM 10.52 mIU/mL Final     Comment:     RESULTS REPEATED     Hep B S Ab Interp   Date/Time Value Ref Range Status   11/18/2024 03:01 PM REACTIVE (A) NR   Final     Pre-HD   11/20/24 1420   Observations & Evaluations   Level of Consciousness 0   Oriented X 4   Heart Rhythm Regular   Respiratory Quality/Effort Dyspnea with exertion   O2 Device Nasal cannula   Bilateral Breath Sounds Clear   Skin Condition/Temp Warm;Dry   Edema None   Vital Signs   /65   Temp 97.8 °F (36.6 °C)   Pulse 63   Respirations 16   SpO2 97 %   Pain Assessment   Pain Assessment None - Denies Pain   Technical Checks   Dialysis Machine No. 01   RO Machine Number [id number]   Dialyzer Lot No. [id number]   Tubing Lot Number [id number]   All Connections Secure Yes   NS Bag Yes   Saline Line Double Clamped Yes   Dialyzer Nipro ELISIO   Prime Volume (mL) 200 mL   ICEBOAT I;C;E;B;O;A;T   RO Machine Log Sheet Completed Yes   Machine Alarm Self Test Completed;Passed   Air Foam Detector Tested;Proper Function   Extracorporeal Circuit Tested for Integrity Yes   Machine Conductivity 13.8   Machine Ph 7.4   Bleach Test (Neg) Yes   Bath Temperature 96.8 °F (36

## 2024-11-20 NOTE — PLAN OF CARE
Problem: Occupational Therapy - Adult  Goal: By Discharge: Performs self-care activities at highest level of function for planned discharge setting.  See evaluation for individualized goals.  Description: FUNCTIONAL STATUS PRIOR TO ADMISSION:  pt reports that he was able to bathe and dress self, with only assist with donning socks.  He used a cane or RW in the home and Rolator outside of the home.  Receives Help From: Family, ADL Assistance: Needs assistance,  ,  ,  ,  ,  , Homemaking Assistance: Independent, Ambulation Assistance: Independent, Transfer Assistance: Independent,       HOME SUPPORT: Patient lived wife.    Occupational Therapy Goals:  Initiated 11/19/2024  1.  Patient will perform grooming with Modified [place] within 7 day(s).  2.  Patient will perform bathing with Modified [place] within 7 day(s).  3.  Patient will perform lower body dressing with Modified [place] and use of AE within 7 day(s).  4.  Patient will perform toilet transfers with Modified [place]  within 7 day(s).  5.  Patient will perform all aspects of toileting with [place] within 7 day(s).    11/19/2024 1623 by Sherley Aaron OT  Outcome: Not Progressing     Problem: Occupational Therapy - Adult  Goal: By Discharge: Performs self-care activities at highest level of function for planned discharge setting.  See evaluation for individualized goals.  Description: FUNCTIONAL STATUS PRIOR TO ADMISSION:  pt reports that he was able to bathe and dress self, with only assist with donning socks.  He used a cane or RW in the home and Rolator outside of the home.  Receives Help From: Family, ADL Assistance: Needs assistance,  ,  ,  ,  ,  , Homemaking Assistance: Independent, Ambulation Assistance: Independent, Transfer Assistance: Independent,       HOME SUPPORT: Patient lived wife.    Occupational Therapy Goals:  Initiated 11/19/2024  1.  Patient will perform grooming with Modified [place] within 7 day(s).  2.

## 2024-11-20 NOTE — PROGRESS NOTES
Hospitalist Progress Note    NAME:   Kenny Mace   : 1945   MRN: [id number]     Date: 2024    Patient PCP: Ritchie Tejada MD    Hospital Problem list:     Acute on chronic systolic congestive heart failure POA LVEF   Acute respiratory failure with hypoxia POA room air sats 89%, RR to 28  ESRD on HD  Hyperkalemia K 8.1 POA  Presented with generalized weakness  Hypoxic in ED sats 89% placed on 2 L  Potassium elevated 8.1 on repeat blood draw  Status post 2 g of IV calcium gluconate, albuterol nebs, 5 units of IV regular insulin/glucose, 10 g of oral Lokelma as well.  K improved to 5.0, now 5.6  Will hold lisinopril for now.  Continue with oral Bumex.  Nephrology following, volume removal  Wean off oxygen remained on 2 L nasal cannula this morning     Elevated HS troponin 252 --> 240, similar level since 2021  CAD  Known to have a chronically elevated troponin 212 to 286 since at least 2021  Cardiology has also been consulted.  Continue with Coreg, Lipitor and Bumex.   No idea why he is not on an aspirin a day  Dr. 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    Hopefully we can discharge patient in a.m. if electrolytes okay and cleared by cardiology     Generalized weakness ? Related to hyperkalemia  Initially code stroke was called for the patient.  CT head, CT angiogram brain and neck negative for any acute process.  Continue to monitor in telemetry with neurochecks every 4 hours.  PT OT eval.     Diabetes mellitus type 2  Sliding scale insulin.  A1c 6.2     Neuropathy  Continue with gabapentin.     BPH  Continue with finasteride and Flomax.     Body mass index is 33.13 kg/m².    Code Status: Full code  DVT Prophylaxis: SQ heparin  GI Prophylaxis:  Baseline: Independent from home       History, assessment and      I had a face to face encounter and independently examined this patient on 11/20/2024, as outlined below:    PHYSICAL EXAM:  General: Alert, cooperative  EENT:  Anicteric sclerae.  Resp:  CTA bilaterally, no wheezing or rales.  No accessory muscle use  CV:  Regular  rhythm,  No edema  GI:  Soft, Non distended, Non tender.  +Bowel sounds  Neurologic:  Alert and oriented X 3, normal speech,   Psych:   Good insight. Not anxious nor agitated  Skin:  No rashes.  No jaundice    Reviewed most current lab test results and cultures  YES  Reviewed most current radiology test results   YES  Review and summation of old records today    NO  Reviewed patient's current orders and MAR    YES  PMH/ reviewed - no change compared to H&P    ________________________________________________________________________        Comments   >50% of visit spent in counseling and coordination of care     ________________________________________________________________________  Jb P Susan John MD     Procedures: see electronic medical records for all procedures/Xrays and details which were not copied into this note but were reviewed prior to creation of Plan.      LABS:  I reviewed today's most current labs and imaging studies.  Pertinent labs include:  Recent Labs     11/18/24  1119 11/19/24  0450 11/20/24  0534   WBC 11.4* 7.4 6.2   HGB 11.6* 10.5* 10.5*   HCT 35.7* 33.6* 34.2*    175 161     Recent Labs     11/18/24  1119 11/18/24  1501 11/19/24  0450 11/20/24  0534   * 129* 131* 132*   K 8.1* 8.1*  8.1* 5.0 5.6*   CL 93* 95* 94* 97   CO2 27 24 28 28   GLUCOSE 219* 177* 154* 104*   BUN 90* 90* 59* 55*   CREATININE 11.90* 12.00* 9.33* 8.38*   CALCIUM 8.6 8.9 8.4* 8.9   MG  --   --  2.5* 2.4   PHOS  --   --  5.9* 5.8*   BILITOT 0.6 0.5 0.7  --    AST 8* 8* 10*  --    ALT 16 13 16  --      Invalid input(s): \"CK\", \"TROPONIN\", \"PBNP\"    Signed: Jb Davis Jr, MD

## 2024-11-20 NOTE — PROGRESS NOTES
Called by CT technician about the patient having a pacemaker with a heart rate of 80 bpm.  Appears to have Lovely device, reached out to device representative to confirm.  Will need to interrogate/programmed the device to lower heart rate for the cardiac scan.  Would prefer heart rate less than 60 bpm.    Thank you for allowing us to participate in the care of this patient.  We will follow.

## 2024-11-21 ENCOUNTER — APPOINTMENT (OUTPATIENT)
Facility: HOSPITAL | Age: 79
End: 2024-11-21
Payer: MEDICARE

## 2024-11-21 LAB
ANION GAP SERPL CALC-SCNC: 6 MMOL/L (ref 2–12)
BASOPHILS # BLD: 0 K/UL (ref 0–0.1)
BASOPHILS NFR BLD: 1 % (ref 0–1)
BUN SERPL-MCNC: 41 MG/DL (ref 6–20)
BUN/CREAT SERPL: 6 (ref 12–20)
CALCIUM SERPL-MCNC: 8.2 MG/DL (ref 8.5–10.1)
CHLORIDE SERPL-SCNC: 95 MMOL/L (ref 97–108)
CO2 SERPL-SCNC: 31 MMOL/L (ref 21–32)
CREAT SERPL-MCNC: 6.53 MG/DL (ref 0.7–1.3)
DIFFERENTIAL METHOD BLD: ABNORMAL
EOSINOPHIL # BLD: 0.3 K/UL (ref 0–0.4)
EOSINOPHIL NFR BLD: 5 % (ref 0–7)
ERYTHROCYTE [DISTWIDTH] IN BLOOD BY AUTOMATED COUNT: 18.6 % (ref 11.5–14.5)
GLUCOSE BLD STRIP.AUTO-MCNC: 148 MG/DL (ref 65–117)
GLUCOSE BLD STRIP.AUTO-MCNC: 152 MG/DL (ref 65–117)
GLUCOSE BLD STRIP.AUTO-MCNC: 165 MG/DL (ref 65–117)
GLUCOSE BLD STRIP.AUTO-MCNC: 189 MG/DL (ref 65–117)
GLUCOSE SERPL-MCNC: 111 MG/DL (ref 65–100)
HCT VFR BLD AUTO: 32.8 % (ref 36.6–50.3)
HGB BLD-MCNC: 10.6 G/DL (ref 12.1–17)
IMM GRANULOCYTES # BLD AUTO: 0 K/UL (ref 0–0.04)
IMM GRANULOCYTES NFR BLD AUTO: 0 % (ref 0–0.5)
LYMPHOCYTES # BLD: 1.5 K/UL (ref 0.8–3.5)
LYMPHOCYTES NFR BLD: 23 % (ref 12–49)
MCH RBC QN AUTO: 31.5 PG (ref 26–34)
MCHC RBC AUTO-ENTMCNC: 32.3 G/DL (ref 30–36.5)
MCV RBC AUTO: 97.6 FL (ref 80–99)
MONOCYTES # BLD: 0.7 K/UL (ref 0–1)
MONOCYTES NFR BLD: 11 % (ref 5–13)
NEUTS SEG # BLD: 3.9 K/UL (ref 1.8–8)
NEUTS SEG NFR BLD: 60 % (ref 32–75)
NRBC # BLD: 0 K/UL (ref 0–0.01)
NRBC BLD-RTO: 0 PER 100 WBC
PLATELET # BLD AUTO: 170 K/UL (ref 150–400)
PMV BLD AUTO: 11.1 FL (ref 8.9–12.9)
POTASSIUM SERPL-SCNC: 4.3 MMOL/L (ref 3.5–5.1)
RBC # BLD AUTO: 3.36 M/UL (ref 4.1–5.7)
SERVICE CMNT-IMP: ABNORMAL
SODIUM SERPL-SCNC: 132 MMOL/L (ref 136–145)
WBC # BLD AUTO: 6.4 K/UL (ref 4.1–11.1)

## 2024-11-21 PROCEDURE — 6360000002 HC RX W HCPCS: Performed by: INTERNAL MEDICINE

## 2024-11-21 PROCEDURE — 6370000000 HC RX 637 (ALT 250 FOR IP): Performed by: INTERNAL MEDICINE

## 2024-11-21 PROCEDURE — 2580000003 HC RX 258: Performed by: INTERNAL MEDICINE

## 2024-11-21 PROCEDURE — 97530 THERAPEUTIC ACTIVITIES: CPT

## 2024-11-21 PROCEDURE — 80048 BASIC METABOLIC PNL TOTAL CA: CPT

## 2024-11-21 PROCEDURE — 6370000000 HC RX 637 (ALT 250 FOR IP): Performed by: STUDENT IN AN ORGANIZED HEALTH CARE EDUCATION/TRAINING PROGRAM

## 2024-11-21 PROCEDURE — 82962 GLUCOSE BLOOD TEST: CPT

## 2024-11-21 PROCEDURE — 2060000000 HC ICU INTERMEDIATE R&B

## 2024-11-21 PROCEDURE — 75574 CT ANGIO HRT W/3D IMAGE: CPT

## 2024-11-21 PROCEDURE — 6360000004 HC RX CONTRAST MEDICATION: Performed by: INTERNAL MEDICINE

## 2024-11-21 PROCEDURE — 85025 COMPLETE CBC W/AUTO DIFF WBC: CPT

## 2024-11-21 PROCEDURE — 97535 SELF CARE MNGMENT TRAINING: CPT

## 2024-11-21 PROCEDURE — 75580 N-INVAS EST C FFR SW ALY CTA: CPT

## 2024-11-21 PROCEDURE — 2500000003 HC RX 250 WO HCPCS: Performed by: STUDENT IN AN ORGANIZED HEALTH CARE EDUCATION/TRAINING PROGRAM

## 2024-11-21 PROCEDURE — 36415 COLL VENOUS BLD VENIPUNCTURE: CPT

## 2024-11-21 RX ORDER — ATENOLOL 25 MG/1
50 TABLET ORAL ONCE
Status: COMPLETED | OUTPATIENT
Start: 2024-11-21 | End: 2024-11-21

## 2024-11-21 RX ORDER — METOPROLOL TARTRATE 1 MG/ML
5 INJECTION, SOLUTION INTRAVENOUS 4 TIMES DAILY PRN
Status: DISCONTINUED | OUTPATIENT
Start: 2024-11-21 | End: 2024-11-22 | Stop reason: HOSPADM

## 2024-11-21 RX ORDER — NITROGLYCERIN 0.4 MG/1
0.8 TABLET SUBLINGUAL ONCE
Status: COMPLETED | OUTPATIENT
Start: 2024-11-21 | End: 2024-11-21

## 2024-11-21 RX ADMIN — HEPARIN SODIUM 5000 UNITS: 5000 INJECTION INTRAVENOUS; SUBCUTANEOUS at 05:34

## 2024-11-21 RX ADMIN — METOPROLOL TARTRATE 5 MG: 5 INJECTION INTRAVENOUS at 11:00

## 2024-11-21 RX ADMIN — METOPROLOL TARTRATE 5 MG: 5 INJECTION INTRAVENOUS at 11:10

## 2024-11-21 RX ADMIN — HEPARIN SODIUM 5000 UNITS: 5000 INJECTION INTRAVENOUS; SUBCUTANEOUS at 16:56

## 2024-11-21 RX ADMIN — Medication 3 MG: at 21:13

## 2024-11-21 RX ADMIN — IOPAMIDOL 100 ML: 755 INJECTION, SOLUTION INTRAVENOUS at 11:45

## 2024-11-21 RX ADMIN — FINASTERIDE 5 MG: 5 TABLET, FILM COATED ORAL at 09:26

## 2024-11-21 RX ADMIN — ASPIRIN 81 MG: 81 TABLET, CHEWABLE ORAL at 09:26

## 2024-11-21 RX ADMIN — HEPARIN SODIUM 5000 UNITS: 5000 INJECTION INTRAVENOUS; SUBCUTANEOUS at 21:39

## 2024-11-21 RX ADMIN — BUMETANIDE 2 MG: 1 TABLET ORAL at 09:26

## 2024-11-21 RX ADMIN — SODIUM CHLORIDE, PRESERVATIVE FREE 10 ML: 5 INJECTION INTRAVENOUS at 21:14

## 2024-11-21 RX ADMIN — ATENOLOL 50 MG: 25 TABLET ORAL at 09:26

## 2024-11-21 RX ADMIN — NITROGLYCERIN 0.8 MG: 0.4 TABLET SUBLINGUAL at 11:31

## 2024-11-21 RX ADMIN — BUMETANIDE 2 MG: 1 TABLET ORAL at 21:12

## 2024-11-21 RX ADMIN — ACETAMINOPHEN 325MG 650 MG: 325 TABLET ORAL at 21:17

## 2024-11-21 RX ADMIN — METOPROLOL TARTRATE 5 MG: 5 INJECTION INTRAVENOUS at 11:05

## 2024-11-21 RX ADMIN — TAMSULOSIN HYDROCHLORIDE 0.8 MG: 0.4 CAPSULE ORAL at 09:26

## 2024-11-21 RX ADMIN — SODIUM CHLORIDE, PRESERVATIVE FREE 10 ML: 5 INJECTION INTRAVENOUS at 09:27

## 2024-11-21 RX ADMIN — GABAPENTIN 300 MG: 300 CAPSULE ORAL at 21:12

## 2024-11-21 RX ADMIN — ESCITALOPRAM OXALATE 10 MG: 10 TABLET ORAL at 09:26

## 2024-11-21 RX ADMIN — ATORVASTATIN CALCIUM 40 MG: 40 TABLET, FILM COATED ORAL at 09:26

## 2024-11-21 RX ADMIN — GUAIFENESIN 600 MG: 600 TABLET, EXTENDED RELEASE ORAL at 09:26

## 2024-11-21 RX ADMIN — ACETAMINOPHEN 325MG 650 MG: 325 TABLET ORAL at 00:24

## 2024-11-21 RX ADMIN — INSULIN LISPRO 1 UNITS: 100 INJECTION, SOLUTION INTRAVENOUS; SUBCUTANEOUS at 21:37

## 2024-11-21 RX ADMIN — METOPROLOL TARTRATE 5 MG: 5 INJECTION INTRAVENOUS at 10:55

## 2024-11-21 RX ADMIN — GUAIFENESIN 600 MG: 600 TABLET, EXTENDED RELEASE ORAL at 21:12

## 2024-11-21 ASSESSMENT — PAIN SCALES - GENERAL
PAINLEVEL_OUTOF10: 0
PAINLEVEL_OUTOF10: 0
PAINLEVEL_OUTOF10: 3
PAINLEVEL_OUTOF10: 0
PAINLEVEL_OUTOF10: 2

## 2024-11-21 ASSESSMENT — PAIN - FUNCTIONAL ASSESSMENT
PAIN_FUNCTIONAL_ASSESSMENT: NONE - DENIES PAIN
PAIN_FUNCTIONAL_ASSESSMENT: ACTIVITIES ARE NOT PREVENTED

## 2024-11-21 ASSESSMENT — PAIN DESCRIPTION - DESCRIPTORS: DESCRIPTORS: ACHING

## 2024-11-21 ASSESSMENT — PAIN SCALES - WONG BAKER
WONGBAKER_NUMERICALRESPONSE: HURTS A LITTLE BIT

## 2024-11-21 ASSESSMENT — PAIN DESCRIPTION - LOCATION: LOCATION: HEAD

## 2024-11-21 NOTE — PROGRESS NOTES
Hospital follow-up CARDIO transitional care appointment has been scheduled with Dr. Minal Maxwell on 12/3/24 at 1330. This is the first available appt due to limited provider availability. PCP office does not offer alternate provider option for hospital follow up. [facility] placed [facility] information AVS for patient resource. Pending patient discharge. Maryam Kewanna Care Management Assistant

## 2024-11-21 NOTE — PROGRESS NOTES
Attempted to schedule hospital follow up PCP appointment. Office  will contact the patient with appointment information per office protocol. [facility] placed [facility] information AVS for patient resource. Pending patient discharge. Maryam Veras, Care Management Assistant

## 2024-11-21 NOTE — PROGRESS NOTES
Name of procedure:Cardiac CTA    Vital Signs: Stable    Fluids removed: N/A    Samples sent to lab: N/A    Any complications related to procedure: None      Patient is at increased fall risk due to medication given.    Received pre-medications metoprolol and sl nitro prior to Cardiac CTA. Tolerated well.     Report called to Ed, RN at ext. [phone]. SBAR items covered.

## 2024-11-21 NOTE — PROGRESS NOTES
..End of Shift Note    Bedside shift change report given to HAI Chang (oncoming nurse) by Dilan Lomeli RN (offgoing nurse).  Report included the following information SBAR    Shift worked:  0700 - 1900     Shift summary and any significant changes:    Pt. Tolerated all medication w/o issue.  Wife at bedside during day shift.  No c/o pain or distress.  Complete cardiac procedure during day shift.     Concerns for physician to address: NO     Zone phone for oncoming shift:  No       Activity:  Level of Assistance: Independent after set-up  Number times ambulated in hallways past shift: 0  Number of times OOB to chair past shift: 2    Cardiac:   Cardiac Monitoring: Yes      Cardiac Rhythm: Sinus rhythm    Access:  Current line(s): PIV     Genitourinary:   Urinary Status: Oliguria    Respiratory:   O2 Device: Nasal cannula  Chronic home O2 use?: NO  Incentive spirometer at bedside: NO    GI:     Current diet:  ADULT DIET; Regular; 5 carb choices (75 gm/meal); Low Fat/Low Chol/High Fiber/2 gm Na; Low Potassium (Less than 3000 mg/day)  Passing flatus: YES    Pain Management:   Patient states pain is manageable on current regimen: YES    Skin:  Herbert Scale Score: 20  Interventions: Wound Offloading (Prevention Methods): Pillows, Repositioning    Patient Safety:  Fall Risk:    Fall Risk Interventions  Toilet Every 2 Hours-In Advance of Need: Yes  Hourly Visual Checks: Awake  Fall Visual Posted: Other (comment)  Room Door Open: Deferred to decrease stimulation  Alarm On: Other (Comment)  Patient Moved Closer to Nursing Station: No    Active Consults:   IP CONSULT TO TELE-NEUROLOGY  IP CONSULT TO CARDIOLOGY  IP CONSULT TO NEPHROLOGY  IP CONSULT TO DIETITIAN    Length of Stay:  Expected LOS: 4  Actual LOS: 3    Dilan Lomeli RN

## 2024-11-21 NOTE — PROGRESS NOTES
melatonin tablet 3 mg  3 mg Oral Nightly    aluminum & magnesium hydroxide-simethicone (MAALOX) 200-200-20 MG/5ML suspension 30 mL  30 mL Oral Q6H PRN    glucose chewable tablet 16 g  4 tablet Oral PRN    dextrose bolus 10% 125 mL  125 mL IntraVENous PRN    Or    dextrose bolus 10% 250 mL  250 mL IntraVENous PRN    glucagon injection 1 mg  1 mg SubCUTAneous PRN    dextrose 10 % infusion   IntraVENous Continuous PRN    insulin lispro (HUMALOG,ADMELOG) injection vial 0-4 Units  0-4 Units SubCUTAneous 4x Daily AC & HS    guaiFENesin (MUCINEX) extended release tablet 600 mg  600 mg Oral BID    benzonatate (TESSALON) capsule 100 mg  100 mg Oral TID PRN    ipratropium 0.5 mg-albuterol 2.5 mg (DUONEB) nebulizer solution 1 Dose  1 Dose Inhalation Q4H PRN    albuterol (PROVENTIL) (2.5 MG/3ML) 0.083% nebulizer solution 2.5 mg  2.5 mg Nebulization Q6H PRN         Objective:      Physical Exam  Physical Exam  Constitutional:       General: He is not in acute distress.  HENT:      Head: Normocephalic and atraumatic.   Cardiovascular:      Rate and Rhythm: Normal rate and regular rhythm.   Pulmonary:      Effort: Pulmonary effort is normal.      Breath sounds: No wheezing.   Neurological:      Mental Status: He is alert.            Data Review:   Recent Labs     11/19/24  0450 11/20/24  0534 11/21/24  0543   WBC 7.4 6.2 6.4   HGB 10.5* 10.5* 10.6*   HCT 33.6* 34.2* 32.8*    161 170     Recent Labs     11/19/24  0450 11/20/24  0534 11/21/24  0543   * 132* 132*   K 5.0 5.6* 4.3   CL 94* 97 95*   CO2 28 28 31   BUN 59* 55* 41*   CREATININE 9.33* 8.38* 6.53*   MG 2.5* 2.4  --    PHOS 5.9* 5.8*  --    ALT 16  --   --          Intake/Output Summary (Last 24 hours) at 11/21/2024 1721  Last data filed at 11/21/2024 0651  Gross per 24 hour   Intake 700 ml   Output 850 ml   Net -150 ml      Telemetry: Unremarkable  Assessment:     Principal Problem:    Hyperkalemia  Resolved Problems:    * No resolved hospital problems.  *      Plan:     Coronary CT angiogram shows nonobstructive coronary artery disease which was confirmed by CT FFR assessment as well.  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  Resume home carvedilol.  Can probably resume patient's home losartan and consultation with nephrology.    Type II myocardial infarction  Patent reed ovale  End-stage renal disease on hemodialysis  Hypertension  Diabetes  Obesity    Okay for outpatient follow-up from cardiology standpoint    Thank you for allowing us to participate in the care of this patient.  Feel free to reach back out if we could be of any further assistance.      Myrtle Crandall MD

## 2024-11-21 NOTE — PROGRESS NOTES
Arrived into the xray recovery area, placed on mon. X 4. Waiting for desired effect of pre-medication prior to proceeding with Cardiac CTA. Will reach out to Dr Crandall if HR remains above 80 prior to test for guidance.

## 2024-11-21 NOTE — PLAN OF CARE
HAI Godinez  Outcome: Progressing     Problem: Neurosensory - Adult  Goal: Achieves stable or improved neurological status  11/21/2024 0158 by Cynthia Mcnulty RN  Outcome: Progressing  Flowsheets (Taken 11/20/2024 0845 by Gretchen Lindsey RN)  Achieves stable or improved neurological status: Assess for and report changes in neurological status  11/20/2024 1705 by Gretchen Lindsey RN  Outcome: Progressing  Flowsheets (Taken 11/20/2024 0845)  Achieves stable or improved neurological status: Assess for and report changes in neurological status     Problem: Respiratory - Adult  Goal: Achieves optimal ventilation and oxygenation  11/21/2024 0158 by Cynthia Mcnulty RN  Outcome: Progressing  11/20/2024 1705 by Gretchen Lindsey RN  Outcome: Progressing     Problem: Cardiovascular - Adult  Goal: Maintains optimal cardiac output and hemodynamic stability  11/21/2024 0158 by Cynthia Mcnulty RN  Outcome: Progressing  11/20/2024 1705 by Gretchen Lindsey RN  Outcome: Progressing  Goal: Absence of cardiac dysrhythmias or at baseline  11/21/2024 0158 by Cynthia Mcnulty RN  Outcome: Progressing  11/20/2024 1705 by Gretchen Lindsey RN  Outcome: Progressing     Problem: Skin/Tissue Integrity - Adult  Goal: Skin integrity remains intact  11/21/2024 0158 by Cynthia Mcnulty RN  Outcome: Progressing  11/20/2024 1705 by Gretchen Lindsey RN  Outcome: Progressing  Flowsheets (Taken 11/20/2024 0845)  Skin Integrity Remains Intact: Monitor for areas of redness and/or skin breakdown

## 2024-11-21 NOTE — PROGRESS NOTES
Physical Therapy  Patient currently off the floor for procedure/testing.  Will defer and continue to follow.  Isabela Irby, PT, DPT

## 2024-11-21 NOTE — PROGRESS NOTES
Came to see the patient, he is off the floor, plan for HD tomorrow if he is still inpatient.   What Type Of Note Output Would You Prefer (Optional)?: Standard Output How Severe Are Your Spot(S)?: mild Have Your Spot(S) Been Treated In The Past?: has not been treated Hpi Title: Evaluation of Skin Lesions Additional History: Patient here today for a full body skin exam , patient stated he has some scaly spots on his left ear.

## 2024-11-21 NOTE — PROGRESS NOTES
Nutrition Note    Nutrition Education:  Educated on low sodium and low potassium diet  Learners: Patient and Significant Other  Readiness: Acceptance  Method: Explanation, Handout, and Teachback  Response: Demonstrated Understanding and Needs Reinforcement  Contact name and number provided.      Electronically signed by Isabela Green RD on 11/21/24 at 2:34 PM EST    Contact: [phone]

## 2024-11-21 NOTE — PLAN OF CARE
Problem: Occupational Therapy - Adult  Goal: By Discharge: Performs self-care activities at highest level of function for planned discharge setting.  See evaluation for individualized goals.  Description: FUNCTIONAL STATUS PRIOR TO ADMISSION:  pt reports that he was able to bathe and dress self, with only assist with donning socks.  He used a cane or RW in the home and Rolator outside of the home.  Receives Help From: Family, ADL Assistance: Needs assistance,  ,  ,  ,  ,  , Homemaking Assistance: Independent, Ambulation Assistance: Independent, Transfer Assistance: Independent,       HOME SUPPORT: Patient lived wife.    Occupational Therapy Goals:  Initiated 11/19/2024  1.  Patient will perform grooming with Modified [place] within 7 day(s).  2.  Patient will perform bathing with Modified [place] within 7 day(s).  3.  Patient will perform lower body dressing with Modified [place] and use of AE within 7 day(s).  4.  Patient will perform toilet transfers with Modified [place]  within 7 day(s).  5.  Patient will perform all aspects of toileting with [place] within 7 day(s).    Outcome: Progressing     OCCUPATIONAL THERAPY TREATMENT  Patient: Kenny Mace (79 y.o. male)  Date: 11/21/2024  Primary Diagnosis: Hyperkalemia [E87.5]  General weakness [R53.1]       Precautions:                  Chart, occupational therapy assessment, plan of care, and goals were reviewed.    ASSESSMENT  Patient continues to benefit from skilled OT services and is progressing towards goals. Pt presented in recliner with wife present at bedside. Agreeable to therapy. Completed LE dressing task (pulling socks up) with mod-maxA. Difficulty leaning forward and limited mobility for tailor sit method. Completed sit<> stand transfers without difficulty. Walked down hallways x2 with RW and without any LOB/instability. Good participation in therapy. Eager to discharge home.          PLAN :  Patient continues to benefit from  skilled intervention to address the above impairments.  Continue treatment per established plan of care to address goals.    Recommendation for discharge: (in order for the patient to meet his/her long term goals):   Intermittent occupational therapy up to 2-3x/week in previous living setting  Pt not interested in HH OT    Other factors to consider for discharge: high risk for falls    IF patient discharges home will need the following DME: patient owns DME required for discharge       SUBJECTIVE:   Patient stated “I'm doing well. Ready to go home.”    OBJECTIVE DATA SUMMARY:   Cognitive/Behavioral Status:  Orientation  Overall Orientation Status: Within Functional Limits  Orientation Level: Oriented X4  Cognition  Overall Cognitive Status: WFL    Functional Mobility and Transfers for ADLs:  Bed Mobility:  Bed Mobility Training  Bed Mobility Training: No     Transfers:   Transfer Training  Transfer Training: Yes  Sit to Stand: Modified independent  Stand to Sit: Modified independent           Balance:     Balance  Sitting: Intact  Standing: Impaired  Standing - Static: Good;Occasional  Standing - Dynamic: Good;Occasional      ADL Intervention:         Feeding: Independent         Grooming: Modified independent    Grooming Skilled Clinical Factors: sitting       LE Dressing: Maximum assistance;Moderate assistance  LE Dressing Skilled Clinical Factors: socks    Toileting: Contact guard assistance       Functional Mobility: Supervision  Functional Mobility Skilled Clinical Factors: RW     Skin Care: Bath wipes;Chlorhexidine wipes    Pain Ratin/10   Pain Intervention(s):   pain is at a level acceptable to the patient      Activity Tolerance:   Good  Please refer to the flowsheet for vital signs taken during this treatment.    After treatment:   Patient left in no apparent distress sitting up in chair, Bed/ chair alarm activated, and Caregiver / family present    COMMUNICATION/EDUCATION:   The patient's plan of care

## 2024-11-21 NOTE — PLAN OF CARE
Problem: Chronic Conditions and Co-morbidities  Goal: Patient's chronic conditions and co-morbidity symptoms are monitored and maintained or improved  11/21/2024 0758 by Dilan Lomeli RN  Outcome: Progressing  11/21/2024 0158 by Cynthia Mcnulty RN  Outcome: Progressing  Flowsheets (Taken 11/20/2024 0845 by Gretchen Lindsey, RN)  Care Plan - Patient's Chronic Conditions and Co-Morbidity Symptoms are Monitored and Maintained or Improved: Monitor and assess patient's chronic conditions and comorbid symptoms for stability, deterioration, or improvement     Problem: Discharge Planning  Goal: Discharge to home or other facility with appropriate resources  11/21/2024 0758 by Dilan Lomeli RN  Outcome: Progressing  11/21/2024 0158 by Cynthia Mcnulty RN  Outcome: Progressing  Flowsheets (Taken 11/20/2024 0845 by Gretchen Lindsey, RN)  Discharge to home or other facility with appropriate resources: Identify barriers to discharge with patient and caregiver     Problem: Safety - Adult  Goal: Free from fall injury  11/21/2024 0758 by Dilan Lomeli RN  Outcome: Progressing  11/21/2024 0158 by Cynthia Mcnulty RN  Outcome: Progressing  Flowsheets (Taken 11/19/2024 0320)  Free From Fall Injury: Instruct family/caregiver on patient safety

## 2024-11-22 VITALS
HEIGHT: 72 IN | SYSTOLIC BLOOD PRESSURE: 128 MMHG | TEMPERATURE: 97.8 F | OXYGEN SATURATION: 91 % | HEART RATE: 60 BPM | WEIGHT: 246.69 LBS | BODY MASS INDEX: 33.41 KG/M2 | DIASTOLIC BLOOD PRESSURE: 70 MMHG | RESPIRATION RATE: 18 BRPM

## 2024-11-22 LAB
ALBUMIN SERPL-MCNC: 3.7 G/DL (ref 3.5–5)
ANION GAP SERPL CALC-SCNC: 11 MMOL/L (ref 2–12)
BUN SERPL-MCNC: 71 MG/DL (ref 6–20)
BUN/CREAT SERPL: 8 (ref 12–20)
CALCIUM SERPL-MCNC: 8.7 MG/DL (ref 8.5–10.1)
CHLORIDE SERPL-SCNC: 92 MMOL/L (ref 97–108)
CO2 SERPL-SCNC: 27 MMOL/L (ref 21–32)
CREAT SERPL-MCNC: 9.16 MG/DL (ref 0.7–1.3)
ERYTHROCYTE [DISTWIDTH] IN BLOOD BY AUTOMATED COUNT: 18.3 % (ref 11.5–14.5)
GLUCOSE BLD STRIP.AUTO-MCNC: 153 MG/DL (ref 65–117)
GLUCOSE SERPL-MCNC: 169 MG/DL (ref 65–100)
HCT VFR BLD AUTO: 31.2 % (ref 36.6–50.3)
HGB BLD-MCNC: 10.3 G/DL (ref 12.1–17)
MCH RBC QN AUTO: 31.1 PG (ref 26–34)
MCHC RBC AUTO-ENTMCNC: 33 G/DL (ref 30–36.5)
MCV RBC AUTO: 94.3 FL (ref 80–99)
NRBC # BLD: 0 K/UL (ref 0–0.01)
NRBC BLD-RTO: 0 PER 100 WBC
PHOSPHATE SERPL-MCNC: 6 MG/DL (ref 2.6–4.7)
PLATELET # BLD AUTO: 160 K/UL (ref 150–400)
PMV BLD AUTO: 11.6 FL (ref 8.9–12.9)
POTASSIUM SERPL-SCNC: 4.3 MMOL/L (ref 3.5–5.1)
RBC # BLD AUTO: 3.31 M/UL (ref 4.1–5.7)
SERVICE CMNT-IMP: ABNORMAL
SODIUM SERPL-SCNC: 130 MMOL/L (ref 136–145)
WBC # BLD AUTO: 5.1 K/UL (ref 4.1–11.1)

## 2024-11-22 PROCEDURE — 6370000000 HC RX 637 (ALT 250 FOR IP): Performed by: STUDENT IN AN ORGANIZED HEALTH CARE EDUCATION/TRAINING PROGRAM

## 2024-11-22 PROCEDURE — 6370000000 HC RX 637 (ALT 250 FOR IP): Performed by: INTERNAL MEDICINE

## 2024-11-22 PROCEDURE — 2580000003 HC RX 258: Performed by: INTERNAL MEDICINE

## 2024-11-22 PROCEDURE — 85027 COMPLETE CBC AUTOMATED: CPT

## 2024-11-22 PROCEDURE — 6360000002 HC RX W HCPCS: Performed by: INTERNAL MEDICINE

## 2024-11-22 PROCEDURE — 2700000000 HC OXYGEN THERAPY PER DAY

## 2024-11-22 PROCEDURE — 36415 COLL VENOUS BLD VENIPUNCTURE: CPT

## 2024-11-22 PROCEDURE — 90935 HEMODIALYSIS ONE EVALUATION: CPT

## 2024-11-22 PROCEDURE — 82962 GLUCOSE BLOOD TEST: CPT

## 2024-11-22 PROCEDURE — 80069 RENAL FUNCTION PANEL: CPT

## 2024-11-22 RX ORDER — ASPIRIN 81 MG/1
81 TABLET, CHEWABLE ORAL DAILY
Status: SHIPPED | COMMUNITY
Start: 2024-11-23

## 2024-11-22 RX ADMIN — GUAIFENESIN 600 MG: 600 TABLET, EXTENDED RELEASE ORAL at 09:16

## 2024-11-22 RX ADMIN — CARVEDILOL 3.12 MG: 3.12 TABLET, FILM COATED ORAL at 09:16

## 2024-11-22 RX ADMIN — HEPARIN SODIUM 5000 UNITS: 5000 INJECTION INTRAVENOUS; SUBCUTANEOUS at 06:47

## 2024-11-22 RX ADMIN — ASPIRIN 81 MG: 81 TABLET, CHEWABLE ORAL at 09:16

## 2024-11-22 RX ADMIN — BUMETANIDE 2 MG: 1 TABLET ORAL at 09:15

## 2024-11-22 RX ADMIN — FINASTERIDE 5 MG: 5 TABLET, FILM COATED ORAL at 09:16

## 2024-11-22 RX ADMIN — SODIUM CHLORIDE, PRESERVATIVE FREE 10 ML: 5 INJECTION INTRAVENOUS at 09:16

## 2024-11-22 RX ADMIN — ATORVASTATIN CALCIUM 40 MG: 40 TABLET, FILM COATED ORAL at 09:16

## 2024-11-22 RX ADMIN — ESCITALOPRAM OXALATE 10 MG: 10 TABLET ORAL at 09:15

## 2024-11-22 RX ADMIN — TAMSULOSIN HYDROCHLORIDE 0.8 MG: 0.4 CAPSULE ORAL at 09:16

## 2024-11-22 ASSESSMENT — PAIN SCALES - GENERAL
PAINLEVEL_OUTOF10: 0

## 2024-11-22 ASSESSMENT — PAIN SCALES - WONG BAKER: WONGBAKER_NUMERICALRESPONSE: NO HURT

## 2024-11-22 NOTE — PROGRESS NOTES
End of Shift Note    Bedside shift change report given to  (oncoming nurse) by Charlene Rizo RN (offgoing nurse).  Report included the following information SBAR    Shift worked: Vitals stable ,No c/o cardio ,pulm   Weight 11.9 kg      Shift summary and any significant changes:          Concerns for physician to address:       Zone phone for oncoming shift:          Activity:  Level of Assistance: Independent after set-up  Number times ambulated in hallways past shift: 0  Number of times OOB to chair past shift: 0    Cardiac:   Cardiac Monitoring: Yes      Cardiac Rhythm: Sinus rhythm    Access:  Current line(s): PIV     Genitourinary:   Urinary Status: Oliguria    Respiratory:   O2 Device: None (Room air)  Chronic home O2 use?: NO  Incentive spirometer at bedside: YES    GI:     Current diet:  ADULT DIET; Regular; 5 carb choices (75 gm/meal); Low Fat/Low Chol/High Fiber/2 gm Na; Low Potassium (Less than 3000 mg/day)  Passing flatus: YES    Pain Management:   Patient states pain is manageable on current regimen: YES    Skin:  Herbert Scale Score: 20  Interventions: Wound Offloading (Prevention Methods): Pillows, Repositioning    Patient Safety:  Fall Risk:    Fall Risk Interventions  Toilet Every 2 Hours-In Advance of Need: Yes  Hourly Visual Checks: Awake  Fall Visual Posted: Other (comment)  Room Door Open: Deferred to decrease stimulation  Alarm On: Other (Comment)  Patient Moved Closer to Nursing Station: No    Active Consults:   IP CONSULT TO TELE-NEUROLOGY  IP CONSULT TO CARDIOLOGY  IP CONSULT TO NEPHROLOGY  IP CONSULT TO DIETITIAN    Length of Stay:  Expected LOS: 4  Actual LOS: 3    Charlene Rizo RN

## 2024-11-22 NOTE — DISCHARGE INSTRUCTIONS
Continue with scheduled hemodialysis every Monday Wednesday Friday    Resume all of your home medications except hold the losartan until you follow-up with your kidney doctor and cardiology in the outpatient setting    Start taking an aspirin 81 mg daily because of some underlying coronary artery disease that was seen on CT scan

## 2024-11-22 NOTE — PROGRESS NOTES
This nice man nephrology Progress Note  [facility] / [place] [facility]  [address], [address]  [place] [address]  Phone - [phone]  Fax - [phone]                 Patient: Kenny Mace                     YOB: 1945        Date- 11/22/2024                                     Admit Date: 11/18/2024   CC: Follow up for ESRD, hyperkalemia          IMPRESSION & PLAN:   [facility]  hyperkalemia  Volume overload  Hyponatremia  Anemia of CKD  Secondary HPT  Elevated troponins      PLAN-  Seen on hd today  Continue calcitriol     Subjective:  Interval History:   Seen on hd    Objective:   Vitals:    11/21/24 2315 11/22/24 0440 11/22/24 0730 11/22/24 1100   BP: 121/74 127/75 123/87 118/75   Pulse: 78  73 78   Resp: 16 16 16 14   Temp: 98 °F (36.7 °C) 98 °F (36.7 °C) 98.6 °F (37 °C) 97.8 °F (36.6 °C)   TempSrc: Oral Oral Oral Oral   SpO2: 95% 97%     Weight:  111.9 kg (246 lb 11.1 oz)     Height:          I/O last 3 completed shifts:  In: 200 [P.O.:200]  Out: 150 [Urine:150]  I/O this shift:  In: 350 [P.O.:350]  Out: -       Physical exam:  GEN:  NAD  NECK:  Supple, no thyromegaly  RESP:  no  wheezing, decreased bs b/l  NEURO: non focal, normal speech  EXT: Edema +nt     PSYCH: Normal mood  SKIN: No Rash        Chart reviewed.         Pertinent Notes reviewed.     Data Review :  Lab Results   Component Value Date/Time     11/22/2024 11:35 AM    K 4.3 11/22/2024 11:35 AM    CL 92 11/22/2024 11:35 AM    CO2 27 11/22/2024 11:35 AM    BUN 71 11/22/2024 11:35 AM    CREATININE 9.16 11/22/2024 11:35 AM    GLUCOSE 169 11/22/2024 11:35 AM    CALCIUM 8.7 11/22/2024 11:35 AM       Lab Results   Component Value Date    WBC 5.1 11/22/2024    HGB 10.3 (L) 11/22/2024    HCT 31.2 (L) 11/22/2024    MCV 94.3 11/22/2024     11/22/2024      Recent Labs     11/20/24  0534 11/21/24  0543 11/22/24  1135   * 132* 130*

## 2024-11-22 NOTE — PLAN OF CARE
Predictive Model Details          21 (Normal)  Factor Value    Calculated 11/22/2024 07:55 66% Age 79 years old    Deterioration Index Model 14% Sodium abnormal (132 mmol/L)     8% Potassium 4.3 mmol/L     5% BUN abnormal (41 MG/DL)     4% Systolic 121     3% Hematocrit abnormal (32.8 %)     0% Pulse 78     0% Temperature 98 °F (36.7 °C)     0% Pulse oximetry 95 %     0% Respiratory rate 16     0% WBC count 6.4 K/uL        Problem: Chronic Conditions and Co-morbidities  Goal: Patient's chronic conditions and co-morbidity symptoms are monitored and maintained or improved  11/22/2024 0755 by Carla Delaney RN  Outcome: Progressing  11/21/2024 2120 by Charlene Rizo RN  Outcome: Progressing  11/21/2024 1934 by Dilan Lomeli RN  Outcome: Progressing  Flowsheets (Taken 11/21/2024 1927 by Charlene Rizo RN)  Care Plan - Patient's Chronic Conditions and Co-Morbidity Symptoms are Monitored and Maintained or Improved:   Monitor and assess patient's chronic conditions and comorbid symptoms for stability, deterioration, or improvement   Collaborate with multidisciplinary team to address chronic and comorbid conditions and prevent exacerbation or deterioration   Update acute care plan with appropriate goals if chronic or comorbid symptoms are exacerbated and prevent overall improvement and discharge     Problem: Discharge Planning  Goal: Discharge to home or other facility with appropriate resources  11/22/2024 0755 by Carla Delaney RN  Outcome: Progressing  11/21/2024 1934 by Dilan Lomeli RN  Outcome: Progressing  Flowsheets (Taken 11/21/2024 1927 by Charlene Rizo, RN)  Discharge to home or other facility with appropriate resources:   Identify barriers to discharge with patient and caregiver   Arrange for needed discharge resources and transportation as appropriate     Problem: Safety - Adult  Goal: Free from fall injury  11/22/2024 0755 by Carla Delaney RN  Outcome: Progressing  11/21/2024 2120 by Charlene Rizo

## 2024-11-22 NOTE — FLOWSHEET NOTE
Primary RN SBAR: Carla Delaney RN  Incapacitated Nurse [facility]. provided: Yes  Patient Education provided: Ordered Dialysis Treatment  Preferred Education method and Primary language: Verbal; English  Hospital General Consent Verified: Yes  Hospital associated wait time; reason: N/A  Hepatitis B Surface Ag   Date/Time Value Ref Range Status   11/18/2024 03:01 PM <0.10 Index Final     Hep B S Ag Interp   Date/Time Value Ref Range Status   11/18/2024 03:01 PM Negative NEG   Final     HEP B SURF AB   Date/Time Value Ref Range Status   11/16/2022 01:00 AM 4.25 mIU/mL Final     Hep B S Ab   Date/Time Value Ref Range Status   11/18/2024 03:01 PM 10.52 mIU/mL Final     Comment:     RESULTS REPEATED     Hep B S Ab Interp   Date/Time Value Ref Range Status   11/18/2024 03:01 PM REACTIVE (A) NR   Final     PRE TREATMENT     11/22/24 1110   Observations & Evaluations   Level of Consciousness 0   Oriented X x4   Heart Rhythm Regular   Respiratory Quality/Effort Unlabored   O2 Device None (Room air)   Bilateral Breath Sounds Clear   Skin Color Hyperpigmentation   Skin Condition/Temp Dry;Warm   Appetite Good   Abdomen Inspection Soft   Bowel Sounds (All Quadrants) Active   Last BM (including prior to admit) 11/22/24   Edema None   Vital Signs   BP (!) 140/78   Temp 98.4 °F (36.9 °C)   Pulse 60   Respirations 18   SpO2 98 %   Pain Assessment   Pain Assessment None - Denies Pain   Pain Level 0   Technical Checks   Dialysis Machine No. 09   RO Machine Number 09   Dialyzer Lot No. [id number]   Tubing Lot Number [id number]   All Connections Secure Yes   NS Bag Yes   Saline Line Double Clamped Yes   Dialyzer Nipro ELISIO   Prime Volume (mL) 200 mL   ICEBOAT I;C;E;B;O;A;T   RO Machine Log Sheet Completed Yes   Machine Alarm Self Test Completed;Passed   Air Foam Detector Tested;Proper Function   Extracorporeal Circuit Tested for Integrity Yes   Machine Conductivity 13.8   Bleach Test (Neg) Yes   Bath Temperature 98.6 °F (37 °C)   Treatment  95 %   Pain Assessment   Pain Assessment None - Denies Pain   Pain Level 0   Hemodialysis Fistula/Graft Superior Arm   No placement date or time found.   Present on Admission/Arrival: Yes  Orientation: Superior  Access Location: Arm   Site Assessment Clean, dry & intact   Thrill Present   Bruit Present   Status Deaccessed   Date of Last Dressing Change 11/22/24   Dressing Status New dressing applied   Post-Hemodialysis Assessment   Post-Treatment Procedures Blood returned;Access bleeding time < 10 minutes   Machine Disinfection Process Acid/Vinegar Clean;Heat Disinfect;Exterior Machine Disinfection   Rinseback Volume (ml) 300 ml   Blood Volume Processed (Liters) 78.2 L   Dialyzer Clearance Lightly streaked   Duration of Treatment (minutes)   (3.5 hours)   Hemodialysis Intake (ml) 500 ml   Hemodialysis Output (ml) 1500 ml   NET Removed (ml) 1000   Tolerated Treatment Good   Patient Response to Treatment Tolerated treatment well   Patient Disposition Return to room

## 2024-11-22 NOTE — CARE COORDINATION
11/22/24 1250   Services At/After Discharge   Transition of Care Consult (CM Consult) N/A   Mode of Transport at Discharge Other (see comment)   Confirm Follow Up Transport Family   Condition of Participation: Discharge Planning   The Plan for Transition of Care is related to the following treatment goals: PCP and specialist   Freedom of Choice list was provided with basic dialogue that supports the patient's individualized plan of care/goals, treatment preferences, and shares the quality data associated with the providers?  Yes     Patient for tentative DC today. His wife will come to transport him once she knows he is going. He has refused home health . No further needs from CM. He will follow up with his normal dialysis schedule outpatient on Monday    English Deangelo VALLES CM   [id number]

## 2024-11-22 NOTE — PROGRESS NOTES
Physical Therapy:    Chart reviewed and attempted to see pt. Pt is currently BRANDT for HD. Per nurse, patient has been ambulating into the hallway with SBA. Will defer and continue to follow as appropriate. Thanks.    Dora Doherty, PTA

## 2024-11-22 NOTE — DISCHARGE SUMMARY
Hospitalist Discharge Note    NAME:   Kenny Mace   : 1945   MRN: [id number]     Admit date: 2024    Discharge date: 24    PCP: Ritchie Tejada MD    Discharge Diagnoses:    Acute on chronic systolic congestive heart failure POA LVEF 25-30%    Acute respiratory failure with hypoxia POA room air sats 89%, RR to 28    ESRD on HD    Hyperkalemia K 8.1 --> 4.3 POA    Type II myocardial infarction     Elevated HS troponin 252 --> 240, similar level since 2021    CAD     Generalized weakness ? Related to hyperkalemia and CHF     Diabetes mellitus type 2 causing ESRD     Diabetic Neuropathy     BPH    Body mass index is 33.46 kg/m².    Code Status: Full code      Discharge Medications:  Current Discharge Medication List        START taking these medications    Details   aspirin 81 MG chewable tablet Take 1 tablet by mouth daily           CONTINUE these medications which have NOT CHANGED    Details   calcitRIOL (ROCALTROL) 0.25 MCG capsule Take 3 capsules by mouth Every Monday, Wednesday, and Friday      ondansetron (ZOFRAN-ODT) 4 MG disintegrating tablet Take 1 tablet by mouth Every Monday, Wednesday, and Friday      carvedilol (COREG) 3.125 MG tablet Take 1 tablet by mouth daily  Qty: 60 tablet, Refills: 3      escitalopram (LEXAPRO) 10 MG tablet Take 1 tablet by mouth      atorvastatin (LIPITOR) 40 MG tablet Take 1 tablet by mouth daily      bumetanide (BUMEX) 2 MG tablet Take 1 tablet by mouth 2 times daily      finasteride (PROSCAR) 5 MG tablet Take 1 tablet by mouth daily      gabapentin (NEURONTIN) 300 MG capsule Take 1 capsule by mouth at bedtime.      insulin 70-30 (HUMULIN;NOVOLIN) (70-30) 100 UNIT per ML injection vial Inject into the skin Use sliding scale      tamsulosin (FLOMAX) 0.4 MG capsule Take 2 capsules by mouth daily      polyethylene glycol (MIRALAX) 17 GM/SCOOP powder Take 17 g by mouth as needed           STOP taking these medications       losartan (COZAAR) 25 MG  hours.  PT OT terraal.     Diabetes mellitus type 2  Sliding scale insulin.  A1c 6.2     Neuropathy  Continue with gabapentin.     BPH  Continue with finasteride and Flomax.     Body mass index is 33.46 kg/m².    Code Status: Full code  DVT Prophylaxis: SQ heparin  GI Prophylaxis:  Baseline: Independent from home       History, assessment and plan for 11/22/2024:     Estimated discharge date: 11/22/2024    Needs to be done before discharge:  Cardiac CTA being done today  Final cardiology clearance  Further dialysis through improved hyperkalemia    Reason for physician visit:    \"Okay\".  Discussed with RN events overnight.   Cardiac CTA today  Additional hemodialysis today  Remains on 2 L nasal cannula oxygen  No HA, SOB, cough, CP, abdominal pain, N/V, diarrhea    Medical Decision Making:   I personally reviewed labs: CBC, CMP  I personally reviewed imaging:  I personally reviewed EKG:  Toxic drug monitoring:   Discussed case with:     Total NON critical care TIME:  40  Minutes    Total CRITICAL CARE TIME Spent:   Minutes non procedure based    Objective:     VITALS:   Last 24hrs VS reviewed since prior progress note. Most recent are:  Patient Vitals for the past 24 hrs:   BP Temp Temp src Pulse Resp SpO2 Weight   11/22/24 1315 (!) 102/56 -- -- 60 -- -- --   11/22/24 1300 108/66 -- -- 60 -- -- --   11/22/24 1245 108/61 -- -- 60 -- -- --   11/22/24 1230 107/63 -- -- 60 -- -- --   11/22/24 1215 116/62 -- -- 60 -- -- --   11/22/24 1200 111/62 -- -- 61 -- -- --   11/22/24 1145 117/63 -- -- 60 -- -- --   11/22/24 1130 125/76 -- -- 68 -- -- --   11/22/24 1117 (!) 140/78 98.4 °F (36.9 °C) -- 60 18 95 % --   11/22/24 1110 (!) 140/78 98.4 °F (36.9 °C) -- 60 18 98 % --   11/22/24 1100 118/75 97.8 °F (36.6 °C) Oral 78 14 -- --   11/22/24 0730 123/87 98.6 °F (37 °C) Oral 73 16 -- --   11/22/24 0440 127/75 98 °F (36.7 °C) Oral -- 16 97 % 111.9 kg (246 lb 11.1 oz)   11/21/24 2315 121/74 98 °F (36.7 °C) Oral 78 16 95 % --   11/21/24

## 2024-11-22 NOTE — PLAN OF CARE
Problem: Chronic Conditions and Co-morbidities  Goal: Patient's chronic conditions and co-morbidity symptoms are monitored and maintained or improved  11/21/2024 2120 by Charlene Rizo RN  Outcome: Progressing  11/21/2024 1934 by Dilan Lomeli RN  Outcome: Progressing  11/21/2024 0758 by Dilan Lomeli RN  Outcome: Progressing     Problem: Discharge Planning  Goal: Discharge to home or other facility with appropriate resources  11/21/2024 1934 by Dilan Lomeli RN  Outcome: Progressing  11/21/2024 0758 by Dilan Lomeli RN  Outcome: Progressing     Problem: Safety - Adult  Goal: Free from fall injury  11/21/2024 2120 by Charlene Rizo RN  Outcome: Progressing  11/21/2024 1934 by Dilan Lomeli RN  Outcome: Progressing  11/21/2024 0758 by Dilan Lomeli RN  Outcome: Progressing     Problem: ABCDS Injury Assessment  Goal: Absence of physical injury  11/21/2024 1934 by Dilan Lomeli RN  Outcome: Progressing  11/21/2024 0758 by Dilan Lomeli RN  Outcome: Progressing     Problem: Neurosensory - Adult  Goal: Achieves stable or improved neurological status  11/21/2024 2120 by Charlene Rizo RN  Outcome: Progressing  11/21/2024 1934 by Dilan Lomeli RN  Outcome: Progressing  11/21/2024 0758 by Dilan Lomeli RN  Outcome: Progressing     Problem: Respiratory - Adult  Goal: Achieves optimal ventilation and oxygenation  11/21/2024 2120 by Charlene Rizo RN  Outcome: Progressing  11/21/2024 0758 by Dilan Lomeli RN  Outcome: Progressing     Problem: Cardiovascular - Adult  Goal: Maintains optimal cardiac output and hemodynamic stability  Outcome: Progressing  Goal: Absence of cardiac dysrhythmias or at baseline  Outcome: Progressing     Problem: Skin/Tissue Integrity - Adult  Goal: Skin integrity remains intact  Outcome: Progressing     Problem: Occupational Therapy - Adult  Goal: By Discharge: Performs self-care activities at highest level of function for planned discharge setting.  See evaluation for

## 2024-11-22 NOTE — PROGRESS NOTES
Hospitalist Progress Note    NAME:   Kenny Mace   : 1945   MRN: [id number]     Date: 2024    Patient PCP: Ritchie Tejada MD    Hospital Problem list:     Acute on chronic systolic congestive heart failure POA LVEF   Acute respiratory failure with hypoxia POA room air sats 89%, RR to 28  ESRD on HD  Hyperkalemia K 8.1 POA  Presented with generalized weakness  Hypoxic in ED sats 89% placed on 2 L  Potassium elevated 8.1 on repeat blood draw  Status post 2 g of IV calcium gluconate, albuterol nebs, 5 units of IV regular insulin/glucose, 10 g of oral Lokelma as well.  K improved to 5.0, now 5.6  Will hold lisinopril for now.  Continue with oral Bumex.  Nephrology following, volume removal  Wean off oxygen remained on 2 L nasal cannula this morning     Elevated HS troponin 252 --> 240, similar level since 2021  CAD  Known to have a chronically elevated troponin 212 to 286 since at least 2021  Cardiology has also been consulted.  Continue with Coreg, Lipitor and Bumex.   No idea why he is not on an aspirin a day  Dr. 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    Hopefully we can discharge patient in a.m. if electrolytes okay and cleared by cardiology     Generalized weakness ? Related to hyperkalemia  Initially code stroke was called for the patient.  CT head, CT angiogram brain and neck negative for any acute process.  Continue to monitor in telemetry with neurochecks every 4 hours.  PT OT eval.     Diabetes mellitus type 2  Sliding scale insulin.  A1c 6.2     Neuropathy  Continue with gabapentin.     BPH  Continue with finasteride and Flomax.     Body mass index is 33.25 kg/m².    Code Status: Full code  DVT Prophylaxis: SQ heparin  GI Prophylaxis:  Baseline: Independent from home       History, assessment and

## 2025-02-17 ENCOUNTER — HOSPITAL ENCOUNTER (EMERGENCY)
Facility: HOSPITAL | Age: 80
Discharge: HOME | End: 2025-02-17
Attending: STUDENT IN AN ORGANIZED HEALTH CARE EDUCATION/TRAINING PROGRAM
Payer: MEDICARE

## 2025-02-17 ENCOUNTER — APPOINTMENT (OUTPATIENT)
Facility: HOSPITAL | Age: 80
End: 2025-02-17
Payer: MEDICARE

## 2025-02-17 VITALS
RESPIRATION RATE: 21 BRPM | HEART RATE: 64 BPM | OXYGEN SATURATION: 96 % | HEIGHT: 72 IN | TEMPERATURE: 98.4 F | DIASTOLIC BLOOD PRESSURE: 63 MMHG | WEIGHT: 243 LBS | SYSTOLIC BLOOD PRESSURE: 123 MMHG | BODY MASS INDEX: 32.91 KG/M2

## 2025-02-17 DIAGNOSIS — J11.1 INFLUENZA-LIKE ILLNESS: Primary | ICD-10-CM

## 2025-02-17 DIAGNOSIS — R53.83 FATIGUE, UNSPECIFIED TYPE: ICD-10-CM

## 2025-02-17 DIAGNOSIS — E87.5 HYPERKALEMIA: ICD-10-CM

## 2025-02-17 LAB
ALBUMIN SERPL-MCNC: 3.6 G/DL (ref 3.5–5)
ALBUMIN/GLOB SERPL: 0.7 (ref 1.1–2.2)
ALP SERPL-CCNC: 88 U/L (ref 45–117)
ALT SERPL-CCNC: 20 U/L (ref 12–78)
ANION GAP SERPL CALC-SCNC: 11 MMOL/L (ref 2–12)
AST SERPL-CCNC: 30 U/L (ref 15–37)
BASOPHILS # BLD: 0.04 K/UL (ref 0–0.1)
BASOPHILS NFR BLD: 0.4 % (ref 0–1)
BILIRUB SERPL-MCNC: 0.7 MG/DL (ref 0.2–1)
BUN SERPL-MCNC: 56 MG/DL (ref 6–20)
BUN/CREAT SERPL: 5 (ref 12–20)
CALCIUM SERPL-MCNC: 8.9 MG/DL (ref 8.5–10.1)
CHLORIDE SERPL-SCNC: 95 MMOL/L (ref 97–108)
CO2 SERPL-SCNC: 26 MMOL/L (ref 21–32)
CREAT SERPL-MCNC: 11 MG/DL (ref 0.7–1.3)
DIFFERENTIAL METHOD BLD: ABNORMAL
EOSINOPHIL # BLD: 0.29 K/UL (ref 0–0.4)
EOSINOPHIL NFR BLD: 2.8 % (ref 0–7)
ERYTHROCYTE [DISTWIDTH] IN BLOOD BY AUTOMATED COUNT: 17.9 % (ref 11.5–14.5)
FLUAV RNA SPEC QL NAA+PROBE: NOT DETECTED
FLUBV RNA SPEC QL NAA+PROBE: NOT DETECTED
GLOBULIN SER CALC-MCNC: 5 G/DL (ref 2–4)
GLUCOSE SERPL-MCNC: 164 MG/DL (ref 65–100)
HCT VFR BLD AUTO: 27 % (ref 36.6–50.3)
HGB BLD-MCNC: 9.3 G/DL (ref 12.1–17)
IMM GRANULOCYTES # BLD AUTO: 0.04 K/UL (ref 0–0.04)
IMM GRANULOCYTES NFR BLD AUTO: 0.4 % (ref 0–0.5)
LYMPHOCYTES # BLD: 0.97 K/UL (ref 0.8–3.5)
LYMPHOCYTES NFR BLD: 9.5 % (ref 12–49)
MAGNESIUM SERPL-MCNC: 2.9 MG/DL (ref 1.6–2.4)
MCH RBC QN AUTO: 33 PG (ref 26–34)
MCHC RBC AUTO-ENTMCNC: 34.4 G/DL (ref 30–36.5)
MCV RBC AUTO: 95.7 FL (ref 80–99)
MONOCYTES # BLD: 0.58 K/UL (ref 0–1)
MONOCYTES NFR BLD: 5.7 % (ref 5–13)
NEUTS SEG # BLD: 8.32 K/UL (ref 1.8–8)
NEUTS SEG NFR BLD: 81.2 % (ref 32–75)
NRBC # BLD: 0 K/UL (ref 0–0.01)
NRBC BLD-RTO: 0 PER 100 WBC
PLATELET # BLD AUTO: 282 K/UL (ref 150–400)
PMV BLD AUTO: 12.4 FL (ref 8.9–12.9)
POTASSIUM SERPL-SCNC: 5.9 MMOL/L (ref 3.5–5.1)
PROT SERPL-MCNC: 8.6 G/DL (ref 6.4–8.2)
RBC # BLD AUTO: 2.82 M/UL (ref 4.1–5.7)
SARS-COV-2 RNA RESP QL NAA+PROBE: NOT DETECTED
SODIUM SERPL-SCNC: 132 MMOL/L (ref 136–145)
SOURCE: NORMAL
TROPONIN I SERPL HS-MCNC: 184 NG/L (ref 0–76)
WBC # BLD AUTO: 10.2 K/UL (ref 4.1–11.1)

## 2025-02-17 PROCEDURE — 84484 ASSAY OF TROPONIN QUANT: CPT

## 2025-02-17 PROCEDURE — 85025 COMPLETE CBC W/AUTO DIFF WBC: CPT

## 2025-02-17 PROCEDURE — 99285 EMERGENCY DEPT VISIT HI MDM: CPT

## 2025-02-17 PROCEDURE — 83735 ASSAY OF MAGNESIUM: CPT

## 2025-02-17 PROCEDURE — 87636 SARSCOV2 & INF A&B AMP PRB: CPT

## 2025-02-17 PROCEDURE — 6360000002 HC RX W HCPCS: Performed by: STUDENT IN AN ORGANIZED HEALTH CARE EDUCATION/TRAINING PROGRAM

## 2025-02-17 PROCEDURE — 6370000000 HC RX 637 (ALT 250 FOR IP): Performed by: STUDENT IN AN ORGANIZED HEALTH CARE EDUCATION/TRAINING PROGRAM

## 2025-02-17 PROCEDURE — 71046 X-RAY EXAM CHEST 2 VIEWS: CPT

## 2025-02-17 PROCEDURE — 96374 THER/PROPH/DIAG INJ IV PUSH: CPT

## 2025-02-17 PROCEDURE — 93005 ELECTROCARDIOGRAM TRACING: CPT | Performed by: STUDENT IN AN ORGANIZED HEALTH CARE EDUCATION/TRAINING PROGRAM

## 2025-02-17 PROCEDURE — 80053 COMPREHEN METABOLIC PANEL: CPT

## 2025-02-17 PROCEDURE — 36415 COLL VENOUS BLD VENIPUNCTURE: CPT

## 2025-02-17 PROCEDURE — 94640 AIRWAY INHALATION TREATMENT: CPT

## 2025-02-17 RX ORDER — ACETAMINOPHEN 500 MG
1000 TABLET ORAL
Status: COMPLETED | OUTPATIENT
Start: 2025-02-17 | End: 2025-02-17

## 2025-02-17 RX ORDER — ONDANSETRON 2 MG/ML
4 INJECTION INTRAMUSCULAR; INTRAVENOUS ONCE
Status: COMPLETED | OUTPATIENT
Start: 2025-02-17 | End: 2025-02-17

## 2025-02-17 RX ORDER — ONDANSETRON 4 MG/1
4 TABLET, ORALLY DISINTEGRATING ORAL 3 TIMES DAILY PRN
Qty: 21 TABLET | Refills: 0 | Status: SHIPPED | OUTPATIENT
Start: 2025-02-17

## 2025-02-17 RX ORDER — IPRATROPIUM BROMIDE AND ALBUTEROL SULFATE 2.5; .5 MG/3ML; MG/3ML
1 SOLUTION RESPIRATORY (INHALATION)
Status: COMPLETED | OUTPATIENT
Start: 2025-02-17 | End: 2025-02-17

## 2025-02-17 RX ORDER — ACETAMINOPHEN 500 MG
1000 TABLET ORAL 3 TIMES DAILY PRN
Qty: 100 TABLET | Refills: 0 | Status: SHIPPED | OUTPATIENT
Start: 2025-02-17

## 2025-02-17 RX ADMIN — IPRATROPIUM BROMIDE AND ALBUTEROL SULFATE 1 DOSE: .5; 3 SOLUTION RESPIRATORY (INHALATION) at 13:41

## 2025-02-17 RX ADMIN — ONDANSETRON 4 MG: 2 INJECTION, SOLUTION INTRAMUSCULAR; INTRAVENOUS at 13:18

## 2025-02-17 RX ADMIN — ACETAMINOPHEN 1000 MG: 500 TABLET, FILM COATED ORAL at 13:18

## 2025-02-17 RX ADMIN — SODIUM ZIRCONIUM CYCLOSILICATE 5 G: 5 POWDER, FOR SUSPENSION ORAL at 15:06

## 2025-02-17 RX ADMIN — SODIUM ZIRCONIUM CYCLOSILICATE 10 G: 10 POWDER, FOR SUSPENSION ORAL at 13:19

## 2025-02-17 ASSESSMENT — LIFESTYLE VARIABLES
HOW MANY STANDARD DRINKS CONTAINING ALCOHOL DO YOU HAVE ON A TYPICAL DAY: PATIENT DOES NOT DRINK
HOW OFTEN DO YOU HAVE A DRINK CONTAINING ALCOHOL: NEVER

## 2025-02-17 ASSESSMENT — PAIN SCALES - GENERAL: PAINLEVEL_OUTOF10: 10

## 2025-02-17 ASSESSMENT — PAIN DESCRIPTION - LOCATION: LOCATION: GENERALIZED

## 2025-02-17 ASSESSMENT — PAIN DESCRIPTION - DESCRIPTORS: DESCRIPTORS: ACHING

## 2025-02-17 ASSESSMENT — PAIN - FUNCTIONAL ASSESSMENT: PAIN_FUNCTIONAL_ASSESSMENT: PREVENTS OR INTERFERES SOME ACTIVE ACTIVITIES AND ADLS

## 2025-02-17 NOTE — ED NOTES
Discharge instructions discussed with patient and family at this time who both verbalize understanding.  PT. Stable for discharge.

## 2025-02-17 NOTE — DISCHARGE INSTRUCTIONS
Please take the Lokelma prescribed to you tomorrow.  Please go to dialysis on Wednesday morning.  If you have any new or worsening symptoms please return to the emergency room.  Please also talk to your nephrologist

## 2025-02-17 NOTE — PROGRESS NOTES
Spoke to ED Physician. Noted potassium of 5.8. Patient was offered hemodialysis which He refused to get dialyzed in the hospital. He would like to go back to his Dialysis Unit to get dialyzed .Advice to give a lokelma now and also give a prescription for lokelma to be used at home. Case was discussed in detail with ED Physician.

## 2025-02-17 NOTE — ED PROVIDER NOTES
[facility] EMERGENCY DEPARTMENT  EMERGENCY DEPARTMENT ENCOUNTER       Pt Name: Kenny Mace  MRN: [id number]  Birthdate 1945  Date of evaluation: 2/17/2025  Provider: José Luis Guthrie MD   PCP: Ritchie Tejada MD  Note Started: 1:22 PM EST 2/17/25     CHIEF COMPLAINT       Chief Complaint   Patient presents with    Fatigue     Patient wheeled into triage c/o generalized weakness/fatigue x2 weeks. Pt reports \"I'm getting over a cold.\" Denies falls. Hx HTN, DM2, CAD, ESRD on HD. Patient HD on M,W,F. Last dialysis on Friday. Per pt, last time he felt like this his potassium was high        HISTORY OF PRESENT ILLNESS: 1 or more elements      History From: Patient  HPI Limitations: None     Kenny Mace is a 79 y.o. male who presents  reports feeling unwell since last Thursday, initially presenting with cold symptoms. The illness has progressed to the point where the patient feels too weak to walk. The patient denies sore throat but reports mild sinus congestion, dry cough, body aches, nausea. They have experienced emesis but deny diarrhea. The patient's appetite has been fluctuating. They also report dyspnea.    The patient is on hemodialysis and missed their scheduled session today. Their last dialysis treatment was on Friday. They continue to have urine output and are taking Bumex as a diuretic.  Patient denies urinary symptoms, abdominal pain, chest pain, syncope.  No medications taken for symptoms prior to arrival.    Medical History  - End-stage renal disease (on dialysis)  - History of smoking (quit 15 years ago)       Nursing Notes were all reviewed and agreed with or any disagreements were addressed in the HPI.     REVIEW OF SYSTEMS      Review of Systems     Positives and Pertinent negatives as per HPI.    PAST HISTORY     Past Medical History:  Past Medical History:   Diagnosis Date    Arthritis     spine    CAD (coronary artery disease)     high cholesterol    Chronic kidney disease

## 2025-02-18 LAB
EKG ATRIAL RATE: 75 BPM
EKG DIAGNOSIS: NORMAL
EKG P-R INTERVAL: 170 MS
EKG Q-T INTERVAL: 428 MS
EKG QRS DURATION: 132 MS
EKG QTC CALCULATION (BAZETT): 477 MS
EKG R AXIS: -49 DEGREES
EKG T AXIS: 122 DEGREES
EKG VENTRICULAR RATE: 75 BPM

## 2025-03-05 ENCOUNTER — APPOINTMENT (OUTPATIENT)
Facility: HOSPITAL | Age: 80
DRG: 286 | End: 2025-03-05
Payer: MEDICARE

## 2025-03-05 ENCOUNTER — HOSPITAL ENCOUNTER (INPATIENT)
Facility: HOSPITAL | Age: 80
LOS: 1 days | Discharge: HOME | DRG: 286 | End: 2025-03-06
Attending: EMERGENCY MEDICINE | Admitting: STUDENT IN AN ORGANIZED HEALTH CARE EDUCATION/TRAINING PROGRAM
Payer: MEDICARE

## 2025-03-05 DIAGNOSIS — N18.6 ESRD (END STAGE RENAL DISEASE) (HCC): ICD-10-CM

## 2025-03-05 DIAGNOSIS — R07.9 ACUTE CHEST PAIN: ICD-10-CM

## 2025-03-05 DIAGNOSIS — I25.10 CAD (CORONARY ARTERY DISEASE): ICD-10-CM

## 2025-03-05 DIAGNOSIS — I21.4 NSTEMI (NON-ST ELEVATED MYOCARDIAL INFARCTION) (HCC): ICD-10-CM

## 2025-03-05 DIAGNOSIS — I47.20 VENTRICULAR TACHYCARDIA (HCC): Primary | ICD-10-CM

## 2025-03-05 LAB
ALBUMIN SERPL-MCNC: 3.7 G/DL (ref 3.5–5)
ALBUMIN/GLOB SERPL: 0.8 (ref 1.1–2.2)
ALP SERPL-CCNC: 72 U/L (ref 45–117)
ALT SERPL-CCNC: 17 U/L (ref 12–78)
ANION GAP SERPL CALC-SCNC: 5 MMOL/L (ref 2–12)
AST SERPL-CCNC: 7 U/L (ref 15–37)
BASOPHILS # BLD: 0.07 K/UL (ref 0–0.1)
BASOPHILS NFR BLD: 1.1 % (ref 0–1)
BILIRUB SERPL-MCNC: 0.6 MG/DL (ref 0.2–1)
BUN SERPL-MCNC: 39 MG/DL (ref 6–20)
BUN/CREAT SERPL: 4 (ref 12–20)
CALCIUM SERPL-MCNC: 9.6 MG/DL (ref 8.5–10.1)
CHLORIDE SERPL-SCNC: 97 MMOL/L (ref 97–108)
CO2 SERPL-SCNC: 31 MMOL/L (ref 21–32)
CREAT SERPL-MCNC: 8.68 MG/DL (ref 0.7–1.3)
DIFFERENTIAL METHOD BLD: ABNORMAL
EOSINOPHIL # BLD: 0.36 K/UL (ref 0–0.4)
EOSINOPHIL NFR BLD: 5.5 % (ref 0–7)
ERYTHROCYTE [DISTWIDTH] IN BLOOD BY AUTOMATED COUNT: 17.5 % (ref 11.5–14.5)
GLOBULIN SER CALC-MCNC: 4.7 G/DL (ref 2–4)
GLUCOSE BLD STRIP.AUTO-MCNC: 129 MG/DL (ref 65–117)
GLUCOSE BLD STRIP.AUTO-MCNC: 147 MG/DL (ref 65–117)
GLUCOSE SERPL-MCNC: 140 MG/DL (ref 65–100)
HCT VFR BLD AUTO: 26.6 % (ref 36.6–50.3)
HGB BLD-MCNC: 8.7 G/DL (ref 12.1–17)
IMM GRANULOCYTES # BLD AUTO: 0.02 K/UL (ref 0–0.04)
IMM GRANULOCYTES NFR BLD AUTO: 0.3 % (ref 0–0.5)
LYMPHOCYTES # BLD: 1.13 K/UL (ref 0.8–3.5)
LYMPHOCYTES NFR BLD: 17.4 % (ref 12–49)
MCH RBC QN AUTO: 33 PG (ref 26–34)
MCHC RBC AUTO-ENTMCNC: 32.7 G/DL (ref 30–36.5)
MCV RBC AUTO: 100.8 FL (ref 80–99)
MONOCYTES # BLD: 0.42 K/UL (ref 0–1)
MONOCYTES NFR BLD: 6.5 % (ref 5–13)
NEUTS SEG # BLD: 4.51 K/UL (ref 1.8–8)
NEUTS SEG NFR BLD: 69.2 % (ref 32–75)
NRBC # BLD: 0 K/UL (ref 0–0.01)
NRBC BLD-RTO: 0 PER 100 WBC
PLATELET # BLD AUTO: 239 K/UL (ref 150–400)
PMV BLD AUTO: 11.8 FL (ref 8.9–12.9)
POTASSIUM SERPL-SCNC: 4.5 MMOL/L (ref 3.5–5.1)
PROT SERPL-MCNC: 8.4 G/DL (ref 6.4–8.2)
RBC # BLD AUTO: 2.64 M/UL (ref 4.1–5.7)
SERVICE CMNT-IMP: ABNORMAL
SERVICE CMNT-IMP: ABNORMAL
SODIUM SERPL-SCNC: 133 MMOL/L (ref 136–145)
TROPONIN I SERPL HS-MCNC: 182 NG/L (ref 0–76)
TROPONIN I SERPL HS-MCNC: <4 NG/L (ref 0–76)
WBC # BLD AUTO: 6.5 K/UL (ref 4.1–11.1)

## 2025-03-05 PROCEDURE — 85025 COMPLETE CBC W/AUTO DIFF WBC: CPT

## 2025-03-05 PROCEDURE — 99285 EMERGENCY DEPT VISIT HI MDM: CPT

## 2025-03-05 PROCEDURE — 84484 ASSAY OF TROPONIN QUANT: CPT

## 2025-03-05 PROCEDURE — 2060000000 HC ICU INTERMEDIATE R&B

## 2025-03-05 PROCEDURE — 80053 COMPREHEN METABOLIC PANEL: CPT

## 2025-03-05 PROCEDURE — 6370000000 HC RX 637 (ALT 250 FOR IP): Performed by: STUDENT IN AN ORGANIZED HEALTH CARE EDUCATION/TRAINING PROGRAM

## 2025-03-05 PROCEDURE — 93005 ELECTROCARDIOGRAM TRACING: CPT | Performed by: EMERGENCY MEDICINE

## 2025-03-05 PROCEDURE — 82962 GLUCOSE BLOOD TEST: CPT

## 2025-03-05 PROCEDURE — 6360000002 HC RX W HCPCS: Performed by: INTERNAL MEDICINE

## 2025-03-05 PROCEDURE — 71275 CT ANGIOGRAPHY CHEST: CPT

## 2025-03-05 PROCEDURE — 2500000003 HC RX 250 WO HCPCS: Performed by: STUDENT IN AN ORGANIZED HEALTH CARE EDUCATION/TRAINING PROGRAM

## 2025-03-05 PROCEDURE — 6360000002 HC RX W HCPCS: Performed by: STUDENT IN AN ORGANIZED HEALTH CARE EDUCATION/TRAINING PROGRAM

## 2025-03-05 PROCEDURE — 6360000004 HC RX CONTRAST MEDICATION: Performed by: EMERGENCY MEDICINE

## 2025-03-05 PROCEDURE — 36415 COLL VENOUS BLD VENIPUNCTURE: CPT

## 2025-03-05 RX ORDER — ACETAMINOPHEN 650 MG/1
650 SUPPOSITORY RECTAL EVERY 6 HOURS PRN
Status: DISCONTINUED | OUTPATIENT
Start: 2025-03-05 | End: 2025-03-06 | Stop reason: HOSPADM

## 2025-03-05 RX ORDER — GUAIFENESIN 200 MG/10ML
200 LIQUID ORAL EVERY 4 HOURS PRN
Status: DISCONTINUED | OUTPATIENT
Start: 2025-03-05 | End: 2025-03-06

## 2025-03-05 RX ORDER — IOPAMIDOL 755 MG/ML
100 INJECTION, SOLUTION INTRAVASCULAR
Status: COMPLETED | OUTPATIENT
Start: 2025-03-05 | End: 2025-03-05

## 2025-03-05 RX ORDER — ESCITALOPRAM OXALATE 10 MG/1
10 TABLET ORAL DAILY
Status: DISCONTINUED | OUTPATIENT
Start: 2025-03-05 | End: 2025-03-06 | Stop reason: HOSPADM

## 2025-03-05 RX ORDER — ASPIRIN 81 MG/1
81 TABLET, CHEWABLE ORAL DAILY
Status: DISCONTINUED | OUTPATIENT
Start: 2025-03-05 | End: 2025-03-06 | Stop reason: HOSPADM

## 2025-03-05 RX ORDER — GABAPENTIN 300 MG/1
300 CAPSULE ORAL NIGHTLY
Status: DISCONTINUED | OUTPATIENT
Start: 2025-03-05 | End: 2025-03-06 | Stop reason: HOSPADM

## 2025-03-05 RX ORDER — FINASTERIDE 5 MG/1
5 TABLET, FILM COATED ORAL DAILY
Status: DISCONTINUED | OUTPATIENT
Start: 2025-03-06 | End: 2025-03-06 | Stop reason: HOSPADM

## 2025-03-05 RX ORDER — BUMETANIDE 1 MG/1
2 TABLET ORAL 2 TIMES DAILY
Status: DISCONTINUED | OUTPATIENT
Start: 2025-03-05 | End: 2025-03-06 | Stop reason: HOSPADM

## 2025-03-05 RX ORDER — HEPARIN SODIUM 5000 [USP'U]/ML
5000 INJECTION, SOLUTION INTRAVENOUS; SUBCUTANEOUS EVERY 8 HOURS SCHEDULED
Status: DISCONTINUED | OUTPATIENT
Start: 2025-03-05 | End: 2025-03-06 | Stop reason: HOSPADM

## 2025-03-05 RX ORDER — ONDANSETRON 4 MG/1
4 TABLET, ORALLY DISINTEGRATING ORAL EVERY 8 HOURS PRN
Status: DISCONTINUED | OUTPATIENT
Start: 2025-03-05 | End: 2025-03-06

## 2025-03-05 RX ORDER — CALCITRIOL 0.25 UG/1
0.75 CAPSULE, LIQUID FILLED ORAL
Status: DISCONTINUED | OUTPATIENT
Start: 2025-03-05 | End: 2025-03-06 | Stop reason: HOSPADM

## 2025-03-05 RX ORDER — LIDOCAINE 4 G/G
1 PATCH TOPICAL DAILY
Status: DISCONTINUED | OUTPATIENT
Start: 2025-03-05 | End: 2025-03-06 | Stop reason: HOSPADM

## 2025-03-05 RX ORDER — LABETALOL HYDROCHLORIDE 5 MG/ML
10 INJECTION, SOLUTION INTRAVENOUS EVERY 6 HOURS PRN
Status: DISCONTINUED | OUTPATIENT
Start: 2025-03-05 | End: 2025-03-06

## 2025-03-05 RX ORDER — INSULIN LISPRO 100 [IU]/ML
0-4 INJECTION, SOLUTION INTRAVENOUS; SUBCUTANEOUS
Status: DISCONTINUED | OUTPATIENT
Start: 2025-03-05 | End: 2025-03-06 | Stop reason: HOSPADM

## 2025-03-05 RX ORDER — SODIUM CHLORIDE 0.9 % (FLUSH) 0.9 %
5-40 SYRINGE (ML) INJECTION EVERY 12 HOURS SCHEDULED
Status: DISCONTINUED | OUTPATIENT
Start: 2025-03-05 | End: 2025-03-06 | Stop reason: HOSPADM

## 2025-03-05 RX ORDER — ISOSORBIDE MONONITRATE 30 MG/1
30 TABLET, EXTENDED RELEASE ORAL DAILY
Status: DISCONTINUED | OUTPATIENT
Start: 2025-03-05 | End: 2025-03-06 | Stop reason: HOSPADM

## 2025-03-05 RX ORDER — ALUMINA, MAGNESIA, AND SIMETHICONE 2400; 2400; 240 MG/30ML; MG/30ML; MG/30ML
15 SUSPENSION ORAL EVERY 6 HOURS PRN
Status: DISCONTINUED | OUTPATIENT
Start: 2025-03-05 | End: 2025-03-06

## 2025-03-05 RX ORDER — POLYETHYLENE GLYCOL 3350 17 G/17G
17 POWDER, FOR SOLUTION ORAL DAILY PRN
Status: DISCONTINUED | OUTPATIENT
Start: 2025-03-05 | End: 2025-03-06

## 2025-03-05 RX ORDER — GLUCAGON 1 MG/ML
1 KIT INJECTION PRN
Status: DISCONTINUED | OUTPATIENT
Start: 2025-03-05 | End: 2025-03-06 | Stop reason: HOSPADM

## 2025-03-05 RX ORDER — SODIUM CHLORIDE 0.9 % (FLUSH) 0.9 %
5-40 SYRINGE (ML) INJECTION PRN
Status: DISCONTINUED | OUTPATIENT
Start: 2025-03-05 | End: 2025-03-06 | Stop reason: HOSPADM

## 2025-03-05 RX ORDER — ACETAMINOPHEN 325 MG/1
650 TABLET ORAL EVERY 6 HOURS PRN
Status: DISCONTINUED | OUTPATIENT
Start: 2025-03-05 | End: 2025-03-06 | Stop reason: HOSPADM

## 2025-03-05 RX ORDER — SODIUM CHLORIDE 9 MG/ML
INJECTION, SOLUTION INTRAVENOUS PRN
Status: DISCONTINUED | OUTPATIENT
Start: 2025-03-05 | End: 2025-03-06 | Stop reason: HOSPADM

## 2025-03-05 RX ORDER — ONDANSETRON 2 MG/ML
4 INJECTION INTRAMUSCULAR; INTRAVENOUS EVERY 6 HOURS PRN
Status: DISCONTINUED | OUTPATIENT
Start: 2025-03-05 | End: 2025-03-06

## 2025-03-05 RX ORDER — ATORVASTATIN CALCIUM 40 MG/1
40 TABLET, FILM COATED ORAL DAILY
Status: DISCONTINUED | OUTPATIENT
Start: 2025-03-05 | End: 2025-03-06 | Stop reason: HOSPADM

## 2025-03-05 RX ORDER — TAMSULOSIN HYDROCHLORIDE 0.4 MG/1
0.8 CAPSULE ORAL DAILY
Status: DISCONTINUED | OUTPATIENT
Start: 2025-03-05 | End: 2025-03-06 | Stop reason: HOSPADM

## 2025-03-05 RX ORDER — DEXTROSE MONOHYDRATE 100 MG/ML
INJECTION, SOLUTION INTRAVENOUS CONTINUOUS PRN
Status: DISCONTINUED | OUTPATIENT
Start: 2025-03-05 | End: 2025-03-06 | Stop reason: HOSPADM

## 2025-03-05 RX ORDER — CARVEDILOL 6.25 MG/1
6.25 TABLET ORAL 2 TIMES DAILY WITH MEALS
Status: DISCONTINUED | OUTPATIENT
Start: 2025-03-05 | End: 2025-03-06 | Stop reason: HOSPADM

## 2025-03-05 RX ADMIN — HEPARIN SODIUM 5000 UNITS: 5000 INJECTION INTRAVENOUS; SUBCUTANEOUS at 16:23

## 2025-03-05 RX ADMIN — SODIUM CHLORIDE, PRESERVATIVE FREE 10 ML: 5 INJECTION INTRAVENOUS at 21:06

## 2025-03-05 RX ADMIN — IOPAMIDOL 100 ML: 755 INJECTION, SOLUTION INTRAVENOUS at 14:56

## 2025-03-05 RX ADMIN — TAMSULOSIN HYDROCHLORIDE 0.8 MG: 0.4 CAPSULE ORAL at 16:21

## 2025-03-05 RX ADMIN — HEPARIN SODIUM 5000 UNITS: 5000 INJECTION INTRAVENOUS; SUBCUTANEOUS at 21:02

## 2025-03-05 RX ADMIN — ISOSORBIDE MONONITRATE 30 MG: 30 TABLET, EXTENDED RELEASE ORAL at 16:21

## 2025-03-05 RX ADMIN — ATORVASTATIN CALCIUM 40 MG: 40 TABLET, FILM COATED ORAL at 16:21

## 2025-03-05 RX ADMIN — ESCITALOPRAM OXALATE 10 MG: 10 TABLET ORAL at 16:21

## 2025-03-05 RX ADMIN — EPOETIN ALFA-EPBX 10000 UNITS: 10000 INJECTION, SOLUTION INTRAVENOUS; SUBCUTANEOUS at 23:37

## 2025-03-05 RX ADMIN — GABAPENTIN 300 MG: 300 CAPSULE ORAL at 21:02

## 2025-03-05 RX ADMIN — ASPIRIN 81 MG: 81 TABLET, CHEWABLE ORAL at 16:21

## 2025-03-05 RX ADMIN — CARVEDILOL 6.25 MG: 6.25 TABLET, FILM COATED ORAL at 16:21

## 2025-03-05 RX ADMIN — BUMETANIDE 2 MG: 1 TABLET ORAL at 21:02

## 2025-03-05 ASSESSMENT — LIFESTYLE VARIABLES
HOW OFTEN DO YOU HAVE A DRINK CONTAINING ALCOHOL: NEVER
HOW MANY STANDARD DRINKS CONTAINING ALCOHOL DO YOU HAVE ON A TYPICAL DAY: PATIENT DOES NOT DRINK

## 2025-03-05 ASSESSMENT — HEART SCORE: ECG: NON-SPECIFC REPOLARIZATION DISTURBANCE/LBTB/PM

## 2025-03-05 NOTE — ED TRIAGE NOTES
Triage: Patient arrives by EMS from [facility]. Patient presented there with chest pain and shortness of breath. Normal troponin. Dr. Mcnulty is cardiologist and is here and was transferred for follow up. M-W-FR dialysis. No dialysis today.     Upon transfer from stretcher to EMS stretcher had 10 beat run of VTACH. Provider at [facility] wanted to continue with transfer. In route, patient had 9 minute run of VTACH. Got 150mg bolus AMIO that completed on arrival to ED. Patient slightly diaphoretic.  Has nitro on left chest.

## 2025-03-05 NOTE — CONSULTS
IP Cardiology Consult       Date of consult:  03/05/25  Date of admission: 3/5/2025  Primary Cardiologist: Dr Mcnulty   Consult requested by Dr Santa       Assessment:    Chest pain, unclear etiology    Dyspnea on exertion    NSVT    Nonobstructive CAD by coronary CTA in 11/2024    Problem list: per clinic note 12/2025    Systolic heart failure, dilated cardiomyopathy with ejection fraction 25-30%   status post AICD in 08/2024 with Dr Harrison     PET scan in 07/2024 shows no ischemia.  LV ejection fraction 20%    History of abnormal MPI in 01/2018 -with mireya-lateral reversibility using pharmacological stress, consistent with branch vessel distribution ischemia. Reviewed with Dr. Miranda and pt elected medical management.     - Hypertension.  - Hyperlipidemia.  - DM.  - End stage CKD. Followed by Dr. Denilson Mcnulty.          Dialysis on MWF.  - Hx of tobacco  - obstructive sleep apnea, not using CPAP  - back issues    Retired, walks with walker  Seen by Dr Shrestha before    retired, used to work as a     Coronary CTA 11/2024     1. Left main originates normally from left coronary cusp and is normal in size  without any significant atherosclerotic plaque or calcification.  2. The LAD is normal in size. Proximal and midportion of the vessel have  significant focal areas of calcification limiting interpretation. There is at  least 50-60 % moderate coronary stenosis. 1 major diagonal branch is visualized  that is angiographically unremarkable  3. The left circumflex is normal in size vessel with moderate 40 to 50% stenosis  in the mid segment of the vessel which is partially calcified.   4. The RCA is a large caliber vessel that arises from the left coronary artery.  There is minimal less than 25% calcified and noncalcified stenosis in the  proximal portions of the vessel. A very short segment in the mid portion of the  vessel is obscured by blooming artifact from the cardiac device leads in

## 2025-03-05 NOTE — ED NOTES
[employer] not picking up for interrogation. Patient states this AICD was placed Jan 2025. Waiting for cardiology to call back to follow up if this is correct.

## 2025-03-05 NOTE — H&P
Hospitalist Admission Note    NAME:   Kenny Mace   : 1945   MRN: [id number]     Date/Time: 3/5/2025 5:16 PM    Patient PCP: Ritchie Tejada MD    ______________________________________________________________________  Given the patient's current clinical presentation, I have a high level of concern for decompensation if discharged from the emergency department.  Complex decision making was performed, which includes reviewing the patient's available past medical records, laboratory results, and x-ray films.       My assessment of this patient's clinical condition and my plan of care is as follows.    Assessment / Plan:  Chest pain  History of CAD  -Atypical, pleuritic, reproducible on palpation.  EKG with nonspecific ST/T wave abnormalities.  -Cardiology consulted, patient has recent coronary CTA with nonobstructive CAD, recommend CTA chest to rule out PE, which has been ordered.  Follow-up further recommendations.  -Continue aspirin, Lipitor, telemetry monitoring.  Will place lidocaine patch    Chronic CHFrEF 25 to 30% not in acute exacerbation  NSVT  -Interrogation of device as per cardiology, continue home Bumex 2 mg twice daily.  Continue Coreg.    Hypertension  -Continue Coreg, dose increased to 6.25 mg twice daily as per cardiology.  Not on ACE/ARB due to history of severe hyperkalemia.  Will start on Imdur 30 mg daily.  As needed labetalol    ESRD  -Nephrology consulted, follow-up recommendations    GERD  -As needed Mylanta    BPH  -Flomax, finasteride    Mood disorder  -Continue Lexapro    Diabetes mellitus  -Low sliding scale insulin 3 times daily AC at bedtime, monitor for hypoglycemia    Chronic respiratory failure  -Continue home 2 L O2 nasal cannula, monitor                            Medical Decision Making:   I personally reviewed labs: CBC, CMP, troponins  I personally reviewed imaging: Chest x-ray  I personally reviewed EKG: Normal sinus rhythm  Toxic drug monitoring: Insulin, monitor

## 2025-03-05 NOTE — ED NOTES
Dr. Mcnulty saw patient. He is trying to get in touch with [employer] for further interrogation.

## 2025-03-05 NOTE — ED PROVIDER NOTES
[facility] EMERGENCY DEPARTMENT  EMERGENCY DEPARTMENT ENCOUNTER       Pt Name: Kenny Mace  MRN: [id number]  Birthdate 1945  Date of evaluation: 3/5/2025  Provider: Светлана Santa MD   PCP: Ritchie Tejada MD  Note Started: 12:57 PM EST 3/5/25     CHIEF COMPLAINT       Chief Complaint   Patient presents with    Chest Pain    Irregular Heart Beat        HISTORY OF PRESENT ILLNESS: 1 or more elements      History From: Patient, EMS and medical records, History limited by: none     Kenny Mace is a 79 y.o. male patient with history of end-stage renal disease, coronary artery disease, diabetes, hypertension, here for chest pain and V. tach.  Patient has an AICD.  He went to [place] ER today for chest pain which started last night.  Chest pain would last for an hour at a time, involve the left chest and did not radiate.  Was associated with shortness of breath, but no nausea or diaphoresis.  His troponins were 0.08 and 0.07, EKG showed new T inversions V3-V6, chest x-ray showed cardiomegaly and ICD, his potassium was 4.3.  He was due for dialysis today.  Chest pain resolved with Nitropaste.  Decision was already made to transfer him here, because his cardiologist is at this facility.  When  EMS picked him up, the patient went into V tach for a total of  9 minutes.  EMS gave him an amiodarone bolus.  They note that his ICD did not fire.  Patient denies any chest pain or shortness of breath currently.  He denies leg pain, cough, fever       Please See MDM for Additional Details of the HPI/PMH  Nursing Notes were all reviewed and agreed with or any disagreements were addressed in the HPI.     REVIEW OF SYSTEMS        Positives and Pertinent negatives as per HPI.    PAST HISTORY     Past Medical History:  Past Medical History:   Diagnosis Date    Arthritis     spine    CAD (coronary artery disease)     high cholesterol    Chronic kidney disease     dialysis    Diabetes (HCC)     ESRD (end stage renal  inadvertently transcribed by the computer software. Please disregards these errors. Please excuse any errors that have escaped final proofreading.)        Светлана Santa MD  03/05/25 3965

## 2025-03-05 NOTE — PROGRESS NOTES
Nephrology Progress Note  [facility]  [address], [address]  [place] [address]  Phone - [phone]  Fax - [phone]                   Patient: Kenny Mace                     YOB: 1945        Date- 3/5/2025                                     Admit Date: 3/5/2025   CC: Follow up for ESRD          IMPRESSION & PLAN:   Esrd --- [facility] unit--mwf, [facility] nephrology  Chest pain  Hyponatremia  Anemia of CKD  Secondary HPT  Elevated troponins       PLAN-  Plan for HD analia Barillas has been notified  We will keep him on Monday Wednesday Friday schedule     Subjective:  Interval History:   -Seen and examined today in the ED.  Came for chest pain.  Seen by cardiology.  Renal consult was requested for hemodialysis needs.  He was supposed to go for dialysis today at his unit at [facility] but decided to come to the ER for ongoing chest pain and missed treatment today.    Objective:   Vitals:    03/05/25 1315 03/05/25 1330 03/05/25 1400 03/05/25 1500   BP:   (!) 181/83 (!) 180/95   Pulse: 71 76 69 74   Resp: 21 23 22 17   Temp:       TempSrc:       SpO2: 100% 96% 97% 98%   Weight:       Height:          No intake/output data recorded.  No intake/output data recorded.      Physical exam:    GEN: NAD  NECK- no mass  RESP: No wheezing, decreased BS b/l  CVS: S1,S2  RRR  NEURO: Normal speech, Non focal  EXT: No Edema   PSYCH: Normal Mood  HD Access: Left upper extremity AV graft    Chart reviewed.         Pertinent Notes reviewed.       Data Review :  Lab Results   Component Value Date/Time     03/05/2025 12:47 PM    K 4.5 03/05/2025 12:47 PM    CL 97 03/05/2025 12:47 PM    CO2 31 03/05/2025 12:47 PM    BUN 39 03/05/2025 12:47 PM    CREATININE 8.68 03/05/2025 12:47 PM    GLUCOSE 140 03/05/2025 12:47 PM    CALCIUM 9.6 03/05/2025 12:47 PM       Lab Results   Component Value Date    WBC 6.5 03/05/2025    HGB

## 2025-03-06 VITALS
OXYGEN SATURATION: 100 % | HEIGHT: 72 IN | DIASTOLIC BLOOD PRESSURE: 95 MMHG | BODY MASS INDEX: 33.18 KG/M2 | HEART RATE: 69 BPM | RESPIRATION RATE: 16 BRPM | WEIGHT: 245 LBS | TEMPERATURE: 98.4 F | SYSTOLIC BLOOD PRESSURE: 159 MMHG

## 2025-03-06 PROBLEM — I47.20 VENTRICULAR TACHYCARDIA (HCC): Status: RESOLVED | Noted: 2025-03-05 | Resolved: 2025-03-06

## 2025-03-06 LAB
ANION GAP SERPL CALC-SCNC: 7 MMOL/L (ref 2–12)
BUN SERPL-MCNC: 48 MG/DL (ref 6–20)
BUN/CREAT SERPL: 5 (ref 12–20)
CALCIUM SERPL-MCNC: 9.1 MG/DL (ref 8.5–10.1)
CHLORIDE SERPL-SCNC: 98 MMOL/L (ref 97–108)
CO2 SERPL-SCNC: 28 MMOL/L (ref 21–32)
CREAT SERPL-MCNC: 9.45 MG/DL (ref 0.7–1.3)
ECHO BSA: 2.38 M2
ERYTHROCYTE [DISTWIDTH] IN BLOOD BY AUTOMATED COUNT: 17.1 % (ref 11.5–14.5)
EST. AVERAGE GLUCOSE BLD GHB EST-MCNC: 120 MG/DL
GLUCOSE BLD STRIP.AUTO-MCNC: 110 MG/DL (ref 65–117)
GLUCOSE BLD STRIP.AUTO-MCNC: 127 MG/DL (ref 65–117)
GLUCOSE BLD STRIP.AUTO-MCNC: 195 MG/DL (ref 65–117)
GLUCOSE BLD STRIP.AUTO-MCNC: 96 MG/DL (ref 65–117)
GLUCOSE SERPL-MCNC: 100 MG/DL (ref 65–100)
HBA1C MFR BLD: 5.8 % (ref 4–5.6)
HCT VFR BLD AUTO: 23.5 % (ref 36.6–50.3)
HGB BLD-MCNC: 7.4 G/DL (ref 12.1–17)
MCH RBC QN AUTO: 31.6 PG (ref 26–34)
MCHC RBC AUTO-ENTMCNC: 31.5 G/DL (ref 30–36.5)
MCV RBC AUTO: 100.4 FL (ref 80–99)
NRBC # BLD: 0 K/UL (ref 0–0.01)
NRBC BLD-RTO: 0 PER 100 WBC
PLATELET # BLD AUTO: 192 K/UL (ref 150–400)
PMV BLD AUTO: 11.3 FL (ref 8.9–12.9)
POTASSIUM SERPL-SCNC: 5 MMOL/L (ref 3.5–5.1)
RBC # BLD AUTO: 2.34 M/UL (ref 4.1–5.7)
SERVICE CMNT-IMP: ABNORMAL
SERVICE CMNT-IMP: ABNORMAL
SERVICE CMNT-IMP: NORMAL
SERVICE CMNT-IMP: NORMAL
SODIUM SERPL-SCNC: 133 MMOL/L (ref 136–145)
TROPONIN I SERPL HS-MCNC: 166 NG/L (ref 0–76)
WBC # BLD AUTO: 6.3 K/UL (ref 4.1–11.1)

## 2025-03-06 PROCEDURE — 82962 GLUCOSE BLOOD TEST: CPT

## 2025-03-06 PROCEDURE — B2151ZZ FLUOROSCOPY OF LEFT HEART USING LOW OSMOLAR CONTRAST: ICD-10-PCS | Performed by: STUDENT IN AN ORGANIZED HEALTH CARE EDUCATION/TRAINING PROGRAM

## 2025-03-06 PROCEDURE — 36415 COLL VENOUS BLD VENIPUNCTURE: CPT

## 2025-03-06 PROCEDURE — 6370000000 HC RX 637 (ALT 250 FOR IP): Performed by: STUDENT IN AN ORGANIZED HEALTH CARE EDUCATION/TRAINING PROGRAM

## 2025-03-06 PROCEDURE — 93458 L HRT ARTERY/VENTRICLE ANGIO: CPT | Performed by: STUDENT IN AN ORGANIZED HEALTH CARE EDUCATION/TRAINING PROGRAM

## 2025-03-06 PROCEDURE — 85027 COMPLETE CBC AUTOMATED: CPT

## 2025-03-06 PROCEDURE — B2111ZZ FLUOROSCOPY OF MULTIPLE CORONARY ARTERIES USING LOW OSMOLAR CONTRAST: ICD-10-PCS | Performed by: STUDENT IN AN ORGANIZED HEALTH CARE EDUCATION/TRAINING PROGRAM

## 2025-03-06 PROCEDURE — 5A1D70Z PERFORMANCE OF URINARY FILTRATION, INTERMITTENT, LESS THAN 6 HOURS PER DAY: ICD-10-PCS | Performed by: STUDENT IN AN ORGANIZED HEALTH CARE EDUCATION/TRAINING PROGRAM

## 2025-03-06 PROCEDURE — 99152 MOD SED SAME PHYS/QHP 5/>YRS: CPT | Performed by: STUDENT IN AN ORGANIZED HEALTH CARE EDUCATION/TRAINING PROGRAM

## 2025-03-06 PROCEDURE — 6360000002 HC RX W HCPCS: Performed by: STUDENT IN AN ORGANIZED HEALTH CARE EDUCATION/TRAINING PROGRAM

## 2025-03-06 PROCEDURE — C1894 INTRO/SHEATH, NON-LASER: HCPCS | Performed by: STUDENT IN AN ORGANIZED HEALTH CARE EDUCATION/TRAINING PROGRAM

## 2025-03-06 PROCEDURE — 2500000003 HC RX 250 WO HCPCS: Performed by: STUDENT IN AN ORGANIZED HEALTH CARE EDUCATION/TRAINING PROGRAM

## 2025-03-06 PROCEDURE — 99153 MOD SED SAME PHYS/QHP EA: CPT | Performed by: STUDENT IN AN ORGANIZED HEALTH CARE EDUCATION/TRAINING PROGRAM

## 2025-03-06 PROCEDURE — 90935 HEMODIALYSIS ONE EVALUATION: CPT

## 2025-03-06 PROCEDURE — 4A023N7 MEASUREMENT OF CARDIAC SAMPLING AND PRESSURE, LEFT HEART, PERCUTANEOUS APPROACH: ICD-10-PCS | Performed by: STUDENT IN AN ORGANIZED HEALTH CARE EDUCATION/TRAINING PROGRAM

## 2025-03-06 PROCEDURE — 83036 HEMOGLOBIN GLYCOSYLATED A1C: CPT

## 2025-03-06 PROCEDURE — 6360000004 HC RX CONTRAST MEDICATION: Performed by: STUDENT IN AN ORGANIZED HEALTH CARE EDUCATION/TRAINING PROGRAM

## 2025-03-06 PROCEDURE — 80048 BASIC METABOLIC PNL TOTAL CA: CPT

## 2025-03-06 PROCEDURE — 2700000000 HC OXYGEN THERAPY PER DAY

## 2025-03-06 PROCEDURE — 84484 ASSAY OF TROPONIN QUANT: CPT

## 2025-03-06 PROCEDURE — 2709999900 HC NON-CHARGEABLE SUPPLY: Performed by: STUDENT IN AN ORGANIZED HEALTH CARE EDUCATION/TRAINING PROGRAM

## 2025-03-06 PROCEDURE — C1887 CATHETER, GUIDING: HCPCS | Performed by: STUDENT IN AN ORGANIZED HEALTH CARE EDUCATION/TRAINING PROGRAM

## 2025-03-06 PROCEDURE — C1769 GUIDE WIRE: HCPCS | Performed by: STUDENT IN AN ORGANIZED HEALTH CARE EDUCATION/TRAINING PROGRAM

## 2025-03-06 RX ORDER — LIDOCAINE 4 G/G
1 PATCH TOPICAL DAILY
Qty: 30 EACH | Refills: 0 | Status: SHIPPED | OUTPATIENT
Start: 2025-03-07

## 2025-03-06 RX ORDER — VERAPAMIL HYDROCHLORIDE 2.5 MG/ML
INJECTION, SOLUTION INTRAVENOUS PRN
Status: DISCONTINUED | OUTPATIENT
Start: 2025-03-06 | End: 2025-03-06 | Stop reason: HOSPADM

## 2025-03-06 RX ORDER — LIDOCAINE HYDROCHLORIDE 10 MG/ML
INJECTION, SOLUTION INFILTRATION; PERINEURAL PRN
Status: DISCONTINUED | OUTPATIENT
Start: 2025-03-06 | End: 2025-03-06 | Stop reason: HOSPADM

## 2025-03-06 RX ORDER — FENTANYL CITRATE 50 UG/ML
INJECTION, SOLUTION INTRAMUSCULAR; INTRAVENOUS PRN
Status: DISCONTINUED | OUTPATIENT
Start: 2025-03-06 | End: 2025-03-06 | Stop reason: HOSPADM

## 2025-03-06 RX ORDER — IOPAMIDOL 755 MG/ML
INJECTION, SOLUTION INTRAVASCULAR PRN
Status: DISCONTINUED | OUTPATIENT
Start: 2025-03-06 | End: 2025-03-06 | Stop reason: HOSPADM

## 2025-03-06 RX ORDER — ISOSORBIDE MONONITRATE 30 MG/1
30 TABLET, EXTENDED RELEASE ORAL DAILY
Qty: 30 TABLET | Refills: 3 | Status: SHIPPED | OUTPATIENT
Start: 2025-03-07

## 2025-03-06 RX ORDER — HEPARIN SODIUM 1000 [USP'U]/ML
INJECTION, SOLUTION INTRAVENOUS; SUBCUTANEOUS PRN
Status: DISCONTINUED | OUTPATIENT
Start: 2025-03-06 | End: 2025-03-06 | Stop reason: HOSPADM

## 2025-03-06 RX ORDER — CARVEDILOL 6.25 MG/1
6.25 TABLET ORAL 2 TIMES DAILY WITH MEALS
Qty: 60 TABLET | Refills: 3 | Status: SHIPPED | OUTPATIENT
Start: 2025-03-06

## 2025-03-06 RX ORDER — ASPIRIN 81 MG/1
TABLET, CHEWABLE ORAL PRN
Status: DISCONTINUED | OUTPATIENT
Start: 2025-03-06 | End: 2025-03-06 | Stop reason: HOSPADM

## 2025-03-06 RX ORDER — HEPARIN SODIUM 10000 [USP'U]/ML
INJECTION, SOLUTION INTRAVENOUS; SUBCUTANEOUS PRN
Status: DISCONTINUED | OUTPATIENT
Start: 2025-03-06 | End: 2025-03-06 | Stop reason: HOSPADM

## 2025-03-06 RX ADMIN — ESCITALOPRAM OXALATE 10 MG: 10 TABLET ORAL at 11:15

## 2025-03-06 RX ADMIN — FINASTERIDE 5 MG: 5 TABLET, FILM COATED ORAL at 11:15

## 2025-03-06 RX ADMIN — HEPARIN SODIUM 5000 UNITS: 5000 INJECTION INTRAVENOUS; SUBCUTANEOUS at 06:51

## 2025-03-06 RX ADMIN — ATORVASTATIN CALCIUM 40 MG: 40 TABLET, FILM COATED ORAL at 11:15

## 2025-03-06 RX ADMIN — ISOSORBIDE MONONITRATE 30 MG: 30 TABLET, EXTENDED RELEASE ORAL at 11:15

## 2025-03-06 RX ADMIN — TAMSULOSIN HYDROCHLORIDE 0.8 MG: 0.4 CAPSULE ORAL at 11:15

## 2025-03-06 RX ADMIN — BUMETANIDE 2 MG: 1 TABLET ORAL at 11:15

## 2025-03-06 RX ADMIN — CARVEDILOL 6.25 MG: 6.25 TABLET, FILM COATED ORAL at 11:15

## 2025-03-06 ASSESSMENT — PAIN SCALES - GENERAL: PAINLEVEL_OUTOF10: 0

## 2025-03-06 NOTE — PROGRESS NOTES
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  Continue with increased dose of Coreg  Awaiting for interrogation report  Can be discharged from cardiac standpoint after hemodialysis.      Full note and recommendations to follow. '

## 2025-03-06 NOTE — PROGRESS NOTES
Bedside and Verbal shift change report given to HAI Correa  (oncoming nurse) by HAI Burt  (offgoing nurse). Report included the following information Nurse Handoff Report, Index, Adult Overview, MAR, Med Rec Status, Alarm Parameters, Quality Measures, and Event Log.

## 2025-03-06 NOTE — PROGRESS NOTES
Nephrology Progress Note  [facility] / [place] [facility]  [address], [address]  [place] [address]  Phone - [phone]  Fax - [phone]                   Patient: Kenny Mace                     YOB: 1945        Date- 3/6/2025                                     Admit Date: 3/5/2025   CC: Follow up for esrd          IMPRESSION & PLAN:   ESRD --- [facility] unit--mwf, [facility] nephrology  Chest pain  Hyponatremia  Anemia of CKD  Secondary HPT  Elevated troponins   S/P Cardiac cath      PLAN-  Hd today  Na should improve with hd today  Hd again tomorrow  If he is discharged today- he will got [facility] unit  Continue calcitriol  Epogen for anemia     Subjective:  Interval History:   3-6-25-- s/p cath today- he didn't get hd yesterday    3-5-25-- patient Came for chest pain.  Seen by cardiology.  Renal consult was requested for hemodialysis needs.  He was supposed to go for dialysis today at his unit at [facility] but decided to come to the ER for ongoing chest pain and missed treatment today.    Objective:   Vitals:    03/06/25 1000 03/06/25 1051 03/06/25 1100 03/06/25 1115   BP:  (!) 175/97 (!) 176/84 (!) 176/90   Pulse: 63 61 62 66   Resp: 16  19 21   Temp:       TempSrc:       SpO2: 90% 100% 100% 100%   Weight:       Height:          No intake/output data recorded.  I/O this shift:  In: -   Out: 10 [Blood:10]      Physical exam:    GEN: NAD  NECK- no mass  RESP: no wheezing  NEURO: Normal speech, non focal  EXT: No Edema   PSYCH: Normal Mood  HD Access: Left upper extremity AV graft    Chart reviewed.         Pertinent Notes reviewed.       Data Review :  Lab Results   Component Value Date/Time     03/06/2025 03:05 AM    K 5.0 03/06/2025 03:05 AM    CL 98 03/06/2025 03:05 AM    CO2 28 03/06/2025 03:05 AM    BUN 48 03/06/2025 03:05 AM    CREATININE 9.45 03/06/2025 03:05 AM    GLUCOSE 100 03/06/2025 03:05 AM    CALCIUM 9.1  mg Oral Nightly    tamsulosin (FLOMAX) capsule 0.8 mg  0.8 mg Oral Daily    sodium chloride flush 0.9 % injection 5-40 mL  5-40 mL IntraVENous 2 times per day    sodium chloride flush 0.9 % injection 5-40 mL  5-40 mL IntraVENous PRN    0.9 % sodium chloride infusion   IntraVENous PRN    ondansetron (ZOFRAN-ODT) disintegrating tablet 4 mg  4 mg Oral Q8H PRN    Or    ondansetron (ZOFRAN) injection 4 mg  4 mg IntraVENous Q6H PRN    acetaminophen (TYLENOL) tablet 650 mg  650 mg Oral Q6H PRN    Or    acetaminophen (TYLENOL) suppository 650 mg  650 mg Rectal Q6H PRN    polyethylene glycol (GLYCOLAX) packet 17 g  17 g Oral Daily PRN    isosorbide mononitrate (IMDUR) extended release tablet 30 mg  30 mg Oral Daily    heparin (porcine) injection 5,000 Units  5,000 Units SubCUTAneous 3 times per day    labetalol (NORMODYNE;TRANDATE) injection 10 mg  10 mg IntraVENous Q6H PRN    lidocaine 4 % external patch 1 patch  1 patch TransDERmal Daily    guaiFENesin (ROBITUSSIN) 100 MG/5ML liquid 200 mg  200 mg Oral Q4H PRN    aluminum & magnesium hydroxide-simethicone (MYLANTA) 400-400-40 MG/5ML suspension 15 mL  15 mL Oral Q6H PRN    glucose chewable tablet 16 g  4 tablet Oral PRN    dextrose bolus 10% 125 mL  125 mL IntraVENous PRN    Or    dextrose bolus 10% 250 mL  250 mL IntraVENous PRN    glucagon injection 1 mg  1 mg SubCUTAneous PRN    dextrose 10 % infusion   IntraVENous Continuous PRN    insulin lispro (HUMALOG,ADMELOG) injection vial 0-4 Units  0-4 Units SubCUTAneous 4x Daily AC & HS        Denilson Mcnulty MD  3/6/2025

## 2025-03-06 NOTE — PROGRESS NOTES
4 Eyes Skin Assessment     NAME:  Kenny Mace  YOB: 1945  MEDICAL RECORD NUMBER:  [id number]    The patient is being assessed for  Admission    I agree that at least one RN has performed a thorough Head to Toe Skin Assessment on the patient. ALL assessment sites listed below have been assessed.      Areas assessed by both nurses:    Head, Face, Ears, Shoulders, Back, Chest, Arms, Elbows, Hands, Sacrum. Buttock, Coccyx, Ischium, Legs. Feet and Heels, and Under Medical Devices         Does the Patient have a Wound? No noted wound(s)       Herbert Prevention initiated by RN: Yes  Wound Care Orders initiated by RN: No    Pressure Injury (Stage 3,4, Unstageable, DTI, NWPT, and Complex wounds) if present, place Wound referral order by RN under : No    New Ostomies, if present place, Ostomy referral order under : No     Nurse 1 eSignature: Electronically signed by NHAN JASON RN on 3/5/25 at 7:51 PM EST    **SHARE this note so that the co-signing nurse can place an eSignature**    Nurse 2 eSignature: Electronically signed by Carmel Monroy RN on 3/6/25 at 6:45 AM EST

## 2025-03-06 NOTE — CARDIO/PULMONARY
Chart reviewed: Patient is 79 y.o. male admitted with Ventricular tachycardia (HCC) [I47.20]  ESRD (end stage renal disease) (HCC) [N18.6]  Acute chest pain [R07.9]    Cardiac cath 3/6/25 without intervention:  \"Non obstructive CAD \"

## 2025-03-06 NOTE — PROGRESS NOTES
End of Shift Note    Bedside shift change report given to HAI Correa (oncoming nurse) by Carmel Monroy RN (offgoing nurse).  Report included the following information SBAR, Kardex, and Recent Results    Shift worked:  night     Shift summary and any significant changes:    Pt has periods of MAGNOLIA; did not receive dialysis overnight     Concerns for physician to address: Sleep apnea     Zone phone for oncoming shift:       Activity:     Number times ambulated in hallways past shift: 0  Number of times OOB to chair past shift: 1    Cardiac:   Cardiac Monitoring: Yes      Cardiac Rhythm: Sinus rhythm    Access:  Current line(s): PIV     Genitourinary:   Urinary Status: Oliguria    Respiratory:   O2 Device: Nasal cannula  Chronic home O2 use?: YES  Incentive spirometer at bedside: NO    GI:     Current diet:  ADULT DIET; Regular; 4 carb choices (60 gm/meal); No Caffeine  Passing flatus: YES    Pain Management:   Patient states pain is manageable on current regimen: YES    Skin:  Herbert Scale Score: 19  Interventions: Wound Offloading (Prevention Methods): Elevate heels, Turning, Repositioning    Patient Safety:  Fall Risk:    Fall Risk Interventions  Toilet Every 2 Hours-In Advance of Need: Yes  Hourly Visual Checks: Awake, In chair  Fall Visual Posted: Socks  Room Door Open: Yes  Alarm On: Bed, Chair  Patient Moved Closer to Nursing Station: No    Active Consults:   IP CONSULT TO CARDIOLOGY  IP CONSULT TO NEPHROLOGY    Length of Stay:  Expected LOS: 2  Actual LOS: 1    Carmel Monroy RN

## 2025-03-06 NOTE — PROGRESS NOTES
Progress Note      3/6/2025 7:38 AM  NAME: Kenny Mace   MRN:  [id number]   Admit Diagnosis: Ventricular tachycardia (HCC) [I47.20]  ESRD (end stage renal disease) (HCC) [N18.6]  Acute chest pain [R07.9]        Primary Cardiologist: Dr Mcnulty   Consult requested by Dr Santa         Assessment:     Chest pain, unclear etiology    NSTEMI, minimal trop elevation      Dyspnea on exertion     NSVT     Nonobstructive CAD by coronary CTA in 11/2024     Problem list: per clinic note 12/2025     Systolic heart failure, dilated cardiomyopathy with ejection fraction 25-30%   status post AICD in 08/2024 with Dr Harrison      PET scan in 07/2024 shows no ischemia.  LV ejection fraction 20%     History of abnormal MPI in 01/2018 -with mireya-lateral reversibility using pharmacological stress, consistent with branch vessel distribution ischemia. Reviewed with Dr. Miranda and pt elected medical management.      - Hypertension.  - Hyperlipidemia.  - DM.  - End stage CKD. Followed by Dr. Denilson Mcnulty.          Dialysis on MWF.  - Hx of tobacco  - obstructive sleep apnea, not using CPAP  - back issues     Retired, walks with walker  Seen by Dr Shrestha before    retired, used to work as a              Recommendations:     Troponin slightly trended up from normal range.  Unclear etiology of chest pain.  There are some pleuritic component.  However, pain also resolved after he got nitroglycerin.  Discussed stress test and cardiac catheterization.  Will plan for cardiac catheterization for definite evaluation of coronary artery disease.  Discussed risk and benefit.  Also discussed with wife over the phone.    Continue aspirin  Continue Lipitor  Continue Coreg, increase to 6.25 mg twice daily  Consider starting valsartan at lower dose, monitor potassium level  Renal consult for hemodialysis  Continue Bumex  Interrogation of device to evaluate for NSVT burden  Left heart catheterization today    Thank you for this consult

## 2025-03-06 NOTE — DISCHARGE INSTRUCTIONS
Please follow-up with PCP within 1 week of hospital discharge  Start taking carvedilol 6.25mg twice daily instead of the 3.125mg  Start taking isosorbide mono. 30mg daily       Continue taking other home medications as prescribed listed below                  [address], [address]   [phone]  [place] [address]    [email or web address]    Patient Discharge Instructions    Kenny Mace / [id number] : 1945    Admitted 3/5/2025 Discharged: 3/6/2025       It is important that you take the medication exactly as they are prescribed.   Keep your medication in the bottles provided by the pharmacist and keep a list of the medication names, dosages, and times to be taken in your wallet.   Do not take other medications without consulting your doctor.     BRING ALL OF YOUR MEDICINES TO YOUR OFFICE VISIT      Follow-up with Joycelyn Mcnulty MD or my nurse practitioner in 2-4 weeks.      Cardiac Catheterization  Discharge Instructions    Transradial Catheterization Discharge Instructions (WRIST)    Discharge instructions:   Your radial artery in your wrist was used for your cardiac catheterization. This site may be slightly bruised and sore following your procedure. Expect mild tingling or the hand and tenderness at the puncture site for up to 3 days. Excess movement of the wrist used should be avoided for the next 24-48 hours.   No lifting over 2 pounds (approximately a ½ gallon of milk) with this arm for 24 hours.   Keep the site of the procedure covered with a bandage for 24 hours.   You may shower the day after your procedure. Do not take a tub bath or submerge the puncture site in water for 48 hours.   No heavy impact activity/lifting > 10 pounds for 1 week.     If bleeding of the wrist occurs at home:   If the site on your wrist where you had the catheterization procedure begins to bleed, do not panic.   Place 1 or 2 fingers over the puncture site and hold pressure to stop the bleeding. You may be  days.    Drink plenty of fluids for 24-48 hours after your cath to flush the contrast dye from your kidneys. No alcoholic beverages for 24 hours.  You may resume your previous diet (low fat, low cholesterol) after your cath.      After your cath, some bruising or discomfort is common during the healing process.  Tylenol, 1-2 tablets every 6 hours as needed, is recommended if you experience any discomfort.  If you experience any signs or symptoms of infection such as fever, chills, or poorly healing incision, persistent tenderness or swelling in the groin, redness and/or warmth to the touch, numbness, significant tingling or pain at the groin site or affected extremity, rash, drainage from the cath site, or if the leg feels tight or swollen, call your physician right away.    If bleeding at the cath site occurs, take a clean gauze pad and apply direct pressure to the groin just above the puncture site.  Call 911 immediately, and continue to apply direct pressure until an ambulance gets to your location.    You may return to work  2  days after your cardiac cath if no groin bleeding.      Information obtained by :  I understand that if any problems occur once I am at home I am to contact my physician.    I understand and acknowledge receipt of the instructions indicated above.                                                                                                                                           R.N.'s Signature                                                                  Date/Time                                                                                                                                              Patient or Representative Signature                                                          Date/Time      Joycelyn Mcnulty MD              [address], [address] [phone]  [place] [address]    [email or web address]

## 2025-03-06 NOTE — FLOWSHEET NOTE
Primary RN SBAR: Kiah Stark RN  Incapacitated Nurse [facility]. provided: yes  Patient Education provided: Site rotation, arm positioning  Preferred Education method and Primary language: verbal and english  Hospital General Consent Verified: yes  Hospital associated wait time; reason: Pre 23 mins transport, 22 mins transport post tx  Hepatitis B Surface Ag   Date/Time Value Ref Range Status   11/18/2024 03:01 PM <0.10 Index Final     Hep B S Ag Interp   Date/Time Value Ref Range Status   11/18/2024 03:01 PM Negative NEG   Final     HEP B SURF AB   Date/Time Value Ref Range Status   11/16/2022 01:00 AM 4.25 mIU/mL Final     Hep B S Ab   Date/Time Value Ref Range Status   11/18/2024 03:01 PM 10.52 mIU/mL Final     Comment:     RESULTS REPEATED     Hep B S Ab Interp   Date/Time Value Ref Range Status   11/18/2024 03:01 PM REACTIVE (A) NR   Final      TX started-     03/06/25 1425   Observations & Evaluations   Level of Consciousness 0   Oriented X 4   Heart Rhythm Regular   Respiratory Quality/Effort Unlabored   O2 Device Nasal cannula   Bilateral Breath Sounds Diminished   Skin Color Hyperpigmentation  (wnl)   Skin Condition/Temp Dry;Warm   Abdomen Inspection Soft;Obese   Bowel Sounds (All Quadrants) Active   Edema None   Vital Signs   BP (!) 167/76   Temp 97.8 °F (36.6 °C)   Pulse 62   Respirations 18   SpO2 99 %   Pain Assessment   Pain Assessment None - Denies Pain   Technical Checks   Dialysis Machine No. [id number]   RO Machine Number [id number]   Dialyzer Lot No. [id number]   Tubing Lot Number [id number]   All Connections Secure Yes   NS Bag Yes   Saline Line Double Clamped Yes   Dialyzer Nipro ELISIO   Prime Volume (mL) 200 mL   ICEBOAT I;C;E;B;O;A;T   RO Machine Log Sheet Completed Yes   Machine Alarm Self Test Completed;Passed   Air Foam Detector Tested;Proper Function;pH Reading   Extracorporeal Circuit Tested for Integrity Yes   Machine Conductivity 13.4   Bath Temperature 98.6 °F (37 °C)   Dialysis Bath   Na+ (Sodium)

## 2025-03-06 NOTE — PROGRESS NOTES
TRANSFER - OUT REPORT:    Verbal report given to HAI Aponte  on Kenny Mace  being transferred to IV[facility]222 for routine post-op       Report consisted of patient's Situation, Background, Assessment and   Recommendations(SBAR).     Information from the following report(s) Nurse Handoff Report, Index, Adult Overview, MAR, Recent Results, Alarm Parameters, Procedure Verification, Quality Measures, and Event Log was reviewed with the receiving nurse.           Lines:   Peripheral IV 03/05/25 Right Antecubital (Active)   Site Assessment Clean, dry & intact 03/06/25 1001   Line Status Capped;Flushed 03/06/25 1001   Line Care Connections checked and tightened 03/06/25 1001   Phlebitis Assessment No symptoms 03/06/25 1001   Infiltration Assessment 0 03/06/25 1001   Alcohol Cap Used Yes 03/06/25 1001   Dressing Status Clean, dry & intact 03/06/25 1001   Dressing Type Transparent 03/06/25 1001   Dressing Intervention New 03/05/25 1247       Peripheral IV 03/05/25 Right Forearm (Active)   Site Assessment Clean, dry & intact 03/06/25 1001   Line Status Capped;Flushed 03/06/25 1001   Line Care Connections checked and tightened 03/06/25 1001   Phlebitis Assessment No symptoms 03/06/25 1001   Infiltration Assessment 0 03/06/25 1001   Alcohol Cap Used Yes 03/06/25 1001   Dressing Status Clean, dry & intact 03/06/25 1001   Dressing Type Transparent 03/06/25 1001        Opportunity for questions and clarification was provided.      Patient transported with:  Monitor

## 2025-03-06 NOTE — PROGRESS NOTES
10:07 AM Patient arrived to IVCU from Cath Lab. Right radial, TR band at 15cc, CDI, no bleeding/hematoma. Immobilizer in place. BP elevated, NSR/Sinus Erich, 2L O2 NC, Afebrile. Patient on bedrest.     Spoke with Nargis, plan for HD in the room as soon as the HD tech is available. RN messaged hopsitalist, cardiology is okay with D/C after HD.     13:15 PM TR band off. Right radial CDI, no bleeding/hematoma. Gauze/tegaderm over incision site. VSS, NSR, RA, Afebrile Pt reporting no pain. Bedrest complete.

## 2025-03-07 LAB
EKG ATRIAL RATE: 72 BPM
EKG DIAGNOSIS: NORMAL
EKG P AXIS: -3 DEGREES
EKG P-R INTERVAL: 196 MS
EKG Q-T INTERVAL: 414 MS
EKG QRS DURATION: 130 MS
EKG QTC CALCULATION (BAZETT): 453 MS
EKG R AXIS: -53 DEGREES
EKG T AXIS: 113 DEGREES
EKG VENTRICULAR RATE: 72 BPM

## 2025-03-07 NOTE — FLOWSHEET NOTE
Feels fine, looks good, no complaints.  R wrist benign.  Written dc instructions reviewed w wife and pt.  Was weak and lightheaded and spit up some phlegm when he got up to wc but says he feels like this sometimes after HD and wishes to proceed w dc.  Was fine minutes after sitting in wc and had no trouble getting from wc to car.  No further issues.

## 2025-03-07 NOTE — DISCHARGE SUMMARY
Discharge Summary    Name: Kenny Mace  [id number]  YOB: 1945 (Age: 79 y.o.)   Date of Admission: 3/5/2025  Date of Discharge: 3/7/2025  Attending Physician: Yuly att. 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)    Abdominal pain, acute, epigastric    Acute on chronic systolic CHF (congestive heart failure) (HCC)    Elevated troponin    Pneumonia    Chest pain    Type II diabetes mellitus with nephropathy (HCC)    Renal failure (ARF), acute on chronic    Renal anasarca    CKD (chronic kidney disease) stage 3, GFR 30-59 ml/min (HCC)    Accelerated hypertension    Convulsions (HCC)    Type 2 diabetes mellitus with stage 4 chronic kidney disease, with long-term current use of insulin (HCC)    Vitamin D deficiency    Hyperlipidemia    Systolic heart failure (HCC)    Acute hypoxemic respiratory failure (HCC)    SIRS (systemic inflammatory response syndrome) (HCC)    Myoclonus    Cervical spondylosis with myelopathy    Immune-mediated neuropathy    Diarrhea    Hypotension    B12 deficiency    Nephrotic syndrome    Acute on chronic renal failure    Acute renal failure (ARF)    Acute respiratory failure with hypoxia (HCC)    Cervical post-laminectomy syndrome    Degenerative cervical spinal stenosis    Unable to ambulate    Degenerative lumbar spinal stenosis    Asterixis    Hyperkalemia       Consultations:  IP CONSULT TO CARDIOLOGY  IP CONSULT TO NEPHROLOGY      Brief Admission History/Reason for Admission Per Sharonda Bundy MD:  is a 79 y.o.  male

## 2025-04-17 ENCOUNTER — OFFICE VISIT (OUTPATIENT)
Age: 80
End: 2025-04-17
Payer: MEDICARE

## 2025-04-17 VITALS
BODY MASS INDEX: 32.32 KG/M2 | DIASTOLIC BLOOD PRESSURE: 60 MMHG | HEIGHT: 72 IN | OXYGEN SATURATION: 95 % | HEART RATE: 80 BPM | WEIGHT: 238.6 LBS | SYSTOLIC BLOOD PRESSURE: 124 MMHG | TEMPERATURE: 97.6 F | RESPIRATION RATE: 17 BRPM

## 2025-04-17 DIAGNOSIS — G25.0 BENIGN ESSENTIAL TREMOR SYNDROME: ICD-10-CM

## 2025-04-17 DIAGNOSIS — M47.12 CERVICAL SPONDYLOSIS WITH MYELOPATHY: ICD-10-CM

## 2025-04-17 DIAGNOSIS — N18.6 ESRD (END STAGE RENAL DISEASE) ON DIALYSIS (HCC): ICD-10-CM

## 2025-04-17 DIAGNOSIS — Z99.2 ESRD (END STAGE RENAL DISEASE) ON DIALYSIS (HCC): ICD-10-CM

## 2025-04-17 DIAGNOSIS — D89.89 IMMUNE-MEDIATED NEUROPATHY: ICD-10-CM

## 2025-04-17 DIAGNOSIS — M48.02 DEGENERATIVE CERVICAL SPINAL STENOSIS: ICD-10-CM

## 2025-04-17 DIAGNOSIS — G25.3 MYOCLONUS: ICD-10-CM

## 2025-04-17 DIAGNOSIS — M48.061 DEGENERATIVE LUMBAR SPINAL STENOSIS: ICD-10-CM

## 2025-04-17 DIAGNOSIS — E11.42 DIABETIC PERIPHERAL NEUROPATHY ASSOCIATED WITH TYPE 2 DIABETES MELLITUS: Primary | ICD-10-CM

## 2025-04-17 DIAGNOSIS — G63 IMMUNE-MEDIATED NEUROPATHY: ICD-10-CM

## 2025-04-17 DIAGNOSIS — R26.0 SENSORY ATAXIC GAIT: ICD-10-CM

## 2025-04-17 DIAGNOSIS — M96.1 CERVICAL POST-LAMINECTOMY SYNDROME: ICD-10-CM

## 2025-04-17 PROCEDURE — 1126F AMNT PAIN NOTED NONE PRSNT: CPT | Performed by: PSYCHIATRY & NEUROLOGY

## 2025-04-17 PROCEDURE — 99214 OFFICE O/P EST MOD 30 MIN: CPT | Performed by: PSYCHIATRY & NEUROLOGY

## 2025-04-17 PROCEDURE — 1160F RVW MEDS BY RX/DR IN RCRD: CPT | Performed by: PSYCHIATRY & NEUROLOGY

## 2025-04-17 PROCEDURE — 3044F HG A1C LEVEL LT 7.0%: CPT | Performed by: PSYCHIATRY & NEUROLOGY

## 2025-04-17 PROCEDURE — 1036F TOBACCO NON-USER: CPT | Performed by: PSYCHIATRY & NEUROLOGY

## 2025-04-17 PROCEDURE — 1123F ACP DISCUSS/DSCN MKR DOCD: CPT | Performed by: PSYCHIATRY & NEUROLOGY

## 2025-04-17 PROCEDURE — G8417 CALC BMI ABV UP PARAM F/U: HCPCS | Performed by: PSYCHIATRY & NEUROLOGY

## 2025-04-17 PROCEDURE — 1159F MED LIST DOCD IN RCRD: CPT | Performed by: PSYCHIATRY & NEUROLOGY

## 2025-04-17 PROCEDURE — 3078F DIAST BP <80 MM HG: CPT | Performed by: PSYCHIATRY & NEUROLOGY

## 2025-04-17 PROCEDURE — 3074F SYST BP LT 130 MM HG: CPT | Performed by: PSYCHIATRY & NEUROLOGY

## 2025-04-17 PROCEDURE — G8427 DOCREV CUR MEDS BY ELIG CLIN: HCPCS | Performed by: PSYCHIATRY & NEUROLOGY

## 2025-04-17 RX ORDER — PRIMIDONE 50 MG/1
25 TABLET ORAL 3 TIMES DAILY
Qty: 45 TABLET | Refills: 3 | Status: ON HOLD | OUTPATIENT
Start: 2025-04-17

## 2025-04-17 ASSESSMENT — PATIENT HEALTH QUESTIONNAIRE - PHQ9
SUM OF ALL RESPONSES TO PHQ QUESTIONS 1-9: 0
2. FEELING DOWN, DEPRESSED OR HOPELESS: NOT AT ALL
1. LITTLE INTEREST OR PLEASURE IN DOING THINGS: NOT AT ALL

## 2025-04-17 NOTE — PROGRESS NOTES
Consult  REFERRED BY:  Ritchie Tejada MD    CHIEF COMPLAINT: Patient seen for new problem with tremors in his hands, which do not look too prominent today but his wife does verify that times the tremor gets really bad, and is more of intention tremor, he wants a test for Parkinson's disease because he thinks he might have that, but this is clearly more of an intention tremor but also interferes with his ability to write.  We advised the patient that he should start the medication for essential tremor and we will try him on primidone after the risk and benefits explained to the patient and his wife in detail and they agree with plans and therapy as above.  He is already on Coreg a beta-blocker so we can add that on.  We advised the patient he does not need a DaTscan or test for Parkinson's disease, as he has essential tremor and will start the medication as above.  Patient previously seen for weakness in his legs and previous cervical laminectomy and recurrent moderate severe stenosis at multiple levels in the cervical spine and follow-up after neurosurgery evaluation, and they saw him sometime within the last year and still have advised him no surgery and he is seeing Dr. Black.      Subjective:     Kenny Mace is a 79 y.o.right-handed -American male we are seeing on urgent work in basis at the request of Dr. Tejada for new problem of Patient seen for new problem with tremors in his hands, which do not look too prominent today but his wife does verify that times the tremor gets really bad, and is more of intention tremor, he wants a test for Parkinson's disease because he thinks he might have that, but this is clearly more of an intention tremor but also interferes with his ability to write.  We advised the patient that he should start the medication for essential tremor and we will try him on primidone after the risk and benefits explained to the patient and his wife in detail and they agree with plans

## 2025-04-20 ENCOUNTER — APPOINTMENT (OUTPATIENT)
Facility: HOSPITAL | Age: 80
DRG: 640 | End: 2025-04-20
Payer: MEDICARE

## 2025-04-20 ENCOUNTER — HOSPITAL ENCOUNTER (INPATIENT)
Facility: HOSPITAL | Age: 80
LOS: 2 days | Discharge: HOME | DRG: 640 | End: 2025-04-22
Attending: EMERGENCY MEDICINE | Admitting: STUDENT IN AN ORGANIZED HEALTH CARE EDUCATION/TRAINING PROGRAM
Payer: MEDICARE

## 2025-04-20 DIAGNOSIS — E87.5 HYPERKALEMIA: ICD-10-CM

## 2025-04-20 DIAGNOSIS — R10.9 ACUTE ABDOMINAL PAIN: Primary | ICD-10-CM

## 2025-04-20 DIAGNOSIS — R07.9 ACUTE CHEST PAIN: ICD-10-CM

## 2025-04-20 DIAGNOSIS — M25.561 ACUTE PAIN OF RIGHT KNEE: ICD-10-CM

## 2025-04-20 LAB
ALBUMIN SERPL-MCNC: 3.6 G/DL (ref 3.5–5)
ALBUMIN/GLOB SERPL: 0.9 (ref 1.1–2.2)
ALP SERPL-CCNC: 66 U/L (ref 45–117)
ALT SERPL-CCNC: 14 U/L (ref 12–78)
ANION GAP SERPL CALC-SCNC: 6 MMOL/L (ref 2–12)
AST SERPL-CCNC: 19 U/L (ref 15–37)
BASOPHILS # BLD: 0.05 K/UL (ref 0–0.1)
BASOPHILS NFR BLD: 0.8 % (ref 0–1)
BILIRUB SERPL-MCNC: 0.5 MG/DL (ref 0.2–1)
BUN SERPL-MCNC: 64 MG/DL (ref 6–20)
BUN/CREAT SERPL: 7 (ref 12–20)
CALCIUM SERPL-MCNC: 10.2 MG/DL (ref 8.5–10.1)
CHLORIDE SERPL-SCNC: 97 MMOL/L (ref 97–108)
CO2 SERPL-SCNC: 30 MMOL/L (ref 21–32)
CREAT SERPL-MCNC: 9.53 MG/DL (ref 0.7–1.3)
DIFFERENTIAL METHOD BLD: ABNORMAL
EKG ATRIAL RATE: 64 BPM
EKG DIAGNOSIS: NORMAL
EKG P-R INTERVAL: 228 MS
EKG Q-T INTERVAL: 434 MS
EKG QRS DURATION: 136 MS
EKG QTC CALCULATION (BAZETT): 444 MS
EKG R AXIS: -55 DEGREES
EKG T AXIS: 112 DEGREES
EKG VENTRICULAR RATE: 63 BPM
EOSINOPHIL # BLD: 0.19 K/UL (ref 0–0.4)
EOSINOPHIL NFR BLD: 2.9 % (ref 0–7)
ERYTHROCYTE [DISTWIDTH] IN BLOOD BY AUTOMATED COUNT: 18.2 % (ref 11.5–14.5)
FLUAV RNA SPEC QL NAA+PROBE: NOT DETECTED
FLUBV RNA SPEC QL NAA+PROBE: NOT DETECTED
GLOBULIN SER CALC-MCNC: 4.2 G/DL (ref 2–4)
GLUCOSE SERPL-MCNC: 130 MG/DL (ref 65–100)
HCT VFR BLD AUTO: 31.4 % (ref 36.6–50.3)
HGB BLD-MCNC: 10 G/DL (ref 12.1–17)
IMM GRANULOCYTES # BLD AUTO: 0.03 K/UL (ref 0–0.04)
IMM GRANULOCYTES NFR BLD AUTO: 0.5 % (ref 0–0.5)
LYMPHOCYTES # BLD: 1.36 K/UL (ref 0.8–3.5)
LYMPHOCYTES NFR BLD: 21 % (ref 12–49)
MAGNESIUM SERPL-MCNC: 3.1 MG/DL (ref 1.6–2.4)
MCH RBC QN AUTO: 32.4 PG (ref 26–34)
MCHC RBC AUTO-ENTMCNC: 31.8 G/DL (ref 30–36.5)
MCV RBC AUTO: 101.6 FL (ref 80–99)
MONOCYTES # BLD: 0.58 K/UL (ref 0–1)
MONOCYTES NFR BLD: 8.9 % (ref 5–13)
NEUTS SEG # BLD: 4.28 K/UL (ref 1.8–8)
NEUTS SEG NFR BLD: 65.9 % (ref 32–75)
NRBC # BLD: 0 K/UL (ref 0–0.01)
NRBC BLD-RTO: 0 PER 100 WBC
PLATELET # BLD AUTO: 227 K/UL (ref 150–400)
PMV BLD AUTO: 10.9 FL (ref 8.9–12.9)
POTASSIUM SERPL-SCNC: 6.1 MMOL/L (ref 3.5–5.1)
PROT SERPL-MCNC: 7.8 G/DL (ref 6.4–8.2)
RBC # BLD AUTO: 3.09 M/UL (ref 4.1–5.7)
SARS-COV-2 RNA RESP QL NAA+PROBE: NOT DETECTED
SODIUM SERPL-SCNC: 133 MMOL/L (ref 136–145)
SOURCE: NORMAL
TROPONIN I SERPL HS-MCNC: 130 NG/L (ref 0–76)
TROPONIN I SERPL HS-MCNC: 134 NG/L (ref 0–76)
WBC # BLD AUTO: 6.5 K/UL (ref 4.1–11.1)

## 2025-04-20 PROCEDURE — 96375 TX/PRO/DX INJ NEW DRUG ADDON: CPT

## 2025-04-20 PROCEDURE — 74176 CT ABD & PELVIS W/O CONTRAST: CPT

## 2025-04-20 PROCEDURE — 6370000000 HC RX 637 (ALT 250 FOR IP): Performed by: EMERGENCY MEDICINE

## 2025-04-20 PROCEDURE — 36415 COLL VENOUS BLD VENIPUNCTURE: CPT

## 2025-04-20 PROCEDURE — 83735 ASSAY OF MAGNESIUM: CPT

## 2025-04-20 PROCEDURE — 2500000003 HC RX 250 WO HCPCS: Performed by: STUDENT IN AN ORGANIZED HEALTH CARE EDUCATION/TRAINING PROGRAM

## 2025-04-20 PROCEDURE — 84484 ASSAY OF TROPONIN QUANT: CPT

## 2025-04-20 PROCEDURE — 93005 ELECTROCARDIOGRAM TRACING: CPT | Performed by: EMERGENCY MEDICINE

## 2025-04-20 PROCEDURE — 6360000002 HC RX W HCPCS: Performed by: STUDENT IN AN ORGANIZED HEALTH CARE EDUCATION/TRAINING PROGRAM

## 2025-04-20 PROCEDURE — 87340 HEPATITIS B SURFACE AG IA: CPT

## 2025-04-20 PROCEDURE — 1100000000 HC RM PRIVATE

## 2025-04-20 PROCEDURE — 73562 X-RAY EXAM OF KNEE 3: CPT

## 2025-04-20 PROCEDURE — 6370000000 HC RX 637 (ALT 250 FOR IP): Performed by: STUDENT IN AN ORGANIZED HEALTH CARE EDUCATION/TRAINING PROGRAM

## 2025-04-20 PROCEDURE — 1100000003 HC PRIVATE W/ TELEMETRY

## 2025-04-20 PROCEDURE — 71045 X-RAY EXAM CHEST 1 VIEW: CPT

## 2025-04-20 PROCEDURE — 96374 THER/PROPH/DIAG INJ IV PUSH: CPT

## 2025-04-20 PROCEDURE — 87636 SARSCOV2 & INF A&B AMP PRB: CPT

## 2025-04-20 PROCEDURE — 80053 COMPREHEN METABOLIC PANEL: CPT

## 2025-04-20 PROCEDURE — 85025 COMPLETE CBC W/AUTO DIFF WBC: CPT

## 2025-04-20 PROCEDURE — 86706 HEP B SURFACE ANTIBODY: CPT

## 2025-04-20 PROCEDURE — 2500000003 HC RX 250 WO HCPCS: Performed by: EMERGENCY MEDICINE

## 2025-04-20 PROCEDURE — 99285 EMERGENCY DEPT VISIT HI MDM: CPT

## 2025-04-20 RX ORDER — SODIUM CHLORIDE 9 MG/ML
INJECTION, SOLUTION INTRAVENOUS PRN
Status: DISCONTINUED | OUTPATIENT
Start: 2025-04-20 | End: 2025-04-22 | Stop reason: HOSPADM

## 2025-04-20 RX ORDER — SODIUM CHLORIDE 0.9 % (FLUSH) 0.9 %
5-40 SYRINGE (ML) INJECTION EVERY 12 HOURS SCHEDULED
Status: DISCONTINUED | OUTPATIENT
Start: 2025-04-20 | End: 2025-04-22 | Stop reason: HOSPADM

## 2025-04-20 RX ORDER — INSULIN LISPRO 100 [IU]/ML
0-4 INJECTION, SOLUTION INTRAVENOUS; SUBCUTANEOUS
Status: DISCONTINUED | OUTPATIENT
Start: 2025-04-20 | End: 2025-04-22 | Stop reason: HOSPADM

## 2025-04-20 RX ORDER — ACETAMINOPHEN 325 MG/1
650 TABLET ORAL EVERY 6 HOURS PRN
Status: DISCONTINUED | OUTPATIENT
Start: 2025-04-20 | End: 2025-04-22 | Stop reason: HOSPADM

## 2025-04-20 RX ORDER — GLUCAGON 1 MG/ML
1 KIT INJECTION PRN
Status: DISCONTINUED | OUTPATIENT
Start: 2025-04-20 | End: 2025-04-22 | Stop reason: HOSPADM

## 2025-04-20 RX ORDER — SODIUM CHLORIDE 0.9 % (FLUSH) 0.9 %
5-40 SYRINGE (ML) INJECTION PRN
Status: DISCONTINUED | OUTPATIENT
Start: 2025-04-20 | End: 2025-04-22 | Stop reason: HOSPADM

## 2025-04-20 RX ORDER — ASPIRIN 81 MG/1
81 TABLET, CHEWABLE ORAL DAILY
Status: DISCONTINUED | OUTPATIENT
Start: 2025-04-21 | End: 2025-04-22 | Stop reason: HOSPADM

## 2025-04-20 RX ORDER — CARVEDILOL 6.25 MG/1
6.25 TABLET ORAL 2 TIMES DAILY WITH MEALS
Status: DISCONTINUED | OUTPATIENT
Start: 2025-04-21 | End: 2025-04-22 | Stop reason: HOSPADM

## 2025-04-20 RX ORDER — GABAPENTIN 300 MG/1
300 CAPSULE ORAL NIGHTLY
Status: DISCONTINUED | OUTPATIENT
Start: 2025-04-20 | End: 2025-04-22 | Stop reason: HOSPADM

## 2025-04-20 RX ORDER — ONDANSETRON 2 MG/ML
4 INJECTION INTRAMUSCULAR; INTRAVENOUS EVERY 6 HOURS PRN
Status: DISCONTINUED | OUTPATIENT
Start: 2025-04-20 | End: 2025-04-22 | Stop reason: HOSPADM

## 2025-04-20 RX ORDER — BUMETANIDE 0.25 MG/ML
2 INJECTION, SOLUTION INTRAMUSCULAR; INTRAVENOUS ONCE
Status: COMPLETED | OUTPATIENT
Start: 2025-04-20 | End: 2025-04-20

## 2025-04-20 RX ORDER — ACETAMINOPHEN 650 MG/1
650 SUPPOSITORY RECTAL EVERY 6 HOURS PRN
Status: DISCONTINUED | OUTPATIENT
Start: 2025-04-20 | End: 2025-04-22 | Stop reason: HOSPADM

## 2025-04-20 RX ORDER — INDOMETHACIN 25 MG/1
50 CAPSULE ORAL
Status: COMPLETED | OUTPATIENT
Start: 2025-04-20 | End: 2025-04-20

## 2025-04-20 RX ORDER — FINASTERIDE 5 MG/1
5 TABLET, FILM COATED ORAL DAILY
Status: DISCONTINUED | OUTPATIENT
Start: 2025-04-21 | End: 2025-04-22 | Stop reason: HOSPADM

## 2025-04-20 RX ORDER — ESCITALOPRAM OXALATE 10 MG/1
10 TABLET ORAL DAILY
Status: DISCONTINUED | OUTPATIENT
Start: 2025-04-21 | End: 2025-04-22 | Stop reason: HOSPADM

## 2025-04-20 RX ORDER — ATORVASTATIN CALCIUM 40 MG/1
40 TABLET, FILM COATED ORAL DAILY
Status: DISCONTINUED | OUTPATIENT
Start: 2025-04-21 | End: 2025-04-22 | Stop reason: HOSPADM

## 2025-04-20 RX ORDER — TAMSULOSIN HYDROCHLORIDE 0.4 MG/1
0.8 CAPSULE ORAL DAILY
Status: DISCONTINUED | OUTPATIENT
Start: 2025-04-21 | End: 2025-04-22 | Stop reason: HOSPADM

## 2025-04-20 RX ORDER — PRIMIDONE 50 MG/1
25 TABLET ORAL 3 TIMES DAILY
Status: DISCONTINUED | OUTPATIENT
Start: 2025-04-20 | End: 2025-04-22 | Stop reason: HOSPADM

## 2025-04-20 RX ORDER — ISOSORBIDE MONONITRATE 30 MG/1
30 TABLET, EXTENDED RELEASE ORAL DAILY
Status: DISCONTINUED | OUTPATIENT
Start: 2025-04-21 | End: 2025-04-22 | Stop reason: HOSPADM

## 2025-04-20 RX ORDER — CALCITRIOL 0.25 UG/1
0.75 CAPSULE, LIQUID FILLED ORAL
Status: DISCONTINUED | OUTPATIENT
Start: 2025-04-21 | End: 2025-04-22 | Stop reason: HOSPADM

## 2025-04-20 RX ORDER — DEXTROSE MONOHYDRATE 100 MG/ML
INJECTION, SOLUTION INTRAVENOUS CONTINUOUS PRN
Status: DISCONTINUED | OUTPATIENT
Start: 2025-04-20 | End: 2025-04-22 | Stop reason: HOSPADM

## 2025-04-20 RX ORDER — HEPARIN SODIUM 5000 [USP'U]/ML
5000 INJECTION, SOLUTION INTRAVENOUS; SUBCUTANEOUS EVERY 8 HOURS SCHEDULED
Status: DISCONTINUED | OUTPATIENT
Start: 2025-04-21 | End: 2025-04-22 | Stop reason: HOSPADM

## 2025-04-20 RX ORDER — BUMETANIDE 1 MG/1
2 TABLET ORAL 2 TIMES DAILY
Status: DISCONTINUED | OUTPATIENT
Start: 2025-04-21 | End: 2025-04-22 | Stop reason: HOSPADM

## 2025-04-20 RX ORDER — DEXTROSE MONOHYDRATE 25 G/50ML
25 INJECTION, SOLUTION INTRAVENOUS PRN
Status: DISCONTINUED | OUTPATIENT
Start: 2025-04-20 | End: 2025-04-22 | Stop reason: HOSPADM

## 2025-04-20 RX ADMIN — BUMETANIDE 2 MG: 0.25 INJECTION INTRAMUSCULAR; INTRAVENOUS at 23:13

## 2025-04-20 RX ADMIN — SODIUM CHLORIDE, PRESERVATIVE FREE 10 ML: 5 INJECTION INTRAVENOUS at 23:18

## 2025-04-20 RX ADMIN — SODIUM BICARBONATE 50 MEQ: 84 INJECTION, SOLUTION INTRAVENOUS at 22:34

## 2025-04-20 RX ADMIN — INSULIN HUMAN 3 UNITS: 100 INJECTION, SOLUTION PARENTERAL at 22:32

## 2025-04-20 RX ADMIN — SODIUM ZIRCONIUM CYCLOSILICATE 10 G: 10 POWDER, FOR SUSPENSION ORAL at 23:17

## 2025-04-20 ASSESSMENT — HEART SCORE: ECG: NON-SPECIFC REPOLARIZATION DISTURBANCE/LBTB/PM

## 2025-04-20 ASSESSMENT — PAIN - FUNCTIONAL ASSESSMENT: PAIN_FUNCTIONAL_ASSESSMENT: NONE - DENIES PAIN

## 2025-04-20 NOTE — ED PROVIDER NOTES
[facility] EMERGENCY DEPARTMENT  EMERGENCY DEPARTMENT ENCOUNTER       Pt Name: Kenny Mace  MRN: [id number]  Birthdate 1945  Date of evaluation: 4/20/2025  Provider: Светлана Santa MD   PCP: Ritchie Tejada MD  Note Started: 6:57 PM EDT 4/20/25     CHIEF COMPLAINT       Chief Complaint   Patient presents with    Fatigue     Pt presents to triage via wheelchair w/ complaints of increased general fatigue and indigestion over the past week . Pt also reports a GLF where he injured his R knee x1 month ago - was not evaluated at that time and is concerned because it is , denies wound. Pt requests that his prostate be checked due some recent urinary incontinence as well         HISTORY OF PRESENT ILLNESS: 1 or more elements      History From: Patient, History limited by: none     Kenny Mace is a 79 y.o. male patient with history of renal failure, pancreatitis, coronary artery disease, diabetes, CHF, here for fatigue and indigestion x 1 week.  This discomfort is intermittent and last for hours at a time.  Located in the epigastrium and radiates to the chest.  It is of moderate severity when present, the patient denies any pain at this time.  It is associated with shortness of breath, nausea but no diaphoresis.  He denies cough or fever.  He also states that he injured his right knee a month ago after he tripped on a rug.  He is concerned because he still has tenderness to palpation of the right knee.  He also has had urinary incontinence for about a week.  He is wondering whether his prostate is enlarged.  Denies any lower back pain       Please See MDM for Additional Details of the HPI/PMH  Nursing Notes were all reviewed and agreed with or any disagreements were addressed in the HPI.     REVIEW OF SYSTEMS        Positives and Pertinent negatives as per HPI.    PAST HISTORY     Past Medical History:  Past Medical History:   Diagnosis Date    Arthritis     spine    CAD (coronary artery disease)

## 2025-04-20 NOTE — ED NOTES
Bedside report given to HAI Heredia by HAI Rudolph. Nurse was informed of reason for arrival, vitals, labs, medications, orders, procedures, results, cardiac rhythm, any outstanding and pending orders and plan of care. Opportunity for questions were provided for receiving RN at this time.

## 2025-04-21 ENCOUNTER — APPOINTMENT (OUTPATIENT)
Facility: HOSPITAL | Age: 80
DRG: 640 | End: 2025-04-21
Payer: MEDICARE

## 2025-04-21 PROBLEM — M25.561 ACUTE PAIN OF RIGHT KNEE: Status: ACTIVE | Noted: 2025-04-21

## 2025-04-21 LAB
ALBUMIN SERPL-MCNC: 3.4 G/DL (ref 3.5–5)
ALBUMIN/GLOB SERPL: 0.8 (ref 1.1–2.2)
ALP SERPL-CCNC: 62 U/L (ref 45–117)
ALT SERPL-CCNC: 11 U/L (ref 12–78)
ANION GAP SERPL CALC-SCNC: 4 MMOL/L (ref 2–12)
ANION GAP SERPL CALC-SCNC: 7 MMOL/L (ref 2–12)
AST SERPL-CCNC: 7 U/L (ref 15–37)
BASOPHILS # BLD: 0.04 K/UL (ref 0–0.1)
BASOPHILS NFR BLD: 0.6 % (ref 0–1)
BILIRUB SERPL-MCNC: 0.5 MG/DL (ref 0.2–1)
BUN SERPL-MCNC: 40 MG/DL (ref 6–20)
BUN SERPL-MCNC: 71 MG/DL (ref 6–20)
BUN/CREAT SERPL: 6 (ref 12–20)
BUN/CREAT SERPL: 7 (ref 12–20)
CALCIUM SERPL-MCNC: 9.2 MG/DL (ref 8.5–10.1)
CALCIUM SERPL-MCNC: 9.7 MG/DL (ref 8.5–10.1)
CHLORIDE SERPL-SCNC: 97 MMOL/L (ref 97–108)
CHLORIDE SERPL-SCNC: 97 MMOL/L (ref 97–108)
CO2 SERPL-SCNC: 28 MMOL/L (ref 21–32)
CO2 SERPL-SCNC: 33 MMOL/L (ref 21–32)
CREAT SERPL-MCNC: 6.66 MG/DL (ref 0.7–1.3)
CREAT SERPL-MCNC: 9.82 MG/DL (ref 0.7–1.3)
DIFFERENTIAL METHOD BLD: ABNORMAL
EOSINOPHIL # BLD: 0.22 K/UL (ref 0–0.4)
EOSINOPHIL NFR BLD: 3.1 % (ref 0–7)
ERYTHROCYTE [DISTWIDTH] IN BLOOD BY AUTOMATED COUNT: 18.3 % (ref 11.5–14.5)
GLOBULIN SER CALC-MCNC: 4.3 G/DL (ref 2–4)
GLUCOSE BLD STRIP.AUTO-MCNC: 115 MG/DL (ref 65–117)
GLUCOSE BLD STRIP.AUTO-MCNC: 117 MG/DL (ref 65–117)
GLUCOSE BLD STRIP.AUTO-MCNC: 118 MG/DL (ref 65–117)
GLUCOSE SERPL-MCNC: 121 MG/DL (ref 65–100)
GLUCOSE SERPL-MCNC: 137 MG/DL (ref 65–100)
HBV SURFACE AB SER QL: NONREACTIVE
HBV SURFACE AB SER-ACNC: 4.26 MIU/ML
HBV SURFACE AG SER QL: <0.1 INDEX
HBV SURFACE AG SER QL: NEGATIVE
HCT VFR BLD AUTO: 30.7 % (ref 36.6–50.3)
HGB BLD-MCNC: 9.6 G/DL (ref 12.1–17)
IMM GRANULOCYTES # BLD AUTO: 0.03 K/UL (ref 0–0.04)
IMM GRANULOCYTES NFR BLD AUTO: 0.4 % (ref 0–0.5)
LYMPHOCYTES # BLD: 1.44 K/UL (ref 0.8–3.5)
LYMPHOCYTES NFR BLD: 20 % (ref 12–49)
MCH RBC QN AUTO: 32.4 PG (ref 26–34)
MCHC RBC AUTO-ENTMCNC: 31.3 G/DL (ref 30–36.5)
MCV RBC AUTO: 103.7 FL (ref 80–99)
MONOCYTES # BLD: 0.51 K/UL (ref 0–1)
MONOCYTES NFR BLD: 7.1 % (ref 5–13)
NEUTS SEG # BLD: 4.96 K/UL (ref 1.8–8)
NEUTS SEG NFR BLD: 68.8 % (ref 32–75)
NRBC # BLD: 0 K/UL (ref 0–0.01)
NRBC BLD-RTO: 0 PER 100 WBC
PLATELET # BLD AUTO: 211 K/UL (ref 150–400)
PMV BLD AUTO: 11.1 FL (ref 8.9–12.9)
POTASSIUM SERPL-SCNC: 4.7 MMOL/L (ref 3.5–5.1)
POTASSIUM SERPL-SCNC: 5.2 MMOL/L (ref 3.5–5.1)
PROT SERPL-MCNC: 7.7 G/DL (ref 6.4–8.2)
RBC # BLD AUTO: 2.96 M/UL (ref 4.1–5.7)
SERVICE CMNT-IMP: ABNORMAL
SERVICE CMNT-IMP: NORMAL
SERVICE CMNT-IMP: NORMAL
SODIUM SERPL-SCNC: 132 MMOL/L (ref 136–145)
SODIUM SERPL-SCNC: 134 MMOL/L (ref 136–145)
WBC # BLD AUTO: 7.2 K/UL (ref 4.1–11.1)

## 2025-04-21 PROCEDURE — 6360000002 HC RX W HCPCS: Performed by: INTERNAL MEDICINE

## 2025-04-21 PROCEDURE — 6370000000 HC RX 637 (ALT 250 FOR IP): Performed by: STUDENT IN AN ORGANIZED HEALTH CARE EDUCATION/TRAINING PROGRAM

## 2025-04-21 PROCEDURE — 5A1D70Z PERFORMANCE OF URINARY FILTRATION, INTERMITTENT, LESS THAN 6 HOURS PER DAY: ICD-10-PCS | Performed by: INTERNAL MEDICINE

## 2025-04-21 PROCEDURE — 6360000002 HC RX W HCPCS: Performed by: STUDENT IN AN ORGANIZED HEALTH CARE EDUCATION/TRAINING PROGRAM

## 2025-04-21 PROCEDURE — 85025 COMPLETE CBC W/AUTO DIFF WBC: CPT

## 2025-04-21 PROCEDURE — 2500000003 HC RX 250 WO HCPCS: Performed by: STUDENT IN AN ORGANIZED HEALTH CARE EDUCATION/TRAINING PROGRAM

## 2025-04-21 PROCEDURE — 73700 CT LOWER EXTREMITY W/O DYE: CPT

## 2025-04-21 PROCEDURE — 82962 GLUCOSE BLOOD TEST: CPT

## 2025-04-21 PROCEDURE — 6370000000 HC RX 637 (ALT 250 FOR IP): Performed by: PHYSICIAN ASSISTANT

## 2025-04-21 PROCEDURE — 36415 COLL VENOUS BLD VENIPUNCTURE: CPT

## 2025-04-21 PROCEDURE — 80053 COMPREHEN METABOLIC PANEL: CPT

## 2025-04-21 PROCEDURE — 90935 HEMODIALYSIS ONE EVALUATION: CPT

## 2025-04-21 PROCEDURE — 1100000003 HC PRIVATE W/ TELEMETRY

## 2025-04-21 RX ORDER — LIDOCAINE 4 G/G
1 PATCH TOPICAL DAILY
Status: DISCONTINUED | OUTPATIENT
Start: 2025-04-21 | End: 2025-04-22

## 2025-04-21 RX ADMIN — SODIUM CHLORIDE, PRESERVATIVE FREE 10 ML: 5 INJECTION INTRAVENOUS at 20:54

## 2025-04-21 RX ADMIN — ASPIRIN 81 MG: 81 TABLET, CHEWABLE ORAL at 12:41

## 2025-04-21 RX ADMIN — CARVEDILOL 6.25 MG: 6.25 TABLET, FILM COATED ORAL at 18:08

## 2025-04-21 RX ADMIN — ATORVASTATIN CALCIUM 40 MG: 40 TABLET, FILM COATED ORAL at 12:44

## 2025-04-21 RX ADMIN — PRIMIDONE 25 MG: 50 TABLET ORAL at 12:42

## 2025-04-21 RX ADMIN — PRIMIDONE 25 MG: 50 TABLET ORAL at 20:52

## 2025-04-21 RX ADMIN — HEPARIN SODIUM 5000 UNITS: 5000 INJECTION INTRAVENOUS; SUBCUTANEOUS at 14:20

## 2025-04-21 RX ADMIN — ESCITALOPRAM OXALATE 10 MG: 10 TABLET ORAL at 12:42

## 2025-04-21 RX ADMIN — HEPARIN SODIUM 5000 UNITS: 5000 INJECTION INTRAVENOUS; SUBCUTANEOUS at 23:45

## 2025-04-21 RX ADMIN — ERYTHROPOIETIN 10000 UNITS: 10000 INJECTION, SOLUTION INTRAVENOUS; SUBCUTANEOUS at 20:55

## 2025-04-21 RX ADMIN — CALCITRIOL CAPSULES 0.25 MCG 0.75 MCG: 0.25 CAPSULE ORAL at 18:07

## 2025-04-21 RX ADMIN — BUMETANIDE 2 MG: 1 TABLET ORAL at 12:44

## 2025-04-21 RX ADMIN — ISOSORBIDE MONONITRATE 30 MG: 30 TABLET, EXTENDED RELEASE ORAL at 12:41

## 2025-04-21 RX ADMIN — TAMSULOSIN HYDROCHLORIDE 0.8 MG: 0.4 CAPSULE ORAL at 12:44

## 2025-04-21 RX ADMIN — GABAPENTIN 300 MG: 300 CAPSULE ORAL at 20:53

## 2025-04-21 RX ADMIN — HEPARIN SODIUM 5000 UNITS: 5000 INJECTION INTRAVENOUS; SUBCUTANEOUS at 06:32

## 2025-04-21 RX ADMIN — BUMETANIDE 2 MG: 1 TABLET ORAL at 20:53

## 2025-04-21 RX ADMIN — SODIUM CHLORIDE, PRESERVATIVE FREE 10 ML: 5 INJECTION INTRAVENOUS at 12:46

## 2025-04-21 RX ADMIN — FINASTERIDE 5 MG: 5 TABLET, FILM COATED ORAL at 12:43

## 2025-04-21 NOTE — ED NOTES
Report given to Ambreen VALLES on COU by Giovanna VALLES. Report included the following information Nurse Handoff Report, ED Encounter Summary, ED SBAR, Adult Overview, Intake/Output, MAR, Recent Results, and Neuro Assessment, outstanding orders to be completed.

## 2025-04-21 NOTE — DISCHARGE INSTRUCTIONS
You have been given a copy of the American Heart Association's Heart Failure Educational Booklet.  Please read over this booklet and take it with you to your next physician appointment with any questions you may have.     For additional resources and to access the American Heart Association's Interactive Workbook \"Healthier Living with Heart Failure - Managing Symptoms and Reducing Risk,\" please scan the QR code below.               Download the Heart Failure Dietrich Chichi: Search in your Google Play Store (Gist) or Timecros Chichi Store (Renmatix): Search for- HF Dietrich Chichi.    https://www.heart.org/en/health-topics/heart-failure/heart-failure-tools-resources/pm-cjckky-kkp    HF Dietrich is a brand-new phone chichi that helps you track daily symptoms, vitals, mood, energy level and more. You can even add your heart failure care team members to view your data and monitor your condition at home.    HF Dietrich Lets You:  Track symptoms, medications and more  Share health information with your health care team  Connect with others living with heart failure

## 2025-04-21 NOTE — PLAN OF CARE
Problem: Chronic Conditions and Co-morbidities  Goal: Patient's chronic conditions and co-morbidity symptoms are monitored and maintained or improved  Outcome: Progressing     Problem: Discharge Planning  Goal: Discharge to home or other facility with appropriate resources  Outcome: Progressing     Problem: Skin/Tissue Integrity  Goal: Skin integrity remains intact  Description: 1.  Monitor for areas of redness and/or skin breakdown2.  Assess vascular access sites hourly3.  Every 4-6 hours minimum:  Change oxygen saturation probe site4.  Every 4-6 hours:  If on nasal continuous positive airway pressure, respiratory therapy assess nares and determine need for appliance change or resting period  Outcome: Progressing     Problem: Safety - Adult  Goal: Free from fall injury  Outcome: Progressing     Problem: ABCDS Injury Assessment  Goal: Absence of physical injury  Outcome: Progressing

## 2025-04-21 NOTE — H&P
repolarization abnormality ( R in aVL ,   Romhilt-Miguel )  Abnormal ECG  When compared with ECG of 05-MAR-2025 12:23,  SC interval has increased  Right bundle branch block is now present  Confirmed by Светлана Santa MD ([id number]) on 4/20/2025 7:22:06 PM     CBC with Auto Differential    Collection Time: 04/20/25  6:52 PM   Result Value Ref Range    WBC 6.5 4.1 - 11.1 K/uL    RBC 3.09 (L) 4.10 - 5.70 M/uL    Hemoglobin 10.0 (L) 12.1 - 17.0 g/dL    Hematocrit 31.4 (L) 36.6 - 50.3 %    .6 (H) 80.0 - 99.0 FL    MCH 32.4 26.0 - 34.0 PG    MCHC 31.8 30.0 - 36.5 g/dL    RDW 18.2 (H) 11.5 - 14.5 %    Platelets 227 150 - 400 K/uL    MPV 10.9 8.9 - 12.9 FL    Nucleated RBCs 0.0 0  WBC    nRBC 0.00 0.00 - 0.01 K/uL    Neutrophils % 65.9 32.0 - 75.0 %    Lymphocytes % 21.0 12.0 - 49.0 %    Monocytes % 8.9 5.0 - 13.0 %    Eosinophils % 2.9 0.0 - 7.0 %    Basophils % 0.8 0.0 - 1.0 %    Immature Granulocytes % 0.5 0.0 - 0.5 %    Neutrophils Absolute 4.28 1.80 - 8.00 K/UL    Lymphocytes Absolute 1.36 0.80 - 3.50 K/UL    Monocytes Absolute 0.58 0.00 - 1.00 K/UL    Eosinophils Absolute 0.19 0.00 - 0.40 K/UL    Basophils Absolute 0.05 0.00 - 0.10 K/UL    Immature Granulocytes Absolute 0.03 0.00 - 0.04 K/UL    Differential Type AUTOMATED     Comprehensive Metabolic Panel    Collection Time: 04/20/25  6:52 PM   Result Value Ref Range    Sodium 133 (L) 136 - 145 mmol/L    Potassium 6.1 (H) 3.5 - 5.1 mmol/L    Chloride 97 97 - 108 mmol/L    CO2 30 21 - 32 mmol/L    Anion Gap 6 2 - 12 mmol/L    Glucose 130 (H) 65 - 100 mg/dL    BUN 64 (H) 6 - 20 MG/DL    Creatinine 9.53 (H) 0.70 - 1.30 MG/DL    BUN/Creatinine Ratio 7 (L) 12 - 20      Est, Glom Filt Rate 5 (L) >60 ml/min/1.73m2    Calcium 10.2 (H) 8.5 - 10.1 MG/DL    Total Bilirubin 0.5 0.2 - 1.0 MG/DL    ALT 14 12 - 78 U/L    AST 19 15 - 37 U/L    Alk Phosphatase 66 45 - 117 U/L    Total Protein 7.8 6.4 - 8.2 g/dL    Albumin 3.6 3.5 - 5.0 g/dL    Globulin 4.2 (H) 2.0 - 4.0 g/dL

## 2025-04-21 NOTE — FLOWSHEET NOTE
Ultrafiltration Rate (ml/hr) 860 ml/hr   Ultrafiltration Removed (ml) 0 ml       RN completed patient assessment. RN reviewed vital signs and Hepatitis status interpretation for LPN. Tx completed and reviewed by RN Blanco Bocanegra  Primary RN SBAR: Dayton Ghosh  Incapacitated Nurse [facility]. provided: Yes  Patient Education provided: Access care  Preferred Education method and Primary language: verbal/english  Dialysis consent: yes  Hospital General Consent Verified: yes  Hospital associated wait time; reason: n/a  Hepatitis B Surface Ag   Date/Time Value Ref Range Status   11/18/2024 03:01 PM <0.10 Index Final     Hep B S Ag Interp   Date/Time Value Ref Range Status   11/18/2024 03:01 PM Negative NEG   Final     HEP B SURF AB   Date/Time Value Ref Range Status   11/16/2022 01:00 AM 4.25 mIU/mL Final     Hep B S Ab   Date/Time Value Ref Range Status   11/18/2024 03:01 PM 10.52 mIU/mL Final     Comment:     RESULTS REPEATED     Hep B S Ab Interp   Date/Time Value Ref Range Status   11/18/2024 03:01 PM REACTIVE (A) NR   Final       Post HD note    04/21/25 1115   Treatment   Time Off 1115   Observations & Evaluations   Level of Consciousness 0   Oriented X 4   Heart Rhythm Regular   Respiratory Quality/Effort Unlabored   O2 Device None (Room air)   Bilateral Breath Sounds Diminished   Abdomen Inspection Obese   Edema Generalized   Vital Signs   /82   Temp 97.5 °F (36.4 °C)   Pulse 65   Respirations 18   SpO2 94 %   Pain Assessment   Pain Assessment None - Denies Pain   During Hemodialysis Assessment   Blood Flow Rate (ml/min) 400 ml/min   Arterial Pressure (mmHg) -180 mmHg   Venous Pressure (mmHg) 250   TMP 50      Access Visible Yes   Ultrafiltration Rate (ml/hr) 960 ml/hr   Ultrafiltration Removed (ml) 3100 ml   Post-Hemodialysis Assessment   Post-Treatment Procedures Blood returned;Catheter Capped, clamped with Saline x2 ports   Machine Disinfection Process Exterior Machine Disinfection   Rinseback

## 2025-04-22 VITALS
HEIGHT: 72 IN | OXYGEN SATURATION: 93 % | SYSTOLIC BLOOD PRESSURE: 138 MMHG | DIASTOLIC BLOOD PRESSURE: 80 MMHG | WEIGHT: 238.54 LBS | TEMPERATURE: 97.9 F | HEART RATE: 81 BPM | BODY MASS INDEX: 32.31 KG/M2 | RESPIRATION RATE: 18 BRPM

## 2025-04-22 LAB
ALBUMIN SERPL-MCNC: 3.5 G/DL (ref 3.5–5)
ALBUMIN/GLOB SERPL: 0.9 (ref 1.1–2.2)
ALP SERPL-CCNC: 62 U/L (ref 45–117)
ALT SERPL-CCNC: 11 U/L (ref 12–78)
ANION GAP SERPL CALC-SCNC: 5 MMOL/L (ref 2–12)
AST SERPL-CCNC: 7 U/L (ref 15–37)
BASOPHILS # BLD: 0.04 K/UL (ref 0–0.1)
BASOPHILS NFR BLD: 0.7 % (ref 0–1)
BILIRUB SERPL-MCNC: 0.8 MG/DL (ref 0.2–1)
BUN SERPL-MCNC: 50 MG/DL (ref 6–20)
BUN/CREAT SERPL: 7 (ref 12–20)
CALCIUM SERPL-MCNC: 9.2 MG/DL (ref 8.5–10.1)
CHLORIDE SERPL-SCNC: 96 MMOL/L (ref 97–108)
CO2 SERPL-SCNC: 31 MMOL/L (ref 21–32)
CREAT SERPL-MCNC: 7.65 MG/DL (ref 0.7–1.3)
DIFFERENTIAL METHOD BLD: ABNORMAL
EOSINOPHIL # BLD: 0.17 K/UL (ref 0–0.4)
EOSINOPHIL NFR BLD: 3.2 % (ref 0–7)
ERYTHROCYTE [DISTWIDTH] IN BLOOD BY AUTOMATED COUNT: 18.3 % (ref 11.5–14.5)
GLOBULIN SER CALC-MCNC: 4 G/DL (ref 2–4)
GLUCOSE BLD STRIP.AUTO-MCNC: 112 MG/DL (ref 65–117)
GLUCOSE BLD STRIP.AUTO-MCNC: 114 MG/DL (ref 65–117)
GLUCOSE BLD STRIP.AUTO-MCNC: 130 MG/DL (ref 65–117)
GLUCOSE SERPL-MCNC: 98 MG/DL (ref 65–100)
HCT VFR BLD AUTO: 29.5 % (ref 36.6–50.3)
HGB BLD-MCNC: 9.1 G/DL (ref 12.1–17)
IMM GRANULOCYTES # BLD AUTO: 0.02 K/UL (ref 0–0.04)
IMM GRANULOCYTES NFR BLD AUTO: 0.4 % (ref 0–0.5)
LYMPHOCYTES # BLD: 1.19 K/UL (ref 0.8–3.5)
LYMPHOCYTES NFR BLD: 22.2 % (ref 12–49)
MCH RBC QN AUTO: 31.2 PG (ref 26–34)
MCHC RBC AUTO-ENTMCNC: 30.8 G/DL (ref 30–36.5)
MCV RBC AUTO: 101 FL (ref 80–99)
MONOCYTES # BLD: 0.47 K/UL (ref 0–1)
MONOCYTES NFR BLD: 8.8 % (ref 5–13)
NEUTS SEG # BLD: 3.47 K/UL (ref 1.8–8)
NEUTS SEG NFR BLD: 64.7 % (ref 32–75)
NRBC # BLD: 0 K/UL (ref 0–0.01)
NRBC BLD-RTO: 0 PER 100 WBC
PLATELET # BLD AUTO: 194 K/UL (ref 150–400)
PMV BLD AUTO: 11.1 FL (ref 8.9–12.9)
POTASSIUM SERPL-SCNC: 4.4 MMOL/L (ref 3.5–5.1)
PROT SERPL-MCNC: 7.5 G/DL (ref 6.4–8.2)
RBC # BLD AUTO: 2.92 M/UL (ref 4.1–5.7)
SERVICE CMNT-IMP: ABNORMAL
SERVICE CMNT-IMP: NORMAL
SERVICE CMNT-IMP: NORMAL
SODIUM SERPL-SCNC: 132 MMOL/L (ref 136–145)
WBC # BLD AUTO: 5.4 K/UL (ref 4.1–11.1)

## 2025-04-22 PROCEDURE — 6360000002 HC RX W HCPCS: Performed by: STUDENT IN AN ORGANIZED HEALTH CARE EDUCATION/TRAINING PROGRAM

## 2025-04-22 PROCEDURE — 97161 PT EVAL LOW COMPLEX 20 MIN: CPT

## 2025-04-22 PROCEDURE — 6370000000 HC RX 637 (ALT 250 FOR IP): Performed by: STUDENT IN AN ORGANIZED HEALTH CARE EDUCATION/TRAINING PROGRAM

## 2025-04-22 PROCEDURE — 2500000003 HC RX 250 WO HCPCS: Performed by: STUDENT IN AN ORGANIZED HEALTH CARE EDUCATION/TRAINING PROGRAM

## 2025-04-22 PROCEDURE — 97530 THERAPEUTIC ACTIVITIES: CPT

## 2025-04-22 PROCEDURE — 6370000000 HC RX 637 (ALT 250 FOR IP): Performed by: PHYSICIAN ASSISTANT

## 2025-04-22 PROCEDURE — 36415 COLL VENOUS BLD VENIPUNCTURE: CPT

## 2025-04-22 PROCEDURE — 85025 COMPLETE CBC W/AUTO DIFF WBC: CPT

## 2025-04-22 PROCEDURE — 82962 GLUCOSE BLOOD TEST: CPT

## 2025-04-22 PROCEDURE — 97116 GAIT TRAINING THERAPY: CPT

## 2025-04-22 PROCEDURE — 80053 COMPREHEN METABOLIC PANEL: CPT

## 2025-04-22 RX ORDER — LIDOCAINE 4 G/G
1 PATCH TOPICAL DAILY
Qty: 30 PATCH | Refills: 0 | Status: SHIPPED | OUTPATIENT
Start: 2025-04-22

## 2025-04-22 RX ADMIN — BUMETANIDE 2 MG: 1 TABLET ORAL at 08:08

## 2025-04-22 RX ADMIN — HEPARIN SODIUM 5000 UNITS: 5000 INJECTION INTRAVENOUS; SUBCUTANEOUS at 06:24

## 2025-04-22 RX ADMIN — ASPIRIN 81 MG: 81 TABLET, CHEWABLE ORAL at 08:08

## 2025-04-22 RX ADMIN — ESCITALOPRAM OXALATE 10 MG: 10 TABLET ORAL at 08:08

## 2025-04-22 RX ADMIN — PRIMIDONE 25 MG: 50 TABLET ORAL at 08:15

## 2025-04-22 RX ADMIN — TAMSULOSIN HYDROCHLORIDE 0.8 MG: 0.4 CAPSULE ORAL at 08:08

## 2025-04-22 RX ADMIN — FINASTERIDE 5 MG: 5 TABLET, FILM COATED ORAL at 08:12

## 2025-04-22 RX ADMIN — ATORVASTATIN CALCIUM 40 MG: 40 TABLET, FILM COATED ORAL at 08:08

## 2025-04-22 RX ADMIN — DICLOFENAC SODIUM 4 G: 10 GEL TOPICAL at 14:00

## 2025-04-22 RX ADMIN — ISOSORBIDE MONONITRATE 30 MG: 30 TABLET, EXTENDED RELEASE ORAL at 08:08

## 2025-04-22 RX ADMIN — CARVEDILOL 6.25 MG: 6.25 TABLET, FILM COATED ORAL at 08:04

## 2025-04-22 RX ADMIN — CALCIUM POLYCARBOPHIL 625 MG: 625 TABLET, FILM COATED ORAL at 11:36

## 2025-04-22 RX ADMIN — SODIUM CHLORIDE, PRESERVATIVE FREE 10 ML: 5 INJECTION INTRAVENOUS at 08:09

## 2025-04-22 ASSESSMENT — PAIN SCALES - GENERAL: PAINLEVEL_OUTOF10: 0

## 2025-04-22 NOTE — PROGRESS NOTES
[facility]         NAME:Kenny Mace  MRN:[id number]   :1945     78 y/o male with ESRD on HD MWF presented to the ED with fatigue. K was 6.1 in ER withiout EKG changes. He has not missed any HD treatments. He is due for HD tomorrow. Nephrology was consulted for HD management.     [place] [facility] [place] [facility]  Hyperkalemia  -6.1  Hyponatremia  Anemia of CKD  SHPTH    PLAN  Give K shifting medications  HD ordered, 2K damien Barillas has been notified  ISIS with HD  Resume HD MWF  Resume home medications  Daily labs    No admission diagnoses are documented for this encounter.     HARLAN Stearns - NP  [facility]    
Attempted to schedule hospital follow up PCP appointment. Office  will contact the patient with appointment information per office protocol. [facility] information on AVS for patient resource. Pending patient discharge.   
End of Shift Note    Bedside shift change report given to Iona (oncoming nurse) by Rama Trevino RN (offgoing nurse).  Report included the following information SBAR, Intake/Output, MAR, Recent Results, and Med Rec Status    Shift worked:  7A-7P     Shift summary and any significant changes:     Pt AOX4, VSS. Pt had hemodialysis done today, 2 L removed. X1 assist with a walker to the bathroom. Denies pain, SOB and discomfort.      Concerns for physician to address:       Zone phone for oncoming shift:          Activity:     Number times ambulated in hallways past shift: 0  Number of times OOB to chair past shift: 1    Cardiac:   Cardiac Monitoring: Yes      Cardiac Rhythm: Sinus rhythm    Access:  Current line(s): PIV     Genitourinary:   Urinary Status: Voiding    Respiratory:   O2 Device: None (Room air)  Chronic home O2 use?: NO  Incentive spirometer at bedside: NO    GI:  Last BM (including prior to admit): 04/21/25  Current diet:  ADULT DIET; Regular; 3 carb choices (45 gm/meal); Low Potassium (Less than 3000 mg/day); Low Phosphorus (Less than 1000 mg)  Passing flatus: YES    Pain Management:   Patient states pain is manageable on current regimen: YES    Skin:  Herbert Scale Score: 19  Interventions: Wound Offloading (Prevention Methods): Bed, pressure reduction mattress    Patient Safety:  Fall Risk: Nursing Judgement-Fall Risk High(Add Comments): Yes  Fall Risk Interventions  Nursing Judgement-Fall Risk High(Add Comments): Yes  Toilet Every 2 Hours-In Advance of Need: Yes  Hourly Visual Checks: Awake  Fall Visual Posted: Fall sign posted, Socks  Room Door Open: Yes  Alarm On: Bed  Patient Moved Closer to Nursing Station: No    Active Consults:   IP CONSULT TO NEPHROLOGY    Length of Stay:  Expected LOS: 3  Actual LOS: 1    Rama Trevino RN                           
End of Shift Note    Bedside shift change report given to Rama VALLES (oncoming nurse) by Iona Aceves LPN (offgoing nurse).  Report included the following information SBAR, Kardex, and MAR    Shift worked:  1900 to 0700     Shift summary and any significant changes:     Patient was received AOX4 , v/s , in no distress, verbalized no complaints on shift. Patient took all medications on shift as per orders. Safety precautions maintained on shift Patient rested throughout the night with eyes closed. Report was given to on coming shift nurse.      Concerns for physician to address:    none   Zone phone for oncoming shift:   [phone]         Iona Aceves LPN                            
Nephrology Progress Note  [facility] / [place] Office  [address], [address]  [place] [address]  Phone - [phone]  Fax - [phone]                   Patient: Kenny Mace                     YOB: 1945        Date- 4/21/2025                                     Admit Date: 4/20/2025   CC: Follow up for ESRD on HD          IMPRESSION & PLAN:   ESRD on HD MWF, [facility] [place]   Hyperkalemia, improved  Anemia of chronic disease  Hypertension   Type 2 diabetes      PLAN-  HD today. Continue MWF schedule  Continue diuresis with Bumex  Low potassium diet  Continue epogen w/ HD  Daily labs     Subjective:  Interval History:   - 4-21-25 - pt seen on HD today. Tolerating HD well. No major complaints. K improving    HPI:  80 y/o male with ESRD on HD MWF presented to the ED with fatigue. K was 6.1 in ER withiout EKG changes. He has not missed any HD treatments. Nephrology was consulted for HD management.     Objective:   Vitals:    04/21/25 0915 04/21/25 0945 04/21/25 1000 04/21/25 1015   BP: 124/78 130/82 129/78 126/80   Pulse: 74 87 75 63   Resp:       Temp:       TempSrc:       SpO2:       Weight:       Height:          No intake/output data recorded.  No intake/output data recorded.      Physical exam:    GEN: NAD  NECK- no mass  RESP: No wheezing, decreased BS b/l  CVS: S1,S2  RRR  NEURO: Normal speech, Non focal  EXT: No Edema   PSYCH: Normal Mood    Chart reviewed.         Pertinent Notes reviewed.       Data Review :  Lab Results   Component Value Date/Time     04/21/2025 01:15 AM    K 5.2 04/21/2025 01:15 AM    CL 97 04/21/2025 01:15 AM    CO2 28 04/21/2025 01:15 AM    BUN 71 04/21/2025 01:15 AM    CREATININE 9.82 04/21/2025 01:15 AM    GLUCOSE 137 04/21/2025 01:15 AM    CALCIUM 9.7 04/21/2025 01:15 AM       Lab Results   Component Value Date    WBC 7.2 04/21/2025    HGB 9.6 (L) 04/21/2025    HCT 30.7 (L) 
Nephrology Progress Note  [facility] / [place] Office  [address], [address]  [place] [address]  Phone - [phone]  Fax - [phone]                   Patient: Kenny Mace                     YOB: 1945        Date- 4/22/2025                                     Admit Date: 4/20/2025   CC: Follow up for ESRD on HD          IMPRESSION & PLAN:   [place] [facility] [place] [facility]   Hyperkalemia, resolved  Mild hyponatremia  Anemia of chronic disease  Hypertension   Type 2 diabetes      PLAN-  No HD today. Resume MWF schedule as outpatient  Continue diuresis with bumex  Low potassium diet  Epogen w/ HD  Safe for d/c from renal standpoint     Subjective:  Interval History:   - 4-22-25 - pt seen and examined at bedside. No complaints. Sitting up eating breakfast and feels ready to go home. Denies shortness of breath. K back to normal    HPI:  78 y/o male with ESRD on HD MWF presented to the ED with fatigue. K was 6.1 in ER withiout EKG changes. He has not missed any HD treatments. Nephrology was consulted for HD management.     Objective:   Vitals:    04/21/25 2007 04/21/25 2053 04/22/25 0412 04/22/25 0739   BP: 122/77 122/77 (!) 146/83 138/80   Pulse: 79  74 81   Resp: 16   18   Temp: 98.4 °F (36.9 °C)  98.2 °F (36.8 °C) 97.9 °F (36.6 °C)   TempSrc: Oral   Oral   SpO2: 92%  93% 93%   Weight:       Height:          I/O last 3 completed shifts:  In: 700   Out: 3000   No intake/output data recorded.      Physical exam:    GEN: NAD  NECK- no mass  RESP: No wheezing, decreased BS b/l  CVS: S1,S2  RRR  NEURO: Normal speech, Non focal  EXT: No Edema   PSYCH: Normal Mood    Chart reviewed.         Pertinent Notes reviewed.       Data Review :  Lab Results   Component Value Date/Time     04/22/2025 04:17 AM    K 4.4 04/22/2025 04:17 AM    CL 96 04/22/2025 04:17 AM    CO2 31 04/22/2025 04:17 AM    BUN 50 04/22/2025 04:17 AM 
Pt AOX4, VSS. X1 assist to the bathroom with a walker. Pt denied pain and discomfort. Pt has order to discharge home. Discharge instructions given, pt verbalized understanding. IV removed, catheter intact. No redness or swelling noted. All belongings sent home with pt. Pt accompanied home by wife.   
Pt off unit to dialysis.   
Spiritual Health History and Assessment/Progress Note  [facility]    Initial Encounter,  ,  ,      Name: Kenny Mace MRN: [id number]    Age: 79 y.o.     Sex: male   Language: English   Jewish: Restorationism   Hyperkalemia     Date: 4/22/2025            Total Time Calculated: 16 min              Spiritual Assessment began in [facility]-[employer] [facility]        Referral/Consult From: Rounding   Encounter Overview/Reason: Initial Encounter  Service Provided For: Patient    Fiorella, Belief, Meaning:   Patient identifies as spiritual, is connected with a fiorella tradition or spiritual practice, and has beliefs or practices that help with coping during difficult times  Family/Friends No family/friends present      Importance and Influence:  Patient has no beliefs influential to healthcare decision-making identified during this visit  Family/Friends No family/friends present    Community:  Patient feels well-supported. Support system includes: Spouse/Partner  Family/Friends No family/friends present    Assessment and Plan of Care:     Patient Interventions include: Facilitated expression of thoughts and feelings, Explored spiritual coping/struggle/distress, Affirmed coping skills/support systems, Facilitated life review and/ or legacy, and Other: assurance of prayer  Family/Friends Interventions include: No family/friends present    Patient Plan of Care: Spiritual Care available upon further referral  Family/Friends Plan of Care: No family/friends present    Electronically signed by Chaplain CB ALMODOVAR on 4/22/2025 at 1:07 PM    
Date/Time    COVID-19 & Influenza Combo [[id number]] Collected: 04/20/25 2010    Order Status: Completed Specimen: Nasopharyngeal Updated: 04/20/25 2100     Source Nasopharyngeal        SARS-CoV-2, PCR Not detected        Comment: Not Detected results do not preclude SARS-CoV-2 infection and should not be used as the sole basis for patient management decisions.Results must be combined with clinical observations, patient history, and epidemiological information.        Rapid Influenza A By PCR Not detected        Rapid Influenza B By PCR Not detected        Comment:    Testing was performed using shira Beti SARS-CoV-2 and Influenza A/B nucleic acid assay.  This test is a multiplex Real-Time Reverse Transcriptase Polymerase Chain Reaction (RT-PCR) based in vitro diagnostic test intended for the qualitative detection of nucleic acids from SARS-CoV-2, Influenza A, and Influenza B in nasopharyngeal specimens.                 Recent Labs     04/20/25 1852 04/21/25  0115   WBC 6.5 7.2   HGB 10.0* 9.6*   HCT 31.4* 30.7*    211     Recent Labs     04/20/25 1852 04/20/25  1900 04/21/25  0115   *  --  132*   K 6.1*  --  5.2*   CL 97  --  97   CO2 30  --  28   BUN 64*  --  71*   MG  --  3.1*  --    ALT 14  --  11*      Lab Results   Component Value Date/Time    TSH 2.54 11/15/2022 10:50 PM         Other medical conditions are listed in the active hospital problem list section; these and other pertinent data were taken into consideration when the treatment plan was developed and customized to this patient's unique overall circumstances and needs.  We have reviewed relevant medical records within the constraints of this admission process.   high complexity decision making was performed for this patient who is at high risk for decompensation with multiple organ involvement.         Marisol Amezquita PA-C  4/21/2025

## 2025-04-22 NOTE — DISCHARGE INSTR - DIET

## 2025-04-22 NOTE — PLAN OF CARE
Problem: Physical Therapy - Adult  Goal: By Discharge: Performs mobility at highest level of function for planned discharge setting.  See evaluation for individualized goals.  Description: FUNCTIONAL STATUS PRIOR TO ADMISSION: Patient was modified independent using a rolling walker, rollator, and single point cane for functional mobility.     HOME SUPPORT PRIOR TO ADMISSION: The patient lived with wife who provides support as needed and transportation to dialysis.    Physical Therapy Goals  Initiated 4/22/2025  1.  Patient will move from supine to sit and sit to supine in bed with modified independence within 7 day(s).    2.  Patient will perform sit to stand with modified independence within 7 day(s).  3.  Patient will transfer from bed to chair and chair to bed with modified independence using the least restrictive device within 7 day(s).  4.  Patient will ambulate with supervision/set-up for 150 feet with the least restrictive device within 7 day(s).     4/22/2025 1125 by Carissa Polk, PT  Outcome: Progressing

## 2025-04-22 NOTE — PLAN OF CARE
Problem: Chronic Conditions and Co-morbidities  Goal: Patient's chronic conditions and co-morbidity symptoms are monitored and maintained or improved  4/22/2025 0750 by Rama Trevino RN  Outcome: Progressing  4/22/2025 0052 by Iona Aceves LPN  Outcome: Progressing     Problem: Discharge Planning  Goal: Discharge to home or other facility with appropriate resources  4/22/2025 0750 by Rama Trevino RN  Outcome: Progressing  4/22/2025 0052 by Iona Aceves LPN  Outcome: Progressing     Problem: Skin/Tissue Integrity  Goal: Skin integrity remains intact  Description: 1.  Monitor for areas of redness and/or skin breakdown2.  Assess vascular access sites hourly3.  Every 4-6 hours minimum:  Change oxygen saturation probe site4.  Every 4-6 hours:  If on nasal continuous positive airway pressure, respiratory therapy assess nares and determine need for appliance change or resting period  4/22/2025 0750 by Rama Trevino RN  Outcome: Progressing  4/22/2025 0052 by Iona Aceves LPN  Outcome: Progressing     Problem: Safety - Adult  Goal: Free from fall injury  4/22/2025 0750 by Rama Trevino RN  Outcome: Progressing  4/22/2025 0052 by Iona Aceves LPN  Outcome: Progressing     Problem: ABCDS Injury Assessment  Goal: Absence of physical injury  4/22/2025 0750 by Rama Trevino RN  Outcome: Progressing  4/22/2025 0052 by Iona Aceves LPN  Outcome: Progressing

## 2025-04-22 NOTE — PLAN OF CARE
Problem: Physical Therapy - Adult  Goal: By Discharge: Performs mobility at highest level of function for planned discharge setting.  See evaluation for individualized goals.  Description: FUNCTIONAL STATUS PRIOR TO ADMISSION: Patient was modified independent using a rolling walker, rollator, and single point cane for functional mobility.     HOME SUPPORT PRIOR TO ADMISSION: The patient lived with wife who provides support as needed and transportation to dialysis.    Physical Therapy Goals  Initiated 4/22/2025  1.  Patient will move from supine to sit and sit to supine in bed with modified independence within 7 day(s).    2.  Patient will perform sit to stand with modified independence within 7 day(s).  3.  Patient will transfer from bed to chair and chair to bed with modified independence using the least restrictive device within 7 day(s).  4.  Patient will ambulate with supervision/set-up for 150 feet with the least restrictive device within 7 day(s).     Outcome: Progressing   PHYSICAL THERAPY EVALUATION    Patient: Kenny Mace (79 y.o. male)  Date: 4/22/2025  Primary Diagnosis: Hyperkalemia [E87.5]  Acute abdominal pain [R10.9]  Acute chest pain [R07.9]  Acute pain of right knee [M25.561]       Precautions: Restrictions/Precautions  Restrictions/Precautions: Fall Risk            ASSESSMENT :   DEFICITS/IMPAIRMENTS:   The patient is limited by decreased functional mobility, strength, sensation, activity tolerance, balance following admit with fatigue and hyperkalemia.     Pt received in bed napping; oriented x 4 once fully awake. Pt required min A for support at trunk with supine to sit, otherwise mobilized with CGA/ SBA for transfers from bed and toilet height. Pt tolerated gait training around room and on unit with RW CGA/ SBA for balance, cues for posture. Noted BARKER with SpO2 >88% on RA.     Pt endorses near baseline LOF with mobility and is eager for d/c home. Discussed paced activity, use of energy

## 2025-04-22 NOTE — CARE COORDINATION
Pt is cleared for d/c from CM standpoint. Wife to transport        Pt reviewed in IDR this morning. CM met with pt at bedside, introduced self and role. Pt declined HH recommendations. Pt advises wife will transport at discharge.    CM will send discharge summary, dialysis run to [facility] once pt is discharge.    1945 -  faxed [facility] (fax: [phone]) d/c summary, nephrology consult and dailysis run sheet - with confirmation.    Brenda Throckmorton RN  [facility] Case Management      
recommendations)   Social/Functional History   Lives With Spouse   Type of Home House   Home Layout One level   Home Access Stairs to enter with rails   Entrance Stairs - Number of Steps 1   Bathroom Toilet Standard   Bathroom Equipment None   Home Equipment Cane;Rollator   Receives Help From Family;Friend(s)   Prior Level of Assist for ADLs Independent   Prior Level of Assist for Homemaking Needs assistance   Ambulation Assistance Independent   Prior Level of Assist for Transfers Independent   Active  No   Patient's  Info wife transports   Occupation Retired   Discharge Planning   Type of Residence House   Living Arrangements Spouse/Significant Other   Current Services Prior To Admission Durable Medical Equipment;Oxygen Therapy   Current DME Prior to Arrival Oxygen Therapy (Comment);Cane;Walker   Potential Assistance Needed N/A   DME Ordered? No   Potential Assistance Purchasing Medications No   Type of Home Care Services None   Patient expects to be discharged to: House   One/Two Story Residence One story   History of falls? 1           Advance Care Planning     General Advance Care Planning (ACP) Conversation    Date of Conversation: 4/21/2025  Conducted with: Patient with Decision Making Capacity  Other persons present: None    Healthcare Decision Maker:   Primary Decision Maker: Teresita Mace - Spouse - [phone]     Today we documented Decision Maker(s) consistent with Legal Next of Kin hierarchy.  Content/Action Overview:  DECLINED ACP Conversation - will revisit periodically  Reviewed DNR/DNI and patient elects Full Code (Attempt Resuscitation)    Length of Voluntary ACP Conversation in minutes:  <16 minutes (Non-Billable)    Brenda Throckmorton RN  [facility] Case Management  /[id number]

## 2025-04-22 NOTE — PLAN OF CARE
Problem: Chronic Conditions and Co-morbidities  Goal: Patient's chronic conditions and co-morbidity symptoms are monitored and maintained or improved  4/22/2025 0052 by Iona Aceves LPN  Outcome: Progressing  4/21/2025 1530 by Rama Trevino RN  Outcome: Progressing     Problem: Discharge Planning  Goal: Discharge to home or other facility with appropriate resources  4/22/2025 0052 by Iona Aceves LPN  Outcome: Progressing  4/21/2025 1530 by Rama Trevino RN  Outcome: Progressing     Problem: Skin/Tissue Integrity  Goal: Skin integrity remains intact  Description: 1.  Monitor for areas of redness and/or skin breakdown2.  Assess vascular access sites hourly3.  Every 4-6 hours minimum:  Change oxygen saturation probe site4.  Every 4-6 hours:  If on nasal continuous positive airway pressure, respiratory therapy assess nares and determine need for appliance change or resting period  4/22/2025 0052 by Iona Aceves LPN  Outcome: Progressing  4/21/2025 1530 by Rama Trevino, RN  Outcome: Progressing     Problem: Safety - Adult  Goal: Free from fall injury  4/22/2025 0052 by Iona Aceves LPN  Outcome: Progressing  4/21/2025 1530 by Rama Trevino RN  Outcome: Progressing     Problem: ABCDS Injury Assessment  Goal: Absence of physical injury  4/22/2025 0052 by Iona Aceves LPN  Outcome: Progressing  4/21/2025 1530 by Rama Trevino, RN  Outcome: Progressing

## 2025-05-28 ENCOUNTER — APPOINTMENT (OUTPATIENT)
Facility: HOSPITAL | Age: 80
End: 2025-05-28
Payer: MEDICARE

## 2025-05-28 ENCOUNTER — HOSPITAL ENCOUNTER (INPATIENT)
Facility: HOSPITAL | Age: 80
LOS: 3 days | Discharge: HOME | End: 2025-05-31
Admitting: INTERNAL MEDICINE
Payer: MEDICARE

## 2025-05-28 DIAGNOSIS — G93.40 ACUTE ENCEPHALOPATHY: ICD-10-CM

## 2025-05-28 DIAGNOSIS — E87.5 HYPERKALEMIA: ICD-10-CM

## 2025-05-28 DIAGNOSIS — J96.01 ACUTE HYPOXIC RESPIRATORY FAILURE (HCC): Primary | ICD-10-CM

## 2025-05-28 PROBLEM — I50.1 ACUTE LEFT-SIDED CHF (CONGESTIVE HEART FAILURE) (HCC): Status: ACTIVE | Noted: 2025-05-28

## 2025-05-28 LAB
ALBUMIN SERPL-MCNC: 3.8 G/DL (ref 3.5–5)
ALBUMIN SERPL-MCNC: 4.2 G/DL (ref 3.5–5)
ALBUMIN/GLOB SERPL: 0.8 (ref 1.1–2.2)
ALBUMIN/GLOB SERPL: 1 (ref 1.1–2.2)
ALP SERPL-CCNC: 58 U/L (ref 45–117)
ALP SERPL-CCNC: 62 U/L (ref 45–117)
ALT SERPL-CCNC: 15 U/L (ref 12–78)
ALT SERPL-CCNC: 18 U/L (ref 12–78)
ANION GAP BLD CALC-SCNC: 5 (ref 10–20)
ANION GAP SERPL CALC-SCNC: 11 MMOL/L (ref 2–12)
ANION GAP SERPL CALC-SCNC: 16 MMOL/L (ref 2–12)
ANION GAP SERPL CALC-SCNC: 16 MMOL/L (ref 2–12)
AST SERPL-CCNC: 6 U/L (ref 15–37)
AST SERPL-CCNC: ABNORMAL U/L (ref 15–37)
BASE DEFICIT BLD-SCNC: 3.3 MMOL/L
BASOPHILS # BLD: 0.07 K/UL (ref 0–0.1)
BASOPHILS NFR BLD: 0.9 % (ref 0–1)
BILIRUB SERPL-MCNC: 0.4 MG/DL (ref 0.2–1)
BILIRUB SERPL-MCNC: 0.5 MG/DL (ref 0.2–1)
BUN SERPL-MCNC: 125 MG/DL (ref 6–20)
BUN SERPL-MCNC: 126 MG/DL (ref 6–20)
BUN SERPL-MCNC: 51 MG/DL (ref 6–20)
BUN/CREAT SERPL: 7 (ref 12–20)
BUN/CREAT SERPL: 9 (ref 12–20)
BUN/CREAT SERPL: 9 (ref 12–20)
CA-I BLD-MCNC: 1.07 MMOL/L (ref 1.15–1.33)
CALCIUM SERPL-MCNC: 9.2 MG/DL (ref 8.5–10.1)
CALCIUM SERPL-MCNC: 9.5 MG/DL (ref 8.5–10.1)
CALCIUM SERPL-MCNC: 9.6 MG/DL (ref 8.5–10.1)
CHLORIDE BLD-SCNC: 103 MMOL/L (ref 100–111)
CHLORIDE SERPL-SCNC: 95 MMOL/L (ref 97–108)
CHLORIDE SERPL-SCNC: 96 MMOL/L (ref 97–108)
CHLORIDE SERPL-SCNC: 96 MMOL/L (ref 97–108)
CO2 BLD-SCNC: 23 MMOL/L (ref 22–29)
CO2 SERPL-SCNC: 17 MMOL/L (ref 21–32)
CO2 SERPL-SCNC: 20 MMOL/L (ref 21–32)
CO2 SERPL-SCNC: 28 MMOL/L (ref 21–32)
COMMENT:: NORMAL
CREAT SERPL-MCNC: 14.3 MG/DL (ref 0.7–1.3)
CREAT SERPL-MCNC: 14.5 MG/DL (ref 0.7–1.3)
CREAT SERPL-MCNC: 7.09 MG/DL (ref 0.7–1.3)
CREAT UR-MCNC: 13.7 MG/DL (ref 0.6–1.3)
DIFFERENTIAL METHOD BLD: ABNORMAL
EKG ATRIAL RATE: 71 BPM
EKG DIAGNOSIS: NORMAL
EKG P AXIS: 1 DEGREES
EKG P-R INTERVAL: 244 MS
EKG Q-T INTERVAL: 436 MS
EKG QRS DURATION: 174 MS
EKG QTC CALCULATION (BAZETT): 473 MS
EKG R AXIS: -62 DEGREES
EKG T AXIS: 106 DEGREES
EKG VENTRICULAR RATE: 71 BPM
EOSINOPHIL # BLD: 0.25 K/UL (ref 0–0.4)
EOSINOPHIL NFR BLD: 3.2 % (ref 0–7)
ERYTHROCYTE [DISTWIDTH] IN BLOOD BY AUTOMATED COUNT: 15.9 % (ref 11.5–14.5)
GLOBULIN SER CALC-MCNC: 4.4 G/DL (ref 2–4)
GLOBULIN SER CALC-MCNC: 4.7 G/DL (ref 2–4)
GLUCOSE BLD STRIP.AUTO-MCNC: 129 MG/DL (ref 74–99)
GLUCOSE BLD STRIP.AUTO-MCNC: 140 MG/DL (ref 65–117)
GLUCOSE BLD STRIP.AUTO-MCNC: 173 MG/DL (ref 65–117)
GLUCOSE SERPL-MCNC: 150 MG/DL (ref 65–100)
GLUCOSE SERPL-MCNC: 163 MG/DL (ref 65–100)
GLUCOSE SERPL-MCNC: 92 MG/DL (ref 65–100)
HBV SURFACE AB SER QL: NONREACTIVE
HBV SURFACE AB SER QL: NONREACTIVE
HBV SURFACE AB SER-ACNC: 4.73 MIU/ML
HBV SURFACE AB SER-ACNC: 4.85 MIU/ML
HBV SURFACE AG SER QL: <0.1 INDEX
HBV SURFACE AG SER QL: <0.1 INDEX
HBV SURFACE AG SER QL: NEGATIVE
HBV SURFACE AG SER QL: NEGATIVE
HCO3 BLDA-SCNC: 24 MMOL/L
HCT VFR BLD AUTO: 33.7 % (ref 36.6–50.3)
HGB BLD-MCNC: 10.8 G/DL (ref 12.1–17)
IMM GRANULOCYTES # BLD AUTO: 0.04 K/UL (ref 0–0.04)
IMM GRANULOCYTES NFR BLD AUTO: 0.5 % (ref 0–0.5)
LACTATE BLD-SCNC: 1.26 MMOL/L (ref 0.4–2)
LYMPHOCYTES # BLD: 0.94 K/UL (ref 0.8–3.5)
LYMPHOCYTES NFR BLD: 11.9 % (ref 12–49)
MAGNESIUM SERPL-MCNC: NORMAL MG/DL (ref 1.6–2.4)
MCH RBC QN AUTO: 30.9 PG (ref 26–34)
MCHC RBC AUTO-ENTMCNC: 32 G/DL (ref 30–36.5)
MCV RBC AUTO: 96.3 FL (ref 80–99)
MONOCYTES # BLD: 0.45 K/UL (ref 0–1)
MONOCYTES NFR BLD: 5.7 % (ref 5–13)
NEUTS SEG # BLD: 6.18 K/UL (ref 1.8–8)
NEUTS SEG NFR BLD: 77.8 % (ref 32–75)
NRBC # BLD: 0 K/UL (ref 0–0.01)
NRBC BLD-RTO: 0 PER 100 WBC
PCO2 BLDV: 51.1 MMHG (ref 41–51)
PH BLDV: 7.28 (ref 7.32–7.42)
PLATELET # BLD AUTO: 226 K/UL (ref 150–400)
PMV BLD AUTO: 11.7 FL (ref 8.9–12.9)
PO2 BLDV: <27 MMHG (ref 25–40)
POTASSIUM BLD-SCNC: 7.6 MMOL/L (ref 3.5–5.5)
POTASSIUM SERPL-SCNC: 3.7 MMOL/L (ref 3.5–5.1)
POTASSIUM SERPL-SCNC: 7.5 MMOL/L (ref 3.5–5.1)
POTASSIUM SERPL-SCNC: ABNORMAL MMOL/L (ref 3.5–5.1)
PROT SERPL-MCNC: 8.5 G/DL (ref 6.4–8.2)
PROT SERPL-MCNC: 8.6 G/DL (ref 6.4–8.2)
RBC # BLD AUTO: 3.5 M/UL (ref 4.1–5.7)
SERVICE CMNT-IMP: ABNORMAL
SODIUM BLD-SCNC: 131 MMOL/L (ref 136–145)
SODIUM SERPL-SCNC: 129 MMOL/L (ref 136–145)
SODIUM SERPL-SCNC: 131 MMOL/L (ref 136–145)
SODIUM SERPL-SCNC: 135 MMOL/L (ref 136–145)
SPECIMEN HOLD: NORMAL
SPECIMEN SITE: ABNORMAL
TROPONIN I SERPL HS-MCNC: 193 NG/L (ref 0–76)
TROPONIN I SERPL HS-MCNC: 235 NG/L (ref 0–76)
TROPONIN I SERPL HS-MCNC: 253 NG/L (ref 0–76)
WBC # BLD AUTO: 7.9 K/UL (ref 4.1–11.1)

## 2025-05-28 PROCEDURE — 82330 ASSAY OF CALCIUM: CPT

## 2025-05-28 PROCEDURE — 96366 THER/PROPH/DIAG IV INF ADDON: CPT

## 2025-05-28 PROCEDURE — 94760 N-INVAS EAR/PLS OXIMETRY 1: CPT

## 2025-05-28 PROCEDURE — 96365 THER/PROPH/DIAG IV INF INIT: CPT

## 2025-05-28 PROCEDURE — 70498 CT ANGIOGRAPHY NECK: CPT

## 2025-05-28 PROCEDURE — 93005 ELECTROCARDIOGRAM TRACING: CPT

## 2025-05-28 PROCEDURE — 84295 ASSAY OF SERUM SODIUM: CPT

## 2025-05-28 PROCEDURE — 36415 COLL VENOUS BLD VENIPUNCTURE: CPT

## 2025-05-28 PROCEDURE — 82803 BLOOD GASES ANY COMBINATION: CPT

## 2025-05-28 PROCEDURE — 70496 CT ANGIOGRAPHY HEAD: CPT

## 2025-05-28 PROCEDURE — 82947 ASSAY GLUCOSE BLOOD QUANT: CPT

## 2025-05-28 PROCEDURE — 2500000003 HC RX 250 WO HCPCS: Performed by: INTERNAL MEDICINE

## 2025-05-28 PROCEDURE — 6370000000 HC RX 637 (ALT 250 FOR IP): Performed by: INTERNAL MEDICINE

## 2025-05-28 PROCEDURE — 6360000002 HC RX W HCPCS

## 2025-05-28 PROCEDURE — 6360000004 HC RX CONTRAST MEDICATION

## 2025-05-28 PROCEDURE — 87340 HEPATITIS B SURFACE AG IA: CPT

## 2025-05-28 PROCEDURE — 85025 COMPLETE CBC W/AUTO DIFF WBC: CPT

## 2025-05-28 PROCEDURE — 1100000000 HC RM PRIVATE

## 2025-05-28 PROCEDURE — 71045 X-RAY EXAM CHEST 1 VIEW: CPT

## 2025-05-28 PROCEDURE — 99285 EMERGENCY DEPT VISIT HI MDM: CPT

## 2025-05-28 PROCEDURE — 90935 HEMODIALYSIS ONE EVALUATION: CPT

## 2025-05-28 PROCEDURE — 5A1D70Z PERFORMANCE OF URINARY FILTRATION, INTERMITTENT, LESS THAN 6 HOURS PER DAY: ICD-10-PCS | Performed by: INTERNAL MEDICINE

## 2025-05-28 PROCEDURE — 96375 TX/PRO/DX INJ NEW DRUG ADDON: CPT

## 2025-05-28 PROCEDURE — 83735 ASSAY OF MAGNESIUM: CPT

## 2025-05-28 PROCEDURE — 84132 ASSAY OF SERUM POTASSIUM: CPT

## 2025-05-28 PROCEDURE — 86706 HEP B SURFACE ANTIBODY: CPT

## 2025-05-28 PROCEDURE — 70450 CT HEAD/BRAIN W/O DYE: CPT

## 2025-05-28 PROCEDURE — 80053 COMPREHEN METABOLIC PANEL: CPT

## 2025-05-28 PROCEDURE — 84484 ASSAY OF TROPONIN QUANT: CPT

## 2025-05-28 PROCEDURE — 4A03X5D MEASUREMENT OF ARTERIAL FLOW, INTRACRANIAL, EXTERNAL APPROACH: ICD-10-PCS | Performed by: INTERNAL MEDICINE

## 2025-05-28 PROCEDURE — 82962 GLUCOSE BLOOD TEST: CPT

## 2025-05-28 RX ORDER — ACETAMINOPHEN 650 MG/1
650 SUPPOSITORY RECTAL EVERY 6 HOURS PRN
Status: DISCONTINUED | OUTPATIENT
Start: 2025-05-28 | End: 2025-05-31 | Stop reason: HOSPADM

## 2025-05-28 RX ORDER — FINASTERIDE 5 MG/1
5 TABLET, FILM COATED ORAL DAILY
Status: DISCONTINUED | OUTPATIENT
Start: 2025-05-29 | End: 2025-05-31 | Stop reason: HOSPADM

## 2025-05-28 RX ORDER — ACETAMINOPHEN 325 MG/1
650 TABLET ORAL EVERY 6 HOURS PRN
Status: DISCONTINUED | OUTPATIENT
Start: 2025-05-28 | End: 2025-05-31 | Stop reason: HOSPADM

## 2025-05-28 RX ORDER — TAMSULOSIN HYDROCHLORIDE 0.4 MG/1
0.8 CAPSULE ORAL DAILY
Status: DISCONTINUED | OUTPATIENT
Start: 2025-05-29 | End: 2025-05-31 | Stop reason: HOSPADM

## 2025-05-28 RX ORDER — DEXTROSE MONOHYDRATE 100 MG/ML
INJECTION, SOLUTION INTRAVENOUS CONTINUOUS PRN
Status: DISCONTINUED | OUTPATIENT
Start: 2025-05-28 | End: 2025-05-31 | Stop reason: HOSPADM

## 2025-05-28 RX ORDER — IOPAMIDOL 755 MG/ML
100 INJECTION, SOLUTION INTRAVASCULAR
Status: COMPLETED | OUTPATIENT
Start: 2025-05-28 | End: 2025-05-28

## 2025-05-28 RX ORDER — BISACODYL 10 MG
10 SUPPOSITORY, RECTAL RECTAL DAILY PRN
Status: DISCONTINUED | OUTPATIENT
Start: 2025-05-28 | End: 2025-05-31 | Stop reason: HOSPADM

## 2025-05-28 RX ORDER — INSULIN LISPRO 100 [IU]/ML
0-8 INJECTION, SOLUTION INTRAVENOUS; SUBCUTANEOUS
Status: DISCONTINUED | OUTPATIENT
Start: 2025-05-28 | End: 2025-05-31 | Stop reason: HOSPADM

## 2025-05-28 RX ORDER — ONDANSETRON 4 MG/1
4 TABLET, ORALLY DISINTEGRATING ORAL EVERY 8 HOURS PRN
Status: DISCONTINUED | OUTPATIENT
Start: 2025-05-28 | End: 2025-05-31 | Stop reason: HOSPADM

## 2025-05-28 RX ORDER — BUMETANIDE 1 MG/1
4 TABLET ORAL 2 TIMES DAILY
Status: DISCONTINUED | OUTPATIENT
Start: 2025-05-28 | End: 2025-05-31 | Stop reason: HOSPADM

## 2025-05-28 RX ORDER — PRIMIDONE 50 MG/1
25 TABLET ORAL 3 TIMES DAILY
Status: DISCONTINUED | OUTPATIENT
Start: 2025-05-28 | End: 2025-05-31 | Stop reason: HOSPADM

## 2025-05-28 RX ORDER — ATORVASTATIN CALCIUM 20 MG/1
40 TABLET, FILM COATED ORAL NIGHTLY
Status: DISCONTINUED | OUTPATIENT
Start: 2025-05-28 | End: 2025-05-31 | Stop reason: HOSPADM

## 2025-05-28 RX ORDER — GLUCAGON 1 MG/ML
1 KIT INJECTION PRN
Status: DISCONTINUED | OUTPATIENT
Start: 2025-05-28 | End: 2025-05-31 | Stop reason: HOSPADM

## 2025-05-28 RX ORDER — SODIUM CHLORIDE 0.9 % (FLUSH) 0.9 %
5-40 SYRINGE (ML) INJECTION EVERY 12 HOURS SCHEDULED
Status: DISCONTINUED | OUTPATIENT
Start: 2025-05-28 | End: 2025-05-31 | Stop reason: HOSPADM

## 2025-05-28 RX ORDER — HEPARIN SODIUM 5000 [USP'U]/ML
5000 INJECTION, SOLUTION INTRAVENOUS; SUBCUTANEOUS EVERY 8 HOURS SCHEDULED
Status: DISCONTINUED | OUTPATIENT
Start: 2025-05-29 | End: 2025-05-31 | Stop reason: HOSPADM

## 2025-05-28 RX ORDER — SODIUM CHLORIDE 9 MG/ML
INJECTION, SOLUTION INTRAVENOUS PRN
Status: DISCONTINUED | OUTPATIENT
Start: 2025-05-28 | End: 2025-05-31 | Stop reason: HOSPADM

## 2025-05-28 RX ORDER — ESCITALOPRAM OXALATE 10 MG/1
10 TABLET ORAL DAILY
Status: DISCONTINUED | OUTPATIENT
Start: 2025-05-29 | End: 2025-05-31 | Stop reason: HOSPADM

## 2025-05-28 RX ORDER — HYDRALAZINE HYDROCHLORIDE 20 MG/ML
20 INJECTION INTRAMUSCULAR; INTRAVENOUS EVERY 6 HOURS PRN
Status: DISCONTINUED | OUTPATIENT
Start: 2025-05-28 | End: 2025-05-31 | Stop reason: HOSPADM

## 2025-05-28 RX ORDER — ASPIRIN 81 MG/1
81 TABLET, CHEWABLE ORAL DAILY
Status: DISCONTINUED | OUTPATIENT
Start: 2025-05-29 | End: 2025-05-31 | Stop reason: HOSPADM

## 2025-05-28 RX ORDER — ONDANSETRON 2 MG/ML
4 INJECTION INTRAMUSCULAR; INTRAVENOUS EVERY 6 HOURS PRN
Status: DISCONTINUED | OUTPATIENT
Start: 2025-05-28 | End: 2025-05-31 | Stop reason: HOSPADM

## 2025-05-28 RX ORDER — CARVEDILOL 6.25 MG/1
6.25 TABLET ORAL 2 TIMES DAILY WITH MEALS
Status: DISCONTINUED | OUTPATIENT
Start: 2025-05-28 | End: 2025-05-31 | Stop reason: HOSPADM

## 2025-05-28 RX ORDER — POLYETHYLENE GLYCOL 3350 17 G/17G
17 POWDER, FOR SOLUTION ORAL DAILY
Status: DISCONTINUED | OUTPATIENT
Start: 2025-05-29 | End: 2025-05-31 | Stop reason: HOSPADM

## 2025-05-28 RX ORDER — GABAPENTIN 300 MG/1
300 CAPSULE ORAL NIGHTLY
Status: DISCONTINUED | OUTPATIENT
Start: 2025-05-28 | End: 2025-05-31 | Stop reason: HOSPADM

## 2025-05-28 RX ORDER — FUROSEMIDE 10 MG/ML
40 INJECTION INTRAMUSCULAR; INTRAVENOUS ONCE
Status: COMPLETED | OUTPATIENT
Start: 2025-05-28 | End: 2025-05-28

## 2025-05-28 RX ORDER — CALCIUM GLUCONATE 20 MG/ML
2000 INJECTION, SOLUTION INTRAVENOUS
Status: COMPLETED | OUTPATIENT
Start: 2025-05-28 | End: 2025-05-28

## 2025-05-28 RX ORDER — SODIUM CHLORIDE 0.9 % (FLUSH) 0.9 %
5-40 SYRINGE (ML) INJECTION PRN
Status: DISCONTINUED | OUTPATIENT
Start: 2025-05-28 | End: 2025-05-31 | Stop reason: HOSPADM

## 2025-05-28 RX ADMIN — GABAPENTIN 300 MG: 300 CAPSULE ORAL at 23:35

## 2025-05-28 RX ADMIN — ATORVASTATIN CALCIUM 40 MG: 20 TABLET, FILM COATED ORAL at 23:34

## 2025-05-28 RX ADMIN — IOPAMIDOL 100 ML: 755 INJECTION, SOLUTION INTRAVENOUS at 16:28

## 2025-05-28 RX ADMIN — BUMETANIDE 4 MG: 1 TABLET ORAL at 23:34

## 2025-05-28 RX ADMIN — PRIMIDONE 25 MG: 50 TABLET ORAL at 23:35

## 2025-05-28 RX ADMIN — CALCIUM GLUCONATE 2000 MG: 20 INJECTION, SOLUTION INTRAVENOUS at 08:07

## 2025-05-28 RX ADMIN — MELATONIN 6 MG: at 23:33

## 2025-05-28 RX ADMIN — CARVEDILOL 6.25 MG: 6.25 TABLET, FILM COATED ORAL at 18:56

## 2025-05-28 RX ADMIN — FUROSEMIDE 40 MG: 10 INJECTION, SOLUTION INTRAMUSCULAR; INTRAVENOUS at 08:39

## 2025-05-28 RX ADMIN — SODIUM CHLORIDE, PRESERVATIVE FREE 10 ML: 5 INJECTION INTRAVENOUS at 23:36

## 2025-05-28 ASSESSMENT — PAIN DESCRIPTION - ORIENTATION: ORIENTATION: LOWER

## 2025-05-28 ASSESSMENT — PAIN SCALES - GENERAL: PAINLEVEL_OUTOF10: 8

## 2025-05-28 ASSESSMENT — PAIN DESCRIPTION - LOCATION: LOCATION: ABDOMEN

## 2025-05-28 NOTE — H&P
diuresis requiring serial monitoring for renal impairment and electrolyte derangements  [] Critical electrolyte abnormalities requiring IV replacement and close serial monitoring  [x] Insulin - monitoring serial FSBS for Hypoglycemic adverse drug reaction  [] Other -   [] Change in code status:    [] Decision to escalate care:    [] Major surgery/procedure with associated risk factors:    ----------------------------------------------------------------------  C. Data (any 2)  [] Discussed current management and discharge planning options with Case Management.  [x] Discussed management of the case with: Emergency room physician, patient  [x] Telemetry personally reviewed and interpreted as documented above    [] Imaging personally reviewed and interpreted, includes:     [x] Data Review (any 3)  [x] All available Consultant/ER notes from yesterday/today were reviewed  [x] All current labs were reviewed and interpreted for clinical significance   [x] Appropriate follow-up labs were ordered  [] Collateral history obtained from:          Subjective:   CHIEF COMPLAINT: \"I have been short of breath and feeling weak all over for the past few days\"    HISTORY OF PRESENT ILLNESS:     Kenny Mace is a 79 y.o.  male     Diabetes mellitus type 2 causing end-stage renal disease, currently only uses insulin sparingly   Blood sugars normally in the 120s to 140s without insulin at home    Chronic systolic CHF, AICD in place  Denies prior CAD or MI   Last LVEF in 2024 was 25 to 30%    Essential hypertension, hyperlipidemia    End-stage renal disease on hemodialysis Monday Wednesday   Left upper extremity fistula/graft   Still makes urine, chronically on Bumex    Chronic oxygen at home as needed, reports he \"uses it now sparingly\"    Reports he missed hemodialysis on Monday   He told me it was because of an issue with his left upper extremity graft, they told him to come back today    Past several days increasing shortness of breath on  (H) 2 - 12 mmol/L    Glucose 150 (H) 65 - 100 mg/dL     (H) 6 - 20 MG/DL    Creatinine 14.30 (H) 0.70 - 1.30 MG/DL    BUN/Creatinine Ratio 9 (L) 12 - 20      Est, Glom Filt Rate 3 (L) >60 ml/min/1.73m2    Calcium 9.2 8.5 - 10.1 MG/DL    Total Bilirubin 0.5 0.2 - 1.0 MG/DL    ALT 18 12 - 78 U/L    AST Hemolyzed, Recollection Recommended 15 - 37 U/L    Alk Phosphatase 62 45 - 117 U/L    Total Protein 8.6 (H) 6.4 - 8.2 g/dL    Albumin 4.2 3.5 - 5.0 g/dL    Globulin 4.4 (H) 2.0 - 4.0 g/dL    Albumin/Globulin Ratio 1.0 (L) 1.1 - 2.2     Troponin    Collection Time: 05/28/25  7:56 AM   Result Value Ref Range    Troponin, High Sensitivity 253 (HH) 0 - 76 ng/L   Magnesium    Collection Time: 05/28/25  7:56 AM   Result Value Ref Range    Magnesium Hemolyzed, Recollection Recommended 1.6 - 2.4 mg/dL   Comprehensive Metabolic Panel    Collection Time: 05/28/25  9:15 AM   Result Value Ref Range    Sodium 129 (L) 136 - 145 mmol/L    Potassium 7.5 (HH) 3.5 - 5.1 mmol/L    Chloride 96 (L) 97 - 108 mmol/L    CO2 17 (L) 21 - 32 mmol/L    Anion Gap 16 (H) 2 - 12 mmol/L    Glucose 163 (H) 65 - 100 mg/dL     (H) 6 - 20 MG/DL    Creatinine 14.50 (H) 0.70 - 1.30 MG/DL    BUN/Creatinine Ratio 9 (L) 12 - 20      Est, Glom Filt Rate 3 (L) >60 ml/min/1.73m2    Calcium 9.5 8.5 - 10.1 MG/DL    Total Bilirubin 0.4 0.2 - 1.0 MG/DL    ALT 15 12 - 78 U/L    AST 6 (L) 15 - 37 U/L    Alk Phosphatase 58 45 - 117 U/L    Total Protein 8.5 (H) 6.4 - 8.2 g/dL    Albumin 3.8 3.5 - 5.0 g/dL    Globulin 4.7 (H) 2.0 - 4.0 g/dL    Albumin/Globulin Ratio 0.8 (L) 1.1 - 2.2     Hepatitis B Surface Antibody    Collection Time: 05/28/25  9:15 AM   Result Value Ref Range    Hep B S Ab 4.73 mIU/mL    Hep B S Ab Interp NONREACTIVE NR     Hepatitis B Surface Antigen    Collection Time: 05/28/25  9:15 AM   Result Value Ref Range    Hepatitis B Surface Ag <0.10 Index    Hep B S Ag Interp Negative NEG     Extra Tubes Hold    Collection Time: 05/28/25  9:15

## 2025-05-28 NOTE — ED NOTES
Patient with episode of loose stool and urine.  Lizeth care provided and patient placed back in position of comfort with call bell in reach.  Patient and family updated on plan of care.

## 2025-05-28 NOTE — FLOWSHEET NOTE
HD Pre tx-note   05/28/25 0940   Treatment   Treatment Goal 3500   Observations & Evaluations   Level of Consciousness 0   Oriented X 4   Heart Rhythm Regular   Respiratory Quality/Effort Unlabored   O2 Device Nasal cannula   Bilateral Breath Sounds Diminished   Abdomen Inspection Obese   Edema Generalized   Vital Signs   BP (!) 140/70   Temp 97.5 °F (36.4 °C)   Pulse 66   Respirations 18   SpO2 100 %   Pain Assessment   Pain Assessment None - Denies Pain   Technical Checks   Dialysis Machine No. 01   RO Machine Number [id number]   Dialyzer Lot No. [id number]   Tubing Lot Number [id number]   All Connections Secure Yes   NS Bag Yes   Saline Line Double Clamped Yes   Dialyzer Nipro ELISIO   Prime Volume (mL) 300 mL   ICEBOAT I;C;E;B;O;A;T   RO Machine Log Sheet Completed Yes   Machine Alarm Self Test Completed;Passed   Air Foam Detector Tested;Proper Function;pH Reading   Extracorporeal Circuit Tested for Integrity Yes   Machine Conductivity 14.2   Machine Ph 7.6   Manual Ph 7.6   Bleach Test (Neg) Yes   Bath Temperature 98.2 °F (36.8 °C)   Treatment Initiation   Dialyze Hours 4   Treatment  Initiation Universal Precautions maintained;Lines secured to patient;Connections secured;Prime given;Venous Parameters set;Arterial Parameters set;Air foam detector engaged;Dialysate;Saline line double clamped;Dialyzer;Kidney   Dialysis Bath   K+ (Potassium) 1   Ca+ (Calcium) 2.5   Na+ (Sodium) 138   HCO3 (Bicarb) 40   Bicarbonate Concentrate Lot No. [id number]   Acid Concentrate Lot No. [id number]   Citrate Bath   K+ (Potassium) 1   Ca+ (Calcium) 2.5   Bicarb 40   Handoff   Communication Given Transfer Handoff   Handoff Given To HAI Roper   Handoff Received From HAI Nguyen   Handoff Communication Telephone   Time Handoff Given 0900     RN completed patient assessment. RN reviewed vital signs and Hepatitis status interpretation for LPN. Tx completed and reviewed by HAI Henley  Primary RN SBAR: MICA Menendez  Incapacitated Nurse [facility].

## 2025-05-28 NOTE — FLOWSHEET NOTE
Post HD note   05/28/25 1345   Treatment   Time Off 1345   Observations & Evaluations   Level of Consciousness 0   Oriented X 3   Heart Rhythm Regular   Respiratory Quality/Effort Unlabored   O2 Device Nasal cannula   Bilateral Breath Sounds Diminished   Abdomen Inspection Obese   Edema Generalized   Vital Signs   BP (!) 149/81   Temp 97.2 °F (36.2 °C)   Pulse 81   Respirations 18   SpO2 98 %   Pain Assessment   Pain Assessment None - Denies Pain   During Hemodialysis Assessment   Blood Flow Rate (ml/min) 400 ml/min   Arterial Pressure (mmHg) -150 mmHg   Venous Pressure (mmHg) 180   TMP 40      Access Visible Yes   Ultrafiltration Rate (ml/hr) 570 ml/hr   Ultrafiltration Removed (ml) 2500 ml   Post-Hemodialysis Assessment   Post-Treatment Procedures Blood returned;Access bleeding time < 10 minutes   Machine Disinfection Process Acid/Vinegar Clean;Heat Disinfect;Exterior Machine Disinfection   Rinseback Volume (ml) 200 ml   Blood Volume Processed (Liters) 85.5 L   Dialyzer Clearance Clear   Duration of Treatment (minutes) 240 minutes   Heparin Amount Administered During Treatment (mL) 0 mL   Hemodialysis Intake (ml) 500 ml   Hemodialysis Output (ml) 2500 ml   NET Removed (ml) 2000   Tolerated Treatment Fair   Patient Response to Treatment   (Tyesha hd without incident)   Physician Notified No   Patient Disposition Remain in ICU/ED   Handoff   Communication Given Transfer Handoff   Handoff Given To HAI Quiroz   Handoff Received From Renee CHADWICK   Handoff Communication Telephone   Time Handoff Given 1425     Pt. Tyesha. Lenka Menendez RN  Hd  without incident, no s/s of distress. Dr. ASHOK Mcnulty rounding no changes at this time. All possible blood returned to pt. Report given to primary nurse

## 2025-05-28 NOTE — ED NOTES
Upon return from dialysis patient remains lethargic and states he feels weak. Spoke with Dr. Fields who is aware.    [facility]

## 2025-05-28 NOTE — ED NOTES
Patient removed from O2 per Dr. Fields.  Patient sats down to 89% on room air.  Patient placed back on 2 L nasal cannula at this time. Sats up to 94%.

## 2025-05-28 NOTE — CONSULTS
Nephrology Consult Note     [facility]                Phone - [phone]   Patient: Kenny Mace   YOB: 1945    Date- 5/28/2025  MRN: [id number]             CONSULTING PHYSICIAN:     REASON FOR CONSULTATION: HYPERKALEMIA  ADMIT DATE:5/28/2025 PATIENT PCP:Ritchie Tejada MD     IMPRESSION & PLAN:   HYPERKALEMIA  [place] [facility]   Hyponatremia  Anemia of ckd  Sec. Hyperpara  Sob  Fluid overload    PLAN-  Emergent hd today  He may need extra dialysis tomorrow  1k x 2 hour-- 2k x 2 hours  Remove 3-4 kg  Epogen for anemia  Continue calcitriol  Continue coreg       Active Problems:    * No active hospital problems. *  Resolved Problems:    * No resolved hospital problems. *      [x] High complexity decision making was performed  [x] Patient is at high-risk of decompensation with multiple organ involvement    Subjective:   HPI: Kenny Mace is a 79 y.o. male is admitted with   Chief Complaint   Patient presents with    Fatigue     Patient called EMS today for weakness.  Patient is a MWF dialysis patient and missed his treatment on Monday. Patient also with SOB and lower abdominal pain.     .  He was tx from OSH for hyperkalemia and sob  He missed hd on Monday due to infiltration of avf  His k level > 7.0  He is having sob  He is on hd at [facility].  He was my patient in past, he was getting hd at [facility] in past        Review of Systems:    c/o sob,  No c/o chest pain,   No c/o nausea or vomiting, No c/o  fever.     A 11 point review of system was performed today. Pertinent positives and negatives are mentioned in the HPI. The reminder of the ROS is negative and noncontributory.    Past Medical History:   Diagnosis Date    Arthritis     spine    CAD (coronary artery disease)     high cholesterol    Chronic kidney disease     dialysis    Diabetes (HCC)     ESRD (end stage renal disease) (HCC)     GERD

## 2025-05-28 NOTE — ED PROVIDER NOTES
[facility] EMERGENCY DEPARTMENT  EMERGENCY DEPARTMENT ENCOUNTER    Patient Name: Kenny Mace  MRN: [id number]  YOB: 1945  Provider: Socrates Fields MD  PCP: Ritchie Tejada MD  Time/Date of evaluation:  5/28/25    History of Presenting Illness     Chief Complaint   Patient presents with    Fatigue     Patient called EMS today for weakness.  Patient is a MWF dialysis patient and missed his treatment on Monday. Patient also with SOB and lower abdominal pain.        HISTORY (Narrative):   Kenny Mace is a 79 y.o. male presents to the Emergency Department after a fall and reports shortness of breath. The patient, who is dialysis-dependent, states they fell on the floor, landing primarily on their knees. This is not their first fall experience. They deny hitting their head during the fall. The patient missed their scheduled dialysis on Monday due to an incident involving blood stains in their sleeping area. Since then, they have been experiencing increasing shortness of breath, which is visibly apparent. The patient also reports lower abdominal pain, rating it as a 5 out of 10 in severity, more pronounced on one side, though they state it's \"not much pain\" overall. The patient appears weakened, having difficulty sitting up for examination. They confirm they still produce urine, despite being dialysis-dependent.    Current and Past Medications and Supplements  - Not specified    Social History  - Living situation: Patient lives in a place where they sleep (possibly a vehicle or temporary housing)  - Medical Care: Receives dialysis treatment, but missed a recent appointment    Review of Systems  - General: Positive for weakness  - Respiratory: Positive for shortness of breath  - Gastrointestinal: Positive for lower abdominal pain  - Musculoskeletal: Positive for fall    Medical History  - End-stage renal disease requiring dialysis  - History of falls      Nursing Notes were all reviewed and agreed  Calculation (Bazett) 473 ms    P Axis 1 degrees    R Axis -62 degrees    T Axis 106 degrees    Diagnosis       Sinus rhythm with sinus arrhythmia with 1st degree AV block  Left axis deviation  Right bundle branch block  Left ventricular hypertrophy with repolarization abnormality ( R in aVL ,   Romhilt-Miguel )    Confirmed by MD Fields Zachary ([id number]) on 5/28/2025 4:37:30 PM         RADIOLOGIC STUDIES:   Non x-ray images such as CT, Ultrasound and MRI are read by the radiologist. X-ray images are visualized and preliminarily interpreted by the ED Provider with the findings as listed in the ED Course section below.     Interpretation per the Radiologist is listed below, if available at the time of this note:    CT Head W/O Contrast   Final Result      1.  No acute intracranial findings.   2.  Bilateral mastoid effusions.            Electronically signed by Dexter Fernandez      CTA HEAD NECK W WO CONTRAST   Final Result   There is no major vessel occlusion.         Moderate chronic microvascular ischemic change.   The multilevel severe canal and foraminal stenosis is patent.   There is no acute intracranial process.   There is no aneurysm, dissection or hemodynamically significant stenosis.            Electronically signed by LEYLA GREENWOOD      XR CHEST PORTABLE   Final Result   1. Enlarged cardiac silhouette, mild interstitial edema         Electronically signed by Matt Gresham            ED Course and Differential Diagnosis/MDM     8:32 AM EDT DDx, ED Course, and Reassessment:    Vitals: No data found.      ED COURSE/Records Reviewed with summary (prior medical records and Nursing notes)  Nursing Notes and Old Medical Records    ED Course as of 06/05/25 1541   Wed May 28, 2025   0802 POC Potassium(!!): 7.6 [ZD]   0825 Consult for nephrology placed due to concern for hyperkalemia as well as as shortness of breath from volume overload [ZD]      ED Course User Index  [ZD] Socrates Fields MD       Patient was given

## 2025-05-28 NOTE — ED NOTES
Patient back from dialysis at this time.  Patient placed in position of comfort with call bell in reach.

## 2025-05-28 NOTE — ED NOTES
Bedside and Verbal report received from Lenka VALLES. Assumed care of this patient at this time. Patient is alert and oriented x 4. On the montior x3, side rails x2, call bell within reach. No acute distress at this time.

## 2025-05-29 LAB
ALBUMIN SERPL-MCNC: 3.5 G/DL (ref 3.5–5)
ALBUMIN/GLOB SERPL: 0.9 (ref 1.1–2.2)
ALP SERPL-CCNC: 47 U/L (ref 45–117)
ALT SERPL-CCNC: 16 U/L (ref 12–78)
ANION GAP SERPL CALC-SCNC: 12 MMOL/L (ref 2–12)
AST SERPL-CCNC: 29 U/L (ref 15–37)
BASOPHILS # BLD: 0.02 K/UL (ref 0–0.1)
BASOPHILS NFR BLD: 0.4 % (ref 0–1)
BILIRUB SERPL-MCNC: 1 MG/DL (ref 0.2–1)
BUN SERPL-MCNC: 57 MG/DL (ref 6–20)
BUN/CREAT SERPL: 6 (ref 12–20)
CALCIUM SERPL-MCNC: 8.7 MG/DL (ref 8.5–10.1)
CHLORIDE SERPL-SCNC: 94 MMOL/L (ref 97–108)
CO2 SERPL-SCNC: 28 MMOL/L (ref 21–32)
CREAT SERPL-MCNC: 8.97 MG/DL (ref 0.7–1.3)
DIFFERENTIAL METHOD BLD: ABNORMAL
EOSINOPHIL # BLD: 0.26 K/UL (ref 0–0.4)
EOSINOPHIL NFR BLD: 5.3 % (ref 0–7)
ERYTHROCYTE [DISTWIDTH] IN BLOOD BY AUTOMATED COUNT: 15.8 % (ref 11.5–14.5)
EST. AVERAGE GLUCOSE BLD GHB EST-MCNC: 123 MG/DL
GLOBULIN SER CALC-MCNC: 4 G/DL (ref 2–4)
GLUCOSE BLD STRIP.AUTO-MCNC: 150 MG/DL (ref 65–117)
GLUCOSE BLD STRIP.AUTO-MCNC: 170 MG/DL (ref 65–117)
GLUCOSE SERPL-MCNC: 89 MG/DL (ref 65–100)
HBA1C MFR BLD: 5.9 % (ref 4–5.6)
HCT VFR BLD AUTO: 28.5 % (ref 36.6–50.3)
HGB BLD-MCNC: 9.1 G/DL (ref 12.1–17)
IMM GRANULOCYTES # BLD AUTO: 0.01 K/UL (ref 0–0.04)
IMM GRANULOCYTES NFR BLD AUTO: 0.2 % (ref 0–0.5)
LYMPHOCYTES # BLD: 0.83 K/UL (ref 0.8–3.5)
LYMPHOCYTES NFR BLD: 17 % (ref 12–49)
MCH RBC QN AUTO: 30.6 PG (ref 26–34)
MCHC RBC AUTO-ENTMCNC: 31.9 G/DL (ref 30–36.5)
MCV RBC AUTO: 96 FL (ref 80–99)
MONOCYTES # BLD: 0.5 K/UL (ref 0–1)
MONOCYTES NFR BLD: 10.3 % (ref 5–13)
NEUTS SEG # BLD: 3.25 K/UL (ref 1.8–8)
NEUTS SEG NFR BLD: 66.8 % (ref 32–75)
NRBC # BLD: 0 K/UL (ref 0–0.01)
NRBC BLD-RTO: 0 PER 100 WBC
NT PRO BNP: ABNORMAL PG/ML
PLATELET # BLD AUTO: 165 K/UL (ref 150–400)
PMV BLD AUTO: 11.3 FL (ref 8.9–12.9)
POTASSIUM SERPL-SCNC: 4.5 MMOL/L (ref 3.5–5.1)
PROT SERPL-MCNC: 7.5 G/DL (ref 6.4–8.2)
RBC # BLD AUTO: 2.97 M/UL (ref 4.1–5.7)
SERVICE CMNT-IMP: ABNORMAL
SERVICE CMNT-IMP: ABNORMAL
SODIUM SERPL-SCNC: 134 MMOL/L (ref 136–145)
TROPONIN I SERPL HS-MCNC: 263 NG/L (ref 0–76)
WBC # BLD AUTO: 4.9 K/UL (ref 4.1–11.1)

## 2025-05-29 PROCEDURE — 6370000000 HC RX 637 (ALT 250 FOR IP): Performed by: INTERNAL MEDICINE

## 2025-05-29 PROCEDURE — 83036 HEMOGLOBIN GLYCOSYLATED A1C: CPT

## 2025-05-29 PROCEDURE — 80053 COMPREHEN METABOLIC PANEL: CPT

## 2025-05-29 PROCEDURE — 6360000002 HC RX W HCPCS: Performed by: INTERNAL MEDICINE

## 2025-05-29 PROCEDURE — 97530 THERAPEUTIC ACTIVITIES: CPT

## 2025-05-29 PROCEDURE — 85025 COMPLETE CBC W/AUTO DIFF WBC: CPT

## 2025-05-29 PROCEDURE — 36415 COLL VENOUS BLD VENIPUNCTURE: CPT

## 2025-05-29 PROCEDURE — 84484 ASSAY OF TROPONIN QUANT: CPT

## 2025-05-29 PROCEDURE — 82962 GLUCOSE BLOOD TEST: CPT

## 2025-05-29 PROCEDURE — 83880 ASSAY OF NATRIURETIC PEPTIDE: CPT

## 2025-05-29 PROCEDURE — 94760 N-INVAS EAR/PLS OXIMETRY 1: CPT

## 2025-05-29 PROCEDURE — 97161 PT EVAL LOW COMPLEX 20 MIN: CPT

## 2025-05-29 PROCEDURE — 90935 HEMODIALYSIS ONE EVALUATION: CPT

## 2025-05-29 PROCEDURE — 1100000000 HC RM PRIVATE

## 2025-05-29 PROCEDURE — 2700000000 HC OXYGEN THERAPY PER DAY

## 2025-05-29 PROCEDURE — 97165 OT EVAL LOW COMPLEX 30 MIN: CPT

## 2025-05-29 PROCEDURE — 2500000003 HC RX 250 WO HCPCS: Performed by: INTERNAL MEDICINE

## 2025-05-29 RX ADMIN — GABAPENTIN 300 MG: 300 CAPSULE ORAL at 21:15

## 2025-05-29 RX ADMIN — TAMSULOSIN HYDROCHLORIDE 0.8 MG: 0.4 CAPSULE ORAL at 10:31

## 2025-05-29 RX ADMIN — HEPARIN SODIUM 5000 UNITS: 5000 INJECTION INTRAVENOUS; SUBCUTANEOUS at 06:18

## 2025-05-29 RX ADMIN — CARVEDILOL 6.25 MG: 6.25 TABLET, FILM COATED ORAL at 10:32

## 2025-05-29 RX ADMIN — POLYETHYLENE GLYCOL 3350 17 G: 17 POWDER, FOR SOLUTION ORAL at 10:33

## 2025-05-29 RX ADMIN — BUMETANIDE 4 MG: 1 TABLET ORAL at 10:31

## 2025-05-29 RX ADMIN — ESCITALOPRAM OXALATE 10 MG: 10 TABLET ORAL at 10:31

## 2025-05-29 RX ADMIN — BUMETANIDE 4 MG: 1 TABLET ORAL at 21:14

## 2025-05-29 RX ADMIN — HEPARIN SODIUM 5000 UNITS: 5000 INJECTION INTRAVENOUS; SUBCUTANEOUS at 21:15

## 2025-05-29 RX ADMIN — ATORVASTATIN CALCIUM 40 MG: 20 TABLET, FILM COATED ORAL at 21:15

## 2025-05-29 RX ADMIN — SODIUM CHLORIDE, PRESERVATIVE FREE 10 ML: 5 INJECTION INTRAVENOUS at 10:31

## 2025-05-29 RX ADMIN — PRIMIDONE 25 MG: 50 TABLET ORAL at 10:32

## 2025-05-29 RX ADMIN — FINASTERIDE 5 MG: 5 TABLET, FILM COATED ORAL at 10:31

## 2025-05-29 RX ADMIN — SODIUM CHLORIDE, PRESERVATIVE FREE 10 ML: 5 INJECTION INTRAVENOUS at 21:19

## 2025-05-29 RX ADMIN — PRIMIDONE 25 MG: 50 TABLET ORAL at 21:14

## 2025-05-29 RX ADMIN — PRIMIDONE 25 MG: 50 TABLET ORAL at 18:23

## 2025-05-29 RX ADMIN — CARVEDILOL 6.25 MG: 6.25 TABLET, FILM COATED ORAL at 18:23

## 2025-05-29 RX ADMIN — ASPIRIN 81 MG: 81 TABLET, CHEWABLE ORAL at 10:33

## 2025-05-29 ASSESSMENT — PAIN SCALES - GENERAL: PAINLEVEL_OUTOF10: 0

## 2025-05-29 NOTE — DIALYSIS
Post-treatment    05/29/25 1522   Treatment   Time Off 1522   Observations & Evaluations   Level of Consciousness 0   Oriented X 4   Heart Rhythm Regular   Respiratory Quality/Effort Unlabored   O2 Device Nasal cannula   Bilateral Breath Sounds Diminished   Skin Condition/Temp Dry;Warm   Edema Right lower extremity;Left lower extremity   RLE Edema Trace   LLE Edema Trace   Vital Signs   /60   Temp 97.3 °F (36.3 °C)   Pulse 84   Respirations 18   SpO2 100 %   Pain Assessment   Pain Assessment None - Denies Pain   Pain Level 0   Hemodialysis Fistula/Graft Superior Arm   No placement date or time found.   Present on Admission/Arrival: Yes  Orientation: Superior  Access Location: Arm   Site Assessment Clean, dry & intact   Thrill Present   Bruit Present   Status Deaccessed   Dressing Intervention New   Dressing Status Clean, dry & intact;New dressing applied   Post-Hemodialysis Assessment   Post-Treatment Procedures Blood returned;Access bleeding time < 10 minutes   Machine Disinfection Process Acid/Vinegar Clean;Heat Disinfect;Exterior Machine Disinfection   Rinseback Volume (ml) 250 ml   Blood Volume Processed (Liters) 68 L   Dialyzer Clearance Lightly streaked   Duration of Treatment (minutes) 180 minutes   Hemodialysis Intake (ml) 500 ml   Hemodialysis Output (ml) 3000 ml   NET Removed (ml) 2500   Tolerated Treatment Good       Primary RN SBAR: KATHY España RN   Comments: Pt has a graft access. Labs reviewed, blood flow rate of 400 achieved. 2.5L off as ordered. Pt tolerated well. All possible blood returned, lines flushed and needles removed. Hemostasis achieved > 10 min. Dressing applied and dry, clean and intact on departure. VSS

## 2025-05-29 NOTE — PROGRESS NOTES
Received on a stretcher and was not able to move to the bed. VSS , on NSR on the monitor but his Troponin trended up to 263 from 193. C/O lower abdominal pain which was there when he came to the ED. No CP. Hospitalist informed.For dialysis today, report given.

## 2025-05-29 NOTE — CONSULTS
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  - Hyperlipidemia.  - DM.  - End stage CKD. Followed by Dr. Denilson Mcnulty.          Dialysis on MWF.  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 male with past medical history of nonobstructive CAD, CHF, end-stage renal disease presented to emergency room after episode of fall.  He could not get dialysis on Monday due to fistula was not working.  He reports getting winded over the last couple days.  He was feeling weak and had  28%; Rest 20%. Normal myocardial perfusion examination without evidence of ischemia or prior infarct.   Carotid Duplex 09/10/2024 JITENDRA rest:   There is no evidence of arterial insufficiency on the right at rest . JITENDRA is not reliable due to non-compressible vessels on the left.   Right JITENDRA:   Right Resting JITENDRA 1.22.   Left JITENDRA:   Left Resting JITENDRA 1.97.   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 LVEF is 30-35%. Grade 1 DD. Dilated LA. Normal RV function. No significant valve stenosis or regurgitation.   Echo 10/25/2022 EF range 30%-35%, The left ventricular ejection fraction was estimated to be 30%.  Dilated left ventricle with moderately decreased left ventricular function.  Mild mitral regurgitation.  Moderate pulmonary artery hypertension.   MPI 2018 EF 0.41 (41%), Remigio-lateral reversibility using pharmacological stress, consistent with branch vessel distribution ischemia.       ACTIVE ALLERGIES:  Ingredient Reaction (Severity) Medication Name Comment   ALBUMIN COLLOID, HUMAN Itching         PROBLEM LIST:  Problem List reviewed.   Problem Description Onset Date Chronic Clinical Status Notes   Mixed hyperlipidemia 2016 Y     DM type 2 2016 Y     Benign essential HTN 2016 N     Past Medical/Surgical History   (Detailed)  Disease/disorder Onset Date Management Date Comments   CAD       CKD       Diabetes, type 2       Hyperlipidemia       Hypertension       pancreatitis       Renal disease       sleep apnea       UTI         Dual ICD Implant         Family History   (Detailed)    Relationship Family Member Name  Age at Death Condition Onset Age Cause of Death   Father  Y  Hypertension  N   Father  Y  Cardiovascular disease  N   Mother  Y  Cardiovascular disease  N   Mother  Y

## 2025-05-29 NOTE — PROGRESS NOTES
[address], [place] [address]  Phone: [phone]   [phone]    [email or web address]     Nephrology Progress Note    Patient Name : Kenny Mace      : 1945     MRN : [id number]  Date of Admission : 2025  Date of Servive : 25    CC:  Follow up for ESRD       Assessment and Plan     ESRD - - [facility] unit--mwf, [facility]   Hyperkalemia - resolved  Hyponatremia  Anemia of ckd  Sec. Hyperpara  Sob  Fluid overload     PLAN-  HD today at bedside  UF 2-3 L  Continue calcitriol  Continue coreg     Interval History:  Seen and examined.  No complaints this AM.  Had some cp overnight.  For HD at bedside later today.    Review of Systems: Pertinent items are noted in HPI.    Current Medications:   Current Facility-Administered Medications   Medication Dose Route Frequency    carvedilol (COREG) tablet 6.25 mg  6.25 mg Oral BID WC    sodium chloride flush 0.9 % injection 5-40 mL  5-40 mL IntraVENous 2 times per day    sodium chloride flush 0.9 % injection 5-40 mL  5-40 mL IntraVENous PRN    0.9 % sodium chloride infusion   IntraVENous PRN    heparin (porcine) injection 5,000 Units  5,000 Units SubCUTAneous 3 times per day    ondansetron (ZOFRAN-ODT) disintegrating tablet 4 mg  4 mg Oral Q8H PRN    Or    ondansetron (ZOFRAN) injection 4 mg  4 mg IntraVENous Q6H PRN    polyethylene glycol (GLYCOLAX) packet 17 g  17 g Oral Daily    bisacodyl (DULCOLAX) suppository 10 mg  10 mg Rectal Daily PRN    acetaminophen (TYLENOL) tablet 650 mg  650 mg Oral Q6H PRN    Or    acetaminophen (TYLENOL) suppository 650 mg  650 mg Rectal Q6H PRN    aspirin chewable tablet 81 mg  81 mg Oral Daily    atorvastatin (LIPITOR) tablet 40 mg  40 mg Oral Nightly    bumetanide (BUMEX) tablet 4 mg  4 mg Oral BID    escitalopram (LEXAPRO) tablet 10 mg  10 mg Oral Daily    finasteride (PROSCAR) tablet 5 mg  5 mg Oral Daily    gabapentin (NEURONTIN) capsule 300

## 2025-05-29 NOTE — PROGRESS NOTES
End of Shift Note    Bedside shift change report given to HAI Christie (oncoming nurse) by June España RN (offgoing nurse).  Report included the following information SBAR    Shift worked:  7a-7p     Shift summary and any significant changes:     Pt went to Dialysis. Removed 2.1L . Tolerated well. Will go again tomorrow for fluid volume overload. Denies complaint of shortness of breath and pain.      Concerns for physician to address:  Possible discharge     Zone phone for oncoming shift:   [phone]       Activity:  Level of Assistance: Dependent, patient does less than 25%  Number times ambulated in hallways past shift: 0  Number of times OOB to chair past shift: 0    Cardiac:   Cardiac Monitoring: Yes          Access:  Current line(s): PIV     Genitourinary:   Urinary Status: Incontinent    Respiratory:   O2 Device: Nasal cannula  Chronic home O2 use?: YES  Incentive spirometer at bedside: YES    GI:  Last BM (including prior to admit): 05/29/25  Current diet:  ADULT DIET; Regular; 4 carb choices (60 gm/meal); Low Potassium (Less than 3000 mg/day)  Passing flatus: YES    Pain Management:   Patient states pain is manageable on current regimen: YES    Skin:  Herbert Scale Score: 16  Interventions: Wound Offloading (Prevention Methods): Repositioning, Turning    Patient Safety:  Fall Risk: Nursing Judgement-Fall Risk High(Add Comments): Yes  Fall Risk Interventions  Nursing Judgement-Fall Risk High(Add Comments): Yes  Toilet Every 2 Hours-In Advance of Need: Yes  Hourly Visual Checks: Awake, In bed  Fall Visual Posted: Armband, Socks  Room Door Open: Deferred to decrease stimulation  Alarm On: Bed  Patient Moved Closer to Nursing Station: No    Active Consults:   IP CONSULT TO NEPHROLOGY  IP CONSULT TO CARDIOLOGY    Length of Stay:  Expected LOS: 2  Actual LOS: 1    June España RN

## 2025-05-29 NOTE — PLAN OF CARE
Problem: Physical Therapy - Adult  Goal: By Discharge: Performs mobility at highest level of function for planned discharge setting.  See evaluation for individualized goals.  Description: FUNCTIONAL STATUS PRIOR TO ADMISSION: Patient with transient confusion on initial evaluation. Clarification recommended for accuracy. Per chart and patient report, he was ambulatory with RW or rollator inside the home and completed ADLs mostly independently. Patient endorsed difficulty with mobility/independence, falls prior to admission, sedentary lifestyle, and inability to walk. Wife transports patient to dialysis.     HOME SUPPORT PRIOR TO ADMISSION: The patient lived with his wife.    Physical Therapy Goals  Initiated 5/29/2025  1.  Patient will move from supine to sit and sit to supine, scoot up and down, and roll side to side in bed with minimal assistance within 7 day(s).    2.  Patient will perform sit to stand with minimal assistance within 7 day(s).  3.  Patient will transfer from bed to chair and chair to bed with minimal assistance using the least restrictive device within 7 day(s).  4.  Patient will ambulate with minimal assistance for 100 feet with the least restrictive device within 7 day(s).   5.  Patient will ascend/descend 1 stairs with 1 handrail(s) with minimal assistance within 7 day(s).   Outcome: Progressing     PHYSICAL THERAPY EVALUATION    Patient: Kenny Mace (79 y.o. male)  Date: 5/29/2025  Primary Diagnosis: Hyperkalemia [E87.5]  Acute left-sided CHF (congestive heart failure) (HCC) [I50.1]  Acute encephalopathy [G93.40]  Acute hypoxic respiratory failure (HCC) [J96.01]       Precautions: Restrictions/Precautions  Restrictions/Precautions: Fall Risk  Activity Level: Up with Assist, Up as Tolerated            ASSESSMENT :   DEFICITS/IMPAIRMENTS:   The patient is limited by decreased functional mobility, independence in ADLs, high-level IADLs, ROM, strength, body mechanics, activity tolerance, safety

## 2025-05-29 NOTE — FLOWSHEET NOTE
Pre Treatment:  Primary RN SBAR: June España RN  Incapacitated Nurse [facility]. provided: 2nd RN in suite  Patient Education provided: HD Treatment  Preferred Education method and Primary language: Verbal, English  Dialysis consent: Chronic Consent Applies  Hospital General Consent Verified: Yes  Hospital associated wait time; reason: None  Hepatitis B Surface Ag   Date/Time Value Ref Range Status   05/28/2025 10:14 AM <0.10 Index Final     Hep B S Ag Interp   Date/Time Value Ref Range Status   05/28/2025 10:14 AM Negative NEG   Final     Hep B S Ab   Date/Time Value Ref Range Status   05/28/2025 10:14 AM 4.85 mIU/mL Final     Hep B S Ab Interp   Date/Time Value Ref Range Status   05/28/2025 10:14 AM NONREACTIVE NR   Final     Comment:     (NOTE)  The ADVIA Centaur Anti-HBs2 assay is traceable to the World Health   Organization (WHO) Hepatitis B Immunoglobulin 1st International   Reference Preparation (1977). Samples with a calculated value of 10   mIU/mL or greater are considered reactive (protective) in accordance   with the CDC guidelines. The accepted criteria for immunity to HBV is   anti-HBs activity greater than or equal to 10 mIU/mL, as defined by   the WHO International Reference Preparation.  Assay performance has not been established in pregnant women,   patients who are immunosuppressed or immunocompromised, nor have   performance characteristics been established in conjunction with   other 's assays for specific HBV serologic markers. This   assay does not differentiate between vaccine induced immune response   and a response due to infection with HBV. Passively acquired anti-HBs   may be identified following patient transfusion, receipt of   immunoglobulin products, etc.          05/29/25 1211   Treatment   Treatment Goal 2500ml   Observations & Evaluations   Level of Consciousness 0   Oriented X 4   Heart Rhythm Regular   Respiratory Quality/Effort Unlabored   O2 Device Nasal cannula  (2L)

## 2025-05-29 NOTE — PROGRESS NOTES
Spiritual Health History and Assessment/Progress Note  [facility]    Initial Encounter,  ,  ,      Name: Kenny Mace MRN: [id number]    Age: 79 y.o.     Sex: male   Language: English   Jew: Hindu   Acute left-sided CHF (congestive heart failure) (HCC)     Date: 5/29/2025            Total Time Calculated: 49 min              Spiritual Assessment began in [facility] MULTI-SPECIALTY TELEMETRY        Referral/Consult From: Rounding   Encounter Overview/Reason: Initial Encounter  Service Provided For: Patient    [place], Belief, Meaning:   Patient identifies as spiritual, is connected with a fiorella tradition or spiritual practice, and has beliefs or practices that help with coping during difficult times  Family/Friends No family/friends present      Importance and Influence:  Patient has spiritual/personal beliefs that influence decisions regarding their health  Family/Friends No family/friends present    Community:  Patient is connected with a spiritual community and feels well-supported. Support system includes: Spouse/Partner, Children, [place] Community, and Extended family  Family/Friends No family/friends present    Assessment and Plan of Care:     Patient Interventions include: Facilitated expression of thoughts and feelings and Affirmed coping skills/support systems  Family/Friends Interventions include: No family/friends present    Patient Plan of Care: Spiritual Care available upon further referral  Family/Friends Plan of Care: Spiritual Care available upon further referral    Electronically signed by CB Torres on 5/29/2025 at 11:22 AM     RevJEAN PIERRE Talamantes, CB  [facility]   Paging Service [phone])

## 2025-05-29 NOTE — PROGRESS NOTES
Hospitalist Progress Note        Demographics    Patient Name  Kenny Mace   Date of Birth 1945   Medical Record Number  [id number]      Age  79 y.o.   PCP Ritchie Tejada MD   Admit date:  5/28/2025  7:37 AM     Room Number  1112/01  @ [facility]           Admission Diagnoses:  Acute left-sided CHF (congestive heart failure) ([facility])   Admission Summary:  Kenny Mace is a 79 y.o.  male      Diabetes mellitus type 2 causing end-stage renal disease, currently only uses insulin sparingly              Blood sugars normally in the 120s to 140s without insulin at home     Chronic systolic CHF, AICD in place  Denies prior CAD or MI              Last LVEF in 2024 was 25 to 30%     Essential hypertension, hyperlipidemia     End-stage renal disease on hemodialysis Monday Wednesday              Left upper extremity fistula/graft              Still makes urine, chronically on Bumex     Chronic oxygen at home as needed, reports he \"uses it now sparingly\"     Reports he missed hemodialysis on Monday              He told me it was because of an issue with his left upper extremity graft, they told him to come back today     Past several days increasing shortness of breath on exertion  Mild substernal chest pain  Generalized weakness to the point he was having difficulty getting up  Today he wanted to sliding down to the ground landing on his knees when he tried to stand              No LOC or head trauma  He was brought into the emergency room     Emergency room  Required 4 L nasal cannula oxygen but no hypoxia documented  Chest x-ray with mild interstitial edema  Elevated troponin 253 then 235 then 193  Potassium 7.5  BUN/creatinine 126 and 14.3  Sodium 129  EKG with sinus rhythm rate 71 right bundle branch block but QRS more prolonged than baseline to 0.174              First-degree AV block              T waves more peaked and prominent  Received IV calcium  Dr. Mcnulty saw, underwent urgent

## 2025-05-30 LAB
ALBUMIN SERPL-MCNC: 3.6 G/DL (ref 3.5–5)
ANION GAP SERPL CALC-SCNC: 11 MMOL/L (ref 2–12)
BUN SERPL-MCNC: 45 MG/DL (ref 6–20)
BUN/CREAT SERPL: 6 (ref 12–20)
CALCIUM SERPL-MCNC: 9.3 MG/DL (ref 8.5–10.1)
CHLORIDE SERPL-SCNC: 94 MMOL/L (ref 97–108)
CO2 SERPL-SCNC: 28 MMOL/L (ref 21–32)
CREAT SERPL-MCNC: 7.8 MG/DL (ref 0.7–1.3)
ERYTHROCYTE [DISTWIDTH] IN BLOOD BY AUTOMATED COUNT: 15.7 % (ref 11.5–14.5)
EST. AVERAGE GLUCOSE BLD GHB EST-MCNC: 120 MG/DL
GLUCOSE BLD STRIP.AUTO-MCNC: 107 MG/DL (ref 65–117)
GLUCOSE BLD STRIP.AUTO-MCNC: 138 MG/DL (ref 65–117)
GLUCOSE BLD STRIP.AUTO-MCNC: 163 MG/DL (ref 65–117)
GLUCOSE SERPL-MCNC: 97 MG/DL (ref 65–100)
HBA1C MFR BLD: 5.8 % (ref 4–5.6)
HCT VFR BLD AUTO: 28 % (ref 36.6–50.3)
HGB BLD-MCNC: 9.1 G/DL (ref 12.1–17)
MCH RBC QN AUTO: 30.4 PG (ref 26–34)
MCHC RBC AUTO-ENTMCNC: 32.5 G/DL (ref 30–36.5)
MCV RBC AUTO: 93.6 FL (ref 80–99)
NRBC # BLD: 0 K/UL (ref 0–0.01)
NRBC BLD-RTO: 0 PER 100 WBC
NT PRO BNP: ABNORMAL PG/ML
PHOSPHATE SERPL-MCNC: 7 MG/DL (ref 2.6–4.7)
PLATELET # BLD AUTO: 171 K/UL (ref 150–400)
PMV BLD AUTO: 11.6 FL (ref 8.9–12.9)
POTASSIUM SERPL-SCNC: 3.8 MMOL/L (ref 3.5–5.1)
RBC # BLD AUTO: 2.99 M/UL (ref 4.1–5.7)
SERVICE CMNT-IMP: ABNORMAL
SERVICE CMNT-IMP: ABNORMAL
SERVICE CMNT-IMP: NORMAL
SODIUM SERPL-SCNC: 133 MMOL/L (ref 136–145)
WBC # BLD AUTO: 5.2 K/UL (ref 4.1–11.1)

## 2025-05-30 PROCEDURE — 1100000000 HC RM PRIVATE

## 2025-05-30 PROCEDURE — 82962 GLUCOSE BLOOD TEST: CPT

## 2025-05-30 PROCEDURE — 2700000000 HC OXYGEN THERAPY PER DAY

## 2025-05-30 PROCEDURE — 6370000000 HC RX 637 (ALT 250 FOR IP): Performed by: INTERNAL MEDICINE

## 2025-05-30 PROCEDURE — 94760 N-INVAS EAR/PLS OXIMETRY 1: CPT

## 2025-05-30 PROCEDURE — 83036 HEMOGLOBIN GLYCOSYLATED A1C: CPT

## 2025-05-30 PROCEDURE — 85027 COMPLETE CBC AUTOMATED: CPT

## 2025-05-30 PROCEDURE — 90935 HEMODIALYSIS ONE EVALUATION: CPT

## 2025-05-30 PROCEDURE — 2500000003 HC RX 250 WO HCPCS: Performed by: INTERNAL MEDICINE

## 2025-05-30 PROCEDURE — 80069 RENAL FUNCTION PANEL: CPT

## 2025-05-30 PROCEDURE — 83880 ASSAY OF NATRIURETIC PEPTIDE: CPT

## 2025-05-30 PROCEDURE — 36415 COLL VENOUS BLD VENIPUNCTURE: CPT

## 2025-05-30 PROCEDURE — 6360000002 HC RX W HCPCS: Performed by: INTERNAL MEDICINE

## 2025-05-30 RX ADMIN — SODIUM CHLORIDE, PRESERVATIVE FREE 10 ML: 5 INJECTION INTRAVENOUS at 21:33

## 2025-05-30 RX ADMIN — HEPARIN SODIUM 5000 UNITS: 5000 INJECTION INTRAVENOUS; SUBCUTANEOUS at 21:32

## 2025-05-30 RX ADMIN — PRIMIDONE 25 MG: 50 TABLET ORAL at 21:33

## 2025-05-30 RX ADMIN — TAMSULOSIN HYDROCHLORIDE 0.8 MG: 0.4 CAPSULE ORAL at 14:47

## 2025-05-30 RX ADMIN — MELATONIN 6 MG: at 21:52

## 2025-05-30 RX ADMIN — HEPARIN SODIUM 5000 UNITS: 5000 INJECTION INTRAVENOUS; SUBCUTANEOUS at 06:41

## 2025-05-30 RX ADMIN — BUMETANIDE 4 MG: 1 TABLET ORAL at 21:32

## 2025-05-30 RX ADMIN — HEPARIN SODIUM 5000 UNITS: 5000 INJECTION INTRAVENOUS; SUBCUTANEOUS at 15:35

## 2025-05-30 RX ADMIN — FINASTERIDE 5 MG: 5 TABLET, FILM COATED ORAL at 14:47

## 2025-05-30 RX ADMIN — BUMETANIDE 4 MG: 1 TABLET ORAL at 14:48

## 2025-05-30 RX ADMIN — PRIMIDONE 25 MG: 50 TABLET ORAL at 14:51

## 2025-05-30 RX ADMIN — ESCITALOPRAM OXALATE 10 MG: 10 TABLET ORAL at 14:47

## 2025-05-30 RX ADMIN — PRIMIDONE 25 MG: 50 TABLET ORAL at 14:47

## 2025-05-30 RX ADMIN — GABAPENTIN 300 MG: 300 CAPSULE ORAL at 21:33

## 2025-05-30 RX ADMIN — ATORVASTATIN CALCIUM 40 MG: 20 TABLET, FILM COATED ORAL at 21:33

## 2025-05-30 RX ADMIN — ASPIRIN 81 MG: 81 TABLET, CHEWABLE ORAL at 14:47

## 2025-05-30 ASSESSMENT — PAIN SCALES - GENERAL: PAINLEVEL_OUTOF10: 0

## 2025-05-30 NOTE — PROGRESS NOTES
Lots of improvement noted; pt requested to be assisted at bedside and was able to do a self bath. B/sugar controlled and took meds per MAR. No urine during the shift. For another session of dialysis today.  0650: Pt remained in high spirit, woke up at 0600 and gave himself another bath.Dialysis called and his night report has been given.His CBC and BMP will be ordered and drawn during dialysis session.

## 2025-05-30 NOTE — PLAN OF CARE
Problem: Occupational Therapy - Adult  Goal: By Discharge: Performs self-care activities at highest level of function for planned discharge setting.  See evaluation for individualized goals.  Description: FUNCTIONAL STATUS PRIOR TO ADMISSION:  The patient reports being modified independent with functional mobility using RW or rollator, and independent-min A for ADLs. He uses home O2 PRN, typically only at night. Had a fall PTA.   HOME SUPPORT: Patient lived with his wife who provided assistance.    Occupational Therapy Goals:  Initiated 5/29/2025  1.  Patient will perform grooming with supervision in unsupported sit within 7 day(s).  2.  Patient will perform upper body dressing with Set-up within 7 day(s).  3.  Patient will perform lower body dressing with Moderate Assist within 7 day(s).  4.  Patient will perform toilet transfers with Moderate Assist  within 7 day(s).  5.  Patient will perform all aspects of toileting with Moderate Assist within 7 day(s).  6.  Patient will participate in upper extremity therapeutic exercise/activities with Supervision for 5 minutes within 7 day(s).    7.  Patient will utilize energy conservation techniques during functional activities with verbal cues within 7 day(s).   5/29/2025 1415 by Isabela Tolbert OT  Outcome: Progressing     Problem: Discharge Planning  Goal: Discharge to home or other facility with appropriate resources  Outcome: Progressing

## 2025-05-30 NOTE — CARE COORDINATION
Pt is clear from CM standpoint to be d/c on 5/31/25.    Pt's spouse will transport.    Care Management Initial Assessment       RUR: 26%  Readmission? No  1st IM letter given? Yes - 5/28/25  1st  letter given: No    CM introduce self, explain role and confirmed demographics with pt and pt's spouse.    Pt lives with his spouse in an one story home with a ramp to enter.    Pt goes to [facility] in [place] on M/W/F at 8 am.    Pt has a hx of home health-[facility] and SNF-[facility].    No hx of inpatient rehab.    Pt uses [facility] in [place].    CM sent referral to [facility] due to pt did not want [facility] for a home health agency.       05/30/25 1603   Service Assessment   Patient Orientation Alert and Oriented   Cognition Alert   History Provided By Patient   Primary Caregiver Self   Support Systems Spouse/Significant Other   Patient's Healthcare Decision Maker is: Legal Next of Kin  (pt's spouse Teresita Mace)   PCP Verified by CM Yes   Last Visit to PCP Within last 3 months   Prior Functional Level Assistance with the following:;Bathing;Dressing;Toileting;Cooking;Housework;Shopping;Mobility   Current Functional Level Assistance with the following:;Bathing;Dressing;Cooking;Housework;Shopping;Mobility;Toileting   Can patient return to prior living arrangement Yes   Family able to assist with home care needs: Yes   Would you like for me to discuss the discharge plan with any other family members/significant others, and if so, who? Yes  (pt's spouse-Teresita Mace)   Financial Resources Medicare   Community Resources None   Social/Functional History   Lives With Spouse   Type of Home House   Home Equipment Walker - Rolling;Rollator;Cane;Grab bars;Oxygen  (Ramp and shower chair)   Active  No   Patient's  Info Pt's spouse   Discharge Planning   Type of Residence House   Current Services Prior To Admission Oxygen Therapy  (Home oxygen PRN 2 L [facility])   Patient expects to be

## 2025-05-30 NOTE — FLOWSHEET NOTE
Pre Treatment:  Primary RN SBAR: Yury Dick RN  Incapacitated Nurse [facility]. provided: 2nd RN in suite  Patient Education provided: HD Treatment  Preferred Education method and Primary language: Verbal, English  Dialysis consent: Chronic consent applies  Hospital General Consent Verified: Yes  Hospital associated wait time; reason: none  Hepatitis B Surface Ag   Date/Time Value Ref Range Status   05/28/2025 10:14 AM <0.10 Index Final     Hep B S Ag Interp   Date/Time Value Ref Range Status   05/28/2025 10:14 AM Negative NEG   Final     Hep B S Ab   Date/Time Value Ref Range Status   05/28/2025 10:14 AM 4.85 mIU/mL Final     Hep B S Ab Interp   Date/Time Value Ref Range Status   05/28/2025 10:14 AM NONREACTIVE NR   Final     Comment:     (NOTE)  The ADVIA Centaur Anti-HBs2 assay is traceable to the World Health   Organization (WHO) Hepatitis B Immunoglobulin 1st International   Reference Preparation (1977). Samples with a calculated value of 10   mIU/mL or greater are considered reactive (protective) in accordance   with the CDC guidelines. The accepted criteria for immunity to HBV is   anti-HBs activity greater than or equal to 10 mIU/mL, as defined by   the WHO International Reference Preparation.  Assay performance has not been established in pregnant women,   patients who are immunosuppressed or immunocompromised, nor have   performance characteristics been established in conjunction with   other 's assays for specific HBV serologic markers. This   assay does not differentiate between vaccine induced immune response   and a response due to infection with HBV. Passively acquired anti-HBs   may be identified following patient transfusion, receipt of   immunoglobulin products, etc.          05/30/25 0744   Treatment   Treatment Goal 3000ml   Observations & Evaluations   Level of Consciousness 0   Oriented X 4   Heart Rhythm Regular   Respiratory Quality/Effort Unlabored   O2 Device Nasal cannula  (2L)    Arterial Pressure (mmHg) -140 mmHg   Venous Pressure (mmHg) 200   TMP 80      Comments treatment started   Access Visible Yes   Ultrafiltration Rate (ml/hr) 1000 ml/hr   Ultrafiltration Removed (ml) 0 ml     Post Treatment:   05/30/25 1130   Treatment   Time Off 1130   Observations & Evaluations   Level of Consciousness 0   Oriented X 4   Heart Rhythm Regular   Respiratory Quality/Effort Unlabored   O2 Device Nasal cannula  (2L)   Bilateral Breath Sounds Diminished   Skin Condition/Temp Dry;Warm   Abdomen Inspection Obese;Distended   Bowel Sounds (All Quadrants) Active   Edema Right lower extremity;Left lower extremity   RLE Edema Trace   LLE Edema Trace   Vital Signs   /76   Temp 97.5 °F (36.4 °C)   Pulse 69   Respirations 18   SpO2 94 %   Pain Assessment   Pain Assessment None - Denies Pain   During Hemodialysis Assessment   Comments treatment complete   Hemodialysis Fistula/Graft Superior Arm   No placement date or time found.   Present on Admission/Arrival: Yes  Orientation: Superior  Access Location: Arm   Site Assessment Clean, dry & intact   Thrill Present   Bruit Present   Status Deaccessed   Dressing Intervention New   Dressing Status New dressing applied   Post-Hemodialysis Assessment   Post-Treatment Procedures Blood returned;Access bleeding time < 10 minutes   Machine Disinfection Process Acid/Vinegar Clean;Heat Disinfect;Exterior Machine Disinfection   Rinseback Volume (ml) 300 ml   Blood Volume Processed (Liters) 79 L   Dialyzer Clearance Lightly streaked   Duration of Treatment (minutes) 210 minutes   Hemodialysis Intake (ml) 500 ml   Hemodialysis Output (ml) 3275 ml   NET Removed (ml) 2775   Tolerated Treatment Fair   Interventions Taken Ultrafiltration stopped   Physician Notified Yes   Patient Disposition Return to room     Primary RN SBAR: June España, RN  Comments: Pt tolerated treatment well. UF turned off the last 15 minutes of treatment due to cramping. Dr. Mcnulty notified.

## 2025-05-30 NOTE — PROGRESS NOTES
Hospitalist Progress Note        Demographics    Patient Name  Kenny Mace   Date of Birth 1945   Medical Record Number  [id number]      Age  79 y.o.   PCP Ritchie Tejada MD   Admit date:  5/28/2025  7:37 AM     Room Number  1112/01  @ [facility]           Admission Diagnoses:  Acute left-sided CHF (congestive heart failure) (Formerly [facility])   Admission Summary:  Kenny Mace is a 79 y.o.  male      Diabetes mellitus type 2 causing end-stage renal disease, currently only uses insulin sparingly              Blood sugars normally in the 120s to 140s without insulin at home     Chronic systolic CHF, AICD in place  Denies prior CAD or MI              Last LVEF in 2024 was 25 to 30%     Essential hypertension, hyperlipidemia     End-stage renal disease on hemodialysis Monday Wednesday              Left upper extremity fistula/graft              Still makes urine, chronically on Bumex     Chronic oxygen at home as needed, reports he \"uses it now sparingly\"     Reports he missed hemodialysis on Monday              He told me it was because of an issue with his left upper extremity graft, they told him to come back today     Past several days increasing shortness of breath on exertion  Mild substernal chest pain  Generalized weakness to the point he was having difficulty getting up  Today he wanted to sliding down to the ground landing on his knees when he tried to stand              No LOC or head trauma  He was brought into the emergency room     Emergency room  Required 4 L nasal cannula oxygen but no hypoxia documented  Chest x-ray with mild interstitial edema  Elevated troponin 253 then 235 then 193  Potassium 7.5  BUN/creatinine 126 and 14.3  Sodium 129  EKG with sinus rhythm rate 71 right bundle branch block but QRS more prolonged than baseline to 0.174              First-degree AV block              T waves more peaked and prominent  Received IV calcium  Dr. Mcnulty saw, underwent urgent  weakness  Neurological ROS: positive for - weakness    Comments  79-year-old male, laying in bed in no apparent distress     Patient Vitals for the past 24 hrs:   BP   05/30/25 1130 122/76   05/30/25 1115 106/68   05/30/25 1100 122/75   05/30/25 1045 110/65   05/30/25 1030 129/62   05/30/25 1015 134/74   05/30/25 1000 134/74   05/30/25 0945 132/73   05/30/25 0930 123/69   05/30/25 0915 133/67   05/30/25 0900 113/61   05/30/25 0845 127/71   05/30/25 0830 132/65   05/30/25 0815 139/78   05/30/25 0800 (!) 141/78   05/30/25 0744 134/75   05/30/25 0700 (!) 141/79   05/30/25 0113 (!) 141/82   05/29/25 2114 136/76   05/29/25 1945 136/76   05/29/25 1522 113/60   05/29/25 1515 98/61   05/29/25 1500 106/61   05/29/25 1445 110/63   05/29/25 1430 (!) 112/56   05/29/25 1415 114/62   05/29/25 1400 116/63   05/29/25 1345 (!) 113/54   05/29/25 1330 (!) 113/58      Patient Vitals for the past 24 hrs:   Pulse   05/30/25 1130 69   05/30/25 1115 66   05/30/25 1100 76   05/30/25 1045 58   05/30/25 1030 64   05/30/25 1015 59   05/30/25 1000 64   05/30/25 0945 57   05/30/25 0930 63   05/30/25 0915 65   05/30/25 0900 66   05/30/25 0845 63   05/30/25 0830 59   05/30/25 0815 63   05/30/25 0800 76   05/30/25 0744 77   05/30/25 0700 79   05/30/25 0113 82   05/29/25 1945 78   05/29/25 1522 84   05/29/25 1500 72   05/29/25 1445 75   05/29/25 1430 76   05/29/25 1415 65   05/29/25 1400 76   05/29/25 1345 76   05/29/25 1330 76      Patient Vitals for the past 24 hrs:   Resp   05/30/25 1130 18   05/30/25 0744 18   05/30/25 0700 20   05/30/25 0113 17   05/29/25 1945 16   05/29/25 1522 18      Patient Vitals for the past 24 hrs:   Temp   05/30/25 1130 97.5 °F (36.4 °C)   05/30/25 0744 97.2 °F (36.2 °C)   05/30/25 0700 98.4 °F (36.9 °C)   05/30/25 0113 98.2 °F (36.8 °C)   05/29/25 1945 98.6 °F (37 °C)   05/29/25 1522 97.3 °F (36.3 °C)        SpO2 Readings from Last 6 Encounters:   05/30/25 94%   04/22/25 93%   04/17/25 95%   03/06/25 100%   02/17/25

## 2025-05-30 NOTE — PROGRESS NOTES
Physical Therapy  Chart reviewed for updates and attempted to see patient for PT treatment. Patient currently BRANDT for HD. Will defer and continue to follow.   Semaj Rincon, PT, DPT

## 2025-05-30 NOTE — PROGRESS NOTES
Physician Progress Note      PATIENT:               MARY KUMAR  CSN #:                  [id number]  :                       1945  ADMIT DATE:       2025 7:37 AM  DISCH DATE:  RESPONDING  PROVIDER #:        Joycelyn Mcnulty MD          QUERY TEXT:    Acute encephalopathy is documented under admit diagnosis in Dr JEAN PIERRE Mcnulty    note.  Please provide additional clinical indicators supportive of   encephalopathy. Or please document if the diagnosis of encephalopathy has been   ruled out after study.    The clinical indicators include:   Dr JEAN PIERRE Mcnulty: Alert and oriented x3   SHANTHI Bocanegra RN: Oriented x4   WILSON Bradley NP: Alert and oriented x 3  Options provided:  -- encephalopathy was ruled out  -- encephalopathy present as evidenced by, Please document evidence.  -- Other - I will add my own diagnosis  -- Disagree - Not applicable / Not valid  -- Disagree - Clinically unable to determine / Unknown  -- Refer to Clinical Documentation Reviewer    PROVIDER RESPONSE TEXT:    Provider disagreed with this query.  Not entered by me ,.,    Query created by: NANDO HYDE on 2025 9:15 AM      Electronically signed by:  Joycelyn Mcnulty MD 2025 12:55 PM

## 2025-05-30 NOTE — PROGRESS NOTES
% injection 5-40 mL  5-40 mL IntraVENous PRN    0.9 % sodium chloride infusion   IntraVENous PRN    heparin (porcine) injection 5,000 Units  5,000 Units SubCUTAneous 3 times per day    ondansetron (ZOFRAN-ODT) disintegrating tablet 4 mg  4 mg Oral Q8H PRN    Or    ondansetron (ZOFRAN) injection 4 mg  4 mg IntraVENous Q6H PRN    polyethylene glycol (GLYCOLAX) packet 17 g  17 g Oral Daily    bisacodyl (DULCOLAX) suppository 10 mg  10 mg Rectal Daily PRN    acetaminophen (TYLENOL) tablet 650 mg  650 mg Oral Q6H PRN    Or    acetaminophen (TYLENOL) suppository 650 mg  650 mg Rectal Q6H PRN    aspirin chewable tablet 81 mg  81 mg Oral Daily    atorvastatin (LIPITOR) tablet 40 mg  40 mg Oral Nightly    bumetanide (BUMEX) tablet 4 mg  4 mg Oral BID    escitalopram (LEXAPRO) tablet 10 mg  10 mg Oral Daily    finasteride (PROSCAR) tablet 5 mg  5 mg Oral Daily    gabapentin (NEURONTIN) capsule 300 mg  300 mg Oral Nightly    primidone (MYSOLINE) tablet 25 mg  25 mg Oral TID    tamsulosin (FLOMAX) capsule 0.8 mg  0.8 mg Oral Daily    hydrALAZINE (APRESOLINE) injection 20 mg  20 mg IntraVENous Q6H PRN    melatonin tablet 6 mg  6 mg Oral Nightly PRN    glucose chewable tablet 16 g  4 tablet Oral PRN    dextrose bolus 10% 125 mL  125 mL IntraVENous PRN    Or    dextrose bolus 10% 250 mL  250 mL IntraVENous PRN    glucagon injection 1 mg  1 mg SubCUTAneous PRN    dextrose 10 % infusion   IntraVENous Continuous PRN    insulin lispro (HUMALOG,ADMELOG) injection vial 0-8 Units  0-8 Units SubCUTAneous 4x Daily AC & HS              Joycelyn Mcnulty MD

## 2025-05-30 NOTE — PROGRESS NOTES
Occupational Therapy   Chart review; RN states patient off unit for HD; will retry later as able. Sara Lofton OTR/L

## 2025-05-30 NOTE — PLAN OF CARE
Problem: Chronic Conditions and Co-morbidities  Goal: Patient's chronic conditions and co-morbidity symptoms are monitored and maintained or improved  Outcome: Progressing  Flowsheets (Taken 5/30/2025 0700)  Care Plan - Patient's Chronic Conditions and Co-Morbidity Symptoms are Monitored and Maintained or Improved: Monitor and assess patient's chronic conditions and comorbid symptoms for stability, deterioration, or improvement     Problem: Discharge Planning  Goal: Discharge to home or other facility with appropriate resources  5/30/2025 1656 by June España RN  Outcome: Progressing  5/30/2025 0407 by Yury Dick RN  Outcome: Progressing     Problem: Safety - Adult  Goal: Free from fall injury  Outcome: Progressing     Problem: Skin/Tissue Integrity  Goal: Skin integrity remains intact  Description: 1.  Monitor for areas of redness and/or skin breakdown2.  Assess vascular access sites hourly3.  Every 4-6 hours minimum:  Change oxygen saturation probe site4.  Every 4-6 hours:  If on nasal continuous positive airway pressure, respiratory therapy assess nares and determine need for appliance change or resting period  5/30/2025 1656 by June España RN  Outcome: Progressing  5/30/2025 0407 by Yury Dick RN  Outcome: Progressing

## 2025-05-30 NOTE — PROGRESS NOTES
Nephrology Progress Note  [facility] / [place] [facility]  [address], [address]  [place] [address]  Phone - [phone]  Fax - [phone]                   Patient: Kenny Mace                     YOB: 1945        Date- 5/30/2025                                     Admit Date: 5/28/2025   CC: Follow up for end-stage renal disease       IMPRESSION & PLAN:   ESRD-- [facility] unit--[facility], [facility]   Hyponatremia  Anemia of ckd  Sec. Hyperpara  Sob  Fluid overload      PLAN-  Seen on dialysis today  Removed 3 kg as tolerated  Okay to discharge after dialysis today     Subjective:  Interval History:   - Seen on dialysis today  Dialysis flow rate 400 mL/min  No shortness of breath    Objective:   Vitals:    05/30/25 0915 05/30/25 0930 05/30/25 0945 05/30/25 1000   BP: 133/67 123/69 132/73 134/74   Pulse: 65 63 57 64   Resp:       Temp:       TempSrc:       SpO2:       Weight:       Height:          I/O last 3 completed shifts:  In: 650 [P.O.:150]  Out: 3000   No intake/output data recorded.      Physical exam:    GEN: NAD  NECK- no mass  RESP: No wheezing   NEURO: Normal speech, Non focal  EXT: No Edema   PSYCH: Normal Mood    Chart reviewed.         Pertinent Notes reviewed.       Data Review :  Lab Results   Component Value Date/Time     05/30/2025 08:14 AM    K 3.8 05/30/2025 08:14 AM    CL 94 05/30/2025 08:14 AM    CO2 28 05/30/2025 08:14 AM    BUN 45 05/30/2025 08:14 AM    CREATININE 7.80 05/30/2025 08:14 AM    GLUCOSE 97 05/30/2025 08:14 AM    CALCIUM 9.3 05/30/2025 08:14 AM       Lab Results   Component Value Date    WBC 5.2 05/30/2025    HGB 9.1 (L) 05/30/2025    HCT 28.0 (L) 05/30/2025    MCV 93.6 05/30/2025     05/30/2025      Recent Labs     05/28/25  1506 05/29/25  0323 05/30/25  0814   * 134* 133*   K 3.7 4.5 3.8   CL 96* 94* 94*   CO2 28 28 28   GLUCOSE 92 89 97   BUN 51* 57* 45*   CREATININE  40 mg  40 mg Oral Nightly    bumetanide (BUMEX) tablet 4 mg  4 mg Oral BID    escitalopram (LEXAPRO) tablet 10 mg  10 mg Oral Daily    finasteride (PROSCAR) tablet 5 mg  5 mg Oral Daily    gabapentin (NEURONTIN) capsule 300 mg  300 mg Oral Nightly    primidone (MYSOLINE) tablet 25 mg  25 mg Oral TID    tamsulosin (FLOMAX) capsule 0.8 mg  0.8 mg Oral Daily    hydrALAZINE (APRESOLINE) injection 20 mg  20 mg IntraVENous Q6H PRN    melatonin tablet 6 mg  6 mg Oral Nightly PRN    glucose chewable tablet 16 g  4 tablet Oral PRN    dextrose bolus 10% 125 mL  125 mL IntraVENous PRN    Or    dextrose bolus 10% 250 mL  250 mL IntraVENous PRN    glucagon injection 1 mg  1 mg SubCUTAneous PRN    dextrose 10 % infusion   IntraVENous Continuous PRN    insulin lispro (HUMALOG,ADMELOG) injection vial 0-8 Units  0-8 Units SubCUTAneous 4x Daily AC & HS        Denilson Mcnulty MD  5/30/2025

## 2025-05-30 NOTE — PROGRESS NOTES
Physician Progress Note      PATIENT:               MARY KUMAR  CSN #:                  [id number]  :                       1945  ADMIT DATE:       2025 7:37 AM  DISCH DATE:  RESPONDING  PROVIDER #:        Luisa Bradley          QUERY TEXT:    acute CHF is documented in the medical record (H&P). Please provide additional   clinical indicators supportive of your documentation. Or please document if   the diagnosis of acute CHF has been ruled out after study.    The clinical indicators include:  --No rales ( H&P and  Dr JEAN PIERRE Mcnulty cardio). no orthopnea ( Dr Mcnulty)  --Chest x-ray 25: Enlarged cardiac silhouette, mild interstitial edema  --proBNP 12,220 (Lab)  \"He had no dialysis in 5 days\"  \"Hemodialysis in ED\"   (H&P Dr Davis)  --MAR: Lasix 40 mg IV x1  Options provided:  -- Acute systolic CHF was ruled out and chronic systolic CHF only POA   confirmed  -- Acute systolic CHF confirmed as evidenced by, Please document evidence.  -- Other - I will add my own diagnosis  -- Disagree - Not applicable / Not valid  -- Disagree - Clinically unable to determine / Unknown  -- Refer to Clinical Documentation Reviewer    PROVIDER RESPONSE TEXT:    Acute systolic CHF confirmed as evidenced by Interstitial edema    Query created by: NANDO HYDE on 2025 10:57 AM      Electronically signed by:  Luisa Bradley 2025 8:37 AM           chest pain

## 2025-05-31 VITALS
DIASTOLIC BLOOD PRESSURE: 66 MMHG | TEMPERATURE: 97.7 F | WEIGHT: 242.51 LBS | SYSTOLIC BLOOD PRESSURE: 114 MMHG | HEIGHT: 72 IN | HEART RATE: 69 BPM | RESPIRATION RATE: 18 BRPM | OXYGEN SATURATION: 94 % | BODY MASS INDEX: 32.85 KG/M2

## 2025-05-31 LAB
GLUCOSE BLD STRIP.AUTO-MCNC: 122 MG/DL (ref 65–117)
GLUCOSE BLD STRIP.AUTO-MCNC: 279 MG/DL (ref 65–117)
SERVICE CMNT-IMP: ABNORMAL
SERVICE CMNT-IMP: ABNORMAL

## 2025-05-31 PROCEDURE — 6360000002 HC RX W HCPCS: Performed by: INTERNAL MEDICINE

## 2025-05-31 PROCEDURE — 2500000003 HC RX 250 WO HCPCS: Performed by: INTERNAL MEDICINE

## 2025-05-31 PROCEDURE — 82962 GLUCOSE BLOOD TEST: CPT

## 2025-05-31 PROCEDURE — 6370000000 HC RX 637 (ALT 250 FOR IP): Performed by: INTERNAL MEDICINE

## 2025-05-31 RX ORDER — ATORVASTATIN CALCIUM 40 MG/1
40 TABLET, FILM COATED ORAL NIGHTLY
Qty: 30 TABLET | Refills: 3 | Status: SHIPPED | OUTPATIENT
Start: 2025-05-31

## 2025-05-31 RX ORDER — BUMETANIDE 2 MG/1
4 TABLET ORAL 2 TIMES DAILY
Qty: 30 TABLET | Refills: 3 | Status: SHIPPED | OUTPATIENT
Start: 2025-05-31

## 2025-05-31 RX ADMIN — ESCITALOPRAM OXALATE 10 MG: 10 TABLET ORAL at 09:48

## 2025-05-31 RX ADMIN — PRIMIDONE 25 MG: 50 TABLET ORAL at 09:48

## 2025-05-31 RX ADMIN — BUMETANIDE 4 MG: 1 TABLET ORAL at 09:48

## 2025-05-31 RX ADMIN — CARVEDILOL 6.25 MG: 6.25 TABLET, FILM COATED ORAL at 09:47

## 2025-05-31 RX ADMIN — FINASTERIDE 5 MG: 5 TABLET, FILM COATED ORAL at 09:48

## 2025-05-31 RX ADMIN — TAMSULOSIN HYDROCHLORIDE 0.8 MG: 0.4 CAPSULE ORAL at 09:47

## 2025-05-31 RX ADMIN — SODIUM CHLORIDE, PRESERVATIVE FREE 10 ML: 5 INJECTION INTRAVENOUS at 09:51

## 2025-05-31 RX ADMIN — ASPIRIN 81 MG: 81 TABLET, CHEWABLE ORAL at 09:48

## 2025-05-31 RX ADMIN — HEPARIN SODIUM 5000 UNITS: 5000 INJECTION INTRAVENOUS; SUBCUTANEOUS at 06:24

## 2025-05-31 RX ADMIN — INSULIN LISPRO 4 UNITS: 100 INJECTION, SOLUTION INTRAVENOUS; SUBCUTANEOUS at 11:39

## 2025-05-31 NOTE — PLAN OF CARE
Problem: Chronic Conditions and Co-morbidities  Goal: Patient's chronic conditions and co-morbidity symptoms are monitored and maintained or improved  5/31/2025 1424 by Km Prince RN  Outcome: Adequate for Discharge  5/31/2025 1052 by Km Prince RN  Outcome: Progressing     Problem: Discharge Planning  Goal: Discharge to home or other facility with appropriate resources  5/31/2025 1424 by Km Prince RN  Outcome: Adequate for Discharge  5/31/2025 1052 by Km Prince RN  Outcome: Progressing     Problem: Safety - Adult  Goal: Free from fall injury  5/31/2025 1424 by Km Prince RN  Outcome: Adequate for Discharge  5/31/2025 1052 by Km Prince RN  Outcome: Progressing     Problem: Skin/Tissue Integrity  Goal: Skin integrity remains intact  Description: 1.  Monitor for areas of redness and/or skin breakdown2.  Assess vascular access sites hourly3.  Every 4-6 hours minimum:  Change oxygen saturation probe site4.  Every 4-6 hours:  If on nasal continuous positive airway pressure, respiratory therapy assess nares and determine need for appliance change or resting period  5/31/2025 1424 by Km Prince RN  Outcome: Adequate for Discharge  5/31/2025 1052 by Km Prince RN  Outcome: Progressing

## 2025-05-31 NOTE — PROGRESS NOTES
Patient determined to be stable for discharge by attending provider. I have reviewed the discharge instructions and follow-up appointments with the pt. They verbalized understanding and all questions were answered to their satisfaction. No complaints or further questions were expressed.       New medications: Appropriate educational materials and medication side effect teaching were provided.       PIV were removed prior to discharge.     All personal items collected during admission were returned to the patient prior to discharge.    Km Prince RN

## 2025-05-31 NOTE — PROGRESS NOTES
End of Shift Note    Bedside shift change report given to HAI Draper  (oncoming nurse) by June España RN (offgoing nurse).  Report included the following information SBAR    Shift worked:  7a 7p     Shift summary and any significant changes:     Pt went to dialysis withdrew 3. L of fluid.Tolerated well. Spoke with Case Management regarding d/c to SNF versus Home with home health     Concerns for physician to address:  Discharge plans     Zone phone for oncoming shift:   [phone]       Activity:  Level of Assistance: Moderate assist, patient does 50-74%  Number times ambulated in hallways past shift: 0  Number of times OOB to chair past shift: 0    Cardiac:   Cardiac Monitoring: Yes      Cardiac Rhythm: Sinus rhythm    Access:  Current line(s): PIV     Genitourinary:   Urinary Status: Anuria    Respiratory:   O2 Device: Nasal cannula (2L)  Chronic home O2 use?: YES  Incentive spirometer at bedside: NO    GI:  Last BM (including prior to admit): 05/29/25  Current diet:  ADULT DIET; Regular; 4 carb choices (60 gm/meal); Low Potassium (Less than 3000 mg/day)  Passing flatus: YES    Pain Management:   Patient states pain is manageable on current regimen: YES    Skin:  Herbert Scale Score: 17  Interventions: Wound Offloading (Prevention Methods): Repositioning    Patient Safety:  Fall Risk: Nursing Judgement-Fall Risk High(Add Comments): Yes  Fall Risk Interventions  Nursing Judgement-Fall Risk High(Add Comments): Yes  Toilet Every 2 Hours-In Advance of Need: Yes  Hourly Visual Checks: Awake, Quiet, Rooming in  Fall Visual Posted: Socks  Room Door Open: Deferred to decrease stimulation  Alarm On: Bed  Patient Moved Closer to Nursing Station: No    Active Consults:   IP CONSULT TO NEPHROLOGY  IP CONSULT TO CARDIOLOGY  IP CONSULT TO CASE MANAGEMENT    Length of Stay:  Expected LOS: 5  Actual LOS: 2    June España RN

## 2025-05-31 NOTE — DISCHARGE SUMMARY
5/       Physician Discharge Summary     Pt Name  Kenny Mace   Admit date:  5/28/2025   Discharge date and time:   5/31/25   Room Number  1112/01    Medical Record Number  [id number] @ [facility]   Age  79 y.o.   Date of Birth 1945   PCP Ritchie Tejada MD     Admission Diagnoses:                        Acute left-sided CHF (congestive heart failure) (HCC)   Present on Admission:   Acute left-sided CHF (congestive heart failure) (HCC)   Hyperkalemia       Allergies   Allergen Reactions    Albumin (Human) Anaphylaxis and Hives    Albumin Human Anaphylaxis and Hives    Niacin Hives        Excerpt from HPI :   Kenny Mace is a 79 y.o.  male      Diabetes mellitus type 2 causing end-stage renal disease, currently only uses insulin sparingly              Blood sugars normally in the 120s to 140s without insulin at home     Chronic systolic CHF, AICD in place  Denies prior CAD or MI              Last LVEF in 2024 was 25 to 30%     Essential hypertension, hyperlipidemia     End-stage renal disease on hemodialysis Monday Wednesday              Left upper extremity fistula/graft              Still makes urine, chronically on Bumex     Chronic oxygen at home as needed, reports he \"uses it now sparingly\"     Reports he missed hemodialysis on Monday              He told me it was because of an issue with his left upper extremity graft, they told him to come back today     Past several days increasing shortness of breath on exertion  Mild substernal chest pain  Generalized weakness to the point he was having difficulty getting up  Today he wanted to sliding down to the ground landing on his knees when he tried to stand              No LOC or head trauma  He was brought into the emergency room     Emergency room  Required 4 L nasal cannula oxygen but no hypoxia documented  Chest x-ray with mild interstitial edema  Elevated troponin 253 then 235 then 193  Potassium 7.5  BUN/creatinine 126 and  mild interstitial edema  Echo 6/20/2024 reveals EF of 25 to 30%.  Cardiology consulted-appreciate expertise  ProBNP today am 11,576  Continue PO Bumex- dose increase per nephrology/ cardiology    Not on Entresto due to CKD/hyperkalemia   Per cardiologists Dr. Mcnulty -Minimal troponin elevation is type II, he had recent left heart catheterization that was unremarkable, no further cardiac work up needed   Continue medical management as above with aspirin, statin, BP meds  Nephrology Dr. Mcnulty following-appreciate input         Periumbilical and left abdominal pain acute on chronic POA  Diarrhea in emergency room unclear etiology POA resolved  No fever or leukocytosis, no rebound or guarding        Diabetes mellitus type 2 causing end-stage renal disease POA     Home regimen: Uses insulin as needed based on sliding scale   Reports he has \"not used insulin in a while\", blood sugars usually 120s to 140s  Diabetic diet  Sliding scale insulin  POC  Check  A1c     Essential HTN   Hyperlipidemia      Continue ASA  Continue statin  Continue home Coreg  PRN hydralazine      Essential tremor  Continue primidone     Chronic respiratory failure with home O2  Reports he has oxygen at home but just uses it as needed  Reports not using it regularly               Treatment team Treatment Team:   Irene Irizarry MD Baig, MD Hamlet Roper, MD Hamlet Romano, MD Marky Hirsch Kelly, HAI Bradley, Ufei A, APRN - CNP  Nida Denise, HAI Meadows, Km Bolden RN Jones, Courtney   Consultations  IP CONSULT TO NEPHROLOGY  IP CONSULT TO CARDIOLOGY  IP CONSULT TO CASE MANAGEMENT   Procedures/Surgeries  * No surgery found *     Condition at the time of discharge  Improved and stable       Query  None noted today        Disposition Home with family and home health services   Diet cardiac diet   Care Plan discussed with Patient/Family, Nurse, and    Follow up Follow-up Information    None

## 2025-05-31 NOTE — CARE COORDINATION
Transition of Care Plan:    RUR: 26%-\"High Risk\"  Prior Level of Functioning: The patient needs assistance with his ADLs and IADLs  Disposition: Home with Home Health Services-the patient wanted to switch home health agencies. [facility] is reviewing but cannot confirm acceptance until Monday, 6/2/25. CM has explained this to his wife and she is agreeable to move forward with the patient's d/c today, 5/31/25. CM placed the contact information for [facility] on the patient's AVS  MARILEE: 5/31/25  If SNF or IPR: Date FOC offered: N/A  Follow up appointments: PCP & Specialists as indicated   DME needed: The patient has a shower chair, rollator, cane, and FW walker. [facility]  Transportation at discharge: The patient's wife will be transporting him home  IM/IMM Medicare/ letter given: 2nd IM given & signed on 5/30/25  Is patient a  and connected with [facility]? N/A    If yes, was  transfer form completed and [facility] notified?   Caregiver Contact: Teresita Mace, Wife, Phone: [phone]  Discharge Caregiver contacted prior to discharge? CM spoke to the patient's wife via phone  Care Conference needed? No  Barriers to discharge: N/A, patient is being discharged today, 5/31/25    CM spoke to the patient's wife via phone to discuss the patient's d/c today, 5/31/25. She understands that [facility] is unable to confirm the patient's acceptance until Monday, 6/2/25. CM put the contact information for [facility] on the patient's AVS and encouraged the patient's wife to call [facility] on Monday, 5/31/25. She stated that she will do this. She stated that she will be at [facility] in about an hour to transport the patient home. 2nd IM letter was given and signed on 5/30/25.     From CM perspective, the patient can be discharged.     Essie Weinstein, LOYD  [phone]

## 2025-08-23 DIAGNOSIS — G25.0 BENIGN ESSENTIAL TREMOR SYNDROME: ICD-10-CM

## 2025-08-25 RX ORDER — PRIMIDONE 50 MG/1
TABLET ORAL
Qty: 45 TABLET | Refills: 5 | Status: SHIPPED | OUTPATIENT
Start: 2025-08-25

## (undated) DEVICE — COVER,MAYO STAND,STERILE: Brand: MEDLINE

## (undated) DEVICE — SUT PROL 6-0 24IN BV1 DA BLU --

## (undated) DEVICE — INFECTION CONTROL KIT SYS

## (undated) DEVICE — PART NUMBER 108260, VTI 8 MHZ DISPOSABLE DOPPLER PROBE, STRAIGHT, BOX: Brand: VTI 8 MHZ DISPOSABLE DOPPLER PROBE, STRAIGHT, BOX

## (undated) DEVICE — GUIDEWIRE VASC L260CM 0.035IN J TIP L3MM PTFE FIX COR NAMIC

## (undated) DEVICE — BLADE ASSEMB CLP HAIR FINE --

## (undated) DEVICE — SOL INJ SOD CL 0.9% 500ML BG --

## (undated) DEVICE — PROVE COVER: Brand: UNBRANDED

## (undated) DEVICE — REM POLYHESIVE ADULT PATIENT RETURN ELECTRODE: Brand: VALLEYLAB

## (undated) DEVICE — RING BASIN GUIDEWIRE BOWL: Brand: MEDLINE INDUSTRIES, INC.

## (undated) DEVICE — CATHETER GUID 6FR 0.071IN COR AMPLATZ L 0.75 MID

## (undated) DEVICE — HEART CATH-MRMC: Brand: MEDLINE INDUSTRIES, INC.

## (undated) DEVICE — DEVON™ KNEE AND BODY STRAP 60" X 3" (1.5 M X 7.6 CM): Brand: DEVON

## (undated) DEVICE — INTRODUCER SHTH 7FR CANN L11CM DIL TIP 35MM ORNG TUNGSTEN

## (undated) DEVICE — INFLATION DEVICE: Brand: ENCORE™ 26

## (undated) DEVICE — MICROPUNCTURE INTRODUCER SET SILHOUETTE TRANSITIONLESS WITH STAINLESS STEEL WIRE GUIDE: Brand: MICROPUNCTURE

## (undated) DEVICE — Device

## (undated) DEVICE — STERILE POLYISOPRENE POWDER-FREE SURGICAL GLOVES: Brand: PROTEXIS

## (undated) DEVICE — (D)STRIP SKN CLSR 0.5X4IN WHT --

## (undated) DEVICE — CATHETER COR DIAG MP MPA 5FR 100CM 2 SIDE H DXTERITY

## (undated) DEVICE — CATHETER DIAG 5FR L100CM LUMN ID0.047IN JR4 CRV 0 SIDE H

## (undated) DEVICE — SOLUTION IV 500ML 0.9% SOD CHL FLX CONT

## (undated) DEVICE — LOOP VES MAXI YEL 2/PK

## (undated) DEVICE — HANDLE LT SNAP ON ULT DURABLE LENS FOR TRUMPF ALC DISPOSABLE

## (undated) DEVICE — TOWEL SURG W17XL27IN STD BLU COT NONFENESTRATED PREWASHED

## (undated) DEVICE — STAPLER SKIN H3.9MM WIRE DIA0.58MM CRWN 6.9MM 35 STPL ROT

## (undated) DEVICE — NEEDLE HYPO 18GA L1.5IN PNK S STL HUB POLYPR SHLD REG BVL

## (undated) DEVICE — Device: Brand: JELCO

## (undated) DEVICE — PREP SKN CHLRAPRP APL 26ML STR --

## (undated) DEVICE — KERLIX BANDAGE ROLL: Brand: KERLIX

## (undated) DEVICE — SUT PROL 5-0 36IN RB1 DA BLU --

## (undated) DEVICE — SPONGE LAP 18X18IN STRL -- 5/PK

## (undated) DEVICE — SUT ETHLN 4-0 18IN PS2 BLK --

## (undated) DEVICE — GLOVE SURG SZ 75 CRM LTX FREE POLYISOPRENE POLYMER BEAD ANTI

## (undated) DEVICE — SUTURE NONABSORBABLE MONOFILAMENT CV-6 TTC-9 24 IN GORTX 6K02B

## (undated) DEVICE — (D)PREP SKN CHLRAPRP APPL 26ML -- CONVERT TO ITEM 371833

## (undated) DEVICE — PTA BALLOON DILATATION CATHETER: Brand: MUSTANG™

## (undated) DEVICE — SYR 3ML LL TIP 1/10ML GRAD --

## (undated) DEVICE — 3M™ TEGADERM™ TRANSPARENT FILM DRESSING FRAME STYLE, 1626W, 4 IN X 4-3/4 IN (10 CM X 12 CM), 50/CT 4CT/CASE: Brand: 3M™ TEGADERM™

## (undated) DEVICE — COVER,MAYO STAND,XL,STERILE: Brand: MEDLINE

## (undated) DEVICE — CATHETER DIAG 5FR L100CM AL AL 1.0 CRV SZ DBL BRAID WIRE

## (undated) DEVICE — DRAPE,LAPAROTOMY,PED,STERILE: Brand: MEDLINE

## (undated) DEVICE — GLIDESHEATH SLENDER ACCESS KIT: Brand: GLIDESHEATH SLENDER

## (undated) DEVICE — SUTURE VCRL SZ 3-0 L27IN ABSRB UD L26MM SH 1/2 CIR J416H

## (undated) DEVICE — LABEL MED CARD MRMC STRL

## (undated) DEVICE — SYRINGE ANGIO 10 CC BRL STD PRNT POLYCARB LT BLU MEDALLION

## (undated) DEVICE — CATHETER COR DIAG AMPLATZ R MOD CRV 5FR 100CM 0 SIDE H

## (undated) DEVICE — SUTURE PERMAHAND SZ 3-0 L30IN NONABSORBABLE BLK SILK BRAID A304H

## (undated) DEVICE — X-RAY SPONGES,16 PLY: Brand: DERMACEA

## (undated) DEVICE — RADIFOCUS GLIDEWIRE: Brand: GLIDEWIRE

## (undated) DEVICE — SUTURE PROL SZ 5-0 L24IN NONABSORBABLE BLU RB-2 L13IN 1/2 8554H

## (undated) DEVICE — TUBING PRSS MON L6IN PVC M FEM CONN

## (undated) DEVICE — SOLUTION IV 1000ML 0.9% SOD CHL

## (undated) DEVICE — VESSEL LOOPS,MINI, RED: Brand: DEVON

## (undated) DEVICE — SYR IRR BLB 2OZ DISP BLU STRL -- CONVERT TO ITEM 357637

## (undated) DEVICE — TELFA NON-ADHERENT PADS PREPAK: Brand: TELFA

## (undated) DEVICE — GAUZE,SPONGE,2"X2",8PLY,STERILE,LF,2'S: Brand: MEDLINE

## (undated) DEVICE — CATHETER GUID 6FR 0.071IN COR AMPLATZ R 1 MID FLEXIBILITY

## (undated) DEVICE — CATHETER COR DIAG 4.0 5FR 100CM 0 SIDE H

## (undated) DEVICE — GAUZE SPONGES,12 PLY: Brand: CURITY

## (undated) DEVICE — OR HYBRID-MRMC: Brand: MEDLINE INDUSTRIES, INC.

## (undated) DEVICE — SUTURE NONABSORBABLE MONOFILAMENT 3-0 PS-1 18 IN BLK ETHILON 1663H

## (undated) DEVICE — SPLINT WR VELC FOAM NEUT POS DISP FOR RAD ART ACC SFT STRP

## (undated) DEVICE — HI-TORQUE VERSACORE FLOPPY GUIDE WIRE SYSTEM 145 CM: Brand: HI-TORQUE VERSACORE